# Patient Record
Sex: FEMALE | Race: BLACK OR AFRICAN AMERICAN | NOT HISPANIC OR LATINO | Employment: OTHER | URBAN - METROPOLITAN AREA
[De-identification: names, ages, dates, MRNs, and addresses within clinical notes are randomized per-mention and may not be internally consistent; named-entity substitution may affect disease eponyms.]

---

## 2017-01-16 ENCOUNTER — OFFICE VISIT (OUTPATIENT)
Dept: OPHTHALMOLOGY | Facility: CLINIC | Age: 82
End: 2017-01-16
Payer: MEDICARE

## 2017-01-16 DIAGNOSIS — H01.9 EYELID INFLAMMATION: ICD-10-CM

## 2017-01-16 DIAGNOSIS — H02.889 MGD (MEIBOMIAN GLAND DISEASE), UNSPECIFIED LATERALITY: ICD-10-CM

## 2017-01-16 DIAGNOSIS — H40.1232 LOW-TENSION GLAUCOMA OF BOTH EYES, MODERATE STAGE: Primary | ICD-10-CM

## 2017-01-16 DIAGNOSIS — H26.493 POSTERIOR CAPSULAR OPACIFICATION NON VISUALLY SIGNIFICANT OF BOTH EYES: ICD-10-CM

## 2017-01-16 DIAGNOSIS — Z96.1 PSEUDOPHAKIA: ICD-10-CM

## 2017-01-16 PROCEDURE — 92012 INTRM OPH EXAM EST PATIENT: CPT | Mod: S$GLB,,, | Performed by: OPHTHALMOLOGY

## 2017-01-16 PROCEDURE — 99999 PR PBB SHADOW E&M-EST. PATIENT-LVL II: CPT | Mod: PBBFAC,,, | Performed by: OPHTHALMOLOGY

## 2017-01-16 PROCEDURE — 99499 UNLISTED E&M SERVICE: CPT | Mod: S$GLB,,, | Performed by: OPHTHALMOLOGY

## 2017-01-16 RX ORDER — BRIMONIDINE TARTRATE AND TIMOLOL MALEATE 2; 5 MG/ML; MG/ML
1 SOLUTION OPHTHALMIC 2 TIMES DAILY
Qty: 30 ML | Refills: 3 | Status: SHIPPED | OUTPATIENT
Start: 2017-01-16 | End: 2018-10-19 | Stop reason: SDUPTHER

## 2017-01-16 RX ORDER — LATANOPROST 50 UG/ML
1 SOLUTION/ DROPS OPHTHALMIC NIGHTLY
Qty: 6 BOTTLE | Refills: 3 | Status: SHIPPED | OUTPATIENT
Start: 2017-01-16 | End: 2017-05-16 | Stop reason: SDUPTHER

## 2017-01-16 NOTE — PROGRESS NOTES
HPI     DLS: 9/16/16    Pt here for IOP check;    Meds: Combigan bid ou            Latanoprost qhs ou     1. Posterior capsular opacification non visually significant of both eyes  2. Low tension glaucoma of both eyes, moderate stage  3. Eyelid inflammation, both eyes  4. MGD (meibomian gland disease), unspecified laterality   5. Non-insulin dependent type 2 diabetes mellitus  6. Pseudophakia, both eyes        Last edited by Salud Hartman on 1/16/2017  8:06 AM.         Assessment /Plan     For exam results, see Encounter Report.    Low-tension glaucoma of both eyes, moderate stage    Eyelid inflammation - Both Eyes    MGD (meibomian gland disease), unspecified laterality    Posterior capsular opacification non visually significant of both eyes - Both Eyes    Pseudophakia - Both Eyes      1.  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 Present since 2006.     Family history   ??  Glaucoma meds   Brimonidine BID ou, xalatan OU  H/O adverse rxn to glaucoma drops  none  LASERS   none  GLAUCOMA SURGERIES  : none   OTHER EYE SURGERIES  : PCIOL ou- OD- 3/15/06, 9/27/06 OS   CDR  0.85-0.9 w/ sup thinning // 0.7  Tbase  11-15 / 11-13  Tmax      15/13 (on gtts) (? Tmax off gtts)  Ttarget   12/12  HVF  15 test 2002 to 2016 - -early SNS - fluctuates // ?early SAD, ?early IAD/NS   Gonio  +2-3 OU (narrow inf - but doubt pupillary block - PC IOL ou)  CCT  561/567  OCT  6 test 2007 to 2016 - RNFL - nl od // nl os   HRT 11 test 2007 to 2016 -MR -  Dec. S/I od // dec. S/I, bord N os /// CDR 0.76 od // 0.75 os  Disc photos  2002, 2003 - slides // 2007, 2012, 2014, 2015  - OIS     Ttoday  10/10  Test done today: IOP check    2. PCO ou  -mild - monitor   3. MGD /eyelid Inflammation / Blepharitis  WC/LH/AT's   4. Pseudophakia ou - PC IOL ou   -status post cataract extraction and insertion of intraocular lens - Both Eyes   -minimal PCO ou         Plan    POAG - Low Tension Component OD > OS  Target is 12 ou   CSM   -combignan ou bid  - Latanoprost ou q hs    Patient c/o of trouble with reading- recheck current Rx with good lighting conditions- patient sees 20/20- not more clear with change in Rx- continue Rx for now but use reading light.       Pt has letter from insurance company - she wa getting 2 bottles of latanoprost because she has trouble making one bottle last - but she is only allowed to have 1 bottle per month. -- If she can not make one bottle last can change to travatan or lumigan - higher co - pay , but the non generic bottle has a smaller more controlled gtts size and so last better.  Rx written for them to dispense 2 bottles per month 2/2 dexterity issues - may or may not work / PT did get 2 per month - now doing ok (6/25/2015)    RTC 4 months - HRT/Gonio     I have seen and personally examined the patient.  I agree with the findings, assessment and plan of the resident and/or fellow.     Lupis Lundberg MD

## 2017-02-14 ENCOUNTER — OFFICE VISIT (OUTPATIENT)
Dept: INTERNAL MEDICINE | Facility: CLINIC | Age: 82
End: 2017-02-14
Payer: MEDICARE

## 2017-02-14 ENCOUNTER — HOSPITAL ENCOUNTER (OUTPATIENT)
Dept: RADIOLOGY | Facility: HOSPITAL | Age: 82
Discharge: HOME | End: 2017-02-14
Attending: INTERNAL MEDICINE
Payer: MEDICARE

## 2017-02-14 VITALS
OXYGEN SATURATION: 96 % | HEART RATE: 63 BPM | DIASTOLIC BLOOD PRESSURE: 78 MMHG | WEIGHT: 137.56 LBS | BODY MASS INDEX: 23.49 KG/M2 | SYSTOLIC BLOOD PRESSURE: 128 MMHG | HEIGHT: 64 IN

## 2017-02-14 DIAGNOSIS — I10 ESSENTIAL HYPERTENSION: ICD-10-CM

## 2017-02-14 DIAGNOSIS — M25.519 SHOULDER PAIN, UNSPECIFIED CHRONICITY, UNSPECIFIED LATERALITY: ICD-10-CM

## 2017-02-14 DIAGNOSIS — M25.519 SHOULDER PAIN, UNSPECIFIED CHRONICITY, UNSPECIFIED LATERALITY: Primary | ICD-10-CM

## 2017-02-14 DIAGNOSIS — E11.29 TYPE 2 DIABETES MELLITUS WITH MICROALBUMINURIA, WITHOUT LONG-TERM CURRENT USE OF INSULIN: ICD-10-CM

## 2017-02-14 DIAGNOSIS — R80.9 TYPE 2 DIABETES MELLITUS WITH MICROALBUMINURIA, WITHOUT LONG-TERM CURRENT USE OF INSULIN: ICD-10-CM

## 2017-02-14 PROCEDURE — 1125F AMNT PAIN NOTED PAIN PRSNT: CPT | Mod: S$GLB,,, | Performed by: INTERNAL MEDICINE

## 2017-02-14 PROCEDURE — 1159F MED LIST DOCD IN RCRD: CPT | Mod: S$GLB,,, | Performed by: INTERNAL MEDICINE

## 2017-02-14 PROCEDURE — 99215 OFFICE O/P EST HI 40 MIN: CPT | Mod: S$GLB,,, | Performed by: INTERNAL MEDICINE

## 2017-02-14 PROCEDURE — 73030 X-RAY EXAM OF SHOULDER: CPT | Mod: 26,LT,, | Performed by: RADIOLOGY

## 2017-02-14 PROCEDURE — 1157F ADVNC CARE PLAN IN RCRD: CPT | Mod: S$GLB,,, | Performed by: INTERNAL MEDICINE

## 2017-02-14 PROCEDURE — 99999 PR PBB SHADOW E&M-EST. PATIENT-LVL III: CPT | Mod: PBBFAC,,, | Performed by: INTERNAL MEDICINE

## 2017-02-14 PROCEDURE — 73030 X-RAY EXAM OF SHOULDER: CPT | Mod: TC,LT

## 2017-02-14 PROCEDURE — 1160F RVW MEDS BY RX/DR IN RCRD: CPT | Mod: S$GLB,,, | Performed by: INTERNAL MEDICINE

## 2017-02-14 PROCEDURE — 99499 UNLISTED E&M SERVICE: CPT | Mod: S$GLB,,, | Performed by: INTERNAL MEDICINE

## 2017-02-14 NOTE — MR AVS SNAPSHOT
[facility]  [address]  [facility] [address]  Phone: [phone]  Fax: [phone]                  Inna Blankenship   2017 11:30 AM   Office Visit    Description:  Female : 1926   Provider:  Stacy Rodriguez MD   Department:  [facility]           Reason for Visit     Fall           Diagnoses this Visit        Comments    Shoulder pain, unspecified chronicity, unspecified laterality    -  Primary     Essential hypertension                To Do List           Future Appointments        Provider Department Dept Phone    2017 12:30 PM LAB, APPOINTMENT NOMC INTMED [facility] [phone]    2017 1:45 PM NOM [id number] LB LIMIT [facility] [phone]    2017 9:30 AM Stacy Rodriguez MD [facility] [phone]    2017 8:15 AM PERIMETRY, HUMPH [facility] [phone]    2017 8:30 AM Lupis Lundberg MD [facility] [phone]      Goals (5 Years of Data)     None      [facility] Nurse Care Line -  Assistance  Registered nurses in the [facility] provide clinical advisement, health education, appointment booking, and other advisory services.  Call for this free service at [phone].             Medications           Message regarding Medications     Verify the changes and/or additions to your medication regime listed below are the same as discussed with your clinician today.  If any of these changes or additions are incorrect, please notify your healthcare provider.             Verify that the below list of medications is an accurate representation of the medications you are currently taking.  If none reported, the list may be blank. If incorrect, please contact your healthcare provider. Carry this list with you in case of emergency.           Current Medications     amlodipine (NORVASC) 10 MG tablet  "Take 1 tablet (10 mg total) by mouth once daily.    atorvastatin (LIPITOR) 20 MG tablet Take 1 tablet (20 mg total) by mouth once daily.    brimonidine-timolol (COMBIGAN) 0.2-0.5 % Drop Place 1 drop into both eyes 2 (two) times daily. Disp 10 mls for 1 month    CALCIUM CARBONATE ([id number]-903 ORAL) Take 1 tablet by mouth Daily. 1  Oral Every day    latanoprost 0.005 % ophthalmic solution Place 1 drop into both eyes every evening. 2.5mL does not last a full 30 days due to dexterity issues. Disp. 2 bottles per month.    lisinopril (PRINIVIL,ZESTRIL) 40 MG tablet Take 1 tablet (40 mg total) by mouth once daily.           Clinical Reference Information           Your Vitals Were     BP Pulse Height Weight SpO2 BMI    128/78 63 5' 4" (1.626 m) 62.4 kg (137 lb 9.1 oz) 96% 23.61 kg/m2      Blood Pressure          Most Recent Value    BP  128/78      Allergies as of 2/14/2017     No Known Allergies      Immunizations Administered on Date of Encounter - 2/14/2017     None      Orders Placed During Today's Visit     Future Labs/Procedures Expected by Expires    CBC W/ AUTO DIFFERENTIAL  2/14/2017 4/15/2018    COMPREHENSIVE METABOLIC PANEL  2/14/2017 4/15/2018    X-Ray Shoulder 2 or More Views Left  2/14/2017 2/14/2018      [facility] Sign-Up     Activating your [facility] account is as easy as 1-2-3!     1) Visit [email or web address], select Sign Up Now, enter this activation code and your date of birth, then select Next.  [email or web address]  Expires: 3/31/2017 12:02 PM      2) Create a username and password to use when you visit [facility] in the future and select a security question in case you lose your password and select Next.    3) Enter your e-mail address and click Sign Up!    Additional Information  If you have questions, please e-mail [email or web address] or call [phone] to talk to our [facility] staff. Remember, [facility] is NOT to be used for urgent needs. For medical emergencies, dial 911.         Language Assistance " Services     ATTENTION: Language assistance services are available, free of charge. Please call [phone].      ATENCIÓN: Si habla español, tiene a retana disposición servicios gratuitos de asistencia lingüística. Llame al [phone].     CHÚ Ý: N?u b?n nói Ti?ng Vi?t, có các d?ch v? h? tr? ngôn ng? mi?n phí dành cho b?n. G?i s? [phone].         [facility] complies with applicable Federal civil rights laws and does not discriminate on the basis of race, color, national origin, age, disability, or sex.

## 2017-02-19 NOTE — PROGRESS NOTES
Subjective:       Patient ID: Inna Blankenship is a 89 y.o. female.    Chief Complaint: Diabetes    HPI: She has diabetes.  Denies polyuria, polydipsia.  She tripped and fell about 2 or 3 days ago.  Complains of left shoulder pain.  Denies numbness, no weakness    PAST MEDICAL HISTORY: Hypertension, hyperlipidemia, diabetes with nephropathy (diet   controlled), glaucoma, osteopenia , colon adenoma. She had a colonoscopy February 2011     PAST SURGICAL HISTORY: Hysterectomy.     MEDICATIONS: Lisinopril 40-mg daily, Xalatan drops, Lipitor 20 mg daily, amlodipine 10 mg daily     ALLERGIES: No known drug allergies.        Review of Systems   Constitutional: Negative for chills, fatigue, fever and unexpected weight change.   Respiratory: Negative for chest tightness and shortness of breath.    Cardiovascular: Negative for chest pain and palpitations.   Gastrointestinal: Negative for abdominal pain and blood in stool.   Neurological: Negative for dizziness, syncope, numbness and headaches.       Objective:      Physical Exam   HENT:   Right Ear: External ear normal.   Left Ear: External ear normal.   Nose: Nose normal.   Mouth/Throat: Oropharynx is clear and moist.   Eyes: Pupils are equal, round, and reactive to light.   Neck: Normal range of motion.   Cardiovascular: Normal rate and regular rhythm.    No murmur heard.  Pulmonary/Chest: Breath sounds normal.   Abdominal: She exhibits no distension. There is no hepatosplenomegaly. There is no tenderness.   Lymphadenopathy:     She has no cervical adenopathy.     She has no axillary adenopathy.   Neurological: She has normal strength and normal reflexes. No cranial nerve deficit or sensory deficit.     left shoulder without tenderness or redness  Assessment:         assessment and plan: 1.  Diabetes: Check CMP and CBC  2.  Shoulder pain: Check left shoulder x-ray  3.  She was here for an urgent care only appointment.  She will return to clinic for a physical  Plan:       As  above

## 2017-02-21 ENCOUNTER — OFFICE VISIT (OUTPATIENT)
Dept: INTERNAL MEDICINE | Facility: CLINIC | Age: 82
End: 2017-02-21
Payer: MEDICARE

## 2017-02-21 DIAGNOSIS — E11.9 TYPE 2 DIABETES MELLITUS WITHOUT COMPLICATION, WITHOUT LONG-TERM CURRENT USE OF INSULIN: ICD-10-CM

## 2017-02-21 DIAGNOSIS — I10 ESSENTIAL HYPERTENSION: Primary | ICD-10-CM

## 2017-02-21 PROCEDURE — 99215 OFFICE O/P EST HI 40 MIN: CPT | Mod: S$GLB,,, | Performed by: INTERNAL MEDICINE

## 2017-02-21 PROCEDURE — 99499 UNLISTED E&M SERVICE: CPT | Mod: S$GLB,,, | Performed by: INTERNAL MEDICINE

## 2017-02-21 PROCEDURE — 99999 PR PBB SHADOW E&M-EST. PATIENT-LVL III: CPT | Mod: PBBFAC,,, | Performed by: INTERNAL MEDICINE

## 2017-02-21 PROCEDURE — 1126F AMNT PAIN NOTED NONE PRSNT: CPT | Mod: S$GLB,,, | Performed by: INTERNAL MEDICINE

## 2017-02-21 PROCEDURE — 1157F ADVNC CARE PLAN IN RCRD: CPT | Mod: S$GLB,,, | Performed by: INTERNAL MEDICINE

## 2017-02-21 PROCEDURE — 1159F MED LIST DOCD IN RCRD: CPT | Mod: S$GLB,,, | Performed by: INTERNAL MEDICINE

## 2017-02-21 PROCEDURE — 1160F RVW MEDS BY RX/DR IN RCRD: CPT | Mod: S$GLB,,, | Performed by: INTERNAL MEDICINE

## 2017-02-21 RX ORDER — AMLODIPINE BESYLATE 10 MG/1
10 TABLET ORAL DAILY
Qty: 90 TABLET | Refills: 1 | Status: SHIPPED | OUTPATIENT
Start: 2017-02-21 | End: 2017-06-22 | Stop reason: SDUPTHER

## 2017-02-21 RX ORDER — LISINOPRIL 40 MG/1
40 TABLET ORAL DAILY
Qty: 90 TABLET | Refills: 1 | Status: SHIPPED | OUTPATIENT
Start: 2017-02-21 | End: 2017-06-22 | Stop reason: SDUPTHER

## 2017-02-21 RX ORDER — ATORVASTATIN CALCIUM 20 MG/1
20 TABLET, FILM COATED ORAL DAILY
Qty: 90 TABLET | Refills: 1 | Status: SHIPPED | OUTPATIENT
Start: 2017-02-21 | End: 2017-06-22 | Stop reason: SDUPTHER

## 2017-02-21 NOTE — MR AVS SNAPSHOT
[facility]  [address]  [facility] [address]  Phone: [phone]  Fax: [phone]                  Inna Blankenship   2017 9:30 AM   Office Visit    Description:  Female : 1926   Provider:  Stacy Rodriguez MD   Department:  [facility]           Reason for Visit     Follow-up           Diagnoses this Visit        Comments    Essential hypertension    -  Primary     Type 2 diabetes mellitus without complication, without long-term current use of insulin                To Do List           Future Appointments        Provider Department Dept Phone    2017 8:15 AM PERIMETRY, HUMPH [facility] - Ophthal[facility] [phone]    2017 8:30 AM Lupis Lundberg MD [facility] [phone]    2017 10:30 AM MD Johnny Martinez [facility] [phone]      Goals (5 Years of Data)     None      Follow-Up and Disposition     Return for EPP 4 months.       These Medications        Disp Refills Start End    lisinopril (PRINIVIL,ZESTRIL) 40 MG tablet 90 tablet 1 2017     Take 1 tablet (40 mg total) by mouth once daily. - Oral    Pharmacy: [facility] [address] [place] [address] (), [place] - [address] Ph #: [phone]       atorvastatin (LIPITOR) 20 MG tablet 90 tablet 1 2017     Take 1 tablet (20 mg total) by mouth once daily. - Oral    Pharmacy: [facility] [address] [place] [address] (), [place] - [address] Ph #: [phone]       Notes to Pharmacy: **Patient requests 90 day supply**    amlodipine (NORVASC) 10 MG tablet 90 tablet 1 2017     Take 1 tablet (10 mg total) by mouth once daily. - Oral    Pharmacy: [facility] [address] [place] [address] (N), [place] - [address] Ph #: [phone]       Notes to Pharmacy: **Patient requests 90 day supply**      [facility]     [facility] Nurse Care Line -  Assistance  Registered nurses in the [facility] provide clinical  "advisement, health education, appointment booking, and other advisory services.  Call for this free service at [phone].             Medications           Message regarding Medications     Verify the changes and/or additions to your medication regime listed below are the same as discussed with your clinician today.  If any of these changes or additions are incorrect, please notify your healthcare provider.             Verify that the below list of medications is an accurate representation of the medications you are currently taking.  If none reported, the list may be blank. If incorrect, please contact your healthcare provider. Carry this list with you in case of emergency.           Current Medications     amlodipine (NORVASC) 10 MG tablet Take 1 tablet (10 mg total) by mouth once daily.    atorvastatin (LIPITOR) 20 MG tablet Take 1 tablet (20 mg total) by mouth once daily.    brimonidine-timolol (COMBIGAN) 0.2-0.5 % Drop Place 1 drop into both eyes 2 (two) times daily. Disp 10 mls for 1 month    CALCIUM CARBONATE ([id number] ORAL) Take 1 tablet by mouth Daily. 1  Oral Every day    latanoprost 0.005 % ophthalmic solution Place 1 drop into both eyes every evening. 2.5mL does not last a full 30 days due to dexterity issues. Disp. 2 bottles per month.    lisinopril (PRINIVIL,ZESTRIL) 40 MG tablet Take 1 tablet (40 mg total) by mouth once daily.           Clinical Reference Information           Your Vitals Were     BP Pulse Temp Height Weight BMI    124/60 50 97.6 °F (36.4 °C) 5' 4" (1.626 m) 61.8 kg (136 lb 3.9 oz) 23.39 kg/m2      Blood Pressure          Most Recent Value    BP  124/60      Allergies as of 2/21/2017     No Known Allergies      Immunizations Administered on Date of Encounter - 2/21/2017     None      Orders Placed During Today's Visit     Future Labs/Procedures Expected by Expires    Comprehensive metabolic panel  2/21/2017 2/21/2018    Hemoglobin A1c  2/21/2017 2/21/2018    Lipid panel  " 2/21/2017 2/21/2018      [facility] Sign-Up     Activating your [facility] account is as easy as 1-2-3!     1) Visit [email or web address], select Sign Up Now, enter this activation code and your date of birth, then select Next.  [email or web address]  Expires: 3/31/2017 12:02 PM      2) Create a username and password to use when you visit [facility] in the future and select a security question in case you lose your password and select Next.    3) Enter your e-mail address and click Sign Up!    Additional Information  If you have questions, please e-mail [email or web address] or call [phone] to talk to our [facility] staff. Remember, [facility] is NOT to be used for urgent needs. For medical emergencies, dial 911.         Language Assistance Services     ATTENTION: Language assistance services are available, free of charge. Please call [phone].      ATENCIÓN: Si habla timothy, tiene a retana disposición servicios gratuitos de asistencia lingüística. Llame al [phone].     WALI Ý: N?u b?n nói Ti?ng Vi?t, có các d?ch v? h? tr? ngôn ng? mi?n phí dành cho b?n. G?i s? [phone].         [facility] complies with applicable Federal civil rights laws and does not discriminate on the basis of race, color, national origin, age, disability, or sex.

## 2017-02-26 VITALS
HEART RATE: 62 BPM | WEIGHT: 136.25 LBS | TEMPERATURE: 98 F | HEIGHT: 64 IN | BODY MASS INDEX: 23.26 KG/M2 | DIASTOLIC BLOOD PRESSURE: 60 MMHG | SYSTOLIC BLOOD PRESSURE: 124 MMHG

## 2017-02-26 NOTE — PROGRESS NOTES
Subjective:       Patient ID: Inna Blankenship is a 89 y.o. female.    Chief Complaint: Diabetes    HPI: She has diabetes.  Denies polyuria, polydipsia  Review of Systems   Constitutional: Negative for chills, fatigue, fever and unexpected weight change.   Respiratory: Negative for chest tightness and shortness of breath.    Cardiovascular: Negative for chest pain and palpitations.   Gastrointestinal: Negative for abdominal pain and blood in stool.   Neurological: Negative for dizziness, syncope, numbness and headaches.       Objective:      Physical Exam   HENT:   Right Ear: External ear normal.   Left Ear: External ear normal.   Nose: Nose normal.   Mouth/Throat: Oropharynx is clear and moist.   Eyes: Pupils are equal, round, and reactive to light.   Neck: Normal range of motion.   Cardiovascular: Normal rate and regular rhythm.    No murmur heard.  Pulmonary/Chest: Breath sounds normal.   Abdominal: She exhibits no distension. There is no hepatosplenomegaly. There is no tenderness.   Lymphadenopathy:     She has no cervical adenopathy.     She has no axillary adenopathy.   Neurological: She has normal strength and normal reflexes. No cranial nerve deficit or sensory deficit.       Assessment:         assessment and plan: Diabetes: Check CMP, lipid panel, glycosylated hemoglobin.  Discussed Pap smear, pelvic exam, mammogram, breast exam, colonoscopy.  She refused all   Plan:       As above

## 2017-05-16 ENCOUNTER — CLINICAL SUPPORT (OUTPATIENT)
Dept: OPHTHALMOLOGY | Facility: CLINIC | Age: 82
End: 2017-05-16
Payer: MEDICARE

## 2017-05-16 ENCOUNTER — OFFICE VISIT (OUTPATIENT)
Dept: OPHTHALMOLOGY | Facility: CLINIC | Age: 82
End: 2017-05-16
Payer: MEDICARE

## 2017-05-16 DIAGNOSIS — H40.1232 LOW-TENSION GLAUCOMA OF BOTH EYES, MODERATE STAGE: ICD-10-CM

## 2017-05-16 DIAGNOSIS — H02.889 MGD (MEIBOMIAN GLAND DISEASE), UNSPECIFIED LATERALITY: ICD-10-CM

## 2017-05-16 DIAGNOSIS — E11.9 NON-INSULIN DEPENDENT TYPE 2 DIABETES MELLITUS: ICD-10-CM

## 2017-05-16 DIAGNOSIS — Z96.1 PSEUDOPHAKIA: ICD-10-CM

## 2017-05-16 DIAGNOSIS — H26.493 POSTERIOR CAPSULAR OPACIFICATION NON VISUALLY SIGNIFICANT OF BOTH EYES: ICD-10-CM

## 2017-05-16 DIAGNOSIS — H01.9 EYELID INFLAMMATION: ICD-10-CM

## 2017-05-16 DIAGNOSIS — H40.1232 LOW-TENSION GLAUCOMA OF BOTH EYES, MODERATE STAGE: Primary | ICD-10-CM

## 2017-05-16 PROCEDURE — 99499 UNLISTED E&M SERVICE: CPT | Mod: S$GLB,,, | Performed by: OPHTHALMOLOGY

## 2017-05-16 PROCEDURE — 92012 INTRM OPH EXAM EST PATIENT: CPT | Mod: S$GLB,,, | Performed by: OPHTHALMOLOGY

## 2017-05-16 PROCEDURE — 99999 PR PBB SHADOW E&M-EST. PATIENT-LVL II: CPT | Mod: PBBFAC,,, | Performed by: OPHTHALMOLOGY

## 2017-05-16 PROCEDURE — 92134 CPTRZ OPH DX IMG PST SGM RTA: CPT | Mod: S$GLB,,, | Performed by: OPHTHALMOLOGY

## 2017-05-16 RX ORDER — LATANOPROST 50 UG/ML
1 SOLUTION/ DROPS OPHTHALMIC NIGHTLY
Qty: 3 BOTTLE | Refills: 3 | Status: SHIPPED | OUTPATIENT
Start: 2017-05-16 | End: 2018-05-28 | Stop reason: SDUPTHER

## 2017-05-16 NOTE — PROGRESS NOTES
HPI     Glaucoma    Additional comments: HRT review today           Eye Problem    Additional comments: Pt having more difficulty reading small print           Comments   DLS: 1/16/17    1) LTG OD>OS  2) Blepharitis/MGD  3) PCO OU  4) PCIOL OU  5) PCIOL OU    MEDS:  Combigan BID OU  Latanoprost QHS OU       Last edited by Lamar Tinajero MA on 5/16/2017  8:36 AM. (History)            Assessment /Plan     For exam results, see Encounter Report.    Low-tension glaucoma of both eyes, moderate stage    Eyelid inflammation - Both Eyes    MGD (meibomian gland disease), unspecified laterality    Posterior capsular opacification non visually significant of both eyes - Both Eyes    Pseudophakia - Both Eyes    Non-insulin dependent type 2 diabetes mellitus      1.  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 Present since 2006.     Family history   ??  Glaucoma meds   Brimonidine BID ou, xalatan OU  H/O adverse rxn to glaucoma drops  none  LASERS   none  GLAUCOMA SURGERIES  : none   OTHER EYE SURGERIES  : PCIOL ou- OD- 3/15/06, 9/27/06 OS   CDR  0.85-0.9 w/ sup thinning // 0.7  Tbase  11-15 / 11-13  Tmax      15/13 (on gtts) (? Tmax off gtts)  Ttarget   12/12  HVF  15 test 2002 to 2016 - -early SNS - fluctuates // ?early SAD, ?early IAD/NS   Gonio  +2-3 OU (narrow inf - but doubt pupillary block - PC IOL ou)  CCT  561/567  OCT  6 test 2007 to 2016 - RNFL - nl od // nl os   HRT 12 test 2007 to 2017-MR -  MR -  Dec S/I/N bord T od // dec S/I bord N os /// CDR 0.84 od // 0.80 os  Disc photos  2002, 2003 - slides // 2007, 2012, 2014, 2015  - OIS     Ttoday  10/10  Test done today: HRT/Gonio   2. PCO ou  -mild - monitor   3. MGD /eyelid Inflammation / Blepharitis  WC/LH/AT's   4. Pseudophakia ou - PC IOL ou   -status post cataract extraction and insertion of intraocular lens - Both Eyes   -minimal PCO ou        Plan    POAG - Low Tension  Component OD > OS  Target is 12 ou   CSM   -combignan ou bid  - Latanoprost ou q hs (disp 3 bottles at a time)     Patient c/o of trouble with reading- recheck current Rx with good lighting conditions- patient sees 20/20- not more clear with change in Rx- continue Rx for now but use reading light.       RTC 4 months - HVF/OCT/DFE    I have seen and personally examined the patient.  I agree with the findings, assessment and plan of the resident and/or fellow.     Lupis Lundberg MD

## 2017-05-23 ENCOUNTER — TELEPHONE (OUTPATIENT)
Dept: OPHTHALMOLOGY | Facility: CLINIC | Age: 82
End: 2017-05-23

## 2017-05-23 NOTE — TELEPHONE ENCOUNTER
faxed ok for refills of combigan and latanoprost to [facility] pharmacy, [phone]- 3 refills w/90 day supply given

## 2017-06-04 ENCOUNTER — HOSPITAL ENCOUNTER (EMERGENCY)
Facility: OTHER | Age: 82
Discharge: HOME | End: 2017-06-04
Attending: EMERGENCY MEDICINE
Payer: MEDICARE

## 2017-06-04 VITALS
SYSTOLIC BLOOD PRESSURE: 177 MMHG | BODY MASS INDEX: 24.24 KG/M2 | HEIGHT: 64 IN | WEIGHT: 142 LBS | OXYGEN SATURATION: 98 % | RESPIRATION RATE: 16 BRPM | TEMPERATURE: 98 F | HEART RATE: 68 BPM | DIASTOLIC BLOOD PRESSURE: 78 MMHG

## 2017-06-04 DIAGNOSIS — I82.432 ACUTE DEEP VEIN THROMBOSIS (DVT) OF POPLITEAL VEIN OF LEFT LOWER EXTREMITY: Primary | ICD-10-CM

## 2017-06-04 DIAGNOSIS — M79.89 LEG SWELLING: ICD-10-CM

## 2017-06-04 LAB
ALBUMIN SERPL BCP-MCNC: 4.3 G/DL
ALP SERPL-CCNC: 103 U/L
ALT SERPL W/O P-5'-P-CCNC: 14 U/L
ANION GAP SERPL CALC-SCNC: 12 MMOL/L
AST SERPL-CCNC: 20 U/L
BASOPHILS # BLD AUTO: 0.01 K/UL
BASOPHILS NFR BLD: 0.2 %
BILIRUB SERPL-MCNC: 1.1 MG/DL
BILIRUB UR QL STRIP: NEGATIVE
BUN SERPL-MCNC: 10 MG/DL
CALCIUM SERPL-MCNC: 11.1 MG/DL
CHLORIDE SERPL-SCNC: 103 MMOL/L
CLARITY UR: CLEAR
CO2 SERPL-SCNC: 27 MMOL/L
COLOR UR: YELLOW
CREAT SERPL-MCNC: 0.8 MG/DL
DIFFERENTIAL METHOD: ABNORMAL
EOSINOPHIL # BLD AUTO: 0.1 K/UL
EOSINOPHIL NFR BLD: 1.1 %
ERYTHROCYTE [DISTWIDTH] IN BLOOD BY AUTOMATED COUNT: 13.9 %
EST. GFR  (AFRICAN AMERICAN): >60 ML/MIN/1.73 M^2
EST. GFR  (NON AFRICAN AMERICAN): >60 ML/MIN/1.73 M^2
GLUCOSE SERPL-MCNC: 106 MG/DL
GLUCOSE UR QL STRIP: NEGATIVE
HCT VFR BLD AUTO: 41.5 %
HGB BLD-MCNC: 13.9 G/DL
HGB UR QL STRIP: NEGATIVE
KETONES UR QL STRIP: NEGATIVE
LEUKOCYTE ESTERASE UR QL STRIP: NEGATIVE
LYMPHOCYTES # BLD AUTO: 1.7 K/UL
LYMPHOCYTES NFR BLD: 32 %
MCH RBC QN AUTO: 31.3 PG
MCHC RBC AUTO-ENTMCNC: 33.5 %
MCV RBC AUTO: 94 FL
MONOCYTES # BLD AUTO: 0.4 K/UL
MONOCYTES NFR BLD: 6.9 %
NEUTROPHILS # BLD AUTO: 3.1 K/UL
NEUTROPHILS NFR BLD: 59.6 %
NITRITE UR QL STRIP: NEGATIVE
PH UR STRIP: 7 [PH] (ref 5–8)
PLATELET # BLD AUTO: 216 K/UL
PMV BLD AUTO: 11.4 FL
POTASSIUM SERPL-SCNC: 3.7 MMOL/L
PROT SERPL-MCNC: 8.7 G/DL
PROT UR QL STRIP: ABNORMAL
RBC # BLD AUTO: 4.44 M/UL
SODIUM SERPL-SCNC: 142 MMOL/L
SP GR UR STRIP: 1.01 (ref 1–1.03)
URN SPEC COLLECT METH UR: ABNORMAL
UROBILINOGEN UR STRIP-ACNC: NEGATIVE EU/DL
WBC # BLD AUTO: 5.22 K/UL

## 2017-06-04 PROCEDURE — 85025 COMPLETE CBC W/AUTO DIFF WBC: CPT

## 2017-06-04 PROCEDURE — 99284 EMERGENCY DEPT VISIT MOD MDM: CPT

## 2017-06-04 PROCEDURE — 25000003 PHARM REV CODE 250: Performed by: EMERGENCY MEDICINE

## 2017-06-04 PROCEDURE — 81003 URINALYSIS AUTO W/O SCOPE: CPT

## 2017-06-04 PROCEDURE — 80053 COMPREHEN METABOLIC PANEL: CPT

## 2017-06-04 RX ADMIN — APIXABAN 5 MG: 2.5 TABLET, FILM COATED ORAL at 07:06

## 2017-06-04 NOTE — ED TRIAGE NOTES
Patient c/o left ankle swelling and pain that started approx 1 week ago.  Patient also c/o swelling to the left side of her face.  Denies respiratory issues.  Patient has been feeling fatigued for awhile.  Denies any trauma.

## 2017-06-04 NOTE — ED NOTES
Pt aware need for urine sample. States she will call nurse when needing to void. Family remains at recliner area w/ pt. Call light within reach.

## 2017-06-04 NOTE — ED PROVIDER NOTES
Encounter Date: 6/4/2017    SCRIBE #1 NOTE: I, Jocelyn Ann, am scribing for, and in the presence of,  Dr. Ogden. I have scribed the entire note.       History     Chief Complaint   Patient presents with    Leg Swelling     Pt c/o left leg swelling x 1 week. Also c/o lt side facial swelling that started last night     Review of patient's allergies indicates:  No Known Allergies  Time seen by provider: 5:00 PM    This is a 89 y.o. female with arthritis, CKD, DM, HLD, and HTN who presents with complaint of acute L lower leg swelling. She reports gradual onset of symptoms over the last week. She denies recent trauma or fall. She denies any recent travel. She also notes mild L sided facial swelling last night that has improved. She denies any dental issues or SOB.      The history is provided by the patient.     Past Medical History:   Diagnosis Date    Arthritis     Chronic kidney disease, stage III (moderate) 9/11/2015    Colon adenoma     Diabetes mellitus     diet controlled    Glaucoma     Hyperlipemia     Hypertension     Osteopenia     Type II or unspecified type diabetes mellitus without mention of complication, not stated as uncontrolled      Past Surgical History:   Procedure Laterality Date    APPENDECTOMY      CATARACT EXTRACTION W/  INTRAOCULAR LENS IMPLANT Right 3/15/06    OD w/ dr. pitt    CATARACT EXTRACTION W/  INTRAOCULAR LENS IMPLANT Left 9/27/06    OS w/ dr. pitt    COLONOSCOPY W/ POLYPECTOMY      EYE SURGERY      HYSTERECTOMY       Family History   Problem Relation Age of Onset    Heart attack Mother     Heart disease Mother     No Known Problems Father     Cancer Sister     Hypertension Daughter     Asthma Daughter     Allergies Daughter     Amblyopia Neg Hx     Blindness Neg Hx     Cataracts Neg Hx     Diabetes Neg Hx     Glaucoma Neg Hx     Macular degeneration Neg Hx     Retinal detachment Neg Hx     Strabismus Neg Hx     Stroke Neg Hx     Thyroid  disease Neg Hx      Social History   Substance Use Topics    Smoking status: Never Smoker    Smokeless tobacco: Never Used    Alcohol use Yes      Comment: High ball (scotch and water) on occassion socially     Review of Systems   Constitutional: Negative for diaphoresis and fever.   HENT: Positive for facial swelling (L (resolved)). Negative for congestion and sore throat.    Eyes: Negative for pain.   Respiratory: Negative for cough and shortness of breath.    Cardiovascular: Positive for leg swelling (LLE). Negative for chest pain.   Gastrointestinal: Negative for abdominal pain, constipation, diarrhea, nausea and vomiting.   Genitourinary: Negative for dysuria and hematuria.   Musculoskeletal: Negative for myalgias.   Skin: Negative for color change and rash.   Neurological: Negative for syncope and headaches.   Psychiatric/Behavioral: Negative for behavioral problems and confusion.       Physical Exam     Initial Vitals [06/04/17 1505]   BP Pulse Resp Temp SpO2   (!) 173/91 66 18 97.1 °F (36.2 °C) 98 %     Physical Exam    Nursing note and vitals reviewed.  Constitutional: She appears well-developed and well-nourished. She is not diaphoretic. No distress.   HENT:   Head: Normocephalic and atraumatic.   Eyes: Conjunctivae are normal. Pupils are equal, round, and reactive to light.   Neck: Normal range of motion. Neck supple. No JVD present.   Cardiovascular: Normal rate, regular rhythm and normal heart sounds. Exam reveals no gallop and no friction rub.    No murmur heard.  Pulses:       Dorsalis pedis pulses are 2+ on the right side, and 2+ on the left side.   Pulmonary/Chest: Breath sounds normal. No respiratory distress. She has no wheezes. She has no rhonchi. She has no rales.   Abdominal: Soft. Bowel sounds are normal. She exhibits no distension. There is no tenderness. There is no rebound and no guarding.   Musculoskeletal: Normal range of motion. She exhibits edema. She exhibits no tenderness.   Edema  to the L leg from midcalf down. No tenderness or erythema. No edema to R leg.    Lymphadenopathy:     She has no cervical adenopathy.   Neurological: She is alert and oriented to person, place, and time. No sensory deficit.         ED Course   Procedures  Labs Reviewed   CBC W/ AUTO DIFFERENTIAL - Abnormal; Notable for the following:        Result Value    MCH 31.3 (*)     All other components within normal limits   COMPREHENSIVE METABOLIC PANEL - Abnormal; Notable for the following:     Calcium 11.1 (*)     Total Protein 8.7 (*)     Total Bilirubin 1.1 (*)     All other components within normal limits   URINALYSIS - Abnormal; Notable for the following:     Protein, UA Trace (*)     All other components within normal limits      Imaging Results          US Lower Extremity Veins Left (Final result)  Result time 06/04/17 18:15:03    Final result by Salinas Cox MD (06/04/17 18:15:03)                 Impression:     Left lower extremity DVT.      Electronically signed by: SALINAS COX MD, MD  Date:     06/04/17  Time:    18:15              Narrative:    COMPARISON: None    TECHNIQUE:  Gray scale graded compression, color flow and Doppler waveform analysis of the left lower extremity venous system.      FINDINGS:  There is nonocclusive thrombus identified within the left common femoral vein. There is occlusive thrombus seen within the left superficial femoral vein.        Paired posterior tibial and peroneal veins noted. One anterior tibial vein identified. The remaining imaged veins of the left lower extremity and right common femoral vein appear patent with normal direction of flow and wave forms. No discreet fluid collections.                                      Medical Decision Making:   Initial Assessment:   Patient presents to the ED with edema to L leg. I will check US for DVT. It does not appear to be cellulitis. She has no symptoms to suggest pulmonary edema or acute coronary syndrome.   Clinical Tests:    Lab Tests: Ordered and Reviewed  Radiological Study: Ordered and Reviewed  ED Management:  6:54 PM Patient has a DVT. I reviewed the case with Dr. Arreaga (internal medicine). She does has renal insuffiencey. He recommends starting her on Eliquis 2.5 bid and follow up with Dr. Rodriguez this week. I reviewed fall precautions with the patient. She reports occasional falls but does not hit her head. She would like to go home and follow up with her PCP. I highly doubt PE.            Scribe Attestation:   Scribe #1: I performed the above scribed service and the documentation accurately describes the services I performed. I attest to the accuracy of the note.    Attending Attestation:           Physician Attestation for Scribe:  Physician Attestation Statement for Scribe #1: I, Dr. Ogden, reviewed documentation, as scribed by Jocelyn Ann in my presence, and it is both accurate and complete.                 ED Course     Clinical Impression:     1. Acute deep vein thrombosis (DVT) of popliteal vein of left lower extremity    2. Leg swelling          Disposition:   Disposition: Discharged  Condition: Stable       Michael Ogden MD  06/10/17 2054

## 2017-06-08 ENCOUNTER — OFFICE VISIT (OUTPATIENT)
Dept: INTERNAL MEDICINE | Facility: CLINIC | Age: 82
End: 2017-06-08
Payer: MEDICARE

## 2017-06-08 ENCOUNTER — LAB VISIT (OUTPATIENT)
Dept: LAB | Facility: HOSPITAL | Age: 82
End: 2017-06-08
Attending: INTERNAL MEDICINE
Payer: MEDICARE

## 2017-06-08 VITALS
TEMPERATURE: 98 F | BODY MASS INDEX: 23.22 KG/M2 | SYSTOLIC BLOOD PRESSURE: 126 MMHG | WEIGHT: 136 LBS | HEART RATE: 62 BPM | DIASTOLIC BLOOD PRESSURE: 70 MMHG | HEIGHT: 64 IN

## 2017-06-08 DIAGNOSIS — I82.403 ACUTE DEEP VEIN THROMBOSIS (DVT) OF BOTH LOWER EXTREMITIES, UNSPECIFIED VEIN: ICD-10-CM

## 2017-06-08 DIAGNOSIS — I82.403 ACUTE DEEP VEIN THROMBOSIS (DVT) OF BOTH LOWER EXTREMITIES, UNSPECIFIED VEIN: Primary | ICD-10-CM

## 2017-06-08 LAB
ALBUMIN SERPL BCP-MCNC: 3.8 G/DL
ALP SERPL-CCNC: 86 U/L
ALT SERPL W/O P-5'-P-CCNC: 13 U/L
ANION GAP SERPL CALC-SCNC: 9 MMOL/L
AST SERPL-CCNC: 17 U/L
BILIRUB SERPL-MCNC: 0.6 MG/DL
BUN SERPL-MCNC: 14 MG/DL
CALCIUM SERPL-MCNC: 10.4 MG/DL
CHLORIDE SERPL-SCNC: 104 MMOL/L
CO2 SERPL-SCNC: 26 MMOL/L
CREAT SERPL-MCNC: 0.9 MG/DL
EST. GFR  (AFRICAN AMERICAN): >60 ML/MIN/1.73 M^2
EST. GFR  (NON AFRICAN AMERICAN): 56 ML/MIN/1.73 M^2
GLUCOSE SERPL-MCNC: 101 MG/DL
POTASSIUM SERPL-SCNC: 4.2 MMOL/L
PROT SERPL-MCNC: 7.4 G/DL
PTH-INTACT SERPL-MCNC: 47 PG/ML
SODIUM SERPL-SCNC: 139 MMOL/L

## 2017-06-08 PROCEDURE — 99215 OFFICE O/P EST HI 40 MIN: CPT | Mod: S$GLB,,, | Performed by: INTERNAL MEDICINE

## 2017-06-08 PROCEDURE — 80053 COMPREHEN METABOLIC PANEL: CPT

## 2017-06-08 PROCEDURE — 1159F MED LIST DOCD IN RCRD: CPT | Mod: S$GLB,,, | Performed by: INTERNAL MEDICINE

## 2017-06-08 PROCEDURE — 1126F AMNT PAIN NOTED NONE PRSNT: CPT | Mod: S$GLB,,, | Performed by: INTERNAL MEDICINE

## 2017-06-08 PROCEDURE — 83970 ASSAY OF PARATHORMONE: CPT

## 2017-06-08 PROCEDURE — 99499 UNLISTED E&M SERVICE: CPT | Mod: S$GLB,,, | Performed by: INTERNAL MEDICINE

## 2017-06-08 PROCEDURE — 99999 PR PBB SHADOW E&M-EST. PATIENT-LVL IV: CPT | Mod: PBBFAC,,, | Performed by: INTERNAL MEDICINE

## 2017-06-08 PROCEDURE — 36415 COLL VENOUS BLD VENIPUNCTURE: CPT

## 2017-06-09 ENCOUNTER — TELEPHONE (OUTPATIENT)
Dept: INTERNAL MEDICINE | Facility: CLINIC | Age: 82
End: 2017-06-09

## 2017-06-09 DIAGNOSIS — I82.403 DEEP VEIN THROMBOSIS (DVT) OF BOTH LOWER EXTREMITIES, UNSPECIFIED CHRONICITY, UNSPECIFIED VEIN: Primary | ICD-10-CM

## 2017-06-09 NOTE — TELEPHONE ENCOUNTER
She has an appt 7-11-17 with cardiovascular. I prefer for her to see  in vascular medicine. Please cancel the 7-11-17 appt, reschedule her with Dr. Prieto in vascular medicine. Please inform patient

## 2017-06-11 NOTE — PROGRESS NOTES
Subjective:       Patient ID: Inna Blankenship is a 89 y.o. female.    Chief Complaint: Hospital Follow Up    HPI: she was recently hospitalized with a DVT.  Please see hospitalization records.  Currently without complaint  Review of Systems   Constitutional: Negative for chills, fatigue, fever and unexpected weight change.   Respiratory: Negative for chest tightness and shortness of breath.    Cardiovascular: Negative for chest pain and palpitations.   Gastrointestinal: Negative for abdominal pain and blood in stool.   Neurological: Negative for dizziness, syncope, numbness and headaches.       Objective:      Physical Exam   HENT:   Right Ear: External ear normal.   Left Ear: External ear normal.   Nose: Nose normal.   Mouth/Throat: Oropharynx is clear and moist.   Eyes: Pupils are equal, round, and reactive to light.   Neck: Normal range of motion.   Cardiovascular: Normal rate and regular rhythm.    No murmur heard.  Pulmonary/Chest: Breath sounds normal.   Abdominal: She exhibits no distension. There is no hepatosplenomegaly. There is no tenderness.   Lymphadenopathy:     She has no cervical adenopathy.     She has no axillary adenopathy.   Neurological: She has normal strength and normal reflexes. No cranial nerve deficit or sensory deficit.       Assessment:     assessment and plan: 1.  DVT: Increase Eliquis to 5 mg twice a day.schedule vascular medicine appointment  2.  Hypercalcemia: Check CMP and PTH  3.  She was here for an urgent care only appointment she will return to clinic for a physical      Plan:       As above

## 2017-06-12 NOTE — TELEPHONE ENCOUNTER
Spoke with vascular med and Dr. Meadows is no longer there. They stated the pt can go to main campus and see DR. Sabillon or one other dr but we no longer have a vasc med

## 2017-06-22 ENCOUNTER — OFFICE VISIT (OUTPATIENT)
Dept: INTERNAL MEDICINE | Facility: CLINIC | Age: 82
End: 2017-06-22
Payer: MEDICARE

## 2017-06-22 DIAGNOSIS — I82.4Y9 ACUTE DEEP VEIN THROMBOSIS (DVT) OF PROXIMAL VEIN OF LOWER EXTREMITY, UNSPECIFIED LATERALITY: Primary | ICD-10-CM

## 2017-06-22 PROCEDURE — 99215 OFFICE O/P EST HI 40 MIN: CPT | Mod: S$GLB,,, | Performed by: INTERNAL MEDICINE

## 2017-06-22 PROCEDURE — 99999 PR PBB SHADOW E&M-EST. PATIENT-LVL III: CPT | Mod: PBBFAC,,, | Performed by: INTERNAL MEDICINE

## 2017-06-22 PROCEDURE — 1126F AMNT PAIN NOTED NONE PRSNT: CPT | Mod: S$GLB,,, | Performed by: INTERNAL MEDICINE

## 2017-06-22 PROCEDURE — 99499 UNLISTED E&M SERVICE: CPT | Mod: S$GLB,,, | Performed by: INTERNAL MEDICINE

## 2017-06-22 PROCEDURE — 1159F MED LIST DOCD IN RCRD: CPT | Mod: S$GLB,,, | Performed by: INTERNAL MEDICINE

## 2017-06-22 RX ORDER — AMLODIPINE BESYLATE 10 MG/1
10 TABLET ORAL DAILY
Qty: 90 TABLET | Refills: 1 | Status: SHIPPED | OUTPATIENT
Start: 2017-06-22 | End: 2017-10-02 | Stop reason: SDUPTHER

## 2017-06-22 RX ORDER — ATORVASTATIN CALCIUM 20 MG/1
20 TABLET, FILM COATED ORAL DAILY
Qty: 90 TABLET | Refills: 1 | Status: SHIPPED | OUTPATIENT
Start: 2017-06-22 | End: 2017-10-02 | Stop reason: SDUPTHER

## 2017-06-22 RX ORDER — LISINOPRIL 40 MG/1
40 TABLET ORAL DAILY
Qty: 90 TABLET | Refills: 1 | Status: SHIPPED | OUTPATIENT
Start: 2017-06-22 | End: 2017-10-02 | Stop reason: SDUPTHER

## 2017-06-23 ENCOUNTER — TELEPHONE (OUTPATIENT)
Dept: INTERNAL MEDICINE | Facility: CLINIC | Age: 82
End: 2017-06-23

## 2017-06-23 NOTE — TELEPHONE ENCOUNTER
----- Message from Rebecca Abreu MA sent at 6/23/2017 10:29 AM CDT -----  Contact: self - [phone] or [phone]  Patient would like to know if she can take an Advil while on blood thinners. Please call. Thanks!

## 2017-06-23 NOTE — TELEPHONE ENCOUNTER
Called patient at both available numbers, unable to reach, left brief vm.  Would recommend avoiding NSAIDs. Use tylenol if needed prn pain.

## 2017-06-25 VITALS
BODY MASS INDEX: 23.22 KG/M2 | OXYGEN SATURATION: 96 % | SYSTOLIC BLOOD PRESSURE: 134 MMHG | WEIGHT: 136 LBS | TEMPERATURE: 99 F | HEART RATE: 62 BPM | HEIGHT: 64 IN | DIASTOLIC BLOOD PRESSURE: 70 MMHG

## 2017-06-26 ENCOUNTER — TELEPHONE (OUTPATIENT)
Dept: INTERNAL MEDICINE | Facility: CLINIC | Age: 82
End: 2017-06-26

## 2017-06-26 NOTE — PROGRESS NOTES
Subjective:       Patient ID: Inna Blankenship is a 89 y.o. female.    Chief Complaint: Follow-up    HPI: She is here for follow-up of DVT.  Currently without complaint  Review of Systems   Constitutional: Negative for chills, fatigue, fever and unexpected weight change.   Respiratory: Negative for chest tightness and shortness of breath.    Cardiovascular: Negative for chest pain and palpitations.   Gastrointestinal: Negative for abdominal pain and blood in stool.   Neurological: Negative for dizziness, syncope, numbness and headaches.       Objective:      Physical Exam   HENT:   Right Ear: External ear normal.   Left Ear: External ear normal.   Nose: Nose normal.   Mouth/Throat: Oropharynx is clear and moist.   Eyes: Pupils are equal, round, and reactive to light.   Neck: Normal range of motion.   Cardiovascular: Normal rate and regular rhythm.    No murmur heard.  Pulmonary/Chest: Breath sounds normal.   Abdominal: She exhibits no distension. There is no hepatosplenomegaly. There is no tenderness.   Lymphadenopathy:     She has no cervical adenopathy.     She has no axillary adenopathy.   Neurological: She has normal strength and normal reflexes. No cranial nerve deficit or sensory deficit.       Assessment:         assessment and plan: DVT: Continue Eliquis.  Discussed bone density.  She refused  Plan:       As above

## 2017-06-26 NOTE — TELEPHONE ENCOUNTER
----- Message from Espinoza Robles sent at 6/26/2017 12:08 PM CDT -----  Contact: self [phone]  Patient is returning a call from the office . Please call and advise  Thanks !

## 2017-06-27 NOTE — TELEPHONE ENCOUNTER
spoke with pt and pt wants to know if its okay to take an advil being that she is on blood thinners

## 2017-07-05 RX ORDER — ATORVASTATIN CALCIUM 20 MG/1
TABLET, FILM COATED ORAL
Qty: 90 TABLET | Refills: 0 | Status: SHIPPED | OUTPATIENT
Start: 2017-07-05 | End: 2017-07-11 | Stop reason: SDUPTHER

## 2017-07-11 ENCOUNTER — INITIAL CONSULT (OUTPATIENT)
Dept: CARDIOLOGY | Facility: CLINIC | Age: 82
End: 2017-07-11
Payer: MEDICARE

## 2017-07-11 VITALS
DIASTOLIC BLOOD PRESSURE: 65 MMHG | BODY MASS INDEX: 23.71 KG/M2 | WEIGHT: 133.81 LBS | HEIGHT: 63 IN | SYSTOLIC BLOOD PRESSURE: 146 MMHG | HEART RATE: 49 BPM | OXYGEN SATURATION: 99 %

## 2017-07-11 DIAGNOSIS — I82.412 ACUTE DEEP VEIN THROMBOSIS (DVT) OF FEMORAL VEIN OF LEFT LOWER EXTREMITY: Primary | ICD-10-CM

## 2017-07-11 PROCEDURE — 1159F MED LIST DOCD IN RCRD: CPT | Mod: S$GLB,,, | Performed by: INTERNAL MEDICINE

## 2017-07-11 PROCEDURE — 1125F AMNT PAIN NOTED PAIN PRSNT: CPT | Mod: S$GLB,,, | Performed by: INTERNAL MEDICINE

## 2017-07-11 PROCEDURE — 99999 PR PBB SHADOW E&M-EST. PATIENT-LVL IV: CPT | Mod: PBBFAC,,, | Performed by: INTERNAL MEDICINE

## 2017-07-11 PROCEDURE — 99213 OFFICE O/P EST LOW 20 MIN: CPT | Mod: S$GLB,,, | Performed by: INTERNAL MEDICINE

## 2017-07-11 RX ORDER — ACETAMINOPHEN 160 MG/1
160 BAR, CHEWABLE ORAL EVERY 4 HOURS PRN
COMMUNITY
End: 2018-10-19

## 2017-07-11 NOTE — PROGRESS NOTES
Cardiology Clinic Note  Reason for Visit: DVT    HPI:   89 y/o F referred to us for DVT treatment reccs. Currently denies any leg pain. Swelling has improved considerably. No other sxs. Denies chest pain, tightness or pressure, CURRY, orthopnea, PND, leg edema, palpitations, pre-syncope or syncope.      ROS:    Constitution: Negative for fever or chills. Negative for weight loss or gain.   HENT: Negative for sore throat or headaches. Negative for rhinorrhea.  Eyes: Negative for blurred or double vision.   Cardiovascular: See above  Pulmonary: Negative for SOB. Negative for cough.   Gastrointestinal: Negative for abdominal pain. Negative for nausea/ vomiting. Negative for diarrhea.   : Negative for dysuria.   Skin: Negative for rashes.  Neurological: Negative for focal weakness or sensory changes.  Psychological: Negative for depression or anxiety.  PMH:     Past Medical History:   Diagnosis Date    Arthritis     Chronic kidney disease, stage III (moderate) 9/11/2015    Colon adenoma     Diabetes mellitus     diet controlled    Glaucoma     Hyperlipemia     Hypertension     Osteopenia     Type II or unspecified type diabetes mellitus without mention of complication, not stated as uncontrolled      Past Surgical History:   Procedure Laterality Date    APPENDECTOMY      CATARACT EXTRACTION W/  INTRAOCULAR LENS IMPLANT Right 3/15/06    OD w/ dr. pitt    CATARACT EXTRACTION W/  INTRAOCULAR LENS IMPLANT Left 9/27/06    OS w/ dr. pitt    COLONOSCOPY W/ POLYPECTOMY      EYE SURGERY      HYSTERECTOMY       Allergies:   Review of patient's allergies indicates:  Not on File  Medications:     Current Outpatient Prescriptions on File Prior to Visit   Medication Sig Dispense Refill    amlodipine (NORVASC) 10 MG tablet Take 1 tablet (10 mg total) by mouth once daily. 90 tablet 1    apixaban 5 mg Tab Take 1 tablet (5 mg total) by mouth 2 (two) times daily. 180 tablet 0    atorvastatin (LIPITOR) 20  "MG tablet Take 1 tablet (20 mg total) by mouth once daily. 90 tablet 1    brimonidine-timolol (COMBIGAN) 0.2-0.5 % Drop Place 1 drop into both eyes 2 (two) times daily. Disp 10 mls for 1 month 30 mL 3    CALCIUM CARBONATE (EFHT-ZHU-301 ORAL) Take 1 tablet by mouth Daily. 1  Oral Every day      latanoprost 0.005 % ophthalmic solution Place 1 drop into both eyes every evening. 3 Bottle 3    lisinopril (PRINIVIL,ZESTRIL) 40 MG tablet Take 1 tablet (40 mg total) by mouth once daily. 90 tablet 1    [DISCONTINUED] atorvastatin (LIPITOR) 20 MG tablet TAKE ONE TABLET BY MOUTH ONCE DAILY 90 tablet 0    [DISCONTINUED] timolol maleate 0.5% (TIMOPTIC-XR) 0.5 % SolG Place 1 drop into both eyes once daily. 5 mL 12     No current facility-administered medications on file prior to visit.      Social History:     Social History   Substance Use Topics    Smoking status: Never Smoker    Smokeless tobacco: Never Used    Alcohol use Yes     Family History:     Family History   Problem Relation Age of Onset    Heart attack Mother     Heart disease Mother     No Known Problems Father     Cancer Sister     Hypertension Daughter     Asthma Daughter     Allergies Daughter     Amblyopia Neg Hx     Blindness Neg Hx     Cataracts Neg Hx     Diabetes Neg Hx     Glaucoma Neg Hx     Macular degeneration Neg Hx     Retinal detachment Neg Hx     Strabismus Neg Hx     Stroke Neg Hx     Thyroid disease Neg Hx      Physical Exam:   BP (!) 146/65 (BP Location: Right arm, Patient Position: Sitting, BP Method: Automatic)   Pulse (!) 49   Ht 5' 2.5" (1.588 m)   Wt 60.7 kg (133 lb 13.1 oz)   SpO2 99%   BMI 24.09 kg/m²      Constitutional: No apparent distress, conversant  HEENT: Sclera anicteric, extraocular movements intact  Neck: No jugular venous distension, no carotid bruits  CV: Regular rate and rhythm, no murmurs rubs or gallops, normal S1/S2  Pulm: Clear to auscultation bilaterally, no wheezes, rales, or ronchi  GI: " Abdomen soft, nontender, nondistended, normoactive bowel sounds  Extremities: No lower extremity edema, warm with palpable pulses  Skin: No ecchymosis, erythema, or ulcers  Psych: Alert and oriented to person place location, appropriate affect  Neuro: No focal deficits    Labs:     Lab Results   Component Value Date     06/08/2017    K 4.2 06/08/2017     06/08/2017    CO2 26 06/08/2017    BUN 14 06/08/2017    CREATININE 0.9 06/08/2017    ANIONGAP 9 06/08/2017     Lab Results   Component Value Date    AST 17 06/08/2017    ALT 13 06/08/2017    ALKPHOS 86 06/08/2017    BILITOT 0.6 06/08/2017    BILIDIR 0.2 06/22/2005    ALBUMIN 3.8 06/08/2017     Lab Results   Component Value Date    CALCIUM 10.4 06/08/2017    PHOS 3.1 07/20/2005     No results found for: BNP, BNPTRIAGEBLO Lab Results   Component Value Date    WBC 5.22 06/04/2017    HGB 13.9 06/04/2017    HCT 41.5 06/04/2017     06/04/2017    GRAN 3.1 06/04/2017    GRAN 59.6 06/04/2017     Lab Results   Component Value Date    INR 1.1 09/13/2006     Lab Results   Component Value Date    CHOL 182 02/21/2017    HDL 63 02/21/2017    LDLCALC 109.6 02/21/2017    TRIG 47 02/21/2017     Lab Results   Component Value Date    HGBA1C 6.3 (H) 02/21/2017     Lab Results   Component Value Date    TSH 3.554 09/30/2016          Imaging:   DVT US:  There is nonocclusive thrombus identified within the left common femoral vein. There is occlusive thrombus seen within the left superficial femoral vein.        Assessment:   89 y/o F referred to us for DVT treatment reccs.     Plan:   - cont apixaban 5 mg BID x 1 year stop date 6/4/2018). Will see her back then with a repeat DVT US  - check thrombophilia panel 4 weeks after stopping apixaban, and d-dimer 3 months after  - if +ve, would then cont apixaban long term. If -ve, can discontinue    Above plan d/w Dr. Sabillon who is in agreement    Signed:  Deondre Crespo M.D.  Interventional Cardiology Fellow  Pager:  [phone]    7/11/2017 9:46 AM    Follow-up:   Future Appointments  Date Time Provider Department Center   9/22/2017 8:15 AM PERIMETRY, [facility]   9/22/2017 9:00 AM Lupis Lundberg MD [facility] O[facility]AL [facility]   10/2/2017 9:45 AM Stacy Rodriguez MD [facility] PCW       I have personally taken the history and examined this patient. I have discussed and agree with the resident's findings and plan as documented in the resident's note.  Benny Sabillon

## 2017-07-11 NOTE — LETTER
July 11, 2017      Stacy Rodriguez MD  [address]  [facility] [address]           [facility]  [address]  [facility] [address]  Phone: [phone]          Patient: Inna Blankenship   MR Number: [id number]   YOB: 1926   Date of Visit: 7/11/2017       Dear Dr. Stacy Rodriguez:    Thank you for referring Inna Blankenship to me for evaluation. Attached you will find relevant portions of my assessment and plan of care.    If you have questions, please do not hesitate to call me. I look forward to following Inna Blankenship along with you.    Sincerely,    Deondre Crespo MD    Enclosure  CC:  No Recipients    If you would like to receive this communication electronically, please contact [email or web address] or [phone] to request more information on [facility] access.    For providers and/or their staff who would like to refer a patient to [facility], please contact us through our one-stop-shop provider referral line, Julianna Adams, at [phone].    If you feel you have received this communication in error or would no longer like to receive these types of communications, please e-mail [email or web address]

## 2017-08-15 RX ORDER — APIXABAN 5 MG/1
5 TABLET, FILM COATED ORAL 2 TIMES DAILY
Qty: 180 TABLET | Refills: 0 | Status: SHIPPED | OUTPATIENT
Start: 2017-08-15 | End: 2017-10-02 | Stop reason: SDUPTHER

## 2017-09-22 ENCOUNTER — OFFICE VISIT (OUTPATIENT)
Dept: OPHTHALMOLOGY | Facility: CLINIC | Age: 82
End: 2017-09-22
Payer: MEDICARE

## 2017-09-22 ENCOUNTER — CLINICAL SUPPORT (OUTPATIENT)
Dept: OPHTHALMOLOGY | Facility: CLINIC | Age: 82
End: 2017-09-22
Payer: MEDICARE

## 2017-09-22 DIAGNOSIS — H26.493 POSTERIOR CAPSULAR OPACIFICATION NON VISUALLY SIGNIFICANT OF BOTH EYES: ICD-10-CM

## 2017-09-22 DIAGNOSIS — H40.1232 LOW-TENSION GLAUCOMA OF BOTH EYES, MODERATE STAGE: Primary | ICD-10-CM

## 2017-09-22 DIAGNOSIS — H40.1232 LOW-TENSION GLAUCOMA OF BOTH EYES, MODERATE STAGE: ICD-10-CM

## 2017-09-22 DIAGNOSIS — H01.9 EYELID INFLAMMATION: ICD-10-CM

## 2017-09-22 DIAGNOSIS — H02.889 MGD (MEIBOMIAN GLAND DISEASE), UNSPECIFIED LATERALITY: ICD-10-CM

## 2017-09-22 DIAGNOSIS — E11.9 NON-INSULIN DEPENDENT TYPE 2 DIABETES MELLITUS: ICD-10-CM

## 2017-09-22 DIAGNOSIS — Z96.1 PSEUDOPHAKIA: ICD-10-CM

## 2017-09-22 PROCEDURE — 99499 UNLISTED E&M SERVICE: CPT | Mod: S$GLB,,, | Performed by: OPHTHALMOLOGY

## 2017-09-22 PROCEDURE — 99999 PR PBB SHADOW E&M-EST. PATIENT-LVL II: CPT | Mod: PBBFAC,,, | Performed by: OPHTHALMOLOGY

## 2017-09-22 PROCEDURE — 92083 EXTENDED VISUAL FIELD XM: CPT | Mod: S$GLB,,, | Performed by: OPHTHALMOLOGY

## 2017-09-22 PROCEDURE — 92014 COMPRE OPH EXAM EST PT 1/>: CPT | Mod: S$GLB,,, | Performed by: OPHTHALMOLOGY

## 2017-09-22 PROCEDURE — 92133 CPTRZD OPH DX IMG PST SGM ON: CPT | Mod: S$GLB,,, | Performed by: OPHTHALMOLOGY

## 2017-09-22 NOTE — PROGRESS NOTES
HPI     DLS: 5/16/17    Pt here for HVF review;    Meds: Combigan bid ou             Latanoprost qhs ou     1. Low tension glaucoma of both eyes, moderate stage  2. Eyelid inflammation, both eyes  3. MGD (meibomian gland disease), unspecified laterality  4. Posterior capsular opacification non visually significant of both eyes  5. Pseudophakia, both eyes  6. Non-insulin dependent type 2 diabetes mellitus     Last edited by Salud Hartman on 9/22/2017  8:57 AM. (History)          Assessment /Plan     For exam results, see Encounter Report.    Low-tension glaucoma of both eyes, moderate stage  -     Posterior Segment OCT Optic Nerve- Both eyes    Eyelid inflammation - Both Eyes    MGD (meibomian gland disease), unspecified laterality    Posterior capsular opacification non visually significant of both eyes - Both Eyes    Pseudophakia - Both Eyes    Non-insulin dependent type 2 diabetes mellitus        1.  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 Present since 2006.     Family history   ??  Glaucoma meds   Brimonidine BID ou, xalatan OU  H/O adverse rxn to glaucoma drops  none  LASERS   none  GLAUCOMA SURGERIES  : none   OTHER EYE SURGERIES  : PCIOL ou- OD- 3/15/06, 9/27/06 OS   CDR  0.85-0.9 w/ sup thinning // 0.7  Tbase  11-15 / 11-13  Tmax      15/13 (on gtts) (? Tmax off gtts)  Ttarget   12/12  HVF  16 test 2002 to 2017 - -early SNS - fluctuates // ?early SAD, - unreliable OU  Gonio  +2-3 OU (narrow inf - but doubt pupillary block - PC IOL ou)  CCT  561/567  OCT  7 test 2007 to 2017 - RNFL - nl od // nl os   HRT 12 test 2007 to 2017-MR -  MR -  Dec S/I/N bord T od // dec S/I bord N os /// CDR 0.84 od // 0.80 os  Disc photos  2002, 2003 - slides // 2007, 2012, 2014, 2015  - OIS     Ttoday  10/10  Test done today: HVF / DFE / OCT    2. PCO ou  -mild - monitor   3. MGD /eyelid Inflammation / Blepharitis  WC/LH/AT's   4.  Pseudophakia ou - PC IOL ou   -status post cataract extraction and insertion of intraocular lens - Both Eyes   -minimal PCO ou        Plan    POAG - Low Tension Component OD > OS  Target is 12 ou   CSM   -combignan ou bid  - Latanoprost ou q hs (disp 3 bottles at a time)     Patient c/o of trouble with reading- recheck current Rx with good lighting conditions- patient sees 20/20- not more clear with change in Rx- continue Rx for now but use reading light.     Patient would like Rx sent to [facility] mail order from now on    RTC 4 months - IOP check - all other test up to date    I have seen and personally examined the patient.  I agree with the findings, assessment and plan of the resident and/or fellow.     Lupis Lundberg MD

## 2017-10-02 ENCOUNTER — OFFICE VISIT (OUTPATIENT)
Dept: INTERNAL MEDICINE | Facility: CLINIC | Age: 82
End: 2017-10-02
Payer: MEDICARE

## 2017-10-02 ENCOUNTER — LAB VISIT (OUTPATIENT)
Dept: LAB | Facility: HOSPITAL | Age: 82
End: 2017-10-02
Attending: INTERNAL MEDICINE
Payer: MEDICARE

## 2017-10-02 ENCOUNTER — IMMUNIZATION (OUTPATIENT)
Dept: INTERNAL MEDICINE | Facility: CLINIC | Age: 82
End: 2017-10-02
Payer: MEDICARE

## 2017-10-02 VITALS
WEIGHT: 134 LBS | OXYGEN SATURATION: 97 % | DIASTOLIC BLOOD PRESSURE: 64 MMHG | HEIGHT: 62 IN | HEART RATE: 48 BPM | BODY MASS INDEX: 24.66 KG/M2 | TEMPERATURE: 98 F | SYSTOLIC BLOOD PRESSURE: 108 MMHG

## 2017-10-02 DIAGNOSIS — E78.49 OTHER HYPERLIPIDEMIA: ICD-10-CM

## 2017-10-02 DIAGNOSIS — I10 ESSENTIAL HYPERTENSION: ICD-10-CM

## 2017-10-02 DIAGNOSIS — Z00.00 ENCOUNTER FOR PREVENTIVE HEALTH EXAMINATION: Primary | ICD-10-CM

## 2017-10-02 DIAGNOSIS — E11.649 TYPE 2 DIABETES MELLITUS WITH HYPOGLYCEMIA WITHOUT COMA, WITHOUT LONG-TERM CURRENT USE OF INSULIN: Primary | ICD-10-CM

## 2017-10-02 DIAGNOSIS — H40.1232 LOW-TENSION GLAUCOMA OF BOTH EYES, MODERATE STAGE: ICD-10-CM

## 2017-10-02 DIAGNOSIS — M85.80 OSTEOPENIA, UNSPECIFIED LOCATION: ICD-10-CM

## 2017-10-02 DIAGNOSIS — E11.21 TYPE 2 DIABETES MELLITUS WITH DIABETIC NEPHROPATHY, WITHOUT LONG-TERM CURRENT USE OF INSULIN: ICD-10-CM

## 2017-10-02 DIAGNOSIS — E11.649 TYPE 2 DIABETES MELLITUS WITH HYPOGLYCEMIA WITHOUT COMA, WITHOUT LONG-TERM CURRENT USE OF INSULIN: ICD-10-CM

## 2017-10-02 LAB
CREAT UR-MCNC: 82 MG/DL
MICROALBUMIN UR DL<=1MG/L-MCNC: 35 UG/ML
MICROALBUMIN/CREATININE RATIO: 42.7 UG/MG

## 2017-10-02 PROCEDURE — 90662 IIV NO PRSV INCREASED AG IM: CPT | Mod: S$GLB,,, | Performed by: INTERNAL MEDICINE

## 2017-10-02 PROCEDURE — 99499 UNLISTED E&M SERVICE: CPT | Mod: S$GLB,,, | Performed by: NURSE PRACTITIONER

## 2017-10-02 PROCEDURE — 99999 PR PBB SHADOW E&M-EST. PATIENT-LVL V: CPT | Mod: PBBFAC,,, | Performed by: NURSE PRACTITIONER

## 2017-10-02 PROCEDURE — 82570 ASSAY OF URINE CREATININE: CPT

## 2017-10-02 PROCEDURE — G0008 ADMIN INFLUENZA VIRUS VAC: HCPCS | Mod: S$GLB,,, | Performed by: INTERNAL MEDICINE

## 2017-10-02 PROCEDURE — 99215 OFFICE O/P EST HI 40 MIN: CPT | Mod: S$GLB,,, | Performed by: INTERNAL MEDICINE

## 2017-10-02 PROCEDURE — 99499 UNLISTED E&M SERVICE: CPT | Mod: S$GLB,,, | Performed by: INTERNAL MEDICINE

## 2017-10-02 PROCEDURE — G0439 PPPS, SUBSEQ VISIT: HCPCS | Mod: S$GLB,,, | Performed by: NURSE PRACTITIONER

## 2017-10-02 PROCEDURE — 99999 PR PBB SHADOW E&M-EST. PATIENT-LVL III: CPT | Mod: PBBFAC,,, | Performed by: INTERNAL MEDICINE

## 2017-10-02 RX ORDER — AMLODIPINE BESYLATE 10 MG/1
10 TABLET ORAL DAILY
Qty: 90 TABLET | Refills: 1 | Status: SHIPPED | OUTPATIENT
Start: 2017-10-02 | End: 2018-02-06 | Stop reason: SDUPTHER

## 2017-10-02 RX ORDER — LISINOPRIL 40 MG/1
40 TABLET ORAL DAILY
Qty: 90 TABLET | Refills: 1 | Status: SHIPPED | OUTPATIENT
Start: 2017-10-02 | End: 2018-02-06 | Stop reason: SDUPTHER

## 2017-10-02 RX ORDER — ATORVASTATIN CALCIUM 20 MG/1
20 TABLET, FILM COATED ORAL DAILY
Qty: 90 TABLET | Refills: 1 | Status: SHIPPED | OUTPATIENT
Start: 2017-10-02 | End: 2018-02-03 | Stop reason: SDUPTHER

## 2017-10-02 NOTE — PATIENT INSTRUCTIONS
Counseling and Referral of Other Preventative  (Italic type indicates deductible and co-insurance are waived)    Patient Name: Inna Blankenship  Today's Date: 10/2/2017      SERVICE LIMITATIONS RECOMMENDATION    Vaccines    · Pneumococcal (once after 65)    · Influenza (annually)    · Hepatitis B (if medium/high risk)    · Prevnar 13      Hepatitis B medium/high risk factors:       - End-stage renal disease       - Hemophiliacs who received Factor VII or         IX concentrates       - Clients of institutions for the mentally             retarded       - Persons who live in the same house as          a HepB carrier       - Homosexual men       - Illicit injectable drug abusers     Pneumococcal: Done, no repeat necessary     Influenza: Done, repeat in one year     Hepatitis B: N/A     Prevnar 13: Done, no repeat necessary    Mammogram (biennial age 50-74)  Annually (age 40 or over)  N/A    Pap (up to age 70 and after 70 if unknown history or abnormal study last 10 years)    N/A     The USPSTF recommends against screening for cervical cancer in women who have had a hysterectomy with removal of the cervix and who do not have a history of a high-grade precancerous lesion (cervical intraepithelial neoplasia [WHIT] grade 2 or 3) or cervical cancer.     Colorectal cancer screening (to age 75)    · Fecal occult blood test (annual)  · Flexible sigmoidoscopy (5y)  · Screening colonoscopy (10y)  · Barium enema   N/A    Diabetes self-management training (no USPSTF recommendations)  Requires referral by treating physician for patient with diabetes or renal disease. 10 hours of initial DSMT sessions of no less than 30 minutes each in a continuous 12-month period. 2 hours of follow-up DSMT in subsequent years.  N/A    Bone mass measurements (age 65 & older, biennial)  Requires diagnosis related to osteoporosis or estrogen deficiency. Biennial benefit unless patient has history of long-term glucocorticoid  N/A    Glaucoma screening (no  USPSTF recommendation)  Diabetes mellitus, family history   , age 50 or over    American, age 65 or over  Recommend follow up with eye care professional regularly    Medical nutrition therapy for diabetes or renal disease (no recommended schedule)  Requires referral by treating physician for patient with diabetes or renal disease or kidney transplant within the past 3 years.  Can be provided in same year as diabetes self-management training (DSMT), and CMS recommends medical nutrition therapy take place after DSMT. Up to 3 hours for initial year and 2 hours in subsequent years.  N/A    Cardiovascular screening blood tests (every 5 years)  · Fasting lipid panel  Order as a panel if possible  Done this year, repeat every year    Diabetes screening tests (at least every 3 years, Medicare covers annually or at 6-month intervals for prediabetic patients)  · Fasting blood sugar (FBS) or glucose tolerance test (GTT)  Patient must be diagnosed with one of the following:       - Hypertension       - Dyslipidemia       - Obesity (BMI 30kg/m2)       - Previous elevated impaired FBS or GTT       ... or any two of the following:       - Overweight (BMI 25 but <30)       - Family history of diabetes       - Age 65 or older       - History of gestational diabetes or birth of baby weighing more than 9 pounds  Done this year, repeat every year    HIV screening (annually for increased risk patients)  · HIV-1 and HIV-2 by EIA, or INGRID, rapid antibody test or oral mucosa transudate  Patients must be at increased risk for HIV infection per USPSTF guidelines or pregnant. Tests covered annually for patient at increased risk or as requested by the patient. Pregnant patients may receive up to 3 tests during pregnancy.  Risks discussed, screening is not recommended    Smoking cessation counseling (up to 8 sessions per year)  Patients must be asymptomatic of tobacco-related conditions to receive as a preventative  service.  Non-smoker    Subsequent annual wellness visit  At least 12 months since last AWV  Return in one year     The following information is provided to all patients.  This information is to help you find resources for any of the problems found today that may be affecting your health:                Living healthy guide: www.[facility]      Understanding Diabetes: www.diabetes.org      Eating healthy: www.cdc.gov/healthyweight      CDC home safety checklist: www.cdc.gov/steadi/patient.html      Agency on Aging: www.goea.[place].[facility]      Alcoholics anonymous (AA): www.aa.org      Physical Activity: www.enrrique.nih.gov/yj3cfjm      Tobacco use: www.quitwithusla.org

## 2017-10-03 NOTE — PROGRESS NOTES
"Inna Blankenship presented for a  Medicare AWV and comprehensive Health Risk Assessment today. The following components were reviewed and updated:    · Medical history  · Family History  · Social history  · Allergies and Current Medications  · Health Risk Assessment  · Health Maintenance  · Care Team     ** See Completed Assessments for Annual Wellness Visit within the encounter summary.**       The following assessments were completed:  · Living Situation  · CAGE  · Depression Screening  · Timed Get Up and Go  · Whisper Test  · Cognitive Function Screening  · Nutrition Screening  · ADL Screening  · PAQ Screening    Vitals:    10/02/17 1105   BP: 108/64   Pulse: (!) 48   Temp: 97.5 °F (36.4 °C)   SpO2: 97%   Weight: 60.8 kg (134 lb)   Height: 5' 2" (1.575 m)     Body mass index is 24.51 kg/m².  Physical Exam   Constitutional: She is oriented to person, place, and time. She appears well-developed and well-nourished. No distress.   HENT:   Head: Atraumatic.   Eyes: No scleral icterus.   Neck: Neck supple.   Cardiovascular: Normal rate, regular rhythm and normal heart sounds.    Pulses:       Dorsalis pedis pulses are 2+ on the right side, and 2+ on the left side.        Posterior tibial pulses are 2+ on the right side, and 2+ on the left side.   Pulmonary/Chest: Effort normal and breath sounds normal. No respiratory distress.   Abdominal: Soft. Bowel sounds are normal.   Musculoskeletal:        Right foot: There is normal range of motion and no deformity.        Left foot: There is normal range of motion and no deformity.   Left foot mildly edematous   Feet:   Right Foot:   Protective Sensation: 8 sites tested. 6 sites sensed.   Skin Integrity: Negative for ulcer, blister, skin breakdown, erythema, warmth, callus or dry skin.   Left Foot:   Protective Sensation: 8 sites tested. 5 sites sensed.   Skin Integrity: Negative for ulcer, blister, skin breakdown, erythema, warmth, callus or dry skin.   Neurological: She is alert " and oriented to person, place, and time.   Skin: Skin is warm and dry. No erythema.   Psychiatric: Her behavior is normal.   Nursing note and vitals reviewed.        Diagnoses and health risks identified today and associated recommendations/orders:    1. Encounter for preventive health examination  - Screenings performed, as noted above. Personal preventative testing needs reviewed.     2. Type 2 diabetes mellitus with diabetic nephropathy, without long-term current use of insulin  - Stable, followed by PCP    3. Osteopenia, unspecified location  - Stable, followed by PCP    4. Low-tension glaucoma of both eyes, moderate stage  - Stable, followed by opthalmology    5. Essential hypertension  - Stable, followed by PCP    6. Other hyperlipidemia  - Stable, followed by PCP      Provided Inna with a 5-10 year written screening schedule and personal prevention plan. Recommendations were developed using the USPSTF age appropriate recommendations. Education, counseling, and referrals were provided as needed. After Visit Summary printed and given to patient which includes a list of additional screenings\tests needed.    Return in about 1 year (around 10/2/2018) for your next annual wellness visit.    Serena Avery NP

## 2017-10-07 VITALS
BODY MASS INDEX: 23.74 KG/M2 | HEART RATE: 62 BPM | SYSTOLIC BLOOD PRESSURE: 138 MMHG | TEMPERATURE: 98 F | WEIGHT: 134 LBS | DIASTOLIC BLOOD PRESSURE: 84 MMHG | OXYGEN SATURATION: 95 % | HEIGHT: 63 IN

## 2018-01-02 ENCOUNTER — TELEPHONE (OUTPATIENT)
Dept: INTERNAL MEDICINE | Facility: CLINIC | Age: 83
End: 2018-01-02

## 2018-01-02 NOTE — TELEPHONE ENCOUNTER
----- Message from Tahmina Mckeon sent at 1/2/2018  9:41 AM CST -----  Contact: self [phone] cell   Type: Rx    Name of medication(s):  apixaban (ELIQUIS) 5 mg Tab    Is this a refill? New rx? refill    Who prescribed medication? Jennifer    Pharmacy Name, Phone, & Location: [facility] mail order    Comments: Patient is requesting above refill.    Thank you

## 2018-02-02 ENCOUNTER — OFFICE VISIT (OUTPATIENT)
Dept: OPHTHALMOLOGY | Facility: CLINIC | Age: 83
End: 2018-02-02
Payer: MEDICARE

## 2018-02-02 DIAGNOSIS — H26.493 POSTERIOR CAPSULAR OPACIFICATION NON VISUALLY SIGNIFICANT OF BOTH EYES: ICD-10-CM

## 2018-02-02 DIAGNOSIS — H01.9 EYELID INFLAMMATION: ICD-10-CM

## 2018-02-02 DIAGNOSIS — H40.1232 LOW-TENSION GLAUCOMA OF BOTH EYES, MODERATE STAGE: Primary | ICD-10-CM

## 2018-02-02 DIAGNOSIS — H40.89 OTHER GLAUCOMA OF BOTH EYES: ICD-10-CM

## 2018-02-02 DIAGNOSIS — Z96.1 PSEUDOPHAKIA: ICD-10-CM

## 2018-02-02 DIAGNOSIS — H02.889 MGD (MEIBOMIAN GLAND DISEASE), UNSPECIFIED LATERALITY: ICD-10-CM

## 2018-02-02 PROCEDURE — 92020 GONIOSCOPY: CPT | Mod: S$GLB,,, | Performed by: OPHTHALMOLOGY

## 2018-02-02 PROCEDURE — 92012 INTRM OPH EXAM EST PATIENT: CPT | Mod: S$GLB,,, | Performed by: OPHTHALMOLOGY

## 2018-02-02 PROCEDURE — 99999 PR PBB SHADOW E&M-EST. PATIENT-LVL II: CPT | Mod: PBBFAC,,, | Performed by: OPHTHALMOLOGY

## 2018-02-02 PROCEDURE — 99499 UNLISTED E&M SERVICE: CPT | Mod: S$GLB,,, | Performed by: OPHTHALMOLOGY

## 2018-02-02 NOTE — PROGRESS NOTES
HPI     Glaucoma    Additional comments: IOP ck today           Comments   DLS: 9/22/17    1) LTG OD>OS  2) Blepharitis/MGD  3) PCO OU  4) PCIOL OU  5) PCIOL OU    MEDS:  Combigan BID OU  Latanoprost QHS OU       Last edited by Lamar Tinajero MA on 2/2/2018  8:08 AM. (History)            Assessment /Plan     For exam results, see Encounter Report.    Low-tension glaucoma of both eyes, moderate stage    MGD (meibomian gland disease), unspecified laterality    Eyelid inflammation - Both Eyes    Posterior capsular opacification non visually significant of both eyes - Both Eyes    Pseudophakia - Both Eyes        1.  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 Present since 2006.     Family history   ??  Glaucoma meds   Brimonidine BID ou, xalatan OU  H/O adverse rxn to glaucoma drops  none  LASERS   none  GLAUCOMA SURGERIES  : none   OTHER EYE SURGERIES  : PCIOL ou- OD- 3/15/06, 9/27/06 OS   CDR  0.85-0.9 w/ sup thinning // 0.7  Tbase  11-15 / 11-13  Tmax      15/13 (on gtts) (? Tmax off gtts)  Ttarget   12/12  HVF  15 test 2002 to 2016 - -early SNS - fluctuates // ?early SAD, ?early IAD/NS   Gonio  +2-3 OU (narrow inf - but doubt pupillary block - PC IOL ou)  CCT  561/567  OCT  6 test 2007 to 2016 - RNFL - nl od // nl os   HRT 12 test 2007 to 2017-MR -  MR -  Dec S/I/N bord T od // dec S/I bord N os /// CDR 0.84 od // 0.80 os  Disc photos  2002, 2003 - slides // 2007, 2012, 2014, 2015  - OIS     Ttoday  13/13 ( up from 10 ou last visit - did not use combigan this am) (( target is 12 ou)   Test done today: Gonio   2. PCO ou  -mild - monitor   3. MGD /eyelid Inflammation / Blepharitis  WC/LH/AT's   4. Pseudophakia ou - PC IOL ou   -status post cataract extraction and insertion of intraocular lens - Both Eyes   -minimal PCO ou        Plan    POAG - Low Tension Component OD > OS  Just above target today at 13 ou (did not use combigan  this am yet)   Target is 12 ou   CSM   -combignan ou bid  - Latanoprost ou q hs (disp 3 bottles at a time)     Patient c/o of trouble with reading- recheck current Rx with good lighting conditions- patient sees 20/20- not more clear with change in Rx- continue Rx for now but use reading light.       RTC 4 months - HRT - if IOP remains ablve target - consider SLT ou - can do about 180 degress to 270 degrees ou   - too narrow inf ou     I have seen and personally examined the patient.  I agree with the findings, assessment and plan of the resident and/or fellow.     Lupis Lundberg MD

## 2018-02-03 RX ORDER — ATORVASTATIN CALCIUM 20 MG/1
20 TABLET, FILM COATED ORAL DAILY
Qty: 90 TABLET | Refills: 0 | Status: SHIPPED | OUTPATIENT
Start: 2018-02-03 | End: 2018-02-06 | Stop reason: SDUPTHER

## 2018-02-06 ENCOUNTER — OFFICE VISIT (OUTPATIENT)
Dept: INTERNAL MEDICINE | Facility: CLINIC | Age: 83
End: 2018-02-06
Payer: MEDICARE

## 2018-02-06 ENCOUNTER — LAB VISIT (OUTPATIENT)
Dept: LAB | Facility: HOSPITAL | Age: 83
End: 2018-02-06
Attending: INTERNAL MEDICINE
Payer: MEDICARE

## 2018-02-06 DIAGNOSIS — E11.29 TYPE 2 DIABETES MELLITUS WITH MICROALBUMINURIA, WITHOUT LONG-TERM CURRENT USE OF INSULIN: ICD-10-CM

## 2018-02-06 DIAGNOSIS — E11.9 TYPE 2 DIABETES MELLITUS WITHOUT COMPLICATION, WITHOUT LONG-TERM CURRENT USE OF INSULIN: ICD-10-CM

## 2018-02-06 DIAGNOSIS — R80.9 TYPE 2 DIABETES MELLITUS WITH MICROALBUMINURIA, WITHOUT LONG-TERM CURRENT USE OF INSULIN: ICD-10-CM

## 2018-02-06 DIAGNOSIS — E11.9 TYPE 2 DIABETES MELLITUS WITHOUT COMPLICATION, WITHOUT LONG-TERM CURRENT USE OF INSULIN: Primary | ICD-10-CM

## 2018-02-06 LAB
ALBUMIN SERPL BCP-MCNC: 3.6 G/DL
ALP SERPL-CCNC: 79 U/L
ALT SERPL W/O P-5'-P-CCNC: 13 U/L
ANION GAP SERPL CALC-SCNC: 7 MMOL/L
AST SERPL-CCNC: 20 U/L
BILIRUB SERPL-MCNC: 0.7 MG/DL
BUN SERPL-MCNC: 21 MG/DL
CALCIUM SERPL-MCNC: 10 MG/DL
CHLORIDE SERPL-SCNC: 106 MMOL/L
CHOLEST SERPL-MCNC: 166 MG/DL
CHOLEST/HDLC SERPL: 2.8 {RATIO}
CO2 SERPL-SCNC: 28 MMOL/L
CREAT SERPL-MCNC: 0.8 MG/DL
EST. GFR  (AFRICAN AMERICAN): >60 ML/MIN/1.73 M^2
EST. GFR  (NON AFRICAN AMERICAN): >60 ML/MIN/1.73 M^2
ESTIMATED AVG GLUCOSE: 126 MG/DL
GLUCOSE SERPL-MCNC: 131 MG/DL
HBA1C MFR BLD HPLC: 6 %
HDLC SERPL-MCNC: 59 MG/DL
HDLC SERPL: 35.5 %
LDLC SERPL CALC-MCNC: 98 MG/DL
NONHDLC SERPL-MCNC: 107 MG/DL
POTASSIUM SERPL-SCNC: 4.2 MMOL/L
PROT SERPL-MCNC: 6.6 G/DL
SODIUM SERPL-SCNC: 141 MMOL/L
TRIGL SERPL-MCNC: 45 MG/DL

## 2018-02-06 PROCEDURE — 1126F AMNT PAIN NOTED NONE PRSNT: CPT | Mod: S$GLB,,, | Performed by: INTERNAL MEDICINE

## 2018-02-06 PROCEDURE — 99999 PR PBB SHADOW E&M-EST. PATIENT-LVL III: CPT | Mod: PBBFAC,,, | Performed by: INTERNAL MEDICINE

## 2018-02-06 PROCEDURE — 36415 COLL VENOUS BLD VENIPUNCTURE: CPT

## 2018-02-06 PROCEDURE — 80053 COMPREHEN METABOLIC PANEL: CPT

## 2018-02-06 PROCEDURE — 80061 LIPID PANEL: CPT

## 2018-02-06 PROCEDURE — 99499 UNLISTED E&M SERVICE: CPT | Mod: S$GLB,,, | Performed by: INTERNAL MEDICINE

## 2018-02-06 PROCEDURE — 1159F MED LIST DOCD IN RCRD: CPT | Mod: S$GLB,,, | Performed by: INTERNAL MEDICINE

## 2018-02-06 PROCEDURE — 99215 OFFICE O/P EST HI 40 MIN: CPT | Mod: S$GLB,,, | Performed by: INTERNAL MEDICINE

## 2018-02-06 PROCEDURE — 3008F BODY MASS INDEX DOCD: CPT | Mod: S$GLB,,, | Performed by: INTERNAL MEDICINE

## 2018-02-06 PROCEDURE — 83036 HEMOGLOBIN GLYCOSYLATED A1C: CPT

## 2018-02-06 RX ORDER — LISINOPRIL 40 MG/1
40 TABLET ORAL DAILY
Qty: 90 TABLET | Refills: 1 | Status: SHIPPED | OUTPATIENT
Start: 2018-02-06 | End: 2018-10-02 | Stop reason: SDUPTHER

## 2018-02-06 RX ORDER — ATORVASTATIN CALCIUM 20 MG/1
20 TABLET, FILM COATED ORAL DAILY
Qty: 90 TABLET | Refills: 1 | Status: SHIPPED | OUTPATIENT
Start: 2018-02-06 | End: 2018-10-24 | Stop reason: SDUPTHER

## 2018-02-06 RX ORDER — AMLODIPINE BESYLATE 10 MG/1
10 TABLET ORAL DAILY
Qty: 90 TABLET | Refills: 1 | Status: SHIPPED | OUTPATIENT
Start: 2018-02-06 | End: 2018-10-24 | Stop reason: SDUPTHER

## 2018-02-11 VITALS
BODY MASS INDEX: 25.4 KG/M2 | OXYGEN SATURATION: 96 % | TEMPERATURE: 98 F | WEIGHT: 138 LBS | SYSTOLIC BLOOD PRESSURE: 112 MMHG | HEART RATE: 62 BPM | DIASTOLIC BLOOD PRESSURE: 70 MMHG | HEIGHT: 62 IN

## 2018-02-11 NOTE — PROGRESS NOTES
Subjective:       Patient ID: Inna Blankenship is a 89 y.o. female.    Chief Complaint: Diabetes    HPI  She has diabetes.  Denies polyuria, polydipsia  Review of Systems   Constitutional: Negative for chills, fatigue, fever and unexpected weight change.   Respiratory: Negative for chest tightness and shortness of breath.    Cardiovascular: Negative for chest pain and palpitations.   Gastrointestinal: Negative for abdominal pain and blood in stool.   Neurological: Negative for dizziness, syncope, numbness and headaches.       Objective:      Physical Exam   HENT:   Right Ear: External ear normal.   Left Ear: External ear normal.   Nose: Nose normal.   Mouth/Throat: Oropharynx is clear and moist.   Eyes: Pupils are equal, round, and reactive to light.   Neck: Normal range of motion.   Cardiovascular: Normal rate and regular rhythm.    No murmur heard.  Pulmonary/Chest: Breath sounds normal.   Abdominal: She exhibits no distension. There is no hepatosplenomegaly. There is no tenderness.   Lymphadenopathy:     She has no cervical adenopathy.     She has no axillary adenopathy.   Neurological: She has normal strength and normal reflexes. No cranial nerve deficit or sensory deficit.       Assessment/Plan       Assessment and plan: Diabetes: Check CMP, lipid panel, glycosylated hemoglobin

## 2018-04-17 NOTE — PROGRESS NOTES
Here is the plan from today's visit    1. Posterior dislocation of elbow, closed, right, sequela  MRI of elbow, then to see an orthopedic hand surgeon to discuss all options.   - MR Elbow Right w/o Contrast; Future  - ORTHOPEDICS ADULT REFERRAL    Please call or return to clinic if your symptoms don't go away.    Follow up plan  Please make a clinic appointment for follow up with your primary physician Mayda Chamorro MD as needed.     Thank you for coming to [facility] today.  Lab Testing:  **If you had lab testing today and your results are reassuring or normal they will be mailed to you or sent through [employer] within 7 days.   **If the lab tests need quick action we will call you with the results.  The phone number we will call with results is # [phone] (home) . If this is not the best number please call our clinic and change the number.  Medication Refills:  If you need any refills please call your pharmacy and they will contact us.   If you need to  your refill at a new pharmacy, please contact the new pharmacy directly. The new pharmacy will help you get your medications transferred faster.   Scheduling:  If you have any concerns about today's visit or wish to schedule another appointment please call our office during normal business hours [phone] (8-5:00 M-F)  If a referral was made to a [facility] and you don't get a call from central scheduling please call [phone].  If a Mammogram was ordered for you at [facility] call [phone] to schedule or change your appointment.  If you had an XRay/CT/Ultrasound/MRI ordered the number is [phone] to schedule or change your radiology appointment.   Medical Concerns:  If you have urgent medical concerns please call [phone] at any time of the day.    Estefany Cope MD     Subjective:       Patient ID: Inna Blankenship is a 89 y.o. female.    Chief Complaint: Diabetes    HPI: She has diabetes.  Denies polyuria, polydipsia    PAST MEDICAL HISTORY: Hypertension, hyperlipidemia, diabetes with nephropathy (diet   Controlled),DVT, glaucoma, osteopenia , colon adenoma. She had a colonoscopy February 2011     PAST SURGICAL HISTORY: Hysterectomy.     MEDICATIONS: Lisinopril 40-mg daily, Xalatan drops, Lipitor 20 mg daily, amlodipine 10 mg daily , Eliquis 5 mg twice a day     ALLERGIES: No known drug allergies.        Review of Systems   Constitutional: Negative for chills, fatigue, fever and unexpected weight change.   Respiratory: Negative for chest tightness and shortness of breath.    Cardiovascular: Negative for chest pain and palpitations.   Gastrointestinal: Negative for abdominal pain and blood in stool.   Neurological: Negative for dizziness, syncope, numbness and headaches.       Objective:      Physical Exam   HENT:   Right Ear: External ear normal.   Left Ear: External ear normal.   Nose: Nose normal.   Mouth/Throat: Oropharynx is clear and moist.   Eyes: Pupils are equal, round, and reactive to light.   Neck: Normal range of motion.   Cardiovascular: Normal rate and regular rhythm.    No murmur heard.  Pulmonary/Chest: Breath sounds normal.   Abdominal: She exhibits no distension. There is no hepatosplenomegaly. There is no tenderness.   Lymphadenopathy:     She has no cervical adenopathy.     She has no axillary adenopathy.   Neurological: She has normal strength and normal reflexes. No cranial nerve deficit or sensory deficit.       Assessment:         assessment and plan: Diabetes: Check CMP, glycosylated hemoglobin, urine microalbumin, CBC  Plan:       As above

## 2018-05-08 RX ORDER — APIXABAN 5 MG/1
TABLET, FILM COATED ORAL
Qty: 180 TABLET | Refills: 0 | Status: SHIPPED | OUTPATIENT
Start: 2018-05-08 | End: 2018-10-24 | Stop reason: SDUPTHER

## 2018-05-10 ENCOUNTER — PES CALL (OUTPATIENT)
Dept: ADMINISTRATIVE | Facility: CLINIC | Age: 83
End: 2018-05-10

## 2018-05-28 DIAGNOSIS — H40.1232 LOW-TENSION GLAUCOMA OF BOTH EYES, MODERATE STAGE: ICD-10-CM

## 2018-05-28 RX ORDER — LATANOPROST 50 UG/ML
SOLUTION/ DROPS OPHTHALMIC
Qty: 3 BOTTLE | Refills: 3 | Status: SHIPPED | OUTPATIENT
Start: 2018-05-28 | End: 2018-10-19 | Stop reason: SDUPTHER

## 2018-06-14 NOTE — PROGRESS NOTES
Assessment /Plan     For exam results, see Encounter Report.    Low-tension glaucoma of both eyes, moderate stage    MGD (meibomian gland disease), unspecified laterality    Eyelid inflammation - Both Eyes    Posterior capsular opacification non visually significant of both eyes - Both Eyes    Pseudophakia - Both Eyes    Non-insulin dependent type 2 diabetes mellitus    Other glaucoma of both eyes         1.  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 Present since 2006.     Family history   ??  Glaucoma meds   Brimonidine BID ou, xalatan OU  H/O adverse rxn to glaucoma drops  none  LASERS   none  GLAUCOMA SURGERIES  : none   OTHER EYE SURGERIES  : PCIOL ou- OD- 3/15/06, 9/27/06 OS   CDR  0.85-0.9 w/ sup thinning // 0.7  Tbase  11-15 / 11-13  Tmax      15/13 (on gtts) (? Tmax off gtts)  Ttarget   12/12  HVF  15 test 2002 to 2016 - -early SNS - fluctuates // ?early SAD, ?early IAD/NS   Gonio  +2-3 OU (narrow inf - but doubt pupillary block - PC IOL ou)  CCT  561/567  OCT  6 test 2007 to 2016 - RNFL - nl od // nl os   HRT 13 test 2007 to 2018- MR -  Dec. S/N/I od // dec S os /// CDR 0.83 od // 0.74 os  Disc photos  2002, 2003 - slides // 2007, 2012, 2014, 2015  - OIS     Ttoday  11/11 (( below  target of 12 ou ))  Test done today: HRT / IOP      2. PCO ou  -mild - monitor     3. MGD /eyelid Inflammation / Blepharitis  WC/LH/AT's     4. Pseudophakia ou - PC IOL ou   -status post cataract extraction and insertion of intraocular lens - Both Eyes   -minimal PCO ou          Plan    POAG - Low Tension Component OD > OS  Just below  target today at 11 ou   Target is 12 ou   CSM   -combignan ou bid  - Latanoprost ou q hs (disp 3 bottles at a time)     Patient c/o of trouble with reading- recheck current Rx with good lighting conditions- patient sees 20/20- not more clear with change in Rx- continue Rx for now but use reading  light.       RTC 4 months - HVF / DFE / photos // - consider SLT ou if IOP goes up - can do about 180 degress to 270 degrees ou   - too narrow inf ou     I have seen and personally examined the patient.  I agree with the findings, assessment and plan of the resident and/or fellow.     Lupis Lundberg MD

## 2018-06-15 ENCOUNTER — OFFICE VISIT (OUTPATIENT)
Dept: OPHTHALMOLOGY | Facility: CLINIC | Age: 83
End: 2018-06-15
Payer: MEDICARE

## 2018-06-15 ENCOUNTER — CLINICAL SUPPORT (OUTPATIENT)
Dept: OPHTHALMOLOGY | Facility: CLINIC | Age: 83
End: 2018-06-15
Payer: MEDICARE

## 2018-06-15 DIAGNOSIS — H40.1232 LOW-TENSION GLAUCOMA OF BOTH EYES, MODERATE STAGE: ICD-10-CM

## 2018-06-15 DIAGNOSIS — Z96.1 PSEUDOPHAKIA: ICD-10-CM

## 2018-06-15 DIAGNOSIS — H01.9 EYELID INFLAMMATION: ICD-10-CM

## 2018-06-15 DIAGNOSIS — H40.1232 LOW-TENSION GLAUCOMA OF BOTH EYES, MODERATE STAGE: Primary | ICD-10-CM

## 2018-06-15 DIAGNOSIS — H02.889 MGD (MEIBOMIAN GLAND DISEASE), UNSPECIFIED LATERALITY: ICD-10-CM

## 2018-06-15 DIAGNOSIS — E11.9 NON-INSULIN DEPENDENT TYPE 2 DIABETES MELLITUS: ICD-10-CM

## 2018-06-15 DIAGNOSIS — H40.89 OTHER GLAUCOMA OF BOTH EYES: ICD-10-CM

## 2018-06-15 DIAGNOSIS — H26.493 POSTERIOR CAPSULAR OPACIFICATION NON VISUALLY SIGNIFICANT OF BOTH EYES: ICD-10-CM

## 2018-06-15 PROCEDURE — 92012 INTRM OPH EXAM EST PATIENT: CPT | Mod: S$GLB,,, | Performed by: OPHTHALMOLOGY

## 2018-06-15 PROCEDURE — 99999 PR PBB SHADOW E&M-EST. PATIENT-LVL II: CPT | Mod: PBBFAC,,, | Performed by: OPHTHALMOLOGY

## 2018-06-15 PROCEDURE — 99499 UNLISTED E&M SERVICE: CPT | Mod: S$PBB,,, | Performed by: OPHTHALMOLOGY

## 2018-06-15 PROCEDURE — 92133 CPTRZD OPH DX IMG PST SGM ON: CPT | Mod: S$GLB,,, | Performed by: OPHTHALMOLOGY

## 2018-07-16 DIAGNOSIS — I82.592 CHRONIC DEEP VEIN THROMBOSIS (DVT) OF OTHER VEIN OF LEFT LOWER EXTREMITY: Primary | ICD-10-CM

## 2018-07-17 ENCOUNTER — CLINICAL SUPPORT (OUTPATIENT)
Dept: CARDIOLOGY | Facility: CLINIC | Age: 83
End: 2018-07-17
Attending: INTERNAL MEDICINE
Payer: MEDICARE

## 2018-07-17 ENCOUNTER — OFFICE VISIT (OUTPATIENT)
Dept: CARDIOLOGY | Facility: CLINIC | Age: 83
End: 2018-07-17
Payer: MEDICARE

## 2018-07-17 VITALS
DIASTOLIC BLOOD PRESSURE: 84 MMHG | WEIGHT: 138.88 LBS | HEIGHT: 62 IN | HEART RATE: 56 BPM | SYSTOLIC BLOOD PRESSURE: 184 MMHG | BODY MASS INDEX: 25.55 KG/M2

## 2018-07-17 DIAGNOSIS — Z86.718 HISTORY OF DVT (DEEP VEIN THROMBOSIS): ICD-10-CM

## 2018-07-17 DIAGNOSIS — I82.592 CHRONIC DEEP VEIN THROMBOSIS (DVT) OF OTHER VEIN OF LEFT LOWER EXTREMITY: ICD-10-CM

## 2018-07-17 PROBLEM — O22.30 DVT (DEEP VEIN THROMBOSIS) IN PREGNANCY: Status: ACTIVE | Noted: 2018-07-17

## 2018-07-17 PROCEDURE — 99999 PR PBB SHADOW E&M-EST. PATIENT-LVL III: CPT | Mod: PBBFAC,,, | Performed by: INTERNAL MEDICINE

## 2018-07-17 PROCEDURE — 99215 OFFICE O/P EST HI 40 MIN: CPT | Mod: S$GLB,,, | Performed by: INTERNAL MEDICINE

## 2018-07-17 PROCEDURE — 93971 EXTREMITY STUDY: CPT | Mod: S$GLB,,, | Performed by: INTERNAL MEDICINE

## 2018-07-17 NOTE — ASSESSMENT & PLAN NOTE
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   - She has been on Apixaban 5mg BID since 06/2017. Tolerating well with decreased swelling.   - Continue Apixaban 5mg BID likely indefinitely as she is tolerating well  - Follow up with PCP  - Can see Dr. Sabillon PRN   - if any issues with bleeding she can be discontinued as she has distal DVT   - Continue ambulating as tolerated

## 2018-07-18 PROBLEM — Z86.718 HISTORY OF DVT (DEEP VEIN THROMBOSIS): Status: ACTIVE | Noted: 2018-07-17

## 2018-10-03 RX ORDER — LISINOPRIL 40 MG/1
TABLET ORAL
Qty: 90 TABLET | Refills: 1 | Status: SHIPPED | OUTPATIENT
Start: 2018-10-03 | End: 2018-10-24 | Stop reason: SDUPTHER

## 2018-10-08 ENCOUNTER — TELEPHONE (OUTPATIENT)
Dept: INTERNAL MEDICINE | Facility: CLINIC | Age: 83
End: 2018-10-08

## 2018-10-08 NOTE — TELEPHONE ENCOUNTER
----- Message from Anamaria Senior sent at 10/8/2018  2:20 PM CDT -----  Contact: Menl/ [phone]  The patient has an appointment with the A nurse on 10/24 and would like to set up a follow up at with  on the same day. No appointment available on 10/24.Please advise.

## 2018-10-16 NOTE — PROGRESS NOTES
Assessment /Plan     For exam results, see Encounter Report.    Low-tension glaucoma of both eyes, moderate stage    MGD (meibomian gland disease), unspecified laterality    Eyelid inflammation - Both Eyes    Posterior capsular opacification non visually significant of both eyes - Both Eyes    Pseudophakia - Both Eyes          1.  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 Present since 2006.     Family history   ??  Glaucoma meds   Brimonidine BID ou, xalatan OU  H/O adverse rxn to glaucoma drops  none  LASERS   none  GLAUCOMA SURGERIES  : none   OTHER EYE SURGERIES  : PCIOL ou- OD- 3/15/06, 9/27/06 OS   CDR  0.85-0.9 w/ sup thinning // 0.7  Tbase  11-15 / 11-13  Tmax      15/13 (on gtts) (? Tmax off gtts)  Ttarget   12-13 ou   HVF  16 test 2002 to 2016 - -early SNS - fluctuates // non specific - unreliable ou - stable   Gonio  +2-3 OU (narrow inf - but doubt pupillary block - PC IOL ou)  CCT  561/567  OCT  6 test 2007 to 2016 - RNFL - nl od // nl os   HRT 13 test 2007 to 2018- MR -  Dec. S/N/I od // dec S os /// CDR 0.83 od // 0.74 os  Disc photos  2002, 2003 - slides // 2007, 2012, 2014, 2015, 2018   - OIS     Ttoday  13/12 (( below  target of 12 - 13 ou ))  Test done today:HVF / DFE / photos      2. PCO ou  -mild - monitor     3. MGD /eyelid Inflammation / Blepharitis  WC/LH/AT's     4. Pseudophakia ou - PC IOL ou   -status post cataract extraction and insertion of intraocular lens - Both Eyes   -minimal PCO ou          Plan    POAG - Low Tension Component OD > OS  Just below  target today at 11 ou   Target is 12 ou   CSM   -combignan ou bid  - Latanoprost ou q hs (disp 3 bottles at a time)     Patient c/o of trouble with reading- recheck current Rx with good lighting conditions- patient sees 20/20- not more clear with change in Rx- continue Rx for now but use reading light.       RTC 4 months - gonio     I have  seen and personally examined the patient.  I agree with the findings, assessment and plan of the resident and/or fellow.     Lupis Lundberg MD

## 2018-10-19 ENCOUNTER — OFFICE VISIT (OUTPATIENT)
Dept: OPHTHALMOLOGY | Facility: CLINIC | Age: 83
End: 2018-10-19
Payer: MEDICARE

## 2018-10-19 ENCOUNTER — CLINICAL SUPPORT (OUTPATIENT)
Dept: OPHTHALMOLOGY | Facility: CLINIC | Age: 83
End: 2018-10-19
Payer: MEDICARE

## 2018-10-19 DIAGNOSIS — H40.1232 LOW-TENSION GLAUCOMA OF BOTH EYES, MODERATE STAGE: ICD-10-CM

## 2018-10-19 DIAGNOSIS — H02.889 MGD (MEIBOMIAN GLAND DISEASE), UNSPECIFIED LATERALITY: ICD-10-CM

## 2018-10-19 DIAGNOSIS — H26.493 POSTERIOR CAPSULAR OPACIFICATION NON VISUALLY SIGNIFICANT OF BOTH EYES: ICD-10-CM

## 2018-10-19 DIAGNOSIS — Z96.1 PSEUDOPHAKIA: ICD-10-CM

## 2018-10-19 DIAGNOSIS — H01.9 EYELID INFLAMMATION: ICD-10-CM

## 2018-10-19 DIAGNOSIS — H40.1232 LOW-TENSION GLAUCOMA OF BOTH EYES, MODERATE STAGE: Primary | ICD-10-CM

## 2018-10-19 PROCEDURE — 92083 EXTENDED VISUAL FIELD XM: CPT | Mod: 26,S$PBB,, | Performed by: OPHTHALMOLOGY

## 2018-10-19 PROCEDURE — 92250 FUNDUS PHOTOGRAPHY W/I&R: CPT | Mod: PBBFAC | Performed by: OPHTHALMOLOGY

## 2018-10-19 PROCEDURE — 99999 PR PBB SHADOW E&M-EST. PATIENT-LVL II: CPT | Mod: PBBFAC,,, | Performed by: OPHTHALMOLOGY

## 2018-10-19 PROCEDURE — 92014 COMPRE OPH EXAM EST PT 1/>: CPT | Mod: S$PBB,,, | Performed by: OPHTHALMOLOGY

## 2018-10-19 PROCEDURE — 92083 EXTENDED VISUAL FIELD XM: CPT | Mod: PBBFAC

## 2018-10-19 PROCEDURE — 99212 OFFICE O/P EST SF 10 MIN: CPT | Mod: PBBFAC,25 | Performed by: OPHTHALMOLOGY

## 2018-10-19 RX ORDER — LATANOPROST 50 UG/ML
1 SOLUTION/ DROPS OPHTHALMIC NIGHTLY
Qty: 3 BOTTLE | Refills: 3 | Status: SHIPPED | OUTPATIENT
Start: 2018-10-19 | End: 2019-05-31 | Stop reason: SDUPTHER

## 2018-10-19 RX ORDER — BRIMONIDINE TARTRATE AND TIMOLOL MALEATE 2; 5 MG/ML; MG/ML
1 SOLUTION OPHTHALMIC 2 TIMES DAILY
Qty: 30 ML | Refills: 3 | Status: SHIPPED | OUTPATIENT
Start: 2018-10-19 | End: 2019-10-18 | Stop reason: SDUPTHER

## 2018-10-24 ENCOUNTER — OFFICE VISIT (OUTPATIENT)
Dept: INTERNAL MEDICINE | Facility: CLINIC | Age: 83
End: 2018-10-24
Payer: MEDICARE

## 2018-10-24 ENCOUNTER — LAB VISIT (OUTPATIENT)
Dept: LAB | Facility: HOSPITAL | Age: 83
End: 2018-10-24
Attending: INTERNAL MEDICINE
Payer: MEDICARE

## 2018-10-24 VITALS
HEIGHT: 62 IN | DIASTOLIC BLOOD PRESSURE: 60 MMHG | WEIGHT: 138.25 LBS | SYSTOLIC BLOOD PRESSURE: 118 MMHG | BODY MASS INDEX: 25.44 KG/M2 | HEART RATE: 58 BPM

## 2018-10-24 DIAGNOSIS — H02.889 MEIBOMIAN GLAND DISEASE, UNSPECIFIED LATERALITY: ICD-10-CM

## 2018-10-24 DIAGNOSIS — I70.0 CALCIFICATION OF AORTA: ICD-10-CM

## 2018-10-24 DIAGNOSIS — E11.9 DIABETES MELLITUS WITHOUT COMPLICATION: ICD-10-CM

## 2018-10-24 DIAGNOSIS — M85.80 OSTEOPENIA, UNSPECIFIED LOCATION: ICD-10-CM

## 2018-10-24 DIAGNOSIS — Z00.00 ENCOUNTER FOR PREVENTIVE HEALTH EXAMINATION: Primary | ICD-10-CM

## 2018-10-24 DIAGNOSIS — I10 ESSENTIAL HYPERTENSION: ICD-10-CM

## 2018-10-24 DIAGNOSIS — H40.1232 LOW-TENSION GLAUCOMA OF BOTH EYES, MODERATE STAGE: ICD-10-CM

## 2018-10-24 DIAGNOSIS — N18.30 CHRONIC KIDNEY DISEASE, STAGE III (MODERATE): ICD-10-CM

## 2018-10-24 DIAGNOSIS — E11.21 TYPE 2 DIABETES MELLITUS WITH DIABETIC NEPHROPATHY, WITHOUT LONG-TERM CURRENT USE OF INSULIN: ICD-10-CM

## 2018-10-24 DIAGNOSIS — E11.9 DIABETES MELLITUS WITHOUT COMPLICATION: Primary | ICD-10-CM

## 2018-10-24 DIAGNOSIS — E78.49 OTHER HYPERLIPIDEMIA: ICD-10-CM

## 2018-10-24 DIAGNOSIS — Z86.718 HISTORY OF DVT (DEEP VEIN THROMBOSIS): ICD-10-CM

## 2018-10-24 LAB
ALBUMIN/CREAT UR: 62.1 UG/MG
CREAT UR-MCNC: 66 MG/DL
MICROALBUMIN UR DL<=1MG/L-MCNC: 41 UG/ML

## 2018-10-24 PROCEDURE — 99214 OFFICE O/P EST MOD 30 MIN: CPT | Mod: PBBFAC | Performed by: NURSE PRACTITIONER

## 2018-10-24 PROCEDURE — 99213 OFFICE O/P EST LOW 20 MIN: CPT | Mod: PBBFAC,27 | Performed by: INTERNAL MEDICINE

## 2018-10-24 PROCEDURE — G0439 PPPS, SUBSEQ VISIT: HCPCS | Mod: S$GLB,,, | Performed by: NURSE PRACTITIONER

## 2018-10-24 PROCEDURE — 99999 PR PBB SHADOW E&M-EST. PATIENT-LVL IV: CPT | Mod: PBBFAC,,, | Performed by: NURSE PRACTITIONER

## 2018-10-24 PROCEDURE — 99999 PR PBB SHADOW E&M-EST. PATIENT-LVL III: CPT | Mod: PBBFAC,,, | Performed by: INTERNAL MEDICINE

## 2018-10-24 PROCEDURE — 82043 UR ALBUMIN QUANTITATIVE: CPT

## 2018-10-24 PROCEDURE — 99215 OFFICE O/P EST HI 40 MIN: CPT | Mod: S$PBB,,, | Performed by: INTERNAL MEDICINE

## 2018-10-24 PROCEDURE — 1101F PT FALLS ASSESS-DOCD LE1/YR: CPT | Mod: CPTII,,, | Performed by: INTERNAL MEDICINE

## 2018-10-24 RX ORDER — ATORVASTATIN CALCIUM 20 MG/1
20 TABLET, FILM COATED ORAL DAILY
Qty: 90 TABLET | Refills: 1 | Status: SHIPPED | OUTPATIENT
Start: 2018-10-24 | End: 2019-05-01 | Stop reason: SDUPTHER

## 2018-10-24 RX ORDER — AMLODIPINE BESYLATE 10 MG/1
10 TABLET ORAL DAILY
Qty: 90 TABLET | Refills: 1 | Status: SHIPPED | OUTPATIENT
Start: 2018-10-24 | End: 2019-06-17 | Stop reason: SDUPTHER

## 2018-10-24 RX ORDER — LISINOPRIL 40 MG/1
40 TABLET ORAL DAILY
Qty: 90 TABLET | Refills: 1 | Status: ON HOLD | OUTPATIENT
Start: 2018-10-24 | End: 2018-12-27 | Stop reason: HOSPADM

## 2018-10-24 NOTE — PROGRESS NOTES
"Inna Blankenship presented for a  Medicare AWV and comprehensive Health Risk Assessment today. The following components were reviewed and updated:    · Medical history  · Family History  · Social history  · Allergies and Current Medications  · Health Risk Assessment  · Health Maintenance  · Care Team     ** See Completed Assessments for Annual Wellness Visit within the encounter summary.**       The following assessments were completed:  · Living Situation  · CAGE  · Depression Screening  · Timed Get Up and Go  · Whisper Test  · Cognitive Function Screening* Advanced age, her with daughter who assist with cares.  · Nutrition Screening  · ADL Screening  · PAQ Screening    Vitals:    10/24/18 0833   BP: 118/60   Pulse: (!) 58   Weight: 62.7 kg (138 lb 3.7 oz)   Height: 5' 2" (1.575 m)     Body mass index is 25.28 kg/m².  Physical Exam   Constitutional: She is oriented to person, place, and time. She appears well-developed and well-nourished.   HENT:   Head: Normocephalic and atraumatic.   Nose: Nose normal.   Eyes: Conjunctivae and EOM are normal.   Cardiovascular: Normal rate, regular rhythm, normal heart sounds and intact distal pulses.   No murmur heard.  Pulses:       Dorsalis pedis pulses are 2+ on the right side, and 2+ on the left side.   Pulmonary/Chest: Effort normal and breath sounds normal.   Musculoskeletal:        Right foot: There is normal range of motion and no deformity.        Left foot: There is normal range of motion and no deformity.   Cane and wheelchair use   Feet:   Right Foot:   Protective Sensation: 7 sites tested. 7 sites sensed.   Skin Integrity: Negative for skin breakdown, callus or dry skin.   Left Foot:   Protective Sensation: 7 sites tested. 7 sites sensed.   Skin Integrity: Negative for skin breakdown, callus or dry skin.   Neurological: She is alert and oriented to person, place, and time.   Skin: Skin is warm and dry.   Psychiatric: She has a normal mood and affect. Her behavior is " normal. Judgment and thought content normal.   Nursing note and vitals reviewed.        Diagnoses and health risks identified today and associated recommendations/orders:    1. Encounter for preventive health examination  Assessment performed. Health maintenance updated. Chart review completed.    2. History of DVT (deep vein thrombosis)  Chronic. Stable. Followed by PCP.    3. Chronic kidney disease, stage III (moderate)  Component      Latest Ref Rng & Units 10/24/2018   Creatinine      0.5 - 1.4 mg/dL 0.8   Chronic. Stable. Followed by PCP.    4. Meibomian gland disease, unspecified laterality  Chronic. Continue regimen as instructed. Followed by Ophthalmology.    5. Low-tension glaucoma of both eyes, moderate stage  Chronic. Continue regimen as instructed. Followed by Ophthalmology.    6. Type 2 diabetes mellitus with diabetic nephropathy, without long-term current use of insulin  Component      Latest Ref Rng & Units 2/6/2018   Hemoglobin A1C      4.0 - 5.6 % 6.0 (H)   Estimated Avg Glucose      68 - 131 mg/dL 126   Repeat labs today with PCP.  Chronic. Stable.     7. Osteopenia, unspecified location  Chronic. Stable. Followed by PCP.    8. Essential hypertension  Chronic. Stable. Followed by PCP.    9. Other hyperlipidemia  Chronic. Stable. Followed by PCP.    10. Calcification of aorta  Noted on imaging. Stable with blood pressure control and statin therapy. Followed by PCP.      Provided Inna with a 5-10 year written screening schedule and personal prevention plan. Recommendations were developed using the USPSTF age appropriate recommendations. Education, counseling, and referrals were provided as needed. After Visit Summary printed and given to patient which includes a list of additional screenings\tests needed.    Follow-up for follow up with Primary Care Provider as instructed, ;sooner if problems, HRA in 1 year.    JEAN MARIE Wilson

## 2018-10-24 NOTE — PATIENT INSTRUCTIONS
Counseling and Referral of Other Preventative  (Italic type indicates deductible and co-insurance are waived)    Patient Name: Inna Blankenship  Today's Date: 10/24/2018    Health Maintenance       Date Due Completion Date    Hemoglobin A1c 08/06/2018 2/6/2018    Lipid Panel 02/06/2019 2/6/2018    Eye Exam 10/19/2019 10/19/2018    Foot Exam 10/24/2019 10/24/2018 (Done)    Override on 10/24/2018: Done    Override on 10/2/2017: Done    Override on 9/30/2016: Done    Override on 1/29/2016: Done    TETANUS VACCINE 09/30/2026 9/30/2016 (ClinicallyNA)    Override on 9/30/2016: Not Clinically Appropriate        No orders of the defined types were placed in this encounter.    The following information is provided to all patients.  This information is to help you find resources for any of the problems found today that may be affecting your health:                Living healthy guide: www.[facility].[place].[facility]      Understanding Diabetes: www.diabetes.org      Eating healthy: www.cdc.gov/healthyweight      CDC home safety checklist: www.cdc.gov/steadi/patient.html      Agency on Aging: www.goea.[place]      Alcoholics anonymous (AA): www.aa.org      Physical Activity: www.enrrique.nih.gov/wu9bcur      Tobacco use: www.quitwithusla.org

## 2018-10-28 VITALS
HEART RATE: 62 BPM | DIASTOLIC BLOOD PRESSURE: 64 MMHG | BODY MASS INDEX: 25.4 KG/M2 | WEIGHT: 138 LBS | HEIGHT: 62 IN | SYSTOLIC BLOOD PRESSURE: 122 MMHG

## 2018-10-28 NOTE — PROGRESS NOTES
Subjective:       Patient ID: Inna Blankenship is a 89 y.o. female.    Chief Complaint: Diabetes    HPI  She has diabetes.  Denies polyuria, polydipsia       PAST MEDICAL HISTORY: Hypertension, hyperlipidemia, diabetes with nephropathy (diet   Controlled),DVT, glaucoma, osteopenia , colon adenoma. She had a colonoscopy February 2011     PAST SURGICAL HISTORY: Hysterectomy.     MEDICATIONS: Lisinopril 40-mg daily, Xalatan drops, Lipitor 20 mg daily, amlodipine 10 mg daily , Eliquis 5 mg twice a day     ALLERGIES: No known drug allergies.        Review of Systems   Constitutional: Negative for chills, fatigue, fever and unexpected weight change.   Respiratory: Negative for chest tightness and shortness of breath.    Cardiovascular: Negative for chest pain and palpitations.   Gastrointestinal: Negative for abdominal pain and blood in stool.   Neurological: Negative for dizziness, syncope, numbness and headaches.       Objective:      Physical Exam   HENT:   Right Ear: External ear normal.   Left Ear: External ear normal.   Nose: Nose normal.   Mouth/Throat: Oropharynx is clear and moist.   Eyes: Pupils are equal, round, and reactive to light.   Neck: Normal range of motion.   Cardiovascular: Normal rate and regular rhythm.   No murmur heard.  Pulmonary/Chest: Breath sounds normal.   Abdominal: She exhibits no distension. There is no hepatosplenomegaly. There is no tenderness.   Lymphadenopathy:     She has no cervical adenopathy.     She has no axillary adenopathy.   Neurological: She has normal strength and normal reflexes. No cranial nerve deficit or sensory deficit.       Assessment/Plan       Assessment and plan:  Diabetes:  Check CMP, A1c, CBC, urine microalbumin.  Discussed Pap smear, pelvic exam, colonoscopy, mammogram, bone density, bone density.  She declined all

## 2018-11-01 ENCOUNTER — TELEPHONE (OUTPATIENT)
Dept: INTERNAL MEDICINE | Facility: CLINIC | Age: 83
End: 2018-11-01

## 2018-11-01 NOTE — TELEPHONE ENCOUNTER
----- Message from John Long sent at 11/1/2018 11:46 AM CDT -----  Contact: self/[phone]  Pt is calling to speak with someone in the office in regards to the test results she received in the mail. Pt states that she does not understand them and she needs to speak with someone to discuss. Please advise.          Thanks

## 2018-11-02 ENCOUNTER — TELEPHONE (OUTPATIENT)
Dept: INTERNAL MEDICINE | Facility: CLINIC | Age: 83
End: 2018-11-02

## 2018-12-26 ENCOUNTER — HOSPITAL ENCOUNTER (OUTPATIENT)
Facility: OTHER | Age: 83
Discharge: HOME HEALTH | End: 2018-12-27
Attending: EMERGENCY MEDICINE | Admitting: EMERGENCY MEDICINE
Payer: MEDICARE

## 2018-12-26 DIAGNOSIS — J81.0 ACUTE PULMONARY EDEMA: Primary | ICD-10-CM

## 2018-12-26 DIAGNOSIS — I50.9 CHF (CONGESTIVE HEART FAILURE): ICD-10-CM

## 2018-12-26 DIAGNOSIS — R05.9 COUGH: ICD-10-CM

## 2018-12-26 LAB
ANION GAP SERPL CALC-SCNC: 10 MMOL/L
BASOPHILS # BLD AUTO: 0.02 K/UL
BASOPHILS NFR BLD: 0.4 %
BNP SERPL-MCNC: 460 PG/ML
BUN SERPL-MCNC: 24 MG/DL
CALCIUM SERPL-MCNC: 10.1 MG/DL
CHLORIDE SERPL-SCNC: 106 MMOL/L
CO2 SERPL-SCNC: 25 MMOL/L
CREAT SERPL-MCNC: 0.8 MG/DL
DIFFERENTIAL METHOD: ABNORMAL
EOSINOPHIL # BLD AUTO: 0.1 K/UL
EOSINOPHIL NFR BLD: 1.5 %
ERYTHROCYTE [DISTWIDTH] IN BLOOD BY AUTOMATED COUNT: 14.7 %
EST. GFR  (AFRICAN AMERICAN): >60 ML/MIN/1.73 M^2
EST. GFR  (NON AFRICAN AMERICAN): >60 ML/MIN/1.73 M^2
GLUCOSE SERPL-MCNC: 133 MG/DL
HCT VFR BLD AUTO: 35.6 %
HGB BLD-MCNC: 11.5 G/DL
INFLUENZA A, MOLECULAR: NEGATIVE
INFLUENZA B, MOLECULAR: NEGATIVE
LYMPHOCYTES # BLD AUTO: 1.6 K/UL
LYMPHOCYTES NFR BLD: 31 %
MCH RBC QN AUTO: 29.9 PG
MCHC RBC AUTO-ENTMCNC: 32.3 G/DL
MCV RBC AUTO: 93 FL
MONOCYTES # BLD AUTO: 0.3 K/UL
MONOCYTES NFR BLD: 5.6 %
NEUTROPHILS # BLD AUTO: 3.2 K/UL
NEUTROPHILS NFR BLD: 61.3 %
PLATELET # BLD AUTO: 200 K/UL
PMV BLD AUTO: 12 FL
POCT GLUCOSE: 113 MG/DL (ref 70–110)
POCT GLUCOSE: 126 MG/DL (ref 70–110)
POTASSIUM SERPL-SCNC: 3.9 MMOL/L
RBC # BLD AUTO: 3.85 M/UL
SODIUM SERPL-SCNC: 141 MMOL/L
SPECIMEN SOURCE: NORMAL
TROPONIN I SERPL DL<=0.01 NG/ML-MCNC: 0.06 NG/ML
TROPONIN I SERPL DL<=0.01 NG/ML-MCNC: 0.06 NG/ML
WBC # BLD AUTO: 5.19 K/UL

## 2018-12-26 PROCEDURE — 63600175 PHARM REV CODE 636 W HCPCS: Mod: HCNC | Performed by: PHYSICIAN ASSISTANT

## 2018-12-26 PROCEDURE — 83880 ASSAY OF NATRIURETIC PEPTIDE: CPT | Mod: HCNC

## 2018-12-26 PROCEDURE — G0378 HOSPITAL OBSERVATION PER HR: HCPCS | Mod: HCNC

## 2018-12-26 PROCEDURE — 25000003 PHARM REV CODE 250: Mod: HCNC | Performed by: PHYSICIAN ASSISTANT

## 2018-12-26 PROCEDURE — 93005 ELECTROCARDIOGRAM TRACING: CPT | Mod: HCNC

## 2018-12-26 PROCEDURE — 85025 COMPLETE CBC W/AUTO DIFF WBC: CPT | Mod: HCNC

## 2018-12-26 PROCEDURE — 99220 PR INITIAL OBSERVATION CARE,LEVL III: ICD-10-PCS | Mod: HCNC,,, | Performed by: PHYSICIAN ASSISTANT

## 2018-12-26 PROCEDURE — 96374 THER/PROPH/DIAG INJ IV PUSH: CPT | Mod: HCNC

## 2018-12-26 PROCEDURE — 94761 N-INVAS EAR/PLS OXIMETRY MLT: CPT | Mod: HCNC

## 2018-12-26 PROCEDURE — 99285 EMERGENCY DEPT VISIT HI MDM: CPT | Mod: 25,HCNC

## 2018-12-26 PROCEDURE — 84484 ASSAY OF TROPONIN QUANT: CPT | Mod: HCNC

## 2018-12-26 PROCEDURE — 93010 EKG 12-LEAD: ICD-10-PCS | Mod: HCNC,,, | Performed by: INTERNAL MEDICINE

## 2018-12-26 PROCEDURE — 93010 ELECTROCARDIOGRAM REPORT: CPT | Mod: HCNC,,, | Performed by: INTERNAL MEDICINE

## 2018-12-26 PROCEDURE — 25000003 PHARM REV CODE 250: Mod: HCNC | Performed by: EMERGENCY MEDICINE

## 2018-12-26 PROCEDURE — 63600175 PHARM REV CODE 636 W HCPCS: Mod: HCNC | Performed by: EMERGENCY MEDICINE

## 2018-12-26 PROCEDURE — 80048 BASIC METABOLIC PNL TOTAL CA: CPT | Mod: HCNC

## 2018-12-26 PROCEDURE — 36415 COLL VENOUS BLD VENIPUNCTURE: CPT | Mod: HCNC

## 2018-12-26 PROCEDURE — 84484 ASSAY OF TROPONIN QUANT: CPT | Mod: 91,HCNC

## 2018-12-26 PROCEDURE — 87502 INFLUENZA DNA AMP PROBE: CPT | Mod: HCNC

## 2018-12-26 PROCEDURE — 99220 PR INITIAL OBSERVATION CARE,LEVL III: CPT | Mod: HCNC,,, | Performed by: PHYSICIAN ASSISTANT

## 2018-12-26 RX ORDER — SODIUM CHLORIDE 0.9 % (FLUSH) 0.9 %
5 SYRINGE (ML) INJECTION
Status: DISCONTINUED | OUTPATIENT
Start: 2018-12-26 | End: 2018-12-27 | Stop reason: HOSPADM

## 2018-12-26 RX ORDER — IBUPROFEN 200 MG
16 TABLET ORAL
Status: DISCONTINUED | OUTPATIENT
Start: 2018-12-26 | End: 2018-12-27 | Stop reason: HOSPADM

## 2018-12-26 RX ORDER — LATANOPROST 50 UG/ML
1 SOLUTION/ DROPS OPHTHALMIC NIGHTLY
Status: DISCONTINUED | OUTPATIENT
Start: 2018-12-26 | End: 2018-12-27 | Stop reason: HOSPADM

## 2018-12-26 RX ORDER — ACETAMINOPHEN 325 MG/1
650 TABLET ORAL EVERY 8 HOURS PRN
Status: DISCONTINUED | OUTPATIENT
Start: 2018-12-26 | End: 2018-12-27 | Stop reason: HOSPADM

## 2018-12-26 RX ORDER — IBUPROFEN 200 MG
24 TABLET ORAL
Status: DISCONTINUED | OUTPATIENT
Start: 2018-12-26 | End: 2018-12-27 | Stop reason: HOSPADM

## 2018-12-26 RX ORDER — AMLODIPINE BESYLATE 5 MG/1
10 TABLET ORAL DAILY
Status: DISCONTINUED | OUTPATIENT
Start: 2018-12-27 | End: 2018-12-27 | Stop reason: HOSPADM

## 2018-12-26 RX ORDER — FUROSEMIDE 10 MG/ML
40 INJECTION INTRAMUSCULAR; INTRAVENOUS 2 TIMES DAILY
Status: DISCONTINUED | OUTPATIENT
Start: 2018-12-26 | End: 2018-12-27 | Stop reason: HOSPADM

## 2018-12-26 RX ORDER — GLUCAGON 1 MG
1 KIT INJECTION
Status: DISCONTINUED | OUTPATIENT
Start: 2018-12-26 | End: 2018-12-27 | Stop reason: HOSPADM

## 2018-12-26 RX ORDER — INSULIN ASPART 100 [IU]/ML
0-5 INJECTION, SOLUTION INTRAVENOUS; SUBCUTANEOUS
Status: DISCONTINUED | OUTPATIENT
Start: 2018-12-26 | End: 2018-12-27 | Stop reason: HOSPADM

## 2018-12-26 RX ORDER — ONDANSETRON 2 MG/ML
4 INJECTION INTRAMUSCULAR; INTRAVENOUS EVERY 8 HOURS PRN
Status: DISCONTINUED | OUTPATIENT
Start: 2018-12-26 | End: 2018-12-27 | Stop reason: HOSPADM

## 2018-12-26 RX ORDER — ASPIRIN 325 MG
325 TABLET ORAL
Status: COMPLETED | OUTPATIENT
Start: 2018-12-26 | End: 2018-12-26

## 2018-12-26 RX ORDER — ATORVASTATIN CALCIUM 20 MG/1
20 TABLET, FILM COATED ORAL DAILY
Status: DISCONTINUED | OUTPATIENT
Start: 2018-12-27 | End: 2018-12-27 | Stop reason: HOSPADM

## 2018-12-26 RX ORDER — OLMESARTAN MEDOXOMIL 20 MG/1
20 TABLET ORAL DAILY
Status: DISCONTINUED | OUTPATIENT
Start: 2018-12-26 | End: 2018-12-27 | Stop reason: HOSPADM

## 2018-12-26 RX ORDER — FUROSEMIDE 10 MG/ML
40 INJECTION INTRAMUSCULAR; INTRAVENOUS
Status: COMPLETED | OUTPATIENT
Start: 2018-12-26 | End: 2018-12-26

## 2018-12-26 RX ADMIN — FUROSEMIDE 40 MG: 10 INJECTION, SOLUTION INTRAVENOUS at 08:12

## 2018-12-26 RX ADMIN — LATANOPROST 1 DROP: 50 SOLUTION/ DROPS OPHTHALMIC at 08:12

## 2018-12-26 RX ADMIN — APIXABAN 5 MG: 2.5 TABLET, FILM COATED ORAL at 07:12

## 2018-12-26 RX ADMIN — FUROSEMIDE 40 MG: 10 INJECTION, SOLUTION INTRAVENOUS at 02:12

## 2018-12-26 RX ADMIN — OLMESARTAN MEDOXOMIL 20 MG: 20 TABLET, COATED ORAL at 07:12

## 2018-12-26 RX ADMIN — ASPIRIN 325 MG ORAL TABLET 325 MG: 325 PILL ORAL at 02:12

## 2018-12-26 NOTE — SUBJECTIVE & OBJECTIVE
Past Medical History:   Diagnosis Date    Arthritis     CHF (congestive heart failure) 12/26/2018    Chronic kidney disease, stage III (moderate) 9/11/2015    Colon adenoma     Diabetes mellitus     diet controlled    Glaucoma     Hyperlipemia     Hypertension     Osteopenia     Type 2 diabetes mellitus     Type II or unspecified type diabetes mellitus without mention of complication, not stated as uncontrolled        Past Surgical History:   Procedure Laterality Date    APPENDECTOMY      CATARACT EXTRACTION W/  INTRAOCULAR LENS IMPLANT Right 3/15/06        CATARACT EXTRACTION W/  INTRAOCULAR LENS IMPLANT Left 9/27/06        COLONOSCOPY W/ POLYPECTOMY      EYE SURGERY      HYSTERECTOMY         Review of patient's allergies indicates:  No Known Allergies    No current facility-administered medications on file prior to encounter.      Current Outpatient Medications on File Prior to Encounter   Medication Sig    amLODIPine (NORVASC) 10 MG tablet Take 1 tablet (10 mg total) by mouth once daily.    apixaban (ELIQUIS) 5 mg Tab Take 1 tablet (5 mg total) by mouth 2 (two) times daily.    atorvastatin (LIPITOR) 20 MG tablet Take 1 tablet (20 mg total) by mouth once daily.    brimonidine-timolol (COMBIGAN) 0.2-0.5 % Drop Place 1 drop into both eyes 2 (two) times daily. Disp 10 mls for 1 month    CALCIUM CARBONATE (BELK-TVJ-144 ORAL) Take 1 tablet by mouth Daily. 1  Oral Every day    latanoprost 0.005 % ophthalmic solution Place 1 drop into both eyes every evening.    lisinopril (PRINIVIL,ZESTRIL) 40 MG tablet Take 1 tablet (40 mg total) by mouth once daily.    multivit with minerals/lutein (MULTIVITAMIN 50 PLUS ORAL) Take by mouth.    [DISCONTINUED] timolol maleate 0.5% (TIMOPTIC-XR) 0.5 % SolG Place 1 drop into both eyes once daily.     Family History     Problem Relation (Age of Onset)    Allergies Daughter    Asthma Daughter    Cancer Sister    Heart attack Mother    Heart  disease Mother    Hypertension Daughter    No Known Problems Father        Tobacco Use    Smoking status: Never Smoker    Smokeless tobacco: Never Used   Substance and Sexual Activity    Alcohol use: No     Comment: quit drinking since starting Eliquis    Drug use: No    Sexual activity: No     Review of Systems   Constitutional: Negative for activity change, appetite change, chills, fatigue and fever.   HENT: Negative for congestion, rhinorrhea and sinus pressure.    Eyes: Negative.    Respiratory: Positive for cough and shortness of breath. Negative for chest tightness.    Cardiovascular: Negative for chest pain, palpitations and leg swelling.   Gastrointestinal: Negative for abdominal distention, abdominal pain, constipation, diarrhea, nausea and vomiting.   Endocrine: Negative for polydipsia and polyuria.   Genitourinary: Negative for difficulty urinating and urgency.   Musculoskeletal: Negative for back pain and neck pain.   Neurological: Negative for dizziness, light-headedness and headaches.   Psychiatric/Behavioral: Negative for agitation.     Objective:     Vital Signs (Most Recent):  Temp: 97.5 °F (36.4 °C) (12/26/18 1212)  Pulse: (!) 52 (12/26/18 1345)  Resp: 20 (12/26/18 1245)  BP: (!) 163/67 (12/26/18 1345)  SpO2: 95 % (12/26/18 1345) Vital Signs (24h Range):  Temp:  [97.5 °F (36.4 °C)] 97.5 °F (36.4 °C)  Pulse:  [52-61] 52  Resp:  [18-20] 20  SpO2:  [93 %-95 %] 95 %  BP: (150-195)/(65-93) 163/67     Weight: 61.2 kg (135 lb)  Body mass index is 23.17 kg/m².    Physical Exam   Constitutional: She is oriented to person, place, and time. She appears well-developed and well-nourished. No distress.   HENT:   Head: Normocephalic and atraumatic.   Mouth/Throat: Oropharynx is clear and moist.   Eyes: Conjunctivae are normal. Pupils are equal, round, and reactive to light. Right eye exhibits no discharge. Left eye exhibits no discharge. No scleral icterus.   Neck: Normal range of motion. Neck supple.    Cardiovascular: Normal rate, regular rhythm, normal heart sounds and intact distal pulses.   Pulmonary/Chest: Effort normal and breath sounds normal. No respiratory distress.   Diminished b/l   Abdominal: Soft. Bowel sounds are normal. She exhibits no distension. There is no tenderness.   Musculoskeletal: Normal range of motion. She exhibits no edema, tenderness or deformity.   Neurological: She is alert and oriented to person, place, and time. No cranial nerve deficit.   Skin: Skin is warm and dry. Capillary refill takes less than 2 seconds. She is not diaphoretic.   Psychiatric: She has a normal mood and affect.   Nursing note and vitals reviewed.        CRANIAL NERVES     CN III, IV, VI   Pupils are equal, round, and reactive to light.       Significant Labs:   CBC:   Recent Labs   Lab 12/26/18  1243   WBC 5.19   HGB 11.5*   HCT 35.6*        CMP:   Recent Labs   Lab 12/26/18  1243      K 3.9      CO2 25   *   BUN 24   CREATININE 0.8   CALCIUM 10.1   ANIONGAP 10   EGFRNONAA >60     Cardiac Markers:   Recent Labs   Lab 12/26/18  1408   *     Troponin:   Recent Labs   Lab 12/26/18  1243   TROPONINI 0.058*     All pertinent labs within the past 24 hours have been reviewed.    Significant Imaging: I have reviewed all pertinent imaging results/findings within the past 24 hours.   Imaging Results          X-Ray Chest 1 View (Final result)  Result time 12/26/18 12:56:59    Final result by Gucci Washington MD (12/26/18 12:56:59)                 Impression:      Cardiomegaly with central vascular congestion and small effusions.      Electronically signed by: Gucci Washington MD  Date:    12/26/2018  Time:    12:56             Narrative:    EXAMINATION:  XR CHEST 1 VIEW    CLINICAL HISTORY:  Cough    TECHNIQUE:  Single frontal view of the chest was performed.    COMPARISON:  10/29/2012    FINDINGS:  Cardiac silhouette is enlarged.  There is central vascular congestion and suspected  small effusions.  No acute osseous findings.  Degenerative changes of the shoulders.

## 2018-12-26 NOTE — ED NOTES
Pt hourly rounding complete. Pt resting in stretcher AAOx4, RR even and unlabored, NAD noted. Pt up to date on POC and has no questions, concerns, or needs at this time. Pt remains on continuous bp, pulse ox, and cardiac monitors. Bed locked and in lowest position, side rails up x2, and call light within reach. Will continue to monitor.

## 2018-12-26 NOTE — PLAN OF CARE
PCP  Stacy Rodriguez    [facility]    Lives w spouse and daughter next door    Daughter will transport.  See facesheet    Has RW,  No anticipated needs     Denies substance abuse or mental health issues.       12/26/18 1712   Discharge Assessment   Assessment Type Discharge Planning Assessment   Confirmed/corrected address and phone number on facesheet? Yes   Assessment information obtained from? Patient;Caregiver   Expected Length of Stay (days) 1   Communicated expected length of stay with patient/caregiver yes   Prior to hospitilization cognitive status: Alert/Oriented   Prior to hospitalization functional status: Independent   Current cognitive status: Alert/Oriented   Current Functional Status: Independent   Lives With spouse   Able to Return to Prior Arrangements yes   Is patient able to care for self after discharge? Yes   Who are your caregiver(s) and their phone number(s)? see facesheet   Readmission Within the Last 30 Days no previous admission in last 30 days   Patient currently being followed by outpatient case management? No   Patient currently receives any other outside agency services? No   Equipment Currently Used at Home walker, rolling   Do you have any problems affording any of your prescribed medications? No   Is the patient taking medications as prescribed? yes   Does the patient have transportation home? Yes   Transportation Anticipated family or friend will provide   Does the patient receive services at the [facility]? No   Discharge Plan A Home with family

## 2018-12-26 NOTE — ED PROVIDER NOTES
Encounter Date: 12/26/2018    SCRIBE #1 NOTE: IGallo, am scribing for, and in the presence of, Dr. Ogden.       History     Chief Complaint   Patient presents with    Cough     pt to er with c/o one day of cough and nasal congestion x one day.      Seen by provider: 12:23 PM    Patient is a 89 y.o. female with a history of DM, HTN, HLD, and CKD who presents to the ED via EMS with complaint of cough, which began yesterday. She reports generalized weakness for two days. She states she had an episode of heavy coughing after walking back to bed form the bathroom this morning, after which she layed down in bed and began to experience palpitations, shortness of breath and generalized weakness. SOB and palpitations have since improved, though she still feels weak. She denies fever, chest pain, abdominal pain, headache, or urinary symptoms. She is anticoagulated s/p DVT last year. She reports compliance with her prescription medications, but did not yet take them today.      The history is provided by the patient, the EMS personnel and a relative (daughter).     Review of patient's allergies indicates:  No Known Allergies  Past Medical History:   Diagnosis Date    Arthritis     CHF (congestive heart failure) 12/26/2018    Chronic kidney disease, stage III (moderate) 9/11/2015    Colon adenoma     Diabetes mellitus     diet controlled    Glaucoma     Hyperlipemia     Hypertension     Osteopenia     Type 2 diabetes mellitus     Type II or unspecified type diabetes mellitus without mention of complication, not stated as uncontrolled      Past Surgical History:   Procedure Laterality Date    APPENDECTOMY      CATARACT EXTRACTION W/  INTRAOCULAR LENS IMPLANT Right 3/15/06        CATARACT EXTRACTION W/  INTRAOCULAR LENS IMPLANT Left 9/27/06        COLONOSCOPY W/ POLYPECTOMY      EYE SURGERY      HYSTERECTOMY       Family History   Problem Relation Age of Onset    Heart  attack Mother     Heart disease Mother     No Known Problems Father     Cancer Sister     Hypertension Daughter     Asthma Daughter     Allergies Daughter     Amblyopia Neg Hx     Blindness Neg Hx     Cataracts Neg Hx     Diabetes Neg Hx     Glaucoma Neg Hx     Macular degeneration Neg Hx     Retinal detachment Neg Hx     Strabismus Neg Hx     Stroke Neg Hx     Thyroid disease Neg Hx      Social History     Tobacco Use    Smoking status: Never Smoker    Smokeless tobacco: Never Used   Substance Use Topics    Alcohol use: No     Comment: quit drinking since starting Eliquis    Drug use: No     Review of Systems   Constitutional: Negative for chills and fever.   HENT: Positive for congestion.    Respiratory: Positive for cough and shortness of breath.    Cardiovascular: Positive for palpitations. Negative for chest pain and leg swelling.   Gastrointestinal: Negative for abdominal pain, diarrhea, nausea and vomiting.   Genitourinary: Negative for dysuria.   Musculoskeletal: Negative for back pain.   Skin: Negative for rash.   Neurological: Positive for weakness (generalized). Negative for headaches.   Hematological: Bruises/bleeds easily (on eliquis).   Psychiatric/Behavioral: Negative for confusion.       Physical Exam     Initial Vitals [12/26/18 1212]   BP Pulse Resp Temp SpO2   (!) 195/93 61 18 97.5 °F (36.4 °C) (!) 93 %      MAP       --         Physical Exam    Nursing note and vitals reviewed.  Constitutional: She appears well-developed and well-nourished. She is not diaphoretic. No distress.   HENT:   Head: Normocephalic and atraumatic.   Eyes: Conjunctivae and EOM are normal. Pupils are equal, round, and reactive to light.   Neck: Normal range of motion. Neck supple.   Cardiovascular: Normal rate, regular rhythm and normal heart sounds. Exam reveals no gallop and no friction rub.    No murmur heard.  Pulmonary/Chest: No respiratory distress. She has no wheezes. She has no rhonchi. She has  no rales.   Diminished breath sounds at the bases bilaterally.   Abdominal: Soft. There is no tenderness.   Musculoskeletal: Normal range of motion. She exhibits no edema (no lower extremity edema).   Neurological: She is alert and oriented to person, place, and time.   Skin: Skin is warm and dry.   Psychiatric: She has a normal mood and affect. Her behavior is normal. Judgment and thought content normal.         ED Course   Procedures  Labs Reviewed   CBC W/ AUTO DIFFERENTIAL - Abnormal; Notable for the following components:       Result Value    RBC 3.85 (*)     Hemoglobin 11.5 (*)     Hematocrit 35.6 (*)     RDW 14.7 (*)     All other components within normal limits   BASIC METABOLIC PANEL - Abnormal; Notable for the following components:    Glucose 133 (*)     All other components within normal limits   B-TYPE NATRIURETIC PEPTIDE - Abnormal; Notable for the following components:     (*)     All other components within normal limits   TROPONIN I - Abnormal; Notable for the following components:    Troponin I 0.058 (*)     All other components within normal limits   INFLUENZA A & B BY MOLECULAR     EKG Readings: (Independently Interpreted)   Sinus bradycardia. Rate of 58 bpm. No ischemic changes.       Imaging Results          X-Ray Chest 1 View (Final result)  Result time 12/26/18 12:56:59    Final result by Gucci Washington MD (12/26/18 12:56:59)                 Impression:      Cardiomegaly with central vascular congestion and small effusions.      Electronically signed by: Gucci Washington MD  Date:    12/26/2018  Time:    12:56             Narrative:    EXAMINATION:  XR CHEST 1 VIEW    CLINICAL HISTORY:  Cough    TECHNIQUE:  Single frontal view of the chest was performed.    COMPARISON:  10/29/2012    FINDINGS:  Cardiac silhouette is enlarged.  There is central vascular congestion and suspected small effusions.  No acute osseous findings.  Degenerative changes of the shoulders.                               X-Rays:   Independently Interpreted Readings:   Chest X-Ray: Pulmonary edema. This is a new finding for her.     Medical Decision Making:   Initial Assessment:   Patient with one day of cough and two days of generalized weakness. Will look for infectious etiology.  Independently Interpreted Test(s):   I have ordered and independently interpreted X-rays - see prior notes.  I have ordered and independently interpreted EKG Reading(s) - see prior notes  Clinical Tests:   Lab Tests: Ordered and Reviewed  Radiological Study: Ordered and Reviewed  Medical Tests: Reviewed and Ordered  ED Management:  Chest x-ray with pulmonary edema. This is a new finding for her. Will add on troponin, BNP, and give dose of diuretics.             Scribe Attestation:   Scribe #1: I performed the above scribed service and the documentation accurately describes the services I performed. I attest to the accuracy of the note.    Attending Attestation:           Physician Attestation for Scribe:  Physician Attestation Statement for Scribe #1: I, Dr. Ogden, reviewed documentation, as scribed by Gallo Ellsworth in my presence, and it is both accurate and complete.                    Clinical Impression:     1. Acute pulmonary edema    2. Cough    3. CHF (congestive heart failure)           Disposition:   Disposition: Discharged  Condition: Stable                        Michael Ogden MD  12/26/18 2813

## 2018-12-26 NOTE — ED TRIAGE NOTES
Pt presents with c/o cough and congestion x1 day. Pt denies SOB at this time. Pt skin warm dry and intact, no discoloration or swelling noted. Pt AAOx4 RR even and unlabored, NAD noted. Pt placed on cardiac, bp, and pulse ox monitors. Bed locked and in lowest position, side rails up x2, and call light within reach.

## 2018-12-26 NOTE — HPI
"89 y.o. female with a history of DM, HTN, HLD, DVT on Apixiban and CKD who presents to the ED via EMS with complaint of cough for the last few days associated with weakness. Patient states she "couldn't sleep or get comfortable" felt SOB with laying down. She took 2 tylenol and laid sitting up and felt a little improved. Denies fever, chest pain, abdominal pain, headache, or urinary symptoms. She reports compliance with her prescription medications, but did not yet take them today. Denies any similar episode in the past. Denies tobacco, alcoho use.           "

## 2018-12-27 ENCOUNTER — HOSPITAL ENCOUNTER (OUTPATIENT)
Dept: CARDIOLOGY | Facility: OTHER | Age: 83
Discharge: HOME | End: 2018-12-27
Attending: EMERGENCY MEDICINE
Payer: MEDICARE

## 2018-12-27 VITALS
HEART RATE: 52 BPM | BODY MASS INDEX: 22.88 KG/M2 | WEIGHT: 134 LBS | HEIGHT: 64 IN | DIASTOLIC BLOOD PRESSURE: 67 MMHG | SYSTOLIC BLOOD PRESSURE: 163 MMHG

## 2018-12-27 VITALS
WEIGHT: 134.94 LBS | HEART RATE: 72 BPM | DIASTOLIC BLOOD PRESSURE: 71 MMHG | HEIGHT: 64 IN | OXYGEN SATURATION: 95 % | TEMPERATURE: 98 F | RESPIRATION RATE: 16 BRPM | BODY MASS INDEX: 23.04 KG/M2 | SYSTOLIC BLOOD PRESSURE: 155 MMHG

## 2018-12-27 LAB
ANION GAP SERPL CALC-SCNC: 14 MMOL/L
AORTIC ROOT ANNULUS: 3.35 CM
AORTIC VALVE CUSP SEPERATION: 1.46 CM
ASCENDING AORTA: 2.45 CM
AV INDEX (PROSTH): 0.87
AV MEAN GRADIENT: 4.2 MMHG
AV PEAK GRADIENT: 7.29 MMHG
AV VALVE AREA: 2.84 CM2
BSA FOR ECHO PROCEDURE: 1.66 M2
BUN SERPL-MCNC: 19 MG/DL
CALCIUM SERPL-MCNC: 10.1 MG/DL
CHLORIDE SERPL-SCNC: 99 MMOL/L
CO2 SERPL-SCNC: 25 MMOL/L
CREAT SERPL-MCNC: 0.8 MG/DL
CV ECHO LV RWT: 0.6 CM
DOP CALC AO PEAK VEL: 1.35 M/S
DOP CALC AO VTI: 21.71 CM
DOP CALC LVOT AREA: 3.27 CM2
DOP CALC LVOT DIAMETER: 2.04 CM
DOP CALC LVOT STROKE VOLUME: 61.61 CM3
DOP CALCLVOT PEAK VEL VTI: 18.86 CM
E WAVE DECELERATION TIME: 227.67 MSEC
E/A RATIO: 0.79
E/E' RATIO: 12.25
ECHO LV POSTERIOR WALL: 1.24 CM (ref 0.6–1.1)
EST. GFR  (AFRICAN AMERICAN): >60 ML/MIN/1.73 M^2
EST. GFR  (NON AFRICAN AMERICAN): >60 ML/MIN/1.73 M^2
FRACTIONAL SHORTENING: 38 % (ref 28–44)
GLUCOSE SERPL-MCNC: 126 MG/DL
INTERVENTRICULAR SEPTUM: 1.27 CM (ref 0.6–1.1)
IVRT: 0.11 MSEC
LA MAJOR: 6.3 CM
LA WIDTH: 5.15 CM
LEFT ATRIUM SIZE: 4.42 CM
LEFT INTERNAL DIMENSION IN SYSTOLE: 2.59 CM (ref 2.1–4)
LEFT VENTRICLE DIASTOLIC VOLUME INDEX: 46.54 ML/M2
LEFT VENTRICLE DIASTOLIC VOLUME: 76.79 ML
LEFT VENTRICLE MASS INDEX: 113.7 G/M2
LEFT VENTRICLE SYSTOLIC VOLUME INDEX: 14.8 ML/M2
LEFT VENTRICLE SYSTOLIC VOLUME: 24.39 ML
LEFT VENTRICULAR INTERNAL DIMENSION IN DIASTOLE: 4.16 CM (ref 3.5–6)
LEFT VENTRICULAR MASS: 187.59 G
LV LATERAL E/E' RATIO: 9.8
LV SEPTAL E/E' RATIO: 16.33
MAGNESIUM SERPL-MCNC: 1.8 MG/DL
MV PEAK A VEL: 0.62 M/S
MV PEAK E VEL: 0.49 M/S
PHOSPHATE SERPL-MCNC: 3.2 MG/DL
PISA TR MAX VEL: 3.58 M/S
POCT GLUCOSE: 116 MG/DL (ref 70–110)
POTASSIUM SERPL-SCNC: 3.3 MMOL/L
PV PEAK VELOCITY: 0.84 CM/S
RA MAJOR: 4.84 CM
RA WIDTH: 3.67 CM
RIGHT VENTRICULAR END-DIASTOLIC DIMENSION: 4 CM
RV TISSUE DOPPLER FREE WALL SYSTOLIC VELOCITY 1 (APICAL 4 CHAMBER VIEW): 12.53 M/S
SINUS: 3.1 CM
SODIUM SERPL-SCNC: 138 MMOL/L
STJ: 2.47 CM
TDI LATERAL: 0.05
TDI SEPTAL: 0.03
TDI: 0.04
TR MAX PG: 51.27 MMHG
TRICUSPID ANNULAR PLANE SYSTOLIC EXCURSION: 1.18 CM
TROPONIN I SERPL DL<=0.01 NG/ML-MCNC: 0.09 NG/ML

## 2018-12-27 PROCEDURE — 99217 PR OBSERVATION CARE DISCHARGE: ICD-10-PCS | Mod: HCNC,,, | Performed by: PHYSICIAN ASSISTANT

## 2018-12-27 PROCEDURE — 94761 N-INVAS EAR/PLS OXIMETRY MLT: CPT | Mod: HCNC

## 2018-12-27 PROCEDURE — 97161 PT EVAL LOW COMPLEX 20 MIN: CPT | Mod: HCNC

## 2018-12-27 PROCEDURE — 99217 PR OBSERVATION CARE DISCHARGE: CPT | Mod: HCNC,,, | Performed by: PHYSICIAN ASSISTANT

## 2018-12-27 PROCEDURE — G0378 HOSPITAL OBSERVATION PER HR: HCPCS | Mod: HCNC

## 2018-12-27 PROCEDURE — 93306 TTE W/DOPPLER COMPLETE: CPT | Mod: HCNC

## 2018-12-27 PROCEDURE — 80048 BASIC METABOLIC PNL TOTAL CA: CPT | Mod: HCNC

## 2018-12-27 PROCEDURE — 93306 TTE W/DOPPLER COMPLETE: CPT | Mod: 26,HCNC,, | Performed by: INTERNAL MEDICINE

## 2018-12-27 PROCEDURE — 83735 ASSAY OF MAGNESIUM: CPT | Mod: HCNC

## 2018-12-27 PROCEDURE — G8980 MOBILITY D/C STATUS: HCPCS | Mod: CJ,HCNC

## 2018-12-27 PROCEDURE — G8979 MOBILITY GOAL STATUS: HCPCS | Mod: CI,HCNC

## 2018-12-27 PROCEDURE — 63600175 PHARM REV CODE 636 W HCPCS: Mod: HCNC | Performed by: PHYSICIAN ASSISTANT

## 2018-12-27 PROCEDURE — 25000003 PHARM REV CODE 250: Mod: HCNC | Performed by: PHYSICIAN ASSISTANT

## 2018-12-27 PROCEDURE — 84484 ASSAY OF TROPONIN QUANT: CPT | Mod: HCNC

## 2018-12-27 PROCEDURE — 84100 ASSAY OF PHOSPHORUS: CPT | Mod: HCNC

## 2018-12-27 PROCEDURE — G8978 MOBILITY CURRENT STATUS: HCPCS | Mod: CJ,HCNC

## 2018-12-27 PROCEDURE — 97116 GAIT TRAINING THERAPY: CPT | Mod: HCNC

## 2018-12-27 PROCEDURE — 36415 COLL VENOUS BLD VENIPUNCTURE: CPT | Mod: HCNC

## 2018-12-27 RX ORDER — CARVEDILOL 3.12 MG/1
3.12 TABLET ORAL 2 TIMES DAILY
Status: DISCONTINUED | OUTPATIENT
Start: 2018-12-27 | End: 2018-12-27 | Stop reason: HOSPADM

## 2018-12-27 RX ORDER — HYDRALAZINE HYDROCHLORIDE 25 MG/1
25 TABLET, FILM COATED ORAL ONCE
Status: COMPLETED | OUTPATIENT
Start: 2018-12-27 | End: 2018-12-27

## 2018-12-27 RX ORDER — OLMESARTAN MEDOXOMIL 20 MG/1
20 TABLET ORAL DAILY
Qty: 30 TABLET | Refills: 0 | Status: SHIPPED | OUTPATIENT
Start: 2018-12-28 | End: 2019-01-29 | Stop reason: SDUPTHER

## 2018-12-27 RX ORDER — FUROSEMIDE 20 MG/1
20 TABLET ORAL 2 TIMES DAILY
Qty: 60 TABLET | Refills: 0 | Status: SHIPPED | OUTPATIENT
Start: 2018-12-27 | End: 2019-01-10 | Stop reason: DRUGHIGH

## 2018-12-27 RX ORDER — POTASSIUM CHLORIDE 20 MEQ/1
40 TABLET, EXTENDED RELEASE ORAL ONCE
Status: COMPLETED | OUTPATIENT
Start: 2018-12-27 | End: 2018-12-27

## 2018-12-27 RX ORDER — CARVEDILOL 3.12 MG/1
3.12 TABLET ORAL 2 TIMES DAILY
Qty: 60 TABLET | Refills: 0 | Status: SHIPPED | OUTPATIENT
Start: 2018-12-27 | End: 2019-01-10

## 2018-12-27 RX ADMIN — AMLODIPINE BESYLATE 10 MG: 5 TABLET ORAL at 09:12

## 2018-12-27 RX ADMIN — POTASSIUM CHLORIDE 40 MEQ: 1500 TABLET, EXTENDED RELEASE ORAL at 11:12

## 2018-12-27 RX ADMIN — OLMESARTAN MEDOXOMIL 20 MG: 20 TABLET, COATED ORAL at 09:12

## 2018-12-27 RX ADMIN — FUROSEMIDE 40 MG: 10 INJECTION, SOLUTION INTRAVENOUS at 09:12

## 2018-12-27 RX ADMIN — APIXABAN 5 MG: 2.5 TABLET, FILM COATED ORAL at 09:12

## 2018-12-27 RX ADMIN — ATORVASTATIN CALCIUM 20 MG: 20 TABLET, FILM COATED ORAL at 09:12

## 2018-12-27 RX ADMIN — HYDRALAZINE HYDROCHLORIDE 25 MG: 25 TABLET, FILM COATED ORAL at 01:12

## 2018-12-27 NOTE — HOSPITAL COURSE
89 y/o female admitted with SOB/orthopnea, Spo2 on RA 93% , CXR c/w CHF. Diuresed well. ECHO revealed Normal left ventricular systolic function. The estimated ejection fraction is 68%. No WMA, Grade I (mild) left ventricular diastolic dysfunction consistent with impaired relaxation, Mild to moderate pulmonary hypertension present. Patient clinically improved, vitals stable, discharged home with home health. Follow up with PCP and Cardiologist. Plan of care d/w patient and family who understand and agree.

## 2018-12-27 NOTE — DISCHARGE SUMMARY
"[facility] Hospital Medicine  Discharge Summary      Patient Name: Inna Blankenship  MRN: [id number]  Admission Date: 12/26/2018  Hospital Length of Stay: 0 days  Discharge Date and Time:  12/27/2018 4:46 PM  Attending Physician: SHWETHA Boone MD   Discharging Provider: Lorraine Capellan PA-C  Primary Care Provider: Stacy Rodriguez MD      HPI:   89 y.o. female with a history of DM, HTN, HLD, DVT on Apixiban and CKD who presents to the ED via EMS with complaint of cough for the last few days associated with weakness. Patient states she "couldn't sleep or get comfortable" felt SOB with laying down. She took 2 tylenol and laid sitting up and felt a little improved. Denies fever, chest pain, abdominal pain, headache, or urinary symptoms. She reports compliance with her prescription medications, but did not yet take them today. Denies any similar episode in the past. Denies tobacco, alcoho use.             * No surgery found *      Hospital Course:   89 y/o female admitted with SOB/orthopnea, Spo2 on RA 93% , CXR c/w CHF. Diuresed well. ECHO revealed Normal left ventricular systolic function. The estimated ejection fraction is 68%. No WMA, Grade I (mild) left ventricular diastolic dysfunction consistent with impaired relaxation, Mild to moderate pulmonary hypertension present. Patient clinically improved, vitals stable, discharged home with home health. Follow up with PCP and Cardiologist. Plan of care d/w patient and family who understand and agree.          * Acute congestive heart failure    - SOB/orthopnea, Spo2 on RA 93%, symptoms improved  - s/p lasix in ED with some improvement in symptoms  - no Hx of CHF  - , CXR c/w CHF  - flat elevated troponin likely d/t pulm edema  - cont ARB, add low dose Carvedilol 3.125 mg twice daily  - ECHO-   · Moderate concentric left ventricular hypertrophy.  · Mild to moderate pulmonary hypertension present.  · Normal left ventricular systolic function. " The estimated ejection fraction is 68%  · No wall motion abnormalities.  · Grade I (mild) left ventricular diastolic dysfunction consistent with impaired relaxation.  · Normal left atrial pressure.  · Normal right ventricular systolic function.  · Mild left atrial enlargement.  · Mild aortic regurgitation.  · Mild to moderate tricuspid regurgitation.                  History of DVT (deep vein thrombosis)    - recurrent  - cont Apixiban       Type 2 diabetes mellitus with renal complication    - diet controlled    -   Lab Results   Component Value Date    HGBA1C 6.2 (H) 10/24/2018            Chronic kidney disease, stage III (moderate)    - appears stable         Essential hypertension    - elevated on admission  - resume home med amlodipine 10 mg daily, change ACEi to ARB, add Coreg 3.215 twice daily  - follow up with PCP       Final Active Diagnoses:    Diagnosis Date Noted POA    PRINCIPAL PROBLEM:  Acute congestive heart failure [I50.9] 12/26/2018 Yes    Acute pulmonary edema [J81.0] 12/26/2018 Yes    History of DVT (deep vein thrombosis) [Z86.718] 07/17/2018 Not Applicable    Type 2 diabetes mellitus with renal complication [E11.29] 09/30/2016 Yes    Chronic kidney disease, stage III (moderate) [N18.3] 09/11/2015 Yes    Essential hypertension [I10] 07/27/2012 Yes      Problems Resolved During this Admission:       Discharged Condition: stable    [place] [facility]    Follow Up:  Follow-up Information     Benny Sabillon MD In 1 week.    Specialties:  Cardiology, INTERVENTIONAL CARDIOLOGY  Contact information:  [address]  [place] [id number] [phone]             Schedule an appointment as soon as possible for a visit with Stacy Rodriguez MD.    Specialty:  Internal Medicine  Contact information:  [address]  [place] [id number] [phone]                 Patient Instructions:      Diet Adult Regular     Order Specific Question Answer Comments   Na restriction, if  any: 2gNa      Notify your health care provider if you experience any of the following:  temperature >100.4     Notify your health care provider if you experience any of the following:  persistent nausea and vomiting or diarrhea     Notify your health care provider if you experience any of the following:  severe uncontrolled pain     Notify your health care provider if you experience any of the following:  difficulty breathing or increased cough     Notify your health care provider if you experience any of the following:  severe persistent headache     Notify your health care provider if you experience any of the following:  persistent dizziness, light-headedness, or visual disturbances     Notify your health care provider if you experience any of the following:  increased confusion or weakness     Activity as tolerated       Significant Diagnostic Studies: Labs:   CMP   Recent Labs   Lab 12/26/18  1243 12/27/18  0517    138   K 3.9 3.3*    99   CO2 25 25   * 126*   BUN 24 19   CREATININE 0.8 0.8   CALCIUM 10.1 10.1   ANIONGAP 10 14   ESTGFRAFRICA >60 >60   EGFRNONAA >60 >60   , CBC   Recent Labs   Lab 12/26/18  1243   WBC 5.19   HGB 11.5*   HCT 35.6*      , Troponin   Recent Labs   Lab 12/27/18  0121   TROPONINI 0.088*   , A1C:   Recent Labs   Lab 10/24/18  1128   HGBA1C 6.2*    and All labs within the past 24 hours have been reviewed  Radiology: X-Ray: CXR: X-Ray Chest 1 View (CXR):   Results for orders placed or performed during the hospital encounter of 12/26/18   X-Ray Chest 1 View    Narrative    EXAMINATION:  XR CHEST 1 VIEW    CLINICAL HISTORY:  Cough    TECHNIQUE:  Single frontal view of the chest was performed.    COMPARISON:  10/29/2012    FINDINGS:  Cardiac silhouette is enlarged.  There is central vascular congestion and suspected small effusions.  No acute osseous findings.  Degenerative changes of the shoulders.      Impression    Cardiomegaly with central vascular congestion  and small effusions.      Electronically signed by: Gucci Washington MD  Date:    12/26/2018  Time:    12:56     Cardiac Graphics: Echocardiogram:   Transthoracic echo (TTE) complete (Cupid Only):   Results for orders placed or performed during the hospital encounter of 12/26/18   Transthoracic echo (TTE) complete (Cupid Only)   Result Value Ref Range    BSA 1.66 m2    TDI SEPTAL 0.03     LV LATERAL E/E' RATIO 9.80     LV SEPTAL E/E' RATIO 16.33     LA WIDTH 5.15 cm    AORTIC VALVE CUSP SEPERATION 1.46 cm    TDI LATERAL 0.05     PV PEAK VELOCITY 0.84 cm/s    LVIDD 4.16 3.5 - 6.0 cm    IVS 1.27 (A) 0.6 - 1.1 cm    PW 1.24 (A) 0.6 - 1.1 cm    Ao root annulus 3.35 cm    LVIDS 2.59 2.1 - 4.0 cm    FS 38 28 - 44 %    Sinus 3.10 cm    STJ 2.47 cm    Ascending aorta 2.45 cm    LV mass 187.59 g    LA size 4.42 cm    RVDD 4.00 cm    TAPSE 1.18 cm    RV S' 12.53 m/s    Left Ventricle Relative Wall Thickness 0.60 cm    AV mean gradient 4.20 mmHg    AV valve area 2.84 cm2    AV index (prosthetic) 0.87     E/A ratio 0.79     Mean e' 0.04     E wave decelartion time 227.67 msec    IVRT 0.11 msec    LVOT diameter 2.04 cm    LVOT area 3.27 cm2    LVOT peak VTI 18.86 cm    Ao peak perla 1.35 m/s    Ao VTI 21.71 cm    LVOT stroke volume 61.61 cm3    AV peak gradient 7.29 mmHg    E/E' ratio 12.25     MV Peak E Perla 0.49 m/s    TR Max Perla 3.58 m/s    MV Peak A Perla 0.62 m/s    LV Systolic Volume 24.39 mL    LV Systolic Volume Index 14.8 mL/m2    LV Diastolic Volume 76.79 mL    LV Diastolic Volume Index 46.54 mL/m2    LV Mass Index 113.7 g/m2    RA Major Axis 4.84 cm    Left Atrium Major Axis 6.30 cm    Triscuspid Valve Regurgitation Peak Gradient 51.27 mmHg    RA Width 3.67 cm       Pending Diagnostic Studies:     None         Medications:  Reconciled Home Medications:      Medication List      START taking these medications    carvedilol 3.125 MG tablet  Commonly known as:  COREG  Take 1 tablet (3.125 mg total) by mouth 2 (two) times  daily.     furosemide 20 MG tablet  Commonly known as:  LASIX  Take 1 tablet (20 mg total) by mouth 2 (two) times daily.     olmesartan 20 MG tablet  Commonly known as:  BENICAR  Take 1 tablet (20 mg total) by mouth once daily.  Start taking on:  12/28/2018        CONTINUE taking these medications    amLODIPine 10 MG tablet  Commonly known as:  NORVASC  Take 1 tablet (10 mg total) by mouth once daily.     apixaban 5 mg Tab  Commonly known as:  ELIQUIS  Take 1 tablet (5 mg total) by mouth 2 (two) times daily.     atorvastatin 20 MG tablet  Commonly known as:  LIPITOR  Take 1 tablet (20 mg total) by mouth once daily.     brimonidine-timolol 0.2-0.5 % Drop  Commonly known as:  COMBIGAN  Place 1 drop into both eyes 2 (two) times daily. Disp 10 mls for 1 month     latanoprost 0.005 % ophthalmic solution  Place 1 drop into both eyes every evening.     MULTIVITAMIN 50 PLUS ORAL  Take by mouth.     [id number] ORAL  Take 1 tablet by mouth Daily. 1  Oral Every day        STOP taking these medications    lisinopril 40 MG tablet  Commonly known as:  PRINIVIL,ZESTRIL     timolol maleate 0.5% 0.5 % Solg  Commonly known as:  TIMOPTIC-XE            Indwelling Lines/Drains at time of discharge:   Lines/Drains/Airways          None          Time spent on the discharge of patient: >30 minutes  Patient was seen and examined on the date of discharge and determined to be suitable for discharge.         Lorraine Capellan PA-C  Department of Hospital Medicine  [facility]

## 2018-12-27 NOTE — PLAN OF CARE
[facility]  [address] [place]. [address]           HOME  HEALTH ORDERS        Admit to Home Health    Diagnoses:  Active Hospital Problems    Diagnosis  POA    *Acute congestive heart failure [I50.9]  Yes    Acute pulmonary edema [J81.0]  Yes    History of DVT (deep vein thrombosis) [Z86.718]  Not Applicable    Type 2 diabetes mellitus with renal complication [E11.29]  Yes    Chronic kidney disease, stage III (moderate) [N18.3]  Yes    Essential hypertension [I10]  Yes      Resolved Hospital Problems   No resolved problems to display.       Patient is homebound due to: Pt requires home health services due to taxing effort to leave the home as a result of weakness/debility from Acute congestive heart failure    Face to face services were provided on 12/27/2018    Allergies:  Review of patient's allergies indicates:  No Known Allergies    Diet: low sodium    Activity: as tolerated    Nursing:   SN to complete comprehensive assessment including routine vital signs. Instruct on disease process and s/s of complications to report to MD. Review/verify medication list sent home with the patient at time of discharge  and instruct patient/caregiver as needed. Frequency may be adjusted depending on start of care date.    Notify MD if SBP > 160 or < 90; DBP > 90 or < 50; HR > 120 or < 50; Temp > 101;     CONSULTS:     Physical Therapy to evaluate and treat. Evaluate for home safety and equipment needs; Establish/upgrade home exercise program. Perform / instruct on therapeutic exercises, gait training, transfer training, and Range of Motion.    Occupational Therapy to evaluate and treat. Evaluate home environment for safety and equipment needs. Perform/Instruct on transfers, ADL training, ROM, and therapeutic exercises.                                       Aide to provide assistance with personal care, ADLs, and vital signs      Medications: Review discharge medications with  patient and family and provide education.         Medication List      START taking these medications    carvedilol 3.125 MG tablet  Commonly known as:  COREG  Take 1 tablet (3.125 mg total) by mouth 2 (two) times daily.     furosemide 20 MG tablet  Commonly known as:  LASIX  Take 1 tablet (20 mg total) by mouth 2 (two) times daily.     olmesartan 20 MG tablet  Commonly known as:  BENICAR  Take 1 tablet (20 mg total) by mouth once daily.  Start taking on:  12/28/2018        CONTINUE taking these medications    amLODIPine 10 MG tablet  Commonly known as:  NORVASC  Take 1 tablet (10 mg total) by mouth once daily.     apixaban 5 mg Tab  Commonly known as:  ELIQUIS  Take 1 tablet (5 mg total) by mouth 2 (two) times daily.     atorvastatin 20 MG tablet  Commonly known as:  LIPITOR  Take 1 tablet (20 mg total) by mouth once daily.     brimonidine-timolol 0.2-0.5 % Drop  Commonly known as:  COMBIGAN  Place 1 drop into both eyes 2 (two) times daily. Disp 10 mls for 1 month     latanoprost 0.005 % ophthalmic solution  Place 1 drop into both eyes every evening.     MULTIVITAMIN 50 PLUS ORAL     [id number] ORAL        STOP taking these medications    lisinopril 40 MG tablet  Commonly known as:  PRINIVIL,ZESTRIL     timolol maleate 0.5% 0.5 % Solg  Commonly known as:  TIMOPTIC-XE           Where to Get Your Medications      These medications were sent to [facility] [place] - [address]  [address] [place] [address]    Phone:  [phone]   · carvedilol 3.125 MG tablet  · furosemide 20 MG tablet  · olmesartan 20 MG tablet           _________________________________  DCinthya Boone MD      12/27/2018

## 2018-12-27 NOTE — ASSESSMENT & PLAN NOTE
- SOB/orthopnea, Spo2 on RA 93%  - s/p lasix in ED with some improvement in symptoms  - no Hx of CHF  - , CXR c/w pulm edema  - flat elevated troponin likely d/t pulm edema  - check ECHO  - lasix IV twice daily for now  - monitor I/O  - hold on bblocker for now with bradycardia, change ACEi to ARB

## 2018-12-27 NOTE — PLAN OF CARE
Discharge Planning:  Patient admitted on 12-26-18  LOS- day 1      Current dispo: home today  Home health to follow  All discharge questions and concerns were addressed  Transportation: family at the bedside    Case management  to follow

## 2018-12-27 NOTE — ASSESSMENT & PLAN NOTE
- SOB/orthopnea, Spo2 on RA 93%, symptoms improved  - s/p lasix in ED with some improvement in symptoms  - no Hx of CHF  - , CXR c/w CHF  - flat elevated troponin likely d/t pulm edema  - cont ARB, add low dose Carvedilol 3.125 mg twice daily  - ECHO-   · Moderate concentric left ventricular hypertrophy.  · Mild to moderate pulmonary hypertension present.  · Normal left ventricular systolic function. The estimated ejection fraction is 68%  · No wall motion abnormalities.  · Grade I (mild) left ventricular diastolic dysfunction consistent with impaired relaxation.  · Normal left atrial pressure.  · Normal right ventricular systolic function.  · Mild left atrial enlargement.  · Mild aortic regurgitation.  · Mild to moderate tricuspid regurgitation.

## 2018-12-27 NOTE — H&P
"[facility] Hospital Medicine  History & Physical    Patient Name: Inna Blankenship  MRN: [id number]  Admission Date: 12/26/2018  Attending Physician: SHWETHA Boone MD   Primary Care Provider: Stacy Rodriguez MD         Patient information was obtained from patient, relative(s), past medical records and ER records.     Subjective:     Principal Problem:CHF (congestive heart failure)    Chief Complaint:   Chief Complaint   Patient presents with    Cough     pt to er with c/o one day of cough and nasal congestion x one day.         HPI: 89 y.o. female with a history of DM, HTN, HLD, DVT on Apixiban and CKD who presents to the ED via EMS with complaint of cough for the last few days associated with weakness. Patient states she "couldn't sleep or get comfortable" felt SOB with laying down. She took 2 tylenol and laid sitting up and felt a little improved. Denies fever, chest pain, abdominal pain, headache, or urinary symptoms. She reports compliance with her prescription medications, but did not yet take them today. Denies any similar episode in the past. Denies tobacco, alcoho use.             Past Medical History:   Diagnosis Date    Arthritis     CHF (congestive heart failure) 12/26/2018    Chronic kidney disease, stage III (moderate) 9/11/2015    Colon adenoma     Diabetes mellitus     diet controlled    Glaucoma     Hyperlipemia     Hypertension     Osteopenia     Type 2 diabetes mellitus     Type II or unspecified type diabetes mellitus without mention of complication, not stated as uncontrolled        Past Surgical History:   Procedure Laterality Date    APPENDECTOMY      CATARACT EXTRACTION W/  INTRAOCULAR LENS IMPLANT Right 3/15/06        CATARACT EXTRACTION W/  INTRAOCULAR LENS IMPLANT Left 9/27/06        COLONOSCOPY W/ POLYPECTOMY      EYE SURGERY      HYSTERECTOMY         Review of patient's allergies indicates:  No Known Allergies    No current " facility-administered medications on file prior to encounter.      Current Outpatient Medications on File Prior to Encounter   Medication Sig    amLODIPine (NORVASC) 10 MG tablet Take 1 tablet (10 mg total) by mouth once daily.    apixaban (ELIQUIS) 5 mg Tab Take 1 tablet (5 mg total) by mouth 2 (two) times daily.    atorvastatin (LIPITOR) 20 MG tablet Take 1 tablet (20 mg total) by mouth once daily.    brimonidine-timolol (COMBIGAN) 0.2-0.5 % Drop Place 1 drop into both eyes 2 (two) times daily. Disp 10 mls for 1 month    CALCIUM CARBONATE ([id number] ORAL) Take 1 tablet by mouth Daily. 1  Oral Every day    latanoprost 0.005 % ophthalmic solution Place 1 drop into both eyes every evening.    lisinopril (PRINIVIL,ZESTRIL) 40 MG tablet Take 1 tablet (40 mg total) by mouth once daily.    multivit with minerals/lutein (MULTIVITAMIN 50 PLUS ORAL) Take by mouth.    [DISCONTINUED] timolol maleate 0.5% (TIMOPTIC-XR) 0.5 % SolG Place 1 drop into both eyes once daily.     Family History     Problem Relation (Age of Onset)    Allergies Daughter    Asthma Daughter    Cancer Sister    Heart attack Mother    Heart disease Mother    Hypertension Daughter    No Known Problems Father        Tobacco Use    Smoking status: Never Smoker    Smokeless tobacco: Never Used   Substance and Sexual Activity    Alcohol use: No     Comment: quit drinking since starting Eliquis    Drug use: No    Sexual activity: No     Review of Systems   Constitutional: Negative for activity change, appetite change, chills, fatigue and fever.   HENT: Negative for congestion, rhinorrhea and sinus pressure.    Eyes: Negative.    Respiratory: Positive for cough and shortness of breath. Negative for chest tightness.    Cardiovascular: Negative for chest pain, palpitations and leg swelling.   Gastrointestinal: Negative for abdominal distention, abdominal pain, constipation, diarrhea, nausea and vomiting.   Endocrine: Negative for polydipsia and  polyuria.   Genitourinary: Negative for difficulty urinating and urgency.   Musculoskeletal: Negative for back pain and neck pain.   Neurological: Negative for dizziness, light-headedness and headaches.   Psychiatric/Behavioral: Negative for agitation.     Objective:     Vital Signs (Most Recent):  Temp: 97.5 °F (36.4 °C) (12/26/18 1212)  Pulse: (!) 52 (12/26/18 1345)  Resp: 20 (12/26/18 1245)  BP: (!) 163/67 (12/26/18 1345)  SpO2: 95 % (12/26/18 1345) Vital Signs (24h Range):  Temp:  [97.5 °F (36.4 °C)] 97.5 °F (36.4 °C)  Pulse:  [52-61] 52  Resp:  [18-20] 20  SpO2:  [93 %-95 %] 95 %  BP: (150-195)/(65-93) 163/67     Weight: 61.2 kg (135 lb)  Body mass index is 23.17 kg/m².    Physical Exam   Constitutional: She is oriented to person, place, and time. She appears well-developed and well-nourished. No distress.   HENT:   Head: Normocephalic and atraumatic.   Mouth/Throat: Oropharynx is clear and moist.   Eyes: Conjunctivae are normal. Pupils are equal, round, and reactive to light. Right eye exhibits no discharge. Left eye exhibits no discharge. No scleral icterus.   Neck: Normal range of motion. Neck supple.   Cardiovascular: Normal rate, regular rhythm, normal heart sounds and intact distal pulses.   Pulmonary/Chest: Effort normal and breath sounds normal. No respiratory distress.   Diminished b/l   Abdominal: Soft. Bowel sounds are normal. She exhibits no distension. There is no tenderness.   Musculoskeletal: Normal range of motion. She exhibits no edema, tenderness or deformity.   Neurological: She is alert and oriented to person, place, and time. No cranial nerve deficit.   Skin: Skin is warm and dry. Capillary refill takes less than 2 seconds. She is not diaphoretic.   Psychiatric: She has a normal mood and affect.   Nursing note and vitals reviewed.        CRANIAL NERVES     CN III, IV, VI   Pupils are equal, round, and reactive to light.       Significant Labs:   CBC:   Recent Labs   Lab 12/26/18  1243   WBC  5.19   HGB 11.5*   HCT 35.6*        CMP:   Recent Labs   Lab 12/26/18  1243      K 3.9      CO2 25   *   BUN 24   CREATININE 0.8   CALCIUM 10.1   ANIONGAP 10   EGFRNONAA >60     Cardiac Markers:   Recent Labs   Lab 12/26/18  1408   *     Troponin:   Recent Labs   Lab 12/26/18  1243   TROPONINI 0.058*     All pertinent labs within the past 24 hours have been reviewed.    Significant Imaging: I have reviewed all pertinent imaging results/findings within the past 24 hours.   Imaging Results          X-Ray Chest 1 View (Final result)  Result time 12/26/18 12:56:59    Final result by Gucci Washington MD (12/26/18 12:56:59)                 Impression:      Cardiomegaly with central vascular congestion and small effusions.      Electronically signed by: Gucci Washington MD  Date:    12/26/2018  Time:    12:56             Narrative:    EXAMINATION:  XR CHEST 1 VIEW    CLINICAL HISTORY:  Cough    TECHNIQUE:  Single frontal view of the chest was performed.    COMPARISON:  10/29/2012    FINDINGS:  Cardiac silhouette is enlarged.  There is central vascular congestion and suspected small effusions.  No acute osseous findings.  Degenerative changes of the shoulders.                              Assessment/Plan:     * CHF (congestive heart failure)    - SOB/orthopnea, Spo2 on RA 93%  - s/p lasix in ED with some improvement in symptoms  - no Hx of CHF  - , CXR c/w pulm edema  - flat elevated troponin likely d/t pulm edema  - check ECHO  - lasix IV twice daily for now  - monitor I/O  - hold on bblocker for now with bradycardia, change ACEi to ARB             History of DVT (deep vein thrombosis)    - recurrent  - cont Apixiban       Type 2 diabetes mellitus with renal complication    - diet controlled  - SSI/accuchecks  -   Lab Results   Component Value Date    HGBA1C 6.2 (H) 10/24/2018            Chronic kidney disease, stage III (moderate)    - appears stable         Essential  hypertension    - elevated on admission  - resume home med amlodipine 10 mg daily, change ACEi to ARB  - monitor         VTE Risk Mitigation (From admission, onward)        Ordered     apixaban tablet 5 mg  2 times daily      12/26/18 1829     IP VTE HIGH RISK PATIENT  Once      12/26/18 1829     Place sequential compression device  Until discontinued      12/26/18 1830             Lorraine Capellan PA-C  Department of Hospital Medicine   [facility]

## 2018-12-27 NOTE — ASSESSMENT & PLAN NOTE
- elevated on admission  - resume home med amlodipine 10 mg daily, change ACEi to ARB  - monitor

## 2018-12-27 NOTE — PLAN OF CARE
12/27/18 1706   Final Note   Assessment Type Final Discharge Note   Anticip[facility] Discharge Disposition [facility]   Hospital Follow Up  Appt(s) scheduled? Yes   Discharge plans and expectations educations in teach back method with documentation complete? Yes   Right Care Referral Info   Post Acute Recommendation Home-care   Referral Type    Facility Name [facility]

## 2018-12-27 NOTE — ASSESSMENT & PLAN NOTE
- diet controlled  - SSI/accuchecks  -   Lab Results   Component Value Date    HGBA1C 6.2 (H) 10/24/2018

## 2018-12-27 NOTE — PLAN OF CARE
Problem: Physical Therapy Goal  Goal: Physical Therapy Goal  Goals to be met by: 1/3/19    Patient will increase functional independence with mobility by performin. Supine to sit with Mod I  2. Sit to supine with Mod I  3. Sit<>stand transfer with supervision using rolling walker.   4. Gait > 150 feet with SBA using rolling walker.   5. Ascend/descend 4inch step with no handrails with CGA with or without AD.       Outcome: Ongoing (interventions implemented as appropriate)  Eval complete; Goals established. Patient presented in pleasant demeanor, agreeable to PT evaluation and treatment. Patient requires SBA for bed mobility and CGA for transfers. Moderate verbal cues provided for hand placement and BLE positioning during transfers after PT demonstration provided. Patient tolerated 120' of gait training with RW with minimal verbal cues, CGA/SBA for safety. Patient would benefit from HH upon discharge in order to optimize functional mobility outcomes and maximize patient safety.

## 2018-12-27 NOTE — PT/OT/SLP EVAL
Physical Therapy Evaluation    Patient Name:  Inna Blankenship   MRN:  [id number]    Recommendations:     Discharge Recommendations:  (HH PT)   Discharge Equipment Recommendations: none   Barriers to discharge: Decreased caregiver support    Assessment:     Inna Blankenship is a 89 y.o. female admitted with a medical diagnosis of CHF (congestive heart failure).  She presents with the following impairments/functional limitations:  weakness, impaired functional mobilty, gait instability. These impairments inhibit the patient from independently and safely participating in desired functional tasks such as transfers, ambulating within home, cooking, cleaning, and community ambulation.     Eval complete; Goals established. Patient presented in pleasant demeanor, agreeable to PT evaluation and treatment. Patient requires SBA for bed mobility and CGA for transfers. Moderate verbal cues provided for hand placement and BLE positioning during transfers after PT demonstration provided. Patient tolerated 120' of gait training with RW with minimal verbal cues, CGA/SBA for safety 2/2 dizziness. Patient would benefit from HH upon discharge in order to optimize functional mobility outcomes and maximize patient safety.     Rehab Prognosis: Good; patient would benefit from acute skilled PT services to address these deficits and reach maximum level of function.    Recent Surgery: * No surgery found *      Plan:     During this hospitalization, patient to be seen 3 x/week to address the identified rehab impairments via gait training, therapeutic activities, therapeutic exercises and progress toward the following goals:    · Plan of Care Expires:  01/26/19    Subjective     Chief Complaint: Dizziness while ambulating  Patient/Family Comments/goals: Pt inquired about any stairs at other family members home but patient stated she feel confident and has had no problems in the past with stair negotiation. Patient's goddaughter confirmed.    Pain/Comfort:  · Pain Rating 1: 0/10  · Pain Rating Post-Intervention 1: 0/10    Patients cultural, spiritual, Yarsanism conflicts given the current situation: no    Living Environment:  · SSH, 1 step, no handrails   · Walk-in shower with shower bench  · Lives with  who is 89 and cannot assist patient  · Patient was independent with all ADLS and IADLs except driving, sweeping, and mopping  · Daughter and godchild available to to assist after work  · Stated she fell within the past 3 weeks while turning  · Equipment used at home: cane, straight, walker, rolling, grab bar, shower chair.    Objective:     Communicated with RN prior to session.  Patient found all lines intact and call button in reach bed alarm, peripheral IV, telemetry  upon PT entry to room.    General Precautions: Standard, (Cardiac diet)   Orthopedic Precautions:N/A   Braces: N/A     · Cognition:   · Patient is oriented to name, , location and situation   · Pt follows approximately 75 % of multi-step commands.  · Mood: Pleasant and cooperative.   · Musculoskeletal:  · Posture:    · -       Rounded shoulders  · -       Forward head  · LE ROM/Strength:   · RLE weakness > LLE  · LLE MMT for hip flexion, knee ext, knee flx, and ankle DF 4/5  · RLE MMT for hip flexion, knee ext, knee flx, and ankle DF 4-/5  · Neuromuscular:  · Sensation: Intact to light touch bilateral LEs. Denied paresthesias.   · Coordination/Tone/Reflexes: No impairments identified with functional mobility. No formal testing performed.   · Balance: SBA with RW when standing   · Visual-vestibular: No impairments identified with functional mobility. No formal testing performed.  · Integument: Visible skin intact  · Cardiopulmonary:  · Vital signs:   · BP: 156/67  · HR: 76    Functional Mobility:  · Bed Mobility:     · Scooting: stand by assistance  · Supine to Sit: stand by assistance  · Transfers:     · Sit to Stand:  contact guard assistance with rolling walker  · Gait:    · 120' of gait training with RW, CGA/SBA  · Verbal cues for posture as patient had excessive trunk flexion while using RW  · Verbal cues for safety while turning (to prevent RW from losing contact with floor)  · Decreased juanita   · Balance:   · SBA with RW    Therapeutic Activities and Exercises:  · Discussed d/c planning with family member and patient  · Discussed with patient the importance of calling RN prior to ambulating to bathroom   · Told RN she was safe in chair with family member present upon PT's departure from room     AM-PAC 6 CLICK MOBILITY  Total Score:20     Patient left up in chair with all lines intact, call button in reach, RN notified and family member present.    GOALS:   Multidisciplinary Problems     Physical Therapy Goals        Problem: Physical Therapy Goal    Goal Priority Disciplines Outcome Goal Variances Interventions   Physical Therapy Goal     PT, PT/OT Ongoing (interventions implemented as appropriate)     Description:  Goals to be met by: 1/3/19    Patient will increase functional independence with mobility by performin. Supine to sit with Mod I  2. Sit to supine with Mod I  3. Sit<>stand transfer with supervision using rolling walker.   4. Gait > 150 feet with SBA using rolling walker.   5. Ascend/descend 4inch step with no handrails with CGA with or without AD.                         History:     Past Medical History:   Diagnosis Date    Arthritis     CHF (congestive heart failure) 2018    Chronic kidney disease, stage III (moderate) 2015    Colon adenoma     Diabetes mellitus     diet controlled    Glaucoma     Hyperlipemia     Hypertension     Osteopenia     Type 2 diabetes mellitus     Type II or unspecified type diabetes mellitus without mention of complication, not stated as uncontrolled        Past Surgical History:   Procedure Laterality Date    APPENDECTOMY      CATARACT EXTRACTION W/  INTRAOCULAR LENS IMPLANT Right 3/15/06         CATARACT EXTRACTION W/  INTRAOCULAR LENS IMPLANT Left 9/27/06        COLONOSCOPY W/ POLYPECTOMY      EYE SURGERY      HYSTERECTOMY         Clinical Decision Making:     History  Co-morbidities and personal factors that may impact the plan of care Examination  Body Structures and Functions, activity limitations and participation restrictions that may impact the plan of care Clinical Presentation   Decision Making/ Complexity Score   Co-morbidities:   [] Time since onset of injury / illness / exacerbation  [] Status of current condition  []Patient's cognitive status and safety concerns    [] Multiple Medical Problems (see med hx)  Personal Factors:   [] Patient's age  [] Prior Level of function   [] Patient's home situation (environment and family support)  [] Patient's level of motivation  [] Expected progression of patient      HISTORY:(criteria)    [] 15084 - no personal factors/history    [] 76178 - has 1-2 personal factor/comorbidity     [] 39216 - has >3 personal factor/comorbidity     Body Regions:  [] Objective examination findings  [] Head     []  Neck  [] Trunk   [] Upper Extremity  [] Lower Extremity    Body Systems:  [] For communication ability, affect, cognition, language, and learning style: the assessment of the ability to make needs known, consciousness, orientation (person, place, and time), expected emotional /behavioral responses, and learning preferences (eg, learning barriers, education  needs)  [] For the neuromuscular system: a general assessment of gross coordinated movement (eg, balance, gait, locomotion, transfers, and transitions) and motor function  (motor control and motor learning)  [] For the musculoskeletal system: the assessment of gross symmetry, gross range of motion, gross strength, height, and weight  [] For the integumentary system: the assessment of pliability(texture), presence of scar formation, skin color, and skin integrity  [] For  cardiovascular/pulmonary system: the assessment of heart rate, respiratory rate, blood pressure, and edema     Activity limitations:    [] Patient's cognitive status and saf ety concerns          [] Status of current condition      [] Weight bearing restriction  [] Cardiopulmunary Restriction    Participation Restrictions:   [] Goals and goal agreement with the patient     [] Rehab potential (prognosis) and probable outcome      Examination of Body System: (criteria)    [] 18544 - addressing 1-2 elements    [] 90034 - addressing a total of 3 or more elements     [] 10711 -  Addressing a total of 4 or more elements         Clinical Presentation: (criteria)  Choose one     On examination of body system using standardized tests and measures patient presents with (CHOOSE ONE) elements from any of the following: body structures and functions, activity limitations, and/or participation restrictions.  Leading to a clinical presentation that is considered (CHOOSE ONE)                              Clinical Decision Making  (Eval Complexity):  Choose One     Time Tracking:     PT Received On: 12/27/18  PT Start Time: 0909     PT Stop Time: 0941  PT Total Time (min): 32 min     Billable Minutes: Evaluation 22 and Gait Training 10      Galina Pierre, PT  12/27/2018

## 2018-12-27 NOTE — ASSESSMENT & PLAN NOTE
- elevated on admission  - resume home med amlodipine 10 mg daily, change ACEi to ARB, add Coreg 3.215 twice daily  - follow up with PCP

## 2018-12-27 NOTE — PLAN OF CARE
"Problem: Adult Inpatient Plan of Care  Goal: Plan of Care Review  Outcome: Ongoing (interventions implemented as appropriate)  Pt in bed eating dinner, yoko complaint of SOB or cough noted. Pt states " I feel much better ". Pt updated on POC and verbalized understanding. Call bell placed I n reach of pt. Pt ambulates to commode with standby assist. Tele monitor in place. Safety and comfort needs addressed. Will continue to monitor, condition stable.       "

## 2018-12-28 ENCOUNTER — TELEPHONE (OUTPATIENT)
Dept: INTERNAL MEDICINE | Facility: CLINIC | Age: 83
End: 2018-12-28

## 2018-12-28 NOTE — TELEPHONE ENCOUNTER
----- Message from Maya Granger sent at 12/28/2018  1:13 PM CST -----  Contact: Pt [phone]  Pt is calling to notify  that she is no longer in Intensive Care. Please call and advise.

## 2018-12-28 NOTE — TELEPHONE ENCOUNTER
Called patient contact. Having difficulty getting her Lasix filled at [facility]. Advised to have [facility] either pull the med to them in [place] or move the rx to a [facility] that does have the lasix, she realizes she has to have it.  Made f/u appt post hospital stay.

## 2018-12-28 NOTE — NURSING
Pt left unit with family and belongings at bedside. VSS. NAD. Pt and family given discharge instructions and verbalized understanding. Condition stable.

## 2018-12-29 NOTE — PT/OT/SLP DISCHARGE
Physical Therapy Discharge Summary    Name: Inna Blankenship  MRN: [id number]   Principal Problem: Acute congestive heart failure     Patient Discharged from acute Physical Therapy on 18.  Please refer to prior PT noted date on 18 for functional status.     Assessment:     Patient has not met goals.    Objective:     GOALS:   Multidisciplinary Problems     Physical Therapy Goals        Problem: Physical Therapy Goal    Goal Priority Disciplines Outcome Goal Variances Interventions   Physical Therapy Goal     PT, PT/OT Ongoing (interventions implemented as appropriate)     Description:  Goals to be met by: 1/3/19    Patient will increase functional independence with mobility by performin. Supine to sit with Mod I  2. Sit to supine with Mod I  3. Sit<>stand transfer with supervision using rolling walker.   4. Gait > 150 feet with SBA using rolling walker.   5. Ascend/descend 4inch step with no handrails with CGA with or without AD.                         Reasons for Discontinuation of Therapy Services  Transfer to alternate level of care.      Plan:     Patient Discharged to: Home with Home Health Service.    Galina Pierre, PT  2018

## 2018-12-30 ENCOUNTER — NURSE TRIAGE (OUTPATIENT)
Dept: ADMINISTRATIVE | Facility: CLINIC | Age: 83
End: 2018-12-30

## 2018-12-30 RX ORDER — FUROSEMIDE 20 MG/1
20 TABLET ORAL 2 TIMES DAILY
Qty: 28 TABLET | Refills: 0 | Status: SHIPPED | OUTPATIENT
Start: 2018-12-30 | End: 2019-01-24 | Stop reason: SDUPTHER

## 2018-12-30 NOTE — TELEPHONE ENCOUNTER
"    Reason for Disposition   [1] Request for URGENT new prescription or refill of "essential" medication (i.e., likelihood of harm to patient if not taken) AND [2] triager unable to fill per unit policy    Protocols used: ST MEDICATION QUESTION CALL-A-    Patients lasix won't come in from mail order pharmacy for several more days and she needs this medication. On call provider approved 2 weeks supply of lasix. Prescription sent to [facility] on [address] per patient request.  "

## 2019-01-07 ENCOUNTER — TELEPHONE (OUTPATIENT)
Dept: ADMINISTRATIVE | Facility: CLINIC | Age: 84
End: 2019-01-07

## 2019-01-07 NOTE — TELEPHONE ENCOUNTER
Home Health SOC 12/28/2018 - 02/25/2019 with [facility] - Dr. Stacy Rodriguez. Patient received SN, PT and OT services.

## 2019-01-09 ENCOUNTER — OFFICE VISIT (OUTPATIENT)
Dept: INTERNAL MEDICINE | Facility: CLINIC | Age: 84
End: 2019-01-09
Payer: MEDICARE

## 2019-01-09 VITALS
DIASTOLIC BLOOD PRESSURE: 60 MMHG | BODY MASS INDEX: 20.76 KG/M2 | SYSTOLIC BLOOD PRESSURE: 128 MMHG | WEIGHT: 132.25 LBS | HEIGHT: 67 IN

## 2019-01-09 DIAGNOSIS — I50.9 ACUTE CONGESTIVE HEART FAILURE, UNSPECIFIED HEART FAILURE TYPE: ICD-10-CM

## 2019-01-09 DIAGNOSIS — Z09 HOSPITAL DISCHARGE FOLLOW-UP: Primary | ICD-10-CM

## 2019-01-09 PROCEDURE — 99213 OFFICE O/P EST LOW 20 MIN: CPT | Mod: HCNC,S$GLB,, | Performed by: NURSE PRACTITIONER

## 2019-01-09 PROCEDURE — 1101F PT FALLS ASSESS-DOCD LE1/YR: CPT | Mod: CPTII,HCNC,S$GLB, | Performed by: NURSE PRACTITIONER

## 2019-01-09 PROCEDURE — 99213 PR OFFICE/OUTPT VISIT, EST, LEVL III, 20-29 MIN: ICD-10-PCS | Mod: HCNC,S$GLB,, | Performed by: NURSE PRACTITIONER

## 2019-01-09 PROCEDURE — 99999 PR PBB SHADOW E&M-EST. PATIENT-LVL III: CPT | Mod: PBBFAC,HCNC,, | Performed by: NURSE PRACTITIONER

## 2019-01-09 PROCEDURE — 1101F PR PT FALLS ASSESS DOC 0-1 FALLS W/OUT INJ PAST YR: ICD-10-PCS | Mod: CPTII,HCNC,S$GLB, | Performed by: NURSE PRACTITIONER

## 2019-01-09 PROCEDURE — 99999 PR PBB SHADOW E&M-EST. PATIENT-LVL III: ICD-10-PCS | Mod: PBBFAC,HCNC,, | Performed by: NURSE PRACTITIONER

## 2019-01-09 NOTE — PROGRESS NOTES
Subjective:       Patient ID: Inna Blankenship is a 89 y.o. female.    Chief Complaint: Follow-up (ED (went in for SOB and was diagnosed with CHF) )    HPI:  89 yo female that presents to clinic today for hospital follow up.    History of DM, HTN, HLD, and CKD     Patient was seen in ED on 12/26/18 where she presented by ambulance with SOB, cough and palpations.  States she had an episode of heavy coughing after walking back to bed form the bathroom that morning, after which she layed down in bed and began to experience palpitations, shortness of breath and generalized weakness.    In ED had elevated BNP and pulmonary edema on chest xray.  Responded well with lasix.  Echo showed moderate concentric LVH and grade 1 left ventricular dystolic function.  Patient was discharged on lasix, coreg and benicar and told to follow up with PCP and cardiology.    Since ED discharge, patient states that she has had no further cough os SOB.  Denies any fever, chest pain, n/v or dizziness.  States that appetite and energy level are getting back to normal.    Review of Systems   Constitutional: Negative for activity change, appetite change, fatigue and fever.   HENT: Negative for congestion, ear pain, rhinorrhea, sinus pressure, sinus pain and sore throat.    Respiratory: Negative for apnea, cough, shortness of breath and wheezing.    Cardiovascular: Negative for chest pain, palpitations and leg swelling.   Gastrointestinal: Negative for abdominal distention, abdominal pain, constipation, diarrhea, nausea and vomiting.   Musculoskeletal: Negative for arthralgias, back pain, myalgias, neck pain and neck stiffness.   Skin: Negative for color change and rash.   Neurological: Negative for dizziness, light-headedness, numbness and headaches.   Psychiatric/Behavioral: Negative for behavioral problems.       Objective:      Physical Exam   Constitutional: She is oriented to person, place, and time. She appears well-developed and well-nourished.  No distress.   HENT:   Head: Normocephalic and atraumatic.   Right Ear: External ear normal.   Left Ear: External ear normal.   Mouth/Throat: Oropharynx is clear and moist.   Eyes: Conjunctivae and EOM are normal. Pupils are equal, round, and reactive to light.   Neck: Normal range of motion. Neck supple. No thyromegaly present.   Cardiovascular: Normal rate, regular rhythm, normal heart sounds and intact distal pulses.   Pulmonary/Chest: Effort normal and breath sounds normal. No stridor. No respiratory distress. She has no wheezes. She has no rales.   Abdominal: Soft. Bowel sounds are normal. She exhibits no distension and no mass. There is no tenderness.   Lymphadenopathy:     She has no cervical adenopathy.   Neurological: She is alert and oriented to person, place, and time. No cranial nerve deficit or sensory deficit.   Skin: Skin is warm and dry. No erythema.   Psychiatric: Her behavior is normal.       Assessment:       1. Hospital discharge follow-up    2. Acute congestive heart failure, unspecified heart failure type        Plan:     1.  Hospital discharge follow up CHF  -Vitals are stable in clinic.  Patient appears to be doing well since hospital discharge.  -Newly diagnosed CHF.  -Patient states great improvement with new medications.  -Continue medications at current doses.  -Will need further follow up with cardiology.  -Encouraged to keep follow up with PCP in February.  -Encouraged to monitor salt intake in diet.

## 2019-01-10 ENCOUNTER — OFFICE VISIT (OUTPATIENT)
Dept: CARDIOLOGY | Facility: CLINIC | Age: 84
End: 2019-01-10
Payer: MEDICARE

## 2019-01-10 VITALS
DIASTOLIC BLOOD PRESSURE: 68 MMHG | HEART RATE: 58 BPM | WEIGHT: 132.06 LBS | BODY MASS INDEX: 22.55 KG/M2 | SYSTOLIC BLOOD PRESSURE: 149 MMHG | HEIGHT: 64 IN

## 2019-01-10 DIAGNOSIS — E78.49 OTHER HYPERLIPIDEMIA: ICD-10-CM

## 2019-01-10 DIAGNOSIS — I10 ESSENTIAL HYPERTENSION: ICD-10-CM

## 2019-01-10 DIAGNOSIS — I50.32 CHRONIC DIASTOLIC CONGESTIVE HEART FAILURE: Primary | ICD-10-CM

## 2019-01-10 DIAGNOSIS — N18.30 CHRONIC KIDNEY DISEASE, STAGE III (MODERATE): ICD-10-CM

## 2019-01-10 DIAGNOSIS — E11.21 TYPE 2 DIABETES MELLITUS WITH DIABETIC NEPHROPATHY, WITHOUT LONG-TERM CURRENT USE OF INSULIN: ICD-10-CM

## 2019-01-10 PROCEDURE — 99999 PR PBB SHADOW E&M-EST. PATIENT-LVL IV: ICD-10-PCS | Mod: PBBFAC,HCNC,, | Performed by: INTERNAL MEDICINE

## 2019-01-10 PROCEDURE — 99214 PR OFFICE/OUTPT VISIT, EST, LEVL IV, 30-39 MIN: ICD-10-PCS | Mod: HCNC,S$GLB,, | Performed by: INTERNAL MEDICINE

## 2019-01-10 PROCEDURE — 1101F PT FALLS ASSESS-DOCD LE1/YR: CPT | Mod: CPTII,HCNC,S$GLB, | Performed by: INTERNAL MEDICINE

## 2019-01-10 PROCEDURE — 1101F PR PT FALLS ASSESS DOC 0-1 FALLS W/OUT INJ PAST YR: ICD-10-PCS | Mod: CPTII,HCNC,S$GLB, | Performed by: INTERNAL MEDICINE

## 2019-01-10 PROCEDURE — 99214 OFFICE O/P EST MOD 30 MIN: CPT | Mod: HCNC,S$GLB,, | Performed by: INTERNAL MEDICINE

## 2019-01-10 PROCEDURE — 99999 PR PBB SHADOW E&M-EST. PATIENT-LVL IV: CPT | Mod: PBBFAC,HCNC,, | Performed by: INTERNAL MEDICINE

## 2019-01-10 RX ORDER — FUROSEMIDE 20 MG/1
20 TABLET ORAL DAILY
Qty: 90 TABLET | Refills: 3 | Status: SHIPPED | OUTPATIENT
Start: 2019-01-10 | End: 2019-02-02 | Stop reason: SDUPTHER

## 2019-01-10 NOTE — LETTER
January 10, 2019      Devan Garcia, NP  [address]  [facility] [address]           [facility] Cardiology  [address]  [facility] [address]  Phone: [phone]          Patient: Inna Blankenship   MR Number: [id number]   YOB: 1926   Date of Visit: 1/10/2019       Dear Devan Garcia:    Thank you for referring Inna Blankenship to me for evaluation. Attached you will find relevant portions of my assessment and plan of care.    If you have questions, please do not hesitate to call me. I look forward to following Inna Blankenship along with you.    Sincerely,    Gucci Shin MD    Enclosure  CC:  No Recipients    If you would like to receive this communication electronically, please contact [email or web address] or [phone] to request more information on [employer] access.    For providers and/or their staff who would like to refer a patient to [facility], please contact us through our one-stop-shop provider referral line, Julianna Adams, at [phone].    If you feel you have received this communication in error or would no longer like to receive these types of communications, please e-mail [email or web address]

## 2019-01-10 NOTE — PROGRESS NOTES
"Subjective:    Patient ID:  Inna Blankenship is a 89 y.o. female who presents for evaluate of CHF    HPI   89 y.o. female with a history of DM, HTN, HLD, DVT on Apixiban and CKD who presented to the ED via EMS 12/26/18 with complaint of cough for the last few days associated with weakness. Patient states she "couldn't sleep or get comfortable" felt SOB with laying down. BNP on admit was 460. See CXR and Echo below. He has eaten food prepared by family for [employer] and [employer]. She has done well since discharge with no SOB or edema and weight back to base line.      FINDINGS:  Cardiac silhouette is enlarged.  There is central vascular congestion and suspected small effusions.  No acute osseous findings.  Degenerative changes of the shoulders.      Impression       Cardiomegaly with central vascular congestion and small effusions.      Electronically signed by: Gucci Washington MD  Date: 12/26/2018     Conclusion 12/27/18    · Moderate concentric left ventricular hypertrophy.  · Mild to moderate pulmonary hypertension present.  · Normal left ventricular systolic function. The estimated ejection fraction is 68%  · No wall motion abnormalities.  · Grade I (mild) left ventricular diastolic dysfunction consistent with impaired relaxation.  · Normal left atrial pressure.  · Normal right ventricular systolic function.  · Mild left atrial enlargement.  · Mild aortic regurgitation.  · Mild to moderate tricuspid regurgitation.            Lab Results   Component Value Date     12/27/2018    K 3.3 (L) 12/27/2018    CL 99 12/27/2018    CO2 25 12/27/2018    BUN 19 12/27/2018    CREATININE 0.8 12/27/2018     (H) 12/27/2018    HGBA1C 6.2 (H) 10/24/2018    MG 1.8 12/27/2018    AST 26 10/24/2018    ALT 22 10/24/2018    ALBUMIN 4.0 10/24/2018    PROT 7.6 10/24/2018    BILITOT 0.9 10/24/2018    WBC 5.19 12/26/2018    HGB 11.5 (L) 12/26/2018    HCT 35.6 (L) 12/26/2018    MCV 93 12/26/2018     12/26/2018    INR 1.1 " 09/13/2006    TSH 3.554 09/30/2016         Lab Results   Component Value Date    CHOL 166 02/06/2018    HDL 59 02/06/2018    TRIG 45 02/06/2018       Lab Results   Component Value Date    LDLCALC 98.0 02/06/2018       Past Medical History:   Diagnosis Date    Arthritis     CHF (congestive heart failure) 12/26/2018    Chronic kidney disease, stage III (moderate) 9/11/2015    Colon adenoma     Diabetes mellitus     diet controlled    Glaucoma     Hyperlipemia     Hypertension     Osteopenia     Type 2 diabetes mellitus     Type II or unspecified type diabetes mellitus without mention of complication, not stated as uncontrolled        Current Outpatient Medications:     amLODIPine (NORVASC) 10 MG tablet, Take 1 tablet (10 mg total) by mouth once daily., Disp: 90 tablet, Rfl: 1    apixaban (ELIQUIS) 5 mg Tab, Take 1 tablet (5 mg total) by mouth 2 (two) times daily., Disp: 180 tablet, Rfl: 1    atorvastatin (LIPITOR) 20 MG tablet, Take 1 tablet (20 mg total) by mouth once daily., Disp: 90 tablet, Rfl: 1    brimonidine-timolol (COMBIGAN) 0.2-0.5 % Drop, Place 1 drop into both eyes 2 (two) times daily. Disp 10 mls for 1 month, Disp: 30 mL, Rfl: 3    CALCIUM CARBONATE ([id number] ORAL), Take 1 tablet by mouth Daily. 1  Oral Every day, Disp: , Rfl:     furosemide (LASIX) 20 MG tablet, Take 1 tablet (20 mg total) by mouth 2 (two) times daily. for 14 days, Disp: 28 tablet, Rfl: 0    latanoprost 0.005 % ophthalmic solution, Place 1 drop into both eyes every evening., Disp: 3 Bottle, Rfl: 3    multivit with minerals/lutein (MULTIVITAMIN 50 PLUS ORAL), Take by mouth., Disp: , Rfl:     olmesartan (BENICAR) 20 MG tablet, Take 1 tablet (20 mg total) by mouth once daily., Disp: 30 tablet, Rfl: 0    furosemide (LASIX) 20 MG tablet, Take 1 tablet (20 mg total) by mouth once daily., Disp: 90 tablet, Rfl: 3          Review of Systems   Constitution: Negative for decreased appetite, diaphoresis, fever, weakness,  "malaise/fatigue, weight gain and weight loss.   HENT: Negative for congestion, ear discharge, ear pain and nosebleeds.    Eyes: Negative for blurred vision, double vision and visual disturbance.   Cardiovascular: Negative for chest pain, claudication, cyanosis, dyspnea on exertion, irregular heartbeat, leg swelling, near-syncope, orthopnea, palpitations, paroxysmal nocturnal dyspnea and syncope.   Respiratory: Negative for cough, hemoptysis, shortness of breath, sleep disturbances due to breathing, snoring, sputum production and wheezing.    Endocrine: Negative for polydipsia, polyphagia and polyuria.   Hematologic/Lymphatic: Negative for adenopathy and bleeding problem. Does not bruise/bleed easily.   Skin: Negative for color change, nail changes, poor wound healing and rash.   Musculoskeletal: Negative for muscle cramps and muscle weakness.   Gastrointestinal: Negative for abdominal pain, anorexia, change in bowel habit, hematochezia, nausea and vomiting.   Genitourinary: Negative for dysuria, frequency and hematuria.   Neurological: Negative for brief paralysis, difficulty with concentration, excessive daytime sleepiness, dizziness, focal weakness, headaches, light-headedness, seizures and vertigo.   Psychiatric/Behavioral: Negative for altered mental status and depression.   Allergic/Immunologic: Negative for persistent infections.        Objective:BP (!) 149/68 (BP Location: Left arm, Patient Position: Sitting, BP Method: Pediatric (Automatic))   Pulse (!) 58   Ht 5' 4" (1.626 m)   Wt 59.9 kg (132 lb 0.9 oz)   BMI 22.67 kg/m²             Physical Exam   Constitutional: She is oriented to person, place, and time. She appears well-developed and well-nourished.   HENT:   Head: Normocephalic.   Right Ear: External ear normal.   Left Ear: External ear normal.   Nose: Nose normal.   Inspection of lips, teeth and gums normal   Eyes: Conjunctivae and EOM are normal. Pupils are equal, round, and reactive to light. " No scleral icterus.   Neck: Normal range of motion. No JVD present. No tracheal deviation present. No thyromegaly present.   Cardiovascular: Regular rhythm, S1 normal, S2 normal and intact distal pulses. Bradycardia present. Exam reveals no gallop and no friction rub.   No murmur heard.  Pulmonary/Chest: Effort normal and breath sounds normal. No respiratory distress. She has no wheezes. She has no rales. She exhibits no tenderness.   Abdominal: Soft. Bowel sounds are normal. She exhibits no distension. There is no hepatosplenomegaly. There is no tenderness. There is no guarding.   Musculoskeletal: Normal range of motion. She exhibits edema (no edema). She exhibits no tenderness.   Lymphadenopathy:   Palpation of lymph nodes of neck and groin normal   Neurological: She is oriented to person, place, and time. No cranial nerve deficit. She exhibits normal muscle tone. Coordination normal.   Skin: Skin is warm and dry. No rash noted. No erythema. No pallor.   No ankle nor pretibial edema   Psychiatric: She has a normal mood and affect. Her behavior is normal. Judgment and thought content normal.         Assessment:       1. Chronic diastolic congestive heart failure    2. Essential hypertension    3. Other hyperlipidemia    4. Chronic kidney disease, stage III (moderate)    5. Type 2 diabetes mellitus with diabetic nephropathy, without long-term current use of insulin         Plan:       Inna was seen today for acute congestive heart failure, unspecified heart failure ty.    Diagnoses and all orders for this visit:    Chronic diastolic congestive heart failure    Essential hypertension    Other hyperlipidemia    Chronic kidney disease, stage III (moderate)    Type 2 diabetes mellitus with diabetic nephropathy, without long-term current use of insulin    Other orders  -     furosemide (LASIX) 20 MG tablet; Take 1 tablet (20 mg total) by mouth once daily.

## 2019-01-24 RX ORDER — FUROSEMIDE 20 MG/1
20 TABLET ORAL 2 TIMES DAILY
Qty: 28 TABLET | Refills: 1 | Status: SHIPPED | OUTPATIENT
Start: 2019-01-24 | End: 2019-02-02 | Stop reason: ALTCHOICE

## 2019-01-30 RX ORDER — OLMESARTAN MEDOXOMIL 20 MG/1
20 TABLET ORAL DAILY
Qty: 30 TABLET | Refills: 0 | Status: SHIPPED | OUTPATIENT
Start: 2019-01-30 | End: 2019-02-02 | Stop reason: SDUPTHER

## 2019-02-02 ENCOUNTER — LAB VISIT (OUTPATIENT)
Dept: LAB | Facility: HOSPITAL | Age: 84
End: 2019-02-02
Attending: INTERNAL MEDICINE
Payer: MEDICARE

## 2019-02-02 ENCOUNTER — OFFICE VISIT (OUTPATIENT)
Dept: INTERNAL MEDICINE | Facility: CLINIC | Age: 84
End: 2019-02-02
Payer: MEDICARE

## 2019-02-02 VITALS
BODY MASS INDEX: 22.96 KG/M2 | SYSTOLIC BLOOD PRESSURE: 138 MMHG | OXYGEN SATURATION: 96 % | WEIGHT: 134.5 LBS | HEART RATE: 62 BPM | HEIGHT: 64 IN | DIASTOLIC BLOOD PRESSURE: 60 MMHG

## 2019-02-02 DIAGNOSIS — E11.9 DIABETES MELLITUS WITHOUT COMPLICATION: ICD-10-CM

## 2019-02-02 DIAGNOSIS — E11.9 DIABETES MELLITUS WITHOUT COMPLICATION: Primary | ICD-10-CM

## 2019-02-02 DIAGNOSIS — I10 HYPERTENSION, UNSPECIFIED TYPE: ICD-10-CM

## 2019-02-02 LAB
ALBUMIN SERPL BCP-MCNC: 4.2 G/DL
ALP SERPL-CCNC: 91 U/L
ALT SERPL W/O P-5'-P-CCNC: 15 U/L
ANION GAP SERPL CALC-SCNC: 11 MMOL/L
AST SERPL-CCNC: 20 U/L
BILIRUB SERPL-MCNC: 0.8 MG/DL
BUN SERPL-MCNC: 22 MG/DL
CALCIUM SERPL-MCNC: 10.4 MG/DL
CHLORIDE SERPL-SCNC: 102 MMOL/L
CHOLEST SERPL-MCNC: 200 MG/DL
CHOLEST/HDLC SERPL: 3.1 {RATIO}
CO2 SERPL-SCNC: 27 MMOL/L
CREAT SERPL-MCNC: 1.1 MG/DL
EST. GFR  (AFRICAN AMERICAN): 50 ML/MIN/1.73 M^2
EST. GFR  (NON AFRICAN AMERICAN): 44 ML/MIN/1.73 M^2
ESTIMATED AVG GLUCOSE: 140 MG/DL
GLUCOSE SERPL-MCNC: 117 MG/DL
HBA1C MFR BLD HPLC: 6.5 %
HDLC SERPL-MCNC: 65 MG/DL
HDLC SERPL: 32.5 %
LDLC SERPL CALC-MCNC: 122.4 MG/DL
NONHDLC SERPL-MCNC: 135 MG/DL
POTASSIUM SERPL-SCNC: 3.8 MMOL/L
PROT SERPL-MCNC: 8 G/DL
SODIUM SERPL-SCNC: 140 MMOL/L
TRIGL SERPL-MCNC: 63 MG/DL

## 2019-02-02 PROCEDURE — 3288F PR FALLS RISK ASSESSMENT DOCUMENTED: ICD-10-PCS | Mod: HCNC,CPTII,S$GLB, | Performed by: INTERNAL MEDICINE

## 2019-02-02 PROCEDURE — 99499 UNLISTED E&M SERVICE: CPT | Mod: HCNC,S$GLB,, | Performed by: INTERNAL MEDICINE

## 2019-02-02 PROCEDURE — 99999 PR PBB SHADOW E&M-EST. PATIENT-LVL III: CPT | Mod: PBBFAC,HCNC,, | Performed by: INTERNAL MEDICINE

## 2019-02-02 PROCEDURE — 99499 RISK ADDL DX/OHS AUDIT: ICD-10-PCS | Mod: HCNC,S$GLB,, | Performed by: INTERNAL MEDICINE

## 2019-02-02 PROCEDURE — 3288F FALL RISK ASSESSMENT DOCD: CPT | Mod: HCNC,CPTII,S$GLB, | Performed by: INTERNAL MEDICINE

## 2019-02-02 PROCEDURE — 99999 PR PBB SHADOW E&M-EST. PATIENT-LVL III: ICD-10-PCS | Mod: PBBFAC,HCNC,, | Performed by: INTERNAL MEDICINE

## 2019-02-02 PROCEDURE — 99215 PR OFFICE/OUTPT VISIT, EST, LEVL V, 40-54 MIN: ICD-10-PCS | Mod: HCNC,S$GLB,, | Performed by: INTERNAL MEDICINE

## 2019-02-02 PROCEDURE — 1100F PTFALLS ASSESS-DOCD GE2>/YR: CPT | Mod: HCNC,CPTII,S$GLB, | Performed by: INTERNAL MEDICINE

## 2019-02-02 PROCEDURE — 36415 COLL VENOUS BLD VENIPUNCTURE: CPT | Mod: HCNC

## 2019-02-02 PROCEDURE — 80061 LIPID PANEL: CPT | Mod: HCNC

## 2019-02-02 PROCEDURE — 1100F PR PT FALLS ASSESS DOC 2+ FALLS/FALL W/INJURY/YR: ICD-10-PCS | Mod: HCNC,CPTII,S$GLB, | Performed by: INTERNAL MEDICINE

## 2019-02-02 PROCEDURE — 80053 COMPREHEN METABOLIC PANEL: CPT | Mod: HCNC

## 2019-02-02 PROCEDURE — 83036 HEMOGLOBIN GLYCOSYLATED A1C: CPT | Mod: HCNC

## 2019-02-02 PROCEDURE — 99215 OFFICE O/P EST HI 40 MIN: CPT | Mod: HCNC,S$GLB,, | Performed by: INTERNAL MEDICINE

## 2019-02-02 RX ORDER — OLMESARTAN MEDOXOMIL 20 MG/1
20 TABLET ORAL DAILY
Qty: 90 TABLET | Refills: 1 | Status: SHIPPED | OUTPATIENT
Start: 2019-02-02 | End: 2019-06-26 | Stop reason: SDUPTHER

## 2019-02-02 RX ORDER — FUROSEMIDE 20 MG/1
20 TABLET ORAL DAILY
Qty: 90 TABLET | Refills: 1 | Status: SHIPPED | OUTPATIENT
Start: 2019-02-02 | End: 2019-05-31 | Stop reason: SDUPTHER

## 2019-02-09 NOTE — PROGRESS NOTES
Subjective:       Patient ID: Inna Blankenship is a 89 y.o. female.    Chief Complaint: Hypertension    HPI  She returns for management of hypertension.  She has had hypertension for over a year.  Current treatment has included medications outlined in medication list.  She denies chest pain or shortness of breath.  No palpitations.  Denies left arm or neck pain.    PAST MEDICAL HISTORY: Hypertension, hyperlipidemia, diabetes with nephropathy (diet   Controlled), pulmonary hypertension, aortic regurgitation, DVT, glaucoma, osteopenia , colon adenoma. She had a colonoscopy February 2011     PAST SURGICAL HISTORY: Hysterectomy.     MEDICATIONS:  Xalatan drops, Lipitor 20 mg daily, amlodipine 10 mg daily , Eliquis 5 mg twice a day , Benicar 20 mg daily, Lasix 20 mg daily    ALLERGIES: No known drug allergies.      Review of Systems   Constitutional: Negative for chills, fatigue, fever and unexpected weight change.   Respiratory: Negative for chest tightness and shortness of breath.    Cardiovascular: Negative for chest pain and palpitations.   Gastrointestinal: Negative for abdominal pain and blood in stool.   Neurological: Negative for dizziness, syncope, numbness and headaches.       Objective:      Physical Exam   HENT:   Right Ear: External ear normal.   Left Ear: External ear normal.   Nose: Nose normal.   Mouth/Throat: Oropharynx is clear and moist.   Eyes: Pupils are equal, round, and reactive to light.   Neck: Normal range of motion.   Cardiovascular: Normal rate and regular rhythm.   Murmur heard.  Pulmonary/Chest: Breath sounds normal.   Abdominal: She exhibits no distension. There is no hepatosplenomegaly. There is no tenderness.   Lymphadenopathy:     She has no cervical adenopathy.     She has no axillary adenopathy.   Neurological: She has normal strength and normal reflexes. No cranial nerve deficit or sensory deficit.       Assessment/Plan       Assessment and plan:  Hypertension:  Check CMP, lipid panel,  A1c

## 2019-02-18 ENCOUNTER — OFFICE VISIT (OUTPATIENT)
Dept: OPHTHALMOLOGY | Facility: CLINIC | Age: 84
End: 2019-02-18
Payer: MEDICARE

## 2019-02-18 DIAGNOSIS — Z96.1 PSEUDOPHAKIA: ICD-10-CM

## 2019-02-18 DIAGNOSIS — H26.493 POSTERIOR CAPSULAR OPACIFICATION NON VISUALLY SIGNIFICANT OF BOTH EYES: ICD-10-CM

## 2019-02-18 DIAGNOSIS — H40.1232 LOW-TENSION GLAUCOMA OF BOTH EYES, MODERATE STAGE: Primary | ICD-10-CM

## 2019-02-18 DIAGNOSIS — H01.9 EYELID INFLAMMATION: ICD-10-CM

## 2019-02-18 DIAGNOSIS — H02.889 MGD (MEIBOMIAN GLAND DISEASE), UNSPECIFIED LATERALITY: ICD-10-CM

## 2019-02-18 PROCEDURE — 92012 PR EYE EXAM, EST PATIENT,INTERMED: ICD-10-PCS | Mod: HCNC,S$GLB,, | Performed by: OPHTHALMOLOGY

## 2019-02-18 PROCEDURE — 92020 GONIOSCOPY: CPT | Mod: HCNC,S$GLB,, | Performed by: OPHTHALMOLOGY

## 2019-02-18 PROCEDURE — 99999 PR PBB SHADOW E&M-EST. PATIENT-LVL II: CPT | Mod: PBBFAC,HCNC,, | Performed by: OPHTHALMOLOGY

## 2019-02-18 PROCEDURE — 99999 PR PBB SHADOW E&M-EST. PATIENT-LVL II: ICD-10-PCS | Mod: PBBFAC,HCNC,, | Performed by: OPHTHALMOLOGY

## 2019-02-18 PROCEDURE — 92012 INTRM OPH EXAM EST PATIENT: CPT | Mod: HCNC,S$GLB,, | Performed by: OPHTHALMOLOGY

## 2019-02-18 PROCEDURE — 92020 PR SPECIAL EYE EVAL,GONIOSCOPY: ICD-10-PCS | Mod: HCNC,S$GLB,, | Performed by: OPHTHALMOLOGY

## 2019-02-18 NOTE — PROGRESS NOTES
HPI     Glaucoma      Additional comments: 4 month ck today              Blurred Vision      Additional comments: Pt having difficulty reading small print and feels   she needs stronger glasses              Comments     DLS: 10/19/18    1) LTG OD>OS  2) Blepharitis/MGD  3) PCO OU  4) PCIOL OU  5) PCIOL OU    MEDS:  Combigan BID OU  Latanoprost QHS OU          Last edited by Lamar Tinajero MA on 2/18/2019  8:08 AM. (History)            Assessment /Plan     For exam results, see Encounter Report.    Low-tension glaucoma of both eyes, moderate stage    MGD (meibomian gland disease), unspecified laterality    Eyelid inflammation - Both Eyes    Posterior capsular opacification non visually significant of both eyes - Both Eyes    Pseudophakia - Both Eyes      ***

## 2019-02-18 NOTE — PROGRESS NOTES
" ICU Inborn Progress Notes      Age: 5 days Follow Up Provider:     Sex: female Admit Attending: Afshin Gallagher MD   CLEMENCIA:  Gestational Age: 33w5d BW: 2082 g (4 lb 9.4 oz)   Corrected Gest. Age:  34w 3d    Subjective   Overview:      Vaginal Delivery for prematurity at 33w 5d gestation, vacuum x 4 applied. Maternal history and prenatal labs reviewed.  PPROM x ~25 hrs. Amniotic fluid was Clear. Mag initiated evening of  and turned off around 1500 on . Delayed Cord Clampin seconds. Infant vigorous at birth with strong cry.    Interval History:    Discussed with bedside nurse patient's course overnight. Nursing notes reviewed.    Infant remains incubator. Desaturation events improved after nasal cannula resumed . Desat x 3 in the past 24 hours, loaded on caffeine on 1/3. UAC discontinued overnight due to dampened waveform, it was placed overnight - due to loss of IV access and unable to obtain UVC.    Objective   Medications:     Scheduled Meds:    caffeine citrate 10 mg/kg Oral Daily   sodium chloride 3 mL Intravenous Q12H     Continuous Infusions:     dextrose variable concentration infusion (chapis/ped) 7 mL/hr Last Rate: 5.5 mL/hr (19 0300)     PRN Meds:   sodium chloride  •  sucrose  •  zinc oxide    Devices, Monitoring, Treatments:     Lines, Devices, Monitoring and Treatments:    Peripheral IV (Ped/Chapis) 18 Left Hand (Active)   Site Assessment Clean;Dry;Intact 2018  6:40 AM   Line Status Infusing 2018  6:40 AM   Dressing Type Transparent 2018  6:40 AM   Dressing Status Clean;Dry;Intact 2018  6:40 AM       Necessity of devices was discussed with the treatment team and continued or discontinued as appropriate: yes    Respiratory Support:     Nasal Cannula 2 LPM, 21%    Physical Exam:        Current: Weight: (!) 1985 g (4 lb 6 oz) Birth Weight Change: -5%   Last HC: 30.5 cm (12.01\")      PainScore:        Apnea and Bradycardia:   Apnea/Bradycardia " HPI     Glaucoma      Additional comments: 4 month ck today              Blurred Vision      Additional comments: Pt having difficulty reading small print and feels   she needs stronger glasses              Comments     DLS: 10/19/18    1) LTG OD>OS  2) Blepharitis/MGD  3) PCO OU  4) PCIOL OU  5) PCIOL OU    MEDS:  Combigan BID OU  Latanoprost QHS OU          Last edited by Lamar Tinajeor MA on 2/18/2019  8:08 AM. (History)            Assessment /Plan     For exam results, see Encounter Report.    Low-tension glaucoma of both eyes, moderate stage    MGD (meibomian gland disease), unspecified laterality    Eyelid inflammation - Both Eyes    Posterior capsular opacification non visually significant of both eyes - Both Eyes    Pseudophakia - Both Eyes          1.  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 Present since 2006.     Family history   ??  Glaucoma meds   Brimonidine BID ou, xalatan OU  H/O adverse rxn to glaucoma drops  none  LASERS   none  GLAUCOMA SURGERIES  : none   OTHER EYE SURGERIES  : PCIOL ou- OD- 3/15/06, 9/27/06 OS   CDR  0.85-0.9 w/ sup thinning // 0.7  Tbase  11-15 / 11-13  Tmax      15/13 (on gtts) (? Tmax off gtts)  Ttarget   12-13 ou   HVF  16 test 2002 to 2016 - -early SNS - fluctuates // non specific - unreliable ou - stable   Gonio  +2-3 OU (narrow inf - but doubt pupillary block - PC IOL ou)  CCT  561/567  OCT  6 test 2007 to 2016 - RNFL - nl od // nl os   HRT 13 test 2007 to 2018- MR -  Dec. S/N/I od // dec S os /// CDR 0.83 od // 0.74 os  Disc photos  2002, 2003 - slides // 2007, 2012, 2014, 2015, 2018   - OIS     Ttoday  13/13 (( below  target of 12 - 13 ou ))  Test done today: gonio      2. PCO ou  -mild - monitor     3. MGD /eyelid Inflammation / Blepharitis  WC/LH/AT's     4. Pseudophakia ou - PC IOL ou   -status post cataract extraction and insertion of intraocular lens - Both Eyes   -minimal  Events (last 3 days)     Date/Time   Apnea (Sec)   SpO2   Heart Rate   Episode Length (Sec)     Color Change   Intervention   Association Who       01/03/19 1348   20   72   148   20   no   self-resolved   spontaneous TB       01/03/19 1145   20   71   146   20   no   self-resolved   spontaneous TB       01/03/19 1111   20   72   154   20   no   self-resolved   spontaneous TB       01/02/19 1450   20   71   141   25   yes   mild stimulation   spontaneous   SG     01/02/19 0810   20   68   161   25   yes   moderate stimulation     spontaneous SG     01/02/19 0700   20   68   130   25   yes   moderate stimulation     spontaneous SG     01/02/19 0642   --   75   154   25   no   mild stimulation   spontaneous   CS     01/02/19 0423   --   77   158   20   no   self-resolved   spontaneous CS     01/01/19 1354   30   55   --   30   yes   vigorous stimulation     spontaneous SG     01/01/19 1139   --   79   138   20   no   mild stimulation   spontaneous   SG           Bradycardia rate: No Data Recorded    Temp:  [98.1 °F (36.7 °C)-99.2 °F (37.3 °C)] 98.6 °F (37 °C)  Heart Rate:  [129-181] 158  Resp:  [0-63] 58  BP: (78-94)/(51-56) 78/53  Arterial Line BP: (65-79)/(47-54) 66/47  SpO2 Current: SpO2  Min: 71 %  Max: 100 %    Heent: fontanelles are soft and flat, Significant residual moulding. Facial assymetery improved   Respiratory: clear breath sounds bilaterally, no retractions or nasal flaring. Good air entry heard.    Cardiovascular: RRR, S1 S2, no murmurs, 2+ brachial and femoral pulses, brisk capillary refill   Abdomen: Soft, non tender,round, non-distended, good bowel sounds, no loops, cord drying   : normal external genitalia   Extremities: well-perfused, warm and dry, jaundice   Skin: no rashes, or bruising.   Neuro: easily aroused, active, alert     Radiology and Labs:      I have reviewed all the lab results for the past 24 hours. Pertinent findings reviewed in assessment and plan.  yes    I have reviewed all the  PCO ou      5 CHF - hsopitalized  in early 2019   -was taken off the BB for a while - but the cardiologist says it is ok to use and she is back on it again 2/18/2019        Plan    POAG - Low Tension Component OD > OS  Just below  target today at 11 ou   Target is 12 ou   CSM   -combignan ou bid  - Latanoprost ou q hs (disp 3 bottles at a time)     Patient c/o of trouble with reading- recheck current Rx with good lighting conditions- patient sees 20/20- not more clear with change in Rx- continue Rx for now but use reading light.       RTC 4 months - HRT     I have seen and personally examined the patient.  I agree with the findings, assessment and plan of the resident and/or fellow.     Lupis Lundberg MD                  "imaging results for the past 24 hours. Pertinent findings reviewed in assessment and plan. yes    Intake and Output:      Current Weight: Weight: (!) 1985 g (4 lb 6 oz) Last 24hr Weight change: -30 g (-1.1 oz)   Growth:    7 day weight gain:  (to be calculated on M and Thu)   Caloric Intake:  Kcal/kg/day     Intake:     Total Fluid Goal: 160 ml/kg/day Total Fluid Actual: 152 ml/kg/day   Feeds: Maternal BM and Formula  SSC 24   Fortified: No   Route:NG/OG %     PIV: D10 + [id number]/100 ml  Blood Products: none   Output:     UOP: 1.1 ml/kg/hr + void x1 Emesis: x0   Stool: x4      Other: None         Assessment/Plan   Assessment and Plan:      Active Problems:  Prematurity, 2,000-2,499 grams, 33-34 completed weeks  Low birth weight or  infant, 5967-3318 grams  Single liveborn, born in hospital, delivered by vaginal delivery  Assessment: \"Syeda\" is a 33 5/7 wk female infant born via vaginal delivery for PPROM, PTL. Mother is a 32 yr old . Maternal serology: MBT O-, RPR NR, rubella immune, Hep B neg, HIV neg, Hep C neg. Mother received BMZ x 2 on  and . Vigorous with cry at birth. Delayed cord clamping x 30 seconds. BW 2082 grams. BBT O+ ANA LAURA negative.   Plan:  - Age appropriate care   - Routine NICU screening      Apnea of prematurity  Assessment: Admitted to NICU in room air. Nasal cannula placed on  for apneic/desaturation events. Events improved but continued despite flow, therefore loaded with Caffeine /3. A/B/D x3 in last 24 hrs (prior to caffeine load). Caffeine (1/3-present).  Plan:  - Monitor events on caffeine     delivered by vacuum extraction  Caput succedaneum  Assessment: Required vacuum x 4 at delivery, infant presented with face up. Large caput with molding and fluidity at delivery, possible facial palsy with left side drooping-now resolved and no facial asymmetry appreciated 1/3. CBC reassuring x 2. Plt () 166k and () 183k. HUS (): Normal.  Plan:   - " CBC prn  - OT consult for cranial moulding when respiratory status stable    Temperature regulation disturbance,   Assessment: Admitted in Giraffe incubator.  Plan:   - Adjust incubator temperature as indicated to maintain NTE      Hyperbilirubinemia  Assessment:  MBT:  O neg, BBT: O pos, ANA LAURA neg.  Bili () 11.7. Phototherapy -present. Peak bili () 12.1.  Plan:  - Continue phototherapy  - Follow Bili on Neochem profile in AM    Slow feeding in   Hypocalcemia- - Resolving  Assessment: NPO on admission. Mother plans to breastfeed. Mother received Mag PTD. Mg level (): 3.7, (): 3.1, (): 2.7.  Ca Levels - (): 7.2, (): 7.7, (): 8.6, (1/3): 9.6. Feeds initiated .   UAC -1/3. UAC discontinued overnight due to dampened waveform. PIV placed.  Plan:   - Increase feeds to 25 ml q3hr MBM/SSC 24 (~100 ml/kg/d), then increase by 5 mL every 12 hours to goal 40 mL q3h  - Continue IVF D10W + CaGluc via PIV   - Continue total fluid goal to 160 mL/kg/day  - will plan to leave IV out if comes out  - POC glucoses per protocol   - Neochem profile in AM    Healthcare Maintenance  [place] screen- done   Hepatitis B vaccine  Vitamin K and Erythromycin in DR   Hearing screen  CCHD- pass   Car seat test  Free T4/TSH  ROP screen  PCP  Ophthalmology F/U      Resolved Problems  Hypoglycemia, --resolved  Assessment: Admission POC glucose 27. Required D10 bolus x 2 and then Subsequent glucose within range.   Need for observation and evaluation of  for sepsis  Prolonged premature rupture of membranes  Assessment: PPROM approx x25 hrs, clear fluid. Mother on antibiotics. GBS unknown. Blood culture (): FNG. S/P Amp/Gent (-) CBC reassuring x3.      Discharge Planning:      [place] Testing  CCHD Critical Congen Heart Defect Test Date: 19 (19)  Critical Congen Heart Defect Test Result: pass (19)   Car Seat Challenge Test      Hearing Screen       Screen Metabolic Screen Results: Completed (19 0600)     Immunization History   Administered Date(s) Administered   • Hep B, Adolescent or Pediatric 2019         Expected Discharge Date: TBD    Social comments: None at this time  Family Communication: Mother remains inpatient- updated at bedside      Rosa Coffman, APRN  2019  9:55 AM    Patient rounds conducted with Primary Care Nurse

## 2019-03-24 ENCOUNTER — HOSPITAL ENCOUNTER (EMERGENCY)
Facility: OTHER | Age: 84
Discharge: HOME | End: 2019-03-24
Attending: EMERGENCY MEDICINE
Payer: MEDICARE

## 2019-03-24 VITALS
BODY MASS INDEX: 23.05 KG/M2 | HEIGHT: 64 IN | SYSTOLIC BLOOD PRESSURE: 156 MMHG | RESPIRATION RATE: 16 BRPM | DIASTOLIC BLOOD PRESSURE: 72 MMHG | WEIGHT: 135 LBS | HEART RATE: 62 BPM | TEMPERATURE: 98 F | OXYGEN SATURATION: 100 %

## 2019-03-24 DIAGNOSIS — R00.2 PALPITATIONS: ICD-10-CM

## 2019-03-24 LAB
ALBUMIN SERPL BCP-MCNC: 3.8 G/DL (ref 3.5–5.2)
ALP SERPL-CCNC: 85 U/L (ref 55–135)
ALT SERPL W/O P-5'-P-CCNC: 11 U/L (ref 10–44)
ANION GAP SERPL CALC-SCNC: 10 MMOL/L (ref 8–16)
AST SERPL-CCNC: 21 U/L (ref 10–40)
BASOPHILS # BLD AUTO: 0.03 K/UL (ref 0–0.2)
BASOPHILS NFR BLD: 0.5 % (ref 0–1.9)
BILIRUB SERPL-MCNC: 0.7 MG/DL (ref 0.1–1)
BUN SERPL-MCNC: 29 MG/DL (ref 10–30)
CALCIUM SERPL-MCNC: 10.8 MG/DL (ref 8.7–10.5)
CHLORIDE SERPL-SCNC: 101 MMOL/L (ref 95–110)
CO2 SERPL-SCNC: 27 MMOL/L (ref 23–29)
CREAT SERPL-MCNC: 1.1 MG/DL (ref 0.5–1.4)
DIFFERENTIAL METHOD: NORMAL
EOSINOPHIL # BLD AUTO: 0.1 K/UL (ref 0–0.5)
EOSINOPHIL NFR BLD: 1.4 % (ref 0–8)
ERYTHROCYTE [DISTWIDTH] IN BLOOD BY AUTOMATED COUNT: 14.1 % (ref 11.5–14.5)
EST. GFR  (AFRICAN AMERICAN): 50 ML/MIN/1.73 M^2
EST. GFR  (NON AFRICAN AMERICAN): 43 ML/MIN/1.73 M^2
GLUCOSE SERPL-MCNC: 106 MG/DL (ref 70–110)
HCT VFR BLD AUTO: 40.7 % (ref 37–48.5)
HGB BLD-MCNC: 13.7 G/DL (ref 12–16)
LYMPHOCYTES # BLD AUTO: 2 K/UL (ref 1–4.8)
LYMPHOCYTES NFR BLD: 30.8 % (ref 18–48)
MCH RBC QN AUTO: 30.5 PG (ref 27–31)
MCHC RBC AUTO-ENTMCNC: 33.7 G/DL (ref 32–36)
MCV RBC AUTO: 91 FL (ref 82–98)
MONOCYTES # BLD AUTO: 0.3 K/UL (ref 0.3–1)
MONOCYTES NFR BLD: 4.7 % (ref 4–15)
NEUTROPHILS # BLD AUTO: 4 K/UL (ref 1.8–7.7)
NEUTROPHILS NFR BLD: 62.4 % (ref 38–73)
PLATELET # BLD AUTO: 198 K/UL (ref 150–350)
PMV BLD AUTO: 11.8 FL (ref 9.2–12.9)
POTASSIUM SERPL-SCNC: 4 MMOL/L (ref 3.5–5.1)
PROT SERPL-MCNC: 7.7 G/DL (ref 6–8.4)
RBC # BLD AUTO: 4.49 M/UL (ref 4–5.4)
SODIUM SERPL-SCNC: 138 MMOL/L (ref 136–145)
WBC # BLD AUTO: 6.39 K/UL (ref 3.9–12.7)

## 2019-03-24 PROCEDURE — 99285 EMERGENCY DEPT VISIT HI MDM: CPT | Mod: HCNC,25

## 2019-03-24 PROCEDURE — 93010 EKG 12-LEAD: ICD-10-PCS | Mod: HCNC,,, | Performed by: INTERNAL MEDICINE

## 2019-03-24 PROCEDURE — 80053 COMPREHEN METABOLIC PANEL: CPT | Mod: HCNC

## 2019-03-24 PROCEDURE — 85025 COMPLETE CBC W/AUTO DIFF WBC: CPT | Mod: HCNC

## 2019-03-24 PROCEDURE — 93005 ELECTROCARDIOGRAM TRACING: CPT | Mod: HCNC

## 2019-03-24 PROCEDURE — 93010 ELECTROCARDIOGRAM REPORT: CPT | Mod: HCNC,,, | Performed by: INTERNAL MEDICINE

## 2019-03-24 NOTE — ED PROVIDER NOTES
"Encounter Date: 3/24/2019       History     Chief Complaint   Patient presents with    Conjunctivitis     Pt c/o bilateral eye redness which started last night, denies pain or itching sensation. Pt also c/o fast heart beat last night, states "I could see & feel my heart beating in my neck." Pt c/o SOB last night when trying to lie down. Pt states "I had it a little th is morning too."    Palpitations     89 year old female with PMH significant for htn, hld, glaucoma, CKD and chronic CHF presents complaining of palpitations occurring yesterday as well as periorbital rash. Patient reports last night she began noticing her heart "beating in her neck" when she looked in the mirror. She denies chest pain, shortness of breath or syncope at the time. Patient reports symptoms have resolved today but she felt she should come be evaluated. She is with her daughter who stated she would have brought her last night during symptoms but her mother did not call her. Molina additionally states that yesterday she noticed periorbital rash that was erythematous and appeared "mask-like." She denied pain, pruritus or vision changes at the time. It resolved last night but she reported noticing it again this morning before Sabianism. It has again resolved. She denies any new facial creams, ointments or soaps.         Review of patient's allergies indicates:  No Known Allergies  Past Medical History:   Diagnosis Date    Arthritis     CHF (congestive heart failure) 12/26/2018    Chronic kidney disease, stage III (moderate) 9/11/2015    Colon adenoma     Diabetes mellitus     diet controlled    Glaucoma     Hyperlipemia     Hypertension     Osteopenia     Type 2 diabetes mellitus     Type II or unspecified type diabetes mellitus without mention of complication, not stated as uncontrolled      Past Surgical History:   Procedure Laterality Date    APPENDECTOMY      CATARACT EXTRACTION W/  INTRAOCULAR LENS IMPLANT Right 3/15/06    "     CATARACT EXTRACTION W/  INTRAOCULAR LENS IMPLANT Left 9/27/06        COLONOSCOPY W/ POLYPECTOMY      EYE SURGERY      HYSTERECTOMY       Family History   Problem Relation Age of Onset    Heart attack Mother     Heart disease Mother     No Known Problems Father     Cancer Sister     Hypertension Daughter     Asthma Daughter     Allergies Daughter     Amblyopia Neg Hx     Blindness Neg Hx     Cataracts Neg Hx     Diabetes Neg Hx     Glaucoma Neg Hx     Macular degeneration Neg Hx     Retinal detachment Neg Hx     Strabismus Neg Hx     Stroke Neg Hx     Thyroid disease Neg Hx      Social History     Tobacco Use    Smoking status: Never Smoker    Smokeless tobacco: Never Used   Substance Use Topics    Alcohol use: No     Comment: quit drinking since starting Eliquis    Drug use: No     Review of Systems   Constitutional: Negative for appetite change, chills, diaphoresis and fever.   HENT: Negative for sore throat and trouble swallowing.    Eyes: Negative for photophobia and visual disturbance.        Reports intermittent periorbital rash   Respiratory: Negative for cough, chest tightness, shortness of breath and wheezing.    Cardiovascular: Negative for chest pain and palpitations.   Gastrointestinal: Negative for abdominal distention, abdominal pain, diarrhea, nausea and vomiting.   Genitourinary: Negative for difficulty urinating and dysuria.   Musculoskeletal: Negative for arthralgias and myalgias.   Skin: Positive for rash.   Neurological: Negative for dizziness, light-headedness, numbness and headaches.   Hematological: Negative for adenopathy.   Psychiatric/Behavioral: Negative for agitation. The patient is not nervous/anxious.        Physical Exam     Initial Vitals [03/24/19 1334]   BP Pulse Resp Temp SpO2   (!) 145/67 60 18 98 °F (36.7 °C) 98 %      MAP       --         Physical Exam    Constitutional: She appears well-developed and well-nourished. No  distress.   HENT:   Head: Normocephalic and atraumatic.   Eyes: Conjunctivae are normal. Right eye exhibits no discharge. Left eye exhibits no discharge. No scleral icterus.   No periorbital rash appreciated on exam   Neck: Normal range of motion. Neck supple.   Cardiovascular: Normal rate, regular rhythm and normal heart sounds.   Pulmonary/Chest: Breath sounds normal. No respiratory distress. She has no wheezes. She exhibits no tenderness.   Abdominal: Soft. Bowel sounds are normal. She exhibits no distension. There is no tenderness.   Genitourinary:   Genitourinary Comments: deferred   Musculoskeletal: Normal range of motion.   Lymphadenopathy:     She has no cervical adenopathy.   Neurological: She is alert and oriented to person, place, and time. No cranial nerve deficit or sensory deficit.   Skin: Skin is warm and dry. No rash noted. No erythema.   Psychiatric: She has a normal mood and affect. Her behavior is normal. Judgment and thought content normal.         ED Course   Procedures  Labs Reviewed   COMPREHENSIVE METABOLIC PANEL - Abnormal; Notable for the following components:       Result Value    Calcium 10.8 (*)     eGFR if  50 (*)     eGFR if non  43 (*)     All other components within normal limits    Narrative:     Recoll. [id number] by [email or web address] at 03/24/2019 16:17, reason: Specimen   hemolyzed notify Jyoti Maldonado   CBC W/ AUTO DIFFERENTIAL          Imaging Results          X-Ray Chest PA And Lateral (Final result)  Result time 03/24/19 14:16:23    Final result by Jarrod Goldsmith MD (03/24/19 14:16:23)                 Impression:      1. No large focal consolidation.  Obscuration of the costophrenic angles could reflect trace effusions versus atelectasis or scarring.  Prominent interstitial attenuation has improved since the previous exam suggesting sequela of chronic change, noting minimal superimposed interstitial edema is a consideration.      Electronically signed  by: Jarrod Goldsmith MD  Date:    03/24/2019  Time:    14:16             Narrative:    EXAMINATION:  XR CHEST PA AND LATERAL    CLINICAL HISTORY:  Palpitations    TECHNIQUE:  PA and lateral views of the chest were performed.    COMPARISON:  12/26/2018    FINDINGS:  The cardiomediastinal silhouette is prominent noting calcification and tortuosity of the aorta..  There is obscuration of the bilateral costophrenic angles, could reflect trace effusions versus atelectasis or scarring..  The trachea is midline.  The lungs are symmetrically expanded bilaterally with coarse interstitial attenuation and bilateral basilar subsegmental atelectasis..  No large focal consolidation seen.  There is no pneumothorax.  The osseous structures are remarkable for degenerative changes noting osteopenia..                                 Medical Decision Making:   Initial Assessment:   89 year female presents with complaint of palpitations and periorbital rash  ED Management:  Urgent evaluation of 89 year old female who presents with complaint of palpitations and periorbital rash. Patient reports palpitations occurring last night in which she should see her heart beating in the mirror. She reports symptoms then subsided and she has felt well overall today. She denies chest pain, shortness of breath, dizziness, headache, syncope. Normal sinus rhythm on EKG. Physical exam benign. Patient additionally complains of periorbital rash occurring in mask like distribution over the eyes. Reports it improved yesterday and then recurred this morning before Jehovah's witness. It has again resolved. Patient denies associated pruritus, vision changes or eye pain. Patient denies any new creams, ointments, or exposures. Possible seasonal allergy or contact dermatitis. Recommend follow up with PCP or dermatology if persists.   CBC wnl. CXR shows improvement from prior study with no signs of acute focal consolidation and improvement in insterstitial attenuation. CMP  within normal limits. Patient stable for discharge home with routine follow up.                       Clinical Impression:       ICD-10-CM ICD-9-CM   1. Palpitations R00.2 785.1   2. Palpitations R00.2 785.1                                Renu Hollis MD  Resident  03/24/19 2825

## 2019-03-24 NOTE — ED TRIAGE NOTES
"Pt arrived with c/o "heart racing" that started last night and lasted about an hour.  States the heart racing caused her to become SOB.  States she decided to come into ER today because "the skin around her eyes was red."  Denies any visual change, discharge from eyes, or itchiness.  Pt denies palpitations currently, denies any pain.  Reports recent dx of CHF.  "

## 2019-03-24 NOTE — DISCHARGE INSTRUCTIONS
- drink plenty of fluids to remain adequately hydrated   - follow up with primary care physician as previously scheduled   - if rash persists or worsens recommend follow up with dermatology

## 2019-03-24 NOTE — ED NOTES
Rounding completed on pt. Pt denies any c/o at this time, denies palpitations, chest pain or eye complaints. VSS. Family remains at bedside. Updated on plan of care.

## 2019-04-01 PROBLEM — J81.0 ACUTE PULMONARY EDEMA: Status: RESOLVED | Noted: 2018-12-26 | Resolved: 2019-04-01

## 2019-05-01 RX ORDER — ATORVASTATIN CALCIUM 20 MG/1
TABLET, FILM COATED ORAL
Qty: 90 TABLET | Refills: 0 | Status: SHIPPED | OUTPATIENT
Start: 2019-05-01 | End: 2019-06-26 | Stop reason: SDUPTHER

## 2019-05-31 DIAGNOSIS — H40.1232 LOW-TENSION GLAUCOMA OF BOTH EYES, MODERATE STAGE: ICD-10-CM

## 2019-05-31 RX ORDER — LATANOPROST 50 UG/ML
1 SOLUTION/ DROPS OPHTHALMIC NIGHTLY
Qty: 15 ML | Refills: 3 | Status: SHIPPED | OUTPATIENT
Start: 2019-05-31 | End: 2019-10-18 | Stop reason: SDUPTHER

## 2019-05-31 RX ORDER — FUROSEMIDE 20 MG/1
20 TABLET ORAL DAILY
Qty: 90 TABLET | Refills: 1 | Status: SHIPPED | OUTPATIENT
Start: 2019-05-31 | End: 2019-06-26 | Stop reason: SDUPTHER

## 2019-05-31 NOTE — TELEPHONE ENCOUNTER
----- Message from Keshia Young sent at 5/31/2019 12:30 PM CDT -----  Contact: [phone]  Type: Rx    Name of medication(s): furosemide (LASIX) 20 MG tablet    Is this a refill? New rx?   refill    Who prescribed medication?  Jennifer    Pharmacy Name, Phone, & Location:  [facility] - [facility] [address]    Comments:   Please advise, thank you

## 2019-05-31 NOTE — TELEPHONE ENCOUNTER
Approved Medications      furosemide (LASIX) 20 MG tablet         Sig: Take 1 tablet (20 mg total) by mouth once daily.    Disp:  90 tablet    Refills:  1    Start: 5/31/2019 - 6/30/2019    Class: Normal    Authorized by: Amanda Trejo MD        To be filled at: [facility] - [facility] [address]        Spoke with patient to inform them that medication has been approved.

## 2019-06-17 NOTE — PROGRESS NOTES
HPI     DLS: 2/18/19    Pt here for HRT review;    Meds: Combigan BID OU   Latanoprost QHS OU     1) LTG OD>OS   2) Blepharitis/MGD   3) PCO OU   4) PCIOL OU   5) PCIOL OU     Last edited by Salud Hartman on 6/18/2019  8:53 AM. (History)          Assessment /Plan     For exam results, see Encounter Report.    Low-tension glaucoma of both eyes, moderate stage    MGD (meibomian gland disease), unspecified laterality    Eyelid inflammation - Both Eyes    Posterior capsular opacification non visually significant of both eyes - Both Eyes    Pseudophakia - Both Eyes          1.  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 Present since 2006.     Family history   ??  Glaucoma meds   Brimonidine BID ou, xalatan OU  H/O adverse rxn to glaucoma drops  none  LASERS   none  GLAUCOMA SURGERIES  : none   OTHER EYE SURGERIES  : PCIOL ou- OD- 3/15/06, 9/27/06 OS   CDR  0.85-0.9 w/ sup thinning // 0.7  Tbase  11-15 / 11-13  Tmax      15/13 (on gtts) (? Tmax off gtts)  Ttarget   12-13 ou   HVF  16 test 2002 to 2016 - -early SNS - fluctuates // non specific - unreliable ou - stable   Gonio  +2-3 OU (narrow inf - but doubt pupillary block - PC IOL ou)  CCT  561/567  OCT  6 test 2007 to 2016 - RNFL - nl od // nl os   HRT 14 test 2007 to 2019- MR -  Dec. S/N/I od // dec S/I os /// CDR 0.83 od // 0.74 os  Disc photos  2002, 2003 - slides // 2007, 2012, 2014, 2015, 2018   - OIS     Ttoday  9/10 (( below  target of 12 - 13 ou ))  Test done today: HRT      2. PCO ou  -mild - monitor     3. MGD /eyelid Inflammation / Blepharitis  WC/LH/AT's     4. Pseudophakia ou - PC IOL ou   -status post cataract extraction and insertion of intraocular lens - Both Eyes   -minimal PCO ou      5 CHF - hsopitalized  in early 2019   -was taken off the BB for a while - but the cardiologist says it is ok to use and she is back on it again 2/18/2019        Plan    POAG -  Low Tension Component OD > OS  Just below  target today at 11 ou   Target is 12 ou   CSM   -combignan ou bid  - Latanoprost ou q hs (disp 3 bottles at a time)     Patient c/o of trouble with reading- recheck current Rx with good lighting conditions- patient sees 20/20- not more clear with change in Rx- continue Rx for now but use reading light.       RTC 4 months - HVF/OCT/DFE    I have seen and personally examined the patient.  I agree with the findings, assessment and plan of the resident and/or fellow.     Lupis Lundberg MD

## 2019-06-18 ENCOUNTER — OFFICE VISIT (OUTPATIENT)
Dept: OPHTHALMOLOGY | Facility: CLINIC | Age: 84
End: 2019-06-18
Payer: MEDICARE

## 2019-06-18 DIAGNOSIS — Z96.1 PSEUDOPHAKIA: ICD-10-CM

## 2019-06-18 DIAGNOSIS — H02.889 MGD (MEIBOMIAN GLAND DISEASE), UNSPECIFIED LATERALITY: ICD-10-CM

## 2019-06-18 DIAGNOSIS — H26.493 POSTERIOR CAPSULAR OPACIFICATION NON VISUALLY SIGNIFICANT OF BOTH EYES: ICD-10-CM

## 2019-06-18 DIAGNOSIS — H01.9 EYELID INFLAMMATION: ICD-10-CM

## 2019-06-18 DIAGNOSIS — H40.1232 LOW-TENSION GLAUCOMA OF BOTH EYES, MODERATE STAGE: Primary | ICD-10-CM

## 2019-06-18 PROCEDURE — 92133 HEIDELBERG RETINA TOMOGRAPHY (HRT) - OU - BOTH EYES: ICD-10-PCS | Mod: HCNC,S$GLB,, | Performed by: OPHTHALMOLOGY

## 2019-06-18 PROCEDURE — 92012 INTRM OPH EXAM EST PATIENT: CPT | Mod: HCNC,S$GLB,, | Performed by: OPHTHALMOLOGY

## 2019-06-18 PROCEDURE — 99999 PR PBB SHADOW E&M-EST. PATIENT-LVL II: ICD-10-PCS | Mod: PBBFAC,HCNC,, | Performed by: OPHTHALMOLOGY

## 2019-06-18 PROCEDURE — 92133 CPTRZD OPH DX IMG PST SGM ON: CPT | Mod: HCNC,S$GLB,, | Performed by: OPHTHALMOLOGY

## 2019-06-18 PROCEDURE — 92012 PR EYE EXAM, EST PATIENT,INTERMED: ICD-10-PCS | Mod: HCNC,S$GLB,, | Performed by: OPHTHALMOLOGY

## 2019-06-18 PROCEDURE — 99999 PR PBB SHADOW E&M-EST. PATIENT-LVL II: CPT | Mod: PBBFAC,HCNC,, | Performed by: OPHTHALMOLOGY

## 2019-06-18 RX ORDER — AMLODIPINE BESYLATE 10 MG/1
TABLET ORAL
Qty: 90 TABLET | Refills: 0 | Status: SHIPPED | OUTPATIENT
Start: 2019-06-18 | End: 2019-06-26 | Stop reason: SDUPTHER

## 2019-06-18 RX ORDER — APIXABAN 5 MG/1
TABLET, FILM COATED ORAL
Qty: 180 TABLET | Refills: 0 | Status: SHIPPED | OUTPATIENT
Start: 2019-06-18 | End: 2019-06-26 | Stop reason: SDUPTHER

## 2019-06-26 ENCOUNTER — LAB VISIT (OUTPATIENT)
Dept: LAB | Facility: HOSPITAL | Age: 84
End: 2019-06-26
Attending: INTERNAL MEDICINE
Payer: MEDICARE

## 2019-06-26 ENCOUNTER — OFFICE VISIT (OUTPATIENT)
Dept: INTERNAL MEDICINE | Facility: CLINIC | Age: 84
End: 2019-06-26
Payer: MEDICARE

## 2019-06-26 DIAGNOSIS — I10 HYPERTENSION, UNSPECIFIED TYPE: Primary | ICD-10-CM

## 2019-06-26 DIAGNOSIS — I10 HYPERTENSION, UNSPECIFIED TYPE: ICD-10-CM

## 2019-06-26 DIAGNOSIS — E11.9 DIABETES MELLITUS WITHOUT COMPLICATION: ICD-10-CM

## 2019-06-26 LAB
ALBUMIN SERPL BCP-MCNC: 4.4 G/DL (ref 3.5–5.2)
ALP SERPL-CCNC: 86 U/L (ref 55–135)
ALT SERPL W/O P-5'-P-CCNC: 12 U/L (ref 10–44)
ANION GAP SERPL CALC-SCNC: 10 MMOL/L (ref 8–16)
AST SERPL-CCNC: 20 U/L (ref 10–40)
BILIRUB SERPL-MCNC: 0.8 MG/DL (ref 0.1–1)
BUN SERPL-MCNC: 24 MG/DL (ref 10–30)
CALCIUM SERPL-MCNC: 10.6 MG/DL (ref 8.7–10.5)
CHLORIDE SERPL-SCNC: 102 MMOL/L (ref 95–110)
CO2 SERPL-SCNC: 29 MMOL/L (ref 23–29)
CREAT SERPL-MCNC: 1.1 MG/DL (ref 0.5–1.4)
EST. GFR  (AFRICAN AMERICAN): 50 ML/MIN/1.73 M^2
EST. GFR  (NON AFRICAN AMERICAN): 43 ML/MIN/1.73 M^2
ESTIMATED AVG GLUCOSE: 131 MG/DL (ref 68–131)
GLUCOSE SERPL-MCNC: 123 MG/DL (ref 70–110)
HBA1C MFR BLD HPLC: 6.2 % (ref 4–5.6)
POTASSIUM SERPL-SCNC: 4.1 MMOL/L (ref 3.5–5.1)
PROT SERPL-MCNC: 8 G/DL (ref 6–8.4)
SODIUM SERPL-SCNC: 141 MMOL/L (ref 136–145)

## 2019-06-26 PROCEDURE — 99499 RISK ADDL DX/OHS AUDIT: ICD-10-PCS | Mod: HCNC,S$GLB,, | Performed by: INTERNAL MEDICINE

## 2019-06-26 PROCEDURE — 99999 PR PBB SHADOW E&M-EST. PATIENT-LVL III: ICD-10-PCS | Mod: PBBFAC,HCNC,, | Performed by: INTERNAL MEDICINE

## 2019-06-26 PROCEDURE — 1101F PR PT FALLS ASSESS DOC 0-1 FALLS W/OUT INJ PAST YR: ICD-10-PCS | Mod: HCNC,CPTII,S$GLB, | Performed by: INTERNAL MEDICINE

## 2019-06-26 PROCEDURE — 80053 COMPREHEN METABOLIC PANEL: CPT | Mod: HCNC

## 2019-06-26 PROCEDURE — 99215 PR OFFICE/OUTPT VISIT, EST, LEVL V, 40-54 MIN: ICD-10-PCS | Mod: HCNC,S$GLB,, | Performed by: INTERNAL MEDICINE

## 2019-06-26 PROCEDURE — 36415 COLL VENOUS BLD VENIPUNCTURE: CPT | Mod: HCNC

## 2019-06-26 PROCEDURE — 99499 UNLISTED E&M SERVICE: CPT | Mod: HCNC,S$GLB,, | Performed by: INTERNAL MEDICINE

## 2019-06-26 PROCEDURE — 99215 OFFICE O/P EST HI 40 MIN: CPT | Mod: HCNC,S$GLB,, | Performed by: INTERNAL MEDICINE

## 2019-06-26 PROCEDURE — 1101F PT FALLS ASSESS-DOCD LE1/YR: CPT | Mod: HCNC,CPTII,S$GLB, | Performed by: INTERNAL MEDICINE

## 2019-06-26 PROCEDURE — 83036 HEMOGLOBIN GLYCOSYLATED A1C: CPT | Mod: HCNC

## 2019-06-26 PROCEDURE — 99999 PR PBB SHADOW E&M-EST. PATIENT-LVL III: CPT | Mod: PBBFAC,HCNC,, | Performed by: INTERNAL MEDICINE

## 2019-06-26 RX ORDER — BUSPIRONE HYDROCHLORIDE 5 MG/1
5 TABLET ORAL DAILY
Qty: 90 TABLET | Refills: 1 | Status: SHIPPED | OUTPATIENT
Start: 2019-06-26 | End: 2020-01-15 | Stop reason: SDUPTHER

## 2019-06-26 RX ORDER — OLMESARTAN MEDOXOMIL 20 MG/1
20 TABLET ORAL DAILY
Qty: 90 TABLET | Refills: 1 | Status: SHIPPED | OUTPATIENT
Start: 2019-06-26 | End: 2020-01-15 | Stop reason: SDUPTHER

## 2019-06-26 RX ORDER — FUROSEMIDE 20 MG/1
20 TABLET ORAL DAILY
Qty: 90 TABLET | Refills: 1 | Status: SHIPPED | OUTPATIENT
Start: 2019-06-26 | End: 2020-01-15 | Stop reason: SDUPTHER

## 2019-06-26 RX ORDER — ATORVASTATIN CALCIUM 20 MG/1
TABLET, FILM COATED ORAL
Qty: 90 TABLET | Refills: 1 | Status: SHIPPED | OUTPATIENT
Start: 2019-06-26 | End: 2020-01-15 | Stop reason: SDUPTHER

## 2019-06-26 RX ORDER — AMLODIPINE BESYLATE 10 MG/1
10 TABLET ORAL DAILY
Qty: 90 TABLET | Refills: 1 | Status: SHIPPED | OUTPATIENT
Start: 2019-06-26 | End: 2020-01-15 | Stop reason: SDUPTHER

## 2019-06-30 VITALS
BODY MASS INDEX: 23.11 KG/M2 | HEIGHT: 64 IN | HEART RATE: 62 BPM | WEIGHT: 135.38 LBS | SYSTOLIC BLOOD PRESSURE: 138 MMHG | DIASTOLIC BLOOD PRESSURE: 64 MMHG

## 2019-06-30 NOTE — PROGRESS NOTES
Subjective:       Patient ID: Inna Blankenship is a 89 y.o. female.    Chief Complaint: Hypertension    HPI  She returns for management of hypertension.  She has had hypertension for over a year.  Current treatment has included medications outlined in medication list.  She denies chest pain or shortness of breath.  No palpitations.  Denies left arm or neck pain. She has diabetes.  Denies polyuria, polydipsia  Review of Systems   Constitutional: Negative for chills, fatigue, fever and unexpected weight change.   Respiratory: Negative for chest tightness and shortness of breath.    Cardiovascular: Negative for chest pain and palpitations.   Gastrointestinal: Negative for abdominal pain and blood in stool.   Neurological: Negative for dizziness, syncope, numbness and headaches.       Objective:      Physical Exam   HENT:   Right Ear: External ear normal.   Left Ear: External ear normal.   Nose: Nose normal.   Mouth/Throat: Oropharynx is clear and moist.   Eyes: Pupils are equal, round, and reactive to light.   Neck: Normal range of motion.   Cardiovascular: Normal rate and regular rhythm.   Murmur heard.  Pulmonary/Chest: Breath sounds normal.   Abdominal: She exhibits no distension. There is no hepatosplenomegaly. There is no tenderness.   Lymphadenopathy:     She has no cervical adenopathy.     She has no axillary adenopathy.   Neurological: She has normal strength and normal reflexes. No cranial nerve deficit or sensory deficit.       Assessment/Plan       Assessment and plan:  1.  Hypertension:  Check CMP  2.  Diabetes:  Check A1c

## 2019-07-05 ENCOUNTER — TELEPHONE (OUTPATIENT)
Dept: INTERNAL MEDICINE | Facility: CLINIC | Age: 84
End: 2019-07-05

## 2019-07-05 NOTE — TELEPHONE ENCOUNTER
----- Message from Espinoza Robles sent at 7/5/2019 12:32 PM CDT -----  Contact: [phone]  Patient stated she is calling in regards to lab work result that was mail out , stated she have some questions to ask . Please call and advise, Thanks

## 2019-09-10 ENCOUNTER — PES CALL (OUTPATIENT)
Dept: ADMINISTRATIVE | Facility: CLINIC | Age: 84
End: 2019-09-10

## 2019-10-16 ENCOUNTER — PATIENT OUTREACH (OUTPATIENT)
Dept: ADMINISTRATIVE | Facility: OTHER | Age: 84
End: 2019-10-16

## 2019-10-18 ENCOUNTER — OFFICE VISIT (OUTPATIENT)
Dept: OPHTHALMOLOGY | Facility: CLINIC | Age: 84
End: 2019-10-18
Payer: MEDICARE

## 2019-10-18 ENCOUNTER — CLINICAL SUPPORT (OUTPATIENT)
Dept: OPHTHALMOLOGY | Facility: CLINIC | Age: 84
End: 2019-10-18
Payer: MEDICARE

## 2019-10-18 DIAGNOSIS — H01.9 EYELID INFLAMMATION: ICD-10-CM

## 2019-10-18 DIAGNOSIS — H40.1232 LOW-TENSION GLAUCOMA OF BOTH EYES, MODERATE STAGE: ICD-10-CM

## 2019-10-18 DIAGNOSIS — Z96.1 PSEUDOPHAKIA: ICD-10-CM

## 2019-10-18 DIAGNOSIS — H26.493 POSTERIOR CAPSULAR OPACIFICATION NON VISUALLY SIGNIFICANT OF BOTH EYES: ICD-10-CM

## 2019-10-18 DIAGNOSIS — H02.889 MGD (MEIBOMIAN GLAND DISEASE), UNSPECIFIED LATERALITY: Primary | ICD-10-CM

## 2019-10-18 DIAGNOSIS — E11.9 NON-INSULIN DEPENDENT TYPE 2 DIABETES MELLITUS: ICD-10-CM

## 2019-10-18 PROCEDURE — 92083 EXTENDED VISUAL FIELD XM: CPT | Mod: HCNC,S$GLB,, | Performed by: OPHTHALMOLOGY

## 2019-10-18 PROCEDURE — 99999 PR PBB SHADOW E&M-EST. PATIENT-LVL II: CPT | Mod: PBBFAC,HCNC,, | Performed by: OPHTHALMOLOGY

## 2019-10-18 PROCEDURE — 92014 COMPRE OPH EXAM EST PT 1/>: CPT | Mod: HCNC,S$GLB,, | Performed by: OPHTHALMOLOGY

## 2019-10-18 PROCEDURE — 92133 POSTERIOR SEGMENT OCT OPTIC NERVE(OCULAR COHERENCE TOMOGRAPHY) - OU - BOTH EYES: ICD-10-PCS | Mod: HCNC,S$GLB,, | Performed by: OPHTHALMOLOGY

## 2019-10-18 PROCEDURE — 92133 CPTRZD OPH DX IMG PST SGM ON: CPT | Mod: HCNC,S$GLB,, | Performed by: OPHTHALMOLOGY

## 2019-10-18 PROCEDURE — 99999 PR PBB SHADOW E&M-EST. PATIENT-LVL II: ICD-10-PCS | Mod: PBBFAC,HCNC,, | Performed by: OPHTHALMOLOGY

## 2019-10-18 PROCEDURE — 92083 HUMPHREY VISUAL FIELD - OU - BOTH EYES: ICD-10-PCS | Mod: HCNC,S$GLB,, | Performed by: OPHTHALMOLOGY

## 2019-10-18 PROCEDURE — 92014 PR EYE EXAM, EST PATIENT,COMPREHESV: ICD-10-PCS | Mod: HCNC,S$GLB,, | Performed by: OPHTHALMOLOGY

## 2019-10-18 RX ORDER — LATANOPROST 50 UG/ML
1 SOLUTION/ DROPS OPHTHALMIC NIGHTLY
Qty: 15 ML | Refills: 3 | Status: SHIPPED | OUTPATIENT
Start: 2019-10-18 | End: 2021-09-03

## 2019-10-18 RX ORDER — BRIMONIDINE TARTRATE AND TIMOLOL MALEATE 2; 5 MG/ML; MG/ML
1 SOLUTION OPHTHALMIC 2 TIMES DAILY
Qty: 30 ML | Refills: 3 | Status: SHIPPED | OUTPATIENT
Start: 2019-10-18 | End: 2020-10-20

## 2019-10-18 NOTE — PROGRESS NOTES
HPI     Glaucoma      Additional comments: HVF and OCT review today              Comments     DLS: 6/18/19    1) LTG OD>OS  2) Blepharitis/MGD  3) PCO OU  4) PCIOL OU  5) PCIOL OU    MEDS:  Combigan BID OU  Latanoprost QHS OU          Last edited by Lamar Tinajero MA on 10/18/2019  9:30 AM. (History)            Assessment /Plan     For exam results, see Encounter Report.    MGD (meibomian gland disease), unspecified laterality    Low-tension glaucoma of both eyes, moderate stage  -     Posterior Segment OCT Optic Nerve- Both eyes    Eyelid inflammation - Both Eyes    Posterior capsular opacification non visually significant of both eyes - Both Eyes    Non-insulin dependent type 2 diabetes mellitus    Pseudophakia - Both Eyes            1.  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 Present since 2006.     Family history   ??  Glaucoma meds   Brimonidine BID ou, xalatan OU  H/O adverse rxn to glaucoma drops  none  LASERS   none  GLAUCOMA SURGERIES  : none   OTHER EYE SURGERIES  : PCIOL ou- OD- 3/15/06, 9/27/06 OS   CDR  0.85-0.9 w/ sup thinning // 0.7  Tbase  11-15 / 11-13  Tmax      15/13 (on gtts) (? Tmax off gtts)  Ttarget   12-13 ou   HVF  17 test 2002 to 2019 - -abnl high sens  // non specific - unreliable ou - stable   Gonio  +2-3 OU (narrow inf - but doubt pupillary block - PC IOL ou)  CCT  561/567  OCT  7 test 2007 to 2019 - RNFL - bord TI  od // nl os   HRT 14 test 2007 to 2019- MR -  Dec. S/N/I od // dec S/I os /// CDR 0.83 od // 0.74 os  Disc photos  2002, 2003 - slides // 2007, 2012, 2014, 2015, 2018   - OIS     Ttoday  10/11 (( below  target of 12 - 13 ou ))  Test done today: HVF / DFE / OCT      2. PCO ou  -mild - monitor     3. MGD /eyelid Inflammation / Blepharitis  WC/LH/AT's     4. Pseudophakia ou - PC IOL ou   -status post cataract extraction and insertion of intraocular lens - Both Eyes   -minimal PCO ou       5 CHF - hsopitalized  in early 2019   -was taken off the BB for a while - but the cardiologist says it is ok to use and she is back on it again 2/18/2019        Plan    POAG - Low Tension Component OD > OS  Just below  target today at 11 ou   Target is 12 ou   CSM   -combignan ou bid  - Latanoprost ou q hs (disp 3 bottles at a time)     Patient c/o of trouble with reading- recheck current Rx with good lighting conditions- patient sees 20/20- not more clear with change in Rx- continue Rx for now but use reading light.       RTC 4 months - gonio     I have seen and personally examined the patient.  I agree with the findings, assessment and plan of the resident and/or fellow.     Lupis Lundberg MD

## 2019-11-27 ENCOUNTER — OFFICE VISIT (OUTPATIENT)
Dept: HOME HEALTH SERVICES | Facility: CLINIC | Age: 84
End: 2019-11-27
Payer: MEDICARE

## 2019-11-27 VITALS
HEART RATE: 60 BPM | BODY MASS INDEX: 23.05 KG/M2 | DIASTOLIC BLOOD PRESSURE: 79 MMHG | WEIGHT: 135 LBS | SYSTOLIC BLOOD PRESSURE: 141 MMHG | HEIGHT: 64 IN

## 2019-11-27 DIAGNOSIS — Z00.00 ENCOUNTER FOR PREVENTIVE HEALTH EXAMINATION: Primary | ICD-10-CM

## 2019-11-27 DIAGNOSIS — I50.32 CHRONIC DIASTOLIC CONGESTIVE HEART FAILURE: ICD-10-CM

## 2019-11-27 DIAGNOSIS — H02.889 MEIBOMIAN GLAND DISEASE, UNSPECIFIED LATERALITY: ICD-10-CM

## 2019-11-27 DIAGNOSIS — Z86.718 HISTORY OF DVT (DEEP VEIN THROMBOSIS): ICD-10-CM

## 2019-11-27 DIAGNOSIS — E78.49 OTHER HYPERLIPIDEMIA: ICD-10-CM

## 2019-11-27 DIAGNOSIS — M85.80 OSTEOPENIA, UNSPECIFIED LOCATION: ICD-10-CM

## 2019-11-27 DIAGNOSIS — N18.30 CHRONIC KIDNEY DISEASE, STAGE III (MODERATE): ICD-10-CM

## 2019-11-27 DIAGNOSIS — I10 ESSENTIAL HYPERTENSION: ICD-10-CM

## 2019-11-27 DIAGNOSIS — H40.1232 LOW-TENSION GLAUCOMA OF BOTH EYES, MODERATE STAGE: ICD-10-CM

## 2019-11-27 DIAGNOSIS — E11.21 TYPE 2 DIABETES MELLITUS WITH DIABETIC NEPHROPATHY, WITHOUT LONG-TERM CURRENT USE OF INSULIN: ICD-10-CM

## 2019-11-27 PROBLEM — I50.9 ACUTE CONGESTIVE HEART FAILURE: Status: RESOLVED | Noted: 2018-12-26 | Resolved: 2019-11-27

## 2019-11-27 PROCEDURE — G0439 PR MEDICARE ANNUAL WELLNESS SUBSEQUENT VISIT: ICD-10-PCS | Mod: S$GLB,,, | Performed by: NURSE PRACTITIONER

## 2019-11-27 PROCEDURE — G0439 PPPS, SUBSEQ VISIT: HCPCS | Mod: S$GLB,,, | Performed by: NURSE PRACTITIONER

## 2019-11-27 PROCEDURE — 99499 UNLISTED E&M SERVICE: CPT | Mod: S$GLB,,, | Performed by: NURSE PRACTITIONER

## 2019-11-27 PROCEDURE — 99499 RISK ADDL DX/OHS AUDIT: ICD-10-PCS | Mod: S$GLB,,, | Performed by: NURSE PRACTITIONER

## 2019-11-27 NOTE — PROGRESS NOTES
"Inna Blankenship presented for a  Medicare AWV and comprehensive Health Risk Assessment today. The following components were reviewed and updated:    · Medical history  · Family History  · Social history  · Allergies and Current Medications  · Health Risk Assessment  · Health Maintenance  · Care Team     ** See Completed Assessments for Annual Wellness Visit within the encounter summary.**       The following assessments were completed:  · Living Situation  · CAGE  · Depression Screening  · Timed Get Up and Go  · Whisper Test  · Cognitive Function Screening  ·   ·   ·   · Nutrition Screening  · ADL Screening  · PAQ Screening    Vitals:    11/27/19 1207   BP: (!) 141/79   Pulse: 60   Weight: 61.2 kg (135 lb)   Height: 5' 4" (1.626 m)     Body mass index is 23.17 kg/m².  Physical Exam   Constitutional: She is oriented to person, place, and time. She appears well-developed and well-nourished.   HENT:   Head: Normocephalic and atraumatic.   Eyes: EOM are normal.   Neck: Normal range of motion.   Cardiovascular: Normal rate, regular rhythm and normal heart sounds.   Pulmonary/Chest: Effort normal and breath sounds normal. No respiratory distress.   Abdominal: Soft. Bowel sounds are normal. She exhibits no distension.   Musculoskeletal: Normal range of motion. She exhibits no edema.   Unsteady gait  Cane present   Neurological: She is alert and oriented to person, place, and time.   Skin: Skin is warm and dry.   Psychiatric: She has a normal mood and affect. Her behavior is normal.         Diagnoses and health risks identified today and associated recommendations/orders:    1. Encounter for preventive health examination  Assessment completed. Preventive measures reviewed with patient.    2. Low-tension glaucoma of both eyes, moderate stage  Stable, followed by Optometry.    3. Meibomian gland disease, unspecified laterality  Stable, followed by Optometry.    4. Essential hypertension  Stable, followed by PCP.    5. Other " hyperlipidemia  Stable, followed by PCP.    6. Chronic diastolic congestive heart failure  Stable, followed by Cardiology.    7. Chronic kidney disease, stage III (moderate)  Stable, followed by PCP.    8. History of DVT (deep vein thrombosis)  Stable, followed by PCP and Cardiology.    9. Type 2 diabetes mellitus with diabetic nephropathy, without long-term current use of insulin  Stable, followed by PCP.    10. Osteopenia, unspecified location  Stable, followed by PCP.    Provided Inna with a 5-10 year written screening schedule and personal prevention plan. Recommendations were developed using the USPSTF age appropriate recommendations. Education, counseling, and referrals were provided as needed. After Visit Summary printed and given to patient which includes a list of additional screenings\tests needed.    No follow-ups on file.    Morelia Puente NP  I offered to discuss end of life issues, including information on how to make advance directives that the patient could use to name someone who would make medical decisions on their behalf if they became too ill to make themselves.    ___Patient declined  _X_Patient is interested, I provided paper work and offered to discuss.

## 2019-11-27 NOTE — PATIENT INSTRUCTIONS
Counseling and Referral of Other Preventative  (Italic type indicates deductible and co-insurance are waived)    Patient Name: Inna Blankenship  Today's Date: 11/27/2019    Health Maintenance       Date Due Completion Date    Shingles Vaccine (1 of 2) [birth year]1976 ---    Foot Exam 10/24/2019 10/24/2018    Override on 10/24/2018: Done    Override on 10/2/2017: Done    Override on 9/30/2016: Done    Override on 1/29/2016: Done    Influenza Vaccine (1) 12/21/2019 (Originally 9/1/2019) 10/12/2018    Override on 9/30/2016: Done    Hemoglobin A1c 12/26/2019 6/26/2019    Override on 10/24/2018: Done    Lipid Panel 02/02/2020 2/2/2019    Eye Exam 10/18/2020 10/18/2019    TETANUS VACCINE 09/30/2026 9/30/2016 (ClinicallyNA)    Override on 9/30/2016: Not Clinically Appropriate        No orders of the defined types were placed in this encounter.    The following information is provided to all patients.  This information is to help you find resources for any of the problems found today that may be affecting your health:                Living healthy guide: www.[facility].[place].gov      Understanding Diabetes: www.diabetes.org      Eating healthy: www.cdc.gov/healthyweight      CDC home safety checklist: www.cdc.gov/steadi/patient.html      Agency on Aging: www.goea.[place].[facility]      Alcoholics anonymous (AA): www.aa.org      Physical Activity: www.enrrique.nih.gov/vw9ougn      Tobacco use: www.quitwithusla.org

## 2020-01-15 ENCOUNTER — OFFICE VISIT (OUTPATIENT)
Dept: INTERNAL MEDICINE | Facility: CLINIC | Age: 85
End: 2020-01-15
Payer: MEDICARE

## 2020-01-15 DIAGNOSIS — I10 HYPERTENSION, UNSPECIFIED TYPE: ICD-10-CM

## 2020-01-15 DIAGNOSIS — I35.1 AORTIC VALVE INSUFFICIENCY, ETIOLOGY OF CARDIAC VALVE DISEASE UNSPECIFIED: Primary | ICD-10-CM

## 2020-01-15 DIAGNOSIS — I27.20 PULMONARY HTN: ICD-10-CM

## 2020-01-15 DIAGNOSIS — E11.9 DIABETES MELLITUS WITHOUT COMPLICATION: ICD-10-CM

## 2020-01-15 PROCEDURE — 1126F PR PAIN SEVERITY QUANTIFIED, NO PAIN PRESENT: ICD-10-PCS | Mod: HCNC,S$GLB,, | Performed by: INTERNAL MEDICINE

## 2020-01-15 PROCEDURE — 1101F PT FALLS ASSESS-DOCD LE1/YR: CPT | Mod: HCNC,CPTII,S$GLB, | Performed by: INTERNAL MEDICINE

## 2020-01-15 PROCEDURE — 99999 PR PBB SHADOW E&M-EST. PATIENT-LVL IV: CPT | Mod: PBBFAC,HCNC,, | Performed by: INTERNAL MEDICINE

## 2020-01-15 PROCEDURE — 1159F PR MEDICATION LIST DOCUMENTED IN MEDICAL RECORD: ICD-10-PCS | Mod: HCNC,S$GLB,, | Performed by: INTERNAL MEDICINE

## 2020-01-15 PROCEDURE — 99215 PR OFFICE/OUTPT VISIT, EST, LEVL V, 40-54 MIN: ICD-10-PCS | Mod: HCNC,S$GLB,, | Performed by: INTERNAL MEDICINE

## 2020-01-15 PROCEDURE — 1101F PR PT FALLS ASSESS DOC 0-1 FALLS W/OUT INJ PAST YR: ICD-10-PCS | Mod: HCNC,CPTII,S$GLB, | Performed by: INTERNAL MEDICINE

## 2020-01-15 PROCEDURE — 99999 PR PBB SHADOW E&M-EST. PATIENT-LVL IV: ICD-10-PCS | Mod: PBBFAC,HCNC,, | Performed by: INTERNAL MEDICINE

## 2020-01-15 PROCEDURE — 1126F AMNT PAIN NOTED NONE PRSNT: CPT | Mod: HCNC,S$GLB,, | Performed by: INTERNAL MEDICINE

## 2020-01-15 PROCEDURE — 99215 OFFICE O/P EST HI 40 MIN: CPT | Mod: HCNC,S$GLB,, | Performed by: INTERNAL MEDICINE

## 2020-01-15 PROCEDURE — 1159F MED LIST DOCD IN RCRD: CPT | Mod: HCNC,S$GLB,, | Performed by: INTERNAL MEDICINE

## 2020-01-15 RX ORDER — ATORVASTATIN CALCIUM 20 MG/1
TABLET, FILM COATED ORAL
Qty: 90 TABLET | Refills: 1 | Status: SHIPPED | OUTPATIENT
Start: 2020-01-15 | End: 2020-08-06

## 2020-01-15 RX ORDER — AMLODIPINE BESYLATE 10 MG/1
10 TABLET ORAL DAILY
Qty: 90 TABLET | Refills: 1 | Status: ON HOLD | OUTPATIENT
Start: 2020-01-15 | End: 2020-08-29

## 2020-01-15 RX ORDER — ESCITALOPRAM OXALATE 10 MG/1
10 TABLET ORAL DAILY
Qty: 90 TABLET | Refills: 1 | Status: SHIPPED | OUTPATIENT
Start: 2020-01-15 | End: 2020-08-06

## 2020-01-15 RX ORDER — OLMESARTAN MEDOXOMIL 20 MG/1
20 TABLET ORAL DAILY
Qty: 90 TABLET | Refills: 1 | Status: SHIPPED | OUTPATIENT
Start: 2020-01-15 | End: 2020-08-04

## 2020-01-15 RX ORDER — OLMESARTAN MEDOXOMIL 20 MG/1
TABLET ORAL
COMMUNITY
Start: 2019-12-03 | End: 2020-01-15 | Stop reason: SDUPTHER

## 2020-01-15 RX ORDER — BUSPIRONE HYDROCHLORIDE 5 MG/1
5 TABLET ORAL 2 TIMES DAILY
Qty: 180 TABLET | Refills: 1 | Status: SHIPPED | OUTPATIENT
Start: 2020-01-15 | End: 2020-08-06

## 2020-01-15 RX ORDER — FUROSEMIDE 20 MG/1
20 TABLET ORAL DAILY
Qty: 90 TABLET | Refills: 1 | Status: SHIPPED | OUTPATIENT
Start: 2020-01-15 | End: 2020-12-24 | Stop reason: SDUPTHER

## 2020-01-20 VITALS
BODY MASS INDEX: 22.51 KG/M2 | HEART RATE: 96 BPM | HEIGHT: 65 IN | DIASTOLIC BLOOD PRESSURE: 62 MMHG | OXYGEN SATURATION: 95 % | WEIGHT: 135.13 LBS | SYSTOLIC BLOOD PRESSURE: 120 MMHG

## 2020-01-20 NOTE — PROGRESS NOTES
Subjective:       Patient ID: Inna Blankenship is a 89 y.o. female.    Chief Complaint: Hypertension    HPI  She returns for management of hypertension.  She has had hypertension for over a year.  Current treatment has included medications outlined in medication list.  She denies chest pain or shortness of breath.  No palpitations.  Denies left arm or neck pain.  Review of Systems   Constitutional: Negative for chills, fatigue, fever and unexpected weight change.   Respiratory: Negative for chest tightness and shortness of breath.    Cardiovascular: Negative for chest pain and palpitations.   Gastrointestinal: Negative for abdominal pain and blood in stool.   Neurological: Negative for dizziness, syncope, numbness and headaches.       Objective:      Physical Exam   HENT:   Right Ear: External ear normal.   Left Ear: External ear normal.   Nose: Nose normal.   Mouth/Throat: Oropharynx is clear and moist.   Eyes: Pupils are equal, round, and reactive to light.   Neck: Normal range of motion.   Cardiovascular: Normal rate and regular rhythm.   Murmur heard.  Pulmonary/Chest: Breath sounds normal.   Abdominal: She exhibits no distension. There is no hepatosplenomegaly. There is no tenderness.   Lymphadenopathy:     She has no cervical adenopathy.     She has no axillary adenopathy.   Neurological: She has normal strength and normal reflexes. No cranial nerve deficit or sensory deficit.       Assessment/Plan       Assessment and plan:  Hypertension:  Check CMP, lipid panel, A1c, CBC, TSH.  Discussed mammogram, breast exam, Pap smear, pelvic exam, colonoscopy, bone density.  She declined all

## 2020-01-28 ENCOUNTER — OFFICE VISIT (OUTPATIENT)
Dept: CARDIOLOGY | Facility: CLINIC | Age: 85
End: 2020-01-28
Payer: MEDICARE

## 2020-01-28 VITALS
BODY MASS INDEX: 22.77 KG/M2 | HEIGHT: 65 IN | WEIGHT: 136.69 LBS | HEART RATE: 49 BPM | SYSTOLIC BLOOD PRESSURE: 170 MMHG | DIASTOLIC BLOOD PRESSURE: 74 MMHG

## 2020-01-28 DIAGNOSIS — E11.21 TYPE 2 DIABETES MELLITUS WITH DIABETIC NEPHROPATHY, WITHOUT LONG-TERM CURRENT USE OF INSULIN: ICD-10-CM

## 2020-01-28 DIAGNOSIS — E78.49 OTHER HYPERLIPIDEMIA: ICD-10-CM

## 2020-01-28 DIAGNOSIS — I50.32 CHRONIC DIASTOLIC CONGESTIVE HEART FAILURE: Primary | ICD-10-CM

## 2020-01-28 DIAGNOSIS — I10 ESSENTIAL HYPERTENSION: ICD-10-CM

## 2020-01-28 DIAGNOSIS — Z86.718 HISTORY OF DVT (DEEP VEIN THROMBOSIS): ICD-10-CM

## 2020-01-28 PROCEDURE — 1159F MED LIST DOCD IN RCRD: CPT | Mod: HCNC,S$GLB,, | Performed by: INTERNAL MEDICINE

## 2020-01-28 PROCEDURE — 3288F FALL RISK ASSESSMENT DOCD: CPT | Mod: HCNC,CPTII,S$GLB, | Performed by: INTERNAL MEDICINE

## 2020-01-28 PROCEDURE — 99214 PR OFFICE/OUTPT VISIT, EST, LEVL IV, 30-39 MIN: ICD-10-PCS | Mod: HCNC,S$GLB,, | Performed by: INTERNAL MEDICINE

## 2020-01-28 PROCEDURE — 99999 PR PBB SHADOW E&M-EST. PATIENT-LVL III: CPT | Mod: PBBFAC,HCNC,, | Performed by: INTERNAL MEDICINE

## 2020-01-28 PROCEDURE — 1126F PR PAIN SEVERITY QUANTIFIED, NO PAIN PRESENT: ICD-10-PCS | Mod: HCNC,S$GLB,, | Performed by: INTERNAL MEDICINE

## 2020-01-28 PROCEDURE — 1100F PTFALLS ASSESS-DOCD GE2>/YR: CPT | Mod: HCNC,CPTII,S$GLB, | Performed by: INTERNAL MEDICINE

## 2020-01-28 PROCEDURE — 99499 RISK ADDL DX/OHS AUDIT: ICD-10-PCS | Mod: HCNC,S$GLB,, | Performed by: INTERNAL MEDICINE

## 2020-01-28 PROCEDURE — 3288F PR FALLS RISK ASSESSMENT DOCUMENTED: ICD-10-PCS | Mod: HCNC,CPTII,S$GLB, | Performed by: INTERNAL MEDICINE

## 2020-01-28 PROCEDURE — 99999 PR PBB SHADOW E&M-EST. PATIENT-LVL III: ICD-10-PCS | Mod: PBBFAC,HCNC,, | Performed by: INTERNAL MEDICINE

## 2020-01-28 PROCEDURE — 1126F AMNT PAIN NOTED NONE PRSNT: CPT | Mod: HCNC,S$GLB,, | Performed by: INTERNAL MEDICINE

## 2020-01-28 PROCEDURE — 99499 UNLISTED E&M SERVICE: CPT | Mod: HCNC,S$GLB,, | Performed by: INTERNAL MEDICINE

## 2020-01-28 PROCEDURE — 1159F PR MEDICATION LIST DOCUMENTED IN MEDICAL RECORD: ICD-10-PCS | Mod: HCNC,S$GLB,, | Performed by: INTERNAL MEDICINE

## 2020-01-28 PROCEDURE — 99214 OFFICE O/P EST MOD 30 MIN: CPT | Mod: HCNC,S$GLB,, | Performed by: INTERNAL MEDICINE

## 2020-01-28 PROCEDURE — 1100F PR PT FALLS ASSESS DOC 2+ FALLS/FALL W/INJURY/YR: ICD-10-PCS | Mod: HCNC,CPTII,S$GLB, | Performed by: INTERNAL MEDICINE

## 2020-01-28 NOTE — LETTER
January 28, 2020      Stacy Rodriguez MD  [address]  [place] [address]           [facility] - Cardiology  [address]  [facility] [address]  Phone: [phone]          Patient: Inna Blankenship   MR Number: [id number]   YOB: 1926   Date of Visit: 1/28/2020       Dear Dr. Stacy Rodriguez:    Thank you for referring Inna Blankenship to me for evaluation. Attached you will find relevant portions of my assessment and plan of care.    If you have questions, please do not hesitate to call me. I look forward to following Inna Blankenship along with you.    Sincerely,    Gucci Shin MD    Enclosure  CC:  No Recipients    If you would like to receive this communication electronically, please contact [email or web address] or [phone] to request more information on [employer] access.    For providers and/or their staff who would like to refer a patient to [facility], please contact us through our one-stop-shop provider referral line, Julianna Adams, at [phone].    If you feel you have received this communication in error or would no longer like to receive these types of communications, please e-mail [email or web address]

## 2020-01-28 NOTE — PROGRESS NOTES
"Subjective:    Patient ID:  Inna Blankenship is a 89 y.o. female who presents for evaluation of Congestive Heart Failure      HPI   89 year old female with a history of diabetes, hypertension, hyperlipidemia, DVT on Apixiban and CKD . She remains stable since has last visit and denies chest pain or SOB. She is sleeping better with he "new" pills     Conclusion 12/27/18    · Moderate concentric left ventricular hypertrophy.  · Mild to moderate pulmonary hypertension present.  · Normal left ventricular systolic function. The estimated ejection fraction is 68%  · No wall motion abnormalities.  · Grade I (mild) left ventricular diastolic dysfunction consistent with impaired relaxation.  · Normal left atrial pressure.  · Normal right ventricular systolic function.  · Mild left atrial enlargement.  · Mild aortic regurgitation.  · Mild to moderate tricuspid regurgitation.          Lab Results   Component Value Date     01/15/2020    K 4.2 01/15/2020     01/15/2020    CO2 29 01/15/2020    BUN 27 01/15/2020    CREATININE 1.4 01/15/2020     (H) 01/15/2020    HGBA1C 6.2 (H) 01/15/2020    MG 1.8 12/27/2018    AST 19 01/15/2020    ALT 8 (L) 01/15/2020    ALBUMIN 4.0 01/15/2020    PROT 8.1 01/15/2020    BILITOT 0.5 01/15/2020    WBC 5.59 01/15/2020    HGB 13.3 01/15/2020    HCT 40.7 01/15/2020    MCV 93 01/15/2020     01/15/2020    INR 1.1 09/13/2006    TSH 5.489 (H) 01/15/2020         Lab Results   Component Value Date    CHOL 191 01/15/2020    HDL 61 01/15/2020    TRIG 62 01/15/2020       Lab Results   Component Value Date    LDLCALC 117.6 01/15/2020       Past Medical History:   Diagnosis Date    Arthritis     CHF (congestive heart failure) 12/26/2018    Chronic kidney disease, stage III (moderate) 9/11/2015    Colon adenoma     Diabetes mellitus     diet controlled    Glaucoma     Hyperlipemia     Hypertension     Osteopenia     Type 2 diabetes mellitus     Type II or unspecified type diabetes " mellitus without mention of complication, not stated as uncontrolled        Current Outpatient Medications:     amLODIPine (NORVASC) 10 MG tablet, Take 1 tablet (10 mg total) by mouth once daily., Disp: 90 tablet, Rfl: 1    apixaban (ELIQUIS) 5 mg Tab, TAKE 1 TABLET (5 MG TOTAL) BY MOUTH 2 (TWO) TIMES DAILY., Disp: 180 tablet, Rfl: 1    atorvastatin (LIPITOR) 20 MG tablet, TAKE 1 TABLET ONE TIME DAILY, Disp: 90 tablet, Rfl: 1    brimonidine-timolol (COMBIGAN) 0.2-0.5 % Drop, Place 1 drop into both eyes 2 (two) times daily. Disp 10 mls for 1 month, Disp: 30 mL, Rfl: 3    busPIRone (BUSPAR) 5 MG Tab, Take 1 tablet (5 mg total) by mouth 2 (two) times daily., Disp: 180 tablet, Rfl: 1    CALCIUM CARBONATE (MVIK-YBC-100 ORAL), Take 1 tablet by mouth Daily. 1  Oral Every day, Disp: , Rfl:     escitalopram oxalate (LEXAPRO) 10 MG tablet, Take 1 tablet (10 mg total) by mouth once daily., Disp: 90 tablet, Rfl: 1    FLUZONE HIGH-DOSE 2019-20, PF, 180 mcg/0.5 mL Syrg, TO BE ADMINISTERED BY PHARMACIST FOR IMMUNIZATION, Disp: , Rfl: 0    furosemide (LASIX) 20 MG tablet, Take 1 tablet (20 mg total) by mouth once daily., Disp: 90 tablet, Rfl: 1    latanoprost 0.005 % ophthalmic solution, Place 1 drop into both eyes every evening., Disp: 15 mL, Rfl: 3    multivit with minerals/lutein (MULTIVITAMIN 50 PLUS ORAL), Take 1 tablet by mouth once daily. , Disp: , Rfl:     olmesartan (BENICAR) 20 MG tablet, Take 1 tablet (20 mg total) by mouth once daily., Disp: 90 tablet, Rfl: 1          Review of Systems   Constitution: Positive for malaise/fatigue. Negative for decreased appetite, diaphoresis, fever, weight gain and weight loss.   HENT: Negative for congestion, ear discharge, ear pain and nosebleeds.    Eyes: Negative for blurred vision, double vision and visual disturbance.   Cardiovascular: Negative for chest pain, claudication, cyanosis, dyspnea on exertion, irregular heartbeat, leg swelling, near-syncope, orthopnea,  "palpitations, paroxysmal nocturnal dyspnea and syncope.   Respiratory: Negative for cough, hemoptysis, shortness of breath, sleep disturbances due to breathing, snoring, sputum production and wheezing.    Endocrine: Negative for polydipsia, polyphagia and polyuria.   Hematologic/Lymphatic: Negative for adenopathy and bleeding problem. Does not bruise/bleed easily.   Skin: Negative for color change, nail changes, poor wound healing and rash.   Musculoskeletal: Negative for muscle cramps and muscle weakness.   Gastrointestinal: Negative for abdominal pain, anorexia, change in bowel habit, hematochezia, nausea and vomiting.   Genitourinary: Negative for dysuria, frequency and hematuria.   Neurological: Negative for brief paralysis, difficulty with concentration, excessive daytime sleepiness, dizziness, focal weakness, headaches, light-headedness, seizures, vertigo and weakness.   Psychiatric/Behavioral: Negative for altered mental status and depression.   Allergic/Immunologic: Negative for persistent infections.        Objective:BP (!) 170/74   Pulse (!) 49   Ht 5' 5" (1.651 m)   Wt 62 kg (136 lb 11 oz)   BMI 22.75 kg/m²             Physical Exam   Constitutional: She is oriented to person, place, and time. She appears well-developed and well-nourished.   HENT:   Head: Normocephalic.   Right Ear: External ear normal.   Left Ear: External ear normal.   Nose: Nose normal.   Inspection of lips, teeth and gums normal   Eyes: Pupils are equal, round, and reactive to light. Conjunctivae and EOM are normal. No scleral icterus.   Neck: Normal range of motion. No JVD present. No tracheal deviation present. No thyromegaly present.   Cardiovascular: Normal rate, regular rhythm, normal heart sounds and intact distal pulses. Exam reveals no gallop and no friction rub.   No murmur heard.  Pulmonary/Chest: Effort normal and breath sounds normal. No respiratory distress. She has no wheezes. She has no rales. She exhibits no " tenderness.   Abdominal: Soft. Bowel sounds are normal. She exhibits no distension. There is no hepatosplenomegaly. There is no tenderness. There is no guarding.   Musculoskeletal: Normal range of motion. She exhibits no edema or tenderness.   Lymphadenopathy:   Palpation of lymph nodes of neck and groin normal   Neurological: She is oriented to person, place, and time. No cranial nerve deficit. She exhibits normal muscle tone. Coordination normal.   Skin: Skin is warm and dry. No rash noted. No erythema. No pallor.   Palpation of skin normal   Psychiatric: She has a normal mood and affect. Her behavior is normal. Judgment and thought content normal.         Assessment:       1. Chronic diastolic congestive heart failure    2. Essential hypertension    3. Other hyperlipidemia    4. History of DVT (deep vein thrombosis)    5. Type 2 diabetes mellitus with diabetic nephropathy, without long-term current use of insulin         Plan:       Inna was seen today for congestive heart failure.    Diagnoses and all orders for this visit:    Chronic diastolic congestive heart failure    Essential hypertension    Other hyperlipidemia    History of DVT (deep vein thrombosis)    Type 2 diabetes mellitus with diabetic nephropathy, without long-term current use of insulin

## 2020-02-18 ENCOUNTER — OFFICE VISIT (OUTPATIENT)
Dept: OPHTHALMOLOGY | Facility: CLINIC | Age: 85
End: 2020-02-18
Payer: MEDICARE

## 2020-02-18 DIAGNOSIS — Z96.1 PSEUDOPHAKIA: ICD-10-CM

## 2020-02-18 DIAGNOSIS — E11.9 NON-INSULIN DEPENDENT TYPE 2 DIABETES MELLITUS: ICD-10-CM

## 2020-02-18 DIAGNOSIS — H26.493 POSTERIOR CAPSULAR OPACIFICATION NON VISUALLY SIGNIFICANT OF BOTH EYES: ICD-10-CM

## 2020-02-18 DIAGNOSIS — H01.9 EYELID INFLAMMATION: ICD-10-CM

## 2020-02-18 DIAGNOSIS — H40.1232 LOW-TENSION GLAUCOMA OF BOTH EYES, MODERATE STAGE: Primary | ICD-10-CM

## 2020-02-18 DIAGNOSIS — H02.889 MGD (MEIBOMIAN GLAND DISEASE), UNSPECIFIED LATERALITY: ICD-10-CM

## 2020-02-18 PROCEDURE — 99499 UNLISTED E&M SERVICE: CPT | Mod: HCNC,S$GLB,, | Performed by: OPHTHALMOLOGY

## 2020-02-18 PROCEDURE — 92020 GONIOSCOPY: CPT | Mod: HCNC,S$GLB,, | Performed by: OPHTHALMOLOGY

## 2020-02-18 PROCEDURE — 92012 INTRM OPH EXAM EST PATIENT: CPT | Mod: HCNC,S$GLB,, | Performed by: OPHTHALMOLOGY

## 2020-02-18 PROCEDURE — 92012 PR EYE EXAM, EST PATIENT,INTERMED: ICD-10-PCS | Mod: HCNC,S$GLB,, | Performed by: OPHTHALMOLOGY

## 2020-02-18 PROCEDURE — 99999 PR PBB SHADOW E&M-EST. PATIENT-LVL II: ICD-10-PCS | Mod: PBBFAC,HCNC,, | Performed by: OPHTHALMOLOGY

## 2020-02-18 PROCEDURE — 92020 PR SPECIAL EYE EVAL,GONIOSCOPY: ICD-10-PCS | Mod: HCNC,S$GLB,, | Performed by: OPHTHALMOLOGY

## 2020-02-18 PROCEDURE — 99499 RISK ADDL DX/OHS AUDIT: ICD-10-PCS | Mod: HCNC,S$GLB,, | Performed by: OPHTHALMOLOGY

## 2020-02-18 PROCEDURE — 99999 PR PBB SHADOW E&M-EST. PATIENT-LVL II: CPT | Mod: PBBFAC,HCNC,, | Performed by: OPHTHALMOLOGY

## 2020-02-18 NOTE — PROGRESS NOTES
HPI     DLS: 10/18/19    Pt here for 4 month check;    Meds:   Combigan BID OU   Latanoprost QHS OU    1) LTG OD>OS   2) Blepharitis/MGD   3) PCO OU   4) PCIOL OU   5) PCIOL OU     Last edited by Salud Hartman on 2/18/2020  8:06 AM. (History)            Assessment /Plan     For exam results, see Encounter Report.    Low-tension glaucoma of both eyes, moderate stage    MGD (meibomian gland disease), unspecified laterality    Eyelid inflammation - Both Eyes    Posterior capsular opacification non visually significant of both eyes - Both Eyes    Non-insulin dependent type 2 diabetes mellitus    Pseudophakia - Both Eyes            1.  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 Present since 2006.     Family history   ??  Glaucoma meds   Brimonidine BID ou, xalatan OU  H/O adverse rxn to glaucoma drops  none  LASERS   none  GLAUCOMA SURGERIES  : none   OTHER EYE SURGERIES  : PCIOL ou- OD- 3/15/06, 9/27/06 OS   CDR  0.85-0.9 w/ sup thinning // 0.7  Tbase  11-15 / 11-13  Tmax      15/13 (on gtts) (? Tmax off gtts)  Ttarget   12-13 ou   HVF  17 test 2002 to 2019 - -abnl high sens  // non specific - unreliable ou - stable   Gonio  +2-3 OU (narrow inf - but doubt pupillary block - PC IOL ou)  CCT  561/567  OCT  7 test 2007 to 2019 - RNFL - bord TI  od // nl os   HRT 14 test 2007 to 2019- MR -  Dec. S/N/I od // dec S/I os /// CDR 0.83 od // 0.74 os  Disc photos  2002, 2003 - slides // 2007, 2012, 2014, 2015, 2018   - OIS     Ttoday  10/9 (( below  target of 12 - 13 ou ))  Test done today:gonio      2. PCO ou  -mild - monitor     3. MGD /eyelid Inflammation / Blepharitis  WC/LH/AT's     4. Pseudophakia ou - PC IOL ou   -status post cataract extraction and insertion of intraocular lens - Both Eyes   -minimal PCO ou      5 CHF - hsopitalized  in early 2019   -was taken off the BB for a while - but the cardiologist says it is ok to use and  she is back on it again 2/18/2019        Plan    POAG - Low Tension Component OD > OS  Just below  target today at 11 ou   Target is 12 ou   CSM   -combignan ou bid  - Latanoprost ou q hs (disp 3 bottles at a time)     Patient c/o of trouble with reading- recheck current Rx with good lighting conditions- patient sees 20/20- not more clear with change in Rx- continue Rx for now but use reading light.       RTC 4 months - HRT     I have seen and personally examined the patient.  I agree with the findings, assessment and plan of the resident and/or fellow.     Lupis Lundberg MD

## 2020-04-28 RX ORDER — APIXABAN 5 MG/1
TABLET, FILM COATED ORAL
Qty: 180 TABLET | Refills: 0 | Status: SHIPPED | OUTPATIENT
Start: 2020-04-28 | End: 2020-06-19

## 2020-05-18 ENCOUNTER — PES CALL (OUTPATIENT)
Dept: ADMINISTRATIVE | Facility: CLINIC | Age: 85
End: 2020-05-18

## 2020-05-19 ENCOUNTER — OFFICE VISIT (OUTPATIENT)
Dept: INTERNAL MEDICINE | Facility: CLINIC | Age: 85
End: 2020-05-19
Payer: MEDICARE

## 2020-05-19 ENCOUNTER — TELEPHONE (OUTPATIENT)
Dept: INTERNAL MEDICINE | Facility: CLINIC | Age: 85
End: 2020-05-19

## 2020-05-19 DIAGNOSIS — I10 HYPERTENSION, UNSPECIFIED TYPE: Primary | ICD-10-CM

## 2020-05-19 DIAGNOSIS — E11.9 DIABETES MELLITUS WITHOUT COMPLICATION: Primary | ICD-10-CM

## 2020-05-19 PROCEDURE — 1159F PR MEDICATION LIST DOCUMENTED IN MEDICAL RECORD: ICD-10-PCS | Mod: HCNC,95,, | Performed by: INTERNAL MEDICINE

## 2020-05-19 PROCEDURE — 1159F MED LIST DOCD IN RCRD: CPT | Mod: HCNC,95,, | Performed by: INTERNAL MEDICINE

## 2020-05-19 PROCEDURE — 99443 PR PHYSICIAN TELEPHONE EVALUATION 21-30 MIN: CPT | Mod: HCNC,95,, | Performed by: INTERNAL MEDICINE

## 2020-05-19 PROCEDURE — 99443 PR PHYSICIAN TELEPHONE EVALUATION 21-30 MIN: ICD-10-PCS | Mod: HCNC,95,, | Performed by: INTERNAL MEDICINE

## 2020-05-19 PROCEDURE — 1101F PT FALLS ASSESS-DOCD LE1/YR: CPT | Mod: HCNC,CPTII,95, | Performed by: INTERNAL MEDICINE

## 2020-05-19 PROCEDURE — 1101F PR PT FALLS ASSESS DOC 0-1 FALLS W/OUT INJ PAST YR: ICD-10-PCS | Mod: HCNC,CPTII,95, | Performed by: INTERNAL MEDICINE

## 2020-05-23 NOTE — PROGRESS NOTES
Established Patient - Audio Only Telehealth Visit     The patient location is: home  The chief complaint leading to consultation is:  Hypertension  Visit type: Virtual visit with audio only (telephone)  Total time spent with patient: 40 min       The reason for the audio only service rather than synchronous audio and video virtual visit was related to technical difficulties or patient preference/necessity.     Each patient to whom I provide medical services by telemedicine is:  (1) informed of the relationship between the physician and patient and the respective role of any other health care provider with respect to management of the patient; and (2) notified that they may decline to receive medical services by telemedicine and may withdraw from such care at any time. Patient verbally consented to receive this service via voice-only telephone call.       HPI:  She returns for management of hypertension.  She has had hypertension for over a year.  Current treatment has included medications outlined in medication list.  She denies chest pain or shortness of breath.  No palpitations.  Denies left arm or neck pain.  She has been checking her blood pressure at home, and it has been normal.  No other complaint     Assessment and plan:  Hypertension:  Check CMP, A1c, TSH, urine microalbumin.  40 min spent advising and counseling patient, coordinating care                        This service was not originating from a related E/M service provided within the previous 7 days nor will  to an E/M service or procedure within the next 24 hours or my soonest available appointment.  Prevailing standard of care was able to be met in this audio-only visit.

## 2020-05-26 ENCOUNTER — LAB VISIT (OUTPATIENT)
Dept: LAB | Facility: HOSPITAL | Age: 85
End: 2020-05-26
Attending: INTERNAL MEDICINE
Payer: MEDICARE

## 2020-05-26 DIAGNOSIS — E11.9 DIABETES MELLITUS WITHOUT COMPLICATION: ICD-10-CM

## 2020-05-26 LAB
ALBUMIN SERPL BCP-MCNC: 3.6 G/DL (ref 3.5–5.2)
ALP SERPL-CCNC: 88 U/L (ref 55–135)
ALT SERPL W/O P-5'-P-CCNC: 10 U/L (ref 10–44)
ANION GAP SERPL CALC-SCNC: 8 MMOL/L (ref 8–16)
AST SERPL-CCNC: 20 U/L (ref 10–40)
BILIRUB SERPL-MCNC: 0.5 MG/DL (ref 0.1–1)
BUN SERPL-MCNC: 16 MG/DL (ref 10–30)
CALCIUM SERPL-MCNC: 9.8 MG/DL (ref 8.7–10.5)
CHLORIDE SERPL-SCNC: 105 MMOL/L (ref 95–110)
CO2 SERPL-SCNC: 27 MMOL/L (ref 23–29)
CREAT SERPL-MCNC: 0.9 MG/DL (ref 0.5–1.4)
EST. GFR  (AFRICAN AMERICAN): >60 ML/MIN/1.73 M^2
EST. GFR  (NON AFRICAN AMERICAN): 54.9 ML/MIN/1.73 M^2
ESTIMATED AVG GLUCOSE: 117 MG/DL (ref 68–131)
GLUCOSE SERPL-MCNC: 109 MG/DL (ref 70–110)
HBA1C MFR BLD HPLC: 5.7 % (ref 4–5.6)
POTASSIUM SERPL-SCNC: 4.1 MMOL/L (ref 3.5–5.1)
PROT SERPL-MCNC: 7.3 G/DL (ref 6–8.4)
SODIUM SERPL-SCNC: 140 MMOL/L (ref 136–145)
T4 FREE SERPL-MCNC: 0.93 NG/DL (ref 0.71–1.51)
TSH SERPL DL<=0.005 MIU/L-ACNC: 5.71 UIU/ML (ref 0.4–4)

## 2020-05-26 PROCEDURE — 84439 ASSAY OF FREE THYROXINE: CPT | Mod: HCNC

## 2020-05-26 PROCEDURE — 80053 COMPREHEN METABOLIC PANEL: CPT | Mod: HCNC

## 2020-05-26 PROCEDURE — 83036 HEMOGLOBIN GLYCOSYLATED A1C: CPT | Mod: HCNC

## 2020-05-26 PROCEDURE — 84443 ASSAY THYROID STIM HORMONE: CPT | Mod: HCNC

## 2020-05-26 PROCEDURE — 36415 COLL VENOUS BLD VENIPUNCTURE: CPT | Mod: HCNC

## 2020-07-23 ENCOUNTER — PES CALL (OUTPATIENT)
Dept: ADMINISTRATIVE | Facility: CLINIC | Age: 85
End: 2020-07-23

## 2020-08-04 ENCOUNTER — OFFICE VISIT (OUTPATIENT)
Dept: HOME HEALTH SERVICES | Facility: CLINIC | Age: 85
End: 2020-08-04
Payer: MEDICARE

## 2020-08-04 VITALS
BODY MASS INDEX: 22.49 KG/M2 | SYSTOLIC BLOOD PRESSURE: 123 MMHG | DIASTOLIC BLOOD PRESSURE: 60 MMHG | HEART RATE: 59 BPM | WEIGHT: 135 LBS | HEIGHT: 65 IN

## 2020-08-04 DIAGNOSIS — E11.9 CONTROLLED TYPE 2 DIABETES MELLITUS WITHOUT COMPLICATION, WITHOUT LONG-TERM CURRENT USE OF INSULIN: ICD-10-CM

## 2020-08-04 DIAGNOSIS — Z74.09 OTHER REDUCED MOBILITY: ICD-10-CM

## 2020-08-04 DIAGNOSIS — I50.32 CHRONIC DIASTOLIC CONGESTIVE HEART FAILURE: ICD-10-CM

## 2020-08-04 DIAGNOSIS — Z99.89 DEPENDENCE ON OTHER ENABLING MACHINES AND DEVICES: ICD-10-CM

## 2020-08-04 DIAGNOSIS — H02.889 MEIBOMIAN GLAND DISEASE, UNSPECIFIED LATERALITY: ICD-10-CM

## 2020-08-04 DIAGNOSIS — E78.49 OTHER HYPERLIPIDEMIA: ICD-10-CM

## 2020-08-04 DIAGNOSIS — I10 ESSENTIAL HYPERTENSION: ICD-10-CM

## 2020-08-04 DIAGNOSIS — Z00.00 ENCOUNTER FOR PREVENTIVE HEALTH EXAMINATION: Primary | ICD-10-CM

## 2020-08-04 DIAGNOSIS — H40.1232 LOW-TENSION GLAUCOMA OF BOTH EYES, MODERATE STAGE: ICD-10-CM

## 2020-08-04 DIAGNOSIS — M85.80 OSTEOPENIA, UNSPECIFIED LOCATION: ICD-10-CM

## 2020-08-04 DIAGNOSIS — I77.1 TORTUOUS AORTA: ICD-10-CM

## 2020-08-04 DIAGNOSIS — Z86.718 HISTORY OF DVT (DEEP VEIN THROMBOSIS): ICD-10-CM

## 2020-08-04 PROCEDURE — G0439 PPPS, SUBSEQ VISIT: HCPCS | Mod: S$GLB,,, | Performed by: NURSE PRACTITIONER

## 2020-08-04 PROCEDURE — 99499 RISK ADDL DX/OHS AUDIT: ICD-10-PCS | Mod: S$GLB,,, | Performed by: NURSE PRACTITIONER

## 2020-08-04 PROCEDURE — G0439 PR MEDICARE ANNUAL WELLNESS SUBSEQUENT VISIT: ICD-10-PCS | Mod: S$GLB,,, | Performed by: NURSE PRACTITIONER

## 2020-08-04 PROCEDURE — 99499 UNLISTED E&M SERVICE: CPT | Mod: S$GLB,,, | Performed by: NURSE PRACTITIONER

## 2020-08-04 NOTE — PATIENT INSTRUCTIONS
Counseling and Referral of Other Preventative  (Italic type indicates deductible and co-insurance are waived)    Patient Name: Inna Blankenship  Today's Date: 8/4/2020    Health Maintenance       Date Due Completion Date    Shingles Vaccine (1 of 2) 02/04/1976 ---    Foot Exam 10/24/2019 10/24/2018    Override on 10/24/2018: Done    Override on 10/2/2017: Done    Override on 9/30/2016: Done    Override on 1/29/2016: Done    Influenza Vaccine (1) 09/01/2020 11/5/2019    Override on 9/30/2016: Done    Hemoglobin A1c 11/26/2020 5/26/2020    Override on 10/24/2018: Done    Lipid Panel 01/15/2021 1/15/2020    Eye Exam 02/18/2021 2/18/2020    TETANUS VACCINE 09/30/2026 9/30/2016 (ClinicallyNA)    Override on 9/30/2016: Not Clinically Appropriate        No orders of the defined types were placed in this encounter.    The following information is provided to all patients.  This information is to help you find resources for any of the problems found today that may be affecting your health:                Living healthy guide: www.[facility].[place].gov      Understanding Diabetes: www.diabetes.org      Eating healthy: www.cdc.gov/healthyweight      CDC home safety checklist: www.cdc.gov/steadi/patient.html      Agency on Aging: www.goea.[place].[facility]      Alcoholics anonymous (AA): www.aa.org      Physical Activity: www.enrrique.nih.gov/tr5jtds      Tobacco use: www.quitwithusla.org

## 2020-08-04 NOTE — PROGRESS NOTES
"  Inna Blankenship presented for a  Medicare AWV and comprehensive Health Risk Assessment today. The following components were reviewed and updated:    · Medical history  · Family History  · Social history  · Allergies and Current Medications  · Health Risk Assessment  · Health Maintenance  · Care Team     ** See Completed Assessments for Annual Wellness Visit within the encounter summary.**         The following assessments were completed:  · Living Situation  · CAGE  · Depression Screening  · Timed Get Up and Go  · Whisper Test  · Cognitive Function Screening  ·   ·   ·   · Nutrition Screening  · ADL Screening  · PAQ Screening        Vitals:    08/04/20 0902   BP: 123/60   Pulse: (!) 59   Weight: 61.2 kg (135 lb)   Height: 5' 5" (1.651 m)     Body mass index is 22.47 kg/m².  Physical Exam  Constitutional:       Appearance: Normal appearance.   HENT:      Head: Normocephalic and atraumatic.      Nose: Nose normal.      Mouth/Throat:      Mouth: Mucous membranes are moist.   Eyes:      Extraocular Movements: Extraocular movements intact.   Neck:      Musculoskeletal: Normal range of motion.   Cardiovascular:      Rate and Rhythm: Normal rate and regular rhythm.      Heart sounds: Normal heart sounds.   Pulmonary:      Effort: Pulmonary effort is normal. No respiratory distress.      Breath sounds: Normal breath sounds.   Abdominal:      General: Bowel sounds are normal. There is no distension.      Palpations: Abdomen is soft.   Musculoskeletal: Normal range of motion.         General: No swelling.      Comments: Unsteady gait  Cane present   Skin:     General: Skin is warm and dry.   Neurological:      General: No focal deficit present.      Mental Status: She is alert and oriented to person, place, and time.   Psychiatric:         Mood and Affect: Mood normal.         Behavior: Behavior normal.               Diagnoses and health risks identified today and associated recommendations/orders:    1. Encounter for preventive " health examination  Assessment completed. Preventive measures reviewed with patient and patients daughter Cara.    2. Dependence on other enabling machines and devices  Stable, followed by PCP.    3. Other reduced mobility  Stable, followed by PCP.    4. Low-tension glaucoma of both eyes, moderate stage  Stable, followed by Optometry.    5. Meibomian gland disease, unspecified laterality  Stable, followed by Optometry.    6. Chronic diastolic congestive heart failure  Stable, followed by Cardiology.    7. Tortuous aorta  Stable, followed by Cardiology.    8. Essential hypertension  Stable, followed by PCP.    9. Other hyperlipidemia  Stable, followed by PCP.    10. History of DVT (deep vein thrombosis)  Stable, followed by Cardiology.    11. Controlled type 2 diabetes mellitus without complication, without long-term current use of insulin  Stable, followed by PCP.    12. Osteopenia, unspecified location  Stable, followed by PCP.    Provided Inna with a 5-10 year written screening schedule and personal prevention plan. Recommendations were developed using the USPSTF age appropriate recommendations. Education, counseling, and referrals were provided as needed. After Visit Summary printed and given to patient which includes a list of additional screenings\tests needed.    No follow-ups on file.    Morelia Puente NP  I offered to discuss end of life issues, including information on how to make advance directives that the patient could use to name someone who would make medical decisions on their behalf if they became too ill to make themselves.    ___Patient declined  _X_Patient is interested, I provided paper work and offered to discuss.

## 2020-08-28 ENCOUNTER — TELEPHONE (OUTPATIENT)
Dept: INTERNAL MEDICINE | Facility: CLINIC | Age: 85
End: 2020-08-28

## 2020-08-28 ENCOUNTER — HOSPITAL ENCOUNTER (INPATIENT)
Facility: OTHER | Age: 85
LOS: 2 days | Discharge: HOME HEALTH | DRG: 291 | End: 2020-08-30
Attending: EMERGENCY MEDICINE | Admitting: HOSPITALIST
Payer: MEDICARE

## 2020-08-28 DIAGNOSIS — E78.49 OTHER HYPERLIPIDEMIA: ICD-10-CM

## 2020-08-28 DIAGNOSIS — I48.20 CHRONIC ATRIAL FIBRILLATION: ICD-10-CM

## 2020-08-28 DIAGNOSIS — R06.09 DOE (DYSPNEA ON EXERTION): ICD-10-CM

## 2020-08-28 DIAGNOSIS — I10 ESSENTIAL HYPERTENSION: ICD-10-CM

## 2020-08-28 DIAGNOSIS — I50.33 ACUTE ON CHRONIC DIASTOLIC CONGESTIVE HEART FAILURE: Primary | ICD-10-CM

## 2020-08-28 DIAGNOSIS — I48.91 NEW ONSET ATRIAL FIBRILLATION: ICD-10-CM

## 2020-08-28 DIAGNOSIS — I50.9 ACUTE EXACERBATION OF CHF (CONGESTIVE HEART FAILURE): ICD-10-CM

## 2020-08-28 DIAGNOSIS — E11.9 CONTROLLED TYPE 2 DIABETES MELLITUS WITHOUT COMPLICATION, WITHOUT LONG-TERM CURRENT USE OF INSULIN: ICD-10-CM

## 2020-08-28 DIAGNOSIS — E87.6 HYPOKALEMIA: ICD-10-CM

## 2020-08-28 PROBLEM — I50.43 ACUTE ON CHRONIC COMBINED SYSTOLIC AND DIASTOLIC CONGESTIVE HEART FAILURE: Status: ACTIVE | Noted: 2020-08-28

## 2020-08-28 LAB
ALBUMIN SERPL BCP-MCNC: 3 G/DL (ref 3.5–5.2)
ALP SERPL-CCNC: 96 U/L (ref 55–135)
ALT SERPL W/O P-5'-P-CCNC: 34 U/L (ref 10–44)
ANION GAP SERPL CALC-SCNC: 12 MMOL/L (ref 8–16)
AST SERPL-CCNC: 41 U/L (ref 10–40)
BASOPHILS # BLD AUTO: 0.03 K/UL (ref 0–0.2)
BASOPHILS NFR BLD: 0.5 % (ref 0–1.9)
BILIRUB SERPL-MCNC: 0.9 MG/DL (ref 0.1–1)
BNP SERPL-MCNC: 766 PG/ML (ref 0–99)
BUN SERPL-MCNC: 16 MG/DL (ref 10–30)
CALCIUM SERPL-MCNC: 9.3 MG/DL (ref 8.7–10.5)
CHLORIDE SERPL-SCNC: 106 MMOL/L (ref 95–110)
CO2 SERPL-SCNC: 22 MMOL/L (ref 23–29)
CREAT SERPL-MCNC: 0.8 MG/DL (ref 0.5–1.4)
DIFFERENTIAL METHOD: ABNORMAL
EOSINOPHIL # BLD AUTO: 0.1 K/UL (ref 0–0.5)
EOSINOPHIL NFR BLD: 0.8 % (ref 0–8)
ERYTHROCYTE [DISTWIDTH] IN BLOOD BY AUTOMATED COUNT: 15.2 % (ref 11.5–14.5)
EST. GFR  (AFRICAN AMERICAN): >60 ML/MIN/1.73 M^2
EST. GFR  (NON AFRICAN AMERICAN): >60 ML/MIN/1.73 M^2
GLUCOSE SERPL-MCNC: 112 MG/DL (ref 70–110)
HCT VFR BLD AUTO: 34.5 % (ref 37–48.5)
HGB BLD-MCNC: 11.1 G/DL (ref 12–16)
IMM GRANULOCYTES # BLD AUTO: 0.02 K/UL (ref 0–0.04)
IMM GRANULOCYTES NFR BLD AUTO: 0.3 % (ref 0–0.5)
INR PPP: 1.4 (ref 0.8–1.2)
LYMPHOCYTES # BLD AUTO: 1.4 K/UL (ref 1–4.8)
LYMPHOCYTES NFR BLD: 23.1 % (ref 18–48)
MAGNESIUM SERPL-MCNC: 1.7 MG/DL (ref 1.6–2.6)
MCH RBC QN AUTO: 30.5 PG (ref 27–31)
MCHC RBC AUTO-ENTMCNC: 32.2 G/DL (ref 32–36)
MCV RBC AUTO: 95 FL (ref 82–98)
MONOCYTES # BLD AUTO: 0.5 K/UL (ref 0.3–1)
MONOCYTES NFR BLD: 8.8 % (ref 4–15)
NEUTROPHILS # BLD AUTO: 4.1 K/UL (ref 1.8–7.7)
NEUTROPHILS NFR BLD: 66.5 % (ref 38–73)
NRBC BLD-RTO: 0 /100 WBC
PLATELET # BLD AUTO: 170 K/UL (ref 150–350)
PMV BLD AUTO: 11.5 FL (ref 9.2–12.9)
POCT GLUCOSE: 149 MG/DL (ref 70–110)
POTASSIUM SERPL-SCNC: 3.2 MMOL/L (ref 3.5–5.1)
PROT SERPL-MCNC: 6.5 G/DL (ref 6–8.4)
PROTHROMBIN TIME: 14.6 SEC (ref 9–12.5)
RBC # BLD AUTO: 3.64 M/UL (ref 4–5.4)
SARS-COV-2 RDRP RESP QL NAA+PROBE: NEGATIVE
SODIUM SERPL-SCNC: 140 MMOL/L (ref 136–145)
TROPONIN I SERPL DL<=0.01 NG/ML-MCNC: 0.12 NG/ML (ref 0–0.03)
WBC # BLD AUTO: 6.11 K/UL (ref 3.9–12.7)

## 2020-08-28 PROCEDURE — 93010 EKG 12-LEAD: ICD-10-PCS | Mod: HCNC,,, | Performed by: INTERNAL MEDICINE

## 2020-08-28 PROCEDURE — 36415 COLL VENOUS BLD VENIPUNCTURE: CPT | Mod: HCNC

## 2020-08-28 PROCEDURE — 63600175 PHARM REV CODE 636 W HCPCS: Mod: HCNC | Performed by: EMERGENCY MEDICINE

## 2020-08-28 PROCEDURE — 99285 EMERGENCY DEPT VISIT HI MDM: CPT | Mod: 25,HCNC

## 2020-08-28 PROCEDURE — 63600175 PHARM REV CODE 636 W HCPCS: Mod: HCNC | Performed by: NURSE PRACTITIONER

## 2020-08-28 PROCEDURE — 84484 ASSAY OF TROPONIN QUANT: CPT | Mod: HCNC

## 2020-08-28 PROCEDURE — 93010 ELECTROCARDIOGRAM REPORT: CPT | Mod: HCNC,,, | Performed by: INTERNAL MEDICINE

## 2020-08-28 PROCEDURE — 93005 ELECTROCARDIOGRAM TRACING: CPT | Mod: HCNC

## 2020-08-28 PROCEDURE — 83880 ASSAY OF NATRIURETIC PEPTIDE: CPT | Mod: HCNC

## 2020-08-28 PROCEDURE — 80053 COMPREHEN METABOLIC PANEL: CPT | Mod: HCNC

## 2020-08-28 PROCEDURE — 85025 COMPLETE CBC W/AUTO DIFF WBC: CPT | Mod: HCNC

## 2020-08-28 PROCEDURE — 83735 ASSAY OF MAGNESIUM: CPT | Mod: HCNC

## 2020-08-28 PROCEDURE — 25000003 PHARM REV CODE 250: Mod: HCNC | Performed by: EMERGENCY MEDICINE

## 2020-08-28 PROCEDURE — 96374 THER/PROPH/DIAG INJ IV PUSH: CPT | Mod: HCNC

## 2020-08-28 PROCEDURE — 25000003 PHARM REV CODE 250: Mod: HCNC | Performed by: NURSE PRACTITIONER

## 2020-08-28 PROCEDURE — U0002 COVID-19 LAB TEST NON-CDC: HCPCS | Mod: HCNC

## 2020-08-28 PROCEDURE — 94761 N-INVAS EAR/PLS OXIMETRY MLT: CPT | Mod: HCNC

## 2020-08-28 PROCEDURE — 83036 HEMOGLOBIN GLYCOSYLATED A1C: CPT | Mod: HCNC

## 2020-08-28 PROCEDURE — 99223 1ST HOSP IP/OBS HIGH 75: CPT | Mod: HCNC,AI,, | Performed by: NURSE PRACTITIONER

## 2020-08-28 PROCEDURE — 11000001 HC ACUTE MED/SURG PRIVATE ROOM: Mod: HCNC

## 2020-08-28 PROCEDURE — 99223 PR INITIAL HOSPITAL CARE,LEVL III: ICD-10-PCS | Mod: HCNC,AI,, | Performed by: NURSE PRACTITIONER

## 2020-08-28 PROCEDURE — 85610 PROTHROMBIN TIME: CPT | Mod: HCNC

## 2020-08-28 RX ORDER — IBUPROFEN 200 MG
24 TABLET ORAL
Status: DISCONTINUED | OUTPATIENT
Start: 2020-08-28 | End: 2020-08-30 | Stop reason: HOSPADM

## 2020-08-28 RX ORDER — BRIMONIDINE TARTRATE AND TIMOLOL MALEATE 2; 5 MG/ML; MG/ML
1 SOLUTION OPHTHALMIC 2 TIMES DAILY
Status: DISCONTINUED | OUTPATIENT
Start: 2020-08-28 | End: 2020-08-29

## 2020-08-28 RX ORDER — FUROSEMIDE 10 MG/ML
40 INJECTION INTRAMUSCULAR; INTRAVENOUS
Status: DISCONTINUED | OUTPATIENT
Start: 2020-08-28 | End: 2020-08-30 | Stop reason: HOSPADM

## 2020-08-28 RX ORDER — LATANOPROST 50 UG/ML
1 SOLUTION/ DROPS OPHTHALMIC NIGHTLY
Status: DISCONTINUED | OUTPATIENT
Start: 2020-08-28 | End: 2020-08-30 | Stop reason: HOSPADM

## 2020-08-28 RX ORDER — GLUCAGON 1 MG
1 KIT INJECTION
Status: DISCONTINUED | OUTPATIENT
Start: 2020-08-28 | End: 2020-08-30 | Stop reason: HOSPADM

## 2020-08-28 RX ORDER — ESCITALOPRAM OXALATE 10 MG/1
10 TABLET ORAL DAILY
Status: DISCONTINUED | OUTPATIENT
Start: 2020-08-29 | End: 2020-08-30 | Stop reason: HOSPADM

## 2020-08-28 RX ORDER — IBUPROFEN 200 MG
16 TABLET ORAL
Status: DISCONTINUED | OUTPATIENT
Start: 2020-08-28 | End: 2020-08-30 | Stop reason: HOSPADM

## 2020-08-28 RX ORDER — BUSPIRONE HYDROCHLORIDE 5 MG/1
5 TABLET ORAL 2 TIMES DAILY
Status: DISCONTINUED | OUTPATIENT
Start: 2020-08-28 | End: 2020-08-30 | Stop reason: HOSPADM

## 2020-08-28 RX ORDER — SODIUM CHLORIDE 0.9 % (FLUSH) 0.9 %
10 SYRINGE (ML) INJECTION
Status: DISCONTINUED | OUTPATIENT
Start: 2020-08-28 | End: 2020-08-30 | Stop reason: HOSPADM

## 2020-08-28 RX ORDER — ATORVASTATIN CALCIUM 20 MG/1
20 TABLET, FILM COATED ORAL DAILY
Status: DISCONTINUED | OUTPATIENT
Start: 2020-08-29 | End: 2020-08-30 | Stop reason: HOSPADM

## 2020-08-28 RX ORDER — AMOXICILLIN 250 MG
1 CAPSULE ORAL 2 TIMES DAILY
Status: DISCONTINUED | OUTPATIENT
Start: 2020-08-28 | End: 2020-08-30 | Stop reason: HOSPADM

## 2020-08-28 RX ORDER — ONDANSETRON 4 MG/1
4 TABLET, ORALLY DISINTEGRATING ORAL EVERY 8 HOURS PRN
Status: DISCONTINUED | OUTPATIENT
Start: 2020-08-28 | End: 2020-08-30 | Stop reason: HOSPADM

## 2020-08-28 RX ORDER — INSULIN ASPART 100 [IU]/ML
0-5 INJECTION, SOLUTION INTRAVENOUS; SUBCUTANEOUS
Status: DISCONTINUED | OUTPATIENT
Start: 2020-08-28 | End: 2020-08-30 | Stop reason: HOSPADM

## 2020-08-28 RX ORDER — FUROSEMIDE 10 MG/ML
40 INJECTION INTRAMUSCULAR; INTRAVENOUS
Status: COMPLETED | OUTPATIENT
Start: 2020-08-28 | End: 2020-08-28

## 2020-08-28 RX ORDER — OLMESARTAN MEDOXOMIL 20 MG/1
20 TABLET ORAL DAILY
Status: DISCONTINUED | OUTPATIENT
Start: 2020-08-29 | End: 2020-08-30 | Stop reason: HOSPADM

## 2020-08-28 RX ADMIN — FUROSEMIDE 40 MG: 10 INJECTION, SOLUTION INTRAMUSCULAR; INTRAVENOUS at 06:08

## 2020-08-28 RX ADMIN — DOCUSATE SODIUM 50 MG AND SENNOSIDES 8.6 MG 1 TABLET: 8.6; 5 TABLET, FILM COATED ORAL at 10:08

## 2020-08-28 RX ADMIN — FUROSEMIDE 40 MG: 10 INJECTION, SOLUTION INTRAMUSCULAR; INTRAVENOUS at 10:08

## 2020-08-28 RX ADMIN — BUSPIRONE HYDROCHLORIDE 5 MG: 5 TABLET ORAL at 10:08

## 2020-08-28 RX ADMIN — LATANOPROST 1 DROP: 50 SOLUTION OPHTHALMIC at 10:08

## 2020-08-28 RX ADMIN — APIXABAN 5 MG: 2.5 TABLET, FILM COATED ORAL at 06:08

## 2020-08-28 NOTE — ED NOTES
Pt to ED with c/o worsening SOB x 1-2 days. Pt reports reports SOB at rest and with activity. Pt in A.fib in ED. Pt poor historian. Pt states she has history of A.fib. Pt denies CP, n/v/d, fever, chills. AAOX4. RR even, unlabored. Pt speaking in clear, complete sentences without pause. NAD noted. Pt attached to cardiac monitor, pulse ox and BP cycled. Will continue to monitor.

## 2020-08-28 NOTE — TELEPHONE ENCOUNTER
Daughter stated her mother is breathing fast. Stated the pt's  is on hospice. Daughter stated pt didn't want to go to the ER. Also stated pt was giving buspirone and lexapro for her anxiety and doesn't know if those should be increased during this time when pt is dealing with her  who is on hospice.

## 2020-08-28 NOTE — HPI
From H&P by Adrian Shaffer NP:  The patient is a 89 y.o. female with a past medical history of HTN, DM, CKD, CHF, and DVT on Eliquis who presents with complaints of increasing dyspnea on exertion over the last several months with corresponding chest pain.  Her shortness of breath worsened this morning and has lasted longer than usual.  She denies cough or wheezing. She has decreased appetite at baseline with no recent changes.  On initial workup, the patient's BNP is elevated to 766 with a slightly elevated troponin.  She will be admitted for acute exacerbation of congestive heart failure.

## 2020-08-28 NOTE — TELEPHONE ENCOUNTER
Please let daughter know that Dr. Rodriguze is out today. I recommend that she be seen whether it is in emergency room or urgent care for these symptoms including shortness of breath. Playing around with those two medications is likely not going to change anything in the short term. Think it would be best for her to be evaluated urgently if these symptoms are still persisting.

## 2020-08-28 NOTE — ED PROVIDER NOTES
Encounter Date: 8/28/2020    SCRIBE #1 NOTE: I, Kay Mcintyre, am scribing for, and in the presence of, Dr. Rausch.       History     Chief Complaint   Patient presents with    Shortness of Breath     Shortness of breath that started 2 days ago. O2 sat in the low 90s on room air. Denies any other symptoms.     Time seen by provider: 5:00 PM    This is a 89 y.o. female with hx of HTN, DM, CKD, CHF, and DVT on Eliquis who presents via EMS with complaint of intermittent shortness of breath for several months. She reports associated chest tightness when she is short of breath. No leg swelling. Episodes are exacerbated by exertion and movement and are alleviated by rest and sitting still.  She has also had some episodic palpitations with activity that resolve after a few minutes. Her shortness of breath worsened this morning and has lasted longer than usual. EMS reports SaO2 in the low 90s on room air. She denies cough or wheezing. She has decreased appetite at baseline with no recent changes. She denies fever. She is compliant with her medications. This is the extent of the patient's complaints at this time.    The history is provided by the patient, the EMS personnel and medical records.     Review of patient's allergies indicates:  No Known Allergies  Past Medical History:   Diagnosis Date    Arthritis     CHF (congestive heart failure) 12/26/2018    Chronic kidney disease, stage III (moderate) 9/11/2015    Colon adenoma     Diabetes mellitus     diet controlled    Glaucoma     Hyperlipemia     Hypertension     Osteopenia     Type 2 diabetes mellitus     Type II or unspecified type diabetes mellitus without mention of complication, not stated as uncontrolled      Past Surgical History:   Procedure Laterality Date    APPENDECTOMY      CATARACT EXTRACTION W/  INTRAOCULAR LENS IMPLANT Right 03/15/2006        CATARACT EXTRACTION W/  INTRAOCULAR LENS IMPLANT Left 09/27/2006         COLONOSCOPY W/ POLYPECTOMY      EYE SURGERY      HYSTERECTOMY       Family History   Problem Relation Age of Onset    Heart attack Mother     Heart disease Mother     No Known Problems Father     Cancer Sister     Hypertension Daughter     Asthma Daughter     Allergies Daughter     Amblyopia Neg Hx     Blindness Neg Hx     Cataracts Neg Hx     Diabetes Neg Hx     Glaucoma Neg Hx     Macular degeneration Neg Hx     Retinal detachment Neg Hx     Strabismus Neg Hx     Stroke Neg Hx     Thyroid disease Neg Hx      Social History     Tobacco Use    Smoking status: Never Smoker    Smokeless tobacco: Never Used   Substance Use Topics    Alcohol use: Yes     Comment: wine, rarely    Drug use: No     Review of Systems   Constitutional: Positive for appetite change (chronic, no change from baseline). Negative for fever.   HENT: Negative for sore throat.    Eyes: Negative for visual disturbance.   Respiratory: Positive for shortness of breath. Negative for cough and wheezing.    Cardiovascular: Positive for chest pain. Negative for leg swelling.   Gastrointestinal: Negative for nausea.   Genitourinary: Negative for dysuria.   Musculoskeletal: Negative for back pain.   Skin: Negative for rash.   Neurological: Negative for weakness.       Physical Exam     Initial Vitals [08/28/20 1639]   BP Pulse Resp Temp SpO2   (!) 147/92 95 18 98.4 °F (36.9 °C) 100 %      MAP       --         Physical Exam    Nursing note and vitals reviewed.  Constitutional: She appears well-developed and well-nourished. She is not diaphoretic. No distress.   HENT:   Head: Normocephalic and atraumatic.   Eyes: EOM are normal. Pupils are equal, round, and reactive to light.   Neck: Normal range of motion. Neck supple.   Cardiovascular: Normal rate and normal heart sounds. An irregularly irregular rhythm present.  Exam reveals no gallop and no friction rub.    No murmur heard.  Pulmonary/Chest: No respiratory distress. She has  decreased breath sounds (left base). She has no wheezes. She exhibits no tenderness.   Abdominal: Soft. She exhibits no distension. There is no abdominal tenderness. There is no rebound.   Musculoskeletal: Normal range of motion. No tenderness or edema.      Comments: No lower extremity edema.   Neurological: She is alert and oriented to person, place, and time. She has normal strength. No cranial nerve deficit.   Skin: Skin is warm and dry.   Psychiatric: She has a normal mood and affect.         ED Course   Procedures  Labs Reviewed   CBC W/ AUTO DIFFERENTIAL - Abnormal; Notable for the following components:       Result Value    RBC 3.64 (*)     Hemoglobin 11.1 (*)     Hematocrit 34.5 (*)     RDW 15.2 (*)     All other components within normal limits   COMPREHENSIVE METABOLIC PANEL - Abnormal; Notable for the following components:    Potassium 3.2 (*)     CO2 22 (*)     Glucose 112 (*)     Albumin 3.0 (*)     AST 41 (*)     All other components within normal limits   B-TYPE NATRIURETIC PEPTIDE - Abnormal; Notable for the following components:     (*)     All other components within normal limits   TROPONIN I - Abnormal; Notable for the following components:    Troponin I 0.123 (*)     All other components within normal limits   PROTIME-INR - Abnormal; Notable for the following components:    Prothrombin Time 14.6 (*)     INR 1.4 (*)     All other components within normal limits   SARS-COV-2 RNA AMPLIFICATION, QUAL   URINALYSIS, REFLEX TO URINE CULTURE   HEMOGLOBIN A1C   MAGNESIUM     EKG Readings: (Independently Interpreted)   A-fib at rate of 79. No acute ischemia. Compared to previous EKG.       Imaging Results          X-Ray Chest AP Portable (Final result)  Result time 08/28/20 17:38:46    Final result by Misa Crockett MD (08/28/20 17:38:46)                 Impression:      As above.      Electronically signed by: Misa Crockett MD  Date:    08/28/2020  Time:    17:38             Narrative:     EXAMINATION:  XR CHEST AP PORTABLE    CLINICAL HISTORY:  Other forms of dyspnea    TECHNIQUE:  Single frontal view of the chest was performed.    COMPARISON:  March 2019.    FINDINGS:  Heart is enlarged but stable in size.  Lungs are symmetrically expanded.  There is mild increased interstitial attenuation which may reflect chronic change versus mild interstitial edema.  Consolidation/atelectasis seen at the left lung base.  This could represent possible aspiration or developing pneumonia in the right clinical setting.  No evidence of pneumothorax or large pleural effusion.                                 Medical Decision Making:   History:   Old Medical Records: I decided to obtain old medical records.  Initial Assessment:       89-year-old female with history of HTN, CKD, diastolic CHF, DVT on Eliquis presents with progressive CURRY for the past few weeks.  Patient states she is asymptomatic at rest, but with any activity she feels more SOB than usual, with associated chest tightness and occasional palpitations.  Symptoms resolved after few minutes of resting.  She states today SOB was worse than usual.  She is compliant with all medications including Lasix 20 mg and Eliquis for history of DVT.  She denies any associated lower extremity edema, cough, fevers.   On exam patient with irregularly irregular rhythm with normal rate.  Lung exam with slight decrease in breath sounds on left base, but no wheezing.  She has normal O2 sat at rest.  Per review of her last note from her cardiologist Dr. Shin, she has diastolic CHF with most recent admission for this in December 2018, but no known history of AFib.  Concern for CHF exacerbation, new onset AFib, ACS, less likely pneumonia.      Initial labs with elevated BNP and troponin above previous levels when admitted for CHF in December 2018.  EKG with no sign of ischemia, and patient with no chest pain to suggest elevated troponin represents ACS or NSTEMI.  Chest x-ray  concerning for interstitial edema, with possible consolidation or atelectasis at left lung base; no productive cough or fever to suggest pneumonia.  COVID-19 swab negative.  Given new onset AFib and diastolic CHF exacerbation, will admit for inpatient diuresis and cardiology evaluation of AFib.  She is currently on ARB and Norvasc for hypertension, would consider adding beta-blocker for rate control, though while in ED patient's heart rate in AFib ranging from 70-90 with normal BP.  She is already on Eliquis for DVT, so will continue this, and give her nightly dose now.  Will treat with IV Lasix for CHF exacerbation and admit to hospitalist in stable condition.      Independently Interpreted Test(s):   I have ordered and independently interpreted EKG Reading(s) - see prior notes  Clinical Tests:   Lab Tests: Ordered and Reviewed  Radiological Study: Ordered and Reviewed  Medical Tests: Ordered and Reviewed            Scribe Attestation:   Scribe #1: I performed the above scribed service and the documentation accurately describes the services I performed. I attest to the accuracy of the note.    Attending Attestation:           Physician Attestation for Scribe:  Physician Attestation Statement for Scribe #1: I, Dr. Rausch, reviewed documentation, as scribed by Kay Mcintyre in my presence, and it is both accurate and complete.                               Clinical Impression:     1. Acute on chronic diastolic congestive heart failure    2. CURRY (dyspnea on exertion)    3. New onset atrial fibrillation    4. Acute exacerbation of CHF (congestive heart failure)              ED Disposition Condition    Admit                           Pierre Rausch MD  08/28/20 2387

## 2020-08-28 NOTE — ED NOTES
Patient rounding complete. Comfort and positioning addressed. Toileting needs addressed. Pain (0/10). Pt remains attached to pulse ox, cardiac monitor, BP cycled. Bed rails up x2, bed locked and at lowest position, call remains at beside within reach of patient, instructed on use. AAOx4. RR even and unlabored. Pt updated on plan of care. Pt verbalized understanding. Will continue to monitor.

## 2020-08-28 NOTE — TELEPHONE ENCOUNTER
----- Message from Tony Coy sent at 8/28/2020 11:59 AM CDT -----  Contact: Daughter, [phone]  Would like to receive medical advice.  Symptoms: SOB & vomiting    How long has the patient had these symptoms:  yesterday morning      Would they like a call back or a response via [facility]:  Call back     Additional information:  Please call to advise.

## 2020-08-29 PROBLEM — I48.20 CHRONIC ATRIAL FIBRILLATION: Status: ACTIVE | Noted: 2020-08-29

## 2020-08-29 PROBLEM — E87.6 HYPOKALEMIA: Status: ACTIVE | Noted: 2020-08-29

## 2020-08-29 LAB
ANION GAP SERPL CALC-SCNC: 10 MMOL/L (ref 8–16)
ASCENDING AORTA: 3.03 CM
AV INDEX (PROSTH): 0.69
AV MEAN GRADIENT: 5 MMHG
AV PEAK GRADIENT: 11 MMHG
AV REGURGITATION PRESSURE HALF TIME: 450 MS
AV VALVE AREA: 2.38 CM2
AV VELOCITY RATIO: 0.67
BILIRUB UR QL STRIP: NEGATIVE
BSA FOR ECHO PROCEDURE: 1.56 M2
BUN SERPL-MCNC: 13 MG/DL (ref 10–30)
CALCIUM SERPL-MCNC: 9 MG/DL (ref 8.7–10.5)
CHLORIDE SERPL-SCNC: 104 MMOL/L (ref 95–110)
CLARITY UR: CLEAR
CO2 SERPL-SCNC: 26 MMOL/L (ref 23–29)
COLOR UR: YELLOW
CREAT SERPL-MCNC: 0.8 MG/DL (ref 0.5–1.4)
CV ECHO LV RWT: 0.51 CM
DOP CALC AO PEAK VEL: 1.64 M/S
DOP CALC AO VTI: 28.63 CM
DOP CALC LVOT AREA: 3.4 CM2
DOP CALC LVOT DIAMETER: 2.09 CM
DOP CALC LVOT PEAK VEL: 1.1 M/S
DOP CALC LVOT STROKE VOLUME: 68.13 CM3
DOP CALCLVOT PEAK VEL VTI: 19.87 CM
E WAVE DECELERATION TIME: 121.54 MSEC
E/A RATIO: 2.97
E/E' RATIO: 15.64 M/S
ECHO LV POSTERIOR WALL: 1.07 CM (ref 0.6–1.1)
EST. GFR  (AFRICAN AMERICAN): >60 ML/MIN/1.73 M^2
EST. GFR  (NON AFRICAN AMERICAN): >60 ML/MIN/1.73 M^2
ESTIMATED AVG GLUCOSE: 120 MG/DL (ref 68–131)
FRACTIONAL SHORTENING: 29 % (ref 28–44)
GLUCOSE SERPL-MCNC: 98 MG/DL (ref 70–110)
GLUCOSE UR QL STRIP: NEGATIVE
HBA1C MFR BLD HPLC: 5.8 % (ref 4–5.6)
HGB UR QL STRIP: NEGATIVE
INTERVENTRICULAR SEPTUM: 1.05 CM (ref 0.6–1.1)
IVRT: 108.47 MSEC
KETONES UR QL STRIP: NEGATIVE
LA MAJOR: 6.82 CM
LA MINOR: 6.82 CM
LA WIDTH: 4.96 CM
LEFT ATRIUM SIZE: 3.94 CM
LEFT ATRIUM VOLUME INDEX MOD: 65 ML/M2
LEFT ATRIUM VOLUME INDEX: 72.2 ML/M2
LEFT ATRIUM VOLUME MOD: 102 CM3
LEFT ATRIUM VOLUME: 113.29 CM3
LEFT INTERNAL DIMENSION IN SYSTOLE: 2.99 CM (ref 2.1–4)
LEFT VENTRICLE DIASTOLIC VOLUME INDEX: 51 ML/M2
LEFT VENTRICLE DIASTOLIC VOLUME: 80.01 ML
LEFT VENTRICLE MASS INDEX: 96 G/M2
LEFT VENTRICLE SYSTOLIC VOLUME INDEX: 22.2 ML/M2
LEFT VENTRICLE SYSTOLIC VOLUME: 34.81 ML
LEFT VENTRICULAR INTERNAL DIMENSION IN DIASTOLE: 4.23 CM (ref 3.5–6)
LEFT VENTRICULAR MASS: 150.66 G
LEUKOCYTE ESTERASE UR QL STRIP: NEGATIVE
LV LATERAL E/E' RATIO: 12.29 M/S
LV SEPTAL E/E' RATIO: 21.5 M/S
MV PEAK A VEL: 0.29 M/S
MV PEAK E VEL: 0.86 M/S
MV STENOSIS PRESSURE HALF TIME: 35.25 MS
MV VALVE AREA P 1/2 METHOD: 6.24 CM2
NITRITE UR QL STRIP: NEGATIVE
PH UR STRIP: 7 [PH] (ref 5–8)
PISA MRMAX VEL: 0.03 M/S
PISA TR MAX VEL: 3.68 M/S
POCT GLUCOSE: 112 MG/DL (ref 70–110)
POCT GLUCOSE: 117 MG/DL (ref 70–110)
POCT GLUCOSE: 93 MG/DL (ref 70–110)
POTASSIUM SERPL-SCNC: 2.9 MMOL/L (ref 3.5–5.1)
PROT UR QL STRIP: NEGATIVE
PULM VEIN S/D RATIO: 1.88
PV PEAK D VEL: 0.26 M/S
PV PEAK S VEL: 0.49 M/S
PV PEAK VELOCITY: 0.66 CM/S
RA MAJOR: 5.62 CM
RA PRESSURE: 8 MMHG
RA WIDTH: 4.44 CM
RIGHT VENTRICULAR END-DIASTOLIC DIMENSION: 3.91 CM
SINUS: 2.89 CM
SODIUM SERPL-SCNC: 140 MMOL/L (ref 136–145)
SP GR UR STRIP: 1.01 (ref 1–1.03)
STJ: 2.71 CM
TDI LATERAL: 0.07 M/S
TDI SEPTAL: 0.04 M/S
TDI: 0.06 M/S
TR MAX PG: 54 MMHG
TRICUSPID ANNULAR PLANE SYSTOLIC EXCURSION: 2.3 CM
TROPONIN I SERPL DL<=0.01 NG/ML-MCNC: 0.11 NG/ML (ref 0–0.03)
TROPONIN I SERPL DL<=0.01 NG/ML-MCNC: 0.12 NG/ML (ref 0–0.03)
TV REST PULMONARY ARTERY PRESSURE: 62 MMHG
URN SPEC COLLECT METH UR: NORMAL
UROBILINOGEN UR STRIP-ACNC: NEGATIVE EU/DL

## 2020-08-29 PROCEDURE — 25000003 PHARM REV CODE 250: Mod: HCNC | Performed by: HOSPITALIST

## 2020-08-29 PROCEDURE — 63600175 PHARM REV CODE 636 W HCPCS: Mod: HCNC | Performed by: NURSE PRACTITIONER

## 2020-08-29 PROCEDURE — 99223 PR INITIAL HOSPITAL CARE,LEVL III: ICD-10-PCS | Mod: HCNC,25,, | Performed by: INTERNAL MEDICINE

## 2020-08-29 PROCEDURE — 94761 N-INVAS EAR/PLS OXIMETRY MLT: CPT | Mod: HCNC

## 2020-08-29 PROCEDURE — 36415 COLL VENOUS BLD VENIPUNCTURE: CPT | Mod: HCNC

## 2020-08-29 PROCEDURE — 84484 ASSAY OF TROPONIN QUANT: CPT | Mod: HCNC

## 2020-08-29 PROCEDURE — 11000001 HC ACUTE MED/SURG PRIVATE ROOM: Mod: HCNC

## 2020-08-29 PROCEDURE — 99233 PR SUBSEQUENT HOSPITAL CARE,LEVL III: ICD-10-PCS | Mod: HCNC,,, | Performed by: HOSPITALIST

## 2020-08-29 PROCEDURE — 81003 URINALYSIS AUTO W/O SCOPE: CPT | Mod: HCNC

## 2020-08-29 PROCEDURE — 25000003 PHARM REV CODE 250: Mod: HCNC | Performed by: NURSE PRACTITIONER

## 2020-08-29 PROCEDURE — 84484 ASSAY OF TROPONIN QUANT: CPT | Mod: 91,HCNC

## 2020-08-29 PROCEDURE — 80048 BASIC METABOLIC PNL TOTAL CA: CPT | Mod: HCNC

## 2020-08-29 PROCEDURE — 99223 1ST HOSP IP/OBS HIGH 75: CPT | Mod: HCNC,25,, | Performed by: INTERNAL MEDICINE

## 2020-08-29 PROCEDURE — 99233 SBSQ HOSP IP/OBS HIGH 50: CPT | Mod: HCNC,,, | Performed by: HOSPITALIST

## 2020-08-29 RX ORDER — BRIMONIDINE TARTRATE 1.5 MG/ML
1 SOLUTION/ DROPS OPHTHALMIC EVERY 8 HOURS
Status: DISCONTINUED | OUTPATIENT
Start: 2020-08-29 | End: 2020-08-30 | Stop reason: HOSPADM

## 2020-08-29 RX ORDER — POTASSIUM CHLORIDE 20 MEQ/1
40 TABLET, EXTENDED RELEASE ORAL EVERY 4 HOURS
Status: COMPLETED | OUTPATIENT
Start: 2020-08-29 | End: 2020-08-30

## 2020-08-29 RX ORDER — TIMOLOL MALEATE 5 MG/ML
1 SOLUTION/ DROPS OPHTHALMIC 2 TIMES DAILY
Status: DISCONTINUED | OUTPATIENT
Start: 2020-08-29 | End: 2020-08-30 | Stop reason: HOSPADM

## 2020-08-29 RX ADMIN — APIXABAN 5 MG: 2.5 TABLET, FILM COATED ORAL at 08:08

## 2020-08-29 RX ADMIN — FUROSEMIDE 40 MG: 10 INJECTION, SOLUTION INTRAMUSCULAR; INTRAVENOUS at 08:08

## 2020-08-29 RX ADMIN — BUSPIRONE HYDROCHLORIDE 5 MG: 5 TABLET ORAL at 08:08

## 2020-08-29 RX ADMIN — LATANOPROST 1 DROP: 50 SOLUTION OPHTHALMIC at 08:08

## 2020-08-29 RX ADMIN — ATORVASTATIN CALCIUM 20 MG: 20 TABLET, FILM COATED ORAL at 08:08

## 2020-08-29 RX ADMIN — POTASSIUM CHLORIDE 40 MEQ: 1500 TABLET, EXTENDED RELEASE ORAL at 09:08

## 2020-08-29 RX ADMIN — DOCUSATE SODIUM 50 MG AND SENNOSIDES 8.6 MG 1 TABLET: 8.6; 5 TABLET, FILM COATED ORAL at 08:08

## 2020-08-29 RX ADMIN — TIMOLOL MALEATE 1 DROP: 5 SOLUTION/ DROPS OPHTHALMIC at 08:08

## 2020-08-29 RX ADMIN — ESCITALOPRAM OXALATE 10 MG: 10 TABLET ORAL at 08:08

## 2020-08-29 RX ADMIN — BRIMONIDINE TARTRATE 1 DROP: 1.5 SOLUTION OPHTHALMIC at 09:08

## 2020-08-29 RX ADMIN — FUROSEMIDE 40 MG: 10 INJECTION, SOLUTION INTRAMUSCULAR; INTRAVENOUS at 09:08

## 2020-08-29 RX ADMIN — OLMESARTAN MEDOXOMIL 20 MG: 20 TABLET, FILM COATED ORAL at 08:08

## 2020-08-29 NOTE — H&P
[facility] Hospital Medicine  History & Physical    Patient Name: Inna Blankenship  MRN: [id number]  Admission Date: 8/28/2020  Attending Physician: Po Green MD   Primary Care Provider: Stacy Rodriguez MD         Patient information was obtained from patient, past medical records and ER records.     Subjective:     Principal Problem:Acute on chronic combined systolic and diastolic congestive heart failure    Chief Complaint:   Chief Complaint   Patient presents with    Shortness of Breath     Shortness of breath that started 2 days ago. O2 sat in the low 90s on room air. Denies any other symptoms.        HPI: The patient is a 89 y.o. female with a past medical history of HTN, DM, CKD, CHF, and DVT on Eliquis who presents with complaints of increasing dyspnea on exertion over the last several months with corresponding chest pain.  Her shortness of breath worsened this morning and has lasted longer than usual.  She denies cough or wheezing. She has decreased appetite at baseline with no recent changes.  On initial workup, the patient's BNP is elevated to 766 with a slightly elevated troponin.  She will be admitted for acute exacerbation of congestive heart failure.    Past Medical History:   Diagnosis Date    Arthritis     CHF (congestive heart failure) 12/26/2018    Chronic kidney disease, stage III (moderate) 9/11/2015    Colon adenoma     Diabetes mellitus     diet controlled    Glaucoma     Hyperlipemia     Hypertension     Osteopenia     Type 2 diabetes mellitus     Type II or unspecified type diabetes mellitus without mention of complication, not stated as uncontrolled        Past Surgical History:   Procedure Laterality Date    APPENDECTOMY      CATARACT EXTRACTION W/  INTRAOCULAR LENS IMPLANT Right 03/15/2006        CATARACT EXTRACTION W/  INTRAOCULAR LENS IMPLANT Left 09/27/2006        COLONOSCOPY W/ POLYPECTOMY      EYE SURGERY      HYSTERECTOMY          Review of patient's allergies indicates:  No Known Allergies    No current facility-administered medications on file prior to encounter.      Current Outpatient Medications on File Prior to Encounter   Medication Sig    atorvastatin (LIPITOR) 20 MG tablet TAKE 1 TABLET EVERY DAY    busPIRone (BUSPAR) 5 MG Tab TAKE 1 TABLET (5 MG TOTAL) BY MOUTH 2 (TWO) TIMES DAILY.    ELIQUIS 5 mg Tab TAKE 1 TABLET TWICE DAILY    escitalopram oxalate (LEXAPRO) 10 MG tablet TAKE 1 TABLET (10 MG TOTAL) BY MOUTH ONCE DAILY.    olmesartan (BENICAR) 20 MG tablet TAKE 1 TABLET EVERY DAY    amLODIPine (NORVASC) 10 MG tablet Take 1 tablet (10 mg total) by mouth once daily. (Patient not taking: Reported on 8/4/2020)    brimonidine-timolol (COMBIGAN) 0.2-0.5 % Drop Place 1 drop into both eyes 2 (two) times daily. Disp 10 mls for 1 month    CALCIUM CARBONATE (RICU-RFE-675 ORAL) Take 1 tablet by mouth Daily. 1  Oral Every day    FLUZONE HIGH-DOSE 2019-20, PF, 180 mcg/0.5 mL Syrg TO BE ADMINISTERED BY PHARMACIST FOR IMMUNIZATION    furosemide (LASIX) 20 MG tablet Take 1 tablet (20 mg total) by mouth once daily.    latanoprost 0.005 % ophthalmic solution Place 1 drop into both eyes every evening.    multivit with minerals/lutein (MULTIVITAMIN 50 PLUS ORAL) Take 1 tablet by mouth once daily.     [DISCONTINUED] timolol maleate 0.5% (TIMOPTIC-XR) 0.5 % SolG Place 1 drop into both eyes once daily.     Family History     Problem Relation (Age of Onset)    Allergies Daughter    Asthma Daughter    Cancer Sister    Heart attack Mother    Heart disease Mother    Hypertension Daughter    No Known Problems Father        Tobacco Use    Smoking status: Never Smoker    Smokeless tobacco: Never Used   Substance and Sexual Activity    Alcohol use: Yes     Comment: wine, rarely    Drug use: No    Sexual activity: Not Currently     Review of Systems   Constitutional: Positive for activity change, appetite change and fatigue. Negative for  fever.   HENT: Negative for congestion, ear pain, rhinorrhea and sinus pressure.    Eyes: Negative for pain and discharge.   Respiratory: Positive for cough and shortness of breath. Negative for chest tightness and wheezing.    Cardiovascular: Positive for leg swelling. Negative for chest pain.   Gastrointestinal: Negative for abdominal distention, abdominal pain, diarrhea, nausea and vomiting.   Endocrine: Negative for cold intolerance and heat intolerance.   Genitourinary: Negative for difficulty urinating, flank pain, frequency, hematuria and urgency.   Musculoskeletal: Positive for myalgias. Negative for arthralgias and joint swelling.   Allergic/Immunologic: Negative for environmental allergies and food allergies.   Neurological: Negative for dizziness, weakness, light-headedness and headaches.   Hematological: Does not bruise/bleed easily.   Psychiatric/Behavioral: Negative for agitation, behavioral problems and decreased concentration.     Objective:     Vital Signs (Most Recent):  Temp: 98.4 °F (36.9 °C) (08/28/20 1956)  Pulse: 85 (08/28/20 2053)  Resp: (!) 22 (08/28/20 1956)  BP: (!) 150/73 (08/28/20 1956)  SpO2: 97 % (08/28/20 1956) Vital Signs (24h Range):  Temp:  [98.4 °F (36.9 °C)] 98.4 °F (36.9 °C)  Pulse:  [73-95] 85  Resp:  [17-22] 22  SpO2:  [95 %-100 %] 97 %  BP: (139-179)/(66-92) 150/73     Weight: 65.3 kg (144 lb)  Body mass index is 24.72 kg/m².    Physical Exam  Constitutional:       Appearance: Normal appearance. She is well-developed.   HENT:      Head: Normocephalic.   Eyes:      General:         Right eye: No discharge.         Left eye: No discharge.      Conjunctiva/sclera: Conjunctivae normal.   Neck:      Musculoskeletal: Normal range of motion and neck supple.   Cardiovascular:      Rate and Rhythm: Normal rate. Rhythm regularly irregular.      Pulses:           Radial pulses are 2+ on the right side and 2+ on the left side.      Heart sounds: Normal heart sounds.   Pulmonary:       Effort: Pulmonary effort is normal. Tachypnea present. No respiratory distress.      Breath sounds: Examination of the right-lower field reveals rales. Examination of the left-lower field reveals rales. Rales present.   Abdominal:      General: Bowel sounds are decreased. There is no distension.      Palpations: Abdomen is soft.      Tenderness: There is no abdominal tenderness.   Musculoskeletal: Normal range of motion.      Right lower le+ Pitting Edema present.      Left lower le+ Pitting Edema present.   Skin:     General: Skin is warm and dry.      Capillary Refill: Capillary refill takes 2 to 3 seconds.   Neurological:      Mental Status: She is alert.      GCS: GCS eye subscore is 4. GCS verbal subscore is 4. GCS motor subscore is 6.   Psychiatric:         Mood and Affect: Mood normal.         Speech: Speech normal.         Behavior: Behavior normal.             Significant Labs:   CBC:   Recent Labs   Lab 20  1723   WBC 6.11   HGB 11.1*   HCT 34.5*        CMP:   Recent Labs   Lab 20  1723      K 3.2*      CO2 22*   *   BUN 16   CREATININE 0.8   CALCIUM 9.3   PROT 6.5   ALBUMIN 3.0*   BILITOT 0.9   ALKPHOS 96   AST 41*   ALT 34   ANIONGAP 12   EGFRNONAA >60       Significant Imaging: I have reviewed all pertinent imaging results/findings within the past 24 hours.    Assessment/Plan:     * Acute on chronic combined systolic and diastolic congestive heart failure  BNP- 766, troponin- .123    Lasix IV BID  Echo  Cardiology consult  Trend Troponin      Controlled type 2 diabetes mellitus without complication, without long-term current use of insulin  BG- 112    A1c pending  Low dose SSI  BG AC/HS        Other hyperlipidemia  Continue Lipitor      Essential hypertension  Hypertensive currently    Continue benicar  Lasix IV BID        VTE Risk Mitigation (From admission, onward)         Ordered     apixaban tablet 5 mg  2 times daily      20     Place  sequential compression device  Until discontinued      08/28/20 2000     IP VTE HIGH RISK PATIENT  Once      08/28/20 1903     Place sequential compression device  Until discontinued      08/28/20 1903     Reason for No Pharmacological VTE Prophylaxis  Once     Question:  Reasons:  Answer:  Already adequately anticoagulated on oral Anticoagulants    08/28/20 1903                   Adrian Shaffer NP  Department of Hospital Medicine   [facility]

## 2020-08-29 NOTE — NURSING
Spoke with Renetta Welsh, MikeyD. Patient's brimonidine/timolol drops not available in pharmacy. Patient did not bring these eye drops in with her other home medications. Spoke to patient's daughter Cara Saini, on the phone, she will bring in patient's eye drops tomorrow when she comes to visit her mother.

## 2020-08-29 NOTE — CONSULTS
Cardiology Consult  8/29/2020  3:34 PM    Attending Cardiologist: Sb Pacheco M.D.  Primary Care Provider: Stacy Rodriguez MD  Chief Complaint/Reason For Consultation:  CHF      Problem list  Patient Active Problem List   Diagnosis    Essential hypertension    Other hyperlipidemia    Osteopenia    Low tension glaucoma, moderate stage - Both Eyes    MGD (meibomian gland disease) - Both Eyes    History of DVT (deep vein thrombosis)    Chronic diastolic congestive heart failure    Controlled type 2 diabetes mellitus without complication, without long-term current use of insulin    Tortuous aorta    Acute on chronic combined systolic and diastolic congestive heart failure       CC:  SOB    HPI:  Inna Blankenship is a 89 y.o.year-old female HTN, DM, CKD, CHF, and DVT on Eliquis who presents with complaints of increasing dyspnea on exertion over the last several months with corresponding chest pain.  Her shortness of breath worsened this morning and has lasted longer than usual.  She denies cough or wheezing. She has decreased appetite at baseline with no recent changes. W/u thus far showed BNP of 766, troponin slightly elevated from CHF and afib with controlled rate on EKG.  She [place] Maxine [place] [facility] Today, she reports feeling much better regarding her shortness of breath.    Medications  Current Facility-Administered Medications   Medication Dose Route Frequency Provider Last Rate Last Dose    apixaban tablet 5 mg  5 mg Oral BID Adrian Shaffer NP   5 mg at 08/29/20 0857    atorvastatin tablet 20 mg  20 mg Oral Daily Adrian Shaffer NP   20 mg at 08/29/20 0856    brimonidine-timoloL 0.2-0.5 % ophthalmic solution 1 drop  1 drop Both Eyes BID Adrian Shaffer NP        busPIRone tablet 5 mg  5 mg Oral BID Adrian Shaffer NP   5 mg at 08/29/20 0857    dextrose 50% injection 12.5 g  12.5 g Intravenous PRN Adrian Shaffer NP        dextrose 50% injection 25 g  25 g  Intravenous PRN Adrian Shaffer NP        escitalopram oxalate tablet 10 mg  10 mg Oral Daily Adrian Shaffer NP   10 mg at 08/29/20 0856    furosemide injection 40 mg  40 mg Intravenous Q12H Adrian Shaffer NP   40 mg at 08/29/20 0900    glucagon (human recombinant) injection 1 mg  1 mg Intramuscular PRN Adrian Shaffer NP        glucose chewable tablet 16 g  16 g Oral PRN Adrian Shaffer NP        glucose chewable tablet 24 g  24 g Oral PRN Adrian Shaffer NP        insulin aspart U-100 pen 0-5 Units  0-5 Units Subcutaneous QID (AC + HS) PRN Adrian Shaffer NP        latanoprost 0.005 % ophthalmic solution 1 drop  1 drop Both Eyes QHS Adrian Shaffer NP   1 drop at 08/28/20 2200    olmesartan tablet 20 mg  20 mg Oral Daily Adrian Shaffer NP   20 mg at 08/29/20 0857    ondansetron disintegrating tablet 4 mg  4 mg Oral Q8H PRN Adrian Shaffer NP        senna-docusate 8.6-50 mg per tablet 1 tablet  1 tablet Oral BID Adrian Shaffer NP   1 tablet at 08/29/20 0857    sodium chloride 0.9% flush 10 mL  10 mL Intravenous PRN Pierre Rausch MD        sodium chloride 0.9% flush 10 mL  10 mL Intravenous PRN Adrian Shaffer NP          Prior to Admission medications    Medication Sig Start Date End Date Taking? Authorizing Provider   atorvastatin (LIPITOR) 20 MG tablet TAKE 1 TABLET EVERY DAY 8/6/20  Yes Espinoza Nogueira MD   busPIRone (BUSPAR) 5 MG Tab TAKE 1 TABLET (5 MG TOTAL) BY MOUTH 2 (TWO) TIMES DAILY. 8/6/20 8/6/21 Yes Espinoza Nogueira MD   ELIQUIS 5 mg Tab TAKE 1 TABLET TWICE DAILY 6/19/20  Yes Stacy Rodriguez MD   escitalopram oxalate (LEXAPRO) 10 MG tablet TAKE 1 TABLET (10 MG TOTAL) BY MOUTH ONCE DAILY. 8/6/20 8/6/21 Yes Espinoza Nogueira MD   olmesartan (BENICAR) 20 MG tablet TAKE 1 TABLET EVERY DAY 8/4/20  Yes Stacy Rodriguez MD   amLODIPine (NORVASC) 10 MG tablet Take 1 tablet (10 mg total) by mouth once daily.  Patient not taking: Reported  on 8/4/2020 1/15/20   Stacy Rodriguez MD   brimonidine-timolol (COMBIGAN) 0.2-0.5 % Drop Place 1 drop into both eyes 2 (two) times daily. Disp 10 mls for 1 month 10/18/19   Lupis Lundberg MD   CALCIUM CARBONATE ([id number] ORAL) Take 1 tablet by mouth Daily. 1  Oral Every day    Historical Provider, MD   FLUZONE HIGH-DOSE 2019-20, PF, 180 mcg/0.5 mL Syrg TO BE ADMINISTERED BY PHARMACIST FOR IMMUNIZATION 11/5/19   Historical Provider, MD   furosemide (LASIX) 20 MG tablet Take 1 tablet (20 mg total) by mouth once daily. 1/15/20 8/4/20  Stacy Rodriguez MD   latanoprost 0.005 % ophthalmic solution Place 1 drop into both eyes every evening. 10/18/19   Lupis Lundberg MD   multivit with minerals/lutein (MULTIVITAMIN 50 PLUS ORAL) Take 1 tablet by mouth once daily.     Historical Provider, MD   timolol maleate 0.5% (TIMOPTIC-XR) 0.5 % SolG Place 1 drop into both eyes once daily. 4/12/13 7/17/18  Lupis Lundberg MD         History  Past Medical History:   Diagnosis Date    Arthritis     CHF (congestive heart failure) 12/26/2018    Chronic kidney disease, stage III (moderate) 9/11/2015    Colon adenoma     Diabetes mellitus     diet controlled    Glaucoma     Hyperlipemia     Hypertension     Osteopenia     Type 2 diabetes mellitus     Type II or unspecified type diabetes mellitus without mention of complication, not stated as uncontrolled      Past Surgical History:   Procedure Laterality Date    APPENDECTOMY      CATARACT EXTRACTION W/  INTRAOCULAR LENS IMPLANT Right 03/15/2006        CATARACT EXTRACTION W/  INTRAOCULAR LENS IMPLANT Left 09/27/2006        COLONOSCOPY W/ POLYPECTOMY      EYE SURGERY      HYSTERECTOMY       Social History     Socioeconomic History    Marital status:      Spouse name: Not on file    Number of children: Not on file    Years of education: Not on file    Highest education level: Not on file   Occupational History    Not  on file   Social Needs    Financial resource strain: Not on file    Food insecurity     Worry: Not on file     Inability: Not on file    Transportation needs     Medical: Not on file     Non-medical: Not on file   Tobacco Use    Smoking status: Never Smoker    Smokeless tobacco: Never Used   Substance and Sexual Activity    Alcohol use: Yes     Comment: wine, rarely    Drug use: No    Sexual activity: Not Currently   Lifestyle    Physical activity     Days per week: Not on file     Minutes per session: Not on file    Stress: Not on file   Relationships    Social connections     Talks on phone: Not on file     Gets together: Not on file     Attends Buddhist service: Not on file     Active member of club or organization: Not on file     Attends meetings of clubs or organizations: Not on file     Relationship status: Not on file   Other Topics Concern    Not on file   Social History Narrative    Not on file         Allergies  Review of patient's allergies indicates:  No Known Allergies      Review of Systems   Review of Systems   Constitution: Positive for decreased appetite.   Cardiovascular: Positive for chest pain, dyspnea on exertion, irregular heartbeat and leg swelling. Negative for claudication, cyanosis, near-syncope, orthopnea, palpitations, paroxysmal nocturnal dyspnea and syncope.   Respiratory: Positive for shortness of breath. Negative for cough, hemoptysis, sleep disturbances due to breathing, snoring, sputum production and wheezing.    Gastrointestinal: Negative for hematemesis, hematochezia and melena.         Physical Exam  Wt Readings from Last 1 Encounters:   08/29/20 54.4 kg (119 lb 14.9 oz)     BP Readings from Last 3 Encounters:   08/29/20 132/74   08/29/20 (!) 152/76   08/04/20 123/60     Pulse Readings from Last 1 Encounters:   08/29/20 70     Body mass index is 20.59 kg/m².    Physical Exam   Constitutional: She appears well-developed and well-nourished.   Cardiovascular: Normal  rate, S1 normal and S2 normal. An irregularly irregular rhythm present.   Pulses:       Carotid pulses are 2+ on the right side and 2+ on the left side.       Radial pulses are 2+ on the right side and 2+ on the left side.        Dorsalis pedis pulses are 2+ on the right side and 2+ on the left side.   Pulmonary/Chest: Breath sounds normal. No respiratory distress. She has no wheezes. She has no rales. She exhibits no tenderness.   Abdominal: Bowel sounds are normal.   Musculoskeletal:         General: No edema.   Neurological: She is alert.   Skin: Skin is warm and dry.   Psychiatric: She has a normal mood and affect. Her behavior is normal.                 Assessment and Plan:    1.  Acute on chronic diastolic heart failure  Echo noted  Responding well to IV lasix.  Could change to po lasix tomorrow.    2. Afib, controlled HR  Not requiring any rate controlling meds.  On apixaban    3.  HTN  Stable.  Continue benicar    4. DVT  On apixaban.    Thank you for allowing me to participate in the care of Inna Blankenship.         Sb Pacheco MD, F.A.C.C, F.S.C.A.I.

## 2020-08-29 NOTE — ED NOTES
Pt care assumed. Report received from BRODERICK Ang. Patient rounding complete. Pt reports pain 0/10. Restroom and comfort needs addressed. Pt updated on POC. Call light in reach. Will continue to monitor.  Awaiting urine sample.

## 2020-08-29 NOTE — PLAN OF CARE
Initial Discharge Planning Assessment    Patient is alert and oriented with no communication barriers.      Prior to admission patient was independent with ADLs.   Patient denies the use of HH but uses the following DME walker , wheelchair and cane       Patients PCP is correct on the face sheet      Patient choice pharmacy is     [facility]   [address]  [facility] [address]  Phone: [phone] Fax: [phone]  And [facility]   Patients family will transport home at discharge.       CM needs TBD.         08/29/20 1446   Discharge Assessment   Assessment Type Discharge Planning Assessment   Confirmed/corrected address and phone number on facesheet? Yes   Assessment information obtained from? Patient   Communicated expected length of stay with patient/caregiver yes   Prior to hospitilization cognitive status: Alert/Oriented   Prior to hospitalization functional status: Independent;Needs Assistance   Current cognitive status: Alert/Oriented   Current Functional Status: Independent;Needs Assistance   Lives With significant other   Able to Return to Prior Arrangements yes   Is patient able to care for self after discharge? Yes   Readmission Within the Last 30 Days no previous admission in last 30 days   Patient currently being followed by outpatient case management? No   Patient currently receives any other outside agency services? No   Equipment Currently Used at Home walker, rolling;wheelchair;cane, straight   Do you have any problems affording any of your prescribed medications? No   Is the patient taking medications as prescribed? yes   Does the patient have transportation home? Yes   Transportation Anticipated family or friend will provide   Dialysis Name and Scheduled days na   Does the patient receive services at the [facility]? No   Discharge Plan A Home   Discharge Plan B Home with family   DME Needed Upon Discharge  none   Patient/Family in Agreement with Plan yes

## 2020-08-29 NOTE — PLAN OF CARE
POC reviewed with patient. Patient is AAOx4, forgetful at time. VSS on RA. Patient has no c/o pain, nausea, or SOB. Tele monitor in place, patient is in A fib. Patient is incontinent of urine, purewick in place. Displaced x1 overnight, incontinence care provided. Patient turns and repositions self in bed. Patient resting comfortably between care. Purposeful rounding completed. Bed alarm on. Patient instructed to call staff with any needs.

## 2020-08-29 NOTE — PLAN OF CARE
VSS on room air.  Daugther at bedside all day today.  Pt toleration diet well. Pur wich in place to continuous wall suction.  Bed in low position with call light in reach.  Voices no complaints at present. Instructed to call with any needs.

## 2020-08-29 NOTE — NURSING
Patient arrived to room 324 at approximately 1945. VSS on RA. AAOx4, oriented to room and call light. Called daughter Cara Saini and made her aware of patient's admission- [phone]. Patient has clothing from home and home prescription medications. Home medications sent to security. Patient is incontinent of urine. Purewick placed. Bed lowered and locked. Bed alarm on and audible. Patient instructed to call staff with assistance.

## 2020-08-29 NOTE — SUBJECTIVE & OBJECTIVE
Past Medical History:   Diagnosis Date    Arthritis     CHF (congestive heart failure) 12/26/2018    Chronic kidney disease, stage III (moderate) 9/11/2015    Colon adenoma     Diabetes mellitus     diet controlled    Glaucoma     Hyperlipemia     Hypertension     Osteopenia     Type 2 diabetes mellitus     Type II or unspecified type diabetes mellitus without mention of complication, not stated as uncontrolled        Past Surgical History:   Procedure Laterality Date    APPENDECTOMY      CATARACT EXTRACTION W/  INTRAOCULAR LENS IMPLANT Right 03/15/2006        CATARACT EXTRACTION W/  INTRAOCULAR LENS IMPLANT Left 09/27/2006        COLONOSCOPY W/ POLYPECTOMY      EYE SURGERY      HYSTERECTOMY         Review of patient's allergies indicates:  No Known Allergies    No current facility-administered medications on file prior to encounter.      Current Outpatient Medications on File Prior to Encounter   Medication Sig    atorvastatin (LIPITOR) 20 MG tablet TAKE 1 TABLET EVERY DAY    busPIRone (BUSPAR) 5 MG Tab TAKE 1 TABLET (5 MG TOTAL) BY MOUTH 2 (TWO) TIMES DAILY.    ELIQUIS 5 mg Tab TAKE 1 TABLET TWICE DAILY    escitalopram oxalate (LEXAPRO) 10 MG tablet TAKE 1 TABLET (10 MG TOTAL) BY MOUTH ONCE DAILY.    olmesartan (BENICAR) 20 MG tablet TAKE 1 TABLET EVERY DAY    amLODIPine (NORVASC) 10 MG tablet Take 1 tablet (10 mg total) by mouth once daily. (Patient not taking: Reported on 8/4/2020)    brimonidine-timolol (COMBIGAN) 0.2-0.5 % Drop Place 1 drop into both eyes 2 (two) times daily. Disp 10 mls for 1 month    CALCIUM CARBONATE ([id number]-[id number]-404 ORAL) Take 1 tablet by mouth Daily. 1  Oral Every day    FLUZONE HIGH-DOSE 2019-20, PF, 180 mcg/0.5 mL Syrg TO BE ADMINISTERED BY PHARMACIST FOR IMMUNIZATION    furosemide (LASIX) 20 MG tablet Take 1 tablet (20 mg total) by mouth once daily.    latanoprost 0.005 % ophthalmic solution Place 1 drop into both eyes every evening.     multivit with minerals/lutein (MULTIVITAMIN 50 PLUS ORAL) Take 1 tablet by mouth once daily.     [DISCONTINUED] timolol maleate 0.5% (TIMOPTIC-XR) 0.5 % SolG Place 1 drop into both eyes once daily.     Family History     Problem Relation (Age of Onset)    Allergies Daughter    Asthma Daughter    Cancer Sister    Heart attack Mother    Heart disease Mother    Hypertension Daughter    No Known Problems Father        Tobacco Use    Smoking status: Never Smoker    Smokeless tobacco: Never Used   Substance and Sexual Activity    Alcohol use: Yes     Comment: wine, rarely    Drug use: No    Sexual activity: Not Currently     Review of Systems   Constitutional: Positive for activity change, appetite change and fatigue. Negative for fever.   HENT: Negative for congestion, ear pain, rhinorrhea and sinus pressure.    Eyes: Negative for pain and discharge.   Respiratory: Positive for cough and shortness of breath. Negative for chest tightness and wheezing.    Cardiovascular: Positive for leg swelling. Negative for chest pain.   Gastrointestinal: Negative for abdominal distention, abdominal pain, diarrhea, nausea and vomiting.   Endocrine: Negative for cold intolerance and heat intolerance.   Genitourinary: Negative for difficulty urinating, flank pain, frequency, hematuria and urgency.   Musculoskeletal: Positive for myalgias. Negative for arthralgias and joint swelling.   Allergic/Immunologic: Negative for environmental allergies and food allergies.   Neurological: Negative for dizziness, weakness, light-headedness and headaches.   Hematological: Does not bruise/bleed easily.   Psychiatric/Behavioral: Negative for agitation, behavioral problems and decreased concentration.     Objective:     Vital Signs (Most Recent):  Temp: 98.4 °F (36.9 °C) (08/28/20 1956)  Pulse: 85 (08/28/20 2053)  Resp: (!) 22 (08/28/20 1956)  BP: (!) 150/73 (08/28/20 1956)  SpO2: 97 % (08/28/20 1956) Vital Signs (24h Range):  Temp:  [98.4 °F (36.9  °C)] 98.4 °F (36.9 °C)  Pulse:  [73-95] 85  Resp:  [17-22] 22  SpO2:  [95 %-100 %] 97 %  BP: (139-179)/(66-92) 150/73     Weight: 65.3 kg (144 lb)  Body mass index is 24.72 kg/m².    Physical Exam  Constitutional:       Appearance: Normal appearance. She is well-developed.   HENT:      Head: Normocephalic.   Eyes:      General:         Right eye: No discharge.         Left eye: No discharge.      Conjunctiva/sclera: Conjunctivae normal.   Neck:      Musculoskeletal: Normal range of motion and neck supple.   Cardiovascular:      Rate and Rhythm: Normal rate. Rhythm regularly irregular.      Pulses:           Radial pulses are 2+ on the right side and 2+ on the left side.      Heart sounds: Normal heart sounds.   Pulmonary:      Effort: Pulmonary effort is normal. Tachypnea present. No respiratory distress.      Breath sounds: Examination of the right-lower field reveals rales. Examination of the left-lower field reveals rales. Rales present.   Abdominal:      General: Bowel sounds are decreased. There is no distension.      Palpations: Abdomen is soft.      Tenderness: There is no abdominal tenderness.   Musculoskeletal: Normal range of motion.      Right lower le+ Pitting Edema present.      Left lower le+ Pitting Edema present.   Skin:     General: Skin is warm and dry.      Capillary Refill: Capillary refill takes 2 to 3 seconds.   Neurological:      Mental Status: She is alert.      GCS: GCS eye subscore is 4. GCS verbal subscore is 4. GCS motor subscore is 6.   Psychiatric:         Mood and Affect: Mood normal.         Speech: Speech normal.         Behavior: Behavior normal.             Significant Labs:   CBC:   Recent Labs   Lab 20  1723   WBC 6.11   HGB 11.1*   HCT 34.5*        CMP:   Recent Labs   Lab 20  1723      K 3.2*      CO2 22*   *   BUN 16   CREATININE 0.8   CALCIUM 9.3   PROT 6.5   ALBUMIN 3.0*   BILITOT 0.9   ALKPHOS 96   AST 41*   ALT 34   ANIONGAP 12    EGFRNONAA >60       Significant Imaging: I have reviewed all pertinent imaging results/findings within the past 24 hours.

## 2020-08-29 NOTE — CONSULTS
Food & Nutrition  Education    Diet Education: Fluid restriction and low slat diet  Time Spent: Remote  Learners: Patient      Nutrition Education provided with handouts:   Low salt Diet  Restricting Fluids    Comments:  RD working remotely for weekend coverage. Fluid restriction and low sodium diet educations added to clinical references to be printed at discharge. RD on site to f/u.      Follow-Up: yes    Please Re-consult as needed        Thanks!

## 2020-08-30 VITALS
OXYGEN SATURATION: 92 % | HEIGHT: 64 IN | TEMPERATURE: 97 F | WEIGHT: 119.94 LBS | RESPIRATION RATE: 17 BRPM | HEART RATE: 65 BPM | DIASTOLIC BLOOD PRESSURE: 56 MMHG | SYSTOLIC BLOOD PRESSURE: 136 MMHG | BODY MASS INDEX: 20.47 KG/M2

## 2020-08-30 LAB
ANION GAP SERPL CALC-SCNC: 13 MMOL/L (ref 8–16)
BUN SERPL-MCNC: 19 MG/DL (ref 10–30)
CALCIUM SERPL-MCNC: 9 MG/DL (ref 8.7–10.5)
CHLORIDE SERPL-SCNC: 103 MMOL/L (ref 95–110)
CO2 SERPL-SCNC: 22 MMOL/L (ref 23–29)
CREAT SERPL-MCNC: 0.8 MG/DL (ref 0.5–1.4)
EST. GFR  (AFRICAN AMERICAN): >60 ML/MIN/1.73 M^2
EST. GFR  (NON AFRICAN AMERICAN): >60 ML/MIN/1.73 M^2
GLUCOSE SERPL-MCNC: 96 MG/DL (ref 70–110)
POCT GLUCOSE: 115 MG/DL (ref 70–110)
POCT GLUCOSE: 117 MG/DL (ref 70–110)
POCT GLUCOSE: 128 MG/DL (ref 70–110)
POTASSIUM SERPL-SCNC: 3.8 MMOL/L (ref 3.5–5.1)
SODIUM SERPL-SCNC: 138 MMOL/L (ref 136–145)

## 2020-08-30 PROCEDURE — 25000003 PHARM REV CODE 250: Mod: HCNC | Performed by: HOSPITALIST

## 2020-08-30 PROCEDURE — 80048 BASIC METABOLIC PNL TOTAL CA: CPT | Mod: HCNC

## 2020-08-30 PROCEDURE — 25000003 PHARM REV CODE 250: Mod: HCNC | Performed by: NURSE PRACTITIONER

## 2020-08-30 PROCEDURE — 99232 SBSQ HOSP IP/OBS MODERATE 35: CPT | Mod: HCNC,,, | Performed by: INTERNAL MEDICINE

## 2020-08-30 PROCEDURE — 99238 HOSP IP/OBS DSCHRG MGMT 30/<: CPT | Mod: HCNC,,, | Performed by: HOSPITALIST

## 2020-08-30 PROCEDURE — 63600175 PHARM REV CODE 636 W HCPCS: Mod: HCNC | Performed by: NURSE PRACTITIONER

## 2020-08-30 PROCEDURE — 36415 COLL VENOUS BLD VENIPUNCTURE: CPT | Mod: HCNC

## 2020-08-30 PROCEDURE — 99232 PR SUBSEQUENT HOSPITAL CARE,LEVL II: ICD-10-PCS | Mod: HCNC,,, | Performed by: INTERNAL MEDICINE

## 2020-08-30 PROCEDURE — 99238 PR HOSPITAL DISCHARGE DAY,<30 MIN: ICD-10-PCS | Mod: HCNC,,, | Performed by: HOSPITALIST

## 2020-08-30 RX ADMIN — TIMOLOL MALEATE 1 DROP: 5 SOLUTION/ DROPS OPHTHALMIC at 08:08

## 2020-08-30 RX ADMIN — ATORVASTATIN CALCIUM 20 MG: 20 TABLET, FILM COATED ORAL at 08:08

## 2020-08-30 RX ADMIN — ESCITALOPRAM OXALATE 10 MG: 10 TABLET ORAL at 08:08

## 2020-08-30 RX ADMIN — BRIMONIDINE TARTRATE 1 DROP: 1.5 SOLUTION OPHTHALMIC at 06:08

## 2020-08-30 RX ADMIN — POTASSIUM CHLORIDE 40 MEQ: 1500 TABLET, EXTENDED RELEASE ORAL at 02:08

## 2020-08-30 RX ADMIN — FUROSEMIDE 40 MG: 10 INJECTION, SOLUTION INTRAMUSCULAR; INTRAVENOUS at 09:08

## 2020-08-30 RX ADMIN — OLMESARTAN MEDOXOMIL 20 MG: 20 TABLET, FILM COATED ORAL at 08:08

## 2020-08-30 RX ADMIN — BUSPIRONE HYDROCHLORIDE 5 MG: 5 TABLET ORAL at 08:08

## 2020-08-30 RX ADMIN — APIXABAN 5 MG: 2.5 TABLET, FILM COATED ORAL at 08:08

## 2020-08-30 NOTE — HOSPITAL COURSE
Patient was admitted on the heart failure protocol and diuresed.  Echo showed atrial fibrillation with controlled rate, multiple valvular abnormalities, EF 56%.  She was feeling well after being diuresed overnight and was seen by the cardiologist, who recommended continuing IV diuresis until the following day.  She was transitioned back to oral Lasix on 8/30 and discharged home when she was feeling comfortable.  Home health was ordered.

## 2020-08-30 NOTE — DISCHARGE SUMMARY
[facility] Hospital Medicine  Discharge Summary      Patient Name: Inna Blankenship  MRN: [id number]  Admission Date: 8/28/2020  Hospital Length of Stay: 2 days  Discharge Date and Time: 8/30/2020  1:58 PM  Attending Physician: No att. providers found   Discharging Provider: Krissy Torres MD  Primary Care Provider: Stacy Rodriguez MD      HPI:   From H&P by Pamela Shaffer NP:  The patient is a 89 y.o. female with a past medical history of HTN, DM, CKD, CHF, and DVT on Eliquis who presents with complaints of increasing dyspnea on exertion over the last several months with corresponding chest pain.  Her shortness of breath worsened this morning and has lasted longer than usual.  She denies cough or wheezing. She has decreased appetite at baseline with no recent changes.  On initial workup, the patient's BNP is elevated to 766 with a slightly elevated troponin.  She will be admitted for acute exacerbation of congestive heart failure.          Hospital Course:   Patient was admitted on the heart failure protocol and diuresed.  Echo showed atrial fibrillation with controlled rate, multiple valvular abnormalities, EF 56%.  She was feeling well after being diuresed overnight and was seen by the cardiologist, who recommended continuing IV diuresis until the following day.  She was transitioned back to oral Lasix on 8/30 and discharged home when she was feeling comfortable.  Home health was ordered.     Consults:   Consults (From admission, onward)        Status Ordering Provider     Inpatient consult to Cardiology  Once     Provider:  Neeru Elliott MD    Completed PAMELA SHAFFER     Inpatient consult to Registered Dietitian/Nutritionist  Once     Provider:  (Not yet assigned)    PAMELA Tamez     Inpatient consult to Social Work/Case Management  Once     Provider:  (Not yet assigned)    PAMELA Tamez            Final Active Diagnoses:    Diagnosis Date Noted POA     PRINCIPAL PROBLEM:  Acute on chronic diastolic congestive heart failure [I50.33] 08/28/2020 Yes    Chronic atrial fibrillation [I48.20] 08/29/2020 Yes    Hypokalemia [E87.6] 08/29/2020 Yes    Controlled type 2 diabetes mellitus without complication, without long-term current use of insulin [E11.9] 08/04/2020 Yes    Other hyperlipidemia [E78.49] 07/29/2012 Yes    Essential hypertension [I10] 07/27/2012 Yes      Problems Resolved During this Admission:       Discharged Condition: stable    Disposition: Home or Self Care    Follow Up:  Follow-up Information     Stacy Rodriguez MD.    Specialty: Internal Medicine  Why: Follow up in 1-2 weeks  Contact information:  [address]  [place] [id number] [phone]             Gucci Shin MD.    Specialty: Cardiology  Why: Follow up for the next available appointment  Contact information:  [address]  [place] [id number] [phone]             [facility] [place].    Specialty: Home Health Services  Why: they will call you to schedule first appointment for home health  Contact information:  [address]  [address]  [place] [address] [phone]                 Patient Instructions:      Diet Cardiac     Activity as tolerated       Medications:  Reconciled Home Medications:      Medication List      CONTINUE taking these medications    atorvastatin 20 MG tablet  Commonly known as: LIPITOR  TAKE 1 TABLET EVERY DAY     brimonidine-timoloL 0.2-0.5 % Drop  Commonly known as: COMBIGAN  Place 1 drop into both eyes 2 (two) times daily. Disp 10 mls for 1 month     busPIRone 5 MG Tab  Commonly known as: BUSPAR  TAKE 1 TABLET (5 MG TOTAL) BY MOUTH 2 (TWO) TIMES DAILY.     ELIQUIS 5 mg Tab  Generic drug: apixaban  TAKE 1 TABLET TWICE DAILY     escitalopram oxalate 10 MG tablet  Commonly known as: LEXAPRO  TAKE 1 TABLET (10 MG TOTAL) BY MOUTH ONCE DAILY.     FLUZONE HIGH-DOSE 2019-20 (PF) 180 mcg/0.5 mL Syrg  Generic drug: flu vacc  [id number](65yr up)PF  TO BE ADMINISTERED BY PHARMACIST FOR IMMUNIZATION     furosemide 20 MG tablet  Commonly known as: LASIX  Take 1 tablet (20 mg total) by mouth once daily.     latanoprost 0.005 % ophthalmic solution  Place 1 drop into both eyes every evening.     MULTIVITAMIN 50 PLUS ORAL  Take 1 tablet by mouth once daily.     olmesartan 20 MG tablet  Commonly known as: BENICAR  TAKE 1 TABLET EVERY DAY     [id number] ORAL  Take 1 tablet by mouth Daily. 1  Oral Every day        ASK your doctor about these medications    timolol maleate 0.5% 0.5 % Solg  Commonly known as: TIMOPTIC-XE  Place 1 drop into both eyes once daily.            Time spent on the discharge of patient: <30 minutes  Patient was seen and examined on the date of discharge and determined to be suitable for discharge.         Krissy Torres MD  Department of Hospital Medicine  [facility]

## 2020-08-30 NOTE — NURSING
"AAOx4. NAD noted. Patient denies pain/discomfort at this time. Vitals stable - available in flow sheet. Pure wick in place. Cardiac monitor in place. Glucose monitoring continued. Patient states " I usually use a walker/wheel chair at home. Bed locked/lowest position, side rails up x 2, call light within reach. Patient encouraged to call for assistance when needed. Will continue to monitor.   "

## 2020-08-30 NOTE — PROGRESS NOTES
[facility] Hospital Medicine  Progress Note    Patient Name: Inna Blankenship  MRN: [id number]  Patient Class: IP- Inpatient   Admission Date: 8/28/2020  Length of Stay: 1 days  Attending Physician: Krissy Mccall MD  Primary Care Provider: Stacy Rodriguez MD        Subjective:     Principal Problem:Acute on chronic diastolic congestive heart failure        HPI:  From H&P by Adrian Shaffer NP:  The patient is a 89 y.o. female with a past medical history of HTN, DM, CKD, CHF, and DVT on Eliquis who presents with complaints of increasing dyspnea on exertion over the last several months with corresponding chest pain.  Her shortness of breath worsened this morning and has lasted longer than usual.  She denies cough or wheezing. She has decreased appetite at baseline with no recent changes.  On initial workup, the patient's BNP is elevated to 766 with a slightly elevated troponin.  She will be admitted for acute exacerbation of congestive heart failure.    Overview/Hospital Course:  Patient was admitted on the heart failure protocol and diuresed.  Echo showed atrial fibrillation with controlled rate, multiple valvular abnormalities, EF 56%.  She was feeling well after being diuresed overnight and was seen by the cardiologist, who recommended continuing IV diuresis until the following day.    Interval History:  Shortness of breath much improved.  She has been told she should be able to resume oral Lasix tomorrow and be discharged.  She's happy about that.  Her daughter is at bedside.    Review of Systems   Constitutional: Negative for chills and fever.   Respiratory: Positive for shortness of breath. Negative for cough.    Cardiovascular: Negative for chest pain and palpitations.     Objective:     Vital Signs (Most Recent):  Temp: 97.8 °F (36.6 °C) (08/29/20 1519)  Pulse: 81 (08/29/20 1900)  Resp: 14 (08/29/20 1519)  BP: 132/74 (08/29/20 1519)  SpO2: (!) 92 % (08/29/20 1519) Vital Signs (24h  Range):  Temp:  [97.5 °F (36.4 °C)-98.4 °F (36.9 °C)] 97.8 °F (36.6 °C)  Pulse:  [] 81  Resp:  [14-22] 14  SpO2:  [92 %-97 %] 92 %  BP: (126-156)/(66-98) 132/74     Weight: 54.4 kg (119 lb 14.9 oz)  Body mass index is 20.59 kg/m².    Intake/Output Summary (Last 24 hours) at 8/29/2020 1909  Last data filed at 8/29/2020 1800  Gross per 24 hour   Intake 720 ml   Output 525 ml   Net 195 ml      Physical Exam  Constitutional:       General: She is not in acute distress.     Appearance: She is well-developed. She is not ill-appearing.      Comments: Elderly woman, mildly hard of hearing.   HENT:      Head: Normocephalic.   Eyes:      Conjunctiva/sclera: Conjunctivae normal.      Pupils: Pupils are equal, round, and reactive to light.   Neck:      Musculoskeletal: Neck supple.      Thyroid: No thyromegaly.   Cardiovascular:      Rate and Rhythm: Normal rate. Rhythm irregular.      Heart sounds: Murmur present. No friction rub. No gallop.    Pulmonary:      Effort: Pulmonary effort is normal.      Breath sounds: Normal breath sounds.   Abdominal:      General: Bowel sounds are normal. There is no distension.      Palpations: Abdomen is soft.      Tenderness: There is no abdominal tenderness.   Musculoskeletal: Normal range of motion.   Lymphadenopathy:      Cervical: No cervical adenopathy.   Skin:     General: Skin is warm and dry.      Findings: No rash.   Neurological:      Mental Status: She is alert and oriented to person, place, and time.      Comments: Strength equal and symmetric   Psychiatric:         Behavior: Behavior normal.         Thought Content: Thought content normal.         Significant Labs: All pertinent labs within the past 24 hours have been reviewed.    Significant Imaging: I have reviewed all pertinent imaging results/findings within the past 24 hours.      Assessment/Plan:      * Acute on chronic diastolic congestive heart failure  - Presented with elevated BNP and troponin, sudden onset  shortness of breath.  - New echo shows normal EF, atrial fibrillation.  - Continue IV Lasix per cardiologist.  - Troponin trending down.    Hypokalemia  - Due to Lasix  - Supplement oral potassium.  Might start spironolactone.      Chronic atrial fibrillation  - Patient on metoprolol, apixaban  - Note she is also on beta-blocker eyedrops for glaucoma.  HR controlled.      Controlled type 2 diabetes mellitus without complication, without long-term current use of insulin  - HbA1c 5.8 and BG has been normal  - Continue low dose SSI, hold oral medications.    Other hyperlipidemia  Continue Lipitor      Essential hypertension  Hypertensive currently    Continue benicar  Lasix IV BID        VTE Risk Mitigation (From admission, onward)         Ordered     apixaban tablet 5 mg  2 times daily      08/28/20 2000     Place sequential compression device  Until discontinued      08/28/20 2000     IP VTE HIGH RISK PATIENT  Once      08/28/20 1903     Place sequential compression device  Until discontinued      08/28/20 1903     Reason for No Pharmacological VTE Prophylaxis  Once     Question:  Reasons:  Answer:  Already adequately anticoagulated on oral Anticoagulants    08/28/20 1903                Discharge Planning   BEN:      Code Status: Full Code   Is the patient medically ready for discharge?:     Reason for patient still in hospital (select all that apply): Patient trending condition, Treatment and Consult recommendations  Discharge Plan A: Home                  Krissy Torres MD  Department of Hospital Medicine   [facility]

## 2020-08-30 NOTE — PLAN OF CARE
Pt states she lives at home with  (in hospice) and daughter.     Daughter requests  to resume with [facility].  Spoke with Ambika at [facility], they will begin seeing pt on Wednesday.  Referral completed in [facility].  Information added to AVS.    Family to provide transportation home.    No further DC needs from CM perspective.         08/30/20 1612   Final Note   Assessment Type Final Discharge Note   Anticipated Discharge Disposition Home-Health   What phone number can be called within the next 1-3 days to see how you are doing after discharge? [phone]   Hospital Follow Up  Appt(s) scheduled? Yes   Discharge plans and expectations educations in teach back method with documentation complete? Yes   Right Care Referral Info   Post Acute Recommendation Home-care   Post-Acute Status   Post-Acute Authorization Home Health   Home Health Status Set-up Complete

## 2020-08-30 NOTE — SUBJECTIVE & OBJECTIVE
Interval History:  Shortness of breath much improved.  She has been told she should be able to resume oral Lasix tomorrow and be discharged.  She's happy about that.  Her daughter is at bedside.    Review of Systems   Constitutional: Negative for chills and fever.   Respiratory: Positive for shortness of breath. Negative for cough.    Cardiovascular: Negative for chest pain and palpitations.     Objective:     Vital Signs (Most Recent):  Temp: 97.8 °F (36.6 °C) (08/29/20 1519)  Pulse: 81 (08/29/20 1900)  Resp: 14 (08/29/20 1519)  BP: 132/74 (08/29/20 1519)  SpO2: (!) 92 % (08/29/20 1519) Vital Signs (24h Range):  Temp:  [97.5 °F (36.4 °C)-98.4 °F (36.9 °C)] 97.8 °F (36.6 °C)  Pulse:  [] 81  Resp:  [14-22] 14  SpO2:  [92 %-97 %] 92 %  BP: (126-156)/(66-98) 132/74     Weight: 54.4 kg (119 lb 14.9 oz)  Body mass index is 20.59 kg/m².    Intake/Output Summary (Last 24 hours) at 8/29/2020 1909  Last data filed at 8/29/2020 1800  Gross per 24 hour   Intake 720 ml   Output 525 ml   Net 195 ml      Physical Exam  Constitutional:       General: She is not in acute distress.     Appearance: She is well-developed. She is not ill-appearing.      Comments: Elderly woman, mildly hard of hearing.   HENT:      Head: Normocephalic.   Eyes:      Conjunctiva/sclera: Conjunctivae normal.      Pupils: Pupils are equal, round, and reactive to light.   Neck:      Musculoskeletal: Neck supple.      Thyroid: No thyromegaly.   Cardiovascular:      Rate and Rhythm: Normal rate. Rhythm irregular.      Heart sounds: Murmur present. No friction rub. No gallop.    Pulmonary:      Effort: Pulmonary effort is normal.      Breath sounds: Normal breath sounds.   Abdominal:      General: Bowel sounds are normal. There is no distension.      Palpations: Abdomen is soft.      Tenderness: There is no abdominal tenderness.   Musculoskeletal: Normal range of motion.   Lymphadenopathy:      Cervical: No cervical adenopathy.   Skin:     General: Skin  is warm and dry.      Findings: No rash.   Neurological:      Mental Status: She is alert and oriented to person, place, and time.      Comments: Strength equal and symmetric   Psychiatric:         Behavior: Behavior normal.         Thought Content: Thought content normal.         Significant Labs: All pertinent labs within the past 24 hours have been reviewed.    Significant Imaging: I have reviewed all pertinent imaging results/findings within the past 24 hours.

## 2020-08-30 NOTE — PLAN OF CARE
[facility]    HOME HEALTH ORDERS  FACE TO FACE ENCOUNTER    Patient Name: Inna Blankenship  YOB: 1926    PCP: Stacy Rodriguez MD   PCP Address: [address] [place] [id number]  PCP Phone Number: [phone]  PCP Fax: [phone]    Encounter Date: 08/30/2020    Admit to Home Health    Diagnoses:  Active Hospital Problems    Diagnosis  POA    *Acute on chronic diastolic congestive heart failure [I50.33]  Yes    Chronic atrial fibrillation [I48.20]  Yes    Hypokalemia [E87.6]  Yes    Controlled type 2 diabetes mellitus without complication, without long-term current use of insulin [E11.9]  Yes    Other hyperlipidemia [E78.49]  Yes    Essential hypertension [I10]  Yes      Resolved Hospital Problems   No resolved problems to display.       Future Appointments   Date Time Provider Department Center   9/18/2020  8:40 AM Stacy Rodriguez MD [facility] Johnny Simental PCW     Follow-up Information     Stacy Rodriguez MD.    Specialty: Internal Medicine  Why: Follow up in 1-2 weeks  Contact information:  [address]  [facility] [id number] [phone]             Gucci Shin MD.    Specialty: Cardiology  Why: Follow up for the next available appointment  Contact information:  [address]  [facility] [id number] [phone]                     I have seen and examined this patient face to face today. My clinical findings that support the need for the home health skilled services and home bound status are the following:  Weakness/numbness causing balance and gait disturbance due to Weakness/Debility making it taxing to leave home.    Allergies:Review of patient's allergies indicates:  No Known Allergies    Diet: cardiac diet    Activities: activity as tolerated    Nursing:   SN to complete comprehensive assessment including routine vital signs. Instruct on disease process and s/s of complications to report to MD. Review/verify medication list sent home with the  patient at time of discharge  and instruct patient/caregiver as needed. Frequency may be adjusted depending on start of care date.    Notify MD if SBP > 160 or < 90; DBP > 90 or < 50; HR > 120 or < 50; Temp > 101    CONSULTS:    Physical Therapy to evaluate and treat. Evaluate for home safety and equipment needs; Establish/upgrade home exercise program. Perform / instruct on therapeutic exercises, gait training, transfer training, and Range of Motion.  Occupational Therapy to evaluate and treat. Evaluate home environment for safety and equipment needs. Perform/Instruct on transfers, ADL training, ROM, and therapeutic exercises.  Aide to provide assistance with personal care, ADLs, and vital signs.        Medications: Review discharge medications with patient and family and provide education.      Current Discharge Medication List      CONTINUE these medications which have NOT CHANGED    Details   atorvastatin (LIPITOR) 20 MG tablet TAKE 1 TABLET EVERY DAY  Qty: 90 tablet, Refills: 0      busPIRone (BUSPAR) 5 MG Tab TAKE 1 TABLET (5 MG TOTAL) BY MOUTH 2 (TWO) TIMES DAILY.  Qty: 180 tablet, Refills: 0      ELIQUIS 5 mg Tab TAKE 1 TABLET TWICE DAILY  Qty: 180 tablet, Refills: 0      escitalopram oxalate (LEXAPRO) 10 MG tablet TAKE 1 TABLET (10 MG TOTAL) BY MOUTH ONCE DAILY.  Qty: 90 tablet, Refills: 0      olmesartan (BENICAR) 20 MG tablet TAKE 1 TABLET EVERY DAY  Qty: 90 tablet, Refills: 0      brimonidine-timolol (COMBIGAN) 0.2-0.5 % Drop Place 1 drop into both eyes 2 (two) times daily. Disp 10 mls for 1 month  Qty: 30 mL, Refills: 3    Associated Diagnoses: Low-tension glaucoma of both eyes, moderate stage      CALCIUM CARBONATE (MAMB-XVR-691 ORAL) Take 1 tablet by mouth Daily. 1  Oral Every day      FLUZONE HIGH-DOSE 2019-20, PF, 180 mcg/0.5 mL Syrg TO BE ADMINISTERED BY PHARMACIST FOR IMMUNIZATION  Refills: 0      furosemide (LASIX) 20 MG tablet Take 1 tablet (20 mg total) by mouth once daily.  Qty: 90 tablet,  Refills: 1      latanoprost 0.005 % ophthalmic solution Place 1 drop into both eyes every evening.  Qty: 15 mL, Refills: 3    Associated Diagnoses: Low-tension glaucoma of both eyes, moderate stage      multivit with minerals/lutein (MULTIVITAMIN 50 PLUS ORAL) Take 1 tablet by mouth once daily.          STOP taking these medications       timolol maleate 0.5% (TIMOPTIC-XR) 0.5 % SolG Comments:   Reason for Stopping:               I certify that this patient is confined to her home and needs intermittent skilled nursing care, physical therapy and occupational therapy.

## 2020-08-30 NOTE — ASSESSMENT & PLAN NOTE
- Patient on metoprolol, apixaban  - Note she is also on beta-blocker eyedrops for glaucoma.  HR controlled.

## 2020-08-30 NOTE — PROGRESS NOTES
"    Cardiology Progress Note    8/30/2020  9:19 AM    Subjective/Interim:      No complaints.  No CP, SOB, PND.  No issues reported overnight by RN.  afib is controlled.     Objective:   24-hour Vitals:  Temp:  [97.7 °F (36.5 °C)-98.4 °F (36.9 °C)] 97.9 °F (36.6 °C)  Pulse:  [] 67  Resp:  [14-22] 22  SpO2:  [92 %-98 %] 98 %  BP: (107-152)/(51-79) 130/68    Physical Examination:  Vitals: /68 (BP Location: Right arm, Patient Position: Lying)   Pulse 67   Temp 97.9 °F (36.6 °C) (Oral)   Resp (!) 22   Ht 5' 4" (1.626 m)   Wt 54.4 kg (119 lb 14.9 oz)   SpO2 98%   Breastfeeding No   BMI 20.59 kg/m²     Physical Exam   Constitutional: She appears well-developed and well-nourished.   Cardiovascular: Normal rate, S1 normal and S2 normal. An irregularly irregular rhythm present.   Pulses:       Carotid pulses are 2+ on the right side and 2+ on the left side.       Radial pulses are 2+ on the right side and 2+ on the left side.        Dorsalis pedis pulses are 2+ on the right side and 2+ on the left side.   Pulmonary/Chest: Breath sounds normal. No respiratory distress. She has no wheezes. She has no rales. She exhibits no tenderness.   Abdominal: Bowel sounds are normal.   Musculoskeletal:         General: No edema.   Neurological: She is alert.   Skin: Skin is warm and dry.   Psychiatric: She has a normal mood and affect. Her behavior is normal.       Laboratory:  Trended Lab Data:  Recent Labs   Lab 08/28/20  1723   WBC 6.11   HGB 11.1*   HCT 34.5*          Recent Labs   Lab 08/28/20  1723 08/28/20  1953 08/29/20  0408 08/30/20  0401     --  140 138   K 3.2*  --  2.9* 3.8     --  104 103   CO2 22*  --  26 22*   BUN 16  --  13 19   *  --  98 96   CALCIUM 9.3  --  9.0 9.0   MG  --  1.7  --   --        Recent Labs   Lab 08/28/20  1723   PROT 6.5   ALBUMIN 3.0*   BILITOT 0.9   AST 41*   ALT 34   ALKPHOS 96       Recent Labs   Lab 08/28/20  1723   INR 1.4*       Cardiac:   Recent " Labs   Lab 08/28/20  1723 08/29/20  0007 08/29/20  0408   TROPONINI 0.123* 0.108* 0.120*   *  --   --        FLP:   Lab Results   Component Value Date    CHOL 191 01/15/2020    HDL 61 01/15/2020    LDLCALC 117.6 01/15/2020    TRIG 62 01/15/2020    CHOLHDL 31.9 01/15/2020     DM:   Lab Results   Component Value Date    HGBA1C 5.8 (H) 08/28/2020    HGBA1C 5.7 (H) 05/26/2020    HGBA1C 6.2 (H) 01/15/2020    LDLCALC 117.6 01/15/2020    CREATININE 0.8 08/30/2020     Thyroid:   Lab Results   Component Value Date    TSH 5.715 (H) 05/26/2020    FREET4 0.93 05/26/2020     Anemia:   Lab Results   Component Value Date    FERRITIN 593.2 (H) 06/15/2005     Urinalysis:   Lab Results   Component Value Date    LABURIN  01/29/2016     Multiple organisms isolated. None in predominance.  Repeat if    LABURIN clinically necessary. 01/29/2016    COLORU Yellow 08/29/2020    SPECGRAV 1.010 08/29/2020    NITRITE Negative 08/29/2020    KETONESU Negative 08/29/2020    UROBILINOGEN Negative 08/29/2020     @      Other Results:  EKG (my interpretation):    TELEMETRY:  afib rate 70's.    Echo:    Radiology:  X-ray Chest Ap Portable    Result Date: 8/28/2020  EXAMINATION: XR CHEST AP PORTABLE CLINICAL HISTORY: Other forms of dyspnea TECHNIQUE: Single frontal view of the chest was performed. COMPARISON: March 2019. FINDINGS: Heart is enlarged but stable in size.  Lungs are symmetrically expanded.  There is mild increased interstitial attenuation which may reflect chronic change versus mild interstitial edema.  Consolidation/atelectasis seen at the left lung base.  This could represent possible aspiration or developing pneumonia in the right clinical setting.  No evidence of pneumothorax or large pleural effusion.     As above. Electronically signed by: Misa Crockett MD Date:    08/28/2020 Time:    17:38        Current Medications:     Infusions:       Scheduled:   apixaban  5 mg Oral BID    atorvastatin  20 mg Oral Daily    brimonidine  0.15 % OPTH DROP  1 drop Both Eyes Q8H    busPIRone  5 mg Oral BID    escitalopram oxalate  10 mg Oral Daily    furosemide (LASIX) IV  40 mg Intravenous Q12H    latanoprost  1 drop Both Eyes QHS    olmesartan  20 mg Oral Daily    senna-docusate 8.6-50 mg  1 tablet Oral BID    timolol maleate 0.5%  1 drop Both Eyes BID        PRN:  dextrose 50%, dextrose 50%, glucagon (human recombinant), glucose, glucose, insulin aspart U-100, ondansetron, sodium chloride 0.9%, sodium chloride 0.9%     Assessment and Plan:     Inna Blankenship is a 89 y.o.female with    1.  Acute on chronic diastolic heart failure  Echo noted  Responded well to IV lasix.  Ok to discharge on po lasix QD today.  F/u with her cardiologist, Dr. Shin.     2. Afib, controlled HR  Not requiring any rate controlling meds.  On apixaban     3.  HTN  Stable.  Continue benicar     4. DVT  On apixaban.         Sb Pacheco MD        Disclaimer: This document was created using voice recognition software (M*Modal Fluency Direct). Although it may be edited, this document may contain errors related to incorrect recognition of the spoken word. Please call the physician if clarification is needed.

## 2020-08-30 NOTE — ASSESSMENT & PLAN NOTE
- Presented with elevated BNP and troponin, sudden onset shortness of breath.  - New echo shows normal EF, atrial fibrillation.  - Continue IV Lasix per cardiologist.  - Troponin trending down.

## 2020-08-30 NOTE — NURSING
Discharge instructions given, questions answered, pt and daughter acknowledged understanding. IV removed with catheter intact. Telemetry removed and returned to monitor tech. Pt dressed and belongings at bedside. Daughter to transport home. Transporting to the [facility] via wheelchair.

## 2020-08-31 ENCOUNTER — PATIENT OUTREACH (OUTPATIENT)
Dept: ADMINISTRATIVE | Facility: CLINIC | Age: 85
End: 2020-08-31

## 2020-08-31 DIAGNOSIS — I50.33 ACUTE ON CHRONIC DIASTOLIC (CONGESTIVE) HEART FAILURE: Primary | ICD-10-CM

## 2020-08-31 NOTE — PATIENT INSTRUCTIONS
Left- or Right- Side Congestive Heart Failure (CHF)    The heart is a large muscle. It is a pump that circulates blood throughout the body. Blood carries oxygen to all of the organs, including the brain, muscles, and skin. After your body takes the oxygen out of the blood, the blood returns to the heart. The right side of the heart collects the blood from the body and pumps it to the lungs. In the lungs, it gets fresh oxygen and gives up carbon dioxide. The oxygen-rich blood from the lungs then returns to the left side of the heart, where it is pumped back out to the rest of your body, starting the process all over.  Congestive heart failure (CHF) occurs when the heart muscle is weakened. This affects the pumping action of the heart. Heart failure can affect the right side of the heart or the left side. But heart failure may affect not only the right side of the heart or only the left side. Although it may have started on one side, it can and often eventually does affect both sides.  Right-side heart failure  When the right side of the heart is weakened, it cant handle the blood it is getting from the rest of the body. This blood returns to the heart through veins. When too much pressure builds up in the veins, fluid leaks out into the tissues. Gravity then causes that fluid to move to those parts of the body that are the lowest. So one of the first symptoms of right-side CHF can include swelling in the feet and ankles. If the condition gets worse, the swelling can even go up past the knees. Sometimes it gets so severe, the liver can get congested as well.  Left-side heart failure  When the left side of the heart is weakened, it cant handle the blood it gets from the lungs. Pressure then builds up in the veins of the lungs, causing fluid to leak into the lung tissues. This may cause CHF and pulmonary edema. This causes you to feel short of breath, weak, or dizzy. These symptoms are often worse with exertion,  such as when climbing stairs or walking up hills. Lying with your head flat is uncomfortable and can make your breathing worse. This may make sleeping difficult. You may need to use extra pillows to elevate your upper body to sleep well. The same is true when just resting during the daytime.  There are many causes of heart failure including:  · Coronary artery disease  · Past heart attack (also known as acute myocardial infarction, or AMI)  · High blood pressure  · Damaged heart valve  · Diabetes  · Obesity  · Cigarette smoking  · Alcohol abuse  Heart failure is a chronic condition. There is no cure. The purpose of medical treatment is to improve the pumping action of the heart. The main way to do this is to remove excess water from the body. A number of medicines can help reach this goal, improve symptoms, and prevent the heart from becoming weaker. Sometimes, heart failure can become so severe that a device is placed in the heart to help with pumping. Another major goal is to better treat the causes of heart failure, such as diabetes and high blood pressure, by making changes in your lifestyle and maximizing medical control when needed.  Home care  Follow these guidelines when caring for yourself at home:  · Check your weight every day. This is very important because a sudden increase in weight gain could mean worsening heart failure. Keep these things in mind:  ¨ Use the same scale every day.  ¨ Weigh yourself at the same time every day.  ¨ Make sure the scale is on a hard floor surface, not on a rug or carpet.  ¨ Keep a record of your weight every day so your healthcare provider can see it. If you are not given a log sheet for this, keep a separate journal for this purpose.   · Cut back on the amount of salt (sodium) you eat. Follow your healthcare provider's recommendation on how much salt or sodium you should have each day.  ¨ Avoid high-salt foods. These include olives, pickles, smoked meats, salted potato  chips, and most prepared foods.  ¨ Don't add salt to your food at the table. Use only small amounts of salt when cooking.  ¨ Read the labels carefully on food packages to learn how much salt or sodium is in each serving in the package. Remember, a can or package of food may contain more than 1 serving. So if you eat all the food in the package, you may be getting more salt than you think.  · Follow your healthcare provider's recommendations about how much fluid you should have. Be aware that some foods, such as soup, pudding, and juicy fruits like oranges or melons, contain liquid. You'll need to count the liquid in those foods as part of your daily fluid intake. Your provider can help you with this.  · Stop smoking.  · Cut back on how much alcohol you drink.  · Lose weight if you are overweight. The excess weight adds a lot of stress on the workload of the heart.  · Stay active. Talk with your provider about an exercise program that is safe for your heart.  · Keep your feet elevated to reduce swelling. Ask your provider about support hose as a preventive treatment for daytime leg swelling.  Besides taking your medicine as instructed, an important part of treatment is lifestyle changes. These include diet, physical activity, stopping smoking, and weight control.  Improve your diet by including more fresh foods, cutting back on how much sugar and saturated fat you eat, and eating fewer processed foods and less salt.  Follow-up care  Follow up with your healthcare provider, or as advised.  Make sure to keep any appointments that were made for you. These can help better control your congestive heart failure. You will need to follow up with your provider on a routine basis to make sure your heart failure is well managed.  If an X-ray, ECG, or other tests were done, you will be told of any new findings that may affect your care.  Call 911  Call 911 if you:  · Become severely short of breath  · Feel lightheaded, or feel  like you might pass out or faint  · Have chest pain or discomfort that is different than usual, the medicines your doctor told you to use for this don't help, or the pain lasts longer than 10 to 15 minutes  · You suddenly develop a rapid heart rate  When to seek medical advice  The following may be signs that your heart failure is getting worse. Call your healthcare provider right away if any of these happen:  · Sudden weight gain. This means 3 or more pounds in one day, or 5 or more pounds in 1 week.  · Trouble breathing not related to being active  · New or increased swelling of your legs or ankles  · Swelling or pain in your abdomen  · Breathing trouble at night. This means waking up short of breath or needing more pillows to breathe.  · Frequent coughing that doesnt go away  · Feeling much more tired than usual  Date Last Reviewed: 1/4/20  © 5641-0104 [employer]. [address] [place] [address], [place] [address]. All rights reserved. This information is not intended as a substitute for professional medical care. Always follow your healthcare professional's instructions.

## 2020-09-02 PROCEDURE — G0180 PR HOME HEALTH MD CERTIFICATION: ICD-10-PCS | Mod: ,,, | Performed by: HOSPITALIST

## 2020-09-02 PROCEDURE — G0180 MD CERTIFICATION HHA PATIENT: HCPCS | Mod: ,,, | Performed by: HOSPITALIST

## 2020-09-10 ENCOUNTER — CARE AT HOME (OUTPATIENT)
Dept: HOME HEALTH SERVICES | Facility: CLINIC | Age: 85
End: 2020-09-10
Payer: MEDICARE

## 2020-09-10 DIAGNOSIS — I50.33 ACUTE ON CHRONIC DIASTOLIC (CONGESTIVE) HEART FAILURE: ICD-10-CM

## 2020-09-10 PROCEDURE — 99495 TRANSJ CARE MGMT MOD F2F 14D: CPT | Mod: S$GLB,,, | Performed by: NURSE PRACTITIONER

## 2020-09-10 PROCEDURE — 99499 UNLISTED E&M SERVICE: CPT | Mod: S$GLB,,, | Performed by: NURSE PRACTITIONER

## 2020-09-10 PROCEDURE — 99497 PR ADVNCD CARE PLAN 30 MIN: ICD-10-PCS | Mod: 25,S$GLB,, | Performed by: NURSE PRACTITIONER

## 2020-09-10 PROCEDURE — 99495 TCM SERVICES (MODERATE COMPLEXITY): ICD-10-PCS | Mod: S$GLB,,, | Performed by: NURSE PRACTITIONER

## 2020-09-10 PROCEDURE — 99499 RISK ADDL DX/OHS AUDIT: ICD-10-PCS | Mod: S$GLB,,, | Performed by: NURSE PRACTITIONER

## 2020-09-10 PROCEDURE — 99497 ADVNCD CARE PLAN 30 MIN: CPT | Mod: 25,S$GLB,, | Performed by: NURSE PRACTITIONER

## 2020-09-15 VITALS
HEIGHT: 64 IN | WEIGHT: 119 LBS | TEMPERATURE: 97 F | BODY MASS INDEX: 20.32 KG/M2 | RESPIRATION RATE: 18 BRPM | DIASTOLIC BLOOD PRESSURE: 62 MMHG | HEART RATE: 62 BPM | SYSTOLIC BLOOD PRESSURE: 126 MMHG | OXYGEN SATURATION: 96 %

## 2020-09-15 NOTE — PROGRESS NOTES
[facility]  Transition of Care Home Visit    Visit Date: 9/10/2020  Encounter Provider: Heather Farley NP  PCP:  Stacy Rodriguez MD    PRESENTING HISTORY      Patient ID: Inna Blankenship is a 89 y.o. female.    Consult Requested By:  Dr. Stacy Rodriguez  Reason for Consult:  Hospital Follow up    Inna is being seen at home due to  physical debility that presents a taxing effort to leave the home, to mitigate high risk of hospital readmission or due to the limited availability of reliable or safe options for transportation to the point of access to the provider. Prior to treatment on this visit the chart was reviewed and patient consent was obtained.  .      Chief Complaint: Transitional Care, CHF      History of Present Illness: Ms. Inna Blankenship is a 89 y.o. female who was recently admitted to [facility].    Admission Date: 8/28/2020  Hospital Length of Stay: 2 days  Discharge Date and Time: 8/30/2020     Hospital Course:   Patient was admitted on the heart failure protocol and diuresed.  Echo showed atrial fibrillation with controlled rate, multiple valvular abnormalities, EF 56%.  She was feeling well after being diuresed overnight and was seen by the cardiologist, who recommended continuing IV diuresis until the following day.  She was transitioned back to oral Lasix on 8/30 and discharged home when she was feeling comfortable.  Home health was ordered.   ___________________________________________________________  HPI:  Inna Blankenship is a 89 year old female with a past medical history of HTN, DM, CKD, CHF and DVT in which she takes Elilquis daily. She presented to ED 8/28/2020 with a cc of increasing dyspnea on exertion with corresponding chest pain. She denies cough or wheezing. She has decreased appetite at baseline with no recent changes.  On initial workup, the patient's BNP is elevated to 766 with a slightly elevated troponin.  She will be admitted for acute exacerbation of congestive heart  failure.    See hospital course above.    She is being evaluated today for a transition of care visit. With this visit today patient is found at home, sitting up at the kitchen table. She is AAOx3. Her daughter is present who is her primary caregiver during the day. She has another family member who sits with her and her  overnight. Her  is current with [facility] hospice. She is ambulatory with a rolling walker.    [facility] RN/PT is working with patient over the last few weeks.      VSS. Denies fever, chest pain, shortness of breath, nausea, vomiting, diarrhea. Risks of environmental exposure to coronavirus discussed including: social distancing, hand hygiene, and limiting departures from the home for necessities only.  Reports understanding and willingness to comply.  All hospital discharge orders reviewed and being followed, all medications reconciled and reviewed, patient and family verbalized understanding. No other needs identified at this time.     I initiated the process of advance care planning today and explained the importance of this process to the patient and family.  I introduced the concept of advance directives to the patient, as well. The patient has not previously appointed a HCPOA. Then the patient received detailed information about the importance of designating a Health Care Power of  (HCPOA). The patient was also instructed to communicate with this person about their wishes for future healthcare, should patient become sick and lose decision-making capacity. We spoke about ACP for 30 minutes.    Attestation: Screening criteria to assess the level of the patient's risk for infection with COVID-19 as recommended by the CDC at the time of the above documented home visit concluded appropriateness to proceed. Universal precautions were maintained at all times, including provider use of 60% alcohol gel hand  immediately prior to entry and upon departing the patient's  home.    Review of Systems  Constitutional: Positive for activity change and fatigue. Negative for fever.   HENT: Negative for congestion, ear pain, rhinorrhea and sinus pressure.   Eyes: Negative for pain and discharge.   Respiratory: Negative for chest tightness and wheezing.    Cardiovascular: Positive for leg swelling. Negative for chest pain.   Gastrointestinal: Negative for abdominal distention, abdominal pain, diarrhea, nausea and vomiting.   Endocrine: Negative for cold intolerance and heat intolerance.   Genitourinary: Negative for difficulty urinating, flank pain, frequency, hematuria and urgency.   Musculoskeletal: Positive for myalgias. Negative for arthralgias and joint swelling.   Allergic/Immunologic: Negative for environmental allergies and food allergies.   Neurological: Negative for dizziness, weakness, light-headedness and headaches.   Hematological: Does not bruise/bleed easily.   Psychiatric/Behavioral: Negative for agitation, behavioral problems and decreased concentration.    Assessments:  · Environmental: Lives in single story with no steps to enter  · Functional Status: Min asst with ADLs and walker for mobility  · Safety: Fall precautions  · Nutritional: Cardiac diet  · Home Health/DME/Supplies: [facility]    PAST HISTORY:     Past Medical History:   Diagnosis Date    Arthritis     CHF (congestive heart failure) 12/26/2018    Chronic atrial fibrillation 8/29/2020    Chronic kidney disease, stage III (moderate) 9/11/2015    Colon adenoma     Diabetes mellitus, type II     Diet controlled    Glaucoma     Hyperlipemia     Hypertension     Osteopenia        Past Surgical History:   Procedure Laterality Date    APPENDECTOMY      CATARACT EXTRACTION W/  INTRAOCULAR LENS IMPLANT Right 03/15/2006        CATARACT EXTRACTION W/  INTRAOCULAR LENS IMPLANT Left 09/27/2006        COLONOSCOPY W/ POLYPECTOMY      EYE SURGERY      HYSTERECTOMY         Family History    Problem Relation Age of Onset    Heart attack Mother     Heart disease Mother     No Known Problems Father     Cancer Sister     Hypertension Daughter     Asthma Daughter     Allergies Daughter     Amblyopia Neg Hx     Blindness Neg Hx     Cataracts Neg Hx     Diabetes Neg Hx     Glaucoma Neg Hx     Macular degeneration Neg Hx     Retinal detachment Neg Hx     Strabismus Neg Hx     Stroke Neg Hx     Thyroid disease Neg Hx        Social History     Socioeconomic History    Marital status:      Spouse name: Not on file    Number of children: Not on file    Years of education: Not on file    Highest education level: Not on file   Occupational History    Not on file   Social Needs    Financial resource strain: Not on file    Food insecurity     Worry: Not on file     Inability: Not on file    Transportation needs     Medical: Not on file     Non-medical: Not on file   Tobacco Use    Smoking status: Never Smoker    Smokeless tobacco: Never Used   Substance and Sexual Activity    Alcohol use: Yes     Comment: wine, rarely    Drug use: No    Sexual activity: Not Currently   Lifestyle    Physical activity     Days per week: Not on file     Minutes per session: Not on file    Stress: Not on file   Relationships    Social connections     Talks on phone: Not on file     Gets together: Not on file     Attends Protestant service: Not on file     Active member of club or organization: Not on file     Attends meetings of clubs or organizations: Not on file     Relationship status: Not on file   Other Topics Concern    Not on file   Social History Narrative    Not on file       MEDICATIONS & ALLERGIES:     Current Outpatient Medications on File Prior to Visit   Medication Sig Dispense Refill    atorvastatin (LIPITOR) 20 MG tablet TAKE 1 TABLET EVERY DAY 90 tablet 0    brimonidine-timolol (COMBIGAN) 0.2-0.5 % Drop Place 1 drop into both eyes 2 (two) times daily. Disp 10 mls for 1 month 30  mL 3    busPIRone (BUSPAR) 5 MG Tab TAKE 1 TABLET (5 MG TOTAL) BY MOUTH 2 (TWO) TIMES DAILY. 180 tablet 0    CALCIUM CARBONATE ([id number] ORAL) Take 1 tablet by mouth Daily. 1  Oral Every day      ELIQUIS 5 mg Tab TAKE 1 TABLET TWICE DAILY 180 tablet 0    escitalopram oxalate (LEXAPRO) 10 MG tablet TAKE 1 TABLET (10 MG TOTAL) BY MOUTH ONCE DAILY. 90 tablet 0    FLUZONE HIGH-DOSE 2019-20, PF, 180 mcg/0.5 mL Syrg TO BE ADMINISTERED BY PHARMACIST FOR IMMUNIZATION  0    furosemide (LASIX) 20 MG tablet Take 1 tablet (20 mg total) by mouth once daily. 90 tablet 1    latanoprost 0.005 % ophthalmic solution Place 1 drop into both eyes every evening. 15 mL 3    multivit with minerals/lutein (MULTIVITAMIN 50 PLUS ORAL) Take 1 tablet by mouth once daily.       olmesartan (BENICAR) 20 MG tablet TAKE 1 TABLET EVERY DAY 90 tablet 0    [DISCONTINUED] timolol maleate 0.5% (TIMOPTIC-XR) 0.5 % SolG Place 1 drop into both eyes once daily. 5 mL 12     No current facility-administered medications on file prior to visit.         Review of patient's allergies indicates:  No Known Allergies    OBJECTIVE:     Vital Signs:  Vitals:    09/10/20 1438   BP: 126/62   Pulse: 62   Resp: 18   Temp: 97.3 °F (36.3 °C)     Body mass index is 20.43 kg/m².     Physical Exam:  Constitutional:       Appearance: Normal appearance. She is well-developed.   HENT:      Head: Normocephalic.   Eyes:      General:         Right eye: No discharge.         Left eye: No discharge.      Conjunctiva/sclera: Conjunctivae normal.   Neck:      Musculoskeletal: Normal range of motion and neck supple.   Cardiovascular:      Rate and Rhythm: Normal rate. Rhythm regularly irregular.      Pulses:           Radial pulses are 2+ on the right side and 2+ on the left side.      Heart sounds: Normal heart sounds.   Pulmonary:      Effort: Pulmonary effort is normal. No respiratory distress.      Breath sounds: wnl  Abdominal:      General: There is no distension.       Palpations: Abdomen is soft.      Tenderness: There is no abdominal tenderness.   Musculoskeletal: Normal range of motion.      Right lower le+ Pitting Edema present.      Left lower le+ Pitting Edema present.   Skin:     General: Skin is warm and dry.      Capillary Refill: Capillary refill takes 2 to 3 seconds.   Neurological:      Mental Status: She is alert.      Psychiatric:         Mood and Affect: Mood normal.         Speech: Speech normal.         Behavior: Behavior normal    Laboratory  Lab Results   Component Value Date    WBC 6.11 2020    HGB 11.1 (L) 2020    HCT 34.5 (L) 2020    MCV 95 2020     2020     Lab Results   Component Value Date    INR 1.4 (H) 2020    INR 1.1 2006    INR 1.0 03/10/2006     Lab Results   Component Value Date    HGBA1C 5.8 (H) 2020     No results for input(s): POCTGLUCOSE in the last 72 hours.    Diagnostic Results:  n/a    TRANSITION OF CARE:     [facility] Contact Note: 2020    Family and/or Caretaker present at visit?  Yes.  Diagnostic tests reviewed/disposition: No diagnosic tests pending after this hospitalization.  Disease/illness education: Fall precautions  Home health/community services discussion/referrals: Patient has home health established at [facility].   Establishment or re-establishment of referral orders for community resources: No other necessary community resources.   Discussion with other health care providers: No discussion with other health care providers necessary.     Transition of Care Visit:     I have reviewed and updated the history and problem list.  I have reconciled the medication list.  I have discussed the hospitalization and current medical issues, prognosis and plans with the patient/family.  I  spent more than 50% of time discussing the care with the patient/family.  Total Face-to-Face Encounter: 60 minutes.    Medications Reconciliation:   I have reconciled the  patient's home medications and discharge medications with the patient/family. I have updated all changes.  Refer to After-Visit Medication List.    ASSESSMENT & PLAN:     HIGH RISK CONDITION(S):  Patient has a condition that poses threat to life and bodily function    Inna was seen today for transitional care.    Diagnoses and all orders for this visit:    Acute on chronic diastolic congestive heart failure  - Presented with elevated BNP and troponin, sudden onset shortness of breath.  - New echo shows normal EF, atrial fibrillation.    Chronic atrial fibrillation  Long term anti coagulant   - Patient on metoprolol, apixaban  - Bleeding precautions        Controlled type 2 diabetes mellitus without complication, without long-term current use of insulin  - HbA1c 5.8 and BG has been normal  - Continue home medication.     Other hyperlipidemia  Continue Lipitor        Essential hypertension  Continue home meds        Were controlled substances prescribed?  No    Instructions for the patient:    Scheduled Follow-up :  Future Appointments   Date Time Provider Department Center   9/21/2020  8:00 AM Stacy Rodriguez MD [facility] Boone PCW       After Visit Medication List :     Medication List          Accurate as of September 10, 2020 11:59 PM. If you have any questions, ask your nurse or doctor.            CONTINUE taking these medications    atorvastatin 20 MG tablet  Commonly known as: LIPITOR  TAKE 1 TABLET EVERY DAY     brimonidine-timoloL 0.2-0.5 % Drop  Commonly known as: COMBIGAN  Place 1 drop into both eyes 2 (two) times daily. Disp 10 mls for 1 month     busPIRone 5 MG Tab  Commonly known as: BUSPAR  TAKE 1 TABLET (5 MG TOTAL) BY MOUTH 2 (TWO) TIMES DAILY.     ELIQUIS 5 mg Tab  Generic drug: apixaban  TAKE 1 TABLET TWICE DAILY     escitalopram oxalate 10 MG tablet  Commonly known as: LEXAPRO  TAKE 1 TABLET (10 MG TOTAL) BY MOUTH ONCE DAILY.     FLUZONE HIGH-DOSE 2019-20 (PF) 180 mcg/0.5 mL Syrg  Generic drug:  flu vacc [id number](65yr up)PF     latanoprost 0.005 % ophthalmic solution  Place 1 drop into both eyes every evening.     MULTIVITAMIN 50 PLUS ORAL     olmesartan 20 MG tablet  Commonly known as: BENICAR  TAKE 1 TABLET EVERY DAY     [id number] ORAL            Signature:  Heather Farley NP    Patient consent obtained prior to treatment

## 2020-09-15 NOTE — PATIENT INSTRUCTIONS
Instructions:  - [facility] Nurse Practitioner to schedule home follow-up visit with patient in 4-6 weeks or as needed.  - Continue all medications, treatments and therapies as ordered.   - Follow all instructions, recommendations as discussed.  - Maintain Safety Precautions at all times.  - Attend all medical appointments as scheduled.  - For worsening symptoms: call Primary Care Physician or Nurse Practitioner.  - For emergencies, call 911 or immediately report to the nearest emergency room.  - Limit Risks of environmental exposure to coronavirus/COVID-19 as discussed including: social distancing, hand hygiene, and limiting departures from the home for necessities only.

## 2020-09-21 ENCOUNTER — IMMUNIZATION (OUTPATIENT)
Dept: INTERNAL MEDICINE | Facility: CLINIC | Age: 85
End: 2020-09-21
Payer: MEDICARE

## 2020-09-21 ENCOUNTER — OFFICE VISIT (OUTPATIENT)
Dept: INTERNAL MEDICINE | Facility: CLINIC | Age: 85
End: 2020-09-21
Payer: MEDICARE

## 2020-09-21 ENCOUNTER — EXTERNAL HOME HEALTH (OUTPATIENT)
Dept: HOME HEALTH SERVICES | Facility: HOSPITAL | Age: 85
End: 2020-09-21
Payer: MEDICARE

## 2020-09-21 DIAGNOSIS — I48.91 ATRIAL FIBRILLATION, UNSPECIFIED TYPE: Primary | ICD-10-CM

## 2020-09-21 DIAGNOSIS — I10 HYPERTENSION, UNSPECIFIED TYPE: ICD-10-CM

## 2020-09-21 PROCEDURE — G0008 PR ADMIN INFLUENZA VIRUS VAC: ICD-10-PCS | Mod: HCNC,S$GLB,, | Performed by: INTERNAL MEDICINE

## 2020-09-21 PROCEDURE — 99999 PR PBB SHADOW E&M-EST. PATIENT-LVL IV: ICD-10-PCS | Mod: PBBFAC,HCNC,, | Performed by: INTERNAL MEDICINE

## 2020-09-21 PROCEDURE — 1126F AMNT PAIN NOTED NONE PRSNT: CPT | Mod: HCNC,S$GLB,, | Performed by: INTERNAL MEDICINE

## 2020-09-21 PROCEDURE — 1159F MED LIST DOCD IN RCRD: CPT | Mod: HCNC,S$GLB,, | Performed by: INTERNAL MEDICINE

## 2020-09-21 PROCEDURE — 90694 VACC AIIV4 NO PRSRV 0.5ML IM: CPT | Mod: HCNC,S$GLB,, | Performed by: INTERNAL MEDICINE

## 2020-09-21 PROCEDURE — 99215 PR OFFICE/OUTPT VISIT, EST, LEVL V, 40-54 MIN: ICD-10-PCS | Mod: HCNC,25,S$GLB, | Performed by: INTERNAL MEDICINE

## 2020-09-21 PROCEDURE — 90694 FLU VACCINE - QUADRIVALENT - ADJUVANTED: ICD-10-PCS | Mod: HCNC,S$GLB,, | Performed by: INTERNAL MEDICINE

## 2020-09-21 PROCEDURE — 1126F PR PAIN SEVERITY QUANTIFIED, NO PAIN PRESENT: ICD-10-PCS | Mod: HCNC,S$GLB,, | Performed by: INTERNAL MEDICINE

## 2020-09-21 PROCEDURE — 1101F PT FALLS ASSESS-DOCD LE1/YR: CPT | Mod: HCNC,CPTII,S$GLB, | Performed by: INTERNAL MEDICINE

## 2020-09-21 PROCEDURE — 99215 OFFICE O/P EST HI 40 MIN: CPT | Mod: HCNC,25,S$GLB, | Performed by: INTERNAL MEDICINE

## 2020-09-21 PROCEDURE — 99999 PR PBB SHADOW E&M-EST. PATIENT-LVL IV: CPT | Mod: PBBFAC,HCNC,, | Performed by: INTERNAL MEDICINE

## 2020-09-21 PROCEDURE — 1101F PR PT FALLS ASSESS DOC 0-1 FALLS W/OUT INJ PAST YR: ICD-10-PCS | Mod: HCNC,CPTII,S$GLB, | Performed by: INTERNAL MEDICINE

## 2020-09-21 PROCEDURE — 1159F PR MEDICATION LIST DOCUMENTED IN MEDICAL RECORD: ICD-10-PCS | Mod: HCNC,S$GLB,, | Performed by: INTERNAL MEDICINE

## 2020-09-21 PROCEDURE — G0008 ADMIN INFLUENZA VIRUS VAC: HCPCS | Mod: HCNC,S$GLB,, | Performed by: INTERNAL MEDICINE

## 2020-09-23 VITALS
WEIGHT: 125.88 LBS | BODY MASS INDEX: 21.49 KG/M2 | SYSTOLIC BLOOD PRESSURE: 128 MMHG | HEART RATE: 87 BPM | DIASTOLIC BLOOD PRESSURE: 86 MMHG | TEMPERATURE: 99 F | HEIGHT: 64 IN | OXYGEN SATURATION: 97 %

## 2020-09-24 NOTE — PROGRESS NOTES
Subjective:       Patient ID: Inna Blankenship is a 89 y.o. female.    Chief Complaint: Hypertension    HPI  She returns for management of hypertension.  She has had hypertension for over a year.  Current treatment has included medications outlined in medication list.  She denies chest pain or shortness of breath.  No palpitations.  Denies left arm or neck pain.       PAST MEDICAL HISTORY: Hypertension, hyperlipidemia, diabetes with nephropathy (diet   Controlled), pulmonary hypertension, aortic regurgitation,  atrial fibrillation, DVT, glaucoma , colon adenoma. She had a colonoscopy February 2011     PAST SURGICAL HISTORY: Hysterectomy.     MEDICATIONS:  Xalatan drops, Lipitor 20 mg daily, Eliquis 5 mg twice a day , Benicar 20 mg daily, Lasix 20 mg daily    ALLERGIES: No known drug allergies.      Review of Systems   Constitutional: Negative for chills, fatigue, fever and unexpected weight change.   Respiratory: Negative for chest tightness and shortness of breath.    Cardiovascular: Negative for chest pain and palpitations.   Gastrointestinal: Negative for abdominal pain and blood in stool.   Neurological: Negative for dizziness, syncope, numbness and headaches.       Objective:      Physical Exam  HENT:      Right Ear: External ear normal.      Left Ear: External ear normal.      Nose: Nose normal.      Mouth/Throat:      Mouth: Mucous membranes are moist.      Pharynx: Oropharynx is clear.   Eyes:      Pupils: Pupils are equal, round, and reactive to light.   Neck:      Musculoskeletal: Normal range of motion.   Cardiovascular:      Rate and Rhythm: Normal rate and regular rhythm.      Heart sounds: Murmur present.   Pulmonary:      Breath sounds: Normal breath sounds.   Abdominal:      General: There is no distension.      Palpations: There is no hepatomegaly or splenomegaly.      Tenderness: There is no abdominal tenderness.   Lymphadenopathy:      Cervical: No cervical adenopathy.      Upper Body:      Right upper  body: No axillary adenopathy.      Left upper body: No axillary adenopathy.   Neurological:      Cranial Nerves: No cranial nerve deficit.      Sensory: No sensory deficit.      Motor: Motor function is intact.      Deep Tendon Reflexes: Reflexes are normal and symmetric.         Assessment/Plan           assessment and plan:  1.  Hypertension:  Controlled  2.  Atrial fibrillation:  Follow up with Cardiology

## 2020-09-29 ENCOUNTER — DOCUMENT SCAN (OUTPATIENT)
Dept: HOME HEALTH SERVICES | Facility: HOSPITAL | Age: 85
End: 2020-09-29
Payer: MEDICARE

## 2020-09-29 ENCOUNTER — OFFICE VISIT (OUTPATIENT)
Dept: CARDIOLOGY | Facility: CLINIC | Age: 85
End: 2020-09-29
Payer: MEDICARE

## 2020-09-29 VITALS
BODY MASS INDEX: 21.23 KG/M2 | HEIGHT: 65 IN | DIASTOLIC BLOOD PRESSURE: 79 MMHG | SYSTOLIC BLOOD PRESSURE: 161 MMHG | HEART RATE: 92 BPM | WEIGHT: 127.44 LBS

## 2020-09-29 DIAGNOSIS — I50.32 CHRONIC DIASTOLIC CONGESTIVE HEART FAILURE: Primary | ICD-10-CM

## 2020-09-29 DIAGNOSIS — I10 ESSENTIAL HYPERTENSION: ICD-10-CM

## 2020-09-29 DIAGNOSIS — I48.91 ATRIAL FIBRILLATION, UNSPECIFIED TYPE: ICD-10-CM

## 2020-09-29 DIAGNOSIS — I27.20 PULMONARY HYPERTENSION: ICD-10-CM

## 2020-09-29 DIAGNOSIS — E78.49 OTHER HYPERLIPIDEMIA: ICD-10-CM

## 2020-09-29 PROCEDURE — 99999 PR PBB SHADOW E&M-EST. PATIENT-LVL V: CPT | Mod: PBBFAC,HCNC,GC, | Performed by: STUDENT IN AN ORGANIZED HEALTH CARE EDUCATION/TRAINING PROGRAM

## 2020-09-29 PROCEDURE — 1126F AMNT PAIN NOTED NONE PRSNT: CPT | Mod: HCNC,GC,S$GLB, | Performed by: STUDENT IN AN ORGANIZED HEALTH CARE EDUCATION/TRAINING PROGRAM

## 2020-09-29 PROCEDURE — 1101F PR PT FALLS ASSESS DOC 0-1 FALLS W/OUT INJ PAST YR: ICD-10-PCS | Mod: HCNC,CPTII,GC,S$GLB | Performed by: STUDENT IN AN ORGANIZED HEALTH CARE EDUCATION/TRAINING PROGRAM

## 2020-09-29 PROCEDURE — 1126F PR PAIN SEVERITY QUANTIFIED, NO PAIN PRESENT: ICD-10-PCS | Mod: HCNC,GC,S$GLB, | Performed by: STUDENT IN AN ORGANIZED HEALTH CARE EDUCATION/TRAINING PROGRAM

## 2020-09-29 PROCEDURE — 99214 OFFICE O/P EST MOD 30 MIN: CPT | Mod: HCNC,GC,S$GLB, | Performed by: STUDENT IN AN ORGANIZED HEALTH CARE EDUCATION/TRAINING PROGRAM

## 2020-09-29 PROCEDURE — 1159F MED LIST DOCD IN RCRD: CPT | Mod: HCNC,GC,S$GLB, | Performed by: STUDENT IN AN ORGANIZED HEALTH CARE EDUCATION/TRAINING PROGRAM

## 2020-09-29 PROCEDURE — 1159F PR MEDICATION LIST DOCUMENTED IN MEDICAL RECORD: ICD-10-PCS | Mod: HCNC,GC,S$GLB, | Performed by: STUDENT IN AN ORGANIZED HEALTH CARE EDUCATION/TRAINING PROGRAM

## 2020-09-29 PROCEDURE — 1101F PT FALLS ASSESS-DOCD LE1/YR: CPT | Mod: HCNC,CPTII,GC,S$GLB | Performed by: STUDENT IN AN ORGANIZED HEALTH CARE EDUCATION/TRAINING PROGRAM

## 2020-09-29 PROCEDURE — 99214 PR OFFICE/OUTPT VISIT, EST, LEVL IV, 30-39 MIN: ICD-10-PCS | Mod: HCNC,GC,S$GLB, | Performed by: STUDENT IN AN ORGANIZED HEALTH CARE EDUCATION/TRAINING PROGRAM

## 2020-09-29 PROCEDURE — 99999 PR PBB SHADOW E&M-EST. PATIENT-LVL V: ICD-10-PCS | Mod: PBBFAC,HCNC,GC, | Performed by: STUDENT IN AN ORGANIZED HEALTH CARE EDUCATION/TRAINING PROGRAM

## 2020-09-29 NOTE — PROGRESS NOTES
Cardiology Clinic Note  Reason for Visit: hospital follow up  09/29/2020    HPI:     Inna Blankenship is a 89 y/o F with HTN, HLD, T2DM, h/o DVT on apixaban, CKD who presents for follow up. She was last seen by Dr. Shin 1/28/20 at which time she denied any new symptoms and no new medical changes made. She was admitted 8/28-8/30 for progressive CURRY. She was admitted for ADHF and new onset atrial fibrillation, was diuresed with IV lasix and discharged home with PO furosemide 20 mg daily and started on apixaban 5 mg BID.     Since discharge, she reports SOB after mild exertion (ex: walking to/from the bathroom, getting dressed in the morning). Daughter reports these symptoms are new as of 1-month ago. She denies any chest pain with exertion. She denies any orthopnea, PND or leg swelling. She also denies palpitations. Daughter has maintained a low-sodium diet. Despite PT/OT at home, pt remains largely sedentary and does not walk/exercise. She has been stressed because her  has been on hospice this month.    ROS:    Constitution: Negative for fever or chills. Negative for weight loss or gain.   HENT: Negative for sore throat or headaches. Negative for rhinorrhea.  Eyes: Negative for blurred or double vision.   Cardiovascular: See above  Pulmonary: Negative for SOB. Negative for cough.   Gastrointestinal: Negative for abdominal pain. Negative for nausea/ vomiting. Negative for diarrhea.   : Negative for dysuria.   Neurological: Negative for focal weakness or sensory changes.  PMH:     Past Medical History:   Diagnosis Date    Arthritis     CHF (congestive heart failure) 12/26/2018    Chronic atrial fibrillation 8/29/2020    Chronic kidney disease, stage III (moderate) 9/11/2015    Colon adenoma     Diabetes mellitus, type II     Diet controlled    Glaucoma     Hyperlipemia     Hypertension     Osteopenia      Past Surgical History:   Procedure Laterality Date    APPENDECTOMY      CATARACT EXTRACTION W/   INTRAOCULAR LENS IMPLANT Right 03/15/2006        CATARACT EXTRACTION W/  INTRAOCULAR LENS IMPLANT Left 09/27/2006        COLONOSCOPY W/ POLYPECTOMY      EYE SURGERY      HYSTERECTOMY       Allergies:   Review of patient's allergies indicates:  No Known Allergies  Medications:     Current Outpatient Medications on File Prior to Visit   Medication Sig Dispense Refill    atorvastatin (LIPITOR) 20 MG tablet TAKE 1 TABLET EVERY DAY 90 tablet 0    brimonidine-timolol (COMBIGAN) 0.2-0.5 % Drop Place 1 drop into both eyes 2 (two) times daily. Disp 10 mls for 1 month 30 mL 3    busPIRone (BUSPAR) 5 MG Tab TAKE 1 TABLET (5 MG TOTAL) BY MOUTH 2 (TWO) TIMES DAILY. 180 tablet 0    CALCIUM CARBONATE (YYES-NLE-658 ORAL) Take 1 tablet by mouth Daily. 1  Oral Every day      ELIQUIS 5 mg Tab TAKE 1 TABLET TWICE DAILY 180 tablet 0    escitalopram oxalate (LEXAPRO) 10 MG tablet TAKE 1 TABLET (10 MG TOTAL) BY MOUTH ONCE DAILY. 90 tablet 0    FLUZONE HIGH-DOSE 2019-20, PF, 180 mcg/0.5 mL Syrg TO BE ADMINISTERED BY PHARMACIST FOR IMMUNIZATION  0    furosemide (LASIX) 20 MG tablet Take 1 tablet (20 mg total) by mouth once daily. 90 tablet 1    latanoprost 0.005 % ophthalmic solution Place 1 drop into both eyes every evening. 15 mL 3    multivit with minerals/lutein (MULTIVITAMIN 50 PLUS ORAL) Take 1 tablet by mouth once daily.       olmesartan (BENICAR) 20 MG tablet TAKE 1 TABLET EVERY DAY 90 tablet 0    [DISCONTINUED] timolol maleate 0.5% (TIMOPTIC-XR) 0.5 % SolG Place 1 drop into both eyes once daily. 5 mL 12     No current facility-administered medications on file prior to visit.      Social History:     Social History     Tobacco Use    Smoking status: Never Smoker    Smokeless tobacco: Never Used   Substance Use Topics    Alcohol use: Yes     Comment: wine, rarely     Family History:     Family History   Problem Relation Age of Onset    Heart attack Mother     Heart disease Mother     No  "Known Problems Father     Cancer Sister     Hypertension Daughter     Asthma Daughter     Allergies Daughter     Amblyopia Neg Hx     Blindness Neg Hx     Cataracts Neg Hx     Diabetes Neg Hx     Glaucoma Neg Hx     Macular degeneration Neg Hx     Retinal detachment Neg Hx     Strabismus Neg Hx     Stroke Neg Hx     Thyroid disease Neg Hx      Physical Exam:   BP (!) 161/79 (BP Location: Left arm, Patient Position: Sitting, BP Method: Large (Automatic))   Pulse 92   Ht 5' 5" (1.651 m)   Wt 57.8 kg (127 lb 6.8 oz)   BMI 21.20 kg/m²      Constitutional: No acute distress, conversant  HEENT: Sclera anicteric, extraocular motions intact, Oropharynx clear  Neck: No JVD, no carotid bruits  Cardiovascular: regular rate and rhythm, no murmur, rubs or gallops, normal S1/S2  Pulmonary: Clear to auscultation bilaterally  Abdominal: Abdomen soft, nontender, nondistended, positive bowel sounds  Extremities: No lower extremity edema,   Pulses: 2+ BL Radial, 2+ BL carotid, 2+ BL DP, 2+ BL PT  Skin: No ecchymosis, erythema, or ulcers  Psych: Alert and oriented x 3, appropriate affect  Neuro: CNII-XII intact, no focal deficits    Labs:     Lab Results   Component Value Date     08/30/2020    K 3.8 08/30/2020     08/30/2020    CO2 22 (L) 08/30/2020    BUN 19 08/30/2020    CREATININE 0.8 08/30/2020    ANIONGAP 13 08/30/2020     Lab Results   Component Value Date    AST 41 (H) 08/28/2020    ALT 34 08/28/2020    ALKPHOS 96 08/28/2020    BILITOT 0.9 08/28/2020    BILIDIR 0.2 06/22/2005    ALBUMIN 3.0 (L) 08/28/2020     Lab Results   Component Value Date    CALCIUM 9.0 08/30/2020    MG 1.7 08/28/2020    PHOS 3.2 12/27/2018     Lab Results   Component Value Date     (H) 08/28/2020     (H) 12/26/2018    Lab Results   Component Value Date    WBC 6.11 08/28/2020    HGB 11.1 (L) 08/28/2020    HCT 34.5 (L) 08/28/2020     08/28/2020    GRAN 4.1 08/28/2020    GRAN 66.5 08/28/2020     Lab Results "   Component Value Date    INR 1.4 (H) 2020     Lab Results   Component Value Date    CHOL 191 01/15/2020    HDL 61 01/15/2020    LDLCALC 117.6 01/15/2020    TRIG 62 01/15/2020     Lab Results   Component Value Date    HGBA1C 5.8 (H) 2020     Lab Results   Component Value Date    TSH 5.715 (H) 2020          Imaging:     EK20: atrial fibrillation  79 bpm    TTE 20:  ·Concentric left ventricular hypertrophy.  ·Severe left atrial enlargement.  ·Intermediate central venous pressure (8 mmHg).  ·The estimated PA systolic pressure is 62 mmHg.  ·Normal left ventricular systolic function. The estimated ejection fraction is 56%.  ·No wall motion abnormalities.  ·Atrial fibrillation observed.  ·Normal right ventricular systolic function.  ·Moderate right atrial enlargement.  ·Moderate aortic regurgitation.  ·Moderate tricuspid regurgitation.     Assessment:     1. Atrial fibrillation- rate controlled  2. Chronic diastolic heart failure - compensated, euvolemic, NYHA FC II symptoms  3. Hypertension - controlled  4. Hyperlipidemia  5. Elevated PA pressures    Plan:     1. Atrial fibrillation  - CHADsVASC 6 (age, female, HTN, T2DM, CHF); continue apixaban 5 mg BID  - refer to EP for rhythm control     2. CHF  - compensated, euvolemic   - continue current regimen    3. Elevated PA pressures on TTE  - may be due to age-related diastolic dysfunction but concern for CTEPH, V/Q scan ordered    RTC in 6 months, or earlier as needed.    Patient seen and plan of care discussed with Dr. Ponce    Signed:  Britany Sanchez MD  Cardiology Fellow PGY 5  Beeper:  [phone]

## 2020-10-06 ENCOUNTER — TELEPHONE (OUTPATIENT)
Dept: ELECTROPHYSIOLOGY | Facility: CLINIC | Age: 85
End: 2020-10-06

## 2020-10-06 DIAGNOSIS — I49.8 OTHER SPECIFIED CARDIAC ARRHYTHMIAS: Primary | ICD-10-CM

## 2020-10-09 NOTE — PROGRESS NOTES
Electrophysiology Note   Reason for visit: Management of new onset AF    HPI:   Inna Blankenship is a 89 y.o. female with a past medical history of HTN, DM, CKD, CHF, and DVT on Eliquis who presents today to establish care with arrhythmic clinic. She came to attention after a recent hospitalization for evaluation of persistent shortness of breath in August 2020. She noted be clinical heart failure and was treated for decompensated heart failure with diuresis. Patient was also noted to be in rate controlled atrial fibrillation which she previously had no documented history of.  She was maintained on anticoagulation without AV delmi blockade and discharged home after an otherwise uneventful admission. She has since followed up in general cardiology clinic where she continued to complain of dyspnea though improved from August. She had not had any other recent visits to the ED or required hospitalization since August.     She is here today accompanied by her daughter who reports that patient has had notable decline in her functional status. She reports ongoing mild exertional dyspnea worse in the mornings. She denies any chest pain with exertion. She denies any orthopnea, PND or leg swelling. She also denies palpitations. Her  recently passed and that has impacted her life.     Of note her Echo showed preserved LVEF, LV hypertrophy, bi atrial enlargement and  moderate AI and TR.  ECG today: atrial fibrillation, 81 bpm, occasional PVCs.       Past Medical History:   Diagnosis Date    Arthritis     CHF (congestive heart failure) 12/26/2018    Chronic atrial fibrillation 8/29/2020    Chronic kidney disease, stage III (moderate) 9/11/2015    Colon adenoma     Diabetes mellitus, type II     Diet controlled    Glaucoma     Hyperlipemia     Hypertension     Osteopenia         Social History     Socioeconomic History    Marital status:      Spouse name: Not on file    Number of children: Not on file     Years of education: Not on file    Highest education level: Not on file   Occupational History    Not on file   Social Needs    Financial resource strain: Not on file    Food insecurity     Worry: Not on file     Inability: Not on file    Transportation needs     Medical: Not on file     Non-medical: Not on file   Tobacco Use    Smoking status: Never Smoker    Smokeless tobacco: Never Used   Substance and Sexual Activity    Alcohol use: Yes     Comment: wine, rarely    Drug use: No    Sexual activity: Not Currently   Lifestyle    Physical activity     Days per week: Not on file     Minutes per session: Not on file    Stress: Not on file   Relationships    Social connections     Talks on phone: Not on file     Gets together: Not on file     Attends Tenriism service: Not on file     Active member of club or organization: Not on file     Attends meetings of clubs or organizations: Not on file     Relationship status: Not on file   Other Topics Concern    Not on file   Social History Narrative    Not on file        Family History   Problem Relation Age of Onset    Heart attack Mother     Heart disease Mother     No Known Problems Father     Cancer Sister     Hypertension Daughter     Asthma Daughter     Allergies Daughter     Amblyopia Neg Hx     Blindness Neg Hx     Cataracts Neg Hx     Diabetes Neg Hx     Glaucoma Neg Hx     Macular degeneration Neg Hx     Retinal detachment Neg Hx     Strabismus Neg Hx     Stroke Neg Hx     Thyroid disease Neg Hx         Current Outpatient Medications   Medication Sig Dispense Refill    atorvastatin (LIPITOR) 20 MG tablet TAKE 1 TABLET EVERY DAY 90 tablet 0    brimonidine-timolol (COMBIGAN) 0.2-0.5 % Drop Place 1 drop into both eyes 2 (two) times daily. Disp 10 mls for 1 month 30 mL 3    busPIRone (BUSPAR) 5 MG Tab TAKE 1 TABLET TWICE DAILY 180 tablet 0    CALCIUM CARBONATE (LDVP-LLW-524 ORAL) Take 1 tablet by mouth Daily. 1  Oral Every day       ELIQUIS 5 mg Tab TAKE 1 TABLET TWICE DAILY 180 tablet 0    escitalopram oxalate (LEXAPRO) 10 MG tablet TAKE 1 TABLET EVERY DAY 90 tablet 0    FLUZONE HIGH-DOSE 2019-20, PF, 180 mcg/0.5 mL Syrg TO BE ADMINISTERED BY PHARMACIST FOR IMMUNIZATION  0    furosemide (LASIX) 20 MG tablet Take 1 tablet (20 mg total) by mouth once daily. 90 tablet 1    latanoprost 0.005 % ophthalmic solution Place 1 drop into both eyes every evening. 15 mL 3    multivit with minerals/lutein (MULTIVITAMIN 50 PLUS ORAL) Take 1 tablet by mouth once daily.       olmesartan (BENICAR) 20 MG tablet TAKE 1 TABLET EVERY DAY 90 tablet 0     No current facility-administered medications for this visit.         Constitutional: (-)fevers, (-)chills, (-)night sweats  HEENT: (-) headaches (-) lightheadedness (-) blurry vision, (-) nose bleeds (-)dentures (-) neck stiffness (-)hearing changes  Cardiovascular: (-)chest pain (-)paroxysmal nocturnal dyspnea (-)orthopnea  Respiratory: (-)shortness of breath, (-)dyspnea on exertion (-)hemoptysis  Gastrointestinal: (-)abdominal pain (-)nausea (-)vomiting (-)hematemesis  (-)hematochezia (-)tarry stools  Musculoskeletal: (-)arthralgias (-)limited range of motion  Neurologic: (-)parasthesias (-)mood disorder (-)anxiety  Endo: (-)polyuria (-)polydipsia (-)heat/cold intolerance  Skin: (-)rash     Physical Exam:   Wt Readings from Last 3 Encounters:   10/12/20 58.4 kg (128 lb 12 oz)   09/29/20 57.8 kg (127 lb 6.8 oz)   09/21/20 57.1 kg (125 lb 14.1 oz)     Temp Readings from Last 3 Encounters:   09/21/20 98.5 °F (36.9 °C)   09/10/20 97.3 °F (36.3 °C)   08/30/20 97 °F (36.1 °C) (Oral)     BP Readings from Last 3 Encounters:   10/12/20 (!) 141/79   09/29/20 (!) 161/79   09/21/20 128/86     Pulse Readings from Last 3 Encounters:   10/12/20 (!) 113   09/29/20 92   09/21/20 87       Gen: she appears well for her stated age, she is in no acute distress, seated in a wheel chair, she is pleasant patient answering questions  appropriately  HEENT: extra-ocular muscles intact, normocephalic-atraumatic  Neck: no jugular venous distension, no lymphadenopathy, no thyromegaly, no carotid bruits  CVS: regular rate and irregular rhythm, S1S2 normal, no murmurs/rubs/gallops  CHEST: clear to auscultation bilaterally, no wheezes/rales/ronchi  ABD: Soft, non-tender, nondistended, bowel sounds present  EXT: No Edema, 2+ pulses bilaterally  NEURO: awake, alert, and oriented   Skin: No rash     Review of Labs:   Lab Results   Component Value Date    WBC 6.11 08/28/2020    HGB 11.1 (L) 08/28/2020    HCT 34.5 (L) 08/28/2020    MCV 95 08/28/2020     08/28/2020       CMP  Sodium   Date Value Ref Range Status   08/30/2020 138 136 - 145 mmol/L Final     Potassium   Date Value Ref Range Status   08/30/2020 3.8 3.5 - 5.1 mmol/L Final     Chloride   Date Value Ref Range Status   08/30/2020 103 95 - 110 mmol/L Final     CO2   Date Value Ref Range Status   08/30/2020 22 (L) 23 - 29 mmol/L Final     Glucose   Date Value Ref Range Status   08/30/2020 96 70 - 110 mg/dL Final     BUN, Bld   Date Value Ref Range Status   08/30/2020 19 10 - 30 mg/dL Final     Creatinine   Date Value Ref Range Status   08/30/2020 0.8 0.5 - 1.4 mg/dL Final     Calcium   Date Value Ref Range Status   08/30/2020 9.0 8.7 - 10.5 mg/dL Final     Total Protein   Date Value Ref Range Status   08/28/2020 6.5 6.0 - 8.4 g/dL Final     Albumin   Date Value Ref Range Status   08/28/2020 3.0 (L) 3.5 - 5.2 g/dL Final     Total Bilirubin   Date Value Ref Range Status   08/28/2020 0.9 0.1 - 1.0 mg/dL Final     Comment:     For infants and newborns, interpretation of results should be based  on gestational age, weight and in agreement with clinical  observations.  Premature Infant recommended reference ranges:  Up to 24 hours.............<8.0 mg/dL  Up to 48 hours............<12.0 mg/dL  3-5 days..................<15.0 mg/dL  6-29 days.................<15.0 mg/dL       Alkaline Phosphatase   Date  Value Ref Range Status   08/28/2020 96 55 - 135 U/L Final     AST   Date Value Ref Range Status   08/28/2020 41 (H) 10 - 40 U/L Final     ALT   Date Value Ref Range Status   08/28/2020 34 10 - 44 U/L Final     Anion Gap   Date Value Ref Range Status   08/30/2020 13 8 - 16 mmol/L Final     eGFR if    Date Value Ref Range Status   08/30/2020 >60 >60 mL/min/1.73 m^2 Final     eGFR if non    Date Value Ref Range Status   08/30/2020 >60 >60 mL/min/1.73 m^2 Final     Comment:     Calculation used to obtain the estimated glomerular filtration  rate (eGFR) is the CKD-EPI equation.          24 Hour Holter- 2016  Sinus rhythm with heart rates varying between 34 and 103 bpm with an average of 59 bpm.   VENTRICULAR ARRHYTHMIAS   1. There were very rare PVCs totalling 43 and averaging 1 per hour.   2. There were no episodes of ventricular tachycardia.   SUPRA VENTRICULAR ARRHYTHMIAS   1. There were rare PACs totalling 132 and averaging 5 per hour.  There were 2 couplets.   2. There were 2 non-sustained runs of EAT lasting from 3 to 6 beats.   SINUS NODE FUNCTION   1. The circadian pattern of sinus rate variability followed a typical curve with HRs averaging 65 bpm during waking hours and 55 bpm during sleep (9:30 PM - 7:30 AM).   2. There was no evidence of high grade SA delmi block.   AV CONDUCTION   1. There was no evidence of high grade AV block.   2. The longest RR interval was 2120 msec. The shortest RR interval was 500 msec.       Most recent ECG  10/12/2020 - atrial fibrillation , 81 bpm, occasion PVCs      Most recent Cath  None      Doppler 6/2017  There is nonocclusive thrombus identified within the left common femoral vein. There is occlusive thrombus seen within the left superficial femoral vein.       Most recent Echo 8/29/2020  · Concentric left ventricular hypertrophy.  · Severe left atrial enlargement.  · Intermediate central venous pressure (8 mmHg).  · The estimated PA systolic  pressure is 62 mmHg.  · Normal left ventricular systolic function. The estimated ejection fraction is 56%.  · No wall motion abnormalities.  · Atrial fibrillation observed.  · Normal right ventricular systolic function.  · Moderate right atrial enlargement.  · Moderate aortic regurgitation.  · Moderate tricuspid regurgitation.         Summary  Inna Blankenship is a 89 y.o. female with HTN, DM, CKD, CHF, and DVT  who was recently found to be in atrial fibrillation and associated clinical heart failure. Her CHADSVASC score is elevated around 6  (age, female, HTN, T2DM, CHF) and she is on anticoagulation. She is here today for evaluation of rhythm control given her ongoing symptoms of dyspnea despite rate control atrial fibrillation. I think it is very reasonable to pursue rhythm control strategy given her ongoing symptoms. She is anticoagulated and adherent to her medications but is hesitant about RAFFAELE even after it was discussed in detail. She has never had a CVA therefore reasonable to forgo RAFFAELE if she remains complaint with anticoagulation. Regarding her  rhythm control, we are some what limited to primarily class III agents. She does not have a hx of CAD and has not had a recent ischemic evaluation. Her renal function within the normal range.       Assessment  #Rate controlled persistent atrial fibrillation/ CHADsVASC 6 - Currently on Eliquis, not on BB   #Persistent dyspnea       Plan:  Will start amioderone, load at 200 mg BID x 4 weeks then reduce to 200 mg daily . If patient has s/e will consider reducing to 100 mg or switch entirely after she is in NSR  WIll plan for DCCV in 3 weeks . Will forgo RAFFAELE if she is remains complaint with eliquis   Will continue apixaban 5 mg BID

## 2020-10-12 ENCOUNTER — INITIAL CONSULT (OUTPATIENT)
Dept: ELECTROPHYSIOLOGY | Facility: CLINIC | Age: 85
End: 2020-10-12
Payer: MEDICARE

## 2020-10-12 ENCOUNTER — HOSPITAL ENCOUNTER (OUTPATIENT)
Dept: CARDIOLOGY | Facility: CLINIC | Age: 85
Discharge: HOME | End: 2020-10-12
Payer: MEDICARE

## 2020-10-12 VITALS
SYSTOLIC BLOOD PRESSURE: 141 MMHG | DIASTOLIC BLOOD PRESSURE: 79 MMHG | HEIGHT: 64 IN | WEIGHT: 128.75 LBS | BODY MASS INDEX: 21.98 KG/M2 | HEART RATE: 113 BPM

## 2020-10-12 DIAGNOSIS — I48.91 ATRIAL FIBRILLATION, UNSPECIFIED TYPE: ICD-10-CM

## 2020-10-12 DIAGNOSIS — I48.20 CHRONIC ATRIAL FIBRILLATION: Primary | ICD-10-CM

## 2020-10-12 DIAGNOSIS — Z01.818 PRE-OP TESTING: ICD-10-CM

## 2020-10-12 DIAGNOSIS — I49.8 OTHER SPECIFIED CARDIAC ARRHYTHMIAS: ICD-10-CM

## 2020-10-12 PROCEDURE — 1126F AMNT PAIN NOTED NONE PRSNT: CPT | Mod: HCNC,S$GLB,, | Performed by: INTERNAL MEDICINE

## 2020-10-12 PROCEDURE — 93010 RHYTHM STRIP: ICD-10-PCS | Mod: HCNC,S$GLB,, | Performed by: INTERNAL MEDICINE

## 2020-10-12 PROCEDURE — 99999 PR PBB SHADOW E&M-EST. PATIENT-LVL IV: CPT | Mod: PBBFAC,HCNC,, | Performed by: INTERNAL MEDICINE

## 2020-10-12 PROCEDURE — 1159F MED LIST DOCD IN RCRD: CPT | Mod: HCNC,S$GLB,, | Performed by: INTERNAL MEDICINE

## 2020-10-12 PROCEDURE — 1101F PR PT FALLS ASSESS DOC 0-1 FALLS W/OUT INJ PAST YR: ICD-10-PCS | Mod: HCNC,CPTII,S$GLB, | Performed by: INTERNAL MEDICINE

## 2020-10-12 PROCEDURE — 93005 ELECTROCARDIOGRAM TRACING: CPT | Mod: HCNC,S$GLB,, | Performed by: INTERNAL MEDICINE

## 2020-10-12 PROCEDURE — 99999 PR PBB SHADOW E&M-EST. PATIENT-LVL IV: ICD-10-PCS | Mod: PBBFAC,HCNC,, | Performed by: INTERNAL MEDICINE

## 2020-10-12 PROCEDURE — 93010 ELECTROCARDIOGRAM REPORT: CPT | Mod: HCNC,S$GLB,, | Performed by: INTERNAL MEDICINE

## 2020-10-12 PROCEDURE — 99205 OFFICE O/P NEW HI 60 MIN: CPT | Mod: HCNC,S$GLB,, | Performed by: INTERNAL MEDICINE

## 2020-10-12 PROCEDURE — 93005 RHYTHM STRIP: ICD-10-PCS | Mod: HCNC,S$GLB,, | Performed by: INTERNAL MEDICINE

## 2020-10-12 PROCEDURE — 1126F PR PAIN SEVERITY QUANTIFIED, NO PAIN PRESENT: ICD-10-PCS | Mod: HCNC,S$GLB,, | Performed by: INTERNAL MEDICINE

## 2020-10-12 PROCEDURE — 99205 PR OFFICE/OUTPT VISIT, NEW, LEVL V, 60-74 MIN: ICD-10-PCS | Mod: HCNC,S$GLB,, | Performed by: INTERNAL MEDICINE

## 2020-10-12 PROCEDURE — 1159F PR MEDICATION LIST DOCUMENTED IN MEDICAL RECORD: ICD-10-PCS | Mod: HCNC,S$GLB,, | Performed by: INTERNAL MEDICINE

## 2020-10-12 PROCEDURE — 1101F PT FALLS ASSESS-DOCD LE1/YR: CPT | Mod: HCNC,CPTII,S$GLB, | Performed by: INTERNAL MEDICINE

## 2020-10-12 RX ORDER — AMIODARONE HYDROCHLORIDE 200 MG/1
200 TABLET ORAL 2 TIMES DAILY
Qty: 60 TABLET | Refills: 6 | Status: SHIPPED | OUTPATIENT
Start: 2020-10-12 | End: 2020-10-19 | Stop reason: SDUPTHER

## 2020-10-12 NOTE — LETTER
October 14, 2020      Britany Sanchez MD  [address]  [facility] [address]           [id number]  [address]  [facility] [address]  Phone: [phone]  Fax: [phone]          Patient: Inna Blankenship   MR Number: [id number]   YOB: 1926   Date of Visit: 10/12/2020       Dear Dr. Britany Sanchez:    Thank you for referring Inna Blankenship to me for evaluation. Attached you will find relevant portions of my assessment and plan of care.    If you have questions, please do not hesitate to call me. I look forward to following Inna Blankenship along with you.    Sincerely,    Marvin Elmore MD    Enclosure  CC:  No Recipients    If you would like to receive this communication electronically, please contact [email or web address] or [phone] to request more information on [facility] access.    For providers and/or their staff who would like to refer a patient to [facility], please contact us through our one-stop-shop provider referral line, [facility] , at [phone].    If you feel you have received this communication in error or would no longer like to receive these types of communications, please e-mail [email or web address]

## 2020-10-13 ENCOUNTER — TELEPHONE (OUTPATIENT)
Dept: ELECTROPHYSIOLOGY | Facility: CLINIC | Age: 85
End: 2020-10-13

## 2020-10-13 NOTE — TELEPHONE ENCOUNTER
----- Message from Jennifer Andres sent at 10/13/2020  2:52 PM CDT -----  Regarding: Interaction  Pt says Rhonda called twice regarding a drug interaction with amiodarone. They need to speak with some one in the office to fill the prescription for Pt. Thanks    [phone]- Human

## 2020-10-15 RX ORDER — AMIODARONE HYDROCHLORIDE 200 MG/1
200 TABLET ORAL 2 TIMES DAILY
Qty: 60 TABLET | Refills: 6 | Status: CANCELLED | OUTPATIENT
Start: 2020-10-15 | End: 2021-10-15

## 2020-10-16 ENCOUNTER — TELEPHONE (OUTPATIENT)
Dept: INTERNAL MEDICINE | Facility: CLINIC | Age: 85
End: 2020-10-16

## 2020-10-16 ENCOUNTER — TELEPHONE (OUTPATIENT)
Dept: ELECTROPHYSIOLOGY | Facility: CLINIC | Age: 85
End: 2020-10-16

## 2020-10-16 RX ORDER — SERTRALINE HYDROCHLORIDE 50 MG/1
50 TABLET, FILM COATED ORAL DAILY
Qty: 90 TABLET | Refills: 1 | Status: SHIPPED | OUTPATIENT
Start: 2020-10-16 | End: 2020-10-19

## 2020-10-16 NOTE — TELEPHONE ENCOUNTER
Please let her know I would like to change her medication. Please advise her to stop the lexapro. I sent a prescription for Zoloft to her [facility] pharmacy as a replacement

## 2020-10-16 NOTE — TELEPHONE ENCOUNTER
Spoke with daughter clark. Informed daughter for pt to stop the lexapro and zoloft was sent to [facility] pharmacy as a replacement. Daughter verbally understood.

## 2020-10-16 NOTE — TELEPHONE ENCOUNTER
Spoke with daughter she says mom is on Lexapro and she thinks it is not working ever since their father passed away a week ago. Also her mom is not acting like herself. Saying things out of usual. She was wondering if there was something you can help with. Says patient cannot remember like she used too.       Please advise!

## 2020-10-16 NOTE — TELEPHONE ENCOUNTER
Returned call to pt's daughter. Advised that Dr noland has not made a decision on medication at this time. I will readdress with him on Monday and get back with her.           ----- Message from Sofiya Farah MA sent at 10/16/2020  1:12 PM CDT -----  Regarding: FW: returning a call/disconnected  Contact: patient's daughter Cara    ----- Message -----  From: Emely Lopez  Sent: 10/16/2020  12:30 PM CDT  To: Catarina Wong Staff  Subject: returning a call/disconnected                    The pt's daughter is calling back because the phone was disconnected. Please call her back @ [phone]. Thanks, Emely

## 2020-10-16 NOTE — TELEPHONE ENCOUNTER
----- Message from No Cardona sent at 10/16/2020 12:51 PM CDT -----  Contact: [phone] Daughter (Nika)  Patient is returning a phone call.  Who left a message for the patient: ?  Does patient know what this is regarding:  ?  Comments:

## 2020-10-19 RX ORDER — AMIODARONE HYDROCHLORIDE 200 MG/1
200 TABLET ORAL DAILY
Qty: 90 TABLET | Refills: 3 | Status: SHIPPED | OUTPATIENT
Start: 2020-11-19 | End: 2020-10-26 | Stop reason: SDUPTHER

## 2020-10-19 RX ORDER — AMIODARONE HYDROCHLORIDE 200 MG/1
200 TABLET ORAL 2 TIMES DAILY
Qty: 60 TABLET | Refills: 11 | Status: SHIPPED | OUTPATIENT
Start: 2020-10-19 | End: 2021-01-11

## 2020-10-19 NOTE — TELEPHONE ENCOUNTER
Returned call to pt's daughter. She stated the PCP changed the citalopram to sertraline. Both of these medications prolong QT interval and should not be used with amiodarone. Daughter  Stopped citalopram on Friday and now will call PCP back to discuss replacing sertraline. She will call linh with drug PCP will replace with. Amiodarone called in to [facility] so pt can begin taking in the mean time.       ----- Message from Jennifer Andres sent at 10/19/2020  9:27 AM CDT -----  Regarding: Med Question  Pt's PCP took off Lexapro because of a drug interaction and want Dr Elmore know if can call. Thanks    [phone] Anerson/daughter

## 2020-10-19 NOTE — TELEPHONE ENCOUNTER
----- Message from Jocelynrick Peraza sent at 10/19/2020  3:31 PM CDT -----  Contact: [phone] Daughter (Cara)  Pt is requesting for sertraline (ZOLOFT) 50 MG tablet to be cancel with [facility] due to an interaction with another rx. Please advise . Please advise

## 2020-10-21 ENCOUNTER — CARE AT HOME (OUTPATIENT)
Dept: HOME HEALTH SERVICES | Facility: CLINIC | Age: 85
End: 2020-10-21
Payer: MEDICARE

## 2020-10-21 VITALS
DIASTOLIC BLOOD PRESSURE: 74 MMHG | HEIGHT: 64 IN | BODY MASS INDEX: 21.85 KG/M2 | HEART RATE: 87 BPM | RESPIRATION RATE: 18 BRPM | WEIGHT: 128 LBS | SYSTOLIC BLOOD PRESSURE: 132 MMHG | TEMPERATURE: 97 F | OXYGEN SATURATION: 98 %

## 2020-10-21 DIAGNOSIS — I50.33 ACUTE ON CHRONIC DIASTOLIC (CONGESTIVE) HEART FAILURE: ICD-10-CM

## 2020-10-21 PROCEDURE — 99497 PR ADVNCD CARE PLAN 30 MIN: ICD-10-PCS | Mod: S$GLB,,, | Performed by: NURSE PRACTITIONER

## 2020-10-21 PROCEDURE — 99350 HOME/RES VST EST HIGH MDM 60: CPT | Mod: S$GLB,,, | Performed by: NURSE PRACTITIONER

## 2020-10-21 PROCEDURE — 1101F PR PT FALLS ASSESS DOC 0-1 FALLS W/OUT INJ PAST YR: ICD-10-PCS | Mod: CPTII,S$GLB,, | Performed by: NURSE PRACTITIONER

## 2020-10-21 PROCEDURE — 1126F PR PAIN SEVERITY QUANTIFIED, NO PAIN PRESENT: ICD-10-PCS | Mod: S$GLB,,, | Performed by: NURSE PRACTITIONER

## 2020-10-21 PROCEDURE — 1159F PR MEDICATION LIST DOCUMENTED IN MEDICAL RECORD: ICD-10-PCS | Mod: S$GLB,,, | Performed by: NURSE PRACTITIONER

## 2020-10-21 PROCEDURE — 1126F AMNT PAIN NOTED NONE PRSNT: CPT | Mod: S$GLB,,, | Performed by: NURSE PRACTITIONER

## 2020-10-21 PROCEDURE — 1101F PT FALLS ASSESS-DOCD LE1/YR: CPT | Mod: CPTII,S$GLB,, | Performed by: NURSE PRACTITIONER

## 2020-10-21 PROCEDURE — 99350 PR HOME VISIT,ESTAB PATIENT,LEVEL IV: ICD-10-PCS | Mod: S$GLB,,, | Performed by: NURSE PRACTITIONER

## 2020-10-21 PROCEDURE — 1159F MED LIST DOCD IN RCRD: CPT | Mod: S$GLB,,, | Performed by: NURSE PRACTITIONER

## 2020-10-21 PROCEDURE — 99497 ADVNCD CARE PLAN 30 MIN: CPT | Mod: S$GLB,,, | Performed by: NURSE PRACTITIONER

## 2020-10-21 NOTE — PATIENT INSTRUCTIONS
Instructions:  - [facility] Nurse Practitioner to schedule home follow-up visit with patient in 4-6 weeks or as needed.  - Continue all medications, treatments and therapies as ordered.   - Follow all instructions, recommendations as discussed.  - Maintain Safety Precautions at all times.  - Attend all medical appointments as scheduled.  - For worsening symptoms: call Primary Care Physician or Nurse Practitioner.  - For emergencies, call 911 or immediately report to the nearest emergency room.  - Limit Risks of environmental exposure to coronavirus/COVID-19 as discussed including: social distancing, hand hygiene, and limiting departures from the home for necessities only.

## 2020-10-21 NOTE — PROGRESS NOTES
[facility]  Transition of Care Home Visit    Visit Date: 10/21/2020  Encounter Provider: Heather Farley NP  PCP:  Stacy Rodriguez MD    PRESENTING HISTORY      Patient ID: Inna Blankenship is a 89 y.o. female.    Consult Requested By:  Heather Farley  Reason for Consult:  Hospital Follow up    Inna is being seen at home due to  physical debility that presents a taxing effort to leave the home, to mitigate high risk of hospital readmission or due to the limited availability of reliable or safe options for transportation to the point of access to the provider. Prior to treatment on this visit the chart was reviewed and patient consent was obtained.  .      Chief Complaint: Transitional Care, CHF      History of Present Illness: Ms. Inna Blankenship is a 89 y.o. female who was recently admitted to [facility].    Admission Date: 8/28/2020  Hospital Length of Stay: 2 days  Discharge Date and Time: 8/30/2020     Hospital Course:   Patient was admitted on the heart failure protocol and diuresed.  Echo showed atrial fibrillation with controlled rate, multiple valvular abnormalities, EF 56%.  She was feeling well after being diuresed overnight and was seen by the cardiologist, who recommended continuing IV diuresis until the following day.  She was transitioned back to oral Lasix on 8/30 and discharged home when she was feeling comfortable.  Home health was ordered.   ___________________________________________________________  HPI:  Inna Blankenship is a 89 year old female with a past medical history of HTN, DM, CKD, CHF and DVT in which she takes Elilquis daily. She presented to ED 8/28/2020 with a cc of increasing dyspnea on exertion with corresponding chest pain. She denies cough or wheezing. She has decreased appetite at baseline with no recent changes.  On initial workup, the patient's BNP is elevated to 766 with a slightly elevated troponin.  She will be admitted for acute exacerbation of congestive heart  failure.    See hospital course above.    She is being evaluated today for a follow up medical care visit. With this visit today patient is found at home, sitting up in the living room on the sofa. She is AAOx3. Her daughter is present who is her primary caregiver during the day. She has another family member who sits with her overnight. Her  was a hospice patient who  about 2 weeks ago, as per her daughter she has been grieving. She is ambulatory with a rolling walker. She has some new onset Afib and was recently started on amiodarone. She is asymptomatic at this time with a normal heart rate.    [facility] RN/PT is working with patient over the last few weeks.      VSS. Denies fever, chest pain, shortness of breath, nausea, vomiting, diarrhea. Risks of environmental exposure to coronavirus discussed including: social distancing, hand hygiene, and limiting departures from the home for necessities only.  Reports understanding and willingness to comply.  All hospital discharge orders reviewed and being followed, all medications reconciled and reviewed, patient and family verbalized understanding. No other needs identified at this time.     I reiterated the process of advance care planning today and explained the importance of this process to the patient and family.  I introduced the concept of advance directives to the patient, as well. The patient has not previously appointed a HCPOA. Then the patient received detailed information about the importance of designating a Health Care Power of  (HCPOA). The patient was also instructed to communicate with this person about their wishes for future healthcare, should patient become sick and lose decision-making capacity. We spoke about ACP for 30 minutes.    Attestation: Screening criteria to assess the level of the patient's risk for infection with COVID-19 as recommended by the CDC at the time of the above documented home visit concluded  appropriateness to proceed. Universal precautions were maintained at all times, including provider use of 60% alcohol gel hand  immediately prior to entry and upon departing the patient's home.    Review of Systems  Constitutional:  Negative for fever.   HENT: Negative for congestion, ear pain, rhinorrhea and sinus pressure.   Eyes: Negative for pain and discharge.   Respiratory: Negative for chest tightness and wheezing.    Cardiovascular:  Negative for chest pain.   Gastrointestinal: Negative for abdominal distention, abdominal pain, diarrhea, nausea and vomiting.   Endocrine: Negative for cold intolerance and heat intolerance.   Genitourinary: Negative for difficulty urinating, flank pain, frequency, hematuria and urgency.   Musculoskeletal: Positive for myalgias. Negative for arthralgias and joint swelling.   Allergic/Immunologic: Negative for environmental allergies and food allergies.   Neurological: Negative for dizziness, weakness, light-headedness and headaches.   Hematological: Does not bruise/bleed easily.   Psychiatric/Behavioral: Negative for agitation, behavioral problems and decreased concentration.    Assessments:  · Environmental: Lives in single story with no steps to enter  · Functional Status: Min asst with ADLs and walker for mobility  · Safety: Fall precautions  · Nutritional: Cardiac diet  · Home Health/DME/Supplies: [facility]    PAST HISTORY:     Past Medical History:   Diagnosis Date    Arthritis     CHF (congestive heart failure) 12/26/2018    Chronic atrial fibrillation 8/29/2020    Chronic kidney disease, stage III (moderate) 9/11/2015    Colon adenoma     Diabetes mellitus, type II     Diet controlled    Glaucoma     Hyperlipemia     Hypertension     Osteopenia        Past Surgical History:   Procedure Laterality Date    APPENDECTOMY      CATARACT EXTRACTION W/  INTRAOCULAR LENS IMPLANT Right 03/15/2006        CATARACT EXTRACTION W/  INTRAOCULAR LENS  IMPLANT Left 09/27/2006        COLONOSCOPY W/ POLYPECTOMY      EYE SURGERY      HYSTERECTOMY         Family History   Problem Relation Age of Onset    Heart attack Mother     Heart disease Mother     No Known Problems Father     Cancer Sister     Hypertension Daughter     Asthma Daughter     Allergies Daughter     Amblyopia Neg Hx     Blindness Neg Hx     Cataracts Neg Hx     Diabetes Neg Hx     Glaucoma Neg Hx     Macular degeneration Neg Hx     Retinal detachment Neg Hx     Strabismus Neg Hx     Stroke Neg Hx     Thyroid disease Neg Hx        Social History     Socioeconomic History    Marital status:      Spouse name: Not on file    Number of children: Not on file    Years of education: Not on file    Highest education level: Not on file   Occupational History    Not on file   Social Needs    Financial resource strain: Not on file    Food insecurity     Worry: Not on file     Inability: Not on file    Transportation needs     Medical: Not on file     Non-medical: Not on file   Tobacco Use    Smoking status: Never Smoker    Smokeless tobacco: Never Used   Substance and Sexual Activity    Alcohol use: Yes     Comment: wine, rarely    Drug use: No    Sexual activity: Not Currently   Lifestyle    Physical activity     Days per week: Not on file     Minutes per session: Not on file    Stress: Not on file   Relationships    Social connections     Talks on phone: Not on file     Gets together: Not on file     Attends Baptism service: Not on file     Active member of club or organization: Not on file     Attends meetings of clubs or organizations: Not on file     Relationship status: Not on file   Other Topics Concern    Not on file   Social History Narrative    Not on file       MEDICATIONS & ALLERGIES:     Current Outpatient Medications on File Prior to Visit   Medication Sig Dispense Refill    amiodarone (PACERONE) 200 MG Tab Take 1 tablet (200 mg total) by  mouth 2 (two) times daily. 60 tablet 11    [START ON 11/19/2020] amiodarone (PACERONE) 200 MG Tab Take 1 tablet (200 mg total) by mouth once daily. 90 tablet 3    atorvastatin (LIPITOR) 20 MG tablet TAKE 1 TABLET EVERY DAY 90 tablet 0    busPIRone (BUSPAR) 5 MG Tab TAKE 1 TABLET TWICE DAILY 180 tablet 0    CALCIUM CARBONATE (EYQC-VEV-500 ORAL) Take 1 tablet by mouth Daily. 1  Oral Every day      COMBIGAN 0.2-0.5 % Drop INSTILL 1 DROP INTO BOTH EYES TWICE DAILY 15 mL 3    ELIQUIS 5 mg Tab TAKE 1 TABLET TWICE DAILY 180 tablet 0    FLUZONE HIGH-DOSE 2019-20, PF, 180 mcg/0.5 mL Syrg TO BE ADMINISTERED BY PHARMACIST FOR IMMUNIZATION  0    furosemide (LASIX) 20 MG tablet Take 1 tablet (20 mg total) by mouth once daily. 90 tablet 1    latanoprost 0.005 % ophthalmic solution Place 1 drop into both eyes every evening. 15 mL 3    multivit with minerals/lutein (MULTIVITAMIN 50 PLUS ORAL) Take 1 tablet by mouth once daily.       olmesartan (BENICAR) 20 MG tablet TAKE 1 TABLET EVERY DAY 90 tablet 0    [DISCONTINUED] timolol maleate 0.5% (TIMOPTIC-XR) 0.5 % SolG Place 1 drop into both eyes once daily. 5 mL 12     No current facility-administered medications on file prior to visit.         Review of patient's allergies indicates:  No Known Allergies    OBJECTIVE:     Vital Signs:  Vitals:    10/21/20 1030   BP: 132/74   Pulse: 87   Resp: 18   Temp: 97 °F (36.1 °C)     Body mass index is 21.97 kg/m².     Physical Exam:  Constitutional:       Appearance: Normal appearance. She is well-developed.   HENT:      Head: Normocephalic.   Eyes:      General:         Right eye: No discharge.         Left eye: No discharge.      Conjunctiva/sclera: Conjunctivae normal.   Neck:      Musculoskeletal: Normal range of motion and neck supple.   Cardiovascular:      Rate and Rhythm: Normal rate. Rhythm regularly irregular.      Pulses:           Radial pulses are 2+ on the right side and 2+ on the left side.      Heart sounds: Normal  heart sounds.   Pulmonary:      Effort: Pulmonary effort is normal. No respiratory distress.      Breath sounds: wnl  Abdominal:      General: There is no distension.      Palpations: Abdomen is soft.      Tenderness: There is no abdominal tenderness.   Musculoskeletal: Normal range of motion.      Right lower le+ Pitting Edema present.      Left lower le+ Pitting Edema present.   Skin:     General: Skin is warm and dry.      Capillary Refill: Capillary refill takes 2 to 3 seconds.   Neurological:      Mental Status: She is alert.      Psychiatric:         Mood and Affect: Mood normal.         Speech: Speech normal.         Behavior: Behavior normal    Laboratory  Lab Results   Component Value Date    WBC 6.11 2020    HGB 11.1 (L) 2020    HCT 34.5 (L) 2020    MCV 95 2020     2020     Lab Results   Component Value Date    INR 1.4 (H) 2020    INR 1.1 2006    INR 1.0 03/10/2006     Lab Results   Component Value Date    HGBA1C 5.8 (H) 2020     No results for input(s): POCTGLUCOSE in the last 72 hours.    Diagnostic Results:  n/a    TRANSITION OF CARE:     [facility] Contact Note: 2020    Family and/or Caretaker present at visit?  Yes.  Diagnostic tests reviewed/disposition: No diagnosic tests pending after this hospitalization.  Disease/illness education: Fall precautions  Home health/community services discussion/referrals: Patient has home health established at [facility].   Establishment or re-establishment of referral orders for community resources: No other necessary community resources.   Discussion with other health care providers: No discussion with other health care providers necessary.     Transition of Care Visit:     I have reviewed and updated the history and problem list.  I have reconciled the medication list.  I have discussed the hospitalization and current medical issues, prognosis and plans with the patient/family.  I   spent more than 50% of time discussing the care with the patient/family.  Total Face-to-Face Encounter: 60 minutes.    Medications Reconciliation:   I have reconciled the patient's home medications and discharge medications with the patient/family. I have updated all changes.  Refer to After-Visit Medication List.    ASSESSMENT & PLAN:     HIGH RISK CONDITION(S):  Patient has a condition that poses threat to life and bodily function    Inna was seen today for transitional care.    Diagnoses and all orders for this visit:    Acute on chronic diastolic congestive heart failure  - Presented with elevated BNP and troponin, sudden onset shortness of breath.  - New echo shows normal EF, atrial fibrillation.    Chronic atrial fibrillation  Long term anti coagulant   - Patient on metoprolol, apixaban  -Continue amiodarone  - Bleeding precautions        Controlled type 2 diabetes mellitus without complication, without long-term current use of insulin  - HbA1c 5.8 and BG has been normal  - Continue home medication.     Other hyperlipidemia  Continue Lipitor        Essential hypertension  Continue home meds        Were controlled substances prescribed?  No    Instructions for the patient:    Scheduled Follow-up :  Future Appointments   Date Time Provider Department Center   11/2/2020 11:00 AM INPATIENT, [facility] ECHOSTR [facility] Boone   1/22/2021 11:20 AM Stacy Rodriguez MD [facility]one [facility]       After Visit Medication List :     Medication List          Accurate as of October 21, 2020  3:39 PM. If you have any questions, ask your nurse or doctor.            CONTINUE taking these medications    * amiodarone 200 MG Tab  Commonly known as: PACERONE  Take 1 tablet (200 mg total) by mouth 2 (two) times daily.     * amiodarone 200 MG Tab  Commonly known as: PACERONE  Take 1 tablet (200 mg total) by mouth once daily.  Start taking on: November 19, 2020     atorvastatin 20 MG tablet  Commonly known as: LIPITOR  TAKE 1 TABLET EVERY  DAY     busPIRone 5 MG Tab  Commonly known as: BUSPAR  TAKE 1 TABLET TWICE DAILY     COMBIGAN 0.2-0.5 % Drop  Generic drug: brimonidine-timoloL  INSTILL 1 DROP INTO BOTH EYES TWICE DAILY     ELIQUIS 5 mg Tab  Generic drug: apixaban  TAKE 1 TABLET TWICE DAILY     FLUZONE HIGH-DOSE 2019-20 (PF) 180 mcg/0.5 mL Syrg  Generic drug: flu vacc [id number](65yr up)PF     furosemide 20 MG tablet  Commonly known as: LASIX  Take 1 tablet (20 mg total) by mouth once daily.     latanoprost 0.005 % ophthalmic solution  Place 1 drop into both eyes every evening.     MULTIVITAMIN 50 PLUS ORAL     olmesartan 20 MG tablet  Commonly known as: BENICAR  TAKE 1 TABLET EVERY DAY     [id number] ORAL         * This list has 2 medication(s) that are the same as other medications prescribed for you. Read the directions carefully, and ask your doctor or other care provider to review them with you.                Signature:  Heather Farley NP    Patient consent obtained prior to treatment

## 2020-10-26 RX ORDER — AMIODARONE HYDROCHLORIDE 200 MG/1
200 TABLET ORAL DAILY
Qty: 90 TABLET | Refills: 3 | Status: ON HOLD | OUTPATIENT
Start: 2020-11-19 | End: 2021-02-11 | Stop reason: HOSPADM

## 2020-10-27 NOTE — PROGRESS NOTES
Patient Instructions  RAFFAELE (Transesophageal Echocardiogram) with Cardioversion     Pre-Procedure Testin/06/2020 at 10:50 AM  Blood Work has been scheduled for you at : [facility]  Located on the second floor of the Clinic Towers  · You do NOT need to fast for this blood work.       2020 at 10:30 AM  COVID 19 Nasal Swab has been scheduled for you at: [facility]  Located on the second floor of the Clinic Towers   · You do NOT need to fast for this.       Important Medication Information:    · You may take your usual morning medications with a sip of water on the day of your procedure.         Day of Procedure:    2020 at 9:00 am - RAFFAELE / Cardioversion     [facility] - [address] [place] [address]  Please report to Cardiology Waiting Room on the 3rd floor of the Hospital,     · Do not eat or drink anything after 12 midnight on the night before your procedure.  · Please follow important medication instructions as listed above.   · Please do not wear makeup, especially mascara, when arriving for your procedure.  · You will be going home after your procedure.  · You will need someone to drive you home from the hospital due to anesthesia. You are allowed one adult guest to accompany you pre and post procedure.       Post Procedure Testin2020  At 11:00AM   EKG has been scheduled for you at: [facility]  Located on the second Floor of the Clinic Towers    You will have a post procedural EKG one week after your cardioversion to assess your heart rhythm and rate.       Directions to Cardiology Waiting Room  If you park in the Parking Garage:  Take elevators to the 2nd floor  Walk up ramp and turn right by Gold Elevators  Take elevator to the 3rd floor  Upon exiting the elevator, turn away from the clinic areas  Walk long english around to front of hospital to area with windows overlooking [facility]  Check in at Reception Desk  OR  If  family is dropping you off:  Have them drop you off at the front of the Hospital  (Near the ER, where all the flags are hung).  Take the E elevators to the 3rd floor.  Check in at the Reception Desk in the waiting room.      Any need to reschedule or cancel procedures, or any questions regarding your procedures should be addressed directly with the Arrhythmia Department Nurses at the following phone number: [phone].

## 2020-11-01 PROCEDURE — G0179 MD RECERTIFICATION HHA PT: HCPCS | Mod: ,,, | Performed by: INTERNAL MEDICINE

## 2020-11-01 PROCEDURE — G0179 PR HOME HEALTH MD RECERTIFICATION: ICD-10-PCS | Mod: ,,, | Performed by: INTERNAL MEDICINE

## 2020-11-03 ENCOUNTER — TELEPHONE (OUTPATIENT)
Dept: CARDIOLOGY | Facility: CLINIC | Age: 85
End: 2020-11-03

## 2020-11-03 NOTE — TELEPHONE ENCOUNTER
----- Message from Magaly Jules MA sent at 10/26/2020  1:39 PM CDT -----  Regarding: FW: Swelling    ----- Message -----  From: Lissy Kendrick RN  Sent: 10/26/2020  12:31 PM CDT  To: Laura Garg Staff  Subject: FW: Swelling                                     Pt is scheduled for RAFFAELE/DCCV on 11/09/2020. Lower leg swelling & Breathing raspy. Could you please follow up with pt. Thanks.    ----- Message -----  From: Jennifer Andres  Sent: 10/26/2020   9:40 AM CDT  To: Lissy Kendrick RN  Subject: Swelling                                         Daughter calling to report Pt's feet are swollen & breathing raspy. Thanks     [phone]

## 2020-11-03 NOTE — TELEPHONE ENCOUNTER
Returned pt's call and spoke to daughter who reported the symptoms had resolved. She will call back if she has other questions.     Britany Sanchez MD  Cardiology, PGY V  Pager: [phone]

## 2020-11-05 ENCOUNTER — TELEPHONE (OUTPATIENT)
Dept: ELECTROPHYSIOLOGY | Facility: CLINIC | Age: 85
End: 2020-11-05

## 2020-11-05 NOTE — TELEPHONE ENCOUNTER
Returned call to daughter. Shr received instructions in the mail and had a few questions. All questions answered. She voiced understanding.        ----- Message from Sofiya Farah MA sent at 11/5/2020  4:23 PM CST -----  Regarding: FW: cancel lab  Contact: patient    ----- Message -----  From: Emely Lopez  Sent: 11/5/2020   4:18 PM CST  To: Catarina Wong Staff  Subject: cancel lab                                       The pt is calling to cancel her labs because her medication didn't come in until later. Please call her back @ [phone]. Thanks, Emely

## 2020-11-06 ENCOUNTER — LAB VISIT (OUTPATIENT)
Dept: SURGERY | Facility: CLINIC | Age: 85
End: 2020-11-06
Payer: MEDICARE

## 2020-11-06 ENCOUNTER — TELEPHONE (OUTPATIENT)
Dept: ELECTROPHYSIOLOGY | Facility: CLINIC | Age: 85
End: 2020-11-06

## 2020-11-06 DIAGNOSIS — Z01.818 PRE-OP TESTING: ICD-10-CM

## 2020-11-06 LAB — SARS-COV-2 RNA RESP QL NAA+PROBE: NOT DETECTED

## 2020-11-06 PROCEDURE — U0003 INFECTIOUS AGENT DETECTION BY NUCLEIC ACID (DNA OR RNA); SEVERE ACUTE RESPIRATORY SYNDROME CORONAVIRUS 2 (SARS-COV-2) (CORONAVIRUS DISEASE [COVID-19]), AMPLIFIED PROBE TECHNIQUE, MAKING USE OF HIGH THROUGHPUT TECHNOLOGIES AS DESCRIBED BY CMS-2020-01-R: HCPCS | Mod: HCNC

## 2020-11-06 NOTE — TELEPHONE ENCOUNTER
Spoke to Cara, patient's daughter    CONFIRMED procedure arrival time of 9AM    Reiterated instructions including:  -Directions to check in desk  -NPO after midnight night prior to procedure  -Confirmed compliance of Eliquis, reports no missed doses  -Pre-procedure LABS done, no alerts  -COVID test today, pending  -Confirmed no fever, cough, or shortness of breath in the past 30 days  -Reviewed current visitor policy    Patient verbalizes understanding of above and appreciates call.

## 2020-11-09 ENCOUNTER — HOSPITAL ENCOUNTER (OUTPATIENT)
Dept: CARDIOLOGY | Facility: HOSPITAL | Age: 85
Discharge: HOME | End: 2020-11-09
Attending: INTERNAL MEDICINE

## 2020-11-09 ENCOUNTER — HOSPITAL ENCOUNTER (OUTPATIENT)
Facility: HOSPITAL | Age: 85
Discharge: HOME | End: 2020-11-09
Attending: INTERNAL MEDICINE | Admitting: INTERNAL MEDICINE
Payer: MEDICARE

## 2020-11-09 ENCOUNTER — ANESTHESIA (OUTPATIENT)
Dept: MEDSURG UNIT | Facility: HOSPITAL | Age: 85
End: 2020-11-09
Payer: MEDICARE

## 2020-11-09 ENCOUNTER — ANESTHESIA EVENT (OUTPATIENT)
Dept: MEDSURG UNIT | Facility: HOSPITAL | Age: 85
End: 2020-11-09
Payer: MEDICARE

## 2020-11-09 VITALS
TEMPERATURE: 99 F | OXYGEN SATURATION: 95 % | DIASTOLIC BLOOD PRESSURE: 81 MMHG | BODY MASS INDEX: 21 KG/M2 | RESPIRATION RATE: 36 BRPM | HEART RATE: 61 BPM | HEIGHT: 64 IN | WEIGHT: 123 LBS | SYSTOLIC BLOOD PRESSURE: 173 MMHG

## 2020-11-09 DIAGNOSIS — I48.91 ATRIAL FIBRILLATION: ICD-10-CM

## 2020-11-09 DIAGNOSIS — I48.19 PERSISTENT ATRIAL FIBRILLATION: ICD-10-CM

## 2020-11-09 DIAGNOSIS — I48.20 CHRONIC ATRIAL FIBRILLATION: Primary | ICD-10-CM

## 2020-11-09 LAB — POCT GLUCOSE: 137 MG/DL (ref 70–110)

## 2020-11-09 PROCEDURE — 37000008 HC ANESTHESIA 1ST 15 MINUTES: Mod: HCNC | Performed by: INTERNAL MEDICINE

## 2020-11-09 PROCEDURE — 25000003 PHARM REV CODE 250: Mod: HCNC | Performed by: NURSE PRACTITIONER

## 2020-11-09 PROCEDURE — 92960 PR CARDIOVERSION, ELECTIVE;EXTERN: ICD-10-PCS | Mod: HCNC,,, | Performed by: INTERNAL MEDICINE

## 2020-11-09 PROCEDURE — 25000003 PHARM REV CODE 250: Mod: HCNC | Performed by: NURSE ANESTHETIST, CERTIFIED REGISTERED

## 2020-11-09 PROCEDURE — D9220A PRA ANESTHESIA: Mod: HCNC,CRNA,, | Performed by: NURSE ANESTHETIST, CERTIFIED REGISTERED

## 2020-11-09 PROCEDURE — 92960 CARDIOVERSION ELECTRIC EXT: CPT | Mod: HCNC | Performed by: INTERNAL MEDICINE

## 2020-11-09 PROCEDURE — D9220A PRA ANESTHESIA: Mod: HCNC,ANES,, | Performed by: ANESTHESIOLOGY

## 2020-11-09 PROCEDURE — 92960 CARDIOVERSION ELECTRIC EXT: CPT | Mod: HCNC,,, | Performed by: INTERNAL MEDICINE

## 2020-11-09 PROCEDURE — 82962 GLUCOSE BLOOD TEST: CPT | Mod: HCNC | Performed by: INTERNAL MEDICINE

## 2020-11-09 PROCEDURE — 93005 ELECTROCARDIOGRAM TRACING: CPT | Mod: HCNC,59

## 2020-11-09 PROCEDURE — 93010 EKG 12-LEAD: ICD-10-PCS | Mod: HCNC,,, | Performed by: INTERNAL MEDICINE

## 2020-11-09 PROCEDURE — 63600175 PHARM REV CODE 636 W HCPCS: Mod: HCNC | Performed by: NURSE ANESTHETIST, CERTIFIED REGISTERED

## 2020-11-09 PROCEDURE — D9220A PRA ANESTHESIA: ICD-10-PCS | Mod: HCNC,ANES,, | Performed by: ANESTHESIOLOGY

## 2020-11-09 PROCEDURE — D9220A PRA ANESTHESIA: ICD-10-PCS | Mod: HCNC,CRNA,, | Performed by: NURSE ANESTHETIST, CERTIFIED REGISTERED

## 2020-11-09 PROCEDURE — 93005 ELECTROCARDIOGRAM TRACING: CPT | Mod: HCNC

## 2020-11-09 PROCEDURE — 25000003 PHARM REV CODE 250: Mod: HCNC | Performed by: INTERNAL MEDICINE

## 2020-11-09 PROCEDURE — 37000009 HC ANESTHESIA EA ADD 15 MINS: Mod: HCNC | Performed by: INTERNAL MEDICINE

## 2020-11-09 PROCEDURE — 93010 ELECTROCARDIOGRAM REPORT: CPT | Mod: HCNC,,, | Performed by: INTERNAL MEDICINE

## 2020-11-09 RX ORDER — ETOMIDATE 2 MG/ML
INJECTION INTRAVENOUS
Status: DISCONTINUED | OUTPATIENT
Start: 2020-11-09 | End: 2020-11-09

## 2020-11-09 RX ORDER — LIDOCAINE HYDROCHLORIDE 20 MG/ML
INJECTION INTRAVENOUS
Status: DISCONTINUED | OUTPATIENT
Start: 2020-11-09 | End: 2020-11-09

## 2020-11-09 RX ORDER — SODIUM CHLORIDE 9 MG/ML
INJECTION, SOLUTION INTRAVENOUS CONTINUOUS PRN
Status: DISCONTINUED | OUTPATIENT
Start: 2020-11-09 | End: 2020-11-09

## 2020-11-09 RX ORDER — SODIUM CHLORIDE 0.9 % (FLUSH) 0.9 %
3 SYRINGE (ML) INJECTION
Status: DISCONTINUED | OUTPATIENT
Start: 2020-11-09 | End: 2020-11-09 | Stop reason: HOSPADM

## 2020-11-09 RX ORDER — PROPOFOL 10 MG/ML
VIAL (ML) INTRAVENOUS
Status: DISCONTINUED | OUTPATIENT
Start: 2020-11-09 | End: 2020-11-09

## 2020-11-09 RX ORDER — ONDANSETRON 2 MG/ML
4 INJECTION INTRAMUSCULAR; INTRAVENOUS ONCE AS NEEDED
Status: DISCONTINUED | OUTPATIENT
Start: 2020-11-09 | End: 2020-11-09 | Stop reason: HOSPADM

## 2020-11-09 RX ORDER — DIPHENHYDRAMINE HYDROCHLORIDE 50 MG/ML
25 INJECTION INTRAMUSCULAR; INTRAVENOUS EVERY 6 HOURS PRN
Status: DISCONTINUED | OUTPATIENT
Start: 2020-11-09 | End: 2020-11-09 | Stop reason: HOSPADM

## 2020-11-09 RX ORDER — HYDROMORPHONE HYDROCHLORIDE 1 MG/ML
0.2 INJECTION, SOLUTION INTRAMUSCULAR; INTRAVENOUS; SUBCUTANEOUS EVERY 5 MIN PRN
Status: DISCONTINUED | OUTPATIENT
Start: 2020-11-09 | End: 2020-11-09 | Stop reason: HOSPADM

## 2020-11-09 RX ORDER — FENTANYL CITRATE 50 UG/ML
25 INJECTION, SOLUTION INTRAMUSCULAR; INTRAVENOUS EVERY 5 MIN PRN
Status: DISCONTINUED | OUTPATIENT
Start: 2020-11-09 | End: 2020-11-09 | Stop reason: HOSPADM

## 2020-11-09 RX ORDER — SILVER SULFADIAZINE 10 G/1000G
CREAM TOPICAL
Status: DISCONTINUED | OUTPATIENT
Start: 2020-11-09 | End: 2020-11-09 | Stop reason: HOSPADM

## 2020-11-09 RX ADMIN — SODIUM CHLORIDE: 0.9 INJECTION, SOLUTION INTRAVENOUS at 10:11

## 2020-11-09 RX ADMIN — ETOMIDATE 3 MG: 2 INJECTION, SOLUTION INTRAVENOUS at 10:11

## 2020-11-09 RX ADMIN — LIDOCAINE HYDROCHLORIDE 40 MG: 20 INJECTION, SOLUTION INTRAVENOUS at 10:11

## 2020-11-09 RX ADMIN — APIXABAN 2.5 MG: 2.5 TABLET, FILM COATED ORAL at 09:11

## 2020-11-09 RX ADMIN — PROPOFOL 15 MG: 10 INJECTION, EMULSION INTRAVENOUS at 10:11

## 2020-11-09 NOTE — TRANSFER OF CARE
"Anesthesia Transfer of Care Note    Patient: Inna Blankenship    Procedure(s) Performed: Procedure(s) (LRB):  CARDIOVERSION (N/A)  ECHOCARDIOGRAM, TRANSESOPHAGEAL (N/A)    Patient location: PACU    Anesthesia Type: general    Transport from OR: Transported from OR on 6-10 L/min O2 by face mask with adequate spontaneous ventilation    Post pain: adequate analgesia    Post assessment: no apparent anesthetic complications and tolerated procedure well    Post vital signs: stable    Level of consciousness: awake, alert and oriented    Nausea/Vomiting: no nausea/vomiting    Complications: none    Transfer of care protocol was followed      Last vitals:   Visit Vitals  /86 (BP Location: Right arm, Patient Position: Lying)   Pulse 60   Temp 37.2 °C (98.9 °F) (Oral)   Resp 20   Ht 5' 4" (1.626 m)   Wt 55.8 kg (123 lb)   SpO2 99%   Breastfeeding No   BMI 21.11 kg/m²     "

## 2020-11-09 NOTE — DISCHARGE SUMMARY
[facility]  Cardiac Electrophysiology  Discharge Summary      Patient Name: Inna Blankenship  MRN: [id number]  Admission Date: 11/9/2020  Hospital Length of Stay: 0 days  Discharge Date and Time:  11/09/2020 10:54 AM  Attending Physician: Marvin Elmore MD    Discharging Provider: Berenice Sue NP  Primary Care Physician: Stacy Rodriguez MD    HPI:   Inna Blankenship presents to short stay cardiac unit on 11/09/2020 for planned RAFFAELE guided DCCV with Dr. Elmore.     Ms. Blankenship is a 89 y.o. female with history of HTN, DM, CKD, CHF, DVT, and new onset AF.     She initially presented to [facility] for persistent shortness of breath in August 2020. At that time she was noted to be in clinical heart failure and was treated with diuresis. She was also noted to be in rate controlled atrial fibrillation which she previously had no documented history of.  She was maintained on anticoagulation without AV delmi blockade and discharged home.   Ms. Blankenship has since followed up in general cardiology clinic where she continued to complain of dyspnea, and more recently with arrhythmia clinic. On evaluation by Fabio Elmore and Jamar, Ms. Blankenship continued to report dyspnea and daughter reported a notable decline in functional status.     Echo (08/29/20) with preserved LVEF, LV hypertrophy, biatrial enlargement and  moderate AI and TR.  AAD was initially held due to ongoing treatment with Lexapro.  This medication was later switched to Zoloft and Ms. Blankenship was initiated on amiodarone load 200 mg bid x 4 weeks on 10/19/20, with plan for maintenance dosing thereafter. She continues to be treated with Eliquis for CVA prophylaxis.      Today, Ms. Blankenship reports feeling well, although continues to have CURRY, worse in the mornings. Review of most recent labs revealing of stable renal function, Cr 1.2 and Hgb/Hct stable.     EKG:   My independent visualization of most recent EKG is AFL at 79 bpm with variable AV block     Procedure(s)  (LRB):  CARDIOVERSION (N/A)  ECHOCARDIOGRAM, TRANSESOPHAGEAL (N/A)     Indwelling Lines/Drains at time of discharge:  Lines/Drains/Airways     Drain            Female External Urinary Catheter 08/28/20 2000 72 days            Hospital Course:  Ms. Blankenship underwent RAFFAELE without evidence of SAVANNA thrombus.  Proceeded with DCCV 200 J x 1 shock, converting from AF to sinus rhythm. She tolerated the procedure without any acute complications. Post-DCCV ECG revealing of sinus rhythm.  Plan to continue amiodarone load through 11/19/20, then reduce to maintenance dose.  Decrease Eliquis to 2.5 mg bid.  Discussed plan of care and discharge instructions with Ms. Blankenship and her daughter.  Instructed to follow up in 1 week for ECG and in 4 weeks for follow up with Dr. Elmore.  All questions were answered and she was discharged home in stable condition.           Consults: Anesthesia       Pending Diagnostic Studies:     Procedure Component Value Units Date/Time    EKG 12-LEAD [[id number]]     Order Status: Sent Lab Status: No result           Final Active Diagnoses:    Diagnosis Date Noted POA    Persistent atrial fibrillation [I48.19] 11/09/2020 Yes      Problems Resolved During this Admission:     Persistent atrial fibrillation    s/p RAFFALEE guided DCCV with restoration of sinus rhythm     Plan:   - Continue amiodarone load through 11/19/2, then reduce to maintenance dosing of 200 mg daily   - Reduce Eliquis to 2.5 mg BID  - Follow up with ECG in 1 week   - Follow up with Dr. Elmore in 4 wees   - Discharge plans/instructions discussed with patient who verbalized understanding and agreement of plans of care. No further questions or concerns  voiced at this time.              Discharged Condition: good    Disposition: Home or Self Care    Follow Up:  Follow-up Information     Marvin Elmore MD In 4 weeks.    Specialties: Electrophysiology, Cardiovascular Disease, Cardiology  Why: s/p DCCV  Contact information:  [facility]  [facility]  [facility] [address]  [phone]                 Patient Instructions:      Diet Adult Regular     Other restrictions (specify):   Order Comments: Because you have received sedation for this procedure:  -Limit activity for the remainder of the day.  -Do not smoke for at least 6 hours and until you are fully awake and alert.  -Do not drink alcoholic beverage for 24 hours.  -Do not drive for 24 hours.  -Defer important decision making until the following day.     Go to the Emergency Department if you develop:   -Bleeding  -Weakness or numbness  -Visual, gait or speech disturbance  -New chest pain, palpitations, shortness of breath, rapid heart beat, or fainting  -Fever      Any need to reschedule or cancel procedures, or any questions regarding your procedures should be addressed directly with the Arrhythmia Department Nurses at the following phone number: [phone].     Notify your health care provider if you experience any of the following:  difficulty breathing or increased cough     Notify your health care provider if you experience any of the following:  persistent dizziness, light-headedness, or visual disturbances     Activity as tolerated     Medications:  Reconciled Home Medications:      Medication List      CHANGE how you take these medications    apixaban 2.5 mg Tab  Commonly known as: ELIQUIS  Take 1 tablet (2.5 mg total) by mouth 2 (two) times daily.  What changed:   · medication strength  · how much to take        CONTINUE taking these medications    * amiodarone 200 MG Tab  Commonly known as: PACERONE  Take 1 tablet (200 mg total) by mouth 2 (two) times daily.     * amiodarone 200 MG Tab  Commonly known as: PACERONE  Take 1 tablet (200 mg total) by mouth once daily.  Start taking on: November 19, 2020     atorvastatin 20 MG tablet  Commonly known as: LIPITOR  TAKE 1 TABLET EVERY DAY     busPIRone 5 MG Tab  Commonly known as: BUSPAR  TAKE 1 TABLET TWICE DAILY     COMBIGAN 0.2-0.5 % Drop  Generic drug:  brimonidine-timoloL  INSTILL 1 DROP INTO BOTH EYES TWICE DAILY     FLUZONE HIGH-DOSE 2019-20 (PF) 180 mcg/0.5 mL Syrg  Generic drug: flu vacc [id number](65yr up)PF  TO BE ADMINISTERED BY PHARMACIST FOR IMMUNIZATION     furosemide 20 MG tablet  Commonly known as: LASIX  Take 1 tablet (20 mg total) by mouth once daily.     latanoprost 0.005 % ophthalmic solution  Place 1 drop into both eyes every evening.     MULTIVITAMIN 50 PLUS ORAL  Take 1 tablet by mouth once daily.     olmesartan 20 MG tablet  Commonly known as: BENICAR  TAKE 1 TABLET EVERY DAY     [id number] ORAL  Take 1 tablet by mouth Daily. 1  Oral Every day         * This list has 2 medication(s) that are the same as other medications prescribed for you. Read the directions carefully, and ask your doctor or other care provider to review them with you.            ASK your doctor about these medications    timolol maleate 0.5% 0.5 % Solg  Commonly known as: TIMOPTIC-XE  Place 1 drop into both eyes once daily.            Time spent on the discharge of patient: 30 minutes    Berenice Sue NP  Cardiac Electrophysiology  [facility]

## 2020-11-09 NOTE — HPI
Inna Blankenship presents to short stay cardiac unit on 11/09/2020 for planned RAFFAELE guided DCCV with Dr. Elmore.     Ms. Blankenship is a 89 y.o. female with history of HTN, DM, CKD, CHF, DVT, and new onset AF.     She initially presented to [facility] for persistent shortness of breath in August 2020. At that time she was noted to be in clinical heart failure and was treated with diuresis. She was also noted to be in rate controlled atrial fibrillation which she previously had no documented history of.  She was maintained on anticoagulation without AV delmi blockade and discharged home.   Ms. Blankenship has since followed up in general cardiology clinic where she continued to complain of dyspnea, and more recently with arrhythmia clinic. On evaluation by Fabio Elmore and Jamar, Ms. Blankenship continued to report dyspnea and daughter reported a notable decline in functional status.     Echo (08/29/20) with preserved LVEF, LV hypertrophy, biatrial enlargement and  moderate AI and TR.  AAD was initially held due to ongoing treatment with Lexapro.  This medication was later switched to Zoloft and Ms. Blankenship was initiated on amiodarone load 200 mg bid x 4 weeks on 10/19/20, with plan for maintenance dosing thereafter. She continues to be treated with Eliquis for CVA prophylaxis.      Today, Ms. Blankenship reports feeling well, although continues to have CURRY, worse in the mornings. Review of most recent labs revealing of stable renal function, Cr 1.2 and Hgb/Hct stable.     EKG:   My independent visualization of most recent EKG is AFL at 79 bpm with variable AV block

## 2020-11-09 NOTE — PROGRESS NOTES
Patient given home medication regimen and discharge instructions. Patient prescriptions sent to pharmacy. Patient informed of follow up appointments and given a copy of discharge instructions with follow up appointments listed. RFA peripheral IV discontinued. Patient discharged home per MD orders with all personal belongings.

## 2020-11-09 NOTE — HOSPITAL COURSE
Ms. Blankenship underwent RAFFAELE without evidence of SAVANNA thrombus.  Proceeded with DCCV 200 J x 1 shock, converting from AF to sinus rhythm. She tolerated the procedure without any acute complications. Post-DCCV ECG revealing of sinus rhythm.  Plan to continue amiodarone load through 11/19/20, then reduce to maintenance dose.  Decrease Eliquis to 2.5 mg bid.  Discussed plan of care and discharge instructions with Ms. Blankenship and her daughter.  Instructed to follow up in 1 week for ECG and in 4 weeks for follow up with Dr. Elmore.  All questions were answered and she was discharged home in stable condition.

## 2020-11-09 NOTE — SUBJECTIVE & OBJECTIVE
Past Medical History:   Diagnosis Date    Arthritis     CHF (congestive heart failure) 12/26/2018    Chronic atrial fibrillation 8/29/2020    Chronic kidney disease, stage III (moderate) 9/11/2015    Colon adenoma     Diabetes mellitus, type II     Diet controlled    Glaucoma     Hyperlipemia     Hypertension     Osteopenia        Past Surgical History:   Procedure Laterality Date    APPENDECTOMY      CATARACT EXTRACTION W/  INTRAOCULAR LENS IMPLANT Right 03/15/2006        CATARACT EXTRACTION W/  INTRAOCULAR LENS IMPLANT Left 09/27/2006        COLONOSCOPY W/ POLYPECTOMY      EYE SURGERY      HYSTERECTOMY         Review of patient's allergies indicates:  No Known Allergies    No current facility-administered medications on file prior to encounter.      Current Outpatient Medications on File Prior to Encounter   Medication Sig    ELIQUIS 5 mg Tab TAKE 1 TABLET TWICE DAILY    amiodarone (PACERONE) 200 MG Tab Take 1 tablet (200 mg total) by mouth 2 (two) times daily.    atorvastatin (LIPITOR) 20 MG tablet TAKE 1 TABLET EVERY DAY    busPIRone (BUSPAR) 5 MG Tab TAKE 1 TABLET TWICE DAILY    CALCIUM CARBONATE ([id number] ORAL) Take 1 tablet by mouth Daily. 1  Oral Every day    COMBIGAN 0.2-0.5 % Drop INSTILL 1 DROP INTO BOTH EYES TWICE DAILY    FLUZONE HIGH-DOSE 2019-20, PF, 180 mcg/0.5 mL Syrg TO BE ADMINISTERED BY PHARMACIST FOR IMMUNIZATION    furosemide (LASIX) 20 MG tablet Take 1 tablet (20 mg total) by mouth once daily.    latanoprost 0.005 % ophthalmic solution Place 1 drop into both eyes every evening.    multivit with minerals/lutein (MULTIVITAMIN 50 PLUS ORAL) Take 1 tablet by mouth once daily.     olmesartan (BENICAR) 20 MG tablet TAKE 1 TABLET EVERY DAY    [DISCONTINUED] timolol maleate 0.5% (TIMOPTIC-XR) 0.5 % SolG Place 1 drop into both eyes once daily.     Family History     Problem Relation (Age of Onset)    Allergies Daughter    Asthma Daughter    Cancer  Sister    Heart attack Mother    Heart disease Mother    Hypertension Daughter    No Known Problems Father        Tobacco Use    Smoking status: Never Smoker    Smokeless tobacco: Never Used   Substance and Sexual Activity    Alcohol use: Yes     Comment: wine, rarely    Drug use: No    Sexual activity: Not Currently     Review of Systems   Constitution: Positive for malaise/fatigue. Negative for fever.   Cardiovascular: Positive for dyspnea on exertion. Negative for chest pain, palpitations and syncope.   Hematologic/Lymphatic: Negative for bleeding problem.   Neurological: Negative for light-headedness.   Psychiatric/Behavioral: Negative for altered mental status. The patient is not nervous/anxious.      Objective:     Vital Signs (Most Recent):    Vital Signs (24h Range):             There is no height or weight on file to calculate BMI.            Physical Exam   Constitutional: She is oriented to person, place, and time. Vital signs are normal. She appears well-developed and well-nourished.   Cardiovascular: Normal rate, S1 normal, S2 normal, normal heart sounds and intact distal pulses. An irregularly irregular rhythm present. Exam reveals no gallop and no friction rub.   No murmur heard.  Pulmonary/Chest: Effort normal and breath sounds normal.   Musculoskeletal: Normal range of motion.         General: No edema.   Neurological: She is alert and oriented to person, place, and time. She has normal strength.   Skin: Skin is warm, dry and intact.   Vitals reviewed.

## 2020-11-09 NOTE — ASSESSMENT & PLAN NOTE
s/p RAFFAELE guided DCCV with restoration of sinus rhythm     Plan:   - Continue amiodarone load through 11/19/2, then reduce to maintenance dosing of 200 mg daily   - Reduce Eliquis to 2.5 mg BID  - Follow up with ECG in 1 week   - Follow up with Dr. Elmore in 4 wees   - Discharge plans/instructions discussed with patient who verbalized understanding and agreement of plans of care. No further questions or concerns  voiced at this time.

## 2020-11-09 NOTE — PLAN OF CARE
Patient in EP PACU s/p RAFFAELE/DCCV. Patient in NSR on telemetry. Post procedure EKG in chart. Patient is AAOx3, vital signs stable, denies pain. Patient's daughter updated on patient's status and at bedside. LIZZY Ng to speak with patient and daughter prior to discharge. Discharge orders placed, patient to be discharged shortly.

## 2020-11-09 NOTE — PROGRESS NOTES
Admitted to ep pacu awake and alert. Vs stable ecg shows nsr.  Daughter called and updated on condition

## 2020-11-09 NOTE — H&P
[facility] - Short Stay Cardiac Unit  Cardiac Electrophysiology  History and Physical     Admission Date: 11/9/2020  Code Status: Prior   Attending Provider: Marvin Elmore MD   Principal Problem:<principal problem not specified>    Subjective:     Chief Complaint:  Persistent AF     HPI:  Inna Blankenship presents to short stay cardiac unit on 11/09/2020 for planned RAFFAELE guided DCCV with Dr. Elmore.     Ms. Blankenship is a 89 y.o. female with history of HTN, DM, CKD, CHF, DVT, and new onset AF.     She initially presented to [facility] for persistent shortness of breath in August 2020. At that time she was noted to be in clinical heart failure and was treated with diuresis. She was also noted to be in rate controlled atrial fibrillation which she previously had no documented history of.  She was maintained on anticoagulation without AV delmi blockade and discharged home.   Ms. Blankenship has since followed up in general cardiology clinic where she continued to complain of dyspnea, and more recently with arrhythmia clinic. On evaluation by Fabio Elmore and Jamar, Ms. Blankenship continued to report dyspnea and daughter reported a notable decline in functional status.     Echo (08/29/20) with preserved LVEF, LV hypertrophy, biatrial enlargement and  moderate AI and TR.  AAD was initially held due to ongoing treatment with Lexapro.  This medication was later switched to Zoloft and Ms. Blankenship was initiated on amiodarone load 200 mg bid x 4 weeks on 10/19/20, with plan for maintenance dosing thereafter. She continues to be treated with Eliquis for CVA prophylaxis.      Today, Ms. Blankenship reports feeling well, although continues to have CURRY, worse in the mornings. Review of most recent labs revealing of stable renal function, Cr 1.2 and Hgb/Hct stable.     EKG:   My independent visualization of most recent EKG is AFL at 79 bpm with variable AV block     Past Medical History:   Diagnosis Date    Arthritis     CHF (congestive heart failure)  12/26/2018    Chronic atrial fibrillation 8/29/2020    Chronic kidney disease, stage III (moderate) 9/11/2015    Colon adenoma     Diabetes mellitus, type II     Diet controlled    Glaucoma     Hyperlipemia     Hypertension     Osteopenia        Past Surgical History:   Procedure Laterality Date    APPENDECTOMY      CATARACT EXTRACTION W/  INTRAOCULAR LENS IMPLANT Right 03/15/2006        CATARACT EXTRACTION W/  INTRAOCULAR LENS IMPLANT Left 09/27/2006        COLONOSCOPY W/ POLYPECTOMY      EYE SURGERY      HYSTERECTOMY         Review of patient's allergies indicates:  No Known Allergies    No current facility-administered medications on file prior to encounter.      Current Outpatient Medications on File Prior to Encounter   Medication Sig    ELIQUIS 5 mg Tab TAKE 1 TABLET TWICE DAILY    amiodarone (PACERONE) 200 MG Tab Take 1 tablet (200 mg total) by mouth 2 (two) times daily.    atorvastatin (LIPITOR) 20 MG tablet TAKE 1 TABLET EVERY DAY    busPIRone (BUSPAR) 5 MG Tab TAKE 1 TABLET TWICE DAILY    CALCIUM CARBONATE ([id number] ORAL) Take 1 tablet by mouth Daily. 1  Oral Every day    COMBIGAN 0.2-0.5 % Drop INSTILL 1 DROP INTO BOTH EYES TWICE DAILY    FLUZONE HIGH-DOSE 2019-20, PF, 180 mcg/0.5 mL Syrg TO BE ADMINISTERED BY PHARMACIST FOR IMMUNIZATION    furosemide (LASIX) 20 MG tablet Take 1 tablet (20 mg total) by mouth once daily.    latanoprost 0.005 % ophthalmic solution Place 1 drop into both eyes every evening.    multivit with minerals/lutein (MULTIVITAMIN 50 PLUS ORAL) Take 1 tablet by mouth once daily.     olmesartan (BENICAR) 20 MG tablet TAKE 1 TABLET EVERY DAY    [DISCONTINUED] timolol maleate 0.5% (TIMOPTIC-XR) 0.5 % SolG Place 1 drop into both eyes once daily.     Family History     Problem Relation (Age of Onset)    Allergies Daughter    Asthma Daughter    Cancer Sister    Heart attack Mother    Heart disease Mother    Hypertension Daughter    No  Known Problems Father        Tobacco Use    Smoking status: Never Smoker    Smokeless tobacco: Never Used   Substance and Sexual Activity    Alcohol use: Yes     Comment: wine, rarely    Drug use: No    Sexual activity: Not Currently     Review of Systems   Constitution: Positive for malaise/fatigue. Negative for fever.   Cardiovascular: Positive for dyspnea on exertion. Negative for chest pain, palpitations and syncope.   Hematologic/Lymphatic: Negative for bleeding problem.   Neurological: Negative for light-headedness.   Psychiatric/Behavioral: Negative for altered mental status. The patient is not nervous/anxious.      Objective:     Vital Signs (Most Recent):    Vital Signs (24h Range):             There is no height or weight on file to calculate BMI.            Physical Exam   Constitutional: She is oriented to person, place, and time. Vital signs are normal. She appears well-developed and well-nourished.   Cardiovascular: Normal rate, S1 normal, S2 normal, normal heart sounds and intact distal pulses. An irregularly irregular rhythm present. Exam reveals no gallop and no friction rub.   No murmur heard.  Pulmonary/Chest: Effort normal and breath sounds normal.   Musculoskeletal: Normal range of motion.         General: No edema.   Neurological: She is alert and oriented to person, place, and time. She has normal strength.   Skin: Skin is warm, dry and intact.   Vitals reviewed.        Assessment and Plan:     Persistent atrial fibrillation  - Reports full compliance with Eliquis, denies any missed doses. Will receive reduced dose of Eliquis 2.5 mg this morning prior to procedure. Defer RAFFAELE.   - Proceed with DCCV only.   - Anesthesia for sedation prior to procedure.   - Extensive discussion with patient regarding risks and benefits of DCCV, and patient would like to proceed.  The patient voices understanding and all questions have been answered. No further questions/concerns voiced at this time.                 Berenice Sue NP  Cardiac Electrophysiology  [facility] - Short Stay Cardiac Unit

## 2020-11-09 NOTE — ASSESSMENT & PLAN NOTE
- Reports full compliance with Eliquis, denies any missed doses. Will receive reduced dose of Eliquis 2.5 mg this morning prior to procedure. Defer RAFFAELE.   - Proceed with DCCV only.   - Anesthesia for sedation prior to procedure.   - Extensive discussion with patient regarding risks and benefits of DCCV, and patient would like to proceed.  The patient voices understanding and all questions have been answered. No further questions/concerns voiced at this time.

## 2020-11-09 NOTE — ANESTHESIA PREPROCEDURE EVALUATION
11/09/2020  Pre-operative evaluation for Procedure(s) (LRB):  CARDIOVERSION (N/A)  ECHOCARDIOGRAM, TRANSESOPHAGEAL (N/A)    Inna Blankenship is a 89 y.o. female with afib here for RAFFAELE with cardioversion    Patient Active Problem List   Diagnosis    Essential hypertension    Other hyperlipidemia    Osteopenia    Low tension glaucoma, moderate stage - Both Eyes    MGD (meibomian gland disease) - Both Eyes    History of DVT (deep vein thrombosis)    Chronic diastolic congestive heart failure    Controlled type 2 diabetes mellitus without complication, without long-term current use of insulin    Tortuous aorta    Acute on chronic diastolic congestive heart failure    Chronic atrial fibrillation    Hypokalemia    Persistent atrial fibrillation       Review of patient's allergies indicates:  No Known Allergies    No current facility-administered medications on file prior to encounter.      Current Outpatient Medications on File Prior to Encounter   Medication Sig Dispense Refill    ELIQUIS 5 mg Tab TAKE 1 TABLET TWICE DAILY 180 tablet 0    amiodarone (PACERONE) 200 MG Tab Take 1 tablet (200 mg total) by mouth 2 (two) times daily. 60 tablet 11    atorvastatin (LIPITOR) 20 MG tablet TAKE 1 TABLET EVERY DAY 90 tablet 0    busPIRone (BUSPAR) 5 MG Tab TAKE 1 TABLET TWICE DAILY 180 tablet 0    CALCIUM CARBONATE (IPXR-DMN-803 ORAL) Take 1 tablet by mouth Daily. 1  Oral Every day      COMBIGAN 0.2-0.5 % Drop INSTILL 1 DROP INTO BOTH EYES TWICE DAILY 15 mL 3    FLUZONE HIGH-DOSE 2019-20, PF, 180 mcg/0.5 mL Syrg TO BE ADMINISTERED BY PHARMACIST FOR IMMUNIZATION  0    furosemide (LASIX) 20 MG tablet Take 1 tablet (20 mg total) by mouth once daily. 90 tablet 1    latanoprost 0.005 % ophthalmic solution Place 1 drop into both eyes every evening. 15 mL 3    multivit with minerals/lutein (MULTIVITAMIN 50 PLUS  ORAL) Take 1 tablet by mouth once daily.       olmesartan (BENICAR) 20 MG tablet TAKE 1 TABLET EVERY DAY 90 tablet 0    [DISCONTINUED] timolol maleate 0.5% (TIMOPTIC-XR) 0.5 % SolG Place 1 drop into both eyes once daily. 5 mL 12       Past Surgical History:   Procedure Laterality Date    APPENDECTOMY      CATARACT EXTRACTION W/  INTRAOCULAR LENS IMPLANT Right 03/15/2006        CATARACT EXTRACTION W/  INTRAOCULAR LENS IMPLANT Left 09/27/2006        COLONOSCOPY W/ POLYPECTOMY      EYE SURGERY      HYSTERECTOMY         Social History     Socioeconomic History    Marital status:      Spouse name: Not on file    Number of children: Not on file    Years of education: Not on file    Highest education level: Not on file   Occupational History    Not on file   Social Needs    Financial resource strain: Not on file    Food insecurity     Worry: Not on file     Inability: Not on file    Transportation needs     Medical: Not on file     Non-medical: Not on file   Tobacco Use    Smoking status: Never Smoker    Smokeless tobacco: Never Used   Substance and Sexual Activity    Alcohol use: Yes     Comment: wine, rarely    Drug use: No    Sexual activity: Not Currently   Lifestyle    Physical activity     Days per week: Not on file     Minutes per session: Not on file    Stress: Not on file   Relationships    Social connections     Talks on phone: Not on file     Gets together: Not on file     Attends Spiritism service: Not on file     Active member of club or organization: Not on file     Attends meetings of clubs or organizations: Not on file     Relationship status: Not on file   Other Topics Concern    Not on file   Social History Narrative    Not on file         CBC:   Recent Labs     11/06/20  1038   WBC 5.77   RBC 4.31   HGB 13.3   HCT 42.6      MCV 99*   MCH 30.9   MCHC 31.2*       CMP:   Recent Labs     11/06/20  1038   *   K 4.2      CO2 26   BUN  18   CREATININE 1.2   *   CALCIUM 10.2       INR  Recent Labs     11/06/20  1038   INR 1.4*   APTT 29.3           Anesthesia Evaluation    I have reviewed the Patient Summary Reports.    I have reviewed the Nursing Notes. I have reviewed the NPO Status.      Review of Systems  Anesthesia Hx:  No problems with previous Anesthesia    Cardiovascular:   Hypertension CHF    Pulmonary:  Pulmonary Normal    Renal/:   Chronic Renal Disease, CRI    Hepatic/GI:  Hepatic/GI Normal    Neurological:  Neurology Normal    Endocrine:   Diabetes        Physical Exam  General:  Well nourished    Airway/Jaw/Neck:  Airway Findings: Mouth Opening: Normal Tongue: Normal  General Airway Assessment: Adult  Mallampati: II       Chest/Lungs:  Chest/Lungs Findings: Normal Respiratory Rate     Heart/Vascular:  Heart Findings: Rate: Normal             Anesthesia Plan  Type of Anesthesia, risks & benefits discussed:  Anesthesia Type:  general, MAC  Patient's Preference:   Intra-op Monitoring Plan: standard ASA monitors  Intra-op Monitoring Plan Comments:   Post Op Pain Control Plan: multimodal analgesia and IV/PO Opioids PRN  Post Op Pain Control Plan Comments:   Induction:   IV  Beta Blocker:         Informed Consent: Patient understands risks and agrees with Anesthesia plan.  Questions answered. Anesthesia consent signed with patient.  ASA Score: 3     Day of Surgery Review of History & Physical:            Ready For Surgery From Anesthesia Perspective.

## 2020-11-10 NOTE — ANESTHESIA POSTPROCEDURE EVALUATION
Anesthesia Post Evaluation    Patient: Inna Blankenship    Procedure(s) Performed: Procedure(s) (LRB):  CARDIOVERSION (N/A)  ECHOCARDIOGRAM, TRANSESOPHAGEAL (N/A)    Final Anesthesia Type: general    Patient location during evaluation: PACU  Patient participation: Yes- Able to Participate  Level of consciousness: awake and alert  Post-procedure vital signs: reviewed and stable  Pain management: adequate  Airway patency: patent    PONV status at discharge: No PONV  Anesthetic complications: no      Cardiovascular status: blood pressure returned to baseline  Respiratory status: unassisted  Hydration status: euvolemic  Follow-up not needed.          Vitals Value Taken Time   /81 11/09/20 1134   Temp 37 °C (98.6 °F) 11/09/20 1100   Pulse 64 11/09/20 1140   Resp 46 11/09/20 1135   SpO2 95 % 11/09/20 1140   Vitals shown include unvalidated device data.      No case tracking events are documented in the log.      Pain/Josiah Score: Josiah Score: 10 (11/9/2020 11:30 AM)

## 2020-11-11 ENCOUNTER — EXTERNAL HOME HEALTH (OUTPATIENT)
Dept: HOME HEALTH SERVICES | Facility: HOSPITAL | Age: 85
End: 2020-11-11
Payer: MEDICARE

## 2020-11-11 ENCOUNTER — TELEPHONE (OUTPATIENT)
Dept: ELECTROPHYSIOLOGY | Facility: CLINIC | Age: 85
End: 2020-11-11

## 2020-11-11 NOTE — TELEPHONE ENCOUNTER
Returned call to  nurse. She states that pt's daughter reports she isn't herself today. HR 48 B/P 100/58  And pt c/o dizziness.  Advised I would speak with dr Elmore and get back with her.    Discussed with Dr Elmore. He recommends the pt decrease amiodarone to 100 mg / QD.     Returned call to  nurse to advise. She voiced understanding.            ----- Message from Sofiya Farah MA sent at 11/11/2020  1:27 PM CST -----  Regarding: FW: heart rate 48  Contact: [facility]-[phone]    ----- Message -----  From: Emely Lopez  Sent: 11/11/2020   1:10 PM CST  To: [facility] Staff  Subject: heart rate 48                                    Farrah is calling to report tat the pt's heart rate is 48. Please call her back @ [phone]. Thanks, Emely - Nurse informed me to send message. - she will be leaving the house in a few mins.

## 2020-11-16 ENCOUNTER — HOSPITAL ENCOUNTER (OUTPATIENT)
Dept: CARDIOLOGY | Facility: CLINIC | Age: 85
Discharge: HOME | End: 2020-11-16
Payer: MEDICARE

## 2020-11-16 DIAGNOSIS — I48.20 CHRONIC ATRIAL FIBRILLATION: ICD-10-CM

## 2020-11-16 PROCEDURE — 93010 ELECTROCARDIOGRAM REPORT: CPT | Mod: HCNC,S$GLB,, | Performed by: INTERNAL MEDICINE

## 2020-11-16 PROCEDURE — 93005 EKG 12-LEAD: ICD-10-PCS | Mod: HCNC,S$GLB,, | Performed by: INTERNAL MEDICINE

## 2020-11-16 PROCEDURE — 93005 ELECTROCARDIOGRAM TRACING: CPT | Mod: HCNC,S$GLB,, | Performed by: INTERNAL MEDICINE

## 2020-11-16 PROCEDURE — 93010 EKG 12-LEAD: ICD-10-PCS | Mod: HCNC,S$GLB,, | Performed by: INTERNAL MEDICINE

## 2020-11-18 ENCOUNTER — LAB VISIT (OUTPATIENT)
Dept: LAB | Facility: HOSPITAL | Age: 85
End: 2020-11-18
Attending: INTERNAL MEDICINE
Payer: MEDICARE

## 2020-11-18 DIAGNOSIS — I50.43 ACUTE ON CHRONIC COMBINED SYSTOLIC AND DIASTOLIC HEART FAILURE: ICD-10-CM

## 2020-11-18 DIAGNOSIS — N18.30 CHRONIC KIDNEY DISEASE, STAGE III (MODERATE): Primary | ICD-10-CM

## 2020-11-18 LAB
ALBUMIN SERPL BCP-MCNC: 3.4 G/DL (ref 3.5–5.2)
ALP SERPL-CCNC: 97 U/L (ref 55–135)
ALT SERPL W/O P-5'-P-CCNC: 30 U/L (ref 10–44)
ANION GAP SERPL CALC-SCNC: 9 MMOL/L (ref 8–16)
AST SERPL-CCNC: 32 U/L (ref 10–40)
BILIRUB SERPL-MCNC: 0.9 MG/DL (ref 0.1–1)
BUN SERPL-MCNC: 16 MG/DL (ref 10–30)
CALCIUM SERPL-MCNC: 9.7 MG/DL (ref 8.7–10.5)
CHLORIDE SERPL-SCNC: 105 MMOL/L (ref 95–110)
CHOLEST SERPL-MCNC: 142 MG/DL (ref 120–199)
CHOLEST/HDLC SERPL: 2.7 {RATIO} (ref 2–5)
CO2 SERPL-SCNC: 27 MMOL/L (ref 23–29)
CREAT SERPL-MCNC: 1.2 MG/DL (ref 0.5–1.4)
ERYTHROCYTE [DISTWIDTH] IN BLOOD BY AUTOMATED COUNT: 16.7 % (ref 11.5–14.5)
EST. GFR  (AFRICAN AMERICAN): 44.7 ML/MIN/1.73 M^2
EST. GFR  (NON AFRICAN AMERICAN): 38.8 ML/MIN/1.73 M^2
GLUCOSE SERPL-MCNC: 94 MG/DL (ref 70–110)
HCT VFR BLD AUTO: 42.6 % (ref 37–48.5)
HDLC SERPL-MCNC: 52 MG/DL (ref 40–75)
HDLC SERPL: 36.6 % (ref 20–50)
HGB BLD-MCNC: 13 G/DL (ref 12–16)
LDLC SERPL CALC-MCNC: 80.2 MG/DL (ref 63–159)
MCH RBC QN AUTO: 30 PG (ref 27–31)
MCHC RBC AUTO-ENTMCNC: 30.5 G/DL (ref 32–36)
MCV RBC AUTO: 98 FL (ref 82–98)
NONHDLC SERPL-MCNC: 90 MG/DL
PLATELET # BLD AUTO: 171 K/UL (ref 150–350)
PMV BLD AUTO: 11.9 FL (ref 9.2–12.9)
POTASSIUM SERPL-SCNC: 3.9 MMOL/L (ref 3.5–5.1)
PROT SERPL-MCNC: 6.6 G/DL (ref 6–8.4)
RBC # BLD AUTO: 4.34 M/UL (ref 4–5.4)
SODIUM SERPL-SCNC: 141 MMOL/L (ref 136–145)
T4 FREE SERPL-MCNC: 1.23 NG/DL (ref 0.71–1.51)
TRIGL SERPL-MCNC: 49 MG/DL (ref 30–150)
TSH SERPL DL<=0.005 MIU/L-ACNC: 6.46 UIU/ML (ref 0.4–4)
WBC # BLD AUTO: 3.94 K/UL (ref 3.9–12.7)

## 2020-11-18 PROCEDURE — 85027 COMPLETE CBC AUTOMATED: CPT | Mod: HCNC

## 2020-11-18 PROCEDURE — 80061 LIPID PANEL: CPT | Mod: HCNC

## 2020-11-18 PROCEDURE — 84439 ASSAY OF FREE THYROXINE: CPT | Mod: HCNC

## 2020-11-18 PROCEDURE — 83880 ASSAY OF NATRIURETIC PEPTIDE: CPT | Mod: HCNC

## 2020-11-18 PROCEDURE — 80053 COMPREHEN METABOLIC PANEL: CPT | Mod: HCNC

## 2020-11-18 PROCEDURE — 84443 ASSAY THYROID STIM HORMONE: CPT | Mod: HCNC

## 2020-11-19 ENCOUNTER — TELEPHONE (OUTPATIENT)
Dept: ELECTROPHYSIOLOGY | Facility: CLINIC | Age: 85
End: 2020-11-19

## 2020-11-19 ENCOUNTER — DOCUMENT SCAN (OUTPATIENT)
Dept: HOME HEALTH SERVICES | Facility: HOSPITAL | Age: 85
End: 2020-11-19
Payer: MEDICARE

## 2020-11-19 DIAGNOSIS — I49.8 OTHER SPECIFIED CARDIAC ARRHYTHMIAS: Primary | ICD-10-CM

## 2020-11-19 LAB — BNP SERPL-MCNC: 1052 PG/ML (ref 0–99)

## 2020-11-20 ENCOUNTER — DOCUMENT SCAN (OUTPATIENT)
Dept: HOME HEALTH SERVICES | Facility: HOSPITAL | Age: 85
End: 2020-11-20
Payer: MEDICARE

## 2020-11-20 ENCOUNTER — TELEPHONE (OUTPATIENT)
Dept: INTERNAL MEDICINE | Facility: CLINIC | Age: 85
End: 2020-11-20

## 2020-11-20 DIAGNOSIS — I50.9 CONGESTIVE HEART FAILURE, UNSPECIFIED HF CHRONICITY, UNSPECIFIED HEART FAILURE TYPE: Primary | ICD-10-CM

## 2020-11-20 NOTE — TELEPHONE ENCOUNTER
Please contact patient inform her that her lab work indicates she needs to follow-up with cardiology.  Order in.  Please schedule

## 2020-11-20 NOTE — TELEPHONE ENCOUNTER
Spoke with daughter clark. Stated pt is f/u with cardiology. Daughter stated pt had the procedure on November 9th and will do her EKG.

## 2020-11-23 ENCOUNTER — DOCUMENT SCAN (OUTPATIENT)
Dept: HOME HEALTH SERVICES | Facility: HOSPITAL | Age: 85
End: 2020-11-23
Payer: MEDICARE

## 2020-11-24 ENCOUNTER — TELEPHONE (OUTPATIENT)
Dept: ELECTROPHYSIOLOGY | Facility: CLINIC | Age: 85
End: 2020-11-24

## 2020-11-24 NOTE — TELEPHONE ENCOUNTER
Returned call to pt's daughter. She is to continue on amiodarone until follow up with Dr Elmore in January.      ----- Message from Janett Mata sent at 11/24/2020 11:25 AM CST -----  Contact: Pt daughter Cara  Pt calling to find out if she needs to refill medication  amiodarone (PACERONE) 200 MG Tab. Please call pt daughter Cara @ [phone]. Thank you.

## 2020-12-02 ENCOUNTER — CARE AT HOME (OUTPATIENT)
Dept: HOME HEALTH SERVICES | Facility: CLINIC | Age: 85
End: 2020-12-02
Payer: MEDICARE

## 2020-12-02 DIAGNOSIS — E11.9 CONTROLLED TYPE 2 DIABETES MELLITUS WITHOUT COMPLICATION, WITHOUT LONG-TERM CURRENT USE OF INSULIN: Primary | ICD-10-CM

## 2020-12-02 PROCEDURE — 99350 HOME/RES VST EST HIGH MDM 60: CPT | Mod: S$GLB,,, | Performed by: NURSE PRACTITIONER

## 2020-12-02 PROCEDURE — 99350 PR HOME VISIT,ESTAB PATIENT,LEVEL IV: ICD-10-PCS | Mod: S$GLB,,, | Performed by: NURSE PRACTITIONER

## 2020-12-02 PROCEDURE — 99497 ADVNCD CARE PLAN 30 MIN: CPT | Mod: S$GLB,,, | Performed by: NURSE PRACTITIONER

## 2020-12-02 PROCEDURE — 99497 PR ADVNCD CARE PLAN 30 MIN: ICD-10-PCS | Mod: S$GLB,,, | Performed by: NURSE PRACTITIONER

## 2020-12-02 PROCEDURE — 1159F MED LIST DOCD IN RCRD: CPT | Mod: S$GLB,,, | Performed by: NURSE PRACTITIONER

## 2020-12-02 PROCEDURE — 1159F PR MEDICATION LIST DOCUMENTED IN MEDICAL RECORD: ICD-10-PCS | Mod: S$GLB,,, | Performed by: NURSE PRACTITIONER

## 2020-12-02 NOTE — PROGRESS NOTES
[facility]  Transition of Care Home Visit    Visit Date: 12/2/2020  Encounter Provider: Heather Farley NP  PCP:  Stacy Rodriguez MD    PRESENTING HISTORY      Patient ID: Inna Blankenship is a 89 y.o. female.    Consult Requested By:  No ref. provider found  Reason for Consult:  Hospital Follow up    Inna is being seen at home due to  physical debility that presents a taxing effort to leave the home, to mitigate high risk of hospital readmission or due to the limited availability of reliable or safe options for transportation to the point of access to the provider. Prior to treatment on this visit the chart was reviewed and patient consent was obtained.  .      Chief Complaint: Transitional Care, CHF      History of Present Illness: Ms. Inna Blankenship is a 89 y.o. female who was recently admitted to [facility].    Admission Date: 8/28/2020  Hospital Length of Stay: 2 days  Discharge Date and Time: 8/30/2020     Hospital Course:   Patient was admitted on the heart failure protocol and diuresed.  Echo showed atrial fibrillation with controlled rate, multiple valvular abnormalities, EF 56%.  She was feeling well after being diuresed overnight and was seen by the cardiologist, who recommended continuing IV diuresis until the following day.  She was transitioned back to oral Lasix on 8/30 and discharged home when she was feeling comfortable.  Home health was ordered.   ___________________________________________________________  HPI:  Inna Blankenship is a 89 year old female with a past medical history of HTN, DM, CKD, CHF and DVT in which she takes Elilquis daily. She presented to ED 8/28/2020 with a cc of increasing dyspnea on exertion with corresponding chest pain. She denies cough or wheezing. She has decreased appetite at baseline with no recent changes.  On initial workup, the patient's BNP is elevated to 766 with a slightly elevated troponin.  She will be admitted for acute exacerbation of congestive  heart failure.    See hospital course above.    She is being evaluated today for a follow up medical care visit. With this visit today patient is found at home, sitting up in the living room on the sofa. She is AAOx3. Her daughter is present who is her primary caregiver during the day. She has another family member who sits with her overnight.She is ambulatory with a rolling walker. She has some new onset Afib and was recently started on amiodarone. She is asymptomatic at this time with a normal heart rate.    [facility] RN/PT is working with patient over the last few weeks.      VSS. Denies fever, chest pain, shortness of breath, nausea, vomiting, diarrhea. Risks of environmental exposure to coronavirus discussed including: social distancing, hand hygiene, and limiting departures from the home for necessities only.  Reports understanding and willingness to comply.  All hospital discharge orders reviewed and being followed, all medications reconciled and reviewed, patient and family verbalized understanding. No other needs identified at this time.     I reiterated the process of advance care planning today and explained the importance of this process to the patient and family.  I introduced the concept of advance directives to the patient, as well. The patient has not previously appointed a HCPOA. Then the patient received detailed information about the importance of designating a Health Care Power of  (HCPOA). The patient was also instructed to communicate with this person about their wishes for future healthcare, should patient become sick and lose decision-making capacity. We spoke about ACP for 30 minutes.    Attestation: Screening criteria to assess the level of the patient's risk for infection with COVID-19 as recommended by the CDC at the time of the above documented home visit concluded appropriateness to proceed. Universal precautions were maintained at all times, including provider use of  60% alcohol gel hand  immediately prior to entry and upon departing the patient's home.    Review of Systems  Constitutional:  Negative for fever.   HENT: Negative for congestion, ear pain, rhinorrhea and sinus pressure.   Eyes: Negative for pain and discharge.   Respiratory: Negative for chest tightness and wheezing.    Cardiovascular:  Negative for chest pain.   Gastrointestinal: Negative for abdominal distention, abdominal pain, diarrhea, nausea and vomiting.   Endocrine: Negative for cold intolerance and heat intolerance.   Genitourinary: Negative for difficulty urinating, flank pain, frequency, hematuria and urgency.   Musculoskeletal: Positive for myalgias. Negative for arthralgias and joint swelling.   Allergic/Immunologic: Negative for environmental allergies and food allergies.   Neurological: Negative for dizziness, weakness, light-headedness and headaches.   Hematological: Does not bruise/bleed easily.   Psychiatric/Behavioral: Negative for agitation, behavioral problems and decreased concentration.    Assessments:  · Environmental: Lives in single story with no steps to enter  · Functional Status: Min asst with ADLs and walker for mobility  · Safety: Fall precautions  · Nutritional: Cardiac diet  · Home Health/DME/Supplies: [facility]     PAST HISTORY:     Past Medical History:   Diagnosis Date    Arthritis     CHF (congestive heart failure) 12/26/2018    Chronic atrial fibrillation 8/29/2020    Chronic kidney disease, stage III (moderate) 9/11/2015    Colon adenoma     Diabetes mellitus, type II     Diet controlled    Glaucoma     Hyperlipemia     Hypertension     Osteopenia        Past Surgical History:   Procedure Laterality Date    APPENDECTOMY      CATARACT EXTRACTION W/  INTRAOCULAR LENS IMPLANT Right 03/15/2006        CATARACT EXTRACTION W/  INTRAOCULAR LENS IMPLANT Left 09/27/2006        COLONOSCOPY W/ POLYPECTOMY      EYE SURGERY      HYSTERECTOMY       TRANSESOPHAGEAL ECHOCARDIOGRAPHY N/A 11/9/2020    Procedure: ECHOCARDIOGRAM, TRANSESOPHAGEAL;  Surgeon: Marcelina Diagnostic Provider;  Location: [facility] EP LAB;  Service: Cardiology;  Laterality: N/A;    TREATMENT OF CARDIAC ARRHYTHMIA N/A 11/9/2020    Procedure: CARDIOVERSION;  Surgeon: Marvin Elmore MD;  Location: [facility] EP LAB;  Service: Cardiology;  Laterality: N/A;  AF, RAFFAELE, DCCV, MAC, GP, 3 PREP       Family History   Problem Relation Age of Onset    Heart attack Mother     Heart disease Mother     No Known Problems Father     Cancer Sister     Hypertension Daughter     Asthma Daughter     Allergies Daughter     Amblyopia Neg Hx     Blindness Neg Hx     Cataracts Neg Hx     Diabetes Neg Hx     Glaucoma Neg Hx     Macular degeneration Neg Hx     Retinal detachment Neg Hx     Strabismus Neg Hx     Stroke Neg Hx     Thyroid disease Neg Hx        Social History     Socioeconomic History    Marital status:      Spouse name: Not on file    Number of children: Not on file    Years of education: Not on file    Highest education level: Not on file   Occupational History    Not on file   Social Needs    Financial resource strain: Not on file    Food insecurity     Worry: Not on file     Inability: Not on file    Transportation needs     Medical: Not on file     Non-medical: Not on file   Tobacco Use    Smoking status: Never Smoker    Smokeless tobacco: Never Used   Substance and Sexual Activity    Alcohol use: Yes     Comment: wine, rarely    Drug use: No    Sexual activity: Not Currently   Lifestyle    Physical activity     Days per week: Not on file     Minutes per session: Not on file    Stress: Not on file   Relationships    Social connections     Talks on phone: Not on file     Gets together: Not on file     Attends Sabianist service: Not on file     Active member of club or organization: Not on file     Attends meetings of clubs or organizations: Not on file     Relationship  status: Not on file   Other Topics Concern    Not on file   Social History Narrative    Not on file       MEDICATIONS & ALLERGIES:     Current Outpatient Medications on File Prior to Visit   Medication Sig Dispense Refill    amiodarone (PACERONE) 200 MG Tab Take 1 tablet (200 mg total) by mouth 2 (two) times daily. 60 tablet 11    amiodarone (PACERONE) 200 MG Tab Take 1 tablet (200 mg total) by mouth once daily. 90 tablet 3    apixaban (ELIQUIS) 2.5 mg Tab Take 1 tablet (2.5 mg total) by mouth 2 (two) times daily. 180 tablet 3    atorvastatin (LIPITOR) 20 MG tablet TAKE 1 TABLET EVERY DAY 90 tablet 0    busPIRone (BUSPAR) 5 MG Tab TAKE 1 TABLET TWICE DAILY 180 tablet 0    CALCIUM CARBONATE (RWAC-RIM-136 ORAL) Take 1 tablet by mouth Daily. 1  Oral Every day      COMBIGAN 0.2-0.5 % Drop INSTILL 1 DROP INTO BOTH EYES TWICE DAILY 15 mL 3    FLUZONE HIGH-DOSE 2019-20, PF, 180 mcg/0.5 mL Syrg TO BE ADMINISTERED BY PHARMACIST FOR IMMUNIZATION  0    furosemide (LASIX) 20 MG tablet Take 1 tablet (20 mg total) by mouth once daily. 90 tablet 1    latanoprost 0.005 % ophthalmic solution Place 1 drop into both eyes every evening. 15 mL 3    multivit with minerals/lutein (MULTIVITAMIN 50 PLUS ORAL) Take 1 tablet by mouth once daily.       olmesartan (BENICAR) 20 MG tablet TAKE 1 TABLET EVERY DAY 90 tablet 0    [DISCONTINUED] timolol maleate 0.5% (TIMOPTIC-XR) 0.5 % SolG Place 1 drop into both eyes once daily. 5 mL 12     No current facility-administered medications on file prior to visit.         Review of patient's allergies indicates:  No Known Allergies    OBJECTIVE:     Vital Signs:  Vitals:    12/02/20 0930   BP: 133/74   Pulse: 64   Resp: 18   Temp: 97 °F (36.1 °C)     Body mass index is 21.11 kg/m².     Physical Exam:  Constitutional:       Appearance: Normal appearance. She is well-developed.   HENT:      Head: Normocephalic.   Eyes:      General:         Right eye: No discharge.         Left eye: No  discharge.      Conjunctiva/sclera: Conjunctivae normal.   Neck:      Musculoskeletal: Normal range of motion and neck supple.   Cardiovascular:      Rate and Rhythm: Normal rate. Rhythm regularly irregular.      Pulses:           Radial pulses are 2+ on the right side and 2+ on the left side.      Heart sounds: Normal heart sounds.   Pulmonary:      Effort: Pulmonary effort is normal. No respiratory distress.      Breath sounds: wnl  Abdominal:      General: There is no distension.      Palpations: Abdomen is soft.      Tenderness: There is no abdominal tenderness.   Musculoskeletal: Normal range of motion.      Right lower le+ Pitting Edema present.      Left lower le+ Pitting Edema present.   Skin:     General: Skin is warm and dry.      Capillary Refill: Capillary refill takes 2 to 3 seconds.   Neurological:      Mental Status: She is alert.      Psychiatric:         Mood and Affect: Mood normal.         Speech: Speech normal.         Behavior: Behavior normal    Laboratory  Lab Results   Component Value Date    WBC 3.94 2020    HGB 13.0 2020    HCT 42.6 2020    MCV 98 2020     2020     Lab Results   Component Value Date    INR 1.4 (H) 2020    INR 1.4 (H) 2020    INR 1.1 2006     Lab Results   Component Value Date    HGBA1C 5.8 (H) 2020     No results for input(s): POCTGLUCOSE in the last 72 hours.    Diagnostic Results:  n/a    TRANSITION OF CARE:     [facility] Contact Note: 2020    Family and/or Caretaker present at visit?  Yes.  Diagnostic tests reviewed/disposition: No diagnosic tests pending after this hospitalization.  Disease/illness education: Fall precautions  Home health/community services discussion/referrals: Patient has home health established at [facility].   Establishment or re-establishment of referral orders for community resources: No other necessary community resources.   Discussion with other health care  providers: No discussion with other health care providers necessary.     Transition of Care Visit:     I have reviewed and updated the history and problem list.  I have reconciled the medication list.  I have discussed the hospitalization and current medical issues, prognosis and plans with the patient/family.  I  spent more than 50% of time discussing the care with the patient/family.  Total Face-to-Face Encounter: 60 minutes.    Medications Reconciliation:   I have reconciled the patient's home medications and discharge medications with the patient/family. I have updated all changes.  Refer to After-Visit Medication List.    ASSESSMENT & PLAN:     HIGH RISK CONDITION(S):  Patient has a condition that poses threat to life and bodily function    Inna was seen today for transitional care.    Diagnoses and all orders for this visit:    Acute on chronic diastolic congestive heart failure  - Presented with elevated BNP and troponin, sudden onset shortness of breath.  - New echo shows normal EF, atrial fibrillation.    Chronic atrial fibrillation  Long term anti coagulant   - Patient on metoprolol, apixaban  -Continue amiodarone  - Bleeding precautions        Controlled type 2 diabetes mellitus without complication, without long-term current use of insulin  - HbA1c 5.8 and BG has been normal  - Continue home medication.     Other hyperlipidemia  Continue Lipitor        Essential hypertension  Continue home meds        Were controlled substances prescribed?  No    Instructions for the patient:    Scheduled Follow-up :  Future Appointments   Date Time Provider Department Center   1/11/2021  8:30 AM EKG, APPT [facility] EKG Johnny Boone   1/11/2021  9:00 AM [address]ika Street NP [facility] ARRHYTH [facility] Boone   1/22/2021 11:20 AM Stacy Rodriguez MD [facility] Johnny Boone PCW       After Visit Medication List :     Medication List          Accurate as of December 2, 2020 11:59 PM. If you have any questions, ask your nurse or doctor.             CONTINUE taking these medications    * amiodarone 200 MG Tab  Commonly known as: PACERONE  Take 1 tablet (200 mg total) by mouth 2 (two) times daily.     * amiodarone 200 MG Tab  Commonly known as: PACERONE  Take 1 tablet (200 mg total) by mouth once daily.     apixaban 2.5 mg Tab  Commonly known as: ELIQUIS  Take 1 tablet (2.5 mg total) by mouth 2 (two) times daily.     atorvastatin 20 MG tablet  Commonly known as: LIPITOR  TAKE 1 TABLET EVERY DAY     busPIRone 5 MG Tab  Commonly known as: BUSPAR  TAKE 1 TABLET TWICE DAILY     COMBIGAN 0.2-0.5 % Drop  Generic drug: brimonidine-timoloL  INSTILL 1 DROP INTO BOTH EYES TWICE DAILY     FLUZONE HIGH-DOSE 2019-20 (PF) 180 mcg/0.5 mL Syrg  Generic drug: flu vacc [id number](65yr up)PF     furosemide 20 MG tablet  Commonly known as: LASIX  Take 1 tablet (20 mg total) by mouth once daily.     latanoprost 0.005 % ophthalmic solution  Place 1 drop into both eyes every evening.     MULTIVITAMIN 50 PLUS ORAL     olmesartan 20 MG tablet  Commonly known as: BENICAR  TAKE 1 TABLET EVERY DAY     [id number] ORAL         * This list has 2 medication(s) that are the same as other medications prescribed for you. Read the directions carefully, and ask your doctor or other care provider to review them with you.                Signature:  Heather Farley NP    Patient consent obtained prior to treatment

## 2020-12-11 ENCOUNTER — TELEPHONE (OUTPATIENT)
Dept: INTERNAL MEDICINE | Facility: CLINIC | Age: 85
End: 2020-12-11

## 2020-12-11 NOTE — TELEPHONE ENCOUNTER
----- Message from Carolyn David sent at 12/11/2020  2:45 PM CST -----  Contact: Sandeep/ [facility]/ [phone]  Verbal order is needed to continue patient physical therapy.

## 2020-12-11 NOTE — TELEPHONE ENCOUNTER
Spoke with Sandeep from [facility] and informed him of EL Kendal message to ok the PT. He stated an verbal understanding.

## 2020-12-14 VITALS
TEMPERATURE: 97 F | BODY MASS INDEX: 21 KG/M2 | HEIGHT: 64 IN | RESPIRATION RATE: 18 BRPM | HEART RATE: 64 BPM | SYSTOLIC BLOOD PRESSURE: 133 MMHG | WEIGHT: 123 LBS | OXYGEN SATURATION: 98 % | DIASTOLIC BLOOD PRESSURE: 74 MMHG

## 2020-12-14 NOTE — PATIENT INSTRUCTIONS
Instructions:  - Continue all medications, treatments and therapies as ordered.   - Follow all instructions, recommendations as discussed.  - Maintain Safety Precautions at all times.  - Attend all medical appointments as scheduled.  - For worsening symptoms: call Primary Care Physician or Nurse Practitioner.  - For emergencies, call 911 or immediately report to the nearest emergency room.  - Limit Risks of environmental exposure to coronavirus/COVID-19 as discussed including: social distancing, hand hygiene, and limiting departures from the home for necessities only.

## 2020-12-15 ENCOUNTER — DOCUMENT SCAN (OUTPATIENT)
Dept: HOME HEALTH SERVICES | Facility: HOSPITAL | Age: 85
End: 2020-12-15
Payer: MEDICARE

## 2020-12-24 RX ORDER — FUROSEMIDE 20 MG/1
20 TABLET ORAL DAILY
Qty: 90 TABLET | Refills: 0 | Status: SHIPPED | OUTPATIENT
Start: 2020-12-24 | End: 2021-02-04 | Stop reason: SDUPTHER

## 2020-12-24 NOTE — TELEPHONE ENCOUNTER
----- Message from Carolyn David sent at 12/24/2020 10:31 AM CST -----  Contact: [phone]/ Delia/daughter  Requesting an RX refill or new RX.  Is this a refill or new RX: refill  RX name and strength: furosemide (LASIX) 20 MG tablet 90 tablet   Is this a 30 day or 90 day RX: 90  Pharmacy name and phone # (copy/paste from chart):  [facility] - [facility] [address] [phone] (Phone)  [phone] (Fax)      Comments:

## 2020-12-24 NOTE — TELEPHONE ENCOUNTER
Approved Prescriptions     furosemide (LASIX) 20 MG tablet         Sig: Take 1 tablet (20 mg total) by mouth once daily.    Disp:  90 tablet    Refills:  0    Start: 12/24/2020 - 1/23/2021    Class: Normal    Authorized by: Espinoza Nogueira MD        To be filled at: [facility] - [facility] [address]

## 2020-12-31 ENCOUNTER — TELEPHONE (OUTPATIENT)
Dept: INTERNAL MEDICINE | Facility: CLINIC | Age: 85
End: 2020-12-31

## 2020-12-31 PROCEDURE — G0179 MD RECERTIFICATION HHA PT: HCPCS | Mod: ,,, | Performed by: INTERNAL MEDICINE

## 2020-12-31 PROCEDURE — G0179 PR HOME HEALTH MD RECERTIFICATION: ICD-10-PCS | Mod: ,,, | Performed by: INTERNAL MEDICINE

## 2020-12-31 NOTE — TELEPHONE ENCOUNTER
----- Message from Chacha Arthur sent at 12/31/2020 11:06 AM CST -----  Contact: Yolanda/[phone]  Type:Home Health(orders,updates,clarifications,etc)    Home Health Agency/Nurse:  [facility]    Phone number: [phone]    Reason for call:    Comments: 1+ chin edema in the left lower extremity.   Patient has not been taking her furosemide (LASIX) 20 MG tablet for the past week, but she is back on it today 12/31/20. Please call and advise. Thank you

## 2020-12-31 NOTE — TELEPHONE ENCOUNTER
Spoke to Nidhi with [facility]. No shortness of breath and lungs sound clear.   She has 1+ pitting edema in the left lower leg. Rhonda was on backorder for furosemide (LASIX) 20 MG tablet  and pt didn't take her medication for 1 week. She is back taking it today 12/31/20

## 2021-01-06 ENCOUNTER — TELEPHONE (OUTPATIENT)
Dept: ELECTROPHYSIOLOGY | Facility: CLINIC | Age: 86
End: 2021-01-06

## 2021-01-08 ENCOUNTER — TELEPHONE (OUTPATIENT)
Dept: INTERNAL MEDICINE | Facility: CLINIC | Age: 86
End: 2021-01-08

## 2021-01-08 ENCOUNTER — TELEPHONE (OUTPATIENT)
Dept: ELECTROPHYSIOLOGY | Facility: CLINIC | Age: 86
End: 2021-01-08

## 2021-01-11 ENCOUNTER — HOSPITAL ENCOUNTER (OUTPATIENT)
Dept: CARDIOLOGY | Facility: CLINIC | Age: 86
Discharge: HOME | End: 2021-01-11
Payer: MEDICARE

## 2021-01-11 ENCOUNTER — OFFICE VISIT (OUTPATIENT)
Dept: ELECTROPHYSIOLOGY | Facility: CLINIC | Age: 86
End: 2021-01-11
Payer: MEDICARE

## 2021-01-11 VITALS
DIASTOLIC BLOOD PRESSURE: 84 MMHG | WEIGHT: 126.13 LBS | HEIGHT: 64 IN | BODY MASS INDEX: 21.53 KG/M2 | HEART RATE: 53 BPM | SYSTOLIC BLOOD PRESSURE: 144 MMHG

## 2021-01-11 DIAGNOSIS — Z86.718 HISTORY OF DVT (DEEP VEIN THROMBOSIS): ICD-10-CM

## 2021-01-11 DIAGNOSIS — I49.8 OTHER SPECIFIED CARDIAC ARRHYTHMIAS: ICD-10-CM

## 2021-01-11 DIAGNOSIS — I10 ESSENTIAL HYPERTENSION: ICD-10-CM

## 2021-01-11 DIAGNOSIS — I50.9 CONGESTIVE HEART FAILURE, UNSPECIFIED HF CHRONICITY, UNSPECIFIED HEART FAILURE TYPE: ICD-10-CM

## 2021-01-11 DIAGNOSIS — I48.20 CHRONIC ATRIAL FIBRILLATION: Primary | ICD-10-CM

## 2021-01-11 PROCEDURE — 93010 RHYTHM STRIP: ICD-10-PCS | Mod: HCNC,S$GLB,, | Performed by: INTERNAL MEDICINE

## 2021-01-11 PROCEDURE — 99214 PR OFFICE/OUTPT VISIT, EST, LEVL IV, 30-39 MIN: ICD-10-PCS | Mod: HCNC,S$GLB,, | Performed by: NURSE PRACTITIONER

## 2021-01-11 PROCEDURE — 93010 ELECTROCARDIOGRAM REPORT: CPT | Mod: HCNC,S$GLB,, | Performed by: INTERNAL MEDICINE

## 2021-01-11 PROCEDURE — 3288F FALL RISK ASSESSMENT DOCD: CPT | Mod: HCNC,CPTII,S$GLB, | Performed by: NURSE PRACTITIONER

## 2021-01-11 PROCEDURE — 1101F PT FALLS ASSESS-DOCD LE1/YR: CPT | Mod: HCNC,CPTII,S$GLB, | Performed by: NURSE PRACTITIONER

## 2021-01-11 PROCEDURE — 1159F MED LIST DOCD IN RCRD: CPT | Mod: HCNC,S$GLB,, | Performed by: NURSE PRACTITIONER

## 2021-01-11 PROCEDURE — 1126F AMNT PAIN NOTED NONE PRSNT: CPT | Mod: HCNC,S$GLB,, | Performed by: NURSE PRACTITIONER

## 2021-01-11 PROCEDURE — 3288F PR FALLS RISK ASSESSMENT DOCUMENTED: ICD-10-PCS | Mod: HCNC,CPTII,S$GLB, | Performed by: NURSE PRACTITIONER

## 2021-01-11 PROCEDURE — 1159F PR MEDICATION LIST DOCUMENTED IN MEDICAL RECORD: ICD-10-PCS | Mod: HCNC,S$GLB,, | Performed by: NURSE PRACTITIONER

## 2021-01-11 PROCEDURE — 99999 PR PBB SHADOW E&M-EST. PATIENT-LVL IV: CPT | Mod: PBBFAC,HCNC,, | Performed by: NURSE PRACTITIONER

## 2021-01-11 PROCEDURE — 1126F PR PAIN SEVERITY QUANTIFIED, NO PAIN PRESENT: ICD-10-PCS | Mod: HCNC,S$GLB,, | Performed by: NURSE PRACTITIONER

## 2021-01-11 PROCEDURE — 99999 PR PBB SHADOW E&M-EST. PATIENT-LVL IV: ICD-10-PCS | Mod: PBBFAC,HCNC,, | Performed by: NURSE PRACTITIONER

## 2021-01-11 PROCEDURE — 93005 RHYTHM STRIP: ICD-10-PCS | Mod: HCNC,S$GLB,, | Performed by: INTERNAL MEDICINE

## 2021-01-11 PROCEDURE — 93005 ELECTROCARDIOGRAM TRACING: CPT | Mod: HCNC,S$GLB,, | Performed by: INTERNAL MEDICINE

## 2021-01-11 PROCEDURE — 99499 UNLISTED E&M SERVICE: CPT | Mod: S$GLB,,, | Performed by: NURSE PRACTITIONER

## 2021-01-11 PROCEDURE — 99214 OFFICE O/P EST MOD 30 MIN: CPT | Mod: HCNC,S$GLB,, | Performed by: NURSE PRACTITIONER

## 2021-01-11 PROCEDURE — 99499 RISK ADDL DX/OHS AUDIT: ICD-10-PCS | Mod: S$GLB,,, | Performed by: NURSE PRACTITIONER

## 2021-01-11 PROCEDURE — 1101F PR PT FALLS ASSESS DOC 0-1 FALLS W/OUT INJ PAST YR: ICD-10-PCS | Mod: HCNC,CPTII,S$GLB, | Performed by: NURSE PRACTITIONER

## 2021-01-12 ENCOUNTER — EXTERNAL HOME HEALTH (OUTPATIENT)
Dept: HOME HEALTH SERVICES | Facility: HOSPITAL | Age: 86
End: 2021-01-12
Payer: MEDICARE

## 2021-01-20 ENCOUNTER — CARE AT HOME (OUTPATIENT)
Dept: HOME HEALTH SERVICES | Facility: CLINIC | Age: 86
End: 2021-01-20
Payer: MEDICARE

## 2021-01-20 DIAGNOSIS — I50.32 CHRONIC DIASTOLIC CONGESTIVE HEART FAILURE: Primary | ICD-10-CM

## 2021-01-20 PROCEDURE — 99443 PR PHYSICIAN TELEPHONE EVALUATION 21-30 MIN: CPT | Mod: 95,,, | Performed by: NURSE PRACTITIONER

## 2021-01-20 PROCEDURE — 99443 PR PHYSICIAN TELEPHONE EVALUATION 21-30 MIN: ICD-10-PCS | Mod: 95,,, | Performed by: NURSE PRACTITIONER

## 2021-01-22 ENCOUNTER — HOSPITAL ENCOUNTER (EMERGENCY)
Facility: HOSPITAL | Age: 86
Discharge: HOME | End: 2021-01-22
Attending: EMERGENCY MEDICINE
Payer: MEDICARE

## 2021-01-22 ENCOUNTER — OFFICE VISIT (OUTPATIENT)
Dept: INTERNAL MEDICINE | Facility: CLINIC | Age: 86
End: 2021-01-22
Payer: MEDICARE

## 2021-01-22 VITALS
HEART RATE: 42 BPM | BODY MASS INDEX: 21.38 KG/M2 | OXYGEN SATURATION: 91 % | SYSTOLIC BLOOD PRESSURE: 142 MMHG | WEIGHT: 125.25 LBS | DIASTOLIC BLOOD PRESSURE: 40 MMHG | HEIGHT: 64 IN

## 2021-01-22 VITALS
WEIGHT: 125 LBS | HEART RATE: 54 BPM | BODY MASS INDEX: 20.09 KG/M2 | OXYGEN SATURATION: 99 % | TEMPERATURE: 99 F | HEIGHT: 66 IN | SYSTOLIC BLOOD PRESSURE: 168 MMHG | DIASTOLIC BLOOD PRESSURE: 78 MMHG | RESPIRATION RATE: 16 BRPM

## 2021-01-22 DIAGNOSIS — R06.02 SHORTNESS OF BREATH: ICD-10-CM

## 2021-01-22 DIAGNOSIS — F43.21 GRIEF: ICD-10-CM

## 2021-01-22 DIAGNOSIS — R00.1 BRADYCARDIA: Primary | ICD-10-CM

## 2021-01-22 DIAGNOSIS — I10 HYPERTENSION, UNSPECIFIED TYPE: ICD-10-CM

## 2021-01-22 DIAGNOSIS — F32.A DEPRESSION, UNSPECIFIED DEPRESSION TYPE: ICD-10-CM

## 2021-01-22 DIAGNOSIS — R06.02 SOB (SHORTNESS OF BREATH): Primary | ICD-10-CM

## 2021-01-22 DIAGNOSIS — R94.31 QT PROLONGATION: ICD-10-CM

## 2021-01-22 DIAGNOSIS — E03.9 HYPOTHYROIDISM, UNSPECIFIED TYPE: ICD-10-CM

## 2021-01-22 LAB
ALBUMIN SERPL BCP-MCNC: 3.5 G/DL (ref 3.5–5.2)
ALP SERPL-CCNC: 91 U/L (ref 55–135)
ALT SERPL W/O P-5'-P-CCNC: 42 U/L (ref 10–44)
ANION GAP SERPL CALC-SCNC: 7 MMOL/L (ref 8–16)
AST SERPL-CCNC: 33 U/L (ref 10–40)
BASOPHILS # BLD AUTO: 0.05 K/UL (ref 0–0.2)
BASOPHILS NFR BLD: 0.8 % (ref 0–1.9)
BILIRUB SERPL-MCNC: 1 MG/DL (ref 0.1–1)
BNP SERPL-MCNC: 805 PG/ML (ref 0–99)
BUN SERPL-MCNC: 19 MG/DL (ref 10–30)
CALCIUM SERPL-MCNC: 9.7 MG/DL (ref 8.7–10.5)
CHLORIDE SERPL-SCNC: 106 MMOL/L (ref 95–110)
CO2 SERPL-SCNC: 29 MMOL/L (ref 23–29)
CREAT SERPL-MCNC: 1.3 MG/DL (ref 0.5–1.4)
CTP QC/QA: YES
DIFFERENTIAL METHOD: ABNORMAL
EOSINOPHIL # BLD AUTO: 0.1 K/UL (ref 0–0.5)
EOSINOPHIL NFR BLD: 2.1 % (ref 0–8)
ERYTHROCYTE [DISTWIDTH] IN BLOOD BY AUTOMATED COUNT: 16.1 % (ref 11.5–14.5)
EST. GFR  (AFRICAN AMERICAN): 40.6 ML/MIN/1.73 M^2
EST. GFR  (NON AFRICAN AMERICAN): 35.2 ML/MIN/1.73 M^2
GLUCOSE SERPL-MCNC: 158 MG/DL (ref 70–110)
HCT VFR BLD AUTO: 46.2 % (ref 37–48.5)
HGB BLD-MCNC: 14.3 G/DL (ref 12–16)
IMM GRANULOCYTES # BLD AUTO: 0.01 K/UL (ref 0–0.04)
IMM GRANULOCYTES NFR BLD AUTO: 0.2 % (ref 0–0.5)
LYMPHOCYTES # BLD AUTO: 1.8 K/UL (ref 1–4.8)
LYMPHOCYTES NFR BLD: 28 % (ref 18–48)
MCH RBC QN AUTO: 31.4 PG (ref 27–31)
MCHC RBC AUTO-ENTMCNC: 31 G/DL (ref 32–36)
MCV RBC AUTO: 102 FL (ref 82–98)
MONOCYTES # BLD AUTO: 0.4 K/UL (ref 0.3–1)
MONOCYTES NFR BLD: 6.9 % (ref 4–15)
NEUTROPHILS # BLD AUTO: 3.9 K/UL (ref 1.8–7.7)
NEUTROPHILS NFR BLD: 62 % (ref 38–73)
NRBC BLD-RTO: 0 /100 WBC
PLATELET # BLD AUTO: 152 K/UL (ref 150–350)
PMV BLD AUTO: 12.5 FL (ref 9.2–12.9)
POTASSIUM SERPL-SCNC: 3.9 MMOL/L (ref 3.5–5.1)
PROT SERPL-MCNC: 6.7 G/DL (ref 6–8.4)
RBC # BLD AUTO: 4.55 M/UL (ref 4–5.4)
SARS-COV-2 RDRP RESP QL NAA+PROBE: NEGATIVE
SODIUM SERPL-SCNC: 142 MMOL/L (ref 136–145)
T4 FREE SERPL-MCNC: 1.06 NG/DL (ref 0.71–1.51)
TSH SERPL DL<=0.005 MIU/L-ACNC: 7.18 UIU/ML (ref 0.4–4)
WBC # BLD AUTO: 6.26 K/UL (ref 3.9–12.7)

## 2021-01-22 PROCEDURE — 3288F PR FALLS RISK ASSESSMENT DOCUMENTED: ICD-10-PCS | Mod: CPTII,S$GLB,, | Performed by: INTERNAL MEDICINE

## 2021-01-22 PROCEDURE — 99999 PR PBB SHADOW E&M-EST. PATIENT-LVL III: ICD-10-PCS | Mod: PBBFAC,,, | Performed by: INTERNAL MEDICINE

## 2021-01-22 PROCEDURE — 99215 PR OFFICE/OUTPT VISIT, EST, LEVL V, 40-54 MIN: ICD-10-PCS | Mod: S$GLB,,, | Performed by: INTERNAL MEDICINE

## 2021-01-22 PROCEDURE — 80053 COMPREHEN METABOLIC PANEL: CPT

## 2021-01-22 PROCEDURE — 1101F PT FALLS ASSESS-DOCD LE1/YR: CPT | Mod: CPTII,S$GLB,, | Performed by: INTERNAL MEDICINE

## 2021-01-22 PROCEDURE — 93010 ELECTROCARDIOGRAM REPORT: CPT | Mod: ,,, | Performed by: INTERNAL MEDICINE

## 2021-01-22 PROCEDURE — 99999 PR PBB SHADOW E&M-EST. PATIENT-LVL III: CPT | Mod: PBBFAC,,, | Performed by: INTERNAL MEDICINE

## 2021-01-22 PROCEDURE — 99285 EMERGENCY DEPT VISIT HI MDM: CPT | Mod: 25

## 2021-01-22 PROCEDURE — 99284 PR EMERGENCY DEPT VISIT,LEVEL IV: ICD-10-PCS | Mod: CR,CS,, | Performed by: EMERGENCY MEDICINE

## 2021-01-22 PROCEDURE — 25000003 PHARM REV CODE 250: Performed by: EMERGENCY MEDICINE

## 2021-01-22 PROCEDURE — 99215 OFFICE O/P EST HI 40 MIN: CPT | Mod: S$GLB,,, | Performed by: INTERNAL MEDICINE

## 2021-01-22 PROCEDURE — 1101F PR PT FALLS ASSESS DOC 0-1 FALLS W/OUT INJ PAST YR: ICD-10-PCS | Mod: CPTII,S$GLB,, | Performed by: INTERNAL MEDICINE

## 2021-01-22 PROCEDURE — U0002 COVID-19 LAB TEST NON-CDC: HCPCS | Performed by: EMERGENCY MEDICINE

## 2021-01-22 PROCEDURE — 93010 EKG 12-LEAD: ICD-10-PCS | Mod: ,,, | Performed by: INTERNAL MEDICINE

## 2021-01-22 PROCEDURE — 1126F PR PAIN SEVERITY QUANTIFIED, NO PAIN PRESENT: ICD-10-PCS | Mod: S$GLB,,, | Performed by: INTERNAL MEDICINE

## 2021-01-22 PROCEDURE — 83880 ASSAY OF NATRIURETIC PEPTIDE: CPT

## 2021-01-22 PROCEDURE — 84443 ASSAY THYROID STIM HORMONE: CPT

## 2021-01-22 PROCEDURE — 99284 EMERGENCY DEPT VISIT MOD MDM: CPT | Mod: CR,CS,, | Performed by: EMERGENCY MEDICINE

## 2021-01-22 PROCEDURE — 3288F FALL RISK ASSESSMENT DOCD: CPT | Mod: CPTII,S$GLB,, | Performed by: INTERNAL MEDICINE

## 2021-01-22 PROCEDURE — 1159F PR MEDICATION LIST DOCUMENTED IN MEDICAL RECORD: ICD-10-PCS | Mod: S$GLB,,, | Performed by: INTERNAL MEDICINE

## 2021-01-22 PROCEDURE — 94761 N-INVAS EAR/PLS OXIMETRY MLT: CPT

## 2021-01-22 PROCEDURE — 1126F AMNT PAIN NOTED NONE PRSNT: CPT | Mod: S$GLB,,, | Performed by: INTERNAL MEDICINE

## 2021-01-22 PROCEDURE — 85025 COMPLETE CBC W/AUTO DIFF WBC: CPT

## 2021-01-22 PROCEDURE — 1159F MED LIST DOCD IN RCRD: CPT | Mod: S$GLB,,, | Performed by: INTERNAL MEDICINE

## 2021-01-22 PROCEDURE — 93005 ELECTROCARDIOGRAM TRACING: CPT

## 2021-01-22 PROCEDURE — 84439 ASSAY OF FREE THYROXINE: CPT

## 2021-01-22 RX ORDER — OLMESARTAN MEDOXOMIL 20 MG/1
20 TABLET ORAL
Status: COMPLETED | OUTPATIENT
Start: 2021-01-22 | End: 2021-01-22

## 2021-01-22 RX ORDER — SERTRALINE HYDROCHLORIDE 50 MG/1
TABLET, FILM COATED ORAL
COMMUNITY
Start: 2020-10-19 | End: 2021-02-08

## 2021-01-22 RX ADMIN — OLMESARTAN MEDOXOMIL 20 MG: 20 TABLET, FILM COATED ORAL at 02:01

## 2021-01-26 ENCOUNTER — TELEPHONE (OUTPATIENT)
Dept: ELECTROPHYSIOLOGY | Facility: CLINIC | Age: 86
End: 2021-01-26

## 2021-02-01 VITALS — BODY MASS INDEX: 20.09 KG/M2 | HEIGHT: 66 IN | WEIGHT: 125 LBS

## 2021-02-04 ENCOUNTER — TELEPHONE (OUTPATIENT)
Dept: ELECTROPHYSIOLOGY | Facility: CLINIC | Age: 86
End: 2021-02-04

## 2021-02-05 RX ORDER — ATORVASTATIN CALCIUM 20 MG/1
20 TABLET, FILM COATED ORAL DAILY
Qty: 90 TABLET | Refills: 0 | Status: SHIPPED | OUTPATIENT
Start: 2021-02-05 | End: 2021-04-26

## 2021-02-05 RX ORDER — BUSPIRONE HYDROCHLORIDE 5 MG/1
5 TABLET ORAL 2 TIMES DAILY
Qty: 180 TABLET | Refills: 0 | Status: SHIPPED | OUTPATIENT
Start: 2021-02-05 | End: 2021-04-26

## 2021-02-05 RX ORDER — FUROSEMIDE 20 MG/1
20 TABLET ORAL DAILY
Qty: 90 TABLET | Refills: 0 | Status: ON HOLD | OUTPATIENT
Start: 2021-02-05 | End: 2021-04-18

## 2021-02-08 ENCOUNTER — HOSPITAL ENCOUNTER (INPATIENT)
Facility: OTHER | Age: 86
LOS: 3 days | Discharge: HOME HEALTH | DRG: 309 | End: 2021-02-11
Attending: INTERNAL MEDICINE | Admitting: INTERNAL MEDICINE
Payer: MEDICARE

## 2021-02-08 DIAGNOSIS — Z51.5 PALLIATIVE CARE PATIENT: ICD-10-CM

## 2021-02-08 DIAGNOSIS — R06.02 SHORTNESS OF BREATH: ICD-10-CM

## 2021-02-08 DIAGNOSIS — R94.31 QT PROLONGATION: Primary | ICD-10-CM

## 2021-02-08 DIAGNOSIS — I50.9 CONGESTIVE HEART FAILURE, UNSPECIFIED HF CHRONICITY, UNSPECIFIED HEART FAILURE TYPE: ICD-10-CM

## 2021-02-08 DIAGNOSIS — R00.1 SYMPTOMATIC BRADYCARDIA: ICD-10-CM

## 2021-02-08 DIAGNOSIS — I10 ESSENTIAL HYPERTENSION: ICD-10-CM

## 2021-02-08 DIAGNOSIS — R00.1 BRADYCARDIA: ICD-10-CM

## 2021-02-08 PROBLEM — I48.19 PERSISTENT ATRIAL FIBRILLATION: Chronic | Status: ACTIVE | Noted: 2020-11-09

## 2021-02-08 PROBLEM — E11.9 CONTROLLED TYPE 2 DIABETES MELLITUS WITHOUT COMPLICATION, WITHOUT LONG-TERM CURRENT USE OF INSULIN: Chronic | Status: ACTIVE | Noted: 2020-08-04

## 2021-02-08 PROBLEM — E87.6 HYPOKALEMIA: Status: RESOLVED | Noted: 2020-08-29 | Resolved: 2021-02-08

## 2021-02-08 PROBLEM — I50.32 CHRONIC DIASTOLIC CONGESTIVE HEART FAILURE: Chronic | Status: ACTIVE | Noted: 2020-08-28

## 2021-02-08 PROBLEM — I50.32 CHRONIC DIASTOLIC CONGESTIVE HEART FAILURE: Status: RESOLVED | Noted: 2019-01-10 | Resolved: 2021-02-08

## 2021-02-08 PROBLEM — I48.20 CHRONIC ATRIAL FIBRILLATION: Status: RESOLVED | Noted: 2020-08-29 | Resolved: 2021-02-08

## 2021-02-08 LAB
ALBUMIN SERPL BCP-MCNC: 3.5 G/DL (ref 3.5–5.2)
ALP SERPL-CCNC: 97 U/L (ref 55–135)
ALT SERPL W/O P-5'-P-CCNC: 31 U/L (ref 10–44)
ANION GAP SERPL CALC-SCNC: 11 MMOL/L (ref 8–16)
AST SERPL-CCNC: 41 U/L (ref 10–40)
BASOPHILS # BLD AUTO: 0.05 K/UL (ref 0–0.2)
BASOPHILS NFR BLD: 0.9 % (ref 0–1.9)
BILIRUB SERPL-MCNC: 0.9 MG/DL (ref 0.1–1)
BILIRUB UR QL STRIP: NEGATIVE
BNP SERPL-MCNC: 905 PG/ML (ref 0–99)
BUN SERPL-MCNC: 16 MG/DL (ref 10–30)
CALCIUM SERPL-MCNC: 9.9 MG/DL (ref 8.7–10.5)
CHLORIDE SERPL-SCNC: 107 MMOL/L (ref 95–110)
CLARITY UR: CLEAR
CO2 SERPL-SCNC: 22 MMOL/L (ref 23–29)
COLOR UR: YELLOW
CREAT SERPL-MCNC: 1.1 MG/DL (ref 0.5–1.4)
CTP QC/QA: YES
DIFFERENTIAL METHOD: ABNORMAL
EOSINOPHIL # BLD AUTO: 0.1 K/UL (ref 0–0.5)
EOSINOPHIL NFR BLD: 2.4 % (ref 0–8)
ERYTHROCYTE [DISTWIDTH] IN BLOOD BY AUTOMATED COUNT: 15.9 % (ref 11.5–14.5)
EST. GFR  (AFRICAN AMERICAN): 49 ML/MIN/1.73 M^2
EST. GFR  (NON AFRICAN AMERICAN): 43 ML/MIN/1.73 M^2
GLUCOSE SERPL-MCNC: 143 MG/DL (ref 70–110)
GLUCOSE UR QL STRIP: NEGATIVE
HCT VFR BLD AUTO: 43 % (ref 37–48.5)
HGB BLD-MCNC: 13.6 G/DL (ref 12–16)
HGB UR QL STRIP: NEGATIVE
IMM GRANULOCYTES # BLD AUTO: 0.01 K/UL (ref 0–0.04)
IMM GRANULOCYTES NFR BLD AUTO: 0.2 % (ref 0–0.5)
INR PPP: 1.3 (ref 0.8–1.2)
KETONES UR QL STRIP: NEGATIVE
LACTATE SERPL-SCNC: 2 MMOL/L (ref 0.5–2.2)
LEUKOCYTE ESTERASE UR QL STRIP: NEGATIVE
LYMPHOCYTES # BLD AUTO: 1.4 K/UL (ref 1–4.8)
LYMPHOCYTES NFR BLD: 25.6 % (ref 18–48)
MCH RBC QN AUTO: 30.8 PG (ref 27–31)
MCHC RBC AUTO-ENTMCNC: 31.6 G/DL (ref 32–36)
MCV RBC AUTO: 98 FL (ref 82–98)
MONOCYTES # BLD AUTO: 0.4 K/UL (ref 0.3–1)
MONOCYTES NFR BLD: 7 % (ref 4–15)
NEUTROPHILS # BLD AUTO: 3.4 K/UL (ref 1.8–7.7)
NEUTROPHILS NFR BLD: 63.9 % (ref 38–73)
NITRITE UR QL STRIP: NEGATIVE
NRBC BLD-RTO: 0 /100 WBC
PH UR STRIP: 7 [PH] (ref 5–8)
PLATELET # BLD AUTO: 164 K/UL (ref 150–350)
PMV BLD AUTO: 11.1 FL (ref 9.2–12.9)
POTASSIUM SERPL-SCNC: 4.6 MMOL/L (ref 3.5–5.1)
PROT SERPL-MCNC: 7.1 G/DL (ref 6–8.4)
PROT UR QL STRIP: NEGATIVE
PROTHROMBIN TIME: 13.5 SEC (ref 9–12.5)
RBC # BLD AUTO: 4.41 M/UL (ref 4–5.4)
SARS-COV-2 RDRP RESP QL NAA+PROBE: NEGATIVE
SODIUM SERPL-SCNC: 140 MMOL/L (ref 136–145)
SP GR UR STRIP: 1.01 (ref 1–1.03)
TROPONIN I SERPL DL<=0.01 NG/ML-MCNC: 0.1 NG/ML (ref 0–0.03)
URN SPEC COLLECT METH UR: NORMAL
UROBILINOGEN UR STRIP-ACNC: NEGATIVE EU/DL
WBC # BLD AUTO: 5.31 K/UL (ref 3.9–12.7)

## 2021-02-08 PROCEDURE — 20000000 HC ICU ROOM

## 2021-02-08 PROCEDURE — 81003 URINALYSIS AUTO W/O SCOPE: CPT

## 2021-02-08 PROCEDURE — 85025 COMPLETE CBC W/AUTO DIFF WBC: CPT

## 2021-02-08 PROCEDURE — 63600175 PHARM REV CODE 636 W HCPCS: Performed by: NURSE PRACTITIONER

## 2021-02-08 PROCEDURE — 25000003 PHARM REV CODE 250: Performed by: INTERNAL MEDICINE

## 2021-02-08 PROCEDURE — 85610 PROTHROMBIN TIME: CPT

## 2021-02-08 PROCEDURE — 99291 CRITICAL CARE FIRST HOUR: CPT | Mod: 25

## 2021-02-08 PROCEDURE — 96374 THER/PROPH/DIAG INJ IV PUSH: CPT

## 2021-02-08 PROCEDURE — 83880 ASSAY OF NATRIURETIC PEPTIDE: CPT

## 2021-02-08 PROCEDURE — 99223 1ST HOSP IP/OBS HIGH 75: CPT | Mod: ,,, | Performed by: INTERNAL MEDICINE

## 2021-02-08 PROCEDURE — 93010 ELECTROCARDIOGRAM REPORT: CPT | Mod: ,,, | Performed by: INTERNAL MEDICINE

## 2021-02-08 PROCEDURE — 93010 EKG 12-LEAD: ICD-10-PCS | Mod: 76,,, | Performed by: INTERNAL MEDICINE

## 2021-02-08 PROCEDURE — 80053 COMPREHEN METABOLIC PANEL: CPT

## 2021-02-08 PROCEDURE — 99291 PR CRITICAL CARE, E/M 30-74 MINUTES: ICD-10-PCS | Mod: ,,, | Performed by: INTERNAL MEDICINE

## 2021-02-08 PROCEDURE — 84484 ASSAY OF TROPONIN QUANT: CPT

## 2021-02-08 PROCEDURE — 99223 PR INITIAL HOSPITAL CARE,LEVL III: ICD-10-PCS | Mod: ,,, | Performed by: INTERNAL MEDICINE

## 2021-02-08 PROCEDURE — 93005 ELECTROCARDIOGRAM TRACING: CPT

## 2021-02-08 PROCEDURE — 83605 ASSAY OF LACTIC ACID: CPT

## 2021-02-08 PROCEDURE — U0002 COVID-19 LAB TEST NON-CDC: HCPCS | Performed by: NURSE PRACTITIONER

## 2021-02-08 PROCEDURE — 99291 CRITICAL CARE FIRST HOUR: CPT | Mod: ,,, | Performed by: INTERNAL MEDICINE

## 2021-02-08 PROCEDURE — 25000003 PHARM REV CODE 250: Performed by: PHYSICIAN ASSISTANT

## 2021-02-08 RX ORDER — IBUPROFEN 200 MG
24 TABLET ORAL
Status: DISCONTINUED | OUTPATIENT
Start: 2021-02-08 | End: 2021-02-11 | Stop reason: HOSPADM

## 2021-02-08 RX ORDER — HYDRALAZINE HYDROCHLORIDE 25 MG/1
25 TABLET, FILM COATED ORAL EVERY 8 HOURS PRN
Status: DISCONTINUED | OUTPATIENT
Start: 2021-02-08 | End: 2021-02-11 | Stop reason: HOSPADM

## 2021-02-08 RX ORDER — SODIUM CHLORIDE 0.9 % (FLUSH) 0.9 %
10 SYRINGE (ML) INJECTION
Status: DISCONTINUED | OUTPATIENT
Start: 2021-02-08 | End: 2021-02-11 | Stop reason: HOSPADM

## 2021-02-08 RX ORDER — FUROSEMIDE 20 MG/1
20 TABLET ORAL DAILY
Status: DISCONTINUED | OUTPATIENT
Start: 2021-02-09 | End: 2021-02-11 | Stop reason: HOSPADM

## 2021-02-08 RX ORDER — INSULIN ASPART 100 [IU]/ML
0-5 INJECTION, SOLUTION INTRAVENOUS; SUBCUTANEOUS
Status: DISCONTINUED | OUTPATIENT
Start: 2021-02-08 | End: 2021-02-10

## 2021-02-08 RX ORDER — OLMESARTAN MEDOXOMIL 20 MG/1
20 TABLET ORAL DAILY
Status: DISCONTINUED | OUTPATIENT
Start: 2021-02-09 | End: 2021-02-09

## 2021-02-08 RX ORDER — SODIUM CHLORIDE 0.9 % (FLUSH) 0.9 %
10 SYRINGE (ML) INJECTION
Status: CANCELLED | OUTPATIENT
Start: 2021-02-08

## 2021-02-08 RX ORDER — ACETAMINOPHEN 325 MG/1
650 TABLET ORAL EVERY 6 HOURS PRN
Status: DISCONTINUED | OUTPATIENT
Start: 2021-02-08 | End: 2021-02-11 | Stop reason: HOSPADM

## 2021-02-08 RX ORDER — FUROSEMIDE 10 MG/ML
40 INJECTION INTRAMUSCULAR; INTRAVENOUS
Status: COMPLETED | OUTPATIENT
Start: 2021-02-08 | End: 2021-02-08

## 2021-02-08 RX ORDER — TALC
6 POWDER (GRAM) TOPICAL NIGHTLY PRN
Status: CANCELLED | OUTPATIENT
Start: 2021-02-08

## 2021-02-08 RX ORDER — ATORVASTATIN CALCIUM 20 MG/1
20 TABLET, FILM COATED ORAL DAILY
Status: DISCONTINUED | OUTPATIENT
Start: 2021-02-09 | End: 2021-02-11 | Stop reason: HOSPADM

## 2021-02-08 RX ORDER — IBUPROFEN 200 MG
16 TABLET ORAL
Status: DISCONTINUED | OUTPATIENT
Start: 2021-02-08 | End: 2021-02-11 | Stop reason: HOSPADM

## 2021-02-08 RX ORDER — GLUCAGON 1 MG
1 KIT INJECTION
Status: DISCONTINUED | OUTPATIENT
Start: 2021-02-08 | End: 2021-02-11 | Stop reason: HOSPADM

## 2021-02-08 RX ADMIN — APIXABAN 2.5 MG: 2.5 TABLET, FILM COATED ORAL at 10:02

## 2021-02-08 RX ADMIN — HYDRALAZINE HYDROCHLORIDE 25 MG: 25 TABLET, FILM COATED ORAL at 07:02

## 2021-02-08 RX ADMIN — FUROSEMIDE 40 MG: 10 INJECTION, SOLUTION INTRAMUSCULAR; INTRAVENOUS at 12:02

## 2021-02-09 ENCOUNTER — DOCUMENT SCAN (OUTPATIENT)
Dept: HOME HEALTH SERVICES | Facility: HOSPITAL | Age: 86
End: 2021-02-09
Payer: MEDICARE

## 2021-02-09 PROBLEM — Z51.5 PALLIATIVE CARE PATIENT: Status: ACTIVE | Noted: 2021-02-09

## 2021-02-09 PROBLEM — N18.32 STAGE 3B CHRONIC KIDNEY DISEASE: Status: ACTIVE | Noted: 2021-02-09

## 2021-02-09 LAB
ALBUMIN SERPL BCP-MCNC: 3.6 G/DL (ref 3.5–5.2)
ANION GAP SERPL CALC-SCNC: 13 MMOL/L (ref 8–16)
BUN SERPL-MCNC: 17 MG/DL (ref 10–30)
CALCIUM SERPL-MCNC: 10.3 MG/DL (ref 8.7–10.5)
CHLORIDE SERPL-SCNC: 108 MMOL/L (ref 95–110)
CO2 SERPL-SCNC: 19 MMOL/L (ref 23–29)
CREAT SERPL-MCNC: 1.2 MG/DL (ref 0.5–1.4)
EST. GFR  (AFRICAN AMERICAN): 44 ML/MIN/1.73 M^2
EST. GFR  (NON AFRICAN AMERICAN): 39 ML/MIN/1.73 M^2
ESTIMATED AVG GLUCOSE: 131 MG/DL (ref 68–131)
GLUCOSE SERPL-MCNC: 136 MG/DL (ref 70–110)
HBA1C MFR BLD: 6.2 % (ref 4–5.6)
MAGNESIUM SERPL-MCNC: 1.7 MG/DL (ref 1.6–2.6)
PHOSPHATE SERPL-MCNC: 3.5 MG/DL (ref 2.7–4.5)
POCT GLUCOSE: 103 MG/DL (ref 70–110)
POCT GLUCOSE: 110 MG/DL (ref 70–110)
POCT GLUCOSE: 125 MG/DL (ref 70–110)
POCT GLUCOSE: 82 MG/DL (ref 70–110)
POTASSIUM SERPL-SCNC: 4.8 MMOL/L (ref 3.5–5.1)
SODIUM SERPL-SCNC: 140 MMOL/L (ref 136–145)
TSH SERPL DL<=0.005 MIU/L-ACNC: 0.85 UIU/ML (ref 0.4–4)

## 2021-02-09 PROCEDURE — 20000000 HC ICU ROOM

## 2021-02-09 PROCEDURE — 99232 PR SUBSEQUENT HOSPITAL CARE,LEVL II: ICD-10-PCS | Mod: ,,, | Performed by: INTERNAL MEDICINE

## 2021-02-09 PROCEDURE — 25000003 PHARM REV CODE 250: Performed by: PHYSICIAN ASSISTANT

## 2021-02-09 PROCEDURE — 99233 SBSQ HOSP IP/OBS HIGH 50: CPT | Mod: ,,, | Performed by: INTERNAL MEDICINE

## 2021-02-09 PROCEDURE — 99232 SBSQ HOSP IP/OBS MODERATE 35: CPT | Mod: ,,, | Performed by: INTERNAL MEDICINE

## 2021-02-09 PROCEDURE — 99233 PR SUBSEQUENT HOSPITAL CARE,LEVL III: ICD-10-PCS | Mod: ,,, | Performed by: INTERNAL MEDICINE

## 2021-02-09 PROCEDURE — 93010 EKG 12-LEAD: ICD-10-PCS | Mod: ,,, | Performed by: INTERNAL MEDICINE

## 2021-02-09 PROCEDURE — 63600175 PHARM REV CODE 636 W HCPCS: Performed by: INTERNAL MEDICINE

## 2021-02-09 PROCEDURE — 25000003 PHARM REV CODE 250: Performed by: INTERNAL MEDICINE

## 2021-02-09 PROCEDURE — 36415 COLL VENOUS BLD VENIPUNCTURE: CPT

## 2021-02-09 PROCEDURE — 93010 ELECTROCARDIOGRAM REPORT: CPT | Mod: ,,, | Performed by: INTERNAL MEDICINE

## 2021-02-09 PROCEDURE — 99223 PR INITIAL HOSPITAL CARE,LEVL III: ICD-10-PCS | Mod: ,,, | Performed by: FAMILY MEDICINE

## 2021-02-09 PROCEDURE — 99223 1ST HOSP IP/OBS HIGH 75: CPT | Mod: ,,, | Performed by: FAMILY MEDICINE

## 2021-02-09 PROCEDURE — 83735 ASSAY OF MAGNESIUM: CPT

## 2021-02-09 PROCEDURE — 80069 RENAL FUNCTION PANEL: CPT

## 2021-02-09 PROCEDURE — 83036 HEMOGLOBIN GLYCOSYLATED A1C: CPT

## 2021-02-09 PROCEDURE — 93005 ELECTROCARDIOGRAM TRACING: CPT

## 2021-02-09 PROCEDURE — 84443 ASSAY THYROID STIM HORMONE: CPT

## 2021-02-09 RX ORDER — OLMESARTAN MEDOXOMIL 20 MG/1
40 TABLET ORAL DAILY
Status: DISCONTINUED | OUTPATIENT
Start: 2021-02-09 | End: 2021-02-11 | Stop reason: HOSPADM

## 2021-02-09 RX ORDER — HALOPERIDOL 5 MG/ML
5 INJECTION INTRAMUSCULAR ONCE
Status: COMPLETED | OUTPATIENT
Start: 2021-02-09 | End: 2021-02-09

## 2021-02-09 RX ORDER — HALOPERIDOL 5 MG/ML
INJECTION INTRAMUSCULAR
Status: DISPENSED
Start: 2021-02-09 | End: 2021-02-10

## 2021-02-09 RX ORDER — MUPIROCIN 20 MG/G
OINTMENT TOPICAL 2 TIMES DAILY
Status: DISCONTINUED | OUTPATIENT
Start: 2021-02-09 | End: 2021-02-11 | Stop reason: HOSPADM

## 2021-02-09 RX ORDER — HALOPERIDOL 5 MG/ML
2.5 INJECTION INTRAMUSCULAR ONCE
Status: COMPLETED | OUTPATIENT
Start: 2021-02-10 | End: 2021-02-09

## 2021-02-09 RX ORDER — HYDRALAZINE HYDROCHLORIDE 20 MG/ML
10 INJECTION INTRAMUSCULAR; INTRAVENOUS ONCE
Status: COMPLETED | OUTPATIENT
Start: 2021-02-10 | End: 2021-02-10

## 2021-02-09 RX ADMIN — ATORVASTATIN CALCIUM 20 MG: 20 TABLET, FILM COATED ORAL at 08:02

## 2021-02-09 RX ADMIN — MUPIROCIN: 20 OINTMENT TOPICAL at 08:02

## 2021-02-09 RX ADMIN — OLMESARTAN MEDOXOMIL 40 MG: 20 TABLET, FILM COATED ORAL at 08:02

## 2021-02-09 RX ADMIN — HALOPERIDOL LACTATE 2.5 MG: 5 INJECTION, SOLUTION INTRAMUSCULAR at 11:02

## 2021-02-09 RX ADMIN — FUROSEMIDE 20 MG: 20 TABLET ORAL at 08:02

## 2021-02-09 RX ADMIN — MUPIROCIN: 20 OINTMENT TOPICAL at 09:02

## 2021-02-09 RX ADMIN — APIXABAN 2.5 MG: 2.5 TABLET, FILM COATED ORAL at 08:02

## 2021-02-09 RX ADMIN — HYDRALAZINE HYDROCHLORIDE 25 MG: 25 TABLET, FILM COATED ORAL at 03:02

## 2021-02-09 RX ADMIN — HALOPERIDOL LACTATE 5 MG: 5 INJECTION, SOLUTION INTRAMUSCULAR at 11:02

## 2021-02-10 PROBLEM — R41.0 DELIRIUM: Status: ACTIVE | Noted: 2021-02-10

## 2021-02-10 PROBLEM — R94.31 QT PROLONGATION: Status: ACTIVE | Noted: 2021-02-10

## 2021-02-10 LAB
ALBUMIN SERPL BCP-MCNC: 2.9 G/DL (ref 3.5–5.2)
ANION GAP SERPL CALC-SCNC: 12 MMOL/L (ref 8–16)
BUN SERPL-MCNC: 22 MG/DL (ref 10–30)
CALCIUM SERPL-MCNC: 8.8 MG/DL (ref 8.7–10.5)
CHLORIDE SERPL-SCNC: 103 MMOL/L (ref 95–110)
CO2 SERPL-SCNC: 22 MMOL/L (ref 23–29)
CREAT SERPL-MCNC: 1 MG/DL (ref 0.5–1.4)
EST. GFR  (AFRICAN AMERICAN): 55 ML/MIN/1.73 M^2
EST. GFR  (NON AFRICAN AMERICAN): 48 ML/MIN/1.73 M^2
GLUCOSE SERPL-MCNC: 125 MG/DL (ref 70–110)
MAGNESIUM SERPL-MCNC: 1.6 MG/DL (ref 1.6–2.6)
PHOSPHATE SERPL-MCNC: 3.1 MG/DL (ref 2.7–4.5)
POCT GLUCOSE: 108 MG/DL (ref 70–110)
POCT GLUCOSE: 120 MG/DL (ref 70–110)
POTASSIUM SERPL-SCNC: 3.5 MMOL/L (ref 3.5–5.1)
SODIUM SERPL-SCNC: 137 MMOL/L (ref 136–145)

## 2021-02-10 PROCEDURE — 80069 RENAL FUNCTION PANEL: CPT

## 2021-02-10 PROCEDURE — 97116 GAIT TRAINING THERAPY: CPT

## 2021-02-10 PROCEDURE — 97161 PT EVAL LOW COMPLEX 20 MIN: CPT

## 2021-02-10 PROCEDURE — 25000003 PHARM REV CODE 250: Performed by: INTERNAL MEDICINE

## 2021-02-10 PROCEDURE — 63600175 PHARM REV CODE 636 W HCPCS: Performed by: NURSE PRACTITIONER

## 2021-02-10 PROCEDURE — 36415 COLL VENOUS BLD VENIPUNCTURE: CPT

## 2021-02-10 PROCEDURE — 99232 PR SUBSEQUENT HOSPITAL CARE,LEVL II: ICD-10-PCS | Mod: ,,, | Performed by: INTERNAL MEDICINE

## 2021-02-10 PROCEDURE — 99233 SBSQ HOSP IP/OBS HIGH 50: CPT | Mod: ,,, | Performed by: INTERNAL MEDICINE

## 2021-02-10 PROCEDURE — 99233 PR SUBSEQUENT HOSPITAL CARE,LEVL III: ICD-10-PCS | Mod: ,,, | Performed by: FAMILY MEDICINE

## 2021-02-10 PROCEDURE — 83735 ASSAY OF MAGNESIUM: CPT

## 2021-02-10 PROCEDURE — 99232 SBSQ HOSP IP/OBS MODERATE 35: CPT | Mod: ,,, | Performed by: INTERNAL MEDICINE

## 2021-02-10 PROCEDURE — 99497 PR ADVNCD CARE PLAN 30 MIN: ICD-10-PCS | Mod: ,,, | Performed by: FAMILY MEDICINE

## 2021-02-10 PROCEDURE — 97530 THERAPEUTIC ACTIVITIES: CPT

## 2021-02-10 PROCEDURE — 63600175 PHARM REV CODE 636 W HCPCS: Performed by: INTERNAL MEDICINE

## 2021-02-10 PROCEDURE — 99233 SBSQ HOSP IP/OBS HIGH 50: CPT | Mod: ,,, | Performed by: FAMILY MEDICINE

## 2021-02-10 PROCEDURE — 99233 PR SUBSEQUENT HOSPITAL CARE,LEVL III: ICD-10-PCS | Mod: ,,, | Performed by: INTERNAL MEDICINE

## 2021-02-10 PROCEDURE — 21400001 HC TELEMETRY ROOM

## 2021-02-10 PROCEDURE — 99497 ADVNCD CARE PLAN 30 MIN: CPT | Mod: ,,, | Performed by: FAMILY MEDICINE

## 2021-02-10 RX ORDER — HYDRALAZINE HYDROCHLORIDE 20 MG/ML
INJECTION INTRAMUSCULAR; INTRAVENOUS
Status: DISPENSED
Start: 2021-02-10 | End: 2021-02-10

## 2021-02-10 RX ADMIN — OLMESARTAN MEDOXOMIL 40 MG: 20 TABLET, FILM COATED ORAL at 08:02

## 2021-02-10 RX ADMIN — HYDRALAZINE HYDROCHLORIDE 10 MG: 20 INJECTION INTRAMUSCULAR; INTRAVENOUS at 12:02

## 2021-02-10 RX ADMIN — ATORVASTATIN CALCIUM 20 MG: 20 TABLET, FILM COATED ORAL at 08:02

## 2021-02-10 RX ADMIN — MUPIROCIN: 20 OINTMENT TOPICAL at 09:02

## 2021-02-10 RX ADMIN — APIXABAN 2.5 MG: 2.5 TABLET, FILM COATED ORAL at 08:02

## 2021-02-10 RX ADMIN — FUROSEMIDE 20 MG: 20 TABLET ORAL at 08:02

## 2021-02-10 RX ADMIN — APIXABAN 2.5 MG: 2.5 TABLET, FILM COATED ORAL at 09:02

## 2021-02-10 RX ADMIN — MUPIROCIN: 20 OINTMENT TOPICAL at 08:02

## 2021-02-11 VITALS
TEMPERATURE: 98 F | OXYGEN SATURATION: 94 % | SYSTOLIC BLOOD PRESSURE: 165 MMHG | BODY MASS INDEX: 22.96 KG/M2 | DIASTOLIC BLOOD PRESSURE: 73 MMHG | HEART RATE: 80 BPM | RESPIRATION RATE: 12 BRPM | HEIGHT: 64 IN | WEIGHT: 134.5 LBS

## 2021-02-11 PROBLEM — R00.1 BRADYCARDIA: Status: RESOLVED | Noted: 2021-02-08 | Resolved: 2021-02-11

## 2021-02-11 PROBLEM — R41.0 DELIRIUM: Status: RESOLVED | Noted: 2021-02-10 | Resolved: 2021-02-11

## 2021-02-11 PROBLEM — R94.31 QT PROLONGATION: Status: RESOLVED | Noted: 2021-02-10 | Resolved: 2021-02-11

## 2021-02-11 PROBLEM — Z51.5 PALLIATIVE CARE PATIENT: Status: RESOLVED | Noted: 2021-02-09 | Resolved: 2021-02-11

## 2021-02-11 LAB
ALBUMIN SERPL BCP-MCNC: 2.9 G/DL (ref 3.5–5.2)
ANION GAP SERPL CALC-SCNC: 10 MMOL/L (ref 8–16)
BUN SERPL-MCNC: 22 MG/DL (ref 10–30)
CALCIUM SERPL-MCNC: 9.5 MG/DL (ref 8.7–10.5)
CHLORIDE SERPL-SCNC: 105 MMOL/L (ref 95–110)
CO2 SERPL-SCNC: 24 MMOL/L (ref 23–29)
CREAT SERPL-MCNC: 1.1 MG/DL (ref 0.5–1.4)
ERYTHROCYTE [DISTWIDTH] IN BLOOD BY AUTOMATED COUNT: 15.2 % (ref 11.5–14.5)
EST. GFR  (AFRICAN AMERICAN): 49 ML/MIN/1.73 M^2
EST. GFR  (NON AFRICAN AMERICAN): 43 ML/MIN/1.73 M^2
GLUCOSE SERPL-MCNC: 105 MG/DL (ref 70–110)
HCT VFR BLD AUTO: 38.7 % (ref 37–48.5)
HGB BLD-MCNC: 12.7 G/DL (ref 12–16)
MAGNESIUM SERPL-MCNC: 1.6 MG/DL (ref 1.6–2.6)
MCH RBC QN AUTO: 30.5 PG (ref 27–31)
MCHC RBC AUTO-ENTMCNC: 32.8 G/DL (ref 32–36)
MCV RBC AUTO: 93 FL (ref 82–98)
PHOSPHATE SERPL-MCNC: 3.1 MG/DL (ref 2.7–4.5)
PLATELET # BLD AUTO: 154 K/UL (ref 150–350)
PMV BLD AUTO: 11.6 FL (ref 9.2–12.9)
POTASSIUM SERPL-SCNC: 3.3 MMOL/L (ref 3.5–5.1)
RBC # BLD AUTO: 4.17 M/UL (ref 4–5.4)
SODIUM SERPL-SCNC: 139 MMOL/L (ref 136–145)
WBC # BLD AUTO: 4.73 K/UL (ref 3.9–12.7)

## 2021-02-11 PROCEDURE — 99232 SBSQ HOSP IP/OBS MODERATE 35: CPT | Mod: ,,, | Performed by: INTERNAL MEDICINE

## 2021-02-11 PROCEDURE — 97535 SELF CARE MNGMENT TRAINING: CPT

## 2021-02-11 PROCEDURE — 99238 PR HOSPITAL DISCHARGE DAY,<30 MIN: ICD-10-PCS | Mod: ,,, | Performed by: INTERNAL MEDICINE

## 2021-02-11 PROCEDURE — 99238 HOSP IP/OBS DSCHRG MGMT 30/<: CPT | Mod: ,,, | Performed by: INTERNAL MEDICINE

## 2021-02-11 PROCEDURE — 93005 ELECTROCARDIOGRAM TRACING: CPT

## 2021-02-11 PROCEDURE — 85027 COMPLETE CBC AUTOMATED: CPT

## 2021-02-11 PROCEDURE — 93041 RHYTHM ECG TRACING: CPT

## 2021-02-11 PROCEDURE — 97116 GAIT TRAINING THERAPY: CPT

## 2021-02-11 PROCEDURE — 93010 EKG 12-LEAD: ICD-10-PCS | Mod: ,,, | Performed by: INTERNAL MEDICINE

## 2021-02-11 PROCEDURE — 25000003 PHARM REV CODE 250: Performed by: INTERNAL MEDICINE

## 2021-02-11 PROCEDURE — 83735 ASSAY OF MAGNESIUM: CPT

## 2021-02-11 PROCEDURE — 36415 COLL VENOUS BLD VENIPUNCTURE: CPT

## 2021-02-11 PROCEDURE — 99232 PR SUBSEQUENT HOSPITAL CARE,LEVL II: ICD-10-PCS | Mod: ,,, | Performed by: INTERNAL MEDICINE

## 2021-02-11 PROCEDURE — 97166 OT EVAL MOD COMPLEX 45 MIN: CPT

## 2021-02-11 PROCEDURE — 93010 ELECTROCARDIOGRAM REPORT: CPT | Mod: ,,, | Performed by: INTERNAL MEDICINE

## 2021-02-11 PROCEDURE — 99900035 HC TECH TIME PER 15 MIN (STAT)

## 2021-02-11 PROCEDURE — 80069 RENAL FUNCTION PANEL: CPT

## 2021-02-11 RX ORDER — OLMESARTAN MEDOXOMIL 40 MG/1
40 TABLET ORAL DAILY
Qty: 30 TABLET | Refills: 0 | Status: SHIPPED | OUTPATIENT
Start: 2021-02-12 | End: 2021-03-09 | Stop reason: SDUPTHER

## 2021-02-11 RX ORDER — POTASSIUM CHLORIDE 20 MEQ/1
20 TABLET, EXTENDED RELEASE ORAL ONCE
Status: COMPLETED | OUTPATIENT
Start: 2021-02-11 | End: 2021-02-11

## 2021-02-11 RX ADMIN — HYDRALAZINE HYDROCHLORIDE 25 MG: 25 TABLET, FILM COATED ORAL at 08:02

## 2021-02-11 RX ADMIN — POTASSIUM CHLORIDE 20 MEQ: 1500 TABLET, EXTENDED RELEASE ORAL at 08:02

## 2021-02-11 RX ADMIN — ATORVASTATIN CALCIUM 20 MG: 20 TABLET, FILM COATED ORAL at 08:02

## 2021-02-11 RX ADMIN — APIXABAN 2.5 MG: 2.5 TABLET, FILM COATED ORAL at 08:02

## 2021-02-11 RX ADMIN — MUPIROCIN: 20 OINTMENT TOPICAL at 08:02

## 2021-02-11 RX ADMIN — OLMESARTAN MEDOXOMIL 40 MG: 20 TABLET, FILM COATED ORAL at 08:02

## 2021-02-11 RX ADMIN — FUROSEMIDE 20 MG: 20 TABLET ORAL at 08:02

## 2021-02-12 ENCOUNTER — TELEPHONE (OUTPATIENT)
Dept: INTERNAL MEDICINE | Facility: CLINIC | Age: 86
End: 2021-02-12

## 2021-02-12 ENCOUNTER — PATIENT OUTREACH (OUTPATIENT)
Dept: ADMINISTRATIVE | Facility: CLINIC | Age: 86
End: 2021-02-12

## 2021-02-25 ENCOUNTER — TELEPHONE (OUTPATIENT)
Dept: INTERNAL MEDICINE | Facility: CLINIC | Age: 86
End: 2021-02-25

## 2021-02-26 ENCOUNTER — DOCUMENT SCAN (OUTPATIENT)
Dept: HOME HEALTH SERVICES | Facility: HOSPITAL | Age: 86
End: 2021-02-26
Payer: MEDICARE

## 2021-03-01 PROCEDURE — G0179 MD RECERTIFICATION HHA PT: HCPCS | Mod: ,,, | Performed by: INTERNAL MEDICINE

## 2021-03-01 PROCEDURE — G0179 PR HOME HEALTH MD RECERTIFICATION: ICD-10-PCS | Mod: ,,, | Performed by: INTERNAL MEDICINE

## 2021-03-02 ENCOUNTER — DOCUMENT SCAN (OUTPATIENT)
Dept: HOME HEALTH SERVICES | Facility: HOSPITAL | Age: 86
End: 2021-03-02
Payer: MEDICARE

## 2021-03-02 ENCOUNTER — DOCUMENT SCAN (OUTPATIENT)
Dept: HOME HEALTH SERVICES | Facility: HOSPITAL | Age: 86
End: 2021-03-02

## 2021-03-06 ENCOUNTER — PATIENT OUTREACH (OUTPATIENT)
Dept: ADMINISTRATIVE | Facility: OTHER | Age: 86
End: 2021-03-06

## 2021-03-09 ENCOUNTER — OFFICE VISIT (OUTPATIENT)
Dept: CARDIOLOGY | Facility: CLINIC | Age: 86
End: 2021-03-09
Payer: MEDICARE

## 2021-03-09 ENCOUNTER — EXTERNAL HOME HEALTH (OUTPATIENT)
Dept: HOME HEALTH SERVICES | Facility: HOSPITAL | Age: 86
End: 2021-03-09
Payer: MEDICARE

## 2021-03-09 VITALS
HEIGHT: 64 IN | BODY MASS INDEX: 21.38 KG/M2 | SYSTOLIC BLOOD PRESSURE: 181 MMHG | HEART RATE: 61 BPM | WEIGHT: 125.25 LBS | DIASTOLIC BLOOD PRESSURE: 80 MMHG

## 2021-03-09 DIAGNOSIS — Z79.01 LONG TERM (CURRENT) USE OF ANTICOAGULANTS: ICD-10-CM

## 2021-03-09 DIAGNOSIS — I48.0 PAROXYSMAL ATRIAL FIBRILLATION: Primary | ICD-10-CM

## 2021-03-09 DIAGNOSIS — R41.3 MEMORY DEFICIT: ICD-10-CM

## 2021-03-09 DIAGNOSIS — I10 ESSENTIAL HYPERTENSION: ICD-10-CM

## 2021-03-09 DIAGNOSIS — I50.32 CHRONIC DIASTOLIC CONGESTIVE HEART FAILURE: ICD-10-CM

## 2021-03-09 DIAGNOSIS — Z86.718 HISTORY OF DVT (DEEP VEIN THROMBOSIS): ICD-10-CM

## 2021-03-09 PROCEDURE — 99999 PR PBB SHADOW E&M-EST. PATIENT-LVL III: CPT | Mod: PBBFAC,,, | Performed by: INTERNAL MEDICINE

## 2021-03-09 PROCEDURE — 1159F MED LIST DOCD IN RCRD: CPT | Mod: S$GLB,,, | Performed by: INTERNAL MEDICINE

## 2021-03-09 PROCEDURE — 99499 RISK ADDL DX/OHS AUDIT: ICD-10-PCS | Mod: S$GLB,,, | Performed by: INTERNAL MEDICINE

## 2021-03-09 PROCEDURE — 99214 PR OFFICE/OUTPT VISIT, EST, LEVL IV, 30-39 MIN: ICD-10-PCS | Mod: S$GLB,,, | Performed by: INTERNAL MEDICINE

## 2021-03-09 PROCEDURE — 99214 OFFICE O/P EST MOD 30 MIN: CPT | Mod: S$GLB,,, | Performed by: INTERNAL MEDICINE

## 2021-03-09 PROCEDURE — 1159F PR MEDICATION LIST DOCUMENTED IN MEDICAL RECORD: ICD-10-PCS | Mod: S$GLB,,, | Performed by: INTERNAL MEDICINE

## 2021-03-09 PROCEDURE — 99999 PR PBB SHADOW E&M-EST. PATIENT-LVL III: ICD-10-PCS | Mod: PBBFAC,,, | Performed by: INTERNAL MEDICINE

## 2021-03-09 PROCEDURE — 99499 UNLISTED E&M SERVICE: CPT | Mod: S$GLB,,, | Performed by: INTERNAL MEDICINE

## 2021-03-09 RX ORDER — OLMESARTAN MEDOXOMIL 40 MG/1
40 TABLET ORAL DAILY
Qty: 30 TABLET | Refills: 0 | Status: SHIPPED | OUTPATIENT
Start: 2021-03-09 | End: 2021-03-31

## 2021-03-16 ENCOUNTER — TELEPHONE (OUTPATIENT)
Dept: INTERNAL MEDICINE | Facility: CLINIC | Age: 86
End: 2021-03-16

## 2021-03-25 ENCOUNTER — DOCUMENT SCAN (OUTPATIENT)
Dept: HOME HEALTH SERVICES | Facility: HOSPITAL | Age: 86
End: 2021-03-25
Payer: MEDICARE

## 2021-03-31 RX ORDER — OLMESARTAN MEDOXOMIL 40 MG/1
TABLET ORAL
Qty: 30 TABLET | Refills: 6 | Status: SHIPPED | OUTPATIENT
Start: 2021-03-31 | End: 2021-08-12

## 2021-04-16 ENCOUNTER — HOSPITAL ENCOUNTER (OUTPATIENT)
Facility: HOSPITAL | Age: 86
Discharge: HOME HEALTH | End: 2021-04-18
Attending: EMERGENCY MEDICINE | Admitting: INTERNAL MEDICINE
Payer: MEDICARE

## 2021-04-16 DIAGNOSIS — R06.02 SHORTNESS OF BREATH: ICD-10-CM

## 2021-04-16 DIAGNOSIS — I50.43 ACUTE ON CHRONIC COMBINED SYSTOLIC AND DIASTOLIC CONGESTIVE HEART FAILURE: Primary | ICD-10-CM

## 2021-04-16 DIAGNOSIS — R06.09 DYSPNEA ON EXERTION: ICD-10-CM

## 2021-04-16 DIAGNOSIS — M79.89 LEG SWELLING: ICD-10-CM

## 2021-04-16 DIAGNOSIS — R41.3 MEMORY CHANGES: ICD-10-CM

## 2021-04-16 DIAGNOSIS — R07.9 CHEST PAIN: ICD-10-CM

## 2021-04-16 DIAGNOSIS — I50.9 CHF (CONGESTIVE HEART FAILURE): ICD-10-CM

## 2021-04-16 DIAGNOSIS — F05 SUNDOWNING: ICD-10-CM

## 2021-04-16 LAB
ALBUMIN SERPL BCP-MCNC: 3.5 G/DL (ref 3.5–5.2)
ALP SERPL-CCNC: 107 U/L (ref 55–135)
ALT SERPL W/O P-5'-P-CCNC: 25 U/L (ref 10–44)
ANION GAP SERPL CALC-SCNC: 10 MMOL/L (ref 8–16)
AST SERPL-CCNC: 32 U/L (ref 10–40)
BASOPHILS # BLD AUTO: 0.03 K/UL (ref 0–0.2)
BASOPHILS NFR BLD: 0.6 % (ref 0–1.9)
BILIRUB SERPL-MCNC: 0.9 MG/DL (ref 0.1–1)
BNP SERPL-MCNC: 743 PG/ML (ref 0–99)
BUN SERPL-MCNC: 21 MG/DL (ref 10–30)
CALCIUM SERPL-MCNC: 10.1 MG/DL (ref 8.7–10.5)
CHLORIDE SERPL-SCNC: 107 MMOL/L (ref 95–110)
CO2 SERPL-SCNC: 24 MMOL/L (ref 23–29)
CREAT SERPL-MCNC: 1 MG/DL (ref 0.5–1.4)
CTP QC/QA: YES
DIFFERENTIAL METHOD: ABNORMAL
EOSINOPHIL # BLD AUTO: 0.2 K/UL (ref 0–0.5)
EOSINOPHIL NFR BLD: 4.4 % (ref 0–8)
ERYTHROCYTE [DISTWIDTH] IN BLOOD BY AUTOMATED COUNT: 15.2 % (ref 11.5–14.5)
EST. GFR  (AFRICAN AMERICAN): 55.3 ML/MIN/1.73 M^2
EST. GFR  (NON AFRICAN AMERICAN): 48 ML/MIN/1.73 M^2
GLUCOSE SERPL-MCNC: 103 MG/DL (ref 70–110)
HCT VFR BLD AUTO: 43 % (ref 37–48.5)
HGB BLD-MCNC: 13.8 G/DL (ref 12–16)
IMM GRANULOCYTES # BLD AUTO: 0.02 K/UL (ref 0–0.04)
IMM GRANULOCYTES NFR BLD AUTO: 0.4 % (ref 0–0.5)
INR PPP: 1.3 (ref 0.8–1.2)
LYMPHOCYTES # BLD AUTO: 1.4 K/UL (ref 1–4.8)
LYMPHOCYTES NFR BLD: 26.4 % (ref 18–48)
MCH RBC QN AUTO: 31.3 PG (ref 27–31)
MCHC RBC AUTO-ENTMCNC: 32.1 G/DL (ref 32–36)
MCV RBC AUTO: 98 FL (ref 82–98)
MONOCYTES # BLD AUTO: 0.5 K/UL (ref 0.3–1)
MONOCYTES NFR BLD: 8.7 % (ref 4–15)
NEUTROPHILS # BLD AUTO: 3.1 K/UL (ref 1.8–7.7)
NEUTROPHILS NFR BLD: 59.5 % (ref 38–73)
NRBC BLD-RTO: 0 /100 WBC
PLATELET # BLD AUTO: 150 K/UL (ref 150–450)
PMV BLD AUTO: 12 FL (ref 9.2–12.9)
POCT GLUCOSE: 100 MG/DL (ref 70–110)
POTASSIUM SERPL-SCNC: 4 MMOL/L (ref 3.5–5.1)
PROT SERPL-MCNC: 7.5 G/DL (ref 6–8.4)
PROTHROMBIN TIME: 13.6 SEC (ref 9–12.5)
RBC # BLD AUTO: 4.41 M/UL (ref 4–5.4)
SARS-COV-2 RDRP RESP QL NAA+PROBE: NEGATIVE
SODIUM SERPL-SCNC: 141 MMOL/L (ref 136–145)
TROPONIN I SERPL DL<=0.01 NG/ML-MCNC: 0.1 NG/ML (ref 0–0.03)
TROPONIN I SERPL DL<=0.01 NG/ML-MCNC: 0.11 NG/ML (ref 0–0.03)
WBC # BLD AUTO: 5.18 K/UL (ref 3.9–12.7)

## 2021-04-16 PROCEDURE — 99291 CRITICAL CARE FIRST HOUR: CPT | Mod: CR,CS,, | Performed by: EMERGENCY MEDICINE

## 2021-04-16 PROCEDURE — 99291 PR CRITICAL CARE, E/M 30-74 MINUTES: ICD-10-PCS | Mod: CR,CS,, | Performed by: EMERGENCY MEDICINE

## 2021-04-16 PROCEDURE — 63600175 PHARM REV CODE 636 W HCPCS: Performed by: STUDENT IN AN ORGANIZED HEALTH CARE EDUCATION/TRAINING PROGRAM

## 2021-04-16 PROCEDURE — 80053 COMPREHEN METABOLIC PANEL: CPT | Performed by: STUDENT IN AN ORGANIZED HEALTH CARE EDUCATION/TRAINING PROGRAM

## 2021-04-16 PROCEDURE — 99220 PR INITIAL OBSERVATION CARE,LEVL III: ICD-10-PCS | Mod: ,,, | Performed by: PHYSICIAN ASSISTANT

## 2021-04-16 PROCEDURE — 99292 PR CRITICAL CARE, ADDL 30 MIN: ICD-10-PCS | Mod: CR,CS,, | Performed by: EMERGENCY MEDICINE

## 2021-04-16 PROCEDURE — 84484 ASSAY OF TROPONIN QUANT: CPT | Performed by: PHYSICIAN ASSISTANT

## 2021-04-16 PROCEDURE — 25000003 PHARM REV CODE 250: Performed by: PHYSICIAN ASSISTANT

## 2021-04-16 PROCEDURE — 84484 ASSAY OF TROPONIN QUANT: CPT | Mod: 91 | Performed by: STUDENT IN AN ORGANIZED HEALTH CARE EDUCATION/TRAINING PROGRAM

## 2021-04-16 PROCEDURE — 96374 THER/PROPH/DIAG INJ IV PUSH: CPT

## 2021-04-16 PROCEDURE — 85025 COMPLETE CBC W/AUTO DIFF WBC: CPT | Performed by: STUDENT IN AN ORGANIZED HEALTH CARE EDUCATION/TRAINING PROGRAM

## 2021-04-16 PROCEDURE — 83880 ASSAY OF NATRIURETIC PEPTIDE: CPT | Performed by: STUDENT IN AN ORGANIZED HEALTH CARE EDUCATION/TRAINING PROGRAM

## 2021-04-16 PROCEDURE — 93005 ELECTROCARDIOGRAM TRACING: CPT

## 2021-04-16 PROCEDURE — 99220 PR INITIAL OBSERVATION CARE,LEVL III: CPT | Mod: ,,, | Performed by: PHYSICIAN ASSISTANT

## 2021-04-16 PROCEDURE — 99292 CRITICAL CARE ADDL 30 MIN: CPT

## 2021-04-16 PROCEDURE — 85610 PROTHROMBIN TIME: CPT | Performed by: STUDENT IN AN ORGANIZED HEALTH CARE EDUCATION/TRAINING PROGRAM

## 2021-04-16 PROCEDURE — 93010 ELECTROCARDIOGRAM REPORT: CPT | Mod: ,,, | Performed by: INTERNAL MEDICINE

## 2021-04-16 PROCEDURE — G0378 HOSPITAL OBSERVATION PER HR: HCPCS

## 2021-04-16 PROCEDURE — 99292 CRITICAL CARE ADDL 30 MIN: CPT | Mod: CR,CS,, | Performed by: EMERGENCY MEDICINE

## 2021-04-16 PROCEDURE — 93010 EKG 12-LEAD: ICD-10-PCS | Mod: ,,, | Performed by: INTERNAL MEDICINE

## 2021-04-16 PROCEDURE — 96375 TX/PRO/DX INJ NEW DRUG ADDON: CPT

## 2021-04-16 PROCEDURE — 63600175 PHARM REV CODE 636 W HCPCS: Performed by: PHYSICIAN ASSISTANT

## 2021-04-16 PROCEDURE — U0002 COVID-19 LAB TEST NON-CDC: HCPCS | Performed by: HOSPITALIST

## 2021-04-16 PROCEDURE — 99291 CRITICAL CARE FIRST HOUR: CPT | Mod: 25

## 2021-04-16 RX ORDER — ONDANSETRON 8 MG/1
8 TABLET, ORALLY DISINTEGRATING ORAL EVERY 8 HOURS PRN
Status: DISCONTINUED | OUTPATIENT
Start: 2021-04-16 | End: 2021-04-18 | Stop reason: HOSPADM

## 2021-04-16 RX ORDER — GLUCAGON 1 MG
1 KIT INJECTION
Status: DISCONTINUED | OUTPATIENT
Start: 2021-04-16 | End: 2021-04-18 | Stop reason: HOSPADM

## 2021-04-16 RX ORDER — LOSARTAN POTASSIUM 50 MG/1
100 TABLET ORAL DAILY
Refills: 6 | Status: DISCONTINUED | OUTPATIENT
Start: 2021-04-17 | End: 2021-04-18 | Stop reason: HOSPADM

## 2021-04-16 RX ORDER — IBUPROFEN 200 MG
16 TABLET ORAL
Status: DISCONTINUED | OUTPATIENT
Start: 2021-04-16 | End: 2021-04-18 | Stop reason: HOSPADM

## 2021-04-16 RX ORDER — SODIUM CHLORIDE 0.9 % (FLUSH) 0.9 %
5 SYRINGE (ML) INJECTION
Status: DISCONTINUED | OUTPATIENT
Start: 2021-04-16 | End: 2021-04-18 | Stop reason: HOSPADM

## 2021-04-16 RX ORDER — IPRATROPIUM BROMIDE AND ALBUTEROL SULFATE 2.5; .5 MG/3ML; MG/3ML
3 SOLUTION RESPIRATORY (INHALATION) EVERY 4 HOURS PRN
Status: DISCONTINUED | OUTPATIENT
Start: 2021-04-16 | End: 2021-04-18 | Stop reason: HOSPADM

## 2021-04-16 RX ORDER — BISACODYL 10 MG
10 SUPPOSITORY, RECTAL RECTAL DAILY PRN
Status: DISCONTINUED | OUTPATIENT
Start: 2021-04-16 | End: 2021-04-18 | Stop reason: HOSPADM

## 2021-04-16 RX ORDER — HYDRALAZINE HYDROCHLORIDE 20 MG/ML
5 INJECTION INTRAMUSCULAR; INTRAVENOUS ONCE
Status: COMPLETED | OUTPATIENT
Start: 2021-04-16 | End: 2021-04-16

## 2021-04-16 RX ORDER — TALC
6 POWDER (GRAM) TOPICAL NIGHTLY PRN
Status: DISCONTINUED | OUTPATIENT
Start: 2021-04-16 | End: 2021-04-18 | Stop reason: HOSPADM

## 2021-04-16 RX ORDER — BUSPIRONE HYDROCHLORIDE 5 MG/1
5 TABLET ORAL 2 TIMES DAILY
Status: DISCONTINUED | OUTPATIENT
Start: 2021-04-16 | End: 2021-04-18 | Stop reason: HOSPADM

## 2021-04-16 RX ORDER — POLYETHYLENE GLYCOL 3350 17 G/17G
17 POWDER, FOR SOLUTION ORAL DAILY
Status: DISCONTINUED | OUTPATIENT
Start: 2021-04-17 | End: 2021-04-16

## 2021-04-16 RX ORDER — ACETAMINOPHEN 325 MG/1
650 TABLET ORAL EVERY 4 HOURS PRN
Status: DISCONTINUED | OUTPATIENT
Start: 2021-04-16 | End: 2021-04-18 | Stop reason: HOSPADM

## 2021-04-16 RX ORDER — LATANOPROST 50 UG/ML
1 SOLUTION/ DROPS OPHTHALMIC NIGHTLY
Status: DISCONTINUED | OUTPATIENT
Start: 2021-04-17 | End: 2021-04-18 | Stop reason: HOSPADM

## 2021-04-16 RX ORDER — IBUPROFEN 200 MG
24 TABLET ORAL
Status: DISCONTINUED | OUTPATIENT
Start: 2021-04-16 | End: 2021-04-18 | Stop reason: HOSPADM

## 2021-04-16 RX ORDER — INSULIN ASPART 100 [IU]/ML
0-5 INJECTION, SOLUTION INTRAVENOUS; SUBCUTANEOUS
Status: DISCONTINUED | OUTPATIENT
Start: 2021-04-16 | End: 2021-04-18 | Stop reason: HOSPADM

## 2021-04-16 RX ORDER — FUROSEMIDE 10 MG/ML
80 INJECTION INTRAMUSCULAR; INTRAVENOUS
Status: COMPLETED | OUTPATIENT
Start: 2021-04-16 | End: 2021-04-16

## 2021-04-16 RX ORDER — HYDRALAZINE HYDROCHLORIDE 50 MG/1
50 TABLET, FILM COATED ORAL EVERY 8 HOURS PRN
Status: DISCONTINUED | OUTPATIENT
Start: 2021-04-16 | End: 2021-04-18 | Stop reason: HOSPADM

## 2021-04-16 RX ORDER — ATORVASTATIN CALCIUM 20 MG/1
20 TABLET, FILM COATED ORAL DAILY
Status: DISCONTINUED | OUTPATIENT
Start: 2021-04-17 | End: 2021-04-18 | Stop reason: HOSPADM

## 2021-04-16 RX ADMIN — HYDRALAZINE HYDROCHLORIDE 5 MG: 20 INJECTION INTRAMUSCULAR; INTRAVENOUS at 09:04

## 2021-04-16 RX ADMIN — APIXABAN 2.5 MG: 2.5 TABLET, FILM COATED ORAL at 11:04

## 2021-04-16 RX ADMIN — FUROSEMIDE 80 MG: 10 INJECTION, SOLUTION INTRAMUSCULAR; INTRAVENOUS at 06:04

## 2021-04-16 RX ADMIN — BUSPIRONE HYDROCHLORIDE 5 MG: 5 TABLET ORAL at 11:04

## 2021-04-17 LAB
ANION GAP SERPL CALC-SCNC: 10 MMOL/L (ref 8–16)
ASCENDING AORTA: 3.01 CM
AV INDEX (PROSTH): 0.59
AV MEAN GRADIENT: 5 MMHG
AV PEAK GRADIENT: 8 MMHG
AV VALVE AREA: 1.85 CM2
AV VELOCITY RATIO: 0.58
BASOPHILS # BLD AUTO: 0.04 K/UL (ref 0–0.2)
BASOPHILS NFR BLD: 0.7 % (ref 0–1.9)
BSA FOR ECHO PROCEDURE: 1.57 M2
BUN SERPL-MCNC: 20 MG/DL (ref 10–30)
CALCIUM SERPL-MCNC: 9.5 MG/DL (ref 8.7–10.5)
CHLORIDE SERPL-SCNC: 105 MMOL/L (ref 95–110)
CO2 SERPL-SCNC: 26 MMOL/L (ref 23–29)
CREAT SERPL-MCNC: 1 MG/DL (ref 0.5–1.4)
CV ECHO LV RWT: 0.68 CM
DIFFERENTIAL METHOD: ABNORMAL
DOP CALC AO PEAK VEL: 1.43 M/S
DOP CALC AO VTI: 28.7 CM
DOP CALC LVOT AREA: 3.1 CM2
DOP CALC LVOT DIAMETER: 2 CM
DOP CALC LVOT PEAK VEL: 0.83 M/S
DOP CALC LVOT STROKE VOLUME: 53 CM3
DOP CALCLVOT PEAK VEL VTI: 16.88 CM
E WAVE DECELERATION TIME: 230.05 MSEC
E/A RATIO: 2.1
E/E' RATIO: 14 M/S
ECHO LV POSTERIOR WALL: 1.5 CM (ref 0.6–1.1)
EJECTION FRACTION: 55 %
EOSINOPHIL # BLD AUTO: 0.2 K/UL (ref 0–0.5)
EOSINOPHIL NFR BLD: 4.1 % (ref 0–8)
ERYTHROCYTE [DISTWIDTH] IN BLOOD BY AUTOMATED COUNT: 15.1 % (ref 11.5–14.5)
EST. GFR  (AFRICAN AMERICAN): 55.3 ML/MIN/1.73 M^2
EST. GFR  (NON AFRICAN AMERICAN): 48 ML/MIN/1.73 M^2
ESTIMATED AVG GLUCOSE: 123 MG/DL (ref 68–131)
FRACTIONAL SHORTENING: 26 % (ref 28–44)
GLUCOSE SERPL-MCNC: 105 MG/DL (ref 70–110)
HBA1C MFR BLD: 5.9 % (ref 4–5.6)
HCT VFR BLD AUTO: 41 % (ref 37–48.5)
HGB BLD-MCNC: 13.1 G/DL (ref 12–16)
IMM GRANULOCYTES # BLD AUTO: 0.01 K/UL (ref 0–0.04)
IMM GRANULOCYTES NFR BLD AUTO: 0.2 % (ref 0–0.5)
INR PPP: 1.3 (ref 0.8–1.2)
INTERVENTRICULAR SEPTUM: 1.2 CM (ref 0.6–1.1)
IVRT: 99.9 MSEC
LA MAJOR: 7.16 CM
LA MINOR: 7.19 CM
LA WIDTH: 5.43 CM
LEFT ATRIUM SIZE: 4.27 CM
LEFT ATRIUM VOLUME INDEX MOD: 79.1 ML/M2
LEFT ATRIUM VOLUME INDEX: 89.5 ML/M2
LEFT ATRIUM VOLUME MOD: 124.93 CM3
LEFT ATRIUM VOLUME: 141.41 CM3
LEFT INTERNAL DIMENSION IN SYSTOLE: 3.27 CM (ref 2.1–4)
LEFT VENTRICLE DIASTOLIC VOLUME INDEX: 55.4 ML/M2
LEFT VENTRICLE DIASTOLIC VOLUME: 87.53 ML
LEFT VENTRICLE MASS INDEX: 144 G/M2
LEFT VENTRICLE SYSTOLIC VOLUME INDEX: 27.4 ML/M2
LEFT VENTRICLE SYSTOLIC VOLUME: 43.31 ML
LEFT VENTRICULAR INTERNAL DIMENSION IN DIASTOLE: 4.4 CM (ref 3.5–6)
LEFT VENTRICULAR MASS: 227.51 G
LV LATERAL E/E' RATIO: 10.5 M/S
LV SEPTAL E/E' RATIO: 21 M/S
LYMPHOCYTES # BLD AUTO: 1.4 K/UL (ref 1–4.8)
LYMPHOCYTES NFR BLD: 23.8 % (ref 18–48)
MAGNESIUM SERPL-MCNC: 1.8 MG/DL (ref 1.6–2.6)
MCH RBC QN AUTO: 31 PG (ref 27–31)
MCHC RBC AUTO-ENTMCNC: 32 G/DL (ref 32–36)
MCV RBC AUTO: 97 FL (ref 82–98)
MONOCYTES # BLD AUTO: 0.5 K/UL (ref 0.3–1)
MONOCYTES NFR BLD: 7.9 % (ref 4–15)
MV PEAK A VEL: 0.3 M/S
MV PEAK E VEL: 0.63 M/S
MV STENOSIS PRESSURE HALF TIME: 66.71 MS
MV VALVE AREA P 1/2 METHOD: 3.3 CM2
NEUTROPHILS # BLD AUTO: 3.7 K/UL (ref 1.8–7.7)
NEUTROPHILS NFR BLD: 63.3 % (ref 38–73)
NRBC BLD-RTO: 0 /100 WBC
PHOSPHATE SERPL-MCNC: 3 MG/DL (ref 2.7–4.5)
PISA TR MAX VEL: 3.68 M/S
PLATELET # BLD AUTO: 169 K/UL (ref 150–450)
PMV BLD AUTO: 11.6 FL (ref 9.2–12.9)
POCT GLUCOSE: 124 MG/DL (ref 70–110)
POCT GLUCOSE: 131 MG/DL (ref 70–110)
POCT GLUCOSE: 140 MG/DL (ref 70–110)
POCT GLUCOSE: 96 MG/DL (ref 70–110)
POTASSIUM SERPL-SCNC: 3.4 MMOL/L (ref 3.5–5.1)
PROTHROMBIN TIME: 13.6 SEC (ref 9–12.5)
RA MAJOR: 6.32 CM
RA PRESSURE: 3 MMHG
RA VOL SYS: 26 ML
RA WIDTH: 4.05 CM
RBC # BLD AUTO: 4.23 M/UL (ref 4–5.4)
RIGHT VENTRICULAR END-DIASTOLIC DIMENSION: 3.66 CM
RV TISSUE DOPPLER FREE WALL SYSTOLIC VELOCITY 1 (APICAL 4 CHAMBER VIEW): 5.23 CM/S
SINUS: 3.17 CM
SODIUM SERPL-SCNC: 141 MMOL/L (ref 136–145)
STJ: 2.71 CM
TDI LATERAL: 0.06 M/S
TDI SEPTAL: 0.03 M/S
TDI: 0.05 M/S
TR MAX PG: 54 MMHG
TRICUSPID ANNULAR PLANE SYSTOLIC EXCURSION: 1.14 CM
TROPONIN I SERPL DL<=0.01 NG/ML-MCNC: 0.12 NG/ML (ref 0–0.03)
TV REST PULMONARY ARTERY PRESSURE: 57 MMHG
WBC # BLD AUTO: 5.79 K/UL (ref 3.9–12.7)

## 2021-04-17 PROCEDURE — 99226 PR SUBSEQUENT OBSERVATION CARE,LEVEL III: CPT | Mod: ,,, | Performed by: PHYSICIAN ASSISTANT

## 2021-04-17 PROCEDURE — 63600175 PHARM REV CODE 636 W HCPCS: Performed by: PHYSICIAN ASSISTANT

## 2021-04-17 PROCEDURE — 80048 BASIC METABOLIC PNL TOTAL CA: CPT | Performed by: PHYSICIAN ASSISTANT

## 2021-04-17 PROCEDURE — 25000003 PHARM REV CODE 250: Performed by: PHYSICIAN ASSISTANT

## 2021-04-17 PROCEDURE — 85025 COMPLETE CBC W/AUTO DIFF WBC: CPT | Performed by: PHYSICIAN ASSISTANT

## 2021-04-17 PROCEDURE — 94799 UNLISTED PULMONARY SVC/PX: CPT

## 2021-04-17 PROCEDURE — 85610 PROTHROMBIN TIME: CPT | Performed by: PHYSICIAN ASSISTANT

## 2021-04-17 PROCEDURE — 36415 COLL VENOUS BLD VENIPUNCTURE: CPT | Performed by: PHYSICIAN ASSISTANT

## 2021-04-17 PROCEDURE — 96376 TX/PRO/DX INJ SAME DRUG ADON: CPT | Mod: 59

## 2021-04-17 PROCEDURE — 84100 ASSAY OF PHOSPHORUS: CPT | Performed by: PHYSICIAN ASSISTANT

## 2021-04-17 PROCEDURE — 83036 HEMOGLOBIN GLYCOSYLATED A1C: CPT | Performed by: PHYSICIAN ASSISTANT

## 2021-04-17 PROCEDURE — G0378 HOSPITAL OBSERVATION PER HR: HCPCS

## 2021-04-17 PROCEDURE — 99226 PR SUBSEQUENT OBSERVATION CARE,LEVEL III: ICD-10-PCS | Mod: ,,, | Performed by: PHYSICIAN ASSISTANT

## 2021-04-17 PROCEDURE — 84484 ASSAY OF TROPONIN QUANT: CPT | Performed by: PHYSICIAN ASSISTANT

## 2021-04-17 PROCEDURE — 83735 ASSAY OF MAGNESIUM: CPT | Performed by: PHYSICIAN ASSISTANT

## 2021-04-17 RX ORDER — FUROSEMIDE 10 MG/ML
40 INJECTION INTRAMUSCULAR; INTRAVENOUS ONCE
Status: COMPLETED | OUTPATIENT
Start: 2021-04-17 | End: 2021-04-17

## 2021-04-17 RX ORDER — POTASSIUM CHLORIDE 20 MEQ/1
40 TABLET, EXTENDED RELEASE ORAL EVERY 4 HOURS
Status: COMPLETED | OUTPATIENT
Start: 2021-04-17 | End: 2021-04-17

## 2021-04-17 RX ADMIN — LATANOPROST 1 DROP: 50 SOLUTION OPHTHALMIC at 09:04

## 2021-04-17 RX ADMIN — BUSPIRONE HYDROCHLORIDE 5 MG: 5 TABLET ORAL at 09:04

## 2021-04-17 RX ADMIN — FUROSEMIDE 40 MG: 10 INJECTION, SOLUTION INTRAMUSCULAR; INTRAVENOUS at 09:04

## 2021-04-17 RX ADMIN — APIXABAN 2.5 MG: 2.5 TABLET, FILM COATED ORAL at 09:04

## 2021-04-17 RX ADMIN — POTASSIUM CHLORIDE 40 MEQ: 1500 TABLET, EXTENDED RELEASE ORAL at 09:04

## 2021-04-17 RX ADMIN — LOSARTAN POTASSIUM 100 MG: 50 TABLET, FILM COATED ORAL at 09:04

## 2021-04-17 RX ADMIN — ATORVASTATIN CALCIUM 20 MG: 20 TABLET, FILM COATED ORAL at 09:04

## 2021-04-17 RX ADMIN — POTASSIUM CHLORIDE 40 MEQ: 1500 TABLET, EXTENDED RELEASE ORAL at 01:04

## 2021-04-18 VITALS
OXYGEN SATURATION: 92 % | DIASTOLIC BLOOD PRESSURE: 70 MMHG | HEART RATE: 55 BPM | SYSTOLIC BLOOD PRESSURE: 159 MMHG | WEIGHT: 121 LBS | TEMPERATURE: 98 F | HEIGHT: 64 IN | BODY MASS INDEX: 20.66 KG/M2 | RESPIRATION RATE: 18 BRPM

## 2021-04-18 LAB
ANION GAP SERPL CALC-SCNC: 11 MMOL/L (ref 8–16)
BASOPHILS # BLD AUTO: 0.04 K/UL (ref 0–0.2)
BASOPHILS NFR BLD: 0.7 % (ref 0–1.9)
BUN SERPL-MCNC: 21 MG/DL (ref 10–30)
CALCIUM SERPL-MCNC: 9.4 MG/DL (ref 8.7–10.5)
CHLORIDE SERPL-SCNC: 109 MMOL/L (ref 95–110)
CO2 SERPL-SCNC: 18 MMOL/L (ref 23–29)
CREAT SERPL-MCNC: 1 MG/DL (ref 0.5–1.4)
DIFFERENTIAL METHOD: ABNORMAL
EOSINOPHIL # BLD AUTO: 0.3 K/UL (ref 0–0.5)
EOSINOPHIL NFR BLD: 4.8 % (ref 0–8)
ERYTHROCYTE [DISTWIDTH] IN BLOOD BY AUTOMATED COUNT: 15.3 % (ref 11.5–14.5)
EST. GFR  (AFRICAN AMERICAN): 55.3 ML/MIN/1.73 M^2
EST. GFR  (NON AFRICAN AMERICAN): 48 ML/MIN/1.73 M^2
GLUCOSE SERPL-MCNC: 89 MG/DL (ref 70–110)
HCT VFR BLD AUTO: 45.3 % (ref 37–48.5)
HGB BLD-MCNC: 14.1 G/DL (ref 12–16)
IMM GRANULOCYTES # BLD AUTO: 0.01 K/UL (ref 0–0.04)
IMM GRANULOCYTES NFR BLD AUTO: 0.2 % (ref 0–0.5)
LYMPHOCYTES # BLD AUTO: 1.3 K/UL (ref 1–4.8)
LYMPHOCYTES NFR BLD: 21.1 % (ref 18–48)
MAGNESIUM SERPL-MCNC: 1.9 MG/DL (ref 1.6–2.6)
MCH RBC QN AUTO: 31.7 PG (ref 27–31)
MCHC RBC AUTO-ENTMCNC: 31.1 G/DL (ref 32–36)
MCV RBC AUTO: 102 FL (ref 82–98)
MONOCYTES # BLD AUTO: 0.5 K/UL (ref 0.3–1)
MONOCYTES NFR BLD: 7.9 % (ref 4–15)
NEUTROPHILS # BLD AUTO: 3.9 K/UL (ref 1.8–7.7)
NEUTROPHILS NFR BLD: 65.3 % (ref 38–73)
NRBC BLD-RTO: 0 /100 WBC
PHOSPHATE SERPL-MCNC: 3.3 MG/DL (ref 2.7–4.5)
PLATELET # BLD AUTO: 111 K/UL (ref 150–450)
PMV BLD AUTO: 12.8 FL (ref 9.2–12.9)
POCT GLUCOSE: 103 MG/DL (ref 70–110)
POCT GLUCOSE: 107 MG/DL (ref 70–110)
POTASSIUM SERPL-SCNC: 4.6 MMOL/L (ref 3.5–5.1)
RBC # BLD AUTO: 4.45 M/UL (ref 4–5.4)
SODIUM SERPL-SCNC: 138 MMOL/L (ref 136–145)
TROPONIN I SERPL DL<=0.01 NG/ML-MCNC: 0.1 NG/ML (ref 0–0.03)
WBC # BLD AUTO: 5.98 K/UL (ref 3.9–12.7)

## 2021-04-18 PROCEDURE — 85025 COMPLETE CBC W/AUTO DIFF WBC: CPT | Performed by: PHYSICIAN ASSISTANT

## 2021-04-18 PROCEDURE — 80048 BASIC METABOLIC PNL TOTAL CA: CPT | Performed by: PHYSICIAN ASSISTANT

## 2021-04-18 PROCEDURE — 36415 COLL VENOUS BLD VENIPUNCTURE: CPT | Performed by: PHYSICIAN ASSISTANT

## 2021-04-18 PROCEDURE — 84100 ASSAY OF PHOSPHORUS: CPT | Performed by: PHYSICIAN ASSISTANT

## 2021-04-18 PROCEDURE — 84484 ASSAY OF TROPONIN QUANT: CPT | Performed by: PHYSICIAN ASSISTANT

## 2021-04-18 PROCEDURE — 94799 UNLISTED PULMONARY SVC/PX: CPT

## 2021-04-18 PROCEDURE — 83735 ASSAY OF MAGNESIUM: CPT | Performed by: PHYSICIAN ASSISTANT

## 2021-04-18 PROCEDURE — 25000003 PHARM REV CODE 250: Performed by: PHYSICIAN ASSISTANT

## 2021-04-18 PROCEDURE — 99217 PR OBSERVATION CARE DISCHARGE: ICD-10-PCS | Mod: ,,, | Performed by: PHYSICIAN ASSISTANT

## 2021-04-18 PROCEDURE — 99217 PR OBSERVATION CARE DISCHARGE: CPT | Mod: ,,, | Performed by: PHYSICIAN ASSISTANT

## 2021-04-18 PROCEDURE — G0378 HOSPITAL OBSERVATION PER HR: HCPCS

## 2021-04-18 RX ORDER — AMLODIPINE BESYLATE 2.5 MG/1
2.5 TABLET ORAL DAILY
Qty: 30 TABLET | Refills: 11 | Status: ON HOLD | OUTPATIENT
Start: 2021-04-18 | End: 2022-04-12 | Stop reason: HOSPADM

## 2021-04-18 RX ORDER — MIRTAZAPINE 15 MG/1
15 TABLET, FILM COATED ORAL NIGHTLY
Qty: 30 TABLET | Refills: 11 | Status: SHIPPED | OUTPATIENT
Start: 2021-04-18 | End: 2021-11-17 | Stop reason: ALTCHOICE

## 2021-04-18 RX ORDER — AMLODIPINE BESYLATE 2.5 MG/1
2.5 TABLET ORAL DAILY
Status: DISCONTINUED | OUTPATIENT
Start: 2021-04-18 | End: 2021-04-18 | Stop reason: HOSPADM

## 2021-04-18 RX ORDER — FUROSEMIDE 20 MG/1
20 TABLET ORAL DAILY
Qty: 30 TABLET | Refills: 2 | Status: SHIPPED | OUTPATIENT
Start: 2021-04-18 | End: 2021-07-09 | Stop reason: SDUPTHER

## 2021-04-18 RX ADMIN — BUSPIRONE HYDROCHLORIDE 5 MG: 5 TABLET ORAL at 09:04

## 2021-04-18 RX ADMIN — ATORVASTATIN CALCIUM 20 MG: 20 TABLET, FILM COATED ORAL at 09:04

## 2021-04-18 RX ADMIN — APIXABAN 2.5 MG: 2.5 TABLET, FILM COATED ORAL at 09:04

## 2021-04-18 RX ADMIN — AMLODIPINE BESYLATE 2.5 MG: 2.5 TABLET ORAL at 09:04

## 2021-04-18 RX ADMIN — LOSARTAN POTASSIUM 100 MG: 50 TABLET, FILM COATED ORAL at 09:04

## 2021-04-23 ENCOUNTER — PES CALL (OUTPATIENT)
Dept: ADMINISTRATIVE | Facility: CLINIC | Age: 86
End: 2021-04-23

## 2021-04-26 RX ORDER — BUSPIRONE HYDROCHLORIDE 5 MG/1
TABLET ORAL
Qty: 180 TABLET | Refills: 0 | Status: SHIPPED | OUTPATIENT
Start: 2021-04-26 | End: 2021-07-06

## 2021-04-26 RX ORDER — ATORVASTATIN CALCIUM 20 MG/1
TABLET, FILM COATED ORAL
Qty: 90 TABLET | Refills: 0 | Status: SHIPPED | OUTPATIENT
Start: 2021-04-26 | End: 2021-07-06

## 2021-04-30 PROCEDURE — G0179 MD RECERTIFICATION HHA PT: HCPCS | Mod: ,,, | Performed by: INTERNAL MEDICINE

## 2021-04-30 PROCEDURE — G0179 PR HOME HEALTH MD RECERTIFICATION: ICD-10-PCS | Mod: ,,, | Performed by: INTERNAL MEDICINE

## 2021-05-03 ENCOUNTER — EXTERNAL HOME HEALTH (OUTPATIENT)
Dept: HOME HEALTH SERVICES | Facility: HOSPITAL | Age: 86
End: 2021-05-03
Payer: MEDICARE

## 2021-05-17 ENCOUNTER — TELEPHONE (OUTPATIENT)
Dept: NEUROLOGY | Facility: CLINIC | Age: 86
End: 2021-05-17

## 2021-05-19 ENCOUNTER — TELEPHONE (OUTPATIENT)
Dept: INTERNAL MEDICINE | Facility: CLINIC | Age: 86
End: 2021-05-19

## 2021-06-16 ENCOUNTER — TELEPHONE (OUTPATIENT)
Dept: INTERNAL MEDICINE | Facility: CLINIC | Age: 86
End: 2021-06-16

## 2021-06-24 ENCOUNTER — TELEPHONE (OUTPATIENT)
Dept: INTERNAL MEDICINE | Facility: CLINIC | Age: 86
End: 2021-06-24

## 2021-07-09 ENCOUNTER — OFFICE VISIT (OUTPATIENT)
Dept: INTERNAL MEDICINE | Facility: CLINIC | Age: 86
End: 2021-07-09
Payer: MEDICARE

## 2021-07-09 ENCOUNTER — LAB VISIT (OUTPATIENT)
Dept: LAB | Facility: HOSPITAL | Age: 86
End: 2021-07-09
Attending: INTERNAL MEDICINE
Payer: MEDICARE

## 2021-07-09 ENCOUNTER — TELEPHONE (OUTPATIENT)
Dept: INTERNAL MEDICINE | Facility: CLINIC | Age: 86
End: 2021-07-09

## 2021-07-09 DIAGNOSIS — E11.9 DIABETES MELLITUS WITHOUT COMPLICATION: ICD-10-CM

## 2021-07-09 DIAGNOSIS — I10 HYPERTENSION, UNSPECIFIED TYPE: ICD-10-CM

## 2021-07-09 DIAGNOSIS — I50.9 CONGESTIVE HEART FAILURE, UNSPECIFIED HF CHRONICITY, UNSPECIFIED HEART FAILURE TYPE: Primary | ICD-10-CM

## 2021-07-09 LAB
ALBUMIN SERPL BCP-MCNC: 3.9 G/DL (ref 3.5–5.2)
ALP SERPL-CCNC: 100 U/L (ref 55–135)
ALT SERPL W/O P-5'-P-CCNC: 14 U/L (ref 10–44)
ANION GAP SERPL CALC-SCNC: 8 MMOL/L (ref 8–16)
AST SERPL-CCNC: 26 U/L (ref 10–40)
BILIRUB SERPL-MCNC: 0.8 MG/DL (ref 0.1–1)
BUN SERPL-MCNC: 24 MG/DL (ref 10–30)
CALCIUM SERPL-MCNC: 11 MG/DL (ref 8.7–10.5)
CHLORIDE SERPL-SCNC: 102 MMOL/L (ref 95–110)
CO2 SERPL-SCNC: 27 MMOL/L (ref 23–29)
CREAT SERPL-MCNC: 1 MG/DL (ref 0.5–1.4)
EST. GFR  (AFRICAN AMERICAN): 55.3 ML/MIN/1.73 M^2
EST. GFR  (NON AFRICAN AMERICAN): 48 ML/MIN/1.73 M^2
GLUCOSE SERPL-MCNC: 89 MG/DL (ref 70–110)
POTASSIUM SERPL-SCNC: 4.6 MMOL/L (ref 3.5–5.1)
PROT SERPL-MCNC: 8.2 G/DL (ref 6–8.4)
SODIUM SERPL-SCNC: 137 MMOL/L (ref 136–145)

## 2021-07-09 PROCEDURE — 36415 COLL VENOUS BLD VENIPUNCTURE: CPT | Performed by: INTERNAL MEDICINE

## 2021-07-09 PROCEDURE — 1159F MED LIST DOCD IN RCRD: CPT | Mod: S$GLB,,, | Performed by: INTERNAL MEDICINE

## 2021-07-09 PROCEDURE — 83036 HEMOGLOBIN GLYCOSYLATED A1C: CPT | Performed by: INTERNAL MEDICINE

## 2021-07-09 PROCEDURE — 99999 PR PBB SHADOW E&M-EST. PATIENT-LVL IV: ICD-10-PCS | Mod: PBBFAC,,, | Performed by: INTERNAL MEDICINE

## 2021-07-09 PROCEDURE — 99499 RISK ADDL DX/OHS AUDIT: ICD-10-PCS | Mod: S$PBB,,, | Performed by: INTERNAL MEDICINE

## 2021-07-09 PROCEDURE — 1101F PR PT FALLS ASSESS DOC 0-1 FALLS W/OUT INJ PAST YR: ICD-10-PCS | Mod: CPTII,S$GLB,, | Performed by: INTERNAL MEDICINE

## 2021-07-09 PROCEDURE — 3288F FALL RISK ASSESSMENT DOCD: CPT | Mod: CPTII,S$GLB,, | Performed by: INTERNAL MEDICINE

## 2021-07-09 PROCEDURE — 1126F PR PAIN SEVERITY QUANTIFIED, NO PAIN PRESENT: ICD-10-PCS | Mod: S$GLB,,, | Performed by: INTERNAL MEDICINE

## 2021-07-09 PROCEDURE — 1159F PR MEDICATION LIST DOCUMENTED IN MEDICAL RECORD: ICD-10-PCS | Mod: S$GLB,,, | Performed by: INTERNAL MEDICINE

## 2021-07-09 PROCEDURE — 1126F AMNT PAIN NOTED NONE PRSNT: CPT | Mod: S$GLB,,, | Performed by: INTERNAL MEDICINE

## 2021-07-09 PROCEDURE — 80053 COMPREHEN METABOLIC PANEL: CPT | Performed by: INTERNAL MEDICINE

## 2021-07-09 PROCEDURE — 99999 PR PBB SHADOW E&M-EST. PATIENT-LVL IV: CPT | Mod: PBBFAC,,, | Performed by: INTERNAL MEDICINE

## 2021-07-09 PROCEDURE — 1101F PT FALLS ASSESS-DOCD LE1/YR: CPT | Mod: CPTII,S$GLB,, | Performed by: INTERNAL MEDICINE

## 2021-07-09 PROCEDURE — 99215 OFFICE O/P EST HI 40 MIN: CPT | Mod: S$GLB,,, | Performed by: INTERNAL MEDICINE

## 2021-07-09 PROCEDURE — 99215 PR OFFICE/OUTPT VISIT, EST, LEVL V, 40-54 MIN: ICD-10-PCS | Mod: S$GLB,,, | Performed by: INTERNAL MEDICINE

## 2021-07-09 PROCEDURE — 99499 UNLISTED E&M SERVICE: CPT | Mod: S$PBB,,, | Performed by: INTERNAL MEDICINE

## 2021-07-09 PROCEDURE — 3288F PR FALLS RISK ASSESSMENT DOCUMENTED: ICD-10-PCS | Mod: CPTII,S$GLB,, | Performed by: INTERNAL MEDICINE

## 2021-07-09 RX ORDER — FUROSEMIDE 20 MG/1
20 TABLET ORAL DAILY
Qty: 30 TABLET | Refills: 2 | Status: SHIPPED | OUTPATIENT
Start: 2021-07-09 | End: 2021-08-20

## 2021-07-10 ENCOUNTER — TELEPHONE (OUTPATIENT)
Dept: INTERNAL MEDICINE | Facility: CLINIC | Age: 86
End: 2021-07-10

## 2021-07-10 DIAGNOSIS — I10 HYPERTENSION, UNSPECIFIED TYPE: Primary | ICD-10-CM

## 2021-07-10 LAB
ESTIMATED AVG GLUCOSE: 126 MG/DL (ref 68–131)
HBA1C MFR BLD: 6 % (ref 4–5.6)

## 2021-07-14 VITALS
TEMPERATURE: 99 F | DIASTOLIC BLOOD PRESSURE: 62 MMHG | BODY MASS INDEX: 21.3 KG/M2 | SYSTOLIC BLOOD PRESSURE: 102 MMHG | OXYGEN SATURATION: 95 % | HEART RATE: 98 BPM | WEIGHT: 124.75 LBS | HEIGHT: 64 IN

## 2021-07-20 ENCOUNTER — PES CALL (OUTPATIENT)
Dept: ADMINISTRATIVE | Facility: CLINIC | Age: 86
End: 2021-07-20

## 2021-07-23 ENCOUNTER — PES CALL (OUTPATIENT)
Dept: ADMINISTRATIVE | Facility: CLINIC | Age: 86
End: 2021-07-23

## 2021-07-26 ENCOUNTER — LAB VISIT (OUTPATIENT)
Dept: LAB | Facility: HOSPITAL | Age: 86
End: 2021-07-26
Attending: INTERNAL MEDICINE
Payer: MEDICARE

## 2021-07-26 DIAGNOSIS — I10 HYPERTENSION, UNSPECIFIED TYPE: ICD-10-CM

## 2021-07-26 LAB
ALBUMIN SERPL BCP-MCNC: 3.4 G/DL (ref 3.5–5.2)
ALP SERPL-CCNC: 92 U/L (ref 55–135)
ALT SERPL W/O P-5'-P-CCNC: 15 U/L (ref 10–44)
ANION GAP SERPL CALC-SCNC: 9 MMOL/L (ref 8–16)
AST SERPL-CCNC: 24 U/L (ref 10–40)
BILIRUB SERPL-MCNC: 0.8 MG/DL (ref 0.1–1)
BUN SERPL-MCNC: 24 MG/DL (ref 10–30)
CALCIUM SERPL-MCNC: 10.1 MG/DL (ref 8.7–10.5)
CHLORIDE SERPL-SCNC: 102 MMOL/L (ref 95–110)
CO2 SERPL-SCNC: 26 MMOL/L (ref 23–29)
CREAT SERPL-MCNC: 1 MG/DL (ref 0.5–1.4)
EST. GFR  (AFRICAN AMERICAN): 55.3 ML/MIN/1.73 M^2
EST. GFR  (NON AFRICAN AMERICAN): 48 ML/MIN/1.73 M^2
GLUCOSE SERPL-MCNC: 147 MG/DL (ref 70–110)
POTASSIUM SERPL-SCNC: 3.9 MMOL/L (ref 3.5–5.1)
PROT SERPL-MCNC: 7.5 G/DL (ref 6–8.4)
SODIUM SERPL-SCNC: 137 MMOL/L (ref 136–145)

## 2021-07-26 PROCEDURE — 80053 COMPREHEN METABOLIC PANEL: CPT | Performed by: INTERNAL MEDICINE

## 2021-07-26 PROCEDURE — 36415 COLL VENOUS BLD VENIPUNCTURE: CPT | Mod: PN | Performed by: INTERNAL MEDICINE

## 2021-08-13 RX ORDER — OLMESARTAN MEDOXOMIL 40 MG/1
TABLET ORAL
Qty: 90 TABLET | Refills: 3 | Status: SHIPPED | OUTPATIENT
Start: 2021-08-13 | End: 2022-03-28

## 2021-09-14 ENCOUNTER — PES CALL (OUTPATIENT)
Dept: ADMINISTRATIVE | Facility: CLINIC | Age: 86
End: 2021-09-14

## 2021-10-04 ENCOUNTER — OFFICE VISIT (OUTPATIENT)
Dept: CARDIOLOGY | Facility: CLINIC | Age: 86
End: 2021-10-04
Payer: MEDICARE

## 2021-10-04 VITALS
HEART RATE: 64 BPM | BODY MASS INDEX: 21.83 KG/M2 | DIASTOLIC BLOOD PRESSURE: 72 MMHG | SYSTOLIC BLOOD PRESSURE: 169 MMHG | WEIGHT: 127.88 LBS | HEIGHT: 64 IN

## 2021-10-04 DIAGNOSIS — I10 ESSENTIAL HYPERTENSION: ICD-10-CM

## 2021-10-04 DIAGNOSIS — I48.20 CHRONIC ATRIAL FIBRILLATION: ICD-10-CM

## 2021-10-04 DIAGNOSIS — R41.3 MEMORY DEFICIT: ICD-10-CM

## 2021-10-04 DIAGNOSIS — E78.49 OTHER HYPERLIPIDEMIA: ICD-10-CM

## 2021-10-04 DIAGNOSIS — Z86.718 HISTORY OF DVT (DEEP VEIN THROMBOSIS): ICD-10-CM

## 2021-10-04 DIAGNOSIS — I50.32 CHRONIC DIASTOLIC CONGESTIVE HEART FAILURE: Primary | ICD-10-CM

## 2021-10-04 DIAGNOSIS — I50.9 CONGESTIVE HEART FAILURE, UNSPECIFIED HF CHRONICITY, UNSPECIFIED HEART FAILURE TYPE: ICD-10-CM

## 2021-10-04 DIAGNOSIS — Z79.01 LONG TERM (CURRENT) USE OF ANTICOAGULANTS: ICD-10-CM

## 2021-10-04 PROCEDURE — 99213 OFFICE O/P EST LOW 20 MIN: CPT | Mod: S$GLB,,, | Performed by: INTERNAL MEDICINE

## 2021-10-04 PROCEDURE — 3288F FALL RISK ASSESSMENT DOCD: CPT | Mod: CPTII,S$GLB,, | Performed by: INTERNAL MEDICINE

## 2021-10-04 PROCEDURE — 99999 PR PBB SHADOW E&M-EST. PATIENT-LVL IV: CPT | Mod: PBBFAC,,, | Performed by: INTERNAL MEDICINE

## 2021-10-04 PROCEDURE — 3288F PR FALLS RISK ASSESSMENT DOCUMENTED: ICD-10-PCS | Mod: CPTII,S$GLB,, | Performed by: INTERNAL MEDICINE

## 2021-10-04 PROCEDURE — 1159F MED LIST DOCD IN RCRD: CPT | Mod: CPTII,S$GLB,, | Performed by: INTERNAL MEDICINE

## 2021-10-04 PROCEDURE — 1126F AMNT PAIN NOTED NONE PRSNT: CPT | Mod: CPTII,S$GLB,, | Performed by: INTERNAL MEDICINE

## 2021-10-04 PROCEDURE — 1101F PT FALLS ASSESS-DOCD LE1/YR: CPT | Mod: CPTII,S$GLB,, | Performed by: INTERNAL MEDICINE

## 2021-10-04 PROCEDURE — 99999 PR PBB SHADOW E&M-EST. PATIENT-LVL IV: ICD-10-PCS | Mod: PBBFAC,,, | Performed by: INTERNAL MEDICINE

## 2021-10-04 PROCEDURE — 99213 PR OFFICE/OUTPT VISIT, EST, LEVL III, 20-29 MIN: ICD-10-PCS | Mod: S$GLB,,, | Performed by: INTERNAL MEDICINE

## 2021-10-04 PROCEDURE — 1159F PR MEDICATION LIST DOCUMENTED IN MEDICAL RECORD: ICD-10-PCS | Mod: CPTII,S$GLB,, | Performed by: INTERNAL MEDICINE

## 2021-10-04 PROCEDURE — 1126F PR PAIN SEVERITY QUANTIFIED, NO PAIN PRESENT: ICD-10-PCS | Mod: CPTII,S$GLB,, | Performed by: INTERNAL MEDICINE

## 2021-10-04 PROCEDURE — 1101F PR PT FALLS ASSESS DOC 0-1 FALLS W/OUT INJ PAST YR: ICD-10-PCS | Mod: CPTII,S$GLB,, | Performed by: INTERNAL MEDICINE

## 2021-10-06 ENCOUNTER — PES CALL (OUTPATIENT)
Dept: ADMINISTRATIVE | Facility: CLINIC | Age: 86
End: 2021-10-06

## 2021-10-07 ENCOUNTER — PES CALL (OUTPATIENT)
Dept: ADMINISTRATIVE | Facility: CLINIC | Age: 86
End: 2021-10-07

## 2021-10-14 ENCOUNTER — TELEPHONE (OUTPATIENT)
Dept: INTERNAL MEDICINE | Facility: CLINIC | Age: 86
End: 2021-10-14

## 2021-10-14 ENCOUNTER — PES CALL (OUTPATIENT)
Dept: ADMINISTRATIVE | Facility: CLINIC | Age: 86
End: 2021-10-14

## 2021-10-14 DIAGNOSIS — I50.9 CONGESTIVE HEART FAILURE, UNSPECIFIED HF CHRONICITY, UNSPECIFIED HEART FAILURE TYPE: Primary | ICD-10-CM

## 2021-10-27 DIAGNOSIS — I48.20 CHRONIC ATRIAL FIBRILLATION: Primary | ICD-10-CM

## 2021-11-17 ENCOUNTER — TELEPHONE (OUTPATIENT)
Dept: INTERNAL MEDICINE | Facility: CLINIC | Age: 86
End: 2021-11-17
Payer: MEDICARE

## 2021-11-17 RX ORDER — ESCITALOPRAM OXALATE 10 MG/1
10 TABLET ORAL DAILY
Qty: 30 TABLET | Refills: 11 | Status: SHIPPED | OUTPATIENT
Start: 2021-11-17 | End: 2022-09-07 | Stop reason: SDUPTHER

## 2021-11-24 ENCOUNTER — OFFICE VISIT (OUTPATIENT)
Dept: HOME HEALTH SERVICES | Facility: CLINIC | Age: 86
End: 2021-11-24
Payer: MEDICARE

## 2021-11-24 VITALS
HEIGHT: 64 IN | DIASTOLIC BLOOD PRESSURE: 86 MMHG | TEMPERATURE: 98 F | HEART RATE: 61 BPM | WEIGHT: 132 LBS | OXYGEN SATURATION: 95 % | SYSTOLIC BLOOD PRESSURE: 142 MMHG | BODY MASS INDEX: 22.53 KG/M2

## 2021-11-24 DIAGNOSIS — E11.22 CONTROLLED TYPE 2 DIABETES MELLITUS WITH STAGE 3 CHRONIC KIDNEY DISEASE, WITHOUT LONG-TERM CURRENT USE OF INSULIN: ICD-10-CM

## 2021-11-24 DIAGNOSIS — I10 ESSENTIAL HYPERTENSION: Chronic | ICD-10-CM

## 2021-11-24 DIAGNOSIS — Z00.00 ENCOUNTER FOR PREVENTIVE HEALTH EXAMINATION: Primary | ICD-10-CM

## 2021-11-24 DIAGNOSIS — R26.9 ABNORMALITY OF GAIT AND MOBILITY: ICD-10-CM

## 2021-11-24 DIAGNOSIS — I48.19 PERSISTENT ATRIAL FIBRILLATION: Chronic | ICD-10-CM

## 2021-11-24 DIAGNOSIS — I70.0 CALCIFICATION OF AORTA: ICD-10-CM

## 2021-11-24 DIAGNOSIS — E78.49 OTHER HYPERLIPIDEMIA: Chronic | ICD-10-CM

## 2021-11-24 DIAGNOSIS — N18.30 CONTROLLED TYPE 2 DIABETES MELLITUS WITH STAGE 3 CHRONIC KIDNEY DISEASE, WITHOUT LONG-TERM CURRENT USE OF INSULIN: ICD-10-CM

## 2021-11-24 DIAGNOSIS — I50.32 CHRONIC DIASTOLIC CONGESTIVE HEART FAILURE: ICD-10-CM

## 2021-11-24 DIAGNOSIS — R41.89 COGNITIVE CHANGE: ICD-10-CM

## 2021-11-24 DIAGNOSIS — Z99.89 DEPENDENCE ON OTHER ENABLING MACHINES AND DEVICES: ICD-10-CM

## 2021-11-24 PROBLEM — N18.31 STAGE 3A CHRONIC KIDNEY DISEASE: Status: ACTIVE | Noted: 2021-02-09

## 2021-11-24 PROCEDURE — G0439 PR MEDICARE ANNUAL WELLNESS SUBSEQUENT VISIT: ICD-10-PCS | Mod: S$GLB,,, | Performed by: NURSE PRACTITIONER

## 2021-11-24 PROCEDURE — 99499 UNLISTED E&M SERVICE: CPT | Mod: S$GLB,,, | Performed by: NURSE PRACTITIONER

## 2021-11-24 PROCEDURE — G0439 PPPS, SUBSEQ VISIT: HCPCS | Mod: S$GLB,,, | Performed by: NURSE PRACTITIONER

## 2021-11-24 PROCEDURE — 99499 RISK ADDL DX/OHS AUDIT: ICD-10-PCS | Mod: S$GLB,,, | Performed by: NURSE PRACTITIONER

## 2021-12-14 ENCOUNTER — CLINICAL SUPPORT (OUTPATIENT)
Dept: CARDIOLOGY | Facility: HOSPITAL | Age: 86
End: 2021-12-14
Attending: NURSE PRACTITIONER
Payer: MEDICARE

## 2021-12-14 DIAGNOSIS — I48.20 CHRONIC ATRIAL FIBRILLATION: ICD-10-CM

## 2021-12-14 PROCEDURE — 93225 XTRNL ECG REC<48 HRS REC: CPT | Mod: HCNC

## 2021-12-14 PROCEDURE — 93227 XTRNL ECG REC<48 HR R&I: CPT | Mod: HCNC,,, | Performed by: STUDENT IN AN ORGANIZED HEALTH CARE EDUCATION/TRAINING PROGRAM

## 2021-12-14 PROCEDURE — 93227 HOLTER MONITOR - 24 HOUR (CUPID ONLY): ICD-10-PCS | Mod: HCNC,,, | Performed by: STUDENT IN AN ORGANIZED HEALTH CARE EDUCATION/TRAINING PROGRAM

## 2021-12-17 LAB
OHS CV EVENT MONITOR DAY: 0
OHS CV HOLTER LENGTH DECIMAL HOURS: 24
OHS CV HOLTER LENGTH HOURS: 24
OHS CV HOLTER LENGTH MINUTES: 0
OHS CV HOLTER SINUS AVERAGE HR: 61
OHS CV HOLTER SINUS MAX HR: 167
OHS CV HOLTER SINUS MIN HR: 47

## 2021-12-27 RX ORDER — ATORVASTATIN CALCIUM 20 MG/1
20 TABLET, FILM COATED ORAL DAILY
Qty: 90 TABLET | Refills: 0 | Status: ON HOLD | OUTPATIENT
Start: 2021-12-27 | End: 2022-04-12 | Stop reason: SDUPTHER

## 2021-12-27 RX ORDER — ATORVASTATIN CALCIUM 20 MG/1
TABLET, FILM COATED ORAL
Qty: 90 TABLET | Refills: 0 | Status: SHIPPED | OUTPATIENT
Start: 2021-12-27 | End: 2022-03-09

## 2022-01-03 ENCOUNTER — TELEPHONE (OUTPATIENT)
Dept: ELECTROPHYSIOLOGY | Facility: CLINIC | Age: 87
End: 2022-01-03
Payer: MEDICARE

## 2022-02-01 ENCOUNTER — OFFICE VISIT (OUTPATIENT)
Dept: OPHTHALMOLOGY | Facility: CLINIC | Age: 87
End: 2022-02-01
Payer: MEDICARE

## 2022-02-01 DIAGNOSIS — H01.9 EYELID INFLAMMATION: ICD-10-CM

## 2022-02-01 DIAGNOSIS — E11.9 NON-INSULIN DEPENDENT TYPE 2 DIABETES MELLITUS: ICD-10-CM

## 2022-02-01 DIAGNOSIS — H26.493 POSTERIOR CAPSULAR OPACIFICATION NON VISUALLY SIGNIFICANT OF BOTH EYES: ICD-10-CM

## 2022-02-01 DIAGNOSIS — Z96.1 PSEUDOPHAKIA: ICD-10-CM

## 2022-02-01 DIAGNOSIS — H04.123 DRY EYES, BILATERAL: ICD-10-CM

## 2022-02-01 DIAGNOSIS — H16.142 SUPERFICIAL PUNCTATE KERATITIS OF LEFT EYE: ICD-10-CM

## 2022-02-01 DIAGNOSIS — H40.1232 LOW-TENSION GLAUCOMA OF BOTH EYES, MODERATE STAGE: Primary | ICD-10-CM

## 2022-02-01 PROCEDURE — 92014 COMPRE OPH EXAM EST PT 1/>: CPT | Mod: HCNC,S$GLB,, | Performed by: OPHTHALMOLOGY

## 2022-02-01 PROCEDURE — 3288F PR FALLS RISK ASSESSMENT DOCUMENTED: ICD-10-PCS | Mod: HCNC,CPTII,S$GLB, | Performed by: OPHTHALMOLOGY

## 2022-02-01 PROCEDURE — 1160F RVW MEDS BY RX/DR IN RCRD: CPT | Mod: HCNC,CPTII,S$GLB, | Performed by: OPHTHALMOLOGY

## 2022-02-01 PROCEDURE — 1101F PR PT FALLS ASSESS DOC 0-1 FALLS W/OUT INJ PAST YR: ICD-10-PCS | Mod: HCNC,CPTII,S$GLB, | Performed by: OPHTHALMOLOGY

## 2022-02-01 PROCEDURE — 1159F MED LIST DOCD IN RCRD: CPT | Mod: HCNC,CPTII,S$GLB, | Performed by: OPHTHALMOLOGY

## 2022-02-01 PROCEDURE — 1160F PR REVIEW ALL MEDS BY PRESCRIBER/CLIN PHARMACIST DOCUMENTED: ICD-10-PCS | Mod: HCNC,CPTII,S$GLB, | Performed by: OPHTHALMOLOGY

## 2022-02-01 PROCEDURE — 92014 PR EYE EXAM, EST PATIENT,COMPREHESV: ICD-10-PCS | Mod: HCNC,S$GLB,, | Performed by: OPHTHALMOLOGY

## 2022-02-01 PROCEDURE — 92133 CPTRZD OPH DX IMG PST SGM ON: CPT | Mod: HCNC,S$GLB,, | Performed by: OPHTHALMOLOGY

## 2022-02-01 PROCEDURE — 92133 POSTERIOR SEGMENT OCT OPTIC NERVE(OCULAR COHERENCE TOMOGRAPHY) - OU - BOTH EYES: ICD-10-PCS | Mod: HCNC,S$GLB,, | Performed by: OPHTHALMOLOGY

## 2022-02-01 PROCEDURE — 99999 PR PBB SHADOW E&M-EST. PATIENT-LVL III: CPT | Mod: PBBFAC,HCNC,, | Performed by: OPHTHALMOLOGY

## 2022-02-01 PROCEDURE — 1126F AMNT PAIN NOTED NONE PRSNT: CPT | Mod: HCNC,CPTII,S$GLB, | Performed by: OPHTHALMOLOGY

## 2022-02-01 PROCEDURE — 99499 RISK ADDL DX/OHS AUDIT: ICD-10-PCS | Mod: HCNC,S$GLB,, | Performed by: OPHTHALMOLOGY

## 2022-02-01 PROCEDURE — 99999 PR PBB SHADOW E&M-EST. PATIENT-LVL III: ICD-10-PCS | Mod: PBBFAC,HCNC,, | Performed by: OPHTHALMOLOGY

## 2022-02-01 PROCEDURE — 99499 UNLISTED E&M SERVICE: CPT | Mod: HCNC,S$GLB,, | Performed by: OPHTHALMOLOGY

## 2022-02-01 PROCEDURE — 3288F FALL RISK ASSESSMENT DOCD: CPT | Mod: HCNC,CPTII,S$GLB, | Performed by: OPHTHALMOLOGY

## 2022-02-01 PROCEDURE — 1101F PT FALLS ASSESS-DOCD LE1/YR: CPT | Mod: HCNC,CPTII,S$GLB, | Performed by: OPHTHALMOLOGY

## 2022-02-01 PROCEDURE — 1159F PR MEDICATION LIST DOCUMENTED IN MEDICAL RECORD: ICD-10-PCS | Mod: HCNC,CPTII,S$GLB, | Performed by: OPHTHALMOLOGY

## 2022-02-01 PROCEDURE — 1126F PR PAIN SEVERITY QUANTIFIED, NO PAIN PRESENT: ICD-10-PCS | Mod: HCNC,CPTII,S$GLB, | Performed by: OPHTHALMOLOGY

## 2022-02-01 RX ORDER — LATANOPROST 50 UG/ML
1 SOLUTION/ DROPS OPHTHALMIC NIGHTLY
Qty: 7.5 ML | Refills: 3 | Status: SHIPPED | OUTPATIENT
Start: 2022-02-01 | End: 2022-04-18

## 2022-02-01 NOTE — PROGRESS NOTES
HPI     DLS: 2/18/2020    Pt here for 4 Month Check;   Pt states no eye pain or discomfort. Pt's son believes that she was taking   off the Combigan eye drops by another doctor maybe the heart doctor but   he's not sure.     Meds;   Combigan BID OU (Pt not using this eye drop)  Latanoprost QHS OU     1) LTG OD>OS   2) Blepharitis/MGD   3) PCO OU   4) PCIOL OU   5) PCIOL OU     Last edited by Salud Hartman on 2/1/2022  9:17 AM. (History)            Assessment /Plan     For exam results, see Encounter Report.    Low-tension glaucoma of both eyes, moderate stage    Posterior capsular opacification non visually significant of both eyes - Both Eyes    Pseudophakia - Both Eyes    Eyelid inflammation - Both Eyes    Non-insulin dependent type 2 diabetes mellitus        1.  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 Present since 2006.     Family history   ??  Glaucoma meds   Brimonidine BID ou, xalatan OU  H/O adverse rxn to glaucoma drops  None. COMBIGAN stopped by PCP/cardiologist.   LASERS   none  GLAUCOMA SURGERIES  : none   OTHER EYE SURGERIES  : PCIOL ou- OD- 3/15/06, 9/27/06 OS   CDR  0.85-0.9 w/ sup thinning // 0.7  Tbase  11-15 / 11-13  Tmax      15/13 (on gtts) (? Tmax off gtts)  Ttarget   12-13 ou   HVF  17 test 2002 to 2019 - -abnl high sens  // non specific - unreliable ou - stable   Gonio  +2-3 OU (narrow inf - but doubt pupillary block - PC IOL ou)  CCT  561/567  OCT  8 test 2007 to 2022 - RNFL - bord G/TI  od // nl os   HRT 14 test 2007 to 2019- MR -  Dec. S/N/I od // dec S/I os /// CDR 0.83 od // 0.74 os  Disc photos  2002, 2003 - slides // 2007, 2012, 2014, 2015, 2018   - OIS     Ttoday  12/13 (( at target of 12 - 13 ou ))  Test done today: IOP // DFE // OCT      2. PCO ou  -mild - monitor - oustide vis axis      3. MGD /eyelid Inflammation / Blepharitis  WC/LH/AT's     4. Pseudophakia ou - PC IOL ou   -status post  cataract extraction and insertion of intraocular lens - Both Eyes   -minimal PCO ou      5 CHF - hsopitalized  in early 2019   -was taken off the BB for a while - but the cardiologist says it is ok to use and she is back on it again 2/18/2019        Plan    POAG - Low Tension Component OD > OS  Just at target today at 11 ou   Target is 12 ou   CSM     - Latanoprost ou q hs (disp 3 bottles at a time)     Patient c/o of trouble with reading- recheck current Rx with good lighting conditions- patient sees 20/20- not more clear with change in Rx- continue Rx for now but use reading light.       RTC 4 months = gonio // try and minimize VF testing - pt is 89 years old

## 2022-03-09 RX ORDER — ATORVASTATIN CALCIUM 20 MG/1
TABLET, FILM COATED ORAL
Qty: 90 TABLET | Refills: 0 | Status: SHIPPED | OUTPATIENT
Start: 2022-03-09 | End: 2022-04-18 | Stop reason: SDUPTHER

## 2022-03-09 NOTE — TELEPHONE ENCOUNTER
Care Due:                  Date            Visit Type   Department     Provider  --------------------------------------------------------------------------------                                EP -                              PRIMARY      [facility]  Last Visit: 07-      CARE ([facility])   MEDICINE       Stacy Rodriguez  Next Visit: None Scheduled  None         None Found                                                            Last  Test          Frequency    Reason                     Performed    Due Date  --------------------------------------------------------------------------------    Lipid Panel.  12 months..  atorvastatin.............  11- 11-    Powered by [employer]. Reference number: [id number].   3/09/2022 2:34:00 PM CST

## 2022-03-18 ENCOUNTER — TELEPHONE (OUTPATIENT)
Dept: ELECTROPHYSIOLOGY | Facility: CLINIC | Age: 87
End: 2022-03-18
Payer: MEDICARE

## 2022-03-18 NOTE — TELEPHONE ENCOUNTER
Spoke with Ms. Blankenship  to remind her of her appt Monday afternoon with Dr. Elmore. Pt verbalized understanding.

## 2022-03-21 ENCOUNTER — OFFICE VISIT (OUTPATIENT)
Dept: ELECTROPHYSIOLOGY | Facility: CLINIC | Age: 87
End: 2022-03-21
Attending: NURSE PRACTITIONER
Payer: MEDICARE

## 2022-03-21 ENCOUNTER — HOSPITAL ENCOUNTER (OUTPATIENT)
Dept: CARDIOLOGY | Facility: CLINIC | Age: 87
Discharge: HOME | End: 2022-03-21
Attending: NURSE PRACTITIONER
Payer: MEDICARE

## 2022-03-21 VITALS
HEIGHT: 64 IN | HEART RATE: 79 BPM | DIASTOLIC BLOOD PRESSURE: 70 MMHG | WEIGHT: 132.06 LBS | BODY MASS INDEX: 22.55 KG/M2 | SYSTOLIC BLOOD PRESSURE: 118 MMHG

## 2022-03-21 DIAGNOSIS — N18.31 STAGE 3A CHRONIC KIDNEY DISEASE: ICD-10-CM

## 2022-03-21 DIAGNOSIS — I10 ESSENTIAL HYPERTENSION: Chronic | ICD-10-CM

## 2022-03-21 DIAGNOSIS — Z86.718 HISTORY OF DVT (DEEP VEIN THROMBOSIS): ICD-10-CM

## 2022-03-21 DIAGNOSIS — I48.19 PERSISTENT ATRIAL FIBRILLATION: Primary | Chronic | ICD-10-CM

## 2022-03-21 DIAGNOSIS — E78.49 OTHER HYPERLIPIDEMIA: Chronic | ICD-10-CM

## 2022-03-21 DIAGNOSIS — I48.20 CHRONIC ATRIAL FIBRILLATION: ICD-10-CM

## 2022-03-21 DIAGNOSIS — N18.30 CONTROLLED TYPE 2 DIABETES MELLITUS WITH STAGE 3 CHRONIC KIDNEY DISEASE, WITHOUT LONG-TERM CURRENT USE OF INSULIN: ICD-10-CM

## 2022-03-21 DIAGNOSIS — E11.22 CONTROLLED TYPE 2 DIABETES MELLITUS WITH STAGE 3 CHRONIC KIDNEY DISEASE, WITHOUT LONG-TERM CURRENT USE OF INSULIN: ICD-10-CM

## 2022-03-21 PROCEDURE — 3288F PR FALLS RISK ASSESSMENT DOCUMENTED: ICD-10-PCS | Mod: CPTII,S$GLB,, | Performed by: INTERNAL MEDICINE

## 2022-03-21 PROCEDURE — 1159F MED LIST DOCD IN RCRD: CPT | Mod: CPTII,S$GLB,, | Performed by: INTERNAL MEDICINE

## 2022-03-21 PROCEDURE — 99999 PR PBB SHADOW E&M-EST. PATIENT-LVL III: ICD-10-PCS | Mod: PBBFAC,,, | Performed by: INTERNAL MEDICINE

## 2022-03-21 PROCEDURE — 99214 PR OFFICE/OUTPT VISIT, EST, LEVL IV, 30-39 MIN: ICD-10-PCS | Mod: S$GLB,,, | Performed by: INTERNAL MEDICINE

## 2022-03-21 PROCEDURE — 1126F PR PAIN SEVERITY QUANTIFIED, NO PAIN PRESENT: ICD-10-PCS | Mod: CPTII,S$GLB,, | Performed by: INTERNAL MEDICINE

## 2022-03-21 PROCEDURE — 1159F PR MEDICATION LIST DOCUMENTED IN MEDICAL RECORD: ICD-10-PCS | Mod: CPTII,S$GLB,, | Performed by: INTERNAL MEDICINE

## 2022-03-21 PROCEDURE — 93010 ELECTROCARDIOGRAM REPORT: CPT | Mod: S$GLB,,, | Performed by: INTERNAL MEDICINE

## 2022-03-21 PROCEDURE — 99214 OFFICE O/P EST MOD 30 MIN: CPT | Mod: S$GLB,,, | Performed by: INTERNAL MEDICINE

## 2022-03-21 PROCEDURE — 1101F PT FALLS ASSESS-DOCD LE1/YR: CPT | Mod: CPTII,S$GLB,, | Performed by: INTERNAL MEDICINE

## 2022-03-21 PROCEDURE — 93005 RHYTHM STRIP: ICD-10-PCS | Mod: S$GLB,,, | Performed by: INTERNAL MEDICINE

## 2022-03-21 PROCEDURE — 99999 PR PBB SHADOW E&M-EST. PATIENT-LVL III: CPT | Mod: PBBFAC,,, | Performed by: INTERNAL MEDICINE

## 2022-03-21 PROCEDURE — 93010 RHYTHM STRIP: ICD-10-PCS | Mod: S$GLB,,, | Performed by: INTERNAL MEDICINE

## 2022-03-21 PROCEDURE — 1160F RVW MEDS BY RX/DR IN RCRD: CPT | Mod: CPTII,S$GLB,, | Performed by: INTERNAL MEDICINE

## 2022-03-21 PROCEDURE — 93005 ELECTROCARDIOGRAM TRACING: CPT | Mod: S$GLB,,, | Performed by: INTERNAL MEDICINE

## 2022-03-21 PROCEDURE — 1160F PR REVIEW ALL MEDS BY PRESCRIBER/CLIN PHARMACIST DOCUMENTED: ICD-10-PCS | Mod: CPTII,S$GLB,, | Performed by: INTERNAL MEDICINE

## 2022-03-21 PROCEDURE — 1101F PR PT FALLS ASSESS DOC 0-1 FALLS W/OUT INJ PAST YR: ICD-10-PCS | Mod: CPTII,S$GLB,, | Performed by: INTERNAL MEDICINE

## 2022-03-21 PROCEDURE — 3288F FALL RISK ASSESSMENT DOCD: CPT | Mod: CPTII,S$GLB,, | Performed by: INTERNAL MEDICINE

## 2022-03-21 PROCEDURE — 1126F AMNT PAIN NOTED NONE PRSNT: CPT | Mod: CPTII,S$GLB,, | Performed by: INTERNAL MEDICINE

## 2022-03-21 RX ORDER — INFLUENZA VACCINE, ADJUVANTED 15; 15; 15; 15 UG/.5ML; UG/.5ML; UG/.5ML; UG/.5ML
INJECTION, SUSPENSION INTRAMUSCULAR
COMMUNITY
Start: 2021-12-03 | End: 2022-03-28

## 2022-03-21 NOTE — PROGRESS NOTES
Subjective:     Inna Blankenship is a 89 y.o. female who was last seen in EP clinic in 1/2021. She is a 89F with HTN, DM, CKD, CHF, and DVT on Eliquis who was well until 1/2020 when she was admitted with ADHF in the setting of rate controlled AF. She was maintained on anticoagulation without AV delmi blockade and discharged home after an otherwise uneventful admission. She has since followed up in general cardiology clinic where she continued to complain of improving but persisting dyspnea.     In 11/2020 a DCCV was performed, and she was discharged on amiodarone. At her 1/2021 visit she was maintaining sinus rhythm and feeling better. However, in 2/2021 Ms. Blankenship was admitted to [facility] with ADHF in the setting of bradycardia with HR of 45 bpm.. Amiodarone was discontinued at this point. A 12/2021 Holter monitor showed normal sinus rhythm with an average HR of 61 bpm, no sustained arrhythmias.    Today in the office Ms. Blankenship feels well. She denies recurrent AF or bleeding issues on eliquis.     My interpretation of today's ECG is sinus rhythm at 79 bpm.    Current Outpatient Medications   Medication Sig    amLODIPine (NORVASC) 2.5 MG tablet Take 1 tablet (2.5 mg total) by mouth once daily.    atorvastatin (LIPITOR) 20 MG tablet Take 1 tablet (20 mg total) by mouth once daily.    ELIQUIS 2.5 mg Tab TAKE 1 TABLET TWICE DAILY    EScitalopram oxalate (LEXAPRO) 10 MG tablet Take 1 tablet (10 mg total) by mouth once daily.    FLUAD QUAD 2021-22,65Y UP,,PF, 60 mcg (15 mcg x 4)/0.5 mL Syrg     furosemide (LASIX) 20 MG tablet TAKE 1 TABLET EVERY DAY IF WEIGHT GAIN 2-3 LBS FOR 2-5 DAYS, TAKE 40MG DAILY. IF NO IMPROVEMENT, CALL MD    latanoprost 0.005 % ophthalmic solution Place 1 drop into both eyes every evening.    multivit with minerals/lutein (MULTIVITAMIN 50 PLUS ORAL) Take 1 tablet by mouth once daily.     atorvastatin (LIPITOR) 20 MG tablet TAKE 1 TABLET EVERY DAY    CALCIUM CARBONATE (CXAN-MUK-464 ORAL) Take 1  "tablet by mouth Daily. 1  Oral Every day    olmesartan (BENICAR) 40 MG tablet TAKE 1 TABLET EVERY DAY (Patient not taking: Reported on 3/21/2022)     No current facility-administered medications for this visit.       Review of Systems   Constitutional: Negative for malaise/fatigue.   Cardiovascular: Negative for chest pain, dyspnea on exertion, irregular heartbeat, leg swelling and palpitations.   Respiratory: Negative for shortness of breath.    Hematologic/Lymphatic: Negative for bleeding problem.   Skin: Negative for rash.   Musculoskeletal: Negative for myalgias.   Gastrointestinal: Negative for hematemesis, hematochezia and nausea.   Genitourinary: Negative for hematuria.   Neurological: Negative for light-headedness.   Psychiatric/Behavioral: Negative for altered mental status.   Allergic/Immunologic: Negative for persistent infections.     Objective:      /70   Pulse 79   Ht 5' 4" (1.626 m)   Wt 59.9 kg (132 lb 0.9 oz)   BMI 22.67 kg/m²     Physical Exam  Vitals and nursing note reviewed.   Constitutional:       Appearance: Normal appearance. She is well-developed.   HENT:      Head: Normocephalic.      Nose: Nose normal.   Eyes:      Pupils: Pupils are equal, round, and reactive to light.   Cardiovascular:      Rate and Rhythm: Normal rate and regular rhythm.   Pulmonary:      Effort: No respiratory distress.      Breath sounds: Normal breath sounds.   Musculoskeletal:         General: Normal range of motion.   Skin:     General: Skin is warm and dry.      Findings: No erythema.   Neurological:      Mental Status: She is alert and oriented to person, place, and time.   Psychiatric:         Speech: Speech normal.         Behavior: Behavior normal.       Lab Results   Component Value Date     07/26/2021    K 3.9 07/26/2021    MG 1.9 04/18/2021    BUN 24 07/26/2021    CREATININE 1.0 07/26/2021    ALT 15 07/26/2021    AST 24 07/26/2021    HGB 14.1 04/18/2021    HCT 45.3 04/18/2021    TSH 0.852 " 02/09/2021    LDLCALC 80.2 11/18/2020       Recent Labs   Lab 11/06/20  1038 02/08/21  1211 04/16/21  1715 04/17/21  1100   INR 1.4 H 1.3 H 1.3 H 1.3 H         Assessment:     1. Persistent atrial fibrillation    2. Essential hypertension    3. Other hyperlipidemia    4. Stage 3a chronic kidney disease    5. History of DVT (deep vein thrombosis)    6. Controlled type 2 diabetes mellitus with stage 3 chronic kidney disease, without long-term current use of insulin      Plan:     In summary, Inna Blankenship is a 89F with HTN, DM, CHF, DVT, and persistent symptomatic AF status-post DCCV and amiodarone initiation. Amiodarone was stopped due to profound bradycardia resulting in a hospitalization. She appears to be maintaining sinus rhythm.    Plan for 1 year follow-up. Should AF recur in the interim will plan for pacemaker placement and reinitiation of amiodarone.    Thank you for allowing me to participate in the care of this patient. Please do not hesitate to call me with any questions or concerns.

## 2022-03-24 ENCOUNTER — TELEPHONE (OUTPATIENT)
Dept: INTERNAL MEDICINE | Facility: CLINIC | Age: 87
End: 2022-03-24
Payer: MEDICARE

## 2022-03-24 ENCOUNTER — TELEPHONE (OUTPATIENT)
Dept: ELECTROPHYSIOLOGY | Facility: CLINIC | Age: 87
End: 2022-03-24
Payer: MEDICARE

## 2022-03-24 NOTE — TELEPHONE ENCOUNTER
----- Message from Carolyn David sent at 3/24/2022  1:13 PM CDT -----  Contact: Delia/[phone]/daughter  Patient would like to get medical advice.  Patient has a cold and her daughter would like to know what liquid cough syrup she recommends. Please call and advise.

## 2022-03-24 NOTE — TELEPHONE ENCOUNTER
Returned call to pt and spoke with daughter. Advised to use HBP product from over the counter for her symptoms.    ----- Message from Juanito Mcgovern MA sent at 3/24/2022  1:45 PM CDT -----  Regarding: FW: medication  Good afternoon Lissy  See below please advise.  Thanks    ----- Message -----  From: Emely Lopez  Sent: 3/24/2022   1:35 PM CDT  To: [facility] Wong Staff  Subject: medication                                       The pt has a cough and wants to know what she can take? Please call her back @ [phone]. Thanks, Emely

## 2022-03-28 ENCOUNTER — OFFICE VISIT (OUTPATIENT)
Dept: INTERNAL MEDICINE | Facility: CLINIC | Age: 87
End: 2022-03-28
Payer: MEDICARE

## 2022-03-28 VITALS
HEART RATE: 78 BPM | BODY MASS INDEX: 22.81 KG/M2 | TEMPERATURE: 98 F | HEIGHT: 64 IN | RESPIRATION RATE: 18 BRPM | DIASTOLIC BLOOD PRESSURE: 80 MMHG | SYSTOLIC BLOOD PRESSURE: 132 MMHG | WEIGHT: 133.63 LBS

## 2022-03-28 DIAGNOSIS — J06.9 ACUTE URI: Primary | ICD-10-CM

## 2022-03-28 DIAGNOSIS — R05.9 COUGH IN ADULT: ICD-10-CM

## 2022-03-28 PROCEDURE — 99999 PR PBB SHADOW E&M-EST. PATIENT-LVL IV: ICD-10-PCS | Mod: PBBFAC,,, | Performed by: NURSE PRACTITIONER

## 2022-03-28 PROCEDURE — 1160F PR REVIEW ALL MEDS BY PRESCRIBER/CLIN PHARMACIST DOCUMENTED: ICD-10-PCS | Mod: CPTII,S$GLB,, | Performed by: NURSE PRACTITIONER

## 2022-03-28 PROCEDURE — 99999 PR PBB SHADOW E&M-EST. PATIENT-LVL IV: CPT | Mod: PBBFAC,,, | Performed by: NURSE PRACTITIONER

## 2022-03-28 PROCEDURE — 99214 PR OFFICE/OUTPT VISIT, EST, LEVL IV, 30-39 MIN: ICD-10-PCS | Mod: S$GLB,,, | Performed by: NURSE PRACTITIONER

## 2022-03-28 PROCEDURE — 1159F MED LIST DOCD IN RCRD: CPT | Mod: CPTII,S$GLB,, | Performed by: NURSE PRACTITIONER

## 2022-03-28 PROCEDURE — 1126F PR PAIN SEVERITY QUANTIFIED, NO PAIN PRESENT: ICD-10-PCS | Mod: CPTII,S$GLB,, | Performed by: NURSE PRACTITIONER

## 2022-03-28 PROCEDURE — 1160F RVW MEDS BY RX/DR IN RCRD: CPT | Mod: CPTII,S$GLB,, | Performed by: NURSE PRACTITIONER

## 2022-03-28 PROCEDURE — 1126F AMNT PAIN NOTED NONE PRSNT: CPT | Mod: CPTII,S$GLB,, | Performed by: NURSE PRACTITIONER

## 2022-03-28 PROCEDURE — 99214 OFFICE O/P EST MOD 30 MIN: CPT | Mod: S$GLB,,, | Performed by: NURSE PRACTITIONER

## 2022-03-28 PROCEDURE — 1159F PR MEDICATION LIST DOCUMENTED IN MEDICAL RECORD: ICD-10-PCS | Mod: CPTII,S$GLB,, | Performed by: NURSE PRACTITIONER

## 2022-03-28 RX ORDER — BENZONATATE 100 MG/1
100 CAPSULE ORAL 3 TIMES DAILY PRN
Qty: 30 CAPSULE | Refills: 0 | Status: SHIPPED | OUTPATIENT
Start: 2022-03-28 | End: 2022-04-18

## 2022-03-28 NOTE — PROGRESS NOTES
"Subjective:       Patient ID: Inna Blankenship is a 89 y.o. female.    Chief Complaint: Cough    Pt of Dr. Rodriguez, here for, "bad cough"    Cough  This is a new problem. The current episode started in the past 7 days (Since last Thursday). The problem has been unchanged. The problem occurs every few hours. The cough is non-productive. Pertinent negatives include no chest pain, chills, ear congestion, ear pain, fever, headaches, heartburn, hemoptysis, myalgias, nasal congestion, postnasal drip, rash, rhinorrhea, sore throat, shortness of breath, sweats, weight loss or wheezing. She has tried nothing for the symptoms. The treatment provided no relief. There is no history of environmental allergies.     Review of Systems   Constitutional: Negative for activity change, appetite change, chills, diaphoresis, fatigue, fever, unexpected weight change and weight loss.   HENT: Negative for nasal congestion, ear pain, nosebleeds, postnasal drip, rhinorrhea, sinus pressure/congestion, sneezing and sore throat.    Respiratory: Positive for cough. Negative for hemoptysis, chest tightness, shortness of breath and wheezing.    Cardiovascular: Negative for chest pain, palpitations, leg swelling and claudication.   Gastrointestinal: Negative for abdominal pain, diarrhea, heartburn, nausea and vomiting.   Musculoskeletal: Negative for myalgias.   Integumentary:  Negative for rash.   Allergic/Immunologic: Negative for environmental allergies, food allergies and immunocompromised state.   Neurological: Negative for weakness, light-headedness, numbness and headaches.   Hematological: Negative for adenopathy. Does not bruise/bleed easily.   Psychiatric/Behavioral: Negative for suicidal ideas.     Review of patient's allergies indicates:  No Known Allergies      Current Outpatient Medications:     amLODIPine (NORVASC) 2.5 MG tablet, Take 1 tablet (2.5 mg total) by mouth once daily., Disp: 30 tablet, Rfl: 11    atorvastatin (LIPITOR) 20 MG " tablet, Take 1 tablet (20 mg total) by mouth once daily., Disp: 90 tablet, Rfl: 0    atorvastatin (LIPITOR) 20 MG tablet, TAKE 1 TABLET EVERY DAY, Disp: 90 tablet, Rfl: 0    CALCIUM CARBONATE (GETF-GNO-035 ORAL), Take 1 tablet by mouth Daily. 1  Oral Every day, Disp: , Rfl:     ELIQUIS 2.5 mg Tab, TAKE 1 TABLET TWICE DAILY, Disp: 180 tablet, Rfl: 3    EScitalopram oxalate (LEXAPRO) 10 MG tablet, Take 1 tablet (10 mg total) by mouth once daily., Disp: 30 tablet, Rfl: 11    furosemide (LASIX) 20 MG tablet, TAKE 1 TABLET EVERY DAY IF WEIGHT GAIN 2-3 LBS FOR 2-5 DAYS, TAKE 40MG DAILY. IF NO IMPROVEMENT, CALL MD, Disp: 90 tablet, Rfl: 0    latanoprost 0.005 % ophthalmic solution, Place 1 drop into both eyes every evening., Disp: 7.5 mL, Rfl: 3    multivit with minerals/lutein (MULTIVITAMIN 50 PLUS ORAL), Take 1 tablet by mouth once daily. , Disp: , Rfl:     Patient Active Problem List   Diagnosis    Essential hypertension    Other hyperlipidemia    Osteopenia    Low tension glaucoma, moderate stage - Both Eyes    MGD (meibomian gland disease) - Both Eyes    History of DVT (deep vein thrombosis)    Chronic diastolic congestive heart failure    Controlled type 2 diabetes mellitus with stage 3 chronic kidney disease, without long-term current use of insulin    Calcification of aorta    Acute on chronic diastolic congestive heart failure    Persistent atrial fibrillation    Stage 3a chronic kidney disease    Shortness of breath     Past Medical History:   Diagnosis Date    Arthritis     CHF (congestive heart failure) 12/26/2018    Chronic atrial fibrillation 8/29/2020    Chronic kidney disease, stage III (moderate) 9/11/2015    Colon adenoma     Diabetes mellitus, type II     Diet controlled    Glaucoma     Hyperlipemia     Hypertension     Osteopenia      Past Surgical History:   Procedure Laterality Date    APPENDECTOMY      CATARACT EXTRACTION W/  INTRAOCULAR LENS IMPLANT Right 03/15/2006         CATARACT EXTRACTION W/  INTRAOCULAR LENS IMPLANT Left 09/27/2006        COLONOSCOPY W/ POLYPECTOMY      EYE SURGERY      HYSTERECTOMY      TRANSESOPHAGEAL ECHOCARDIOGRAPHY N/A 11/9/2020    Procedure: ECHOCARDIOGRAM, TRANSESOPHAGEAL;  Surgeon: Marcelina Diagnostic Provider;  Location: [facility] EP LAB;  Service: Cardiology;  Laterality: N/A;    TREATMENT OF CARDIAC ARRHYTHMIA N/A 11/9/2020    Procedure: CARDIOVERSION;  Surgeon: Marvin Elmore MD;  Location: [facility] EP LAB;  Service: Cardiology;  Laterality: N/A;  AF, RAFFAELE, DCCV, MAC, GP, 3 PREP     Social History     Socioeconomic History    Marital status:    Tobacco Use    Smoking status: Never Smoker    Smokeless tobacco: Never Used   Substance and Sexual Activity    Alcohol use: Yes     Comment: wine, rarely    Drug use: No    Sexual activity: Not Currently     Social Determinants of Health     Financial Resource Strain: Low Risk     Difficulty of Paying Living Expenses: Not very hard   Food Insecurity: No Food Insecurity    Worried About Running Out of Food in the Last Year: Never true    Ran Out of Food in the Last Year: Never true   Transportation Needs: No Transportation Needs    Lack of Transportation (Medical): No    Lack of Transportation (Non-Medical): No   Physical Activity: Inactive    Days of Exercise per Week: 0 days    Minutes of Exercise per Session: 0 min   Stress: Stress Concern Present    Feeling of Stress : To some extent   Social Connections: Socially Isolated    Frequency of Communication with Friends and Family: More than three times a week    Frequency of Social Gatherings with Friends and Family: More than three times a week    Attends Adventism Services: Never    Active Member of Clubs or Organizations: No    Attends Club or Organization Meetings: Never    Marital Status:    Housing Stability: Low Risk     Unable to Pay for Housing in the Last Year: No    Number of Places Lived in  "the Last Year: 1    Unstable Housing in the Last Year: No     Family History   Problem Relation Age of Onset    Heart attack Mother     Heart disease Mother     No Known Problems Father     Cancer Sister     Hypertension Daughter     Asthma Daughter     Allergies Daughter     Cancer Daughter         ovarian    Amblyopia Neg Hx     Blindness Neg Hx     Cataracts Neg Hx     Diabetes Neg Hx     Glaucoma Neg Hx     Macular degeneration Neg Hx     Retinal detachment Neg Hx     Strabismus Neg Hx     Stroke Neg Hx     Thyroid disease Neg Hx            Objective:       Vitals:    03/28/22 1218   BP: 132/80   Pulse: 78   Resp: 18   Temp: 98.1 °F (36.7 °C)   Weight: 60.6 kg (133 lb 9.6 oz)   Height: 5' 4" (1.626 m)   PainSc: 0-No pain     Body mass index is 22.93 kg/m².    Physical Exam  Vitals and nursing note reviewed.   Constitutional:       Appearance: Normal appearance. She is normal weight.   HENT:      Head: Normocephalic.      Right Ear: Tympanic membrane, ear canal and external ear normal. There is no impacted cerumen.      Left Ear: Tympanic membrane, ear canal and external ear normal. There is no impacted cerumen.      Ears:      Comments: Hard of hearing     Nose: Nose normal. No congestion or rhinorrhea.      Comments: Clear PND noted on exam       Mouth/Throat:      Mouth: Mucous membranes are moist.   Eyes:      Extraocular Movements: Extraocular movements intact.      Conjunctiva/sclera: Conjunctivae normal.      Pupils: Pupils are equal, round, and reactive to light.   Cardiovascular:      Rate and Rhythm: Normal rate and regular rhythm.      Pulses: Normal pulses.      Heart sounds: Normal heart sounds.   Musculoskeletal:      Cervical back: Normal range of motion and neck supple. No tenderness.   Lymphadenopathy:      Cervical: No cervical adenopathy.   Skin:     General: Skin is warm and dry.   Neurological:      General: No focal deficit present.      Mental Status: She is alert and " oriented to person, place, and time.   Psychiatric:         Mood and Affect: Mood normal.         Behavior: Behavior normal.         Thought Content: Thought content normal.         Judgment: Judgment normal.         Assessment:       Problem List Items Addressed This Visit    None     Visit Diagnoses     Acute URI    -  Primary    Relevant Medications    benzonatate (TESSALON) 100 MG capsule    Cough in adult        Relevant Medications    benzonatate (TESSALON) 100 MG capsule    BMI 22.0-22.9, adult              Plan:       Inna was seen today for cough.    Diagnoses and all orders for this visit:    Acute URI  -     benzonatate (TESSALON) 100 MG capsule; Take 1 capsule (100 mg total) by mouth 3 (three) times daily as needed for Cough.    Cough in adult  -     benzonatate (TESSALON) 100 MG capsule; Take 1 capsule (100 mg total) by mouth 3 (three) times daily as needed for Cough.    BMI 22.0-22.9, adult  BMI reviewed    Pt instructed that this is viral, antibiotics not indicated.    Rest, fluids, self limiting.    May take Tylenol or Motrin otc prn pain.    Meds as prescribed--tessalon perles 1 capsule every 8 hrs as needed for cough    Self care instructions provided in AVS    Follow up if symptoms worsen or fail to improve.

## 2022-03-28 NOTE — PATIENT INSTRUCTIONS
Pt instructed that this is viral, antibiotics not indicated.    Rest, fluids, self limiting.    May take Tylenol or Motrin otc prn pain.    Meds as prescribed--tessalon perles 1 capsule every 8 hrs as needed for cough

## 2022-03-31 RX ORDER — ESCITALOPRAM OXALATE 10 MG/1
TABLET ORAL
Qty: 90 TABLET | Refills: 0 | OUTPATIENT
Start: 2022-03-31

## 2022-03-31 NOTE — TELEPHONE ENCOUNTER
No new care gaps identified.  Powered by [employer]. Reference number: [id number].   3/31/2022 2:19:30 PM CDT

## 2022-03-31 NOTE — TELEPHONE ENCOUNTER
[facility] Note  Quick DC. Inappropriate Request   Refill request requires further review by MD: NO   Medication Therapy Plan: Pharmacy is requesting new script(s) for the following medications without required information, (sig/ frequency/qty/etc)     [facility] action(s):  Quick Discontinue      Duplicate Pended Encounter(s)/ Last Prescribed Details:    Pharmacies have been requesting medications for patients without required information, (sig, frequency, qty, etc.). In addition, requests are sent for medication(s) pt. are currently not taking, and medications patients have never taken.    We have spoken to the pharmacies about these request types and advised their teams previously that we are unable to assess these New Script requests and require all details for these requests. This is a known issue and has been reported.        Medication related problems are not assessed for QDC.   Medication Reconciliation Completed? NO Were there pending details that required adjustment? NO     Automatic Epic Generated Protocol Data Below:   Requested Prescriptions   Pending Prescriptions Disp Refills    EScitalopram oxalate (LEXAPRO) 10 MG tablet [Pharmacy Med Name: ESCITALOPRAM 10MG TAB] 90 tablet 0              Appointments      Date Provider   Last Visit   7/9/2021 Stacy Rodriguez MD   Next Visit   Visit date not found Stacy Rodriguez MD        Note composed:3:20 PM 03/31/2022

## 2022-04-10 ENCOUNTER — HOSPITAL ENCOUNTER (OUTPATIENT)
Facility: HOSPITAL | Age: 87
Discharge: HOME HEALTH | End: 2022-04-12
Attending: EMERGENCY MEDICINE | Admitting: EMERGENCY MEDICINE
Payer: MEDICARE

## 2022-04-10 DIAGNOSIS — I10 PRIMARY HYPERTENSION: ICD-10-CM

## 2022-04-10 DIAGNOSIS — I16.0 HYPERTENSIVE URGENCY: ICD-10-CM

## 2022-04-10 DIAGNOSIS — I50.33 ACUTE ON CHRONIC DIASTOLIC CHF (CONGESTIVE HEART FAILURE): ICD-10-CM

## 2022-04-10 DIAGNOSIS — R00.1 BRADYCARDIA: ICD-10-CM

## 2022-04-10 DIAGNOSIS — I50.43 ACUTE ON CHRONIC COMBINED SYSTOLIC AND DIASTOLIC CONGESTIVE HEART FAILURE: ICD-10-CM

## 2022-04-10 DIAGNOSIS — R06.02 SOB (SHORTNESS OF BREATH): Primary | ICD-10-CM

## 2022-04-10 DIAGNOSIS — R07.9 CHEST PAIN: ICD-10-CM

## 2022-04-10 PROBLEM — R79.89 ELEVATED TROPONIN: Status: ACTIVE | Noted: 2022-04-10

## 2022-04-10 PROBLEM — G93.40 ACUTE ENCEPHALOPATHY: Status: ACTIVE | Noted: 2022-04-10

## 2022-04-10 LAB
ALBUMIN SERPL BCP-MCNC: 3.7 G/DL (ref 3.5–5.2)
ALLENS TEST: ABNORMAL
ALP SERPL-CCNC: 86 U/L (ref 55–135)
ALT SERPL W/O P-5'-P-CCNC: 20 U/L (ref 10–44)
ANION GAP SERPL CALC-SCNC: 13 MMOL/L (ref 8–16)
AST SERPL-CCNC: 23 U/L (ref 10–40)
BACTERIA #/AREA URNS AUTO: NORMAL /HPF
BASOPHILS # BLD AUTO: 0.07 K/UL (ref 0–0.2)
BASOPHILS NFR BLD: 0.9 % (ref 0–1.9)
BILIRUB SERPL-MCNC: 0.9 MG/DL (ref 0.1–1)
BILIRUB UR QL STRIP: NEGATIVE
BNP SERPL-MCNC: 1085 PG/ML (ref 0–99)
BUN SERPL-MCNC: 14 MG/DL (ref 10–30)
CALCIUM SERPL-MCNC: 10.3 MG/DL (ref 8.7–10.5)
CHLORIDE SERPL-SCNC: 104 MMOL/L (ref 95–110)
CLARITY UR REFRACT.AUTO: CLEAR
CO2 SERPL-SCNC: 24 MMOL/L (ref 23–29)
COLOR UR AUTO: YELLOW
CREAT SERPL-MCNC: 0.8 MG/DL (ref 0.5–1.4)
DIFFERENTIAL METHOD: NORMAL
EOSINOPHIL # BLD AUTO: 0.1 K/UL (ref 0–0.5)
EOSINOPHIL NFR BLD: 0.9 % (ref 0–8)
ERYTHROCYTE [DISTWIDTH] IN BLOOD BY AUTOMATED COUNT: 14.4 % (ref 11.5–14.5)
EST. GFR  (AFRICAN AMERICAN): >60 ML/MIN/1.73 M^2
EST. GFR  (NON AFRICAN AMERICAN): >60 ML/MIN/1.73 M^2
GLUCOSE SERPL-MCNC: 135 MG/DL (ref 70–110)
GLUCOSE UR QL STRIP: NEGATIVE
HCO3 UR-SCNC: 32.9 MMOL/L (ref 24–28)
HCT VFR BLD AUTO: 40.5 % (ref 37–48.5)
HGB BLD-MCNC: 13 G/DL (ref 12–16)
HGB UR QL STRIP: NEGATIVE
HYALINE CASTS UR QL AUTO: 0 /LPF
IMM GRANULOCYTES # BLD AUTO: 0.02 K/UL (ref 0–0.04)
IMM GRANULOCYTES NFR BLD AUTO: 0.3 % (ref 0–0.5)
KETONES UR QL STRIP: NEGATIVE
LACTATE SERPL-SCNC: 1.9 MMOL/L (ref 0.5–2.2)
LEUKOCYTE ESTERASE UR QL STRIP: NEGATIVE
LYMPHOCYTES # BLD AUTO: 1.7 K/UL (ref 1–4.8)
LYMPHOCYTES NFR BLD: 21.6 % (ref 18–48)
MAGNESIUM SERPL-MCNC: 1.8 MG/DL (ref 1.6–2.6)
MCH RBC QN AUTO: 30.3 PG (ref 27–31)
MCHC RBC AUTO-ENTMCNC: 32.1 G/DL (ref 32–36)
MCV RBC AUTO: 94 FL (ref 82–98)
MICROSCOPIC COMMENT: NORMAL
MONOCYTES # BLD AUTO: 0.5 K/UL (ref 0.3–1)
MONOCYTES NFR BLD: 6.9 % (ref 4–15)
NEUTROPHILS # BLD AUTO: 5.4 K/UL (ref 1.8–7.7)
NEUTROPHILS NFR BLD: 69.4 % (ref 38–73)
NITRITE UR QL STRIP: NEGATIVE
NRBC BLD-RTO: 0 /100 WBC
PCO2 BLDA: 41.7 MMHG (ref 35–45)
PH SMN: 7.5 [PH] (ref 7.35–7.45)
PH UR STRIP: 8 [PH] (ref 5–8)
PLATELET # BLD AUTO: 204 K/UL (ref 150–450)
PMV BLD AUTO: 11.6 FL (ref 9.2–12.9)
PO2 BLDA: 22 MMHG (ref 40–60)
POC BE: 10 MMOL/L
POC SATURATED O2: 42 % (ref 95–100)
POC TCO2: 34 MMOL/L (ref 24–29)
POCT GLUCOSE: 151 MG/DL (ref 70–110)
POTASSIUM SERPL-SCNC: 3.5 MMOL/L (ref 3.5–5.1)
PROT SERPL-MCNC: 7.7 G/DL (ref 6–8.4)
PROT UR QL STRIP: ABNORMAL
RBC # BLD AUTO: 4.29 M/UL (ref 4–5.4)
RBC #/AREA URNS AUTO: 3 /HPF (ref 0–4)
SAMPLE: ABNORMAL
SITE: ABNORMAL
SODIUM SERPL-SCNC: 141 MMOL/L (ref 136–145)
SP GR UR STRIP: 1.01 (ref 1–1.03)
T4 FREE SERPL-MCNC: 0.97 NG/DL (ref 0.71–1.51)
TROPONIN I SERPL DL<=0.01 NG/ML-MCNC: 0.09 NG/ML (ref 0–0.03)
TROPONIN I SERPL DL<=0.01 NG/ML-MCNC: 0.13 NG/ML (ref 0–0.03)
TSH SERPL DL<=0.005 MIU/L-ACNC: 5.14 UIU/ML (ref 0.4–4)
URN SPEC COLLECT METH UR: ABNORMAL
WBC # BLD AUTO: 7.81 K/UL (ref 3.9–12.7)
WBC #/AREA URNS AUTO: 1 /HPF (ref 0–5)

## 2022-04-10 PROCEDURE — 93005 ELECTROCARDIOGRAM TRACING: CPT

## 2022-04-10 PROCEDURE — G0378 HOSPITAL OBSERVATION PER HR: HCPCS

## 2022-04-10 PROCEDURE — 96365 THER/PROPH/DIAG IV INF INIT: CPT | Performed by: EMERGENCY MEDICINE

## 2022-04-10 PROCEDURE — 96366 THER/PROPH/DIAG IV INF ADDON: CPT | Performed by: EMERGENCY MEDICINE

## 2022-04-10 PROCEDURE — 96375 TX/PRO/DX INJ NEW DRUG ADDON: CPT

## 2022-04-10 PROCEDURE — 84484 ASSAY OF TROPONIN QUANT: CPT | Mod: 91

## 2022-04-10 PROCEDURE — 84439 ASSAY OF FREE THYROXINE: CPT | Performed by: EMERGENCY MEDICINE

## 2022-04-10 PROCEDURE — 63600175 PHARM REV CODE 636 W HCPCS

## 2022-04-10 PROCEDURE — 99291 CRITICAL CARE FIRST HOUR: CPT | Mod: ,,, | Performed by: EMERGENCY MEDICINE

## 2022-04-10 PROCEDURE — 93010 EKG 12-LEAD: ICD-10-PCS | Mod: ,,, | Performed by: INTERNAL MEDICINE

## 2022-04-10 PROCEDURE — 63600175 PHARM REV CODE 636 W HCPCS: Performed by: PHYSICIAN ASSISTANT

## 2022-04-10 PROCEDURE — 84484 ASSAY OF TROPONIN QUANT: CPT | Mod: 91 | Performed by: PHYSICIAN ASSISTANT

## 2022-04-10 PROCEDURE — 80053 COMPREHEN METABOLIC PANEL: CPT | Performed by: EMERGENCY MEDICINE

## 2022-04-10 PROCEDURE — 85025 COMPLETE CBC W/AUTO DIFF WBC: CPT | Performed by: EMERGENCY MEDICINE

## 2022-04-10 PROCEDURE — 83735 ASSAY OF MAGNESIUM: CPT | Performed by: EMERGENCY MEDICINE

## 2022-04-10 PROCEDURE — 99220 PR INITIAL OBSERVATION CARE,LEVL III: ICD-10-PCS | Mod: ,,, | Performed by: PHYSICIAN ASSISTANT

## 2022-04-10 PROCEDURE — 82803 BLOOD GASES ANY COMBINATION: CPT

## 2022-04-10 PROCEDURE — 84484 ASSAY OF TROPONIN QUANT: CPT | Performed by: EMERGENCY MEDICINE

## 2022-04-10 PROCEDURE — 81001 URINALYSIS AUTO W/SCOPE: CPT | Performed by: EMERGENCY MEDICINE

## 2022-04-10 PROCEDURE — 84443 ASSAY THYROID STIM HORMONE: CPT | Performed by: EMERGENCY MEDICINE

## 2022-04-10 PROCEDURE — 99291 PR CRITICAL CARE, E/M 30-74 MINUTES: ICD-10-PCS | Mod: ,,, | Performed by: EMERGENCY MEDICINE

## 2022-04-10 PROCEDURE — 93010 ELECTROCARDIOGRAM REPORT: CPT | Mod: 76,,, | Performed by: INTERNAL MEDICINE

## 2022-04-10 PROCEDURE — 25000003 PHARM REV CODE 250: Performed by: PHYSICIAN ASSISTANT

## 2022-04-10 PROCEDURE — 83605 ASSAY OF LACTIC ACID: CPT | Performed by: EMERGENCY MEDICINE

## 2022-04-10 PROCEDURE — 83880 ASSAY OF NATRIURETIC PEPTIDE: CPT | Performed by: EMERGENCY MEDICINE

## 2022-04-10 PROCEDURE — 99291 CRITICAL CARE FIRST HOUR: CPT | Mod: 25

## 2022-04-10 PROCEDURE — 99900035 HC TECH TIME PER 15 MIN (STAT)

## 2022-04-10 PROCEDURE — 99220 PR INITIAL OBSERVATION CARE,LEVL III: CPT | Mod: ,,, | Performed by: PHYSICIAN ASSISTANT

## 2022-04-10 PROCEDURE — 36415 COLL VENOUS BLD VENIPUNCTURE: CPT | Performed by: PHYSICIAN ASSISTANT

## 2022-04-10 PROCEDURE — 93010 ELECTROCARDIOGRAM REPORT: CPT | Mod: ,,, | Performed by: INTERNAL MEDICINE

## 2022-04-10 PROCEDURE — 87040 BLOOD CULTURE FOR BACTERIA: CPT | Mod: 59 | Performed by: EMERGENCY MEDICINE

## 2022-04-10 RX ORDER — AMLODIPINE BESYLATE 2.5 MG/1
2.5 TABLET ORAL DAILY
Status: DISCONTINUED | OUTPATIENT
Start: 2022-04-11 | End: 2022-04-11

## 2022-04-10 RX ORDER — TALC
6 POWDER (GRAM) TOPICAL NIGHTLY PRN
Status: DISCONTINUED | OUTPATIENT
Start: 2022-04-10 | End: 2022-04-12 | Stop reason: HOSPADM

## 2022-04-10 RX ORDER — ACETAMINOPHEN 325 MG/1
650 TABLET ORAL EVERY 4 HOURS PRN
Status: DISCONTINUED | OUTPATIENT
Start: 2022-04-10 | End: 2022-04-12 | Stop reason: HOSPADM

## 2022-04-10 RX ORDER — IBUPROFEN 200 MG
16 TABLET ORAL
Status: DISCONTINUED | OUTPATIENT
Start: 2022-04-10 | End: 2022-04-12 | Stop reason: HOSPADM

## 2022-04-10 RX ORDER — ATORVASTATIN CALCIUM 20 MG/1
20 TABLET, FILM COATED ORAL DAILY
Status: DISCONTINUED | OUTPATIENT
Start: 2022-04-11 | End: 2022-04-12 | Stop reason: HOSPADM

## 2022-04-10 RX ORDER — ONDANSETRON 8 MG/1
8 TABLET, ORALLY DISINTEGRATING ORAL EVERY 8 HOURS PRN
Status: DISCONTINUED | OUTPATIENT
Start: 2022-04-10 | End: 2022-04-12

## 2022-04-10 RX ORDER — AMLODIPINE BESYLATE 2.5 MG/1
2.5 TABLET ORAL ONCE
Status: COMPLETED | OUTPATIENT
Start: 2022-04-10 | End: 2022-04-10

## 2022-04-10 RX ORDER — PROMETHAZINE HYDROCHLORIDE 25 MG/1
25 TABLET ORAL EVERY 6 HOURS PRN
Status: DISCONTINUED | OUTPATIENT
Start: 2022-04-10 | End: 2022-04-12

## 2022-04-10 RX ORDER — MAGNESIUM SULFATE HEPTAHYDRATE 40 MG/ML
2 INJECTION, SOLUTION INTRAVENOUS ONCE
Status: COMPLETED | OUTPATIENT
Start: 2022-04-10 | End: 2022-04-10

## 2022-04-10 RX ORDER — LATANOPROST 50 UG/ML
1 SOLUTION/ DROPS OPHTHALMIC NIGHTLY
Status: DISCONTINUED | OUTPATIENT
Start: 2022-04-10 | End: 2022-04-12 | Stop reason: HOSPADM

## 2022-04-10 RX ORDER — IPRATROPIUM BROMIDE AND ALBUTEROL SULFATE 2.5; .5 MG/3ML; MG/3ML
3 SOLUTION RESPIRATORY (INHALATION) EVERY 4 HOURS PRN
Status: DISCONTINUED | OUTPATIENT
Start: 2022-04-10 | End: 2022-04-12 | Stop reason: HOSPADM

## 2022-04-10 RX ORDER — IBUPROFEN 200 MG
24 TABLET ORAL
Status: DISCONTINUED | OUTPATIENT
Start: 2022-04-10 | End: 2022-04-12 | Stop reason: HOSPADM

## 2022-04-10 RX ORDER — HYDRALAZINE HYDROCHLORIDE 25 MG/1
25 TABLET, FILM COATED ORAL EVERY 8 HOURS PRN
Status: DISCONTINUED | OUTPATIENT
Start: 2022-04-10 | End: 2022-04-12 | Stop reason: HOSPADM

## 2022-04-10 RX ORDER — SODIUM CHLORIDE 0.9 % (FLUSH) 0.9 %
10 SYRINGE (ML) INJECTION
Status: DISCONTINUED | OUTPATIENT
Start: 2022-04-10 | End: 2022-04-12 | Stop reason: HOSPADM

## 2022-04-10 RX ORDER — HYDRALAZINE HYDROCHLORIDE 20 MG/ML
10 INJECTION INTRAMUSCULAR; INTRAVENOUS
Status: COMPLETED | OUTPATIENT
Start: 2022-04-10 | End: 2022-04-10

## 2022-04-10 RX ORDER — INSULIN ASPART 100 [IU]/ML
0-5 INJECTION, SOLUTION INTRAVENOUS; SUBCUTANEOUS
Status: DISCONTINUED | OUTPATIENT
Start: 2022-04-10 | End: 2022-04-12 | Stop reason: HOSPADM

## 2022-04-10 RX ORDER — GLUCAGON 1 MG
1 KIT INJECTION
Status: DISCONTINUED | OUTPATIENT
Start: 2022-04-10 | End: 2022-04-12 | Stop reason: HOSPADM

## 2022-04-10 RX ORDER — POLYETHYLENE GLYCOL 3350 17 G/17G
17 POWDER, FOR SOLUTION ORAL DAILY PRN
Status: DISCONTINUED | OUTPATIENT
Start: 2022-04-10 | End: 2022-04-12 | Stop reason: HOSPADM

## 2022-04-10 RX ORDER — ESCITALOPRAM OXALATE 10 MG/1
10 TABLET ORAL NIGHTLY
Status: DISCONTINUED | OUTPATIENT
Start: 2022-04-10 | End: 2022-04-12 | Stop reason: HOSPADM

## 2022-04-10 RX ORDER — BISACODYL 10 MG
10 SUPPOSITORY, RECTAL RECTAL DAILY PRN
Status: DISCONTINUED | OUTPATIENT
Start: 2022-04-10 | End: 2022-04-12 | Stop reason: HOSPADM

## 2022-04-10 RX ORDER — POTASSIUM CHLORIDE 20 MEQ/1
20 TABLET, EXTENDED RELEASE ORAL ONCE
Status: COMPLETED | OUTPATIENT
Start: 2022-04-10 | End: 2022-04-10

## 2022-04-10 RX ORDER — FUROSEMIDE 10 MG/ML
20 INJECTION INTRAMUSCULAR; INTRAVENOUS
Status: DISCONTINUED | OUTPATIENT
Start: 2022-04-11 | End: 2022-04-11

## 2022-04-10 RX ORDER — BENZONATATE 100 MG/1
100 CAPSULE ORAL 3 TIMES DAILY PRN
Status: DISCONTINUED | OUTPATIENT
Start: 2022-04-10 | End: 2022-04-12 | Stop reason: HOSPADM

## 2022-04-10 RX ORDER — FUROSEMIDE 10 MG/ML
40 INJECTION INTRAMUSCULAR; INTRAVENOUS
Status: COMPLETED | OUTPATIENT
Start: 2022-04-10 | End: 2022-04-10

## 2022-04-10 RX ADMIN — HYDRALAZINE HYDROCHLORIDE 10 MG: 20 INJECTION, SOLUTION INTRAMUSCULAR; INTRAVENOUS at 06:04

## 2022-04-10 RX ADMIN — AMLODIPINE BESYLATE 2.5 MG: 2.5 TABLET ORAL at 09:04

## 2022-04-10 RX ADMIN — POTASSIUM CHLORIDE 20 MEQ: 1500 TABLET, EXTENDED RELEASE ORAL at 09:04

## 2022-04-10 RX ADMIN — MAGNESIUM SULFATE HEPTAHYDRATE 2 G: 2 INJECTION, SOLUTION INTRAVENOUS at 09:04

## 2022-04-10 RX ADMIN — LATANOPROST 1 DROP: 50 SOLUTION OPHTHALMIC at 09:04

## 2022-04-10 RX ADMIN — FUROSEMIDE 40 MG: 10 INJECTION, SOLUTION INTRAMUSCULAR; INTRAVENOUS at 06:04

## 2022-04-10 RX ADMIN — APIXABAN 2.5 MG: 2.5 TABLET, FILM COATED ORAL at 09:04

## 2022-04-10 RX ADMIN — ESCITALOPRAM OXALATE 10 MG: 10 TABLET ORAL at 09:04

## 2022-04-10 NOTE — HPI
"Inna Blankenship is a 89 y.o. 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 Patient is a poor historian. Daughter at bedside assists with hx. Patient reports being confused as she was unsure where she was upon awakening around 3am this morning. She was noted to be tachypneic at that time and stated "the vein in my neck is bigger."  Daughter also noticed increased abdominal swelling/distension today which usually indicates volume overload. Patient reports mild shortness of breath at the time of my exam which has improved since revieing IV lasix in the ED earlier today. Mental status is now at baseline per daughter at bedside. Denies fever, chills, chest pain, N/V, abdominal pain, dizziness, HA, vision changes, slurred speech, focal weakness, or facial droop. Of note, patient suffered a mechanical fall approximately 3 weeks ago where she fell off of the toilet and landed on her backside. No head trauma or LOC reported.    ED: hypertensive to 197/99 and tachypneic to RR 36, BP and RR improved s/p IV lasix and IV hydralazine. CBC, CMP grossly unremarkable. Trop 0.128, BNP 1085. EKG w/ non-specific T wave abnormalities. UA non infectious. CTH without acute procress. CXR with pulmonary congestion. Given 40mg lasix IVP x1.   "

## 2022-04-10 NOTE — ED PROVIDER NOTES
"Encounter Date: 4/10/2022       History     Chief Complaint   Patient presents with    Shortness of Breath     Can see heart beat in neck, more confused, sob     89-year-old female with medical history of CHF, hypertension, AFib on Eliquis, CKD 3, DM 2, and osteopenia presents verbal altered mental status and increased work of breathing.  Patient awoke around 3:00 a.m. was confused and unsure of where she was.  She was also noted to be tachypneic and stated the vein in my neck is bigger."  Of note, patient suffered a mechanical fall approximately 3 weeks ago where she fell off of the toilet and landed on her backside.  According to her daughter she is now at her baseline.    Denies fever, chills, diaphoresis, vision changes, headache, dizziness, lightheadedness, chest pain, pleuritic pain, wheezing, cough, abdominal pain, dysuria, constipation, diarrhea, sick contacts, and calf pain.    History is provided by the patient and assisted by the patient's daughter at bedside.        Review of patient's allergies indicates:  No Known Allergies  Past Medical History:   Diagnosis Date    Arthritis     CHF (congestive heart failure) 12/26/2018    Chronic atrial fibrillation 8/29/2020    Chronic kidney disease, stage III (moderate) 9/11/2015    Colon adenoma     Diabetes mellitus, type II     Diet controlled    Glaucoma     Hyperlipemia     Hypertension     Osteopenia      Past Surgical History:   Procedure Laterality Date    APPENDECTOMY      CATARACT EXTRACTION W/  INTRAOCULAR LENS IMPLANT Right 03/15/2006        CATARACT EXTRACTION W/  INTRAOCULAR LENS IMPLANT Left 09/27/2006        COLONOSCOPY W/ POLYPECTOMY      EYE SURGERY      HYSTERECTOMY      TRANSESOPHAGEAL ECHOCARDIOGRAPHY N/A 11/9/2020    Procedure: ECHOCARDIOGRAM, TRANSESOPHAGEAL;  Surgeon: Marcelina Diagnostic Provider;  [facility];  Service: Cardiology;  Laterality: N/A;    TREATMENT OF CARDIAC ARRHYTHMIA N/A " 11/9/2020    Procedure: CARDIOVERSION;  Surgeon: Marvin Elmore MD;  Location: [facility];  Service: Cardiology;  Laterality: N/A;  AF, RAFFAELE, DCCV, MAC, GP, 3 PREP     Family History   Problem Relation Age of Onset    Heart attack Mother     Heart disease Mother     No Known Problems Father     Cancer Sister     Hypertension Daughter     Asthma Daughter     Allergies Daughter     Cancer Daughter         ovarian    Amblyopia Neg Hx     Blindness Neg Hx     Cataracts Neg Hx     Diabetes Neg Hx     Glaucoma Neg Hx     Macular degeneration Neg Hx     Retinal detachment Neg Hx     Strabismus Neg Hx     Stroke Neg Hx     Thyroid disease Neg Hx      Social History     Tobacco Use    Smoking status: Never Smoker    Smokeless tobacco: Never Used   Substance Use Topics    Alcohol use: Yes     Comment: wine, rarely    Drug use: No     Review of Systems   Constitutional: Positive for activity change. Negative for chills, diaphoresis and fever.   HENT: Negative for sore throat.    Eyes: Negative for visual disturbance.   Respiratory: Positive for shortness of breath. Negative for cough, chest tightness and wheezing.    Cardiovascular: Negative for chest pain, palpitations and leg swelling.   Gastrointestinal: Negative for abdominal distention, abdominal pain, constipation, diarrhea, nausea and vomiting.   Endocrine: Negative for polyuria.   Genitourinary: Negative for dysuria.   Musculoskeletal: Negative for arthralgias, back pain, myalgias and neck pain.   Skin: Negative for pallor and rash.   Neurological: Negative for dizziness, tremors, syncope, facial asymmetry, weakness, light-headedness, numbness and headaches.   Psychiatric/Behavioral: Negative for confusion and decreased concentration. The patient is not nervous/anxious.        Physical Exam     Initial Vitals [04/10/22 1506]   BP Pulse Resp Temp SpO2   (!) 160/71 64 (!) 36 97.9 °F (36.6 °C) (!) 94 %      MAP       --         Physical  Exam    Nursing note and vitals reviewed.  Constitutional: She appears well-developed and well-nourished. She is not diaphoretic. No distress.   HENT:   Head: Normocephalic and atraumatic.   Right Ear: External ear normal.   Left Ear: External ear normal.   Nose: Nose normal.   Eyes: Conjunctivae and EOM are normal. Pupils are equal, round, and reactive to light.   Neck:   Normal range of motion.  Cardiovascular: Normal rate, regular rhythm, normal heart sounds and intact distal pulses. Exam reveals no gallop and no friction rub.    No murmur heard.  Pulmonary/Chest: Breath sounds normal. No respiratory distress. She has no wheezes. She has no rhonchi. She has no rales. She exhibits no tenderness.   Abdominal: Abdomen is soft. Bowel sounds are normal. She exhibits no distension. There is no abdominal tenderness. There is no rebound and no guarding.   Musculoskeletal:      Cervical back: Normal range of motion.      Right lower leg: No edema.      Left lower leg: No edema.     Neurological: She is alert and oriented to person, place, and time. She has normal strength. No cranial nerve deficit. GCS score is 15. GCS eye subscore is 4. GCS verbal subscore is 5. GCS motor subscore is 6.   Skin: Skin is warm and dry. Capillary refill takes less than 2 seconds. No rash noted.   Psychiatric: She has a normal mood and affect. Thought content normal.         ED Course   Critical Care    Date/Time: 4/10/2022 3:45 PM  Performed by: Josh Quiñonez DO  Authorized by: Josh Quiñonez DO   Direct patient critical care time: 25 minutes  Additional history critical care time: 15 minutes  Ordering / reviewing critical care time: 10 minutes  Documentation critical care time: 7 minutes  Consulting other physicians critical care time: 10 minutes  Consult with family critical care time: 5 minutes  Total critical care time (exclusive of procedural time) : 72 minutes  Critical care was necessary to treat or prevent imminent or  life-threatening deterioration of the following conditions: cardiac failure.  Critical care was time spent personally by me on the following activities: development of treatment plan with patient or surrogate, discussions with consultants, evaluation of patient's response to treatment, examination of patient, obtaining history from patient or surrogate, ordering and performing treatments and interventions, ordering and review of laboratory studies, ordering and review of radiographic studies, pulse oximetry, re-evaluation of patient's condition and review of old charts.        Labs Reviewed   COMPREHENSIVE METABOLIC PANEL - Abnormal; Notable for the following components:       Result Value    Glucose 135 (*)     All other components within normal limits   B-TYPE NATRIURETIC PEPTIDE - Abnormal; Notable for the following components:    BNP 1,085 (*)     All other components within normal limits   TROPONIN I - Abnormal; Notable for the following components:    Troponin I 0.128 (*)     All other components within normal limits   TSH - Abnormal; Notable for the following components:    TSH 5.144 (*)     All other components within normal limits   URINALYSIS, REFLEX TO URINE CULTURE - Abnormal; Notable for the following components:    Protein, UA 1+ (*)     All other components within normal limits    Narrative:     Specimen Source->Urine   TROPONIN I - Abnormal; Notable for the following components:    Troponin I 0.093 (*)     All other components within normal limits   ISTAT PROCEDURE - Abnormal; Notable for the following components:    POC PH 7.504 (*)     POC PO2 22 (*)     POC HCO3 32.9 (*)     POC SATURATED O2 42 (*)     POC TCO2 34 (*)     All other components within normal limits   CULTURE, BLOOD   CULTURE, BLOOD   CBC W/ AUTO DIFFERENTIAL   MAGNESIUM   LACTIC ACID, PLASMA   URINALYSIS MICROSCOPIC    Narrative:     Specimen Source->Urine   T4, FREE   POCT GLUCOSE, HAND-HELD DEVICE   POCT GLUCOSE MONITORING  CONTINUOUS     EKG Readings: (Independently Interpreted)   Initial Reading: No STEMI. Previous EKG: Compared with most recent EKG Previous EKG Date: 3/21/2022. Rhythm: Normal Sinus Rhythm. Ectopy: PVCs. Conduction: Normal. ST Segments: Normal ST Segments. T Waves: Normal. Axis: Normal. Clinical Impression: Normal Sinus Rhythm with PVCs     ECG Results          EKG 12-lead (In process)  Result time 04/10/22 18:52:21    In process by Interface, Lab In [facility] (04/10/22 18:52:21)                 Narrative:    Test Reason : R06.02,    Vent. Rate : 062 BPM     Atrial Rate : 062 BPM     P-R Int : 160 ms          QRS Dur : 100 ms      QT Int : 478 ms       P-R-T Axes : 071 015 043 degrees     QTc Int : 485 ms    Sinus rhythm with occasional Premature ventricular complexes  Otherwise normal ECG  When compared with ECG of 21-MAR-2022 14:08,  Premature ventricular complexes are now Present  ST no longer depressed in Inferior leads  Nonspecific T wave abnormality now evident in Anterior leads    Referred By: [email or web address]   SELF           Confirmed By:                             Imaging Results          CT Head Without Contrast (Final result)  Result time 04/10/22 17:26:12    Final result by Dejuan Cox MD (04/10/22 17:26:12)                 Impression:      No acute large vascular territory infarct or intracranial hemorrhage identified.  If persistent neurologic deficit, consider MRI brain with diffusion-weighted sequences.    Left thalamic subtle small hypoattenuating focus which could represent artifact versus age-indeterminate lacunar type infarct.  Correlate clinically.    Age-expected cerebral volume loss and chronic microvascular ischemic change.    Electronically signed by resident: Reese Gastelum  Date:    04/10/2022  Time:    17:13    Electronically signed by: Dejuan Cox MD  Date:    04/10/2022  Time:    17:26             Narrative:    EXAMINATION:  CT HEAD WITHOUT CONTRAST    CLINICAL HISTORY:  Mental status  change, unknown cause;    TECHNIQUE:  Low dose axial CT images obtained throughout the head without the use of intravenous contrast.  Axial, sagittal and coronal reconstructions were performed.    COMPARISON:  CT head 07/31/2006    FINDINGS:  Intracranial compartment:    Moderate generalized cerebral volume loss with compensatory prominence of the ventricles and sulcal widening.  No hydrocephalus.    Patchy and confluent hypoattenuation in the supratentorial periventricular white matter, nonspecific but likely representing chronic microvascular ischemic changes not out of proportion for age.  Subtle small more conspicuous area hypoattenuation at the left thalamus.  No loss of gray-white differentiation or regional sulcal effacement to suggest recent large vascular territory infarct.  No evidence of remote major territory infarct.    No intraparenchymal or extra-axial hemorrhage.    Skull/extracranial contents (limited evaluation):    No fracture. Mastoid air cells and visualized paranasal sinuses are essentially clear.  Soft tissue within the left external auditory canal, likely impacted cerumen.  Vascular calcifications noted about the skull base.                               X-Ray Chest AP Portable (Final result)  Result time 04/10/22 16:55:51    Final result by Eugenio Batista DO (04/10/22 16:55:51)                 Impression:      See above      Electronically signed by: Eugenio Batista DO  Date:    04/10/2022  Time:    16:55             Narrative:    EXAMINATION:  XR CHEST AP PORTABLE    CLINICAL HISTORY:  sob;    TECHNIQUE:  Single frontal view of the chest was performed.    COMPARISON:  04/16/2021    FINDINGS:  Previous ill-defined perihilar opacities less apparent which may represent resolving vascular congestion.  There is no new lung opacity.  No large pleural effusion or pneumothorax.  Continued atherosclerotic aorta.  Rightward deviation of the trachea slightly more apparent likely accentuated by slight  right rightward rotation.  Continued degenerative change bilateral shoulder joints.  Prominence of the cardiac silhouette which may be accentuated by technique overall less pronounced from prior..  Clinical correlation and further evaluation as warranted.                              X-Rays:   Independently Interpreted Readings:   Chest X-Ray: Cardiomegaly present. No pneumothorax or free air, no large consolidation     Medications   sodium chloride 0.9% flush 10 mL (has no administration in time range)   atorvastatin tablet 20 mg (20 mg Oral Given 4/11/22 0910)   benzonatate capsule 100 mg (has no administration in time range)   apixaban tablet 2.5 mg (2.5 mg Oral Given 4/11/22 0910)   EScitalopram oxalate tablet 10 mg (10 mg Oral Given 4/10/22 2136)   latanoprost 0.005 % ophthalmic solution 1 drop (1 drop Both Eyes Given 4/10/22 2136)   furosemide injection 20 mg (20 mg Intravenous Given 4/11/22 0910)   hydrALAZINE tablet 25 mg (has no administration in time range)   albuterol-ipratropium 2.5 mg-0.5 mg/3 mL nebulizer solution 3 mL (has no administration in time range)   melatonin tablet 6 mg (has no administration in time range)   ondansetron disintegrating tablet 8 mg (has no administration in time range)   promethazine tablet 25 mg (has no administration in time range)   polyethylene glycol packet 17 g (has no administration in time range)   bisacodyL suppository 10 mg (has no administration in time range)   acetaminophen tablet 650 mg (has no administration in time range)   glucose chewable tablet 16 g (has no administration in time range)   glucose chewable tablet 24 g (has no administration in time range)   glucagon (human recombinant) injection 1 mg (has no administration in time range)   dextrose 10% bolus 125 mL (has no administration in time range)   dextrose 10% bolus 250 mL (has no administration in time range)   insulin aspart U-100 pen 0-5 Units (has no administration in time range)   amLODIPine  tablet 5 mg (has no administration in time range)   amLODIPine tablet 2.5 mg (has no administration in time range)   furosemide injection 40 mg (40 mg Intravenous Given 4/10/22 1810)   hydrALAZINE injection 10 mg (10 mg Intravenous Given 4/10/22 1837)   amLODIPine tablet 2.5 mg (2.5 mg Oral Given 4/10/22 2146)   potassium chloride SA CR tablet 20 mEq (20 mEq Oral Given 4/10/22 2136)   magnesium sulfate 2g in water 50mL IVPB (premix) (0 g Intravenous Stopped 4/10/22 2339)     Medical Decision Making:   History:   Old Medical Records: I decided to obtain old medical records.  Old Records Summarized: records from clinic visits.       <> Summary of Records: Primary care visit 03/28 for cough which started on 03/21.  Patient was diagnosed with upper respiratory tract infection and prescribed Tessalon Perles.  Initial Assessment:   Elderly female lying comfortably in bed and not in any acute distress.  Patient is neurologically intact and following commands.  A&O x4 at this time.  Pain 0/10.  Hypertensive to 197/88 and denies visual changes, headache, and palpitations.  Differential Diagnosis:   AMS, ACS, seizure, stroke, PE, metabolic encephalopathy, hypertensive emergency, hypertensive urgency, pneumonia, URI, UTI, hypoglycemia, electrolyte abnormality.  Independently Interpreted Test(s):   I have ordered and independently interpreted X-rays - see prior notes.  I have ordered and independently interpreted EKG Reading(s) - see prior notes  Clinical Tests:   Lab Tests: Ordered and Reviewed  The following lab test(s) were unremarkable: CBC, Urinalysis, Lactate and CMP       <> Summary of Lab: CBC and CMP grossly unremarkable.  BNP 1085.  Trop 0.128.  TSH 5.144.    Radiological Study: Ordered and Reviewed  Medical Tests: Ordered and Reviewed  ED Management:  Full septic and full cardiac workup.  CT head.  Spoke with patient about code status.  Patient states she would like to talk to her family and will remain full code  until then.  Lasix 40 mg IV x1 Hydralazine 10 mg IV x 1  Discussed with Dr. Quiñonez.  Due to patient age and comorbidities she will be placed in observation for further management and workup.            Attending Attestation:   Physician Attestation Statement for Resident:  As the supervising MD   Physician Attestation Statement: I have personally seen and examined this patient.   I agree with the above history. -:   As the supervising MD I agree with the above PE.    As the supervising MD I agree with the above treatment, course, plan, and disposition.                 I have reviewed and concur with the resident's history, physical, assessment, and plan.  I have personally interviewed and examined the patient at bedside.  See below addendum for my evaluation and additional findings. I did supervise any and all procedures and was present for any critical portion, and was always immediately available for help and as a resource.     Briefly,  this is a 89 y.o.female with a history of CHF, hypertension and multiple comorbidities presenting with shortness of breath, tachypnea and hypertensive.  Initially treated hypertension and CHF exacerbation with Lasix and IV hydralazine.  Exam consistent with volume overload including chest x-ray and ECG.  UA not infectious.  CT head without acute process.  Patient was given Lasix 40 mg IV x1 in the ED.  discussed case with hospital medicine will admit for likely CHF exacerbation and hypertensive urgency.  Please see critical care note for critical care time.    Complexity:  Critical care    Final diagnoses:  [R06.02] SOB (shortness of breath) (Primary)  [I10] Primary hypertension  [I50.43] Acute on chronic combined systolic and diastolic congestive heart failure       Josh Quiñonez DO, FAAEM  Emergency Staff Physician   Dept of Emergency Medicine   [facility]  Spectralink: [id number]        Disclaimer: This note has been generated using voice-recognition software. There  may be typographical errors that have been missed during proof-reading.          ED Course as of 04/11/22 1128   Sun Apr 10, 2022   1512 BP(!): 160/71 [CH]   1512 Temp: 97.9 °F (36.6 °C) [CH]   1512 Pulse: 64 [CH]   1512 Resp(!): 36 [CH]   1512 SpO2(!): 94 % [CH]   1642 Protein, UA(!): 1+ [CH]   1642 RBC, UA: 3 [CH]   1642 WBC, UA: 1 [CH]   1642 Bacteria, UA: None [CH]   1642 TSH(!): 5.144 [CH]   1642 BNP(!): 1,085 [CH]   1642 Troponin I(!): 0.128 [CH]   1642 WBC: 7.81 [CH]   1642 Hemoglobin: 13.0 [CH]   1642 Platelets: 204 [CH]   1642 Sodium: 141 [CH]   1642 Potassium: 3.5 [CH]   1642 Chloride: 104 [CH]   1642 CO2: 24 [CH]   1642 Glucose(!): 135 [CH]   1642 BUN: 14 [CH]   1642 Creatinine: 0.8 [CH]   1642 Alkaline Phosphatase: 86 [CH]   1642 AST: 23 [CH]   1642 ALT: 20 [CH]   1642 eGFR if : >60.0 [CH]   1642 Lactate, Theodore: 1.9 [CH]   1643 BP(!): 197/88 [CH]   1643 Pulse: 64 [CH]   1643 Resp(!): 28 [CH]   1643 SpO2: 96 % [CH]   1643 Resp: 20 [CH]   1705 X-Ray Chest AP Portable [CH]   1706 X-Ray Chest AP Portable  Perihilar opacities. No pneumothorax, pneumomediastinum, widening of mediastinum, or free air under diaphragm.    [CH]   1728 Free T4: 0.97 [CH]      ED Course User Index  [CH] Matt Mcneal MD             Clinical Impression:   Final diagnoses:  [R06.02] SOB (shortness of breath) (Primary)  [I10] Primary hypertension  [I50.43] Acute on chronic combined systolic and diastolic congestive heart failure          ED Disposition Condition    Observation               Matt Mcneal MD  Resident  04/10/22 0710       Josh Quiñonez,   04/11/22 1138

## 2022-04-10 NOTE — ASSESSMENT & PLAN NOTE
Acute hypoxemic respiratory failure  - appears volume overload on exam, [id number], CXR w/ pulm edema  - Satting 94-96% RA  - good UOP s/p 40mg lasix IVP in the ED  - start IV lasix 20 mg BID  - repeat 2D echo  - optimize GDMT as able/once more euvolemic   - maintain K>4 and Mg>2  - telemetry   - strict I/Os, daily weights, fluid restriction, cardiac diet

## 2022-04-10 NOTE — ED TRIAGE NOTES
Pt brought in via personal transportation with family member. Per family member, pt woke up at 3am confused thinking she ''was in the hospital and asking if she could be checked out and go home''. Pt also became confused around 12 this afternoon stating she was ''furniture shopping''. Pt AxO X 4.

## 2022-04-10 NOTE — H&P
"[facility] - Emergency Dept  [facility] Medicine  History & Physical    Patient Name: Inna Blankenship  MRN: [id number]  Patient Class: OP- Observation  Admission Date: 4/10/2022  Attending Physician: Mik Jane MD   Primary Care Provider: Stacy Rodriguez MD         Patient information was obtained from patient, relative(s), past medical records and ER records.     Subjective:     Principal Problem:Acute on chronic diastolic congestive heart failure    Chief Complaint:   Chief Complaint   Patient presents with    Shortness of Breath     Can see heart beat in neck, more confused, sob        HPI: Inna Blankenship is a 89 y.o. 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 [place] altered mental status and increased work of breathing. Patient is a poor historian. Daughter at bedside assists with hx. Patient reports being confused as she was unsure where she was upon awakening around 3am this morning. She was noted to be tachypneic at that time and stated "the vein in my neck is bigger."  Daughter also noticed increased abdominal swelling/distension today which usually indicates volume overload. Patient reports mild shortness of breath at the time of my exam which has improved since revieing IV lasix in the ED earlier today. Mental status is now at baseline per daughter at bedside. Denies fever, chills, chest pain, N/V, abdominal pain, dizziness, HA, vision changes, slurred speech, focal weakness, or facial droop. Of note, patient suffered a mechanical fall approximately 3 weeks ago where she fell off of the toilet and landed on her backside. No head trauma or LOC reported.    ED: hypertensive to 197/99 and tachypneic to RR 36, BP and RR improved s/p IV lasix and IV hydralazine. CBC, CMP grossly unremarkable. Trop 0.128, BNP 1085. EKG w/ non-specific T wave abnormalities. UA non infectious. CTH without acute " procress. CXR with pulmonary congestion. Given 40mg lasix IVP x1.       Past Medical History:   Diagnosis Date    Arthritis     CHF (congestive heart failure) 12/26/2018    Chronic atrial fibrillation 8/29/2020    Chronic kidney disease, stage III (moderate) 9/11/2015    Colon adenoma     Diabetes mellitus, type II     Diet controlled    Glaucoma     Hyperlipemia     Hypertension     Osteopenia        Past Surgical History:   Procedure Laterality Date    APPENDECTOMY      CATARACT EXTRACTION W/  INTRAOCULAR LENS IMPLANT Right 03/15/2006        CATARACT EXTRACTION W/  INTRAOCULAR LENS IMPLANT Left 09/27/2006        COLONOSCOPY W/ POLYPECTOMY      EYE SURGERY      HYSTERECTOMY      TRANSESOPHAGEAL ECHOCARDIOGRAPHY N/A 11/9/2020    Procedure: ECHOCARDIOGRAM, TRANSESOPHAGEAL;  Surgeon: Marcelina Diagnostic Provider;  Location: [facility] EP LAB;  Service: Cardiology;  Laterality: N/A;    TREATMENT OF CARDIAC ARRHYTHMIA N/A 11/9/2020    Procedure: CARDIOVERSION;  Surgeon: Marvin Elmore MD;  Location: [facility] EP LAB;  Service: Cardiology;  Laterality: N/A;  AF, RAFFAELE, DCCV, MAC, GP, 3 PREP       Review of patient's allergies indicates:  No Known Allergies    No current facility-administered medications on file prior to encounter.     Current Outpatient Medications on File Prior to Encounter   Medication Sig    amLODIPine (NORVASC) 2.5 MG tablet Take 1 tablet (2.5 mg total) by mouth once daily.    atorvastatin (LIPITOR) 20 MG tablet Take 1 tablet (20 mg total) by mouth once daily.    atorvastatin (LIPITOR) 20 MG tablet TAKE 1 TABLET EVERY DAY    benzonatate (TESSALON) 100 MG capsule Take 1 capsule (100 mg total) by mouth 3 (three) times daily as needed for Cough.    CALCIUM CARBONATE ([id number] ORAL) Take 1 tablet by mouth Daily. 1  Oral Every day    ELIQUIS 2.5 mg Tab TAKE 1 TABLET TWICE DAILY    EScitalopram oxalate (LEXAPRO) 10 MG tablet Take 1 tablet (10 mg total) by mouth once  daily.    furosemide (LASIX) 20 MG tablet TAKE 1 TABLET EVERY DAY IF WEIGHT GAIN 2-3 LBS FOR 2-5 DAYS, TAKE 40MG DAILY. IF NO IMPROVEMENT, CALL MD    latanoprost 0.005 % ophthalmic solution Place 1 drop into both eyes every evening.    multivit with minerals/lutein (MULTIVITAMIN 50 PLUS ORAL) Take 1 tablet by mouth once daily.     [DISCONTINUED] timolol maleate 0.5% (TIMOPTIC-XR) 0.5 % SolG Place 1 drop into both eyes once daily.     Family History       Problem Relation (Age of Onset)    Allergies Daughter    Asthma Daughter    Cancer Sister, Daughter    Heart attack Mother    Heart disease Mother    Hypertension Daughter    No Known Problems Father          Tobacco Use    Smoking status: Never Smoker    Smokeless tobacco: Never Used   Substance and Sexual Activity    Alcohol use: Yes     Comment: wine, rarely    Drug use: No    Sexual activity: Not Currently     Review of Systems   Constitutional:  Negative for activity change, appetite change, chills and fever.   HENT:  Negative for congestion, trouble swallowing and voice change.    Eyes:  Negative for photophobia and visual disturbance.   Respiratory:  Positive for shortness of breath. Negative for cough, chest tightness and wheezing.    Cardiovascular:  Negative for chest pain, palpitations and leg swelling.   Gastrointestinal:  Positive for abdominal distention. Negative for abdominal pain, constipation, diarrhea, nausea and vomiting.   Endocrine: Negative for polyphagia and polyuria.   Genitourinary:  Positive for frequency (2/2 lasix). Negative for decreased urine volume, difficulty urinating, dysuria, flank pain, hematuria, menstrual problem and pelvic pain.   Musculoskeletal:  Negative for arthralgias, back pain and gait problem.   Skin:  Negative for color change and wound.   Neurological:  Negative for dizziness, tremors, seizures, syncope, facial asymmetry, speech difficulty, weakness, light-headedness and headaches.   Psychiatric/Behavioral:   Positive for confusion (resolved). Negative for agitation and behavioral problems.    Objective:     Vital Signs (Most Recent):  Temp: 97.9 °F (36.6 °C) (04/10/22 1506)  Pulse: 62 (04/10/22 1932)  Resp: 20 (04/10/22 1751)  BP: (!) 175/99 (04/10/22 1932)  SpO2: 98 % (04/10/22 1931)   Vital Signs (24h Range):  Temp:  [97.9 °F (36.6 °C)] 97.9 °F (36.6 °C)  Pulse:  [52-64] 62  Resp:  [20-36] 20  SpO2:  [94 %-98 %] 98 %  BP: (160-197)/(71-99) 175/99     Weight: 60.3 kg (133 lb)  Body mass index is 22.83 kg/m².    Physical Exam  Vitals and nursing note reviewed.   Constitutional:       General: She is not in acute distress.     Appearance: She is well-developed.   HENT:      Head: Normocephalic and atraumatic.      Mouth/Throat:      Pharynx: No oropharyngeal exudate.   Eyes:      Extraocular Movements: Extraocular movements intact.      Conjunctiva/sclera: Conjunctivae normal.   Neck:      Vascular: Hepatojugular reflux and JVD present.   Cardiovascular:      Rate and Rhythm: Normal rate and regular rhythm.      Heart sounds: Normal heart sounds.   Pulmonary:      Effort: Pulmonary effort is normal. No respiratory distress.      Breath sounds: Rales present. No wheezing.      Comments: Course BS to BLL  Abdominal:      General: Bowel sounds are normal. There is distension.      Palpations: Abdomen is soft.      Tenderness: There is no abdominal tenderness. There is no guarding or rebound.   Musculoskeletal:         General: No tenderness. Normal range of motion.      Cervical back: Normal range of motion and neck supple.   Lymphadenopathy:      Cervical: No cervical adenopathy.   Skin:     General: Skin is warm and dry.      Capillary Refill: Capillary refill takes less than 2 seconds.      Findings: No rash.   Neurological:      General: No focal deficit present.      Mental Status: She is alert and oriented to person, place, and time.      Cranial Nerves: No cranial nerve deficit.      Sensory: No sensory deficit.       Coordination: Coordination normal.      Comments: Oriented to person, place and year. At baseline per family   Psychiatric:         Behavior: Behavior normal.         Thought Content: Thought content normal.         Judgment: Judgment normal.           Significant Labs: All pertinent labs within the past 24 hours have been reviewed.  CBC:   Recent Labs   Lab 04/10/22  1548   WBC 7.81   HGB 13.0   HCT 40.5        CMP:   Recent Labs   Lab 04/10/22  1548      K 3.5      CO2 24   *   BUN 14   CREATININE 0.8   CALCIUM 10.3   PROT 7.7   ALBUMIN 3.7   BILITOT 0.9   ALKPHOS 86   AST 23   ALT 20   ANIONGAP 13   EGFRNONAA >60.0       Significant Imaging: I have reviewed all pertinent imaging results/findings within the past 24 hours.  CT Head Without Contrast  Narrative: EXAMINATION:  CT HEAD WITHOUT CONTRAST    CLINICAL HISTORY:  Mental status change, unknown cause;    TECHNIQUE:  Low dose axial CT images obtained throughout the head without the use of intravenous contrast.  Axial, sagittal and coronal reconstructions were performed.    COMPARISON:  CT head 07/31/2006    FINDINGS:  Intracranial compartment:    Moderate generalized cerebral volume loss with compensatory prominence of the ventricles and sulcal widening.  No hydrocephalus.    Patchy and confluent hypoattenuation in the supratentorial periventricular white matter, nonspecific but likely representing chronic microvascular ischemic changes not out of proportion for age.  Subtle small more conspicuous area hypoattenuation at the left thalamus.  No loss of gray-white differentiation or regional sulcal effacement to suggest recent large vascular territory infarct.  No evidence of remote major territory infarct.    No intraparenchymal or extra-axial hemorrhage.    Skull/extracranial contents (limited evaluation):    No fracture. Mastoid air cells and visualized paranasal sinuses are essentially clear.  Soft tissue within the left external  auditory canal, likely impacted cerumen.  Vascular calcifications noted about the skull base.  Impression: No acute large vascular territory infarct or intracranial hemorrhage identified.  If persistent neurologic deficit, consider MRI brain with diffusion-weighted sequences.    Left thalamic subtle small hypoattenuating focus which could represent artifact versus age-indeterminate lacunar type infarct.  Correlate clinically.    Age-expected cerebral volume loss and chronic microvascular ischemic change.    Electronically signed by resident: Reese Gastelum  Date:    04/10/2022  Time:    17:13    Electronically signed by: Dejuan Cox MD  Date:    04/10/2022  Time:    17:26  X-Ray Chest AP Portable  Narrative: EXAMINATION:  XR CHEST AP PORTABLE    CLINICAL HISTORY:  sob;    TECHNIQUE:  Single frontal view of the chest was performed.    COMPARISON:  04/16/2021    FINDINGS:  Previous ill-defined perihilar opacities less apparent which may represent resolving vascular congestion.  There is no new lung opacity.  No large pleural effusion or pneumothorax.  Continued atherosclerotic aorta.  Rightward deviation of the trachea slightly more apparent likely accentuated by slight right rightward rotation.  Continued degenerative change bilateral shoulder joints.  Prominence of the cardiac silhouette which may be accentuated by technique overall less pronounced from prior..  Clinical correlation and further evaluation as warranted.  Impression: See above    Electronically signed by: Eugenio Batista DO  Date:    04/10/2022  Time:    16:55        Assessment/Plan:     * Acute on chronic diastolic congestive heart failure  Acute hypoxemic respiratory failure  - appears volume overload on exam, [id number], CXR w/ pulm edema  - Satting 94-96% RA  - good UOP s/p 40mg lasix IVP in the ED  - start IV lasix 20 mg BID  - repeat 2D echo  - optimize GDMT as able/once more euvolemic   - maintain K>4 and Mg>2  - telemetry   - strict I/Os, daily  weights, fluid restriction, cardiac diet    Hypertensive urgency  Essential hypertension  - uncontrolled BP in the setting of volume overload  - increase amlodipine from 2.5 to 5mg daily  - PRN hydralazine   - consider adding additional BP agent if no improvement w/ diuresis/ increased amlodipine dose    Acute encephalopathy  - suspect  2/2 hypertensive encephalopathy in the setting of likely undignosed dementia, possible hypoxia +/- hypercapnia may have contributed  - mental status back to baseline  - neuro exam non focal  - CTH w/ possible age-indeterminate lacunar type infarct-- suspect artifact vs old infarct as neuro exam not consistent w findings  - VBG pending  - BP control & diuresis as above  - consider MRI brain pending clinical course     Elevated troponin  - Trop 0.128>> 0.093, EKG w/ non-specific TWAs  - no reported CP  - suspect type 2 demand ischemia from vol overload and uncontrolled HTN  - trend Tn    Stage 3a chronic kidney disease  - stable at baseline  - monitor renal fn closely w/ diuresis     Persistent atrial fibrillation  - rate controlled without BB  - continue eliquis 2.5mg BID    Controlled type 2 diabetes mellitus with stage 3 chronic kidney disease, without long-term current use of insulin  - diet controlled  - LDSSI, ACHS accuchecks  - repeat A1c  Lab Results   Component Value Date    HGBA1C 6.0 (H) 07/09/2021         History of DVT (deep vein thrombosis)  - continue eliquis     Other hyperlipidemia  - continue statin     VTE Risk Mitigation (From admission, onward)         Ordered     apixaban tablet 2.5 mg  2 times daily         04/10/22 1928     IP VTE HIGH RISK PATIENT  Once         04/10/22 1848     Place sequential compression device  Until discontinued         04/10/22 1848                   Yulia Hernandez PA-C  Department of Hospital Medicine   [facility] - Emergency Dept

## 2022-04-11 DIAGNOSIS — I50.32 CHRONIC DIASTOLIC CONGESTIVE HEART FAILURE: Primary | ICD-10-CM

## 2022-04-11 PROBLEM — I50.814: Status: ACTIVE | Noted: 2022-04-11

## 2022-04-11 PROBLEM — I27.22 PULMONARY HYPERTENSION DUE TO LEFT HEART DISEASE: Status: ACTIVE | Noted: 2022-04-11

## 2022-04-11 LAB
ANION GAP SERPL CALC-SCNC: 10 MMOL/L (ref 8–16)
ASCENDING AORTA: 3.33 CM
AV INDEX (PROSTH): 0.54
AV MEAN GRADIENT: 4 MMHG
AV PEAK GRADIENT: 8 MMHG
AV VALVE AREA: 1.71 CM2
AV VELOCITY RATIO: 0.55
BASOPHILS # BLD AUTO: 0.05 K/UL (ref 0–0.2)
BASOPHILS NFR BLD: 0.8 % (ref 0–1.9)
BSA FOR ECHO PROCEDURE: 1.64 M2
BUN SERPL-MCNC: 13 MG/DL (ref 10–30)
CALCIUM SERPL-MCNC: 10.1 MG/DL (ref 8.7–10.5)
CHLORIDE SERPL-SCNC: 103 MMOL/L (ref 95–110)
CO2 SERPL-SCNC: 27 MMOL/L (ref 23–29)
CREAT SERPL-MCNC: 0.7 MG/DL (ref 0.5–1.4)
CV ECHO LV RWT: 0.41 CM
DIFFERENTIAL METHOD: ABNORMAL
DOP CALC AO PEAK VEL: 1.41 M/S
DOP CALC AO VTI: 31.13 CM
DOP CALC LVOT AREA: 3.2 CM2
DOP CALC LVOT DIAMETER: 2.01 CM
DOP CALC LVOT PEAK VEL: 0.78 M/S
DOP CALC LVOT STROKE VOLUME: 53.15 CM3
DOP CALCLVOT PEAK VEL VTI: 16.76 CM
E WAVE DECELERATION TIME: 151.83 MSEC
E/A RATIO: 2.79
E/E' RATIO: 27 M/S
ECHO LV POSTERIOR WALL: 0.97 CM (ref 0.6–1.1)
EJECTION FRACTION: 50 %
EOSINOPHIL # BLD AUTO: 0.1 K/UL (ref 0–0.5)
EOSINOPHIL NFR BLD: 1 % (ref 0–8)
ERYTHROCYTE [DISTWIDTH] IN BLOOD BY AUTOMATED COUNT: 14.6 % (ref 11.5–14.5)
EST. GFR  (AFRICAN AMERICAN): >60 ML/MIN/1.73 M^2
EST. GFR  (NON AFRICAN AMERICAN): >60 ML/MIN/1.73 M^2
ESTIMATED AVG GLUCOSE: 117 MG/DL (ref 68–131)
FRACTIONAL SHORTENING: 26 % (ref 28–44)
GLUCOSE SERPL-MCNC: 110 MG/DL (ref 70–110)
HBA1C MFR BLD: 5.7 % (ref 4–5.6)
HCT VFR BLD AUTO: 39.9 % (ref 37–48.5)
HGB BLD-MCNC: 12.9 G/DL (ref 12–16)
IMM GRANULOCYTES # BLD AUTO: 0.01 K/UL (ref 0–0.04)
IMM GRANULOCYTES NFR BLD AUTO: 0.2 % (ref 0–0.5)
INTERVENTRICULAR SEPTUM: 1.04 CM (ref 0.6–1.1)
LA MAJOR: 6.44 CM
LA MINOR: 6.49 CM
LA WIDTH: 4.46 CM
LEFT ATRIUM SIZE: 4.53 CM
LEFT ATRIUM VOLUME INDEX MOD: 69.8 ML/M2
LEFT ATRIUM VOLUME INDEX: 67.7 ML/M2
LEFT ATRIUM VOLUME MOD: 114.49 CM3
LEFT ATRIUM VOLUME: 111.02 CM3
LEFT INTERNAL DIMENSION IN SYSTOLE: 3.47 CM (ref 2.1–4)
LEFT VENTRICLE DIASTOLIC VOLUME INDEX: 61.68 ML/M2
LEFT VENTRICLE DIASTOLIC VOLUME: 101.16 ML
LEFT VENTRICLE MASS INDEX: 100 G/M2
LEFT VENTRICLE SYSTOLIC VOLUME INDEX: 30.4 ML/M2
LEFT VENTRICLE SYSTOLIC VOLUME: 49.86 ML
LEFT VENTRICULAR INTERNAL DIMENSION IN DIASTOLE: 4.68 CM (ref 3.5–6)
LEFT VENTRICULAR MASS: 164.43 G
LV LATERAL E/E' RATIO: 20.25 M/S
LV SEPTAL E/E' RATIO: 40.5 M/S
LYMPHOCYTES # BLD AUTO: 1.2 K/UL (ref 1–4.8)
LYMPHOCYTES NFR BLD: 20.5 % (ref 18–48)
MAGNESIUM SERPL-MCNC: 2.2 MG/DL (ref 1.6–2.6)
MCH RBC QN AUTO: 30.6 PG (ref 27–31)
MCHC RBC AUTO-ENTMCNC: 32.3 G/DL (ref 32–36)
MCV RBC AUTO: 95 FL (ref 82–98)
MONOCYTES # BLD AUTO: 0.5 K/UL (ref 0.3–1)
MONOCYTES NFR BLD: 8.1 % (ref 4–15)
MV PEAK A VEL: 0.29 M/S
MV PEAK E VEL: 0.81 M/S
MV STENOSIS PRESSURE HALF TIME: 44.03 MS
MV VALVE AREA P 1/2 METHOD: 5 CM2
NEUTROPHILS # BLD AUTO: 4.2 K/UL (ref 1.8–7.7)
NEUTROPHILS NFR BLD: 69.4 % (ref 38–73)
NRBC BLD-RTO: 0 /100 WBC
PISA TR MAX VEL: 4.07 M/S
PLATELET # BLD AUTO: 162 K/UL (ref 150–450)
PMV BLD AUTO: 11.6 FL (ref 9.2–12.9)
POCT GLUCOSE: 107 MG/DL (ref 70–110)
POCT GLUCOSE: 116 MG/DL (ref 70–110)
POCT GLUCOSE: 141 MG/DL (ref 70–110)
POCT GLUCOSE: 99 MG/DL (ref 70–110)
POTASSIUM SERPL-SCNC: 3.4 MMOL/L (ref 3.5–5.1)
RA MAJOR: 6.18 CM
RA PRESSURE: 8 MMHG
RA WIDTH: 4.14 CM
RBC # BLD AUTO: 4.21 M/UL (ref 4–5.4)
RIGHT VENTRICULAR END-DIASTOLIC DIMENSION: 4.69 CM
RV TISSUE DOPPLER FREE WALL SYSTOLIC VELOCITY 1 (APICAL 4 CHAMBER VIEW): 7.76 CM/S
SINUS: 3.16 CM
SODIUM SERPL-SCNC: 140 MMOL/L (ref 136–145)
STJ: 2.74 CM
TDI LATERAL: 0.04 M/S
TDI SEPTAL: 0.02 M/S
TDI: 0.03 M/S
TR MAX PG: 66 MMHG
TRICUSPID ANNULAR PLANE SYSTOLIC EXCURSION: 1.41 CM
TROPONIN I SERPL DL<=0.01 NG/ML-MCNC: 0.17 NG/ML (ref 0–0.03)
TV REST PULMONARY ARTERY PRESSURE: 74 MMHG
WBC # BLD AUTO: 6.05 K/UL (ref 3.9–12.7)

## 2022-04-11 PROCEDURE — 83036 HEMOGLOBIN GLYCOSYLATED A1C: CPT | Performed by: PHYSICIAN ASSISTANT

## 2022-04-11 PROCEDURE — 80048 BASIC METABOLIC PNL TOTAL CA: CPT | Performed by: PHYSICIAN ASSISTANT

## 2022-04-11 PROCEDURE — 94761 N-INVAS EAR/PLS OXIMETRY MLT: CPT

## 2022-04-11 PROCEDURE — 99226 PR SUBSEQUENT OBSERVATION CARE,LEVEL III: ICD-10-PCS | Mod: ,,, | Performed by: PHYSICIAN ASSISTANT

## 2022-04-11 PROCEDURE — G0378 HOSPITAL OBSERVATION PER HR: HCPCS

## 2022-04-11 PROCEDURE — 83735 ASSAY OF MAGNESIUM: CPT | Performed by: PHYSICIAN ASSISTANT

## 2022-04-11 PROCEDURE — 63600175 PHARM REV CODE 636 W HCPCS: Performed by: INTERNAL MEDICINE

## 2022-04-11 PROCEDURE — 27000190 HC CPAP FULL FACE MASK W/VALVE

## 2022-04-11 PROCEDURE — 96365 THER/PROPH/DIAG IV INF INIT: CPT | Mod: 59 | Performed by: EMERGENCY MEDICINE

## 2022-04-11 PROCEDURE — 63600175 PHARM REV CODE 636 W HCPCS: Performed by: PHYSICIAN ASSISTANT

## 2022-04-11 PROCEDURE — 99226 PR SUBSEQUENT OBSERVATION CARE,LEVEL III: CPT | Mod: ,,, | Performed by: PHYSICIAN ASSISTANT

## 2022-04-11 PROCEDURE — 36415 COLL VENOUS BLD VENIPUNCTURE: CPT | Performed by: PHYSICIAN ASSISTANT

## 2022-04-11 PROCEDURE — 99900035 HC TECH TIME PER 15 MIN (STAT)

## 2022-04-11 PROCEDURE — 87040 BLOOD CULTURE FOR BACTERIA: CPT | Performed by: PHYSICIAN ASSISTANT

## 2022-04-11 PROCEDURE — 94660 CPAP INITIATION&MGMT: CPT

## 2022-04-11 PROCEDURE — 25000003 PHARM REV CODE 250: Performed by: INTERNAL MEDICINE

## 2022-04-11 PROCEDURE — 25000003 PHARM REV CODE 250: Performed by: PHYSICIAN ASSISTANT

## 2022-04-11 PROCEDURE — 96376 TX/PRO/DX INJ SAME DRUG ADON: CPT | Performed by: EMERGENCY MEDICINE

## 2022-04-11 PROCEDURE — 85025 COMPLETE CBC W/AUTO DIFF WBC: CPT | Performed by: PHYSICIAN ASSISTANT

## 2022-04-11 RX ORDER — AMLODIPINE BESYLATE 2.5 MG/1
2.5 TABLET ORAL ONCE
Status: COMPLETED | OUTPATIENT
Start: 2022-04-11 | End: 2022-04-11

## 2022-04-11 RX ORDER — FUROSEMIDE 10 MG/ML
20 INJECTION INTRAMUSCULAR; INTRAVENOUS 2 TIMES DAILY
Status: DISCONTINUED | OUTPATIENT
Start: 2022-04-11 | End: 2022-04-12

## 2022-04-11 RX ORDER — AMLODIPINE BESYLATE 5 MG/1
5 TABLET ORAL DAILY
Status: DISCONTINUED | OUTPATIENT
Start: 2022-04-12 | End: 2022-04-11

## 2022-04-11 RX ORDER — AMLODIPINE BESYLATE 10 MG/1
10 TABLET ORAL DAILY
Status: DISCONTINUED | OUTPATIENT
Start: 2022-04-12 | End: 2022-04-12 | Stop reason: HOSPADM

## 2022-04-11 RX ORDER — AMLODIPINE BESYLATE 5 MG/1
5 TABLET ORAL DAILY
Status: COMPLETED | OUTPATIENT
Start: 2022-04-11 | End: 2022-04-11

## 2022-04-11 RX ADMIN — LATANOPROST 1 DROP: 50 SOLUTION OPHTHALMIC at 08:04

## 2022-04-11 RX ADMIN — VANCOMYCIN HYDROCHLORIDE 1250 MG: 1.25 INJECTION, POWDER, LYOPHILIZED, FOR SOLUTION INTRAVENOUS at 07:04

## 2022-04-11 RX ADMIN — FUROSEMIDE 20 MG: 10 INJECTION, SOLUTION INTRAMUSCULAR; INTRAVENOUS at 09:04

## 2022-04-11 RX ADMIN — ATORVASTATIN CALCIUM 20 MG: 20 TABLET, FILM COATED ORAL at 09:04

## 2022-04-11 RX ADMIN — ESCITALOPRAM OXALATE 10 MG: 10 TABLET ORAL at 08:04

## 2022-04-11 RX ADMIN — APIXABAN 2.5 MG: 2.5 TABLET, FILM COATED ORAL at 08:04

## 2022-04-11 RX ADMIN — AMLODIPINE BESYLATE 5 MG: 5 TABLET ORAL at 04:04

## 2022-04-11 RX ADMIN — FUROSEMIDE 20 MG: 10 INJECTION, SOLUTION INTRAMUSCULAR; INTRAVENOUS at 05:04

## 2022-04-11 RX ADMIN — AMLODIPINE BESYLATE 2.5 MG: 2.5 TABLET ORAL at 11:04

## 2022-04-11 RX ADMIN — APIXABAN 2.5 MG: 2.5 TABLET, FILM COATED ORAL at 09:04

## 2022-04-11 RX ADMIN — AMLODIPINE BESYLATE 2.5 MG: 2.5 TABLET ORAL at 09:04

## 2022-04-11 RX ADMIN — Medication 6 MG: at 08:04

## 2022-04-11 NOTE — ASSESSMENT & PLAN NOTE
- suspect  2/2 hypertensive encephalopathy in the setting of likely undignosed dementia, possible hypoxia +/- hypercapnia may have contributed  - mental status back to baseline  - neuro exam non focal  - CTH w/ possible age-indeterminate lacunar type infarct-- suspect artifact vs old infarct as neuro exam not consistent w findings  - VBG pending  - BP control & diuresis as above  - consider MRI brain pending clinical course

## 2022-04-11 NOTE — ASSESSMENT & PLAN NOTE
Acute hypoxemic respiratory failure  Pulmonary hypertension  - appears volume overload on exam, [id number], CXR w/ pulm edema  - Satting 94-96% RA  - good UOP s/p 40mg lasix IVP in the ED  - start IV lasix 20 mg BID  - repeat echo below, worsening pulmonary hypertension  - optimize GDMT as able/once more euvolemic   - maintain K>4 and Mg>2  - telemetry   - strict I/Os, daily weights, fluid restriction, cardiac diet  - will need follow up with cardiology clinic    Results for orders placed during the hospital encounter of 04/10/22    Echo    Interpretation Summary  · The left ventricle is normal in size with eccentric hypertrophy and low normal systolic function. The estimated ejection fraction is 50%.  · Moderate right ventricular enlargement with mildly to moderately reduced right ventricular systolic function.  · Grade III left ventricular diastolic dysfunction.  · Severe biatrial enlargement.  · There is mild aortic valve stenosis. Aortic valve area is 1.7 cm2; peak velocity is 1.4 m/s; mean gradient is 4 mmHg.  · Mild mitral regurgitation.  · Moderate tricuspid regurgitation.  · The estimated PA systolic pressure is 74 mmHg.  · Intermediate central venous pressure (8 mmHg).

## 2022-04-11 NOTE — PLAN OF CARE
[facility] - Telemetry Stepdown ([facility]-7)  Discharge Assessment    Primary Care Provider: Stacy Rodriguez MD     Discharge Assessment (most recent)     BRIEF DISCHARGE ASSESSMENT - 04/11/22 1511        Discharge Planning    Assessment Type Discharge Planning Brief Assessment     Resource/Environmental Concerns none     Support Systems Children     Equipment Currently Used at Home walker, rolling;bath bench;grab bar;wheelchair     Current Living Arrangements home/apartment/condo     Patient/Family Anticipates Transition to home     Patient/Family Anticipated Services at Transition none     DME Needed Upon Discharge  none     Discharge Plan A Home Health     Discharge Plan B Home             Pt is a 89 y.o. female admitted with CHF and has a PMH of CHF, HTN and AFib on [facility]. She lives in her house with 24 hr care provided by her family. She has an aide come 3 times a week to help er bathe. She requires assistance with all ADls and IADLs. [facility] Discharge Packet given to patient and/or family with understanding verbalized.   name and number and estimated discharge date written on white board in patient's room with request to call for any questions or concerns.  Will continue to follow for needs.  Young Leyva RN,BSN

## 2022-04-11 NOTE — SUBJECTIVE & OBJECTIVE
Interval History: Patient seen at bedside with daughter. Denies complaints. Abdominal swelling improving. Continue lasix 20 mg IV BID. Echo showed significant pulmonary hypertension. BP uncontrolled, home amlodopine increased to 10 mg daily, suspect ARB will need to be added to regimen in am if not controlled.     Review of Systems   Constitutional:  Negative for chills and fatigue.   HENT:  Negative for congestion.    Respiratory:  Negative for cough, shortness of breath and wheezing.    Cardiovascular:  Negative for chest pain and leg swelling.   Gastrointestinal:  Negative for abdominal distention, abdominal pain, constipation, diarrhea and nausea.   Genitourinary:  Negative for dysuria, frequency, hematuria and urgency.   Musculoskeletal:  Negative for arthralgias and back pain.   Neurological:  Negative for dizziness, weakness, light-headedness and numbness.   Psychiatric/Behavioral:  Negative for agitation, behavioral problems and confusion.    Objective:     Vital Signs (Most Recent):  Temp: 96.7 °F (35.9 °C) (04/11/22 1554)  Pulse: (!) 56 (04/11/22 1554)  Resp: 18 (04/11/22 1554)  BP: (!) 169/77 (04/11/22 1554)  SpO2: 95 % (04/11/22 1554)   Vital Signs (24h Range):  Temp:  [96.2 °F (35.7 °C)-98 °F (36.7 °C)] 96.7 °F (35.9 °C)  Pulse:  [45-76] 56  Resp:  [16-22] 18  SpO2:  [94 %-98 %] 95 %  BP: (144-196)/(69-99) 169/77     Weight: 59.9 kg (132 lb)  Body mass index is 22.66 kg/m².    Intake/Output Summary (Last 24 hours) at 4/11/2022 1615  Last data filed at 4/11/2022 1400  Gross per 24 hour   Intake 120 ml   Output 1200 ml   Net -1080 ml      Physical Exam  Vitals and nursing note reviewed.   Constitutional:       Appearance: Normal appearance.   HENT:      Head: Normocephalic and atraumatic.      Nose: Nose normal. No congestion.      Mouth/Throat:      Mouth: Mucous membranes are moist.      Pharynx: Oropharynx is clear.   Eyes:      Extraocular Movements: Extraocular movements intact.       Conjunctiva/sclera: Conjunctivae normal.      Pupils: Pupils are equal, round, and reactive to light.   Cardiovascular:      Rate and Rhythm: Normal rate and regular rhythm.      Pulses: Normal pulses.      Heart sounds: Normal heart sounds.   Pulmonary:      Effort: Pulmonary effort is normal.      Breath sounds: Rales (faint rales) present. No wheezing.   Chest:      Chest wall: No tenderness.   Abdominal:      General: Abdomen is flat. Bowel sounds are normal. There is no distension.      Palpations: Abdomen is soft.      Tenderness: There is no abdominal tenderness. There is no guarding.   Musculoskeletal:         General: No swelling or tenderness. Normal range of motion.      Right lower leg: No edema.      Left lower leg: No edema.   Skin:     General: Skin is warm and dry.   Neurological:      General: No focal deficit present.      Mental Status: She is alert and oriented to person, place, and time.   Psychiatric:         Mood and Affect: Mood normal.         Behavior: Behavior normal.         Thought Content: Thought content normal.         Judgment: Judgment normal.       Significant Labs: All pertinent labs within the past 24 hours have been reviewed.  CBC:   Recent Labs   Lab 04/10/22  1548 04/11/22  0512   WBC 7.81 6.05   HGB 13.0 12.9   HCT 40.5 39.9    162     CMP:   Recent Labs   Lab 04/10/22  1548 04/11/22  0512    140   K 3.5 3.4*    103   CO2 24 27   * 110   BUN 14 13   CREATININE 0.8 0.7   CALCIUM 10.3 10.1   PROT 7.7  --    ALBUMIN 3.7  --    BILITOT 0.9  --    ALKPHOS 86  --    AST 23  --    ALT 20  --    ANIONGAP 13 10   EGFRNONAA >60.0 >60.0     Magnesium:   Recent Labs   Lab 04/10/22  1548 04/11/22  0512   MG 1.8 2.2     POCT Glucose:   Recent Labs   Lab 04/10/22  2146 04/11/22  0811 04/11/22  1149   POCTGLUCOSE 151* 107 116*       Significant Imaging: I have reviewed all pertinent imaging results/findings within the past 24 hours.

## 2022-04-11 NOTE — SUBJECTIVE & OBJECTIVE
Past Medical History:   Diagnosis Date    Arthritis     CHF (congestive heart failure) 12/26/2018    Chronic atrial fibrillation 8/29/2020    Chronic kidney disease, stage III (moderate) 9/11/2015    Colon adenoma     Diabetes mellitus, type II     Diet controlled    Glaucoma     Hyperlipemia     Hypertension     Osteopenia        Past Surgical History:   Procedure Laterality Date    APPENDECTOMY      CATARACT EXTRACTION W/  INTRAOCULAR LENS IMPLANT Right 03/15/2006        CATARACT EXTRACTION W/  INTRAOCULAR LENS IMPLANT Left 09/27/2006        COLONOSCOPY W/ POLYPECTOMY      EYE SURGERY      HYSTERECTOMY      TRANSESOPHAGEAL ECHOCARDIOGRAPHY N/A 11/9/2020    Procedure: ECHOCARDIOGRAM, TRANSESOPHAGEAL;  Surgeon: Marcelina Diagnostic Provider;  Location: [facility] EP LAB;  Service: Cardiology;  Laterality: N/A;    TREATMENT OF CARDIAC ARRHYTHMIA N/A 11/9/2020    Procedure: CARDIOVERSION;  Surgeon: Marvin Elmore MD;  Location: [facility] EP LAB;  Service: Cardiology;  Laterality: N/A;  AF, RAFFAELE, DCCV, MAC, GP, 3 PREP       Review of patient's allergies indicates:  No Known Allergies    No current facility-administered medications on file prior to encounter.     Current Outpatient Medications on File Prior to Encounter   Medication Sig    amLODIPine (NORVASC) 2.5 MG tablet Take 1 tablet (2.5 mg total) by mouth once daily.    atorvastatin (LIPITOR) 20 MG tablet Take 1 tablet (20 mg total) by mouth once daily.    atorvastatin (LIPITOR) 20 MG tablet TAKE 1 TABLET EVERY DAY    benzonatate (TESSALON) 100 MG capsule Take 1 capsule (100 mg total) by mouth 3 (three) times daily as needed for Cough.    CALCIUM CARBONATE (JSOG-WXO-361 ORAL) Take 1 tablet by mouth Daily. 1  Oral Every day    ELIQUIS 2.5 mg Tab TAKE 1 TABLET TWICE DAILY    EScitalopram oxalate (LEXAPRO) 10 MG tablet Take 1 tablet (10 mg total) by mouth once daily.    furosemide (LASIX) 20 MG tablet TAKE 1 TABLET EVERY DAY IF WEIGHT GAIN 2-3 LBS FOR 2-5  DAYS, TAKE 40MG DAILY. IF NO IMPROVEMENT, CALL MD    latanoprost 0.005 % ophthalmic solution Place 1 drop into both eyes every evening.    multivit with minerals/lutein (MULTIVITAMIN 50 PLUS ORAL) Take 1 tablet by mouth once daily.     [DISCONTINUED] timolol maleate 0.5% (TIMOPTIC-XR) 0.5 % SolG Place 1 drop into both eyes once daily.     Family History       Problem Relation (Age of Onset)    Allergies Daughter    Asthma Daughter    Cancer Sister, Daughter    Heart attack Mother    Heart disease Mother    Hypertension Daughter    No Known Problems Father          Tobacco Use    Smoking status: Never Smoker    Smokeless tobacco: Never Used   Substance and Sexual Activity    Alcohol use: Yes     Comment: wine, rarely    Drug use: No    Sexual activity: Not Currently     Review of Systems   Constitutional:  Negative for activity change, appetite change, chills and fever.   HENT:  Negative for congestion, trouble swallowing and voice change.    Eyes:  Negative for photophobia and visual disturbance.   Respiratory:  Positive for shortness of breath. Negative for cough, chest tightness and wheezing.    Cardiovascular:  Negative for chest pain, palpitations and leg swelling.   Gastrointestinal:  Positive for abdominal distention. Negative for abdominal pain, constipation, diarrhea, nausea and vomiting.   Endocrine: Negative for polyphagia and polyuria.   Genitourinary:  Positive for frequency (2/2 lasix). Negative for decreased urine volume, difficulty urinating, dysuria, flank pain, hematuria, menstrual problem and pelvic pain.   Musculoskeletal:  Negative for arthralgias, back pain and gait problem.   Skin:  Negative for color change and wound.   Neurological:  Negative for dizziness, tremors, seizures, syncope, facial asymmetry, speech difficulty, weakness, light-headedness and headaches.   Psychiatric/Behavioral:  Positive for confusion (resolved). Negative for agitation and behavioral problems.    Objective:      Vital Signs (Most Recent):  Temp: 97.9 °F (36.6 °C) (04/10/22 1506)  Pulse: 62 (04/10/22 1932)  Resp: 20 (04/10/22 1751)  BP: (!) 175/99 (04/10/22 1932)  SpO2: 98 % (04/10/22 1931)   Vital Signs (24h Range):  Temp:  [97.9 °F (36.6 °C)] 97.9 °F (36.6 °C)  Pulse:  [52-64] 62  Resp:  [20-36] 20  SpO2:  [94 %-98 %] 98 %  BP: (160-197)/(71-99) 175/99     Weight: 60.3 kg (133 lb)  Body mass index is 22.83 kg/m².    Physical Exam  Vitals and nursing note reviewed.   Constitutional:       General: She is not in acute distress.     Appearance: She is well-developed.   HENT:      Head: Normocephalic and atraumatic.      Mouth/Throat:      Pharynx: No oropharyngeal exudate.   Eyes:      Extraocular Movements: Extraocular movements intact.      Conjunctiva/sclera: Conjunctivae normal.   Neck:      Vascular: Hepatojugular reflux and JVD present.   Cardiovascular:      Rate and Rhythm: Normal rate and regular rhythm.      Heart sounds: Normal heart sounds.   Pulmonary:      Effort: Pulmonary effort is normal. No respiratory distress.      Breath sounds: Rales present. No wheezing.      Comments: Course BS to BLL  Abdominal:      General: Bowel sounds are normal. There is distension.      Palpations: Abdomen is soft.      Tenderness: There is no abdominal tenderness. There is no guarding or rebound.   Musculoskeletal:         General: No tenderness. Normal range of motion.      Cervical back: Normal range of motion and neck supple.   Lymphadenopathy:      Cervical: No cervical adenopathy.   Skin:     General: Skin is warm and dry.      Capillary Refill: Capillary refill takes less than 2 seconds.      Findings: No rash.   Neurological:      General: No focal deficit present.      Mental Status: She is alert and oriented to person, place, and time.      Cranial Nerves: No cranial nerve deficit.      Sensory: No sensory deficit.      Coordination: Coordination normal.      Comments: Oriented to person, place and year. At  baseline per family   Psychiatric:         Behavior: Behavior normal.         Thought Content: Thought content normal.         Judgment: Judgment normal.           Significant Labs: All pertinent labs within the past 24 hours have been reviewed.  CBC:   Recent Labs   Lab 04/10/22  1548   WBC 7.81   HGB 13.0   HCT 40.5        CMP:   Recent Labs   Lab 04/10/22  1548      K 3.5      CO2 24   *   BUN 14   CREATININE 0.8   CALCIUM 10.3   PROT 7.7   ALBUMIN 3.7   BILITOT 0.9   ALKPHOS 86   AST 23   ALT 20   ANIONGAP 13   EGFRNONAA >60.0       Significant Imaging: I have reviewed all pertinent imaging results/findings within the past 24 hours.  CT Head Without Contrast  Narrative: EXAMINATION:  CT HEAD WITHOUT CONTRAST    CLINICAL HISTORY:  Mental status change, unknown cause;    TECHNIQUE:  Low dose axial CT images obtained throughout the head without the use of intravenous contrast.  Axial, sagittal and coronal reconstructions were performed.    COMPARISON:  CT head 07/31/2006    FINDINGS:  Intracranial compartment:    Moderate generalized cerebral volume loss with compensatory prominence of the ventricles and sulcal widening.  No hydrocephalus.    Patchy and confluent hypoattenuation in the supratentorial periventricular white matter, nonspecific but likely representing chronic microvascular ischemic changes not out of proportion for age.  Subtle small more conspicuous area hypoattenuation at the left thalamus.  No loss of gray-white differentiation or regional sulcal effacement to suggest recent large vascular territory infarct.  No evidence of remote major territory infarct.    No intraparenchymal or extra-axial hemorrhage.    Skull/extracranial contents (limited evaluation):    No fracture. Mastoid air cells and visualized paranasal sinuses are essentially clear.  Soft tissue within the left external auditory canal, likely impacted cerumen.  Vascular calcifications noted about the skull  base.  Impression: No acute large vascular territory infarct or intracranial hemorrhage identified.  If persistent neurologic deficit, consider MRI brain with diffusion-weighted sequences.    Left thalamic subtle small hypoattenuating focus which could represent artifact versus age-indeterminate lacunar type infarct.  Correlate clinically.    Age-expected cerebral volume loss and chronic microvascular ischemic change.    Electronically signed by resident: Reese Gastelum  Date:    04/10/2022  Time:    17:13    Electronically signed by: Dejuan Cox MD  Date:    04/10/2022  Time:    17:26  X-Ray Chest AP Portable  Narrative: EXAMINATION:  XR CHEST AP PORTABLE    CLINICAL HISTORY:  sob;    TECHNIQUE:  Single frontal view of the chest was performed.    COMPARISON:  04/16/2021    FINDINGS:  Previous ill-defined perihilar opacities less apparent which may represent resolving vascular congestion.  There is no new lung opacity.  No large pleural effusion or pneumothorax.  Continued atherosclerotic aorta.  Rightward deviation of the trachea slightly more apparent likely accentuated by slight right rightward rotation.  Continued degenerative change bilateral shoulder joints.  Prominence of the cardiac silhouette which may be accentuated by technique overall less pronounced from prior..  Clinical correlation and further evaluation as warranted.  Impression: See above    Electronically signed by: Eugenio Batista DO  Date:    04/10/2022  Time:    16:55

## 2022-04-11 NOTE — ASSESSMENT & PLAN NOTE
- suspect  2/2 hypertensive encephalopathy in the setting of likely undignosed dementia, possible hypoxia +/- hypercapnia may have contributed  - mental status back to baseline  - neuro exam non focal  - CTH w/ possible age-indeterminate lacunar type infarct-- suspect artifact vs old infarct as neuro exam not consistent w findings  - BP control & diuresis as above  - back to mental baseline

## 2022-04-11 NOTE — HOSPITAL COURSE
Mrs. Blankenship was admitted to observation for CHF exacerbation and hypertensive urgency. Patient started on CHF pathway and IV lasix 20 mg IV BID with resolution of hypervolemia. Down 1.7L. Home amlodipine increased to 10 mg with good control of blood pressure. TTE obtained and showed EF 50%, grade III diastolic dysfunction, mild AS, mild MR, moderate TR, PASp 74 mmHg. Patient with asymptomatic bradycardia, EKG with sinus bradycardia. Qtc prolonged, potassium replaced. No concerning pauses or blocks on EKG. 1/2 blood cultures positive for strep, started on vancomycin, repeat blood cultures no growth to date. Afebrile without leukocytosis. Suspect contaminate. Discussed with patient and family, they are comfortable returning if blood cultures result positive. PT/OT evaluated, recommending HH. Patient discharged on amlodipine 10 mg and HH. Will follow up in transplant clinic. Patient and daughter verbalized understanding. All questions answered. Good contact number obtained from daughter prior to discharge.

## 2022-04-11 NOTE — ASSESSMENT & PLAN NOTE
- diet controlled  - LDSSI, ACHS accuchecks  - repeat A1c  Lab Results   Component Value Date    HGBA1C 6.0 (H) 07/09/2021

## 2022-04-11 NOTE — PROGRESS NOTES
"[facility]) nurse navigator notified of HFT[facility] candidate in need of education and introduction to 4-6 week program.      PT aao x 3 while lying in bed with daughter at bedside. Introduced self to pt as H[facility] OLAF.     Patient given "Home Care Guide for Heart Failure Patients" , "[facility]" flyer and "Daily weight and symptom tracker".  Encouraged pt and caregiver to review information.      Reviewed the following key points of HFT[facility] program with pt and family:   1.) Take your medications as directed.    2.) Weight yourself daily   3.) Follow low salt and limited fluid diet.    4.) Stop smoking and start exercising   5.) Go to your appointments and call your team.      Pt reminded to follow Symptom tracker and to call at the onset of symptoms according to tracker.     Reviewed plan for follow up once discharged to include phone calls, in person and virtual visits to assist pt optimizing their heart failure medication regimen and encouraging healthy lifestyle modifications.  Reminded pt that program will assist them over the next 4-6 weeks and then patient will be transferred to long term care provider .  Reminded pt how to contact HFT[facility] navigator via phone and or via [employer].     Pt given appointment or instructed appointment will be printed on hospital discharge paperwork.     Pt also reminded RN will call 48-72 hours after discharge to check on them.     PT and family verbalize read back of information given.  Encouraged pt and family to read over information often and contact team with any questions or concerns.       "

## 2022-04-11 NOTE — ASSESSMENT & PLAN NOTE
- Trop 0.128>> 0.093>>0.17, EKG w/ non-specific TWAs  - no reported CP  - suspect type 2 demand ischemia from vol overload and uncontrolled HTN

## 2022-04-11 NOTE — ASSESSMENT & PLAN NOTE
Essential hypertension  - uncontrolled BP in the setting of volume overload  - increase amlodipine from 2.5 to 5mg daily  - PRN hydralazine   - consider adding additional BP agent if no improvement w/ diuresis/ increased amlodipine dose

## 2022-04-11 NOTE — PROGRESS NOTES
"[facility] ([address] Medicine  Progress Note    Patient Name: Inna Blankenship  MRN: [id number]  Patient Class: OP- Observation   Admission Date: 4/10/2022  Length of Stay: 0 days  Attending Physician: Kelle Montalvo MD  Primary Care Provider: Stacy Rodriguez MD        Subjective:     Principal Problem:Acute on chronic diastolic congestive heart failure        HPI:  Inna Blankenship is a 89 y.o. 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 Patient is a poor historian. Daughter at bedside assists with hx. Patient reports being confused as she was unsure where she was upon awakening around 3am this morning. She was noted to be tachypneic at that time and stated "the vein in my neck is bigger."  Daughter also noticed increased abdominal swelling/distension today which usually indicates volume overload. Patient reports mild shortness of breath at the time of my exam which has improved since revieing IV lasix in the ED earlier today. Mental status is now at baseline per daughter at bedside. Denies fever, chills, chest pain, N/V, abdominal pain, dizziness, HA, vision changes, slurred speech, focal weakness, or facial droop. Of note, patient suffered a mechanical fall approximately 3 weeks ago where she fell off of the toilet and landed on her backside. No head trauma or LOC reported.    ED: hypertensive to 197/99 and tachypneic to RR 36, BP and RR improved s/p IV lasix and IV hydralazine. CBC, CMP grossly unremarkable. Trop 0.128, BNP 1085. EKG w/ non-specific T wave abnormalities. UA non infectious. CTH without acute procress. CXR with pulmonary congestion. Given 40mg lasix IVP x1.       Overview/Hospital Course:  Mrs. Blankenship was admitted to observation for CHF exacerbation and hypertensive urgency. Patient started " on IV lasix 20 mg IV BID.       Interval History: Patient seen at bedside with daughter. Denies complaints. Abdominal swelling improving. Continue lasix 20 mg IV BID. Echo showed significant pulmonary hypertension. BP uncontrolled, home amlodopine increased to 10 mg daily, suspect ARB will need to be added to regimen in am if not controlled.     Review of Systems   Constitutional:  Negative for chills and fatigue.   HENT:  Negative for congestion.    Respiratory:  Negative for cough, shortness of breath and wheezing.    Cardiovascular:  Negative for chest pain and leg swelling.   Gastrointestinal:  Negative for abdominal distention, abdominal pain, constipation, diarrhea and nausea.   Genitourinary:  Negative for dysuria, frequency, hematuria and urgency.   Musculoskeletal:  Negative for arthralgias and back pain.   Neurological:  Negative for dizziness, weakness, light-headedness and numbness.   Psychiatric/Behavioral:  Negative for agitation, behavioral problems and confusion.    Objective:     Vital Signs (Most Recent):  Temp: 96.7 °F (35.9 °C) (04/11/22 1554)  Pulse: (!) 56 (04/11/22 1554)  Resp: 18 (04/11/22 1554)  BP: (!) 169/77 (04/11/22 1554)  SpO2: 95 % (04/11/22 1554)   Vital Signs (24h Range):  Temp:  [96.2 °F (35.7 °C)-98 °F (36.7 °C)] 96.7 °F (35.9 °C)  Pulse:  [45-76] 56  Resp:  [16-22] 18  SpO2:  [94 %-98 %] 95 %  BP: (144-196)/(69-99) 169/77     Weight: 59.9 kg (132 lb)  Body mass index is 22.66 kg/m².    Intake/Output Summary (Last 24 hours) at 4/11/2022 1615  Last data filed at 4/11/2022 1400  Gross per 24 hour   Intake 120 ml   Output 1200 ml   Net -1080 ml      Physical Exam  Vitals and nursing note reviewed.   Constitutional:       Appearance: Normal appearance.   HENT:      Head: Normocephalic and atraumatic.      Nose: Nose normal. No congestion.      Mouth/Throat:      Mouth: Mucous membranes are moist.      Pharynx: Oropharynx is clear.   Eyes:      Extraocular Movements: Extraocular  movements intact.      Conjunctiva/sclera: Conjunctivae normal.      Pupils: Pupils are equal, round, and reactive to light.   Cardiovascular:      Rate and Rhythm: Normal rate and regular rhythm.      Pulses: Normal pulses.      Heart sounds: Normal heart sounds.   Pulmonary:      Effort: Pulmonary effort is normal.      Breath sounds: Rales (faint rales) present. No wheezing.   Chest:      Chest wall: No tenderness.   Abdominal:      General: Abdomen is flat. Bowel sounds are normal. There is no distension.      Palpations: Abdomen is soft.      Tenderness: There is no abdominal tenderness. There is no guarding.   Musculoskeletal:         General: No swelling or tenderness. Normal range of motion.      Right lower leg: No edema.      Left lower leg: No edema.   Skin:     General: Skin is warm and dry.   Neurological:      General: No focal deficit present.      Mental Status: She is alert and oriented to person, place, and time.   Psychiatric:         Mood and Affect: Mood normal.         Behavior: Behavior normal.         Thought Content: Thought content normal.         Judgment: Judgment normal.       Significant Labs: All pertinent labs within the past 24 hours have been reviewed.  CBC:   Recent Labs   Lab 04/10/22  1548 04/11/22  0512   WBC 7.81 6.05   HGB 13.0 12.9   HCT 40.5 39.9    162     CMP:   Recent Labs   Lab 04/10/22  1548 04/11/22  0512    140   K 3.5 3.4*    103   CO2 24 27   * 110   BUN 14 13   CREATININE 0.8 0.7   CALCIUM 10.3 10.1   PROT 7.7  --    ALBUMIN 3.7  --    BILITOT 0.9  --    ALKPHOS 86  --    AST 23  --    ALT 20  --    ANIONGAP 13 10   EGFRNONAA >60.0 >60.0     Magnesium:   Recent Labs   Lab 04/10/22  1548 04/11/22  0512   MG 1.8 2.2     POCT Glucose:   Recent Labs   Lab 04/10/22  2146 04/11/22  0811 04/11/22  1149   POCTGLUCOSE 151* 107 116*       Significant Imaging: I have reviewed all pertinent imaging results/findings within the past 24  hours.      Assessment/Plan:      * Acute on chronic diastolic congestive heart failure  Acute hypoxemic respiratory failure  Pulmonary hypertension  - appears volume overload on exam, [id number], CXR w/ pulm edema  - Satting 94-96% RA  - good UOP s/p 40mg lasix IVP in the ED  - start IV lasix 20 mg BID  - repeat echo below, worsening pulmonary hypertension  - optimize GDMT as able/once more euvolemic   - maintain K>4 and Mg>2  - telemetry   - strict I/Os, daily weights, fluid restriction, cardiac diet  - will need follow up with cardiology clinic    Results for orders placed during the hospital encounter of 04/10/22    Echo    Interpretation Summary  · The left ventricle is normal in size with eccentric hypertrophy and low normal systolic function. The estimated ejection fraction is 50%.  · Moderate right ventricular enlargement with mildly to moderately reduced right ventricular systolic function.  · Grade III left ventricular diastolic dysfunction.  · Severe biatrial enlargement.  · There is mild aortic valve stenosis. Aortic valve area is 1.7 cm2; peak velocity is 1.4 m/s; mean gradient is 4 mmHg.  · Mild mitral regurgitation.  · Moderate tricuspid regurgitation.  · The estimated PA systolic pressure is 74 mmHg.  · Intermediate central venous pressure (8 mmHg).    Elevated troponin  - Trop 0.128>> 0.093>>0.17, EKG w/ non-specific TWAs  - no reported CP  - suspect type 2 demand ischemia from vol overload and uncontrolled HTN    Acute encephalopathy  - suspect  2/2 hypertensive encephalopathy in the setting of likely undignosed dementia, possible hypoxia +/- hypercapnia may have contributed  - mental status back to baseline  - neuro exam non focal  - CTH w/ possible age-indeterminate lacunar type infarct-- suspect artifact vs old infarct as neuro exam not consistent w findings  - BP control & diuresis as above  - back to mental baseline    Hypertensive urgency  Essential hypertension  - uncontrolled BP in the setting  of volume overload  - increase amlodipine from 2.5 to 5mg daily--> 10 mg daily  - will likely need ARB in am if continues to be above goal  - PRN hydralazine   - consider adding additional BP agent if no improvement w/ diuresis/ increased amlodipine dose    Stage 3a chronic kidney disease  - stable at baseline  - monitor renal fn closely w/ diuresis     Persistent atrial fibrillation  - rate controlled without BB  - continue eliquis 2.5mg BID    Controlled type 2 diabetes mellitus with stage 3 chronic kidney disease, without long-term current use of insulin  - diet controlled  - LDSSI, ACHS accuchecks  - repeat A1c  Lab Results   Component Value Date    HGBA1C 6.0 (H) 07/09/2021         History of DVT (deep vein thrombosis)  - continue eliquis     Other hyperlipidemia  - continue statin       VTE Risk Mitigation (From admission, onward)         Ordered     apixaban tablet 2.5 mg  2 times daily         04/10/22 1928     IP VTE HIGH RISK PATIENT  Once         04/10/22 1848     Place sequential compression device  Until discontinued         04/10/22 1848                Discharge Planning   BEN: 4/12/2022     Code Status: Full Code   Is the patient medically ready for discharge?: No    Reason for patient still in hospital (select all that apply): Patient trending condition, Laboratory test and Treatment  Discharge Plan A: Home Health                  Jossy Cody PA-C  Department of Hospital Medicine   [facility] ([place]

## 2022-04-11 NOTE — ASSESSMENT & PLAN NOTE
Essential hypertension  - uncontrolled BP in the setting of volume overload  - increase amlodipine from 2.5 to 5mg daily--> 10 mg daily  - will likely need ARB in am if continues to be above goal  - PRN hydralazine   - consider adding additional BP agent if no improvement w/ diuresis/ increased amlodipine dose

## 2022-04-12 VITALS
SYSTOLIC BLOOD PRESSURE: 110 MMHG | BODY MASS INDEX: 22.32 KG/M2 | OXYGEN SATURATION: 95 % | HEART RATE: 48 BPM | DIASTOLIC BLOOD PRESSURE: 56 MMHG | HEIGHT: 64 IN | TEMPERATURE: 98 F | WEIGHT: 130.75 LBS | RESPIRATION RATE: 16 BRPM

## 2022-04-12 PROBLEM — R78.81 POSITIVE BLOOD CULTURE: Status: ACTIVE | Noted: 2022-04-12

## 2022-04-12 LAB
ANION GAP SERPL CALC-SCNC: 9 MMOL/L (ref 8–16)
BASOPHILS # BLD AUTO: 0.06 K/UL (ref 0–0.2)
BASOPHILS NFR BLD: 0.8 % (ref 0–1.9)
BUN SERPL-MCNC: 18 MG/DL (ref 10–30)
CALCIUM SERPL-MCNC: 9.7 MG/DL (ref 8.7–10.5)
CHLORIDE SERPL-SCNC: 101 MMOL/L (ref 95–110)
CO2 SERPL-SCNC: 29 MMOL/L (ref 23–29)
CREAT SERPL-MCNC: 1.1 MG/DL (ref 0.5–1.4)
DIFFERENTIAL METHOD: ABNORMAL
EOSINOPHIL # BLD AUTO: 0.2 K/UL (ref 0–0.5)
EOSINOPHIL NFR BLD: 2.4 % (ref 0–8)
ERYTHROCYTE [DISTWIDTH] IN BLOOD BY AUTOMATED COUNT: 14.7 % (ref 11.5–14.5)
EST. GFR  (AFRICAN AMERICAN): 49 ML/MIN/1.73 M^2
EST. GFR  (NON AFRICAN AMERICAN): 42.5 ML/MIN/1.73 M^2
GLUCOSE SERPL-MCNC: 80 MG/DL (ref 70–110)
HCT VFR BLD AUTO: 38.3 % (ref 37–48.5)
HGB BLD-MCNC: 12.2 G/DL (ref 12–16)
IMM GRANULOCYTES # BLD AUTO: 0.01 K/UL (ref 0–0.04)
IMM GRANULOCYTES NFR BLD AUTO: 0.1 % (ref 0–0.5)
LYMPHOCYTES # BLD AUTO: 1.6 K/UL (ref 1–4.8)
LYMPHOCYTES NFR BLD: 21.2 % (ref 18–48)
MAGNESIUM SERPL-MCNC: 2 MG/DL (ref 1.6–2.6)
MCH RBC QN AUTO: 30.7 PG (ref 27–31)
MCHC RBC AUTO-ENTMCNC: 31.9 G/DL (ref 32–36)
MCV RBC AUTO: 96 FL (ref 82–98)
MONOCYTES # BLD AUTO: 0.8 K/UL (ref 0.3–1)
MONOCYTES NFR BLD: 10.6 % (ref 4–15)
NEUTROPHILS # BLD AUTO: 4.8 K/UL (ref 1.8–7.7)
NEUTROPHILS NFR BLD: 64.9 % (ref 38–73)
NRBC BLD-RTO: 0 /100 WBC
PLATELET # BLD AUTO: 179 K/UL (ref 150–450)
PMV BLD AUTO: 12.3 FL (ref 9.2–12.9)
POCT GLUCOSE: 100 MG/DL (ref 70–110)
POCT GLUCOSE: 127 MG/DL (ref 70–110)
POCT GLUCOSE: 96 MG/DL (ref 70–110)
POTASSIUM SERPL-SCNC: 3.5 MMOL/L (ref 3.5–5.1)
RBC # BLD AUTO: 3.98 M/UL (ref 4–5.4)
SODIUM SERPL-SCNC: 139 MMOL/L (ref 136–145)
WBC # BLD AUTO: 7.39 K/UL (ref 3.9–12.7)

## 2022-04-12 PROCEDURE — 83735 ASSAY OF MAGNESIUM: CPT | Performed by: PHYSICIAN ASSISTANT

## 2022-04-12 PROCEDURE — 25000003 PHARM REV CODE 250: Performed by: PHYSICIAN ASSISTANT

## 2022-04-12 PROCEDURE — 93005 ELECTROCARDIOGRAM TRACING: CPT

## 2022-04-12 PROCEDURE — 93010 EKG 12-LEAD: ICD-10-PCS | Mod: ,,, | Performed by: INTERNAL MEDICINE

## 2022-04-12 PROCEDURE — 96376 TX/PRO/DX INJ SAME DRUG ADON: CPT | Performed by: EMERGENCY MEDICINE

## 2022-04-12 PROCEDURE — 97116 GAIT TRAINING THERAPY: CPT

## 2022-04-12 PROCEDURE — 97161 PT EVAL LOW COMPLEX 20 MIN: CPT

## 2022-04-12 PROCEDURE — 36415 COLL VENOUS BLD VENIPUNCTURE: CPT | Performed by: PHYSICIAN ASSISTANT

## 2022-04-12 PROCEDURE — 97530 THERAPEUTIC ACTIVITIES: CPT

## 2022-04-12 PROCEDURE — 99217 PR OBSERVATION CARE DISCHARGE: CPT | Mod: ,,, | Performed by: PHYSICIAN ASSISTANT

## 2022-04-12 PROCEDURE — 97165 OT EVAL LOW COMPLEX 30 MIN: CPT

## 2022-04-12 PROCEDURE — 85025 COMPLETE CBC W/AUTO DIFF WBC: CPT | Performed by: PHYSICIAN ASSISTANT

## 2022-04-12 PROCEDURE — 63600175 PHARM REV CODE 636 W HCPCS: Performed by: INTERNAL MEDICINE

## 2022-04-12 PROCEDURE — 97535 SELF CARE MNGMENT TRAINING: CPT

## 2022-04-12 PROCEDURE — 27000221 HC OXYGEN, UP TO 24 HOURS

## 2022-04-12 PROCEDURE — 93010 ELECTROCARDIOGRAM REPORT: CPT | Mod: ,,, | Performed by: INTERNAL MEDICINE

## 2022-04-12 PROCEDURE — 99217 PR OBSERVATION CARE DISCHARGE: ICD-10-PCS | Mod: ,,, | Performed by: PHYSICIAN ASSISTANT

## 2022-04-12 PROCEDURE — G0378 HOSPITAL OBSERVATION PER HR: HCPCS

## 2022-04-12 PROCEDURE — 80048 BASIC METABOLIC PNL TOTAL CA: CPT | Performed by: PHYSICIAN ASSISTANT

## 2022-04-12 RX ORDER — ATORVASTATIN CALCIUM 20 MG/1
20 TABLET, FILM COATED ORAL DAILY
Qty: 90 TABLET | Refills: 0 | Status: SHIPPED | OUTPATIENT
Start: 2022-04-12 | End: 2022-09-07 | Stop reason: SDUPTHER

## 2022-04-12 RX ORDER — AMLODIPINE BESYLATE 10 MG/1
10 TABLET ORAL DAILY
Qty: 30 TABLET | Refills: 3 | Status: SHIPPED | OUTPATIENT
Start: 2022-04-13 | End: 2022-07-22 | Stop reason: SDUPTHER

## 2022-04-12 RX ORDER — PROCHLORPERAZINE MALEATE 5 MG
5 TABLET ORAL 3 TIMES DAILY PRN
Status: DISCONTINUED | OUTPATIENT
Start: 2022-04-12 | End: 2022-04-12 | Stop reason: HOSPADM

## 2022-04-12 RX ORDER — POTASSIUM CHLORIDE 20 MEQ/1
40 TABLET, EXTENDED RELEASE ORAL ONCE
Status: DISCONTINUED | OUTPATIENT
Start: 2022-04-12 | End: 2022-04-12 | Stop reason: HOSPADM

## 2022-04-12 RX ADMIN — APIXABAN 2.5 MG: 2.5 TABLET, FILM COATED ORAL at 08:04

## 2022-04-12 RX ADMIN — AMLODIPINE BESYLATE 10 MG: 10 TABLET ORAL at 08:04

## 2022-04-12 RX ADMIN — FUROSEMIDE 20 MG: 10 INJECTION, SOLUTION INTRAMUSCULAR; INTRAVENOUS at 08:04

## 2022-04-12 RX ADMIN — ATORVASTATIN CALCIUM 20 MG: 20 TABLET, FILM COATED ORAL at 08:04

## 2022-04-12 NOTE — NURSING
Second night as patient's RN.   HR dropped tonight to as low as 40.  O2 sat at 90% on room air.   Pt placed on 2L sat 94%.  Leonarda notified and charge RN Lean as well.   No new orders received apart from continue monitoring and let Leonarda know if it drops low than this.   Plan of care continues.

## 2022-04-12 NOTE — PROGRESS NOTES
Pharmacokinetic Assessment Follow Up: IV Vancomycin    · Initiate intravenous Vancomycin  with loading dose of 1250 mg once (administered)   · LICO on CKD  (SrCr 0.7 ->1.1)   · Check Vancomycin level 23 hrs after initial dose to determine if scheduling subsequent doses appropriate or if intermittent dosing indicated.   · Draw Vancomycin random level on 04/12/22 @ 18:00  · Desired empiric serum trough concentration 15 - 20 mcg/mL    Pharmacy will continue to follow and monitor vancomycin.    Please contact pharmacy at extension [phone] for questions regarding this assessment.    Thank you for the consult,   Jessica Childs       Patient brief summary:  Inna Blankenship is a 89 y.o. female initiated on antimicrobial therapy with IV Vancomycin for treatment of bacteremia    The patient's current regimen is Vancomycin 750 mg every 24 hrs.     Drug Allergies:   Review of patient's allergies indicates:  No Known Allergies    Actual Body Weight:   59.3 Kg.     Renal Function:   Estimated Creatinine Clearance: 25.8 mL/min (based on SCr of 1.1 mg/dL).,     Dialysis Method (if applicable):  N/A    CBC (last 72 hours):  Recent Labs   Lab Result Units 04/10/22  1548 04/11/22  0512 04/12/22  0300   WBC K/uL 7.81 6.05 7.39   Hemoglobin g/dL 13.0 12.9 12.2   Hemoglobin A1C %  --  5.7*  --    Hematocrit % 40.5 39.9 38.3   Platelets K/uL 204 162 179   Gran % % 69.4 69.4 64.9   Lymph % % 21.6 20.5 21.2   Mono % % 6.9 8.1 10.6   Eosinophil % % 0.9 1.0 2.4   Basophil % % 0.9 0.8 0.8   Differential Method  Automated Automated Automated       Metabolic Panel (last 72 hours):  Recent Labs   Lab Result Units 04/10/22  1548 04/10/22  1612 04/11/22  0512 04/12/22  0300   Sodium mmol/L 141  --  140 139   Potassium mmol/L 3.5  --  3.4* 3.5   Chloride mmol/L 104  --  103 101   CO2 mmol/L 24  --  27 29   Glucose mg/dL 135*  --  110 80   Glucose, UA   --  Negative  --   --    BUN mg/dL 14  --  13 18   Creatinine mg/dL 0.8  --  0.7 1.1   Albumin g/dL 3.7   --   --   --    Total Bilirubin mg/dL 0.9  --   --   --    Alkaline Phosphatase U/L 86  --   --   --    AST U/L 23  --   --   --    ALT U/L 20  --   --   --    Magnesium mg/dL 1.8  --  2.2 2.0       Vancomycin Administrations:  vancomycin given in the last 96 hours                   vancomycin 1.25 g in dextrose 5% 250 mL IVPB (ready to mix) (mg) 1,250 mg New Bag 04/11/22 1938                Microbiologic Results:  Microbiology Results (last 7 days)     Procedure Component Value Units Date/Time    Blood culture #1 **CANNOT BE ORDERED STAT** [[id number]] Collected: 04/10/22 1559    Order Status: Completed Specimen: Blood from Peripheral, Antecubital, Right Updated: 04/12/22 0826     Blood Culture, Routine No growth to date    Blood culture #2 **CANNOT BE ORDERED STAT** [[id number]] Collected: 04/10/22 1549    Order Status: Completed Specimen: Blood from Peripheral, Antecubital, Left Updated: 04/12/22 0742     Blood Culture, Routine Gram stain aer bottle: Gram positive cocci in chains resembling Strep      Results called to and read back by:Yasmin Pineda RN 04/11/2022  18:12    Blood culture [[id number]] Collected: 04/11/22 1911    Order Status: Completed Specimen: Blood from Peripheral, Left Arm Updated: 04/12/22 0145     Blood Culture, Routine No Growth to date    Blood culture [[id number]] Collected: 04/11/22 1911    Order Status: Completed Specimen: Blood from Peripheral, Right Arm Updated: 04/12/22 0145     Blood Culture, Routine No Growth to date

## 2022-04-12 NOTE — NURSING
Patient Bradycardiac this night while patient is in deep sleep.   HR as low as 41.   Yulia notified   - who ordered an EKG and a CPAP.   CPAP improved patient's HR to high 40's and low 50's.   Plan of care continues.

## 2022-04-12 NOTE — PLAN OF CARE
Problem: Physical Therapy  Goal: Physical Therapy Goal  Description: Goals to be met by:     Patient will increase functional independence with mobility by performin. Supine to sit with Stand-by Assistance  2. Sit to stand transfer with Stand-by Assistance using RW  3. Gait  x 25 feet with Stand-by Assistance using Rolling Walker.   4. Lower extremity exercise program x10 reps per handout, with independence    Outcome: Ongoing, Progressing     Eval completed. Goals appropriate.

## 2022-04-12 NOTE — PLAN OF CARE
Problem: Occupational Therapy  Goal: Occupational Therapy Goal  Description: Goals to be met by: 5/3/2022     Patient will increase functional independence with ADLs by performing:    UE Dressing with Set-up Assistance.  LE Dressing with Supervision.  Grooming while standing at sink with Modified [place].  Toileting from toilet with Modified [place] for hygiene and clothing management.   Supine to sit with Modified [place].  Stand pivot transfers with Supervision.  Toilet transfer to toilet with Supervision.    Outcome: Ongoing, Progressing   Patient's goals are set.

## 2022-04-12 NOTE — ASSESSMENT & PLAN NOTE
Acute hypoxemic respiratory failure  Pulmonary hypertension  Right heart failure  - appears volume overload on exam, [id number], CXR w/ pulm edema  - Satting 94-96% RA  - good UOP s/p 40mg lasix IVP in the ED  - start IV lasix 20 mg BID  - down 1.7L with resolution  - repeat echo below, worsening pulmonary hypertension  - optimize GDMT as able/once more euvolemic --> blood pressure controlled with increase in amlodipine  - maintain K>4 and Mg>2  - telemetry   - strict I/Os, daily weights, fluid restriction, cardiac diet  - will need follow up with cardiology clinic    Results for orders placed during the hospital encounter of 04/10/22    Echo    Interpretation Summary  · The left ventricle is normal in size with eccentric hypertrophy and low normal systolic function. The estimated ejection fraction is 50%.  · Moderate right ventricular enlargement with mildly to moderately reduced right ventricular systolic function.  · Grade III left ventricular diastolic dysfunction.  · Severe biatrial enlargement.  · There is mild aortic valve stenosis. Aortic valve area is 1.7 cm2; peak velocity is 1.4 m/s; mean gradient is 4 mmHg.  · Mild mitral regurgitation.  · Moderate tricuspid regurgitation.  · The estimated PA systolic pressure is 74 mmHg.  · Intermediate central venous pressure (8 mmHg).

## 2022-04-12 NOTE — PLAN OF CARE
[facility] - Tele[facility]down ([facility]-7)  Discharge Final Note    Primary Care Provider: Stacy Rodriguez MD   Future Appointments   Date Time Provider Department Center   4/18/2022 10:00 AM LAB, APPOINTMENT [facility] LAB [facility]   4/18/2022 10:30 AM Tabatha Shannon PA-C [facility]   4/18/2022 10:30 AM [facility] HEART FAILURE NURSE [facility] [address]   4/19/2022  1:30 PM Shira Webb PA-C [facility]   6/3/2022  8:30 AM Lupis Lundberg MD [facility] OPHTHAL [facility]   6/8/2022 10:30 AM Gucci Shin MD [facility] CARDIO [facility]       Pt discharged with Home Health. [facility] accepted pt's referral and will see pt on 4/13/22.   Young Leyva, RN,BSN        Expected Discharge Date: 4/12/2022    Final Discharge Note (most recent)     Final Note - 04/12/22 1626        Final Note    Assessment Type Final Discharge Note     Anticipated Discharge Disposition Home-Health Care [facility]     Hospital Resources/Appts/Education Provided Provided patient/caregiver with written discharge plan information;Appointments scheduled and added to AVS        Post-Acute Status    Post-Acute Authorization Home Health     Home Health Status Set-up Complete/Auth obtained                 Important Message from Medicare

## 2022-04-12 NOTE — CLINICAL REVIEW
RAPID RESPONSE NURSE ROUND       Rounding completed with charge RNChristine.  Concerns verbalized at this time for bradycardia. Pt has hystoric EKG's hr 40's, and prior nightshift, pt known to be bradycardic intermittently 40's as well. Primary team aware. Remaining VS WNL. Instructed to call [phone] for further concerns or assistance.

## 2022-04-12 NOTE — PT/OT/SLP EVAL
Physical Therapy Evaluation    Patient Name:  Inna Blankenship   MRN:  [id number]  Admit Date: 4/10/2022  Admitting Diagnosis:  Acute on chronic diastolic congestive heart failure  Length of Stay: 0 days  Recent Surgery: * No surgery found *      Recommendations:     Discharge Recommendations:  home health PT   Discharge Equipment Recommendations: none   Barriers to discharge: None    Assessment:     Inna Blankenship is a 89 y.o. female admitted with a medical diagnosis of Acute on chronic diastolic congestive heart failure.      Problem List: weakness, impaired endurance, gait instability, impaired functional mobilty, impaired cardiopulmonary response to activity  Rehab Prognosis: Good; patient would benefit from acute skilled PT services to address these deficits and reach maximum level of function.      Plan:     During this hospitalization, patient to be seen 3 x/week to address the identified rehab impairments via gait training, therapeutic activities, therapeutic exercises, neuromuscular re-education and progress towards the established goals.    · Plan of Care Expires:  05/11/22    Subjective   Communicated with RN prior to session.  Patient found HOB elevated upon PT entry to room, agreeable to evaluation. Inna Blankenship's daughter present during session.    Chief Complaint: Shortness of Breath (Can see heart beat in neck, more confused, sob)    Patient/Family Comments/goals: to get better and return home   Pain/Comfort:  · Pain Rating 1: 0/10  · Pain Rating Post-Intervention 1: 0/10    Living Environment:  Living Environment: Patient lives alone, but daughter lives 2 houses from her and is always there. She lives in a [facility] with 1 CHEVY to enter. Patient has walk in shower with a shower chair and a grab bar. Patient has a raised toilet with grab bars on toilet. Patient needs assistance for ADLs (especially LE and bathing) from daughter and is modified independent using a RW for functional mobility PTA. Patient has rollator, but  "does not use it.     Objective:   Patient found with: telemetry, peripheral IV, PureWick     General Precautions: Standard, Cardiac fall   Orthopedic Precautions:N/A   Braces: N/A   Oxygen Device: Room Air  Vitals: /65 (Patient Position: Sitting)   Pulse (!) 54   Temp 97.7 °F (36.5 °C) (Oral)   Resp 16   Ht 5' 4" (1.626 m)   Wt 59.3 kg (130 lb 11.7 oz)   SpO2 95%   Breastfeeding No   BMI 22.44 kg/m²     Exams:  · Cognition:   · Alert and Cooperative  · Oriented to person and place  · Command following: Follows two-step commands  · Fluency: clear/fluent  · Hearing: [place]      · RLE ROM: WFL  · RLE Strength: grossly 4/5  · LLE ROM: WFL  · LLE Strength: grossly 4/5    Outcome Measures:  AM-PAC 6 CLICK MOBILITY  Turning over in bed (including adjusting bedclothes, sheets and blankets)?: 3  Sitting down on and standing up from a chair with arms (e.g., wheelchair, bedside commode, etc.): 3  Moving from lying on back to sitting on the side of the bed?: 3  Moving to and from a bed to a chair (including a wheelchair)?: 3  Need to walk in hospital room?: 3  Climbing 3-5 steps with a railing?: 3  Basic Mobility Total Score: 18     Functional Mobility:  Additional staff present: OT     **increased time to perform mobility     Bed Mobility:  · Supine to Sit: minimum assistance; HOB elevated  · Scooting anteriorly to EOB to have both feet planted on floor: contact guard assistance    Sitting Balance at Edge of Bed:   Assistance Level Required: Stand-by Assistance   Time: 10 minutes   Comments:   o Worked on activity tolerance sitting EOB and worked on tolerance to positional change      Transfers:   · Sit <> Stand Transfer: minimum assistance with rolling walker from EOB and toilet  · Verbal cuing for hand placement    · Verbal cuing for upright posture and forward gaze      Gait:   · Patient ambulated: 10ft + 10ft (seated rest break between trials)   · Patient required: contact guard  · Patient used: rolling " walker  · Gait Pattern observed: reciprocal gait  · Gait Deviation(s): decreased step length and decreased juanita  · Impairments due to: impaired balance, decreased strength and baseline gait  · Comments:   · Verbal cuing to keep walker close to body   · Verbal cuing for pacing     Therapeutic Activities, Exercises, & Education:   Educated pt on PT role/POC  Educated pt on importance of OOB activity and daily ambulation   Pt verbalized understanding    C&D RW and BSC left in room to assist staff and pt with mobility and to prevent hospital acquired deconditioning    T/f to chair to increase tolerance to OOB activity and to create optimal positioning for lung expansion       Patient left up in chair with all lines intact, call button in reach, RN notified and daugther present.    GOALS:   Multidisciplinary Problems     Physical Therapy Goals        Problem: Physical Therapy    Goal Priority Disciplines Outcome Goal Variances Interventions   Physical Therapy Goal     PT, PT/OT Ongoing, Progressing     Description: Goals to be met by:     Patient will increase functional independence with mobility by performin. Supine to sit with Stand-by Assistance  2. Sit to stand transfer with Stand-by Assistance using RW  3. Gait  x 25 feet with Stand-by Assistance using Rolling Walker.   4. Lower extremity exercise program x10 reps per handout, with independence                     History:     Past Medical History:   Diagnosis Date    Arthritis     CHF (congestive heart failure) 2018    Chronic atrial fibrillation 2020    Chronic kidney disease, stage III (moderate) 2015    Colon adenoma     Diabetes mellitus, type II     Diet controlled    Glaucoma     Hyperlipemia     Hypertension     Osteopenia        Past Surgical History:   Procedure Laterality Date    APPENDECTOMY      CATARACT EXTRACTION W/  INTRAOCULAR LENS IMPLANT Right 03/15/2006        CATARACT EXTRACTION W/   INTRAOCULAR LENS IMPLANT Left 09/27/2006        COLONOSCOPY W/ POLYPECTOMY      EYE SURGERY      HYSTERECTOMY      TRANSESOPHAGEAL ECHOCARDIOGRAPHY N/A 11/9/2020    Procedure: ECHOCARDIOGRAM, TRANSESOPHAGEAL;  Surgeon: Marcelina Diagnostic Provider;  Location: [facility] EP LAB;  Service: Cardiology;  Laterality: N/A;    TREATMENT OF CARDIAC ARRHYTHMIA N/A 11/9/2020    Procedure: CARDIOVERSION;  Surgeon: Marvin Elmore MD;  Location: [facility] EP LAB;  Service: Cardiology;  Laterality: N/A;  AF, RAFFAELE, DCCV, MAC, GP, 3 PREP       Time Tracking:     PT Received On: 04/12/22  PT Start Time: 0947     PT Stop Time: 1015  PT Total Time (min): 28 min     Billable Minutes: Evaluation 5, Gait Training 15 and Therapeutic Activity 8

## 2022-04-12 NOTE — PROGRESS NOTES
Pharmacokinetic Initial Assessment: IV Vancomycin    Assessment/Plan:    Initiate intravenous vancomycin with loading dose of 1250 mg once (administered) followed by a maintenance dose of vancomycin 750mg IV every 24 hours  Desired empiric serum trough concentration is 15 to 20 mcg/mL  Draw vancomycin trough level 60 min prior to third dose on 4/13/22 at approximately 1900.  Pharmacy will continue to follow and monitor vancomycin.      Please contact pharmacy at extension [phone] with any questions regarding this assessment.     Thank you for the consult,   Mik Greene       Patient brief summary:  Inna Blankenship is a 89 y.o. female initiated on antimicrobial therapy with IV Vancomycin for treatment of suspected bacteremia    Drug Allergies:   Review of patient's allergies indicates:  No Known Allergies    Actual Body Weight:   59.9 kg    Renal Function:   Estimated Creatinine Clearance: 40.6 mL/min (based on SCr of 0.7 mg/dL).,     Dialysis Method (if applicable):  N/A    CBC (last 72 hours):  Recent Labs   Lab Result Units 04/10/22  1548 04/11/22  0512   WBC K/uL 7.81 6.05   Hemoglobin g/dL 13.0 12.9   Hemoglobin A1C %  --  5.7*   Hematocrit % 40.5 39.9   Platelets K/uL 204 162   Gran % % 69.4 69.4   Lymph % % 21.6 20.5   Mono % % 6.9 8.1   Eosinophil % % 0.9 1.0   Basophil % % 0.9 0.8   Differential Method  Automated Automated       Metabolic Panel (last 72 hours):  Recent Labs   Lab Result Units 04/10/22  1548 04/10/22  1612 04/11/22  0512   Sodium mmol/L 141  --  140   Potassium mmol/L 3.5  --  3.4*   Chloride mmol/L 104  --  103   CO2 mmol/L 24  --  27   Glucose mg/dL 135*  --  110   Glucose, UA   --  Negative  --    BUN mg/dL 14  --  13   Creatinine mg/dL 0.8  --  0.7   Albumin g/dL 3.7  --   --    Total Bilirubin mg/dL 0.9  --   --    Alkaline Phosphatase U/L 86  --   --    AST U/L 23  --   --    ALT U/L 20  --   --    Magnesium mg/dL 1.8  --  2.2       Drug levels (last 3 results):  No results for input(s):  VANCOMYCINRA, VANCOMYCINPE, VANCOMYCINTR in the last 72 hours.    Microbiologic Results:  Microbiology Results (last 7 days)     Procedure Component Value Units Date/Time    Blood culture [[id number]] Collected: 04/11/22 1911    Order Status: Completed Specimen: Blood from Peripheral, Left Arm Updated: 04/12/22 0145     Blood Culture, Routine No Growth to date    Blood culture [[id number]] Collected: 04/11/22 1911    Order Status: Completed Specimen: Blood from Peripheral, Right Arm Updated: 04/12/22 0145     Blood Culture, Routine No Growth to date    Blood culture #2 **CANNOT BE ORDERED STAT** [[id number]] Collected: 04/10/22 1549    Order Status: Completed Specimen: Blood from Peripheral, Antecubital, Left Updated: 04/11/22 1813     Blood Culture, Routine Gram stain aer bottle: Gram positive cocci in chains resembling Strep      Results called to and read back by:Yasmin Pineda RN 04/11/2022  18:12    Blood culture #1 **CANNOT BE ORDERED STAT** [[id number]] Collected: 04/10/22 1559    Order Status: Sent Specimen: Blood from Peripheral, Antecubital, Right Updated: 04/10/22 1601

## 2022-04-12 NOTE — NURSING
Pt dc per MD orders. Dc and prescription instructions given.pt verbalized understanding. PIV removed catheter tip intact. Vss. Pt transported by [facility].

## 2022-04-12 NOTE — ASSESSMENT & PLAN NOTE
- 1/2 blood cultures positive for strep, identification pending  - afebrile without leukocytosis  - started on vancomycin  - repeat blood cultures NGTD X 1  - suspect contaminate given lack of systemic infectious signs  - discussed with daughter and patient, will follow up blood cultures and if not contaminate they are comfortable returning for IV antibiotics  - good call back number obtained prior to discharge

## 2022-04-12 NOTE — PLAN OF CARE
[facility] - Tele[facility] Stepdown ([place]-7)  Discharge Assessment    Primary Care Provider: Stacy Rodriguez MD     Future Appointments   Date Time Provider Department [place]   4/18/2022 10:00 AM LAB, APPOINTMENT [facility] LAB [facility]NP [facility]   4/18/2022 10:30 AM Tabatha Shannon PA-C [facility] [address]   4/18/2022 10:30 AM [facility] HEART FAILURE NURSE [facility] [address]   4/19/2022  1:30 PM Shira Webb PA-C [facility]   6/3/2022  8:30 AM Lupis Lundberg MD [facility] OPHTHAL [facility]   6/8/2022 10:30 AM Gucci Shin MD [facility] CARDIO [facility]       Pt discharged home with Home Health.    Young Leyva RN,BSN        Discharge Assessment (most recent)     BRIEF DISCHARGE ASSESSMENT - 04/11/22 1511        Discharge Planning    Assessment Type Discharge Planning Brief Assessment     Resource/Environmental Concerns none     Support Systems Children     Equipment Currently Used at Home walker, rolling;bath bench;grab bar;wheelchair     Current Living Arrangements home/apartment/condo     Patient/Family Anticipates Transition to home     Patient/Family Anticipated Services at Transition none     DME Needed Upon Discharge  none     Discharge Plan A Home Health     Discharge Plan B Home

## 2022-04-12 NOTE — PT/OT/SLP EVAL
Occupational Therapy   Co-Evaluation/Treatment    Name: Inna Blankenship  MRN: [id number]  Admitting Diagnosis:  Acute on chronic diastolic congestive heart failure  Recent Surgery: * No surgery found *      Recommendations:     Discharge Recommendations: home health OT  Discharge Equipment Recommendations:  none  Barriers to discharge:  None    Assessment:     Inna Blankenship is a 89 y.o. female with a medical diagnosis of Acute on chronic diastolic congestive heart failure. Performance deficits affecting function: weakness, impaired self care skills, impaired functional mobilty, gait instability, impaired balance, impaired endurance. Patient participated well in OT evaluation session on this date. Patient needed min A for sit<>stands and CGA for functional ambulation once upright using RW. Patient still needs assistance with ADLs as noted below, but does have assistance at home from daughter at baseline. Patient would benefit from continued skilled acute OT 3x/wk to improve functional mobility, increase independence with ADLs, and address established goals. Recommending HHOT once medically appropriate for discharge to increase maximal independence, reduce burden of care, and ensure safety.     Rehab Prognosis: Good; patient would benefit from acute skilled OT services to address these deficits and reach maximum level of function.       Plan:     Patient to be seen 3 x/week to address the above listed problems via self-care/home management, therapeutic activities, therapeutic exercises  · Plan of Care Expires: 05/12/22  · Plan of Care Reviewed with: patient, daughter    Subjective     Chief Complaint: none noted  Patient/Family Comments/goals: Patient agreed to therapy    Occupational Profile  Living Environment: Patient lives alone, but daughter lives 2 houses from her and is always there. She lives in a [facility] with 1 CHEVY to enter. Patient has walk in shower with a shower chair and a grab bar. Patient has a raised toilet with grab  bars on toilet. Patient needs assistance for ADLs (especially LE and bathing) from daughter and is modified independent using a RW for functional mobility PTA. Patient has rollator, but does not use it.     Pain/Comfort:  · Pain Rating 1: 0/10  · Pain Rating Post-Intervention 1: 0/10    Patients cultural, spiritual, Islam conflicts given the current situation:  no    Objective:     Communicated with: NSG prior to session.  Patient found HOB elevated with PureWick (all lines intact) upon OT entry to room.    General Precautions: Standard, fall   Orthopedic Precautions:N/A   Braces: N/A  Respiratory Status: Room air    Occupational Performance:    Bed Mobility:    · Patient completed Scooting/Bridging with minimum assistance anteriorly to EOB sitting EOB  · Patient completed Supine to Sit with minimum assistance    Functional Mobility/Transfers:  · Patient completed Sit <> Stand Transfer with minimum assistance  with  rolling walker and grab bar by toilet  · Patient completed Toilet Transfer bed>toilet; toilet>bedside chair with functional ambulation technique with minimum assistance with  rolling walker (due to min A needed for all sit<>stands and CGA needed for gait once upright)    Activities of Daily Living:  · Grooming: minimum assistance combing hair and washing face seated in chair  · Lower Body Dressing: total assistance for donning socks   · Total assistance due to PureWik    Cognitive/Visual Perceptual:  Cognitive/Psychosocial Skills:     -       Oriented to: Person, Place, Time and Situation   -       Follows Commands/attention:Follows multistep  commands  -       Communication: clear/fluent  -       Memory: No Deficits noted  -       Safety awareness/insight to disability: intact   -       Mood/Affect/Coping skills/emotional control: Appropriate to situation  Visual/Perceptual:      -Intact      Physical Exam:  Upper Extremity Range of Motion:     -       Right Upper Extremity: WFL  -       Left Upper  Extremity: WFL  Upper Extremity Strength: -       Right Upper Extremity: 4/5  -       Left Upper Extremity: 4/5   Strength:    -       Right Upper Extremity: WFL  -       Left Upper Extremity: WFL  Fine Motor Coordination:    -       Intact  Gross motor coordination:   WFL    AMPAC 6 Click ADL:  AMPAC Total Score: 16    Treatment & Education:  Role of OT and POC  ADL retraining  Functional mobility training  Safety  Discharge planning    Co-treatment performed due to patient's multiple deficits requiring two skilled therapists to appropriately and safely assess patient's strength and endurance while facilitating functional tasks in addition to accommodating for patient's activity tolerance.     Education:  Patient left up in chair with all lines intact, call button in reach and all needs met.     GOALS:   Multidisciplinary Problems     Occupational Therapy Goals        Problem: Occupational Therapy    Goal Priority Disciplines Outcome Interventions   Occupational Therapy Goal     OT, PT/OT Ongoing, Progressing    Description: Goals to be met by: 5/3/2022     Patient will increase functional independence with ADLs by performing:    UE Dressing with Set-up Assistance.  LE Dressing with Supervision.  Grooming while standing at sink with Modified [place].  Toileting from toilet with Modified [place] for hygiene and clothing management.   Supine to sit with Modified [place].  Stand pivot transfers with Supervision.  Toilet transfer to toilet with Supervision.                     History:     Past Medical History:   Diagnosis Date    Arthritis     CHF (congestive heart failure) 12/26/2018    Chronic atrial fibrillation 8/29/2020    Chronic kidney disease, stage III (moderate) 9/11/2015    Colon adenoma     Diabetes mellitus, type II     Diet controlled    Glaucoma     Hyperlipemia     Hypertension     Osteopenia        Past Surgical History:   Procedure Laterality Date    APPENDECTOMY       CATARACT EXTRACTION W/  INTRAOCULAR LENS IMPLANT Right 03/15/2006        CATARACT EXTRACTION W/  INTRAOCULAR LENS IMPLANT Left 09/27/2006        COLONOSCOPY W/ POLYPECTOMY      EYE SURGERY      HYSTERECTOMY      TRANSESOPHAGEAL ECHOCARDIOGRAPHY N/A 11/9/2020    Procedure: ECHOCARDIOGRAM, TRANSESOPHAGEAL;  Surgeon: Marcelina Diagnostic Provider;  Location: [facility] EP LAB;  Service: Cardiology;  Laterality: N/A;    TREATMENT OF CARDIAC ARRHYTHMIA N/A 11/9/2020    Procedure: CARDIOVERSION;  Surgeon: Marvin Elmore MD;  Location: [facility] EP LAB;  Service: Cardiology;  Laterality: N/A;  AF, RAFFAELE, DCCV, MAC, GP, 3 PREP       Time Tracking:     OT Date of Treatment: 04/12/22  OT Start Time: 0947  OT Stop Time: 1013  OT Total Time (min): 26 min    Billable Minutes:Evaluation 15  Self Care/Home Management 11    4/12/2022

## 2022-04-12 NOTE — DISCHARGE SUMMARY
"[facility] - [facility] ([place] [id number])  [facility] Medicine  Discharge Summary      Patient Name: Inna Blankenship  MRN: [id number]  Patient Class: OP- Observation  Admission Date: 4/10/2022  Hospital Length of Stay: 0 days  Discharge Date and Time: 4/12/2022  4:39 PM  Attending Physician: Kelle Montalvo MD   Discharging Provider: Jossy Cody PA-C  Primary Care Provider: Stacy Rodriguez MD  Hospital Medicine Team: [facility] E Jossy Cody PA-C    HPI:   Inna Blankenship is a 89 y.o. 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 Patient is a poor historian. Daughter at bedside assists with hx. Patient reports being confused as she was unsure where she was upon awakening around 3am this morning. She was noted to be tachypneic at that time and stated "the vein in my neck is bigger."  Daughter also noticed increased abdominal swelling/distension today which usually indicates volume overload. Patient reports mild shortness of breath at the time of my exam which has improved since revieing IV lasix in the ED earlier today. Mental status is now at baseline per daughter at bedside. Denies fever, chills, chest pain, N/V, abdominal pain, dizziness, HA, vision changes, slurred speech, focal weakness, or facial droop. Of note, patient suffered a mechanical fall approximately 3 weeks ago where she fell off of the toilet and landed on her backside. No head trauma or LOC reported.    ED: hypertensive to 197/99 and tachypneic to RR 36, BP and RR improved s/p IV lasix and IV hydralazine. CBC, CMP grossly unremarkable. Trop 0.128, BNP 1085. EKG w/ non-specific T wave abnormalities. UA non infectious. CTH without acute procress. CXR with pulmonary congestion. Given 40mg lasix IVP x1.       * No surgery found *      Hospital Course:   Mrs. Blankenship " "was admitted to observation for CHF exacerbation and hypertensive urgency. Patient started on CHF pathway and IV lasix 20 mg IV BID with resolution of hypervolemia. Down 1.7L. Troponin mildly elevated but flat, EKG without acute ischemia, suspect increased in setting of hypertensive urgency.  Home amlodipine increased to 10 mg with good control of blood pressure. TTE obtained and showed EF 50%, grade III diastolic dysfunction, mild AS, mild MR, moderate TR, PASp 74 mmHg. Patient with asymptomatic bradycardia, EKG with sinus bradycardia. Qtc prolonged, potassium replaced. No concerning pauses or blocks on EKG. 1/2 blood cultures positive for strep, started on vancomycin, repeat blood cultures no growth to date. Afebrile without leukocytosis. Suspect contaminate. Discussed with patient and family, they are comfortable returning if blood cultures result positive. PT/OT evaluated, recommending HH. Patient discharged on amlodipine 10 mg and HH. Will follow up in transplant clinic. Patient and daughter verbalized understanding. All questions answered. Good contact number obtained from daughter prior to discharge.        Goals of Care Treatment Preferences:  Code Status: Full Code      Consults:   Consults (From admission, onward)          Status Ordering Provider     Pharmacy to dose Vancomycin consult  Once        Provider:  (Not yet assigned)   "And" Linked Group Details    Acknowledged KENA WARD     Inpatient consult to Social Work/Case Management  Once        Provider:  (Not yet assigned)    Acknowledged EVE BOB     Inpatient consult to Social Work/Case Management  Once        Provider:  (Not yet assigned)    Acknowledged LEAH VARGAS            * Acute on chronic diastolic congestive heart failure  Acute hypoxemic respiratory failure  Pulmonary hypertension  Right heart failure  - appears volume overload on exam, [id number], CXR w/ pulm edema  - Satting 94-96% RA  - good UOP s/p 40mg lasix IVP in " the ED  - start IV lasix 20 mg BID  - down 1.7L with resolution  - repeat echo below, worsening pulmonary hypertension  - optimize GDMT as able/once more euvolemic --> blood pressure controlled with increase in amlodipine  - maintain K>4 and Mg>2  - telemetry   - strict I/Os, daily weights, fluid restriction, cardiac diet  - will need follow up with cardiology clinic    Results for orders placed during the hospital encounter of 04/10/22    Echo    Interpretation Summary  · The left ventricle is normal in size with eccentric hypertrophy and low normal systolic function. The estimated ejection fraction is 50%.  · Moderate right ventricular enlargement with mildly to moderately reduced right ventricular systolic function.  · Grade III left ventricular diastolic dysfunction.  · Severe biatrial enlargement.  · There is mild aortic valve stenosis. Aortic valve area is 1.7 cm2; peak velocity is 1.4 m/s; mean gradient is 4 mmHg.  · Mild mitral regurgitation.  · Moderate tricuspid regurgitation.  · The estimated PA systolic pressure is 74 mmHg.  · Intermediate central venous pressure (8 mmHg).    Positive blood culture  - 1/2 blood cultures positive for strep, identification pending  - afebrile without leukocytosis  - started on vancomycin  - repeat blood cultures NGTD X 1  - suspect contaminate given lack of systemic infectious signs  - discussed with daughter and patient, will follow up blood cultures and if not contaminate they are comfortable returning for IV antibiotics  - good call back number obtained prior to discharge      Acute encephalopathy  - suspect  2/2 hypertensive encephalopathy in the setting of likely undignosed dementia, possible hypoxia +/- hypercapnia may have contributed  - mental status back to baseline  - neuro exam non focal  - CTH w/ possible age-indeterminate lacunar type infarct-- suspect artifact vs old infarct as neuro exam not consistent w findings  - BP control & diuresis as above  - back to  mental baseline    Hypertensive urgency  Essential hypertension  - uncontrolled BP in the setting of volume overload  - increase amlodipine from 2.5 to 5mg daily--> 10 mg daily  - blood pressure now at goal        Final Active Diagnoses:    Diagnosis Date Noted POA    PRINCIPAL PROBLEM:  Acute on chronic diastolic congestive heart failure [I50.33] 08/28/2020 Yes    Positive blood culture [R78.81] 04/12/2022 Yes    Pulmonary hypertension due to left heart disease [I27.22] 04/11/2022 Yes    Right heart failure (secondary to left heart failure) [I50.814] 04/11/2022 Yes    Hypertensive urgency [I16.0] 04/10/2022 Yes    Acute encephalopathy [G93.40] 04/10/2022 Yes    Elevated troponin [R77.8] 04/10/2022 Yes    Stage 3a chronic kidney disease [N18.31] 02/09/2021 Yes    Persistent atrial fibrillation [I48.19] 11/09/2020 Yes     Chronic    Controlled type 2 diabetes mellitus with stage 3 chronic kidney disease, without long-term current use of insulin [E11.22, N18.30] 08/04/2020 Yes    History of DVT (deep vein thrombosis) [Z86.718] 07/17/2018 Not Applicable    Other hyperlipidemia [E78.49] 07/29/2012 Yes     Chronic    Essential hypertension [I10] 07/27/2012 Yes     Chronic      Problems Resolved During this Admission:       Discharged Condition: good    Disposition: Home or Self Care    Follow Up:    Patient Instructions:      Ambulatory referral/consult to [facility]   Standing Status: Future   Referral Priority: Routine Referral Type: Consultation   Referral Reason: Specialty Services Required   Number of Visits Requested: 1     Diet Cardiac     Diet diabetic     Call MD for:  temperature >100.4   Standing Status:      Call MD for:  persistent nausea and vomiting or diarrhea   Standing Status:      Call MD for:  severe uncontrolled pain   Standing Status:      Call MD for:  redness, tenderness, or signs of infection (pain, swelling, redness, odor or green/yellow discharge around incision  site)   Standing Status:      Call MD for:  difficulty breathing or increased cough   Standing Status:      Call MD for:  severe persistent headache   Standing Status:      Call MD for:  worsening rash   Standing Status:      Call MD for:  persistent dizziness, light-headedness, or visual disturbances   Standing Status:      Call MD for:  increased confusion or weakness   Standing Status:      Activity as tolerated     Ambulatory Request for Ready Responder Services   Standing Status: Future Standing Exp. Date: 04/12/23     Order Specific Question Answer Comments   Program referred to: Other CHF       Significant Diagnostic Studies: Labs:   CMP   Recent Labs   Lab 04/10/22  1548 04/11/22  0512 04/12/22  0300    140 139   K 3.5 3.4* 3.5    103 101   CO2 24 27 29   * 110 80   BUN 14 13 18   CREATININE 0.8 0.7 1.1   CALCIUM 10.3 10.1 9.7   PROT 7.7  --   --    ALBUMIN 3.7  --   --    BILITOT 0.9  --   --    ALKPHOS 86  --   --    AST 23  --   --    ALT 20  --   --    ANIONGAP 13 10 9   ESTGFRAFRICA >60.0 >60.0 49.0*   EGFRNONAA >60.0 >60.0 42.5*   , CBC   Recent Labs   Lab 04/10/22  1548 04/11/22  0512 04/12/22  0300   WBC 7.81 6.05 7.39   HGB 13.0 12.9 12.2   HCT 40.5 39.9 38.3    162 179    and Troponin   Microbiology:   Blood Culture   Lab Results   Component Value Date    LABBLOO No Growth to date 04/11/2022    LABBLOO No Growth to date 04/11/2022     Cardiac Graphics: Echocardiogram:   Transthoracic echo (TTE) complete (Cupid Only):   Results for orders placed or performed during the hospital encounter of 04/10/22   Echo   Result Value Ref Range    Ascending aorta 3.33 cm    STJ 2.74 cm    AV mean gradient 4 mmHg    Ao peak perla 1.41 m/s    Ao VTI 31.13 cm    IVS 1.04 0.6 - 1.1 cm    LA size 4.53 cm    Left Atrium Major Axis 6.44 cm    Left Atrium Minor Axis 6.49 cm    LVIDd 4.68 3.5 - 6.0 cm    LVIDs 3.47 2.1 - 4.0 cm    LVOT diameter 2.01 cm    LVOT peak VTI 16.76 cm    Posterior Wall  0.97 0.6 - 1.1 cm    MV Peak A David 0.29 m/s    E wave deceleration time 151.83 msec    MV Peak E David 0.81 m/s    RA Major Axis 6.18 cm    RA Width 4.14 cm    RVDD 4.69 cm    Sinus 3.16 cm    TAPSE 1.41 cm    TR Max David 4.07 m/s    TDI LATERAL 0.04 m/s    TDI SEPTAL 0.02 m/s    LA WIDTH 4.46 cm    MV stenosis pressure 1/2 time 44.03 ms    LV Diastolic Volume 101.16 mL    LV Systolic Volume 49.86 mL    RV S' 7.76 cm/s    LVOT peak david 0.78 m/s    LA volume (mod) 114.49 cm3    LV LATERAL E/E' RATIO 20.25 m/s    LV SEPTAL E/E' RATIO 40.50 m/s    FS 26 %    LA volume 111.02 cm3    LV mass 164.43 g    Left Ventricle Relative Wall Thickness 0.41 cm    AV valve area 1.71 cm2    AV Velocity Ratio 0.55     AV index (prosthetic) 0.54     MV valve area p 1/2 method 5.00 cm2    E/A ratio 2.79     Mean e' 0.03 m/s    LVOT area 3.2 cm2    LVOT stroke volume 53.15 cm3    AV peak gradient 8 mmHg    E/E' ratio 27.00 m/s    LV Systolic Volume Index 30.4 mL/m2    LV Diastolic Volume Index 61.68 mL/m2    LA Volume Index 67.7 mL/m2    LV Mass Index 100 g/m2    Triscuspid Valve Regurgitation Peak Gradient 66 mmHg    LA Volume Index (Mod) 69.8 mL/m2    BSA 1.64 m2    Right Atrial Pressure (from IVC) 8 mmHg    EF 50 %    TV rest pulmonary artery pressure 74 mmHg    Narrative    · The left ventricle is normal in size with eccentric hypertrophy and low   normal systolic function. The estimated ejection fraction is 50%.  · Moderate right ventricular enlargement with mildly to moderately reduced   right ventricular systolic function.  · Grade III left ventricular diastolic dysfunction.  · Severe biatrial enlargement.  · There is mild aortic valve stenosis. Aortic valve area is 1.7 cm2; peak   velocity is 1.4 m/s; mean gradient is 4 mmHg.  · Mild mitral regurgitation.  · Moderate tricuspid regurgitation.  · The estimated PA systolic pressure is 74 mmHg.  · Intermediate central venous pressure (8 mmHg).          Pending Diagnostic Studies:        None           Medications:  Reconciled Home Medications:      Medication List        CHANGE how you take these medications      amLODIPine 10 MG tablet  Commonly known as: NORVASC  Take 1 tablet (10 mg total) by mouth once daily.  Start taking on: April 13, 2022  What changed:   medication strength  how much to take            CONTINUE taking these medications      * atorvastatin 20 MG tablet  Commonly known as: LIPITOR  TAKE 1 TABLET EVERY DAY     * atorvastatin 20 MG tablet  Commonly known as: LIPITOR  Take 1 tablet (20 mg total) by mouth once daily.     benzonatate 100 MG capsule  Commonly known as: TESSALON  Take 1 capsule (100 mg total) by mouth 3 (three) times daily as needed for Cough.     ELIQUIS 2.5 mg Tab  Generic drug: apixaban  TAKE 1 TABLET TWICE DAILY     EScitalopram oxalate 10 MG tablet  Commonly known as: LEXAPRO  Take 1 tablet (10 mg total) by mouth once daily.     furosemide 20 MG tablet  Commonly known as: LASIX  TAKE 1 TABLET EVERY DAY IF WEIGHT GAIN 2-3 LBS FOR 2-5 DAYS, TAKE 40MG DAILY. IF NO IMPROVEMENT, CALL MD     latanoprost 0.005 % ophthalmic solution  Place 1 drop into both eyes every evening.     MULTIVITAMIN 50 PLUS ORAL  Take 1 tablet by mouth once daily.     [id number] ORAL  Take 1 tablet by mouth Daily. 1  Oral Every day           * This list has 2 medication(s) that are the same as other medications prescribed for you. Read the directions carefully, and ask your doctor or other care provider to review them with you.                STOP taking these medications      timolol maleate 0.5% 0.5 % Solg  Commonly known as: TIMOPTIC-XE              Indwelling Lines/Drains at time of discharge:   Lines/Drains/Airways       None                   Time spent on the discharge of patient: 36 minutes         Jossy Cody PA-C  Department of Hospital Medicine  [facility] - Tele[facility] Stepdown ([place]-[facility])

## 2022-04-12 NOTE — ASSESSMENT & PLAN NOTE
Essential hypertension  - uncontrolled BP in the setting of volume overload  - increase amlodipine from 2.5 to 5mg daily--> 10 mg daily  - blood pressure now at goal

## 2022-04-12 NOTE — PLAN OF CARE
Home Health Referrals sent via [facility] [employer].     04/12/22 2521   Post-Acute Status   Post-Acute Authorization Home Health   Home Health Status Referrals Sent   Discharge Plan   Discharge Plan A Home Health   Discharge Plan B Home Health   Young Leyva RN,BSN

## 2022-04-12 NOTE — PLAN OF CARE
[facility] - Telemetry Stepdown ([place] [id number])      HOME HEALTH ORDERS  FACE TO FACE ENCOUNTER    Patient Name: Inna Blankenship  YOB: 1926    PCP: Stacy Rodriguez MD   PCP Address: [address] [facility] [place] [address]  PCP Phone Number: [phone]  PCP Fax: [phone]    Encounter Date: 4/10/22    Admit to Home Health    Diagnoses:  Active Hospital Problems    Diagnosis  POA    *Acute on chronic diastolic congestive heart failure [I50.33]  Yes    Pulmonary hypertension due to left heart disease [I27.22]  Yes    Right heart failure (secondary to left heart failure) [I50.814]  Yes    Hypertensive urgency [I16.0]  Yes    Acute encephalopathy [G93.40]  Yes    Elevated troponin [R77.8]  Yes    Stage 3a chronic kidney disease [N18.31]  Yes    Persistent atrial fibrillation [I48.19]  Yes     Chronic    Controlled type 2 diabetes mellitus with stage 3 chronic kidney disease, without long-term current use of insulin [E11.22, N18.30]  Yes    History of DVT (deep vein thrombosis) [Z86.718]  Not Applicable    Other hyperlipidemia [E78.49]  Yes     Chronic    Essential hypertension [I10]  Yes     Chronic      Resolved Hospital Problems   No resolved problems to display.       Follow Up Appointments:  Future Appointments   Date Time Provider Department Center   4/18/2022 10:00 AM LAB, APPOINTMENT [facility]   4/18/2022 10:30 AM Tabatha Shannon PA-C [facility]   4/18/2022 10:30 AM [facility] HEART FAILURE NURSE [facility]   6/3/2022  8:30 AM Lupis Lundberg MD [facility] OPHTHAL [facility]   6/8/2022 10:30 AM Gucci Shin MD [facility] CARDIO [facility]       Allergies:Review of patient's allergies indicates:  No Known Allergies    Medications: Review discharge medications with patient and family and provide education.    Current Facility-Administered Medications   Medication Dose Route Frequency Provider Last Rate Last Admin    acetaminophen tablet 650 mg  650 mg  Oral Q4H PRN Yulia Hernandez PA-C        albuterol-ipratropium 2.5 mg-0.5 mg/3 mL nebulizer solution 3 mL  3 mL Nebulization Q4H PRN Yulia Hernandez PA-C        amLODIPine tablet 10 mg  10 mg Oral Daily Jossy Cody PA-C   10 mg at 04/12/22 0842    apixaban tablet 2.5 mg  2.5 mg Oral BID Yulia Hernandez PA-C   2.5 mg at 04/12/22 0842    atorvastatin tablet 20 mg  20 mg Oral Daily Yulia Hernandez PA-C   20 mg at 04/12/22 0842    benzonatate capsule 100 mg  100 mg Oral TID PRN Yulia Hernandez PA-C        bisacodyL suppository 10 mg  10 mg Rectal Daily PRN Yulia Hernandez PA-C        dextrose 10% bolus 125 mL  12.5 g Intravenous PRN Yulia Hernandez PA-C        dextrose 10% bolus 250 mL  25 g Intravenous PRN Yulia Hernandez PA-C        EScitalopram oxalate tablet 10 mg  10 mg Oral QHS Yulia Hernandez PA-C   10 mg at 04/11/22 2035    glucagon (human recombinant) injection 1 mg  1 mg Intramuscular PRN Yulia Hernandez PA-C        glucose chewable tablet 16 g  16 g Oral PRN Yulia Hernandez PA-C        glucose chewable tablet 24 g  24 g Oral PRN Yulia Hernandez PA-C        hydrALAZINE tablet 25 mg  25 mg Oral Q8H PRN Yulia Hernandez PA-C        insulin aspart U-100 pen 0-5 Units  0-5 Units Subcutaneous QID (AC + HS) PRN Yulia Hernandez PA-C        latanoprost 0.005 % ophthalmic solution 1 drop  1 drop Both Eyes QHS Yulia Hernandez PA-C   1 drop at 04/11/22 2035    melatonin tablet 6 mg  6 mg Oral Nightly PRN Yulia Hernandez PA-C   6 mg at 04/11/22 2035    polyethylene glycol packet 17 g  17 g Oral Daily PRN Yulia Hernandez PA-C        potassium chloride SA CR tablet 40 mEq  40 mEq Oral Once Kelle Montalvo MD        prochlorperazine tablet 5 mg  5 mg Oral TID PRN Kelle Montalvo MD        sodium chloride 0.9% flush 10 mL  10 mL Intravenous PRN Yulia Hernandez PA-C        vancomycin - pharmacy to dose    Intravenous pharmacy to manage frequency Jossy Cody PA-C         Current Discharge Medication List      CONTINUE these medications which have CHANGED    Details   amLODIPine (NORVASC) 10 MG tablet Take 1 tablet (10 mg total) by mouth once daily.  Qty: 30 tablet, Refills: 3    Comments: .      !! atorvastatin (LIPITOR) 20 MG tablet Take 1 tablet (20 mg total) by mouth once daily.  Qty: 90 tablet, Refills: 0       !! - Potential duplicate medications found. Please discuss with provider.      CONTINUE these medications which have NOT CHANGED    Details   !! atorvastatin (LIPITOR) 20 MG tablet TAKE 1 TABLET EVERY DAY  Qty: 90 tablet, Refills: 0      benzonatate (TESSALON) 100 MG capsule Take 1 capsule (100 mg total) by mouth 3 (three) times daily as needed for Cough.  Qty: 30 capsule, Refills: 0    Associated Diagnoses: Cough in adult; Acute URI      CALCIUM CARBONATE (VRDE-RTT-573 ORAL) Take 1 tablet by mouth Daily. 1  Oral Every day      ELIQUIS 2.5 mg Tab TAKE 1 TABLET TWICE DAILY  Qty: 180 tablet, Refills: 3      EScitalopram oxalate (LEXAPRO) 10 MG tablet Take 1 tablet (10 mg total) by mouth once daily.  Qty: 30 tablet, Refills: 11      furosemide (LASIX) 20 MG tablet TAKE 1 TABLET EVERY DAY IF WEIGHT GAIN 2-3 LBS FOR 2-5 DAYS, TAKE 40MG DAILY. IF NO IMPROVEMENT, CALL MD  Qty: 90 tablet, Refills: 0      latanoprost 0.005 % ophthalmic solution Place 1 drop into both eyes every evening.  Qty: 7.5 mL, Refills: 3    Associated Diagnoses: Low-tension glaucoma of both eyes, moderate stage      multivit with minerals/lutein (MULTIVITAMIN 50 PLUS ORAL) Take 1 tablet by mouth once daily.        !! - Potential duplicate medications found. Please discuss with provider.      STOP taking these medications       timolol maleate 0.5% (TIMOPTIC-XR) 0.5 % SolG Comments:   Reason for Stopping:                 I have seen and examined this patient within the last 30 days. My clinical findings that support the need for the  home health skilled services and home bound status are the following:no   Weakness/numbness causing balance and gait disturbance due to Heart Failure making it taxing to leave home.     Diet:   cardiac diet, diabetic diet 2000 calorie, fluid restriction and 2 gram sodium diet    Labs:  SN to perform labs:  BMP: once; in 1 week and Report Lab results to PCP.    Referrals/ Consults  Physical Therapy to evaluate and treat. Evaluate for home safety and equipment needs; Establish/upgrade home exercise program. Perform / instruct on therapeutic exercises, gait training, transfer training, and Range of Motion.  Occupational Therapy to evaluate and treat. Evaluate home environment for safety and equipment needs. Perform/Instruct on transfers, ADL training, ROM, and therapeutic exercises.   to evaluate for community resources/long-range planning.  Aide to provide assistance with personal care, ADLs, and vital signs.    Activities:   activity as tolerated    Nursing:   Agency to admit patient within 24 hours of hospital discharge unless specified on physician order or at patient request    SN to complete comprehensive assessment including routine vital signs. Instruct on disease process and s/s of complications to report to MD. Review/verify medication list sent home with the patient at time of discharge  and instruct patient/caregiver as needed. Frequency may be adjusted depending on start of care date.     Skilled nurse to perform up to 3 visits PRN for symptoms related to diagnosis    Notify MD if SBP > 160 or < 90; DBP > 90 or < 50; HR > 120 or < 50; Temp > 101; O2 < 88%;    Ok to schedule additional visits based on staff availability and patient request on consecutive days within the home health episode.    When multiple disciplines ordered:    Start of Care occurs on Sunday - Wednesday schedule remaining discipline evaluations as ordered on separate consecutive days following the start of care.    Thursday  SOC -schedule subsequent evaluations Friday and Monday the following week.     Friday - Saturday SOC - schedule subsequent discipline evaluations on consecutive days starting Monday of the following week.    For all post-discharge communication and subsequent orders please contact patient's primary care physician.    Miscellaneous   Heart Failure:      SN to instruct on the following:    Instruct on the definition of CHF.   Instruct on the signs/sympoms of CHF to be reported.   Instruct on and monitor daily weights.   Instruct on factors that cause exacerbation.   Instruct on action, dose, schedule, and side effects of medications.   Instruct on diet as prescribed.   Instruct on activity allowed.   Instruct on life-style modifications for life long management of CHF   SN to assess compliance with daily weights, diet, medications, fluid retention,    safety precautions, activities permitted and life-style modifications.   Additional 1-2 SN visits per week as needed for signs and symptoms     of CHF exacerbation.      For Weight Gain > 2-3 lbs in 1 day or 4-6 lbs over 1 week notify PCP:      Home Health Aide:  Nursing Three times weekly, Physical Therapy Three times weekly, Occupational Therapy Three times weekly, Medical Social Work Weekly and Home Health Aide Three times weekly    Wound Care Orders  no    I certify that this patient is confined to her home and needs intermittent skilled nursing care, physical therapy and occupational therapy.

## 2022-04-13 ENCOUNTER — TELEPHONE (OUTPATIENT)
Dept: CARDIOLOGY | Facility: CLINIC | Age: 87
End: 2022-04-13
Payer: MEDICARE

## 2022-04-13 LAB
BACTERIA BLD CULT: ABNORMAL

## 2022-04-13 PROCEDURE — G0180 MD CERTIFICATION HHA PATIENT: HCPCS | Mod: ,,, | Performed by: INTERNAL MEDICINE

## 2022-04-13 PROCEDURE — G0180 PR HOME HEALTH MD CERTIFICATION: ICD-10-PCS | Mod: ,,, | Performed by: INTERNAL MEDICINE

## 2022-04-13 NOTE — TELEPHONE ENCOUNTER
"[facility]) hospital discharge 48-72 hour phone follow up completed.     Most Recent Hospital Discharge Date: 4/13/22  Last admission Diagnosis/chief complaint:SOB, confused    TCC RN Navigator spoke with daughter Cara    Current Patient reported weight: 134 lbs    Pt reports the following:  []  Shortness of Breath with Activity  []  Shortness of Breath at rest   []  Fatigue  []  Edema   [] Chest pain or tightness  [] Weight Increase since discharge  [x] None of the above    Medications:    Discharge medication reviewed with pt.  Pt reports having medication list available and has all medications at home for use per list.     Education:   Confirmed pt received "Home Care Guide for Heart Failure Patients" while admitted.   Reviewed key points as listed below.      Recommend 2 -3 gram sodium restriction and 1500 cc-2000 cc fluid restriction.   Encourage physical activity with graded exercise program.   Requested patient to weigh themselves daily, and to notify us if their weight increases by more than 3 lbs in 1 day or 5 lbs in 3 days.   o Reminded patient to use "Daily weight and symptom tracker" to record and guide patient on when and how to call [facility]. PT may also use symptom tracker if no scale available  o Pt reports being in the GREEN (color) Zone. If in yellow/red, reminded that they should be calling [facility] today or now.     Watch for these Signs and Symptoms: If any of these occur, contact [facility] immediately:   Increase in shortness of breath with movement   Increase in swelling in your legs and ankles   Weight gain of more than 3 pounds in a day or 5 pounds in 3 days.   Difficulty breathing when you are lying down   Worsening fatigue or tiredness   Stomach bloating, a full feeling or a loss of appetite   Increased coughing--especially when you are lying down      Pt was able to verbalize back to RN in their own words correct diet/fluid restrictions, necessity " for exercise, warning signs and symptoms, when and how to contact their TCC team.      Pt educated on follow-up plan while in [facility] program to include:   Week 1 -  F/u appt with Provider and RN    Week 2-5 - In person/ Virtual/ phone call check ins    Week 5-7 - Pt will discharge from [facility] and transition to longterm care provider (Cardiology/PCP/ Advanced Heart Failure).      Patient active on myChart? no      Pt given the following contact information for ease of communication: [phone] (Mon-Fri, 8a-5p) & for urgent issues on the weekend to page the Heart Transplant MD on call.  Pt also encouraged utilize [facility] messaging as well.      Will follow up with pt at first clinic visit and RN navigator available for pt questions, issues or concerns.

## 2022-04-15 LAB — BACTERIA BLD CULT: NORMAL

## 2022-04-16 LAB
BACTERIA BLD CULT: NORMAL
BACTERIA BLD CULT: NORMAL

## 2022-04-18 ENCOUNTER — OFFICE VISIT (OUTPATIENT)
Dept: CARDIOLOGY | Facility: CLINIC | Age: 87
End: 2022-04-18
Payer: MEDICARE

## 2022-04-18 ENCOUNTER — PES CALL (OUTPATIENT)
Dept: ADMINISTRATIVE | Facility: CLINIC | Age: 87
End: 2022-04-18
Payer: MEDICARE

## 2022-04-18 ENCOUNTER — CLINICAL SUPPORT (OUTPATIENT)
Dept: CARDIOLOGY | Facility: CLINIC | Age: 87
End: 2022-04-18
Payer: MEDICARE

## 2022-04-18 ENCOUNTER — LAB VISIT (OUTPATIENT)
Dept: LAB | Facility: HOSPITAL | Age: 87
End: 2022-04-18
Payer: MEDICARE

## 2022-04-18 ENCOUNTER — OFFICE VISIT (OUTPATIENT)
Dept: INTERNAL MEDICINE | Facility: CLINIC | Age: 87
End: 2022-04-18
Payer: MEDICARE

## 2022-04-18 VITALS
SYSTOLIC BLOOD PRESSURE: 161 MMHG | HEIGHT: 64 IN | HEART RATE: 61 BPM | BODY MASS INDEX: 22.28 KG/M2 | DIASTOLIC BLOOD PRESSURE: 70 MMHG | WEIGHT: 130.5 LBS

## 2022-04-18 VITALS
HEART RATE: 61 BPM | DIASTOLIC BLOOD PRESSURE: 64 MMHG | WEIGHT: 130.94 LBS | SYSTOLIC BLOOD PRESSURE: 130 MMHG | BODY MASS INDEX: 22.35 KG/M2 | HEIGHT: 64 IN | OXYGEN SATURATION: 56 %

## 2022-04-18 DIAGNOSIS — I10 ESSENTIAL HYPERTENSION: Chronic | ICD-10-CM

## 2022-04-18 DIAGNOSIS — I27.22 PULMONARY HYPERTENSION DUE TO LEFT HEART DISEASE: ICD-10-CM

## 2022-04-18 DIAGNOSIS — N18.30 CONTROLLED TYPE 2 DIABETES MELLITUS WITH STAGE 3 CHRONIC KIDNEY DISEASE, WITHOUT LONG-TERM CURRENT USE OF INSULIN: ICD-10-CM

## 2022-04-18 DIAGNOSIS — N18.31 STAGE 3A CHRONIC KIDNEY DISEASE: ICD-10-CM

## 2022-04-18 DIAGNOSIS — I50.32 CHRONIC DIASTOLIC CONGESTIVE HEART FAILURE: Primary | ICD-10-CM

## 2022-04-18 DIAGNOSIS — E78.49 OTHER HYPERLIPIDEMIA: Chronic | ICD-10-CM

## 2022-04-18 DIAGNOSIS — I50.814: ICD-10-CM

## 2022-04-18 DIAGNOSIS — I50.32 CHRONIC DIASTOLIC CONGESTIVE HEART FAILURE: ICD-10-CM

## 2022-04-18 DIAGNOSIS — I48.19 PERSISTENT ATRIAL FIBRILLATION: Chronic | ICD-10-CM

## 2022-04-18 DIAGNOSIS — Z09 HOSPITAL DISCHARGE FOLLOW-UP: Primary | ICD-10-CM

## 2022-04-18 DIAGNOSIS — I10 ESSENTIAL HYPERTENSION: ICD-10-CM

## 2022-04-18 DIAGNOSIS — E11.22 CONTROLLED TYPE 2 DIABETES MELLITUS WITH STAGE 3 CHRONIC KIDNEY DISEASE, WITHOUT LONG-TERM CURRENT USE OF INSULIN: ICD-10-CM

## 2022-04-18 LAB
ALBUMIN SERPL BCP-MCNC: 3.6 G/DL (ref 3.5–5.2)
ALP SERPL-CCNC: 86 U/L (ref 55–135)
ALT SERPL W/O P-5'-P-CCNC: 17 U/L (ref 10–44)
ANION GAP SERPL CALC-SCNC: 10 MMOL/L (ref 8–16)
AST SERPL-CCNC: 26 U/L (ref 10–40)
BASOPHILS # BLD AUTO: 0.06 K/UL (ref 0–0.2)
BASOPHILS NFR BLD: 1 % (ref 0–1.9)
BILIRUB SERPL-MCNC: 0.7 MG/DL (ref 0.1–1)
BNP SERPL-MCNC: 270 PG/ML (ref 0–99)
BUN SERPL-MCNC: 31 MG/DL (ref 10–30)
CALCIUM SERPL-MCNC: 10.8 MG/DL (ref 8.7–10.5)
CHLORIDE SERPL-SCNC: 103 MMOL/L (ref 95–110)
CO2 SERPL-SCNC: 28 MMOL/L (ref 23–29)
CREAT SERPL-MCNC: 1.1 MG/DL (ref 0.5–1.4)
DIFFERENTIAL METHOD: ABNORMAL
EOSINOPHIL # BLD AUTO: 0.1 K/UL (ref 0–0.5)
EOSINOPHIL NFR BLD: 2 % (ref 0–8)
ERYTHROCYTE [DISTWIDTH] IN BLOOD BY AUTOMATED COUNT: 14.6 % (ref 11.5–14.5)
EST. GFR  (AFRICAN AMERICAN): 49 ML/MIN/1.73 M^2
EST. GFR  (NON AFRICAN AMERICAN): 42.5 ML/MIN/1.73 M^2
FERRITIN SERPL-MCNC: 126 NG/ML (ref 20–300)
GLUCOSE SERPL-MCNC: 128 MG/DL (ref 70–110)
HCT VFR BLD AUTO: 41.3 % (ref 37–48.5)
HGB BLD-MCNC: 13.2 G/DL (ref 12–16)
IMM GRANULOCYTES # BLD AUTO: 0.01 K/UL (ref 0–0.04)
IMM GRANULOCYTES NFR BLD AUTO: 0.2 % (ref 0–0.5)
IRON SERPL-MCNC: 96 UG/DL (ref 30–160)
LYMPHOCYTES # BLD AUTO: 1.3 K/UL (ref 1–4.8)
LYMPHOCYTES NFR BLD: 21.5 % (ref 18–48)
MAGNESIUM SERPL-MCNC: 1.9 MG/DL (ref 1.6–2.6)
MCH RBC QN AUTO: 30.2 PG (ref 27–31)
MCHC RBC AUTO-ENTMCNC: 32 G/DL (ref 32–36)
MCV RBC AUTO: 95 FL (ref 82–98)
MONOCYTES # BLD AUTO: 0.6 K/UL (ref 0.3–1)
MONOCYTES NFR BLD: 9.3 % (ref 4–15)
NEUTROPHILS # BLD AUTO: 4 K/UL (ref 1.8–7.7)
NEUTROPHILS NFR BLD: 66 % (ref 38–73)
NRBC BLD-RTO: 0 /100 WBC
PHOSPHATE SERPL-MCNC: 2.7 MG/DL (ref 2.7–4.5)
PLATELET # BLD AUTO: 180 K/UL (ref 150–450)
PMV BLD AUTO: 12.1 FL (ref 9.2–12.9)
POTASSIUM SERPL-SCNC: 3.7 MMOL/L (ref 3.5–5.1)
PROT SERPL-MCNC: 7.6 G/DL (ref 6–8.4)
RBC # BLD AUTO: 4.37 M/UL (ref 4–5.4)
SATURATED IRON: 33 % (ref 20–50)
SODIUM SERPL-SCNC: 141 MMOL/L (ref 136–145)
TOTAL IRON BINDING CAPACITY: 287 UG/DL (ref 250–450)
TRANSFERRIN SERPL-MCNC: 194 MG/DL (ref 200–375)
WBC # BLD AUTO: 5.99 K/UL (ref 3.9–12.7)

## 2022-04-18 PROCEDURE — 99214 OFFICE O/P EST MOD 30 MIN: CPT | Mod: S$GLB,,,

## 2022-04-18 PROCEDURE — 99999 PR PBB SHADOW E&M-EST. PATIENT-LVL III: CPT | Mod: PBBFAC,,,

## 2022-04-18 PROCEDURE — 1159F PR MEDICATION LIST DOCUMENTED IN MEDICAL RECORD: ICD-10-PCS | Mod: CPTII,S$GLB,,

## 2022-04-18 PROCEDURE — 84466 ASSAY OF TRANSFERRIN: CPT

## 2022-04-18 PROCEDURE — 3288F FALL RISK ASSESSMENT DOCD: CPT | Mod: CPTII,S$GLB,,

## 2022-04-18 PROCEDURE — 80053 COMPREHEN METABOLIC PANEL: CPT

## 2022-04-18 PROCEDURE — 1126F PR PAIN SEVERITY QUANTIFIED, NO PAIN PRESENT: ICD-10-PCS | Mod: CPTII,S$GLB,,

## 2022-04-18 PROCEDURE — 99999 PR PBB SHADOW E&M-EST. PATIENT-LVL III: CPT | Mod: PBBFAC,GC,, | Performed by: STUDENT IN AN ORGANIZED HEALTH CARE EDUCATION/TRAINING PROGRAM

## 2022-04-18 PROCEDURE — 99214 PR OFFICE/OUTPT VISIT, EST, LEVL IV, 30-39 MIN: ICD-10-PCS | Mod: S$GLB,,,

## 2022-04-18 PROCEDURE — 1159F MED LIST DOCD IN RCRD: CPT | Mod: CPTII,S$GLB,,

## 2022-04-18 PROCEDURE — 99213 OFFICE O/P EST LOW 20 MIN: CPT | Mod: GC,S$GLB,, | Performed by: STUDENT IN AN ORGANIZED HEALTH CARE EDUCATION/TRAINING PROGRAM

## 2022-04-18 PROCEDURE — 83735 ASSAY OF MAGNESIUM: CPT

## 2022-04-18 PROCEDURE — 3288F PR FALLS RISK ASSESSMENT DOCUMENTED: ICD-10-PCS | Mod: CPTII,S$GLB,,

## 2022-04-18 PROCEDURE — 84100 ASSAY OF PHOSPHORUS: CPT

## 2022-04-18 PROCEDURE — 1160F PR REVIEW ALL MEDS BY PRESCRIBER/CLIN PHARMACIST DOCUMENTED: ICD-10-PCS | Mod: CPTII,S$GLB,,

## 2022-04-18 PROCEDURE — 1126F AMNT PAIN NOTED NONE PRSNT: CPT | Mod: CPTII,S$GLB,,

## 2022-04-18 PROCEDURE — 99999 PR PBB SHADOW E&M-EST. PATIENT-LVL III: ICD-10-PCS | Mod: PBBFAC,,,

## 2022-04-18 PROCEDURE — 99999 PR PBB SHADOW E&M-EST. PATIENT-LVL III: ICD-10-PCS | Mod: PBBFAC,GC,, | Performed by: STUDENT IN AN ORGANIZED HEALTH CARE EDUCATION/TRAINING PROGRAM

## 2022-04-18 PROCEDURE — 1160F RVW MEDS BY RX/DR IN RCRD: CPT | Mod: CPTII,S$GLB,,

## 2022-04-18 PROCEDURE — 99213 PR OFFICE/OUTPT VISIT, EST, LEVL III, 20-29 MIN: ICD-10-PCS | Mod: GC,S$GLB,, | Performed by: STUDENT IN AN ORGANIZED HEALTH CARE EDUCATION/TRAINING PROGRAM

## 2022-04-18 PROCEDURE — 83880 ASSAY OF NATRIURETIC PEPTIDE: CPT

## 2022-04-18 PROCEDURE — 1101F PT FALLS ASSESS-DOCD LE1/YR: CPT | Mod: CPTII,S$GLB,,

## 2022-04-18 PROCEDURE — 82728 ASSAY OF FERRITIN: CPT

## 2022-04-18 PROCEDURE — 1101F PR PT FALLS ASSESS DOC 0-1 FALLS W/OUT INJ PAST YR: ICD-10-PCS | Mod: CPTII,S$GLB,,

## 2022-04-18 PROCEDURE — 85025 COMPLETE CBC W/AUTO DIFF WBC: CPT

## 2022-04-18 RX ORDER — LISINOPRIL 5 MG/1
5 TABLET ORAL DAILY
Qty: 90 TABLET | Refills: 3 | Status: SHIPPED | OUTPATIENT
Start: 2022-04-18 | End: 2022-05-27 | Stop reason: DRUGHIGH

## 2022-04-18 NOTE — PROGRESS NOTES
Refer to provider note for details.   Goal Outcome Evaluation:  Plan of Care Reviewed With: patient

## 2022-04-18 NOTE — PROGRESS NOTES
Clinic Note  4/18/2022      Subjective:       Patient ID:  Inna is a 89 y.o. female being seen for an urgent care visit.    Chief Complaint: Follow-up    Inna Blankenship is a 89 y.o. 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     Patient presented with encephalopathy which improved after BP was better controlled. TTE obtained and showed EF 50%, grade III diastolic dysfunction, mild AS, mild MR, moderate TR, PASp 74 mmHg. She was diuresed adequately and was discharged on amlodipine 10mg and to conitnue lasix 20mg daily. During hospital course, she was found to have 1/2 blood cultures positive for strep that was suspected as contaminant, otherwise there were no other findings indicative of an infection. She was discharged to follow up with the cardiology clinic.     Today, the patient is accompanied by her daughter. Patient and daughter states she feels well and is at her baseline. She reports intermittent dyspnea only with exertion that happens infrequently, which she states is chronic. No weight gain or LE edema. She has been compliant with her medications and avoids salt in her diet. She hasn't checked her BP at home because she ran out of batteries. She reports intermittent lightheadedness only when she gets out of bed in a hurry. She has no other symptoms. No new falls were reported.       Review of Systems   Constitutional: Negative for fever, malaise/fatigue and weight loss.   Eyes: Negative for blurred vision and double vision.   Respiratory: Positive for shortness of breath (Intermittent CURRY (at baseline)).    Cardiovascular: Negative for chest pain, leg swelling and PND.   Gastrointestinal: Negative for abdominal pain, constipation, nausea and vomiting.   Musculoskeletal: Negative for falls.   Neurological: Negative for  headaches.   Psychiatric/Behavioral: The patient is not nervous/anxious and does not have insomnia.        Medication List with Changes/Refills   Current Medications    AMLODIPINE (NORVASC) 10 MG TABLET    Take 1 tablet (10 mg total) by mouth once daily.    ATORVASTATIN (LIPITOR) 20 MG TABLET    TAKE 1 TABLET EVERY DAY    ATORVASTATIN (LIPITOR) 20 MG TABLET    Take 1 tablet (20 mg total) by mouth once daily.    BENZONATATE (TESSALON) 100 MG CAPSULE    Take 1 capsule (100 mg total) by mouth 3 (three) times daily as needed for Cough.    CALCIUM CARBONATE (VBTZ-CKT-543 ORAL)    Take 1 tablet by mouth Daily. 1  Oral Every day    ELIQUIS 2.5 MG TAB    TAKE 1 TABLET TWICE DAILY    ESCITALOPRAM OXALATE (LEXAPRO) 10 MG TABLET    Take 1 tablet (10 mg total) by mouth once daily.    FUROSEMIDE (LASIX) 20 MG TABLET    TAKE 1 TABLET EVERY DAY IF WEIGHT GAIN 2-3 LBS FOR 2-5 DAYS, TAKE 40MG DAILY. IF NO IMPROVEMENT, CALL MD    LATANOPROST 0.005 % OPHTHALMIC SOLUTION    Place 1 drop into both eyes every evening.    MULTIVIT WITH MINERALS/LUTEIN (MULTIVITAMIN 50 PLUS ORAL)    Take 1 tablet by mouth once daily.        Patient Active Problem List   Diagnosis    Essential hypertension    Other hyperlipidemia    Osteopenia    Low tension glaucoma, moderate stage - Both Eyes    MGD (meibomian gland disease) - Both Eyes    History of DVT (deep vein thrombosis)    Chronic diastolic congestive heart failure    Controlled type 2 diabetes mellitus with stage 3 chronic kidney disease, without long-term current use of insulin    Calcification of aorta    Acute on chronic diastolic congestive heart failure    Persistent atrial fibrillation    Stage 3a chronic kidney disease    Shortness of breath    Hypertensive urgency    Acute encephalopathy    Elevated troponin    Pulmonary hypertension due to left heart disease    Right heart failure (secondary to left heart failure)    Positive blood culture           Objective:     "  There were no vitals taken for this visit.  Estimated body mass index is 22.44 kg/m² as calculated from the following:    Height as of 4/11/22: 5' 4" (1.626 m).    Weight as of 4/12/22: 59.3 kg (130 lb 11.7 oz).  Physical Exam  Constitutional:       General: She is not in acute distress.     Appearance: Normal appearance. She is normal weight. She is not ill-appearing, toxic-appearing or diaphoretic.   HENT:      Head: Normocephalic and atraumatic.      Nose: Nose normal.   Eyes:      Extraocular Movements: Extraocular movements intact.   Cardiovascular:      Rate and Rhythm: Regular rhythm. Bradycardia present.      Pulses: Normal pulses.      Heart sounds: Normal heart sounds. No murmur heard.  Pulmonary:      Effort: Pulmonary effort is normal. No respiratory distress.      Breath sounds: Normal breath sounds. No wheezing or rales.   Abdominal:      General: Abdomen is flat. Bowel sounds are normal. There is no distension.      Tenderness: There is no abdominal tenderness.   Musculoskeletal:         General: No swelling or tenderness. Normal range of motion.      Cervical back: Normal range of motion.   Skin:     General: Skin is warm.      Coloration: Skin is not jaundiced or pale.   Neurological:      Mental Status: She is alert and oriented to person, place, and time. Mental status is at baseline.   Psychiatric:         Mood and Affect: Mood normal.         Behavior: Behavior normal.         Thought Content: Thought content normal.           Assessment and Plan:     Inna was seen today for follow-up.    Diagnoses and all orders for this visit:    Hospital discharge follow-up   - Patient reports feeling well and is back to her baseline. Labs and imaging studies were reviewed and appeared stable.    Essential hypertension   - Controlled. Encouraged patient to check her BP at home. Continue amlodipine 10mg daily    Chronic diastolic congestive heart failure   - Euvolemic today. Continue lasix 20mg daily.    - " Follow up with cardiology.    Stage 3a chronic kidney disease   - Stable. Discharging sCr 1.1 is within her baseline.        Follow Up:   Follow up in about 2 months (around 6/18/2022).        Plan discussed with attending Dr. Monique, further recommendations as per attending addendum. Please feel free to call with any questions or concerns.        Adán Oswald MD  Internal Medicine  Resident Physician - PGY3

## 2022-04-18 NOTE — PROGRESS NOTES
[facility] Provider Note (Initial Clinic) Consult Note    Date of original referral: 4/11/22  Age: 89 y.o.  Gender: female  Ethnicity: Black or   Number of admissions for CHF within the preceding year: 1   Duration of CHF: unsure, <10yrs  Type of Congestive Heart Failure: Combined   Etiology: unspecified  Social history: denies smoking history   Enrolled in Infusion suite: no    Diagnostic Labs:   EKG - 04/12/2022  CXR - 04/10/2022  ECHO - 04/11/2022  Stress test -   Stress echo -   Pharmacologic stress -   Cardiac catheterization -    Cardiac MRI -     Lab Results   Component Value Date     04/12/2022     04/11/2022    K 3.5 04/12/2022    K 3.4 (L) 04/11/2022     04/12/2022     04/11/2022    CO2 29 04/12/2022    CO2 27 04/11/2022    GLU 80 04/12/2022     04/11/2022    BUN 18 04/12/2022    BUN 13 04/11/2022    CREATININE 1.1 04/12/2022    CREATININE 0.7 04/11/2022    CALCIUM 9.7 04/12/2022    CALCIUM 10.1 04/11/2022    PROT 7.7 04/10/2022    PROT 7.5 07/26/2021    ALBUMIN 3.7 04/10/2022    ALBUMIN 3.4 (L) 07/26/2021    BILITOT 0.9 04/10/2022    BILITOT 0.8 07/26/2021    ALKPHOS 86 04/10/2022    ALKPHOS 92 07/26/2021    AST 23 04/10/2022    AST 24 07/26/2021    ALT 20 04/10/2022    ALT 15 07/26/2021    ANIONGAP 9 04/12/2022    ANIONGAP 10 04/11/2022    ESTGFRAFRICA 49.0 (A) 04/12/2022    ESTGFRAFRICA >60.0 04/11/2022    EGFRNONAA 42.5 (A) 04/12/2022    EGFRNONAA >60.0 04/11/2022       Lab Results   Component Value Date    WBC 7.39 04/12/2022    WBC 6.05 04/11/2022    RBC 3.98 (L) 04/12/2022    RBC 4.21 04/11/2022    HGB 12.2 04/12/2022    HGB 12.9 04/11/2022    HCT 38.3 04/12/2022    HCT 39.9 04/11/2022    MCV 96 04/12/2022    MCV 95 04/11/2022    MCH 30.7 04/12/2022    MCH 30.6 04/11/2022    MCHC 31.9 (L) 04/12/2022    MCHC 32.3 04/11/2022    RDW 14.7 (H) 04/12/2022    RDW 14.6 (H) 04/11/2022     04/12/2022     04/11/2022    MPV 12.3 04/12/2022    MPV 11.6  04/11/2022    IMMGR 0.1 04/12/2022    IMMGR 0.2 04/11/2022    IGABS 0.01 04/12/2022    IGABS 0.01 04/11/2022    LYMPH 1.6 04/12/2022    LYMPH 21.2 04/12/2022    MONO 0.8 04/12/2022    MONO 10.6 04/12/2022    EOS 0.2 04/12/2022    EOS 0.1 04/11/2022    BASO 0.06 04/12/2022    BASO 0.05 04/11/2022    NRBC 0 04/12/2022    NRBC 0 04/11/2022    GRAN 4.8 04/12/2022    GRAN 64.9 04/12/2022    EOSINOPHIL 2.4 04/12/2022    EOSINOPHIL 1.0 04/11/2022    BASOPHIL 0.8 04/12/2022    BASOPHIL 0.8 04/11/2022       Lab Results   Component Value Date    BNP 1,085 (H) 04/10/2022     (H) 04/16/2021    MG 2.0 04/12/2022    MG 2.2 04/11/2022    PHOS 3.3 04/18/2021    PHOS 3.0 04/17/2021    TROPONINI 0.170 (H) 04/10/2022    TROPONINI 0.093 (H) 04/10/2022    HGBA1C 5.7 (H) 04/11/2022    HGBA1C 6.0 (H) 07/09/2021    TSH 5.144 (H) 04/10/2022    TSH 0.852 02/09/2021    FREET4 0.97 04/10/2022    FREET4 1.06 01/22/2021       Lab Results   Component Value Date    FERRITIN 593.2 (H) 06/15/2005    CHOL 142 11/18/2020    TRIG 49 11/18/2020    HDL 52 11/18/2020    LDLCALC 80.2 11/18/2020    CHOLHDL 36.6 11/18/2020    TOTALCHOLEST 2.7 11/18/2020    NONHDLCHOL 90 11/18/2020    COLORU Yellow 04/10/2022    APPEARANCEUA Clear 04/10/2022    PHUR 8.0 04/10/2022    SPECGRAV 1.010 04/10/2022    PROTEINUA 1+ (A) 04/10/2022    GLUCUA Negative 04/10/2022    KETONESU Negative 04/10/2022    BILIRUBINUA Negative 04/10/2022    OCCULTUA Negative 04/10/2022    NITRITE Negative 04/10/2022    LEUKOCYTESUR Negative 04/10/2022       No implanted cardiac devices    Current Outpatient Medications on File Prior to Visit   Medication Sig Dispense Refill    amLODIPine (NORVASC) 10 MG tablet Take 1 tablet (10 mg total) by mouth once daily. 30 tablet 3    atorvastatin (LIPITOR) 20 MG tablet TAKE 1 TABLET EVERY DAY 90 tablet 0    atorvastatin (LIPITOR) 20 MG tablet Take 1 tablet (20 mg total) by mouth once daily. 90 tablet 0    benzonatate (TESSALON) 100 MG capsule  Take 1 capsule (100 mg total) by mouth 3 (three) times daily as needed for Cough. 30 capsule 0    CALCIUM CARBONATE (ZIRM-KKR-263 ORAL) Take 1 tablet by mouth Daily. 1  Oral Every day      ELIQUIS 2.5 mg Tab TAKE 1 TABLET TWICE DAILY 180 tablet 3    EScitalopram oxalate (LEXAPRO) 10 MG tablet Take 1 tablet (10 mg total) by mouth once daily. 30 tablet 11    furosemide (LASIX) 20 MG tablet TAKE 1 TABLET EVERY DAY IF WEIGHT GAIN 2-3 LBS FOR 2-5 DAYS, TAKE 40MG DAILY. IF NO IMPROVEMENT, CALL MD 90 tablet 0    latanoprost 0.005 % ophthalmic solution Place 1 drop into both eyes every evening. 7.5 mL 3    multivit with minerals/lutein (MULTIVITAMIN 50 PLUS ORAL) Take 1 tablet by mouth once daily.       [DISCONTINUED] timolol maleate 0.5% (TIMOPTIC-XR) 0.5 % SolG Place 1 drop into both eyes once daily. 5 mL 12     No current facility-administered medications on file prior to visit.         HPI:  Patient can walk minimally (couple feet) and pace slow. Presents to clinic in wheelchair. Denies SOB at rest   Patient sleeps on 2-3 number of pillows at BL   Patient wakes up SOB, has to get out of bed, associated cough- denies   Palpitations - denies   Dizzy, light-headed, pre-syncope or syncope- endorses episode of lightheadedness yesterday morning lasting 30mins-1hr in duration while laying in bed. Denies presyncope/syncope   Since discharge frequency of performing weights, home weight and weight change- performing daily weights, reports weight stable 132-134lbs on home scale   Other information felt pertinent to HPI: Ms. 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 She was admitted to observation for CHF exacerbation and hypertensive urgency. Patient started on CHF pathway and IV lasix 20 mg IV BID with resolution of hypervolemia. Down  1.7L. Troponin mildly elevated but flat, EKG without acute ischemia, suspect increased in setting of hypertensive urgency. Home amlodipine increased to 10 mg with good control of blood pressure. TTE obtained and showed EF 50%, grade III diastolic dysfunction, mild AS, mild MR, moderate TR, PASp 74 mmHg. Patient with asymptomatic bradycardia, EKG with sinus bradycardia. Qtc prolonged, potassium replaced. No concerning pauses or blocks on EKG. 1/2 blood cultures positive for strep, started on vancomycin, repeat blood cultures no growth to date.     4/18/22: Today she presents to clinic accompanied by daughter. Denies worsening SOB/LE swelling. BP elevated in clinic, but she has not yet taken morning medications.     PHYSICAL:   Vitals:    04/18/22 1034   BP: (!) 161/70   Pulse: 61      Wt Readings from Last 3 Encounters:   04/18/22 59.2 kg (130 lb 8 oz)   04/12/22 59.3 kg (130 lb 11.7 oz)   03/28/22 60.6 kg (133 lb 9.6 oz)       JVD: no   Heart rhythm: regular  Cardiac murmur: No    S3: no  S4: no  Lungs: clear  Hepatojugular reflux: no  Edema: no      Echo 4/11/22:  · The left ventricle is normal in size with eccentric hypertrophy and low normal systolic function. The estimated ejection fraction is 50%.  · Moderate right ventricular enlargement with mildly to moderately reduced right ventricular systolic function.  · Grade III left ventricular diastolic dysfunction.  · Severe biatrial enlargement.  · There is mild aortic valve stenosis. Aortic valve area is 1.7 cm2; peak velocity is 1.4 m/s; mean gradient is 4 mmHg.  · Mild mitral regurgitation.  · Moderate tricuspid regurgitation.  · The estimated PA systolic pressure is 74 mmHg.  · Intermediate central venous pressure (8 mmHg).    ASSESSMENT: Chronic combined systolic and diastolic HF    PLAN:      Patient Instructions:    Instruct the patient to notify this clinic if HH, a physician or an advanced care provider wants to change medication one of their HF  medications    Activity and Diet restrictions:   o Recommend 2-3 gram sodium restriction and 1500cc- 2000cc fluid restriction.  o Encourage physical activity with graded exercise program.  o Requested patient to weigh themselves daily, and to notify us if their weight increases by more than 3 lbs in 1 day or 5 lbs in 3 days.    Assigned dry weight on home scale: 132-134lbs  Medication changes (include current dose and changed dose): NYHA Class III symptoms. Not on BB therapy 2/2 bradycardia. Continue lasix 20mg daily with instructions to take additional 20mg prn for worsening fluid symptoms. Unfortunately did not take morning medications prior to clinic visit, so hypertensive in clinic. Start low dose lisinopril for additional BP management/GDMT with close monitoring of BP. Prior TTE 4/2021 suspicious of cardiac amyloidosis, but  previous amyloid labs negative (2005). Plan to start aldactone 25mg daily next visit as labs allow  Upcoming labs and date anticipated: RTC in 2 weeks for repeat labs and follow up    Tabatha Shannon PA-C

## 2022-04-18 NOTE — PROGRESS NOTES
I have reviewed the notes, assessments, and/or procedures performed this visit, and I concur with the documentation for Ms. Inna Krzysztof.    Estefania Monique MD  Hospital Medicine Assistant Staff  [facility]

## 2022-04-20 ENCOUNTER — CARE AT HOME (OUTPATIENT)
Dept: HOME HEALTH SERVICES | Facility: CLINIC | Age: 87
End: 2022-04-20
Payer: MEDICARE

## 2022-04-20 DIAGNOSIS — I16.0 HYPERTENSIVE URGENCY: ICD-10-CM

## 2022-04-20 DIAGNOSIS — I48.19 PERSISTENT ATRIAL FIBRILLATION: Chronic | ICD-10-CM

## 2022-04-20 DIAGNOSIS — E78.49 OTHER HYPERLIPIDEMIA: Chronic | ICD-10-CM

## 2022-04-20 DIAGNOSIS — I10 ESSENTIAL HYPERTENSION: Chronic | ICD-10-CM

## 2022-04-20 DIAGNOSIS — N18.30 CONTROLLED TYPE 2 DIABETES MELLITUS WITH STAGE 3 CHRONIC KIDNEY DISEASE, WITHOUT LONG-TERM CURRENT USE OF INSULIN: ICD-10-CM

## 2022-04-20 DIAGNOSIS — E11.22 CONTROLLED TYPE 2 DIABETES MELLITUS WITH STAGE 3 CHRONIC KIDNEY DISEASE, WITHOUT LONG-TERM CURRENT USE OF INSULIN: ICD-10-CM

## 2022-04-20 DIAGNOSIS — I50.32 CHRONIC DIASTOLIC CONGESTIVE HEART FAILURE: ICD-10-CM

## 2022-04-20 PROCEDURE — 99350 PR HOME VISIT,ESTAB PATIENT,LEVEL IV: ICD-10-PCS | Mod: S$GLB,,, | Performed by: NURSE PRACTITIONER

## 2022-04-20 PROCEDURE — 99497 PR ADVNCD CARE PLAN 30 MIN: ICD-10-PCS | Mod: S$GLB,,, | Performed by: NURSE PRACTITIONER

## 2022-04-20 PROCEDURE — 99497 ADVNCD CARE PLAN 30 MIN: CPT | Mod: S$GLB,,, | Performed by: NURSE PRACTITIONER

## 2022-04-20 PROCEDURE — 99350 HOME/RES VST EST HIGH MDM 60: CPT | Mod: S$GLB,,, | Performed by: NURSE PRACTITIONER

## 2022-04-21 VITALS
HEART RATE: 56 BPM | TEMPERATURE: 98 F | OXYGEN SATURATION: 98 % | SYSTOLIC BLOOD PRESSURE: 131 MMHG | DIASTOLIC BLOOD PRESSURE: 60 MMHG | RESPIRATION RATE: 20 BRPM

## 2022-04-21 PROBLEM — I16.0 HYPERTENSIVE URGENCY: Status: RESOLVED | Noted: 2022-04-10 | Resolved: 2022-04-21

## 2022-04-22 NOTE — ASSESSMENT & PLAN NOTE
1. Denies chest pain, SOB  2. Continue medication as prescribed  3. Keep scheduled follow up appts  4. Activity and Diet restrictions:   ? Recommend 2-3 gram sodium restriction and 1500cc- 2000cc fluid restriction.  ? Encourage physical activity with graded exercise program.  ? Requested patient to weigh themselves daily, and to notify us if their weight increases by more than 3 lbs in 1 day or 5 lbs in 3 days.

## 2022-04-22 NOTE — PROGRESS NOTES
"[facility]  Medical Home Visit    Visit Date: 4/20/2022  Encounter Provider: Maria G Álvarez NP  PCP:  Stacy Rodriguez MD    Subjective:      Patient ID: Inna Blankenship is a 89 y.o. female.    Consult Requested By:  Jossy Cody  Reason for Consult: Medical Visit by Home Care Provider    The patient is being seen at home due to a physical debility that presents a taxing effort to leave the home, to mitigate high risk of hospital readmission or due to the limited availability of reliable or safe options for transportation to the point of access to the provider. The visit meets the criteria for medical necessity as defined by CMS as "health-care services needed to prevent, diagnose, or treat an illness, injury, condition, disease, or its symptoms and that meet accepted standards of medicine." Prior to treatment on this visit the chart was reviewed and patient consent was obtained.    Chief Complaint: Follow-up for chronic medical conditions and medication review    HPI:   Inna Blankenship is a 89 y.o. female with a PMHx of CHF, hypertension, AFib on Eliquis, CKD 3, DM 2, and osteopenia.  Recent hospital stay for CHF exacerbation.  She has followed up with Cardiology since hospital discharge.      Today:  Ms. Inna Blankenship is a 89 y.o. female. Inna presents at baseline state of health as reported by patient and caregiver. She is found resting in bed, no signs of distress.  She lives alone and daughter lives 2 houses down.  Daughter checks on her frequently throughout the day.  VSS. Denies SOB, chest pain, fevers, chills, cough, congestion, change in bladder habits, change in bowel habits. She is current with [facility].  Medications reviewed. Reports taking all medications as prescribed. Reports good bm pattern, sleep pattern, appetite.  No other needs identified at this time. Risks of environmental exposure to coronavirus discussed including: social distancing, hand hygiene, and limiting departures from the home " for necessities only.  Reports understanding and willingness to comply.      Attestation: Screening criteria to assess the level of the patient's risk for infection with COVID-19 as recommended by the CDC at the time of the above documented home visit concluded appropriateness to proceed. Universal precautions were maintained at all times, including provider use of >60% alcohol gel hand  immediately prior to entry and upon departing the patient's home as well as cleaning of equipment used in home visit with antibacterial/germicidal disposable wipes.     Review of Systems   Constitutional: Negative for chills and fever.   HENT: Negative for congestion, postnasal drip and rhinorrhea.    Eyes: Negative for visual disturbance.   Respiratory: Negative for chest tightness and shortness of breath.    Cardiovascular: Negative for chest pain and leg swelling.   Gastrointestinal: Negative for abdominal pain, diarrhea, nausea and vomiting.   Genitourinary: Negative for difficulty urinating.   Musculoskeletal: Negative for arthralgias and myalgias.   Skin: Negative for color change.   Neurological: Negative for dizziness and weakness.   Hematological: Does not bruise/bleed easily.   Psychiatric/Behavioral: Negative for agitation.       Assessments:  · Environmental: single story home, no steps to enter, adequate lighting and temperature control  · Functional Status:Assistance with ADL's/IADL's, ambulates with assistance of a cane/walker, continent of bowel and bladder  · Safety: Fall Precautions, COVID Precautions/Social Distancing/Mask Use  · Nutritional: Adequate  · Home Health: [facility]   · DME/Supplies: RW, raised toilet seat        Ethical / Legal: Advance Care Planning   Capacity to make medical decisions:  Yes, Conflict No  · Surrogate decision maker:  Name Cara, Relationship: Daughter  · Advance Directives:  No  · HCPOA: No  · [place]:  No  · Code Status:  Full code    Advanced Care Directives, HCPOA and  [place] forms left in the home for family review, discussion and signing with instructions to return upon their next provider encounter for inclusion to the medical record.  Discussed Advance Care Planning for 20 minutes.     Objective:     Vitals:    04/20/22 1500   BP: 131/60   Pulse: (!) 56   Resp: 20   Temp: 98.2 °F (36.8 °C)   SpO2: 98%   PainSc: 0-No pain     There is no height or weight on file to calculate BMI.    Physical Exam  HENT:      Head: Normocephalic and atraumatic.      Nose: No congestion or rhinorrhea.      Mouth/Throat:      Mouth: Mucous membranes are moist.   Cardiovascular:      Rate and Rhythm: Normal rate.      Pulses: Normal pulses.   Pulmonary:      Effort: No respiratory distress.      Breath sounds: Normal breath sounds.   Abdominal:      General: Bowel sounds are normal.      Palpations: Abdomen is soft.   Musculoskeletal:      Cervical back: Normal range of motion and neck supple.      Right lower leg: No edema.      Left lower leg: No edema.   Skin:     General: Skin is warm and dry.   Neurological:      Mental Status: She is alert. Mental status is at baseline.   Psychiatric:         Mood and Affect: Mood normal.         Assessment:     1. Hypertensive urgency    2. Essential hypertension    3. Other hyperlipidemia    4. Persistent atrial fibrillation    5. Chronic diastolic congestive heart failure    6. Controlled type 2 diabetes mellitus with stage 3 chronic kidney disease, without long-term current use of insulin      Plan:        Inna was seen today for establish care.    Diagnoses and all orders for this visit:        Problem List Items Addressed This Visit        Cardiac/Vascular    Essential hypertension (Chronic)    Current Assessment & Plan     Stable  Continue medications as prescribed  Low Na diet            Other hyperlipidemia (Chronic)    Current Assessment & Plan     Denies chest pain  Continue statin            Persistent atrial fibrillation (Chronic)    Current  Assessment & Plan     Heart rate controlled  Continue medications as prescribed  Continue to follow with Cardiology            Chronic diastolic congestive heart failure    Current Assessment & Plan     1. Denies chest pain, SOB  2. Continue medication as prescribed  3. Keep scheduled follow up appts  4. Activity and Diet restrictions:   ? Recommend 2-3 gram sodium restriction and 1500cc- 2000cc fluid restriction.  ? Encourage physical activity with graded exercise program.  ? Requested patient to weigh themselves daily, and to notify us if their weight increases by more than 3 lbs in 1 day or 5 lbs in 3 days.             RESOLVED: Hypertensive urgency       Endocrine    Controlled type 2 diabetes mellitus with stage 3 chronic kidney disease, without long-term current use of insulin    Current Assessment & Plan     Diabetic diet   DM and CKD followed by PCP                      Were controlled substances prescribed? NO    Instructions:  - [facility] Nurse Practitioner to schedule home follow-up visit with patient in 4-6 weeks or as needed.  - Continue all medications, treatments and therapies as ordered.   - Follow all instructions, recommendations as discussed.  - Maintain Safety Precautions at all times.  - Attend all medical appointments as scheduled.  - For worsening symptoms: call Primary Care Physician or Nurse Practitioner.  - For emergencies, call 911 or immediately report to the nearest emergency room.  - Limit Risks of environmental exposure to coronavirus/COVID-19 as discussed including: social distancing, hand hygiene, and limiting departures from the home for necessities only.         Follow Up Appointments:   Future Appointments   Date Time Provider Department Center   5/2/2022 10:00 AM LAB, APPOINTMENT [facility]   5/2/2022 10:30 AM Tabatha Shannon PA-C [facility]   6/3/2022  8:30 AM Lupis Lundberg MD [facility]   6/8/2022 10:30 AM Gucci Shin,  MD [facility]   6/17/2022 10:00 AM Stacy Rodriguez MD [facility]       Patient consent was obtained prior to treatment on this visit.    Signature:       Maria G Álvarez, MSN, APRN, FNP-C  [facility]    Total face-to-face time was 60 min, >50% of this was spent on counseling and coordination of care. The following issues were discussed: primary and secondary diagnoses, co-morbidities, prescribed medications, treatment modalities, importance of compliance with medical advice and directives for follow-up care

## 2022-04-22 NOTE — PATIENT INSTRUCTIONS
Instructions:  - [facility] to schedule home follow-up visit with patient in 4-6 weeks or as needed.  - Continue all medications, treatments and therapies as ordered.   - Follow all instructions, recommendations as discussed.  - Maintain Safety Precautions at all times.  - Attend all medical appointments as scheduled.  - For worsening symptoms: call Primary Care Physician or Nurse Practitioner.  - For emergencies, call 911 or immediately report to the nearest emergency room.  - Limit Risks of environmental exposure to coronavirus/COVID-19 as discussed including: social distancing, hand hygiene, and limiting departures from the home for necessities only.

## 2022-04-26 ENCOUNTER — EXTERNAL HOME HEALTH (OUTPATIENT)
Dept: HOME HEALTH SERVICES | Facility: HOSPITAL | Age: 87
End: 2022-04-26
Payer: MEDICARE

## 2022-04-28 ENCOUNTER — TELEPHONE (OUTPATIENT)
Dept: CARDIOLOGY | Facility: CLINIC | Age: 87
End: 2022-04-28
Payer: MEDICARE

## 2022-04-28 NOTE — TELEPHONE ENCOUNTER
"[facility]) weekly phone follow up / triage call completed.     TCC RN Navigator spoke with mother    Current Patient reported weight: 125lbs     Recent Patient reported blood pressure and heart rate: "great"    Pt reports the following:  []  Shortness of Breath with Activity  []  Shortness of Breath at rest   []  Fatigue  []  Edema   [] Chest pain or tightness  [] Weight Increase since discharge  [x] None of the above    Pt reports using "Daily weight and symptom tracker".     Pt reports being in the GREEN (color) Zone. If in yellow/red, reminded that they should be calling [facility] today or now.     Medications:    Medication compliance reviewed with pt.  Pt reports having medication list available and has all medications at home for use per list.     Education:   Confirmed pt still has "[facility] Home Care Guide"  .     Reminded of key points as listed below.      Recommend 2 -3 gram sodium restriction and 1500 cc-2000 cc fluid restriction.   Encourage physical activity with graded exercise program.   Requested patient to weigh themselves daily, and to notify us if their weight increases by more than 3 lbs in 1 day or 5 lbs in 3 days.   o Reminded to use "Daily weight and symptom tracker".  Even if pt does not have a scale, to use symptom tracker.       Watch for these Signs and Symptoms: If any of these occur, contact [facility] immediately:   Increase in shortness of breath with movement   Increase in swelling in your legs and ankles   Weight gain of more than 3 pounds in a day or 5 pounds in 3 days.   Difficulty breathing when you are lying down   Worsening fatigue or tiredness   Stomach bloating, a full feeling or a loss of appetite   Increased coughing--especially when you are lying down      Pt was able to verbalize back to RN in their own words correct diet/fluid restrictions, necessity for exercise, warning signs and symptoms, when and " how to contact their [facility] team.      Pt reminded of upcoming appointment.  PT reports they will attend.       Pt reminded of how and when to contact [facility]:  [phone] (Mon-Fri, 8a-5p) & for urgent issues on the weekend to page the Heart Transplant MD on call.  Pt also encouraged utilize [facility] messaging as well.      Pt  verbalized understanding and in agreement of plan.       Will follow up with pt at next clinic visit and RN navigator available for pt questions, issues or concerns.

## 2022-05-02 ENCOUNTER — OFFICE VISIT (OUTPATIENT)
Dept: CARDIOLOGY | Facility: CLINIC | Age: 87
End: 2022-05-02
Payer: MEDICARE

## 2022-05-02 ENCOUNTER — LAB VISIT (OUTPATIENT)
Dept: LAB | Facility: HOSPITAL | Age: 87
End: 2022-05-02
Payer: MEDICARE

## 2022-05-02 ENCOUNTER — EDUCATION (OUTPATIENT)
Dept: TRANSPLANT | Facility: CLINIC | Age: 87
End: 2022-05-02
Payer: MEDICARE

## 2022-05-02 VITALS
HEIGHT: 64 IN | SYSTOLIC BLOOD PRESSURE: 192 MMHG | TEMPERATURE: 98 F | OXYGEN SATURATION: 95 % | HEART RATE: 73 BPM | BODY MASS INDEX: 22.64 KG/M2 | RESPIRATION RATE: 20 BRPM | WEIGHT: 132.63 LBS | DIASTOLIC BLOOD PRESSURE: 79 MMHG

## 2022-05-02 DIAGNOSIS — E11.22 CONTROLLED TYPE 2 DIABETES MELLITUS WITH STAGE 3 CHRONIC KIDNEY DISEASE, WITHOUT LONG-TERM CURRENT USE OF INSULIN: ICD-10-CM

## 2022-05-02 DIAGNOSIS — N18.31 STAGE 3A CHRONIC KIDNEY DISEASE: ICD-10-CM

## 2022-05-02 DIAGNOSIS — I10 ESSENTIAL HYPERTENSION: Primary | Chronic | ICD-10-CM

## 2022-05-02 DIAGNOSIS — I50.32 CHRONIC DIASTOLIC CONGESTIVE HEART FAILURE: ICD-10-CM

## 2022-05-02 DIAGNOSIS — E78.49 OTHER HYPERLIPIDEMIA: Chronic | ICD-10-CM

## 2022-05-02 DIAGNOSIS — I48.19 PERSISTENT ATRIAL FIBRILLATION: Chronic | ICD-10-CM

## 2022-05-02 DIAGNOSIS — N18.30 CONTROLLED TYPE 2 DIABETES MELLITUS WITH STAGE 3 CHRONIC KIDNEY DISEASE, WITHOUT LONG-TERM CURRENT USE OF INSULIN: ICD-10-CM

## 2022-05-02 DIAGNOSIS — I50.814: ICD-10-CM

## 2022-05-02 LAB
ALBUMIN SERPL BCP-MCNC: 3.7 G/DL (ref 3.5–5.2)
ALP SERPL-CCNC: 91 U/L (ref 55–135)
ALT SERPL W/O P-5'-P-CCNC: 17 U/L (ref 10–44)
ANION GAP SERPL CALC-SCNC: 7 MMOL/L (ref 8–16)
AST SERPL-CCNC: 24 U/L (ref 10–40)
BILIRUB SERPL-MCNC: 0.8 MG/DL (ref 0.1–1)
BNP SERPL-MCNC: 387 PG/ML (ref 0–99)
BUN SERPL-MCNC: 14 MG/DL (ref 10–30)
CALCIUM SERPL-MCNC: 10.2 MG/DL (ref 8.7–10.5)
CHLORIDE SERPL-SCNC: 106 MMOL/L (ref 95–110)
CO2 SERPL-SCNC: 27 MMOL/L (ref 23–29)
CREAT SERPL-MCNC: 0.9 MG/DL (ref 0.5–1.4)
EST. GFR  (AFRICAN AMERICAN): >60 ML/MIN/1.73 M^2
EST. GFR  (NON AFRICAN AMERICAN): 54.1 ML/MIN/1.73 M^2
GLUCOSE SERPL-MCNC: 115 MG/DL (ref 70–110)
MAGNESIUM SERPL-MCNC: 1.8 MG/DL (ref 1.6–2.6)
PHOSPHATE SERPL-MCNC: 2.5 MG/DL (ref 2.7–4.5)
POTASSIUM SERPL-SCNC: 3.7 MMOL/L (ref 3.5–5.1)
PROT SERPL-MCNC: 7.7 G/DL (ref 6–8.4)
SODIUM SERPL-SCNC: 140 MMOL/L (ref 136–145)

## 2022-05-02 PROCEDURE — 83880 ASSAY OF NATRIURETIC PEPTIDE: CPT

## 2022-05-02 PROCEDURE — 84100 ASSAY OF PHOSPHORUS: CPT

## 2022-05-02 PROCEDURE — 99214 PR OFFICE/OUTPT VISIT, EST, LEVL IV, 30-39 MIN: ICD-10-PCS | Mod: S$GLB,,,

## 2022-05-02 PROCEDURE — 1126F PR PAIN SEVERITY QUANTIFIED, NO PAIN PRESENT: ICD-10-PCS | Mod: CPTII,S$GLB,,

## 2022-05-02 PROCEDURE — 99214 OFFICE O/P EST MOD 30 MIN: CPT | Mod: S$GLB,,,

## 2022-05-02 PROCEDURE — 83735 ASSAY OF MAGNESIUM: CPT

## 2022-05-02 PROCEDURE — 99999 PR PBB SHADOW E&M-EST. PATIENT-LVL IV: ICD-10-PCS | Mod: PBBFAC,,,

## 2022-05-02 PROCEDURE — 1159F MED LIST DOCD IN RCRD: CPT | Mod: CPTII,S$GLB,,

## 2022-05-02 PROCEDURE — 1160F RVW MEDS BY RX/DR IN RCRD: CPT | Mod: CPTII,S$GLB,,

## 2022-05-02 PROCEDURE — 1160F PR REVIEW ALL MEDS BY PRESCRIBER/CLIN PHARMACIST DOCUMENTED: ICD-10-PCS | Mod: CPTII,S$GLB,,

## 2022-05-02 PROCEDURE — 99999 PR PBB SHADOW E&M-EST. PATIENT-LVL IV: CPT | Mod: PBBFAC,,,

## 2022-05-02 PROCEDURE — 1159F PR MEDICATION LIST DOCUMENTED IN MEDICAL RECORD: ICD-10-PCS | Mod: CPTII,S$GLB,,

## 2022-05-02 PROCEDURE — 80053 COMPREHEN METABOLIC PANEL: CPT

## 2022-05-02 PROCEDURE — 1126F AMNT PAIN NOTED NONE PRSNT: CPT | Mod: CPTII,S$GLB,,

## 2022-05-02 NOTE — PROGRESS NOTES
Met with pt discussing 2g Na diet and 1500ml fluid restriction      Pt reports good yousif, no n/v/d/c, no prob chew /swallow.      Pt reports eating two meals per day. Pt eats eggs with grits and sausage or grits/oatmeal. breakfast varies. Pt will consistently drink one 8oz cup of coffee. Pt will then eat a second meal later in the day that will be a home meal or restaurant meal. Pt states she usually eats at home but likes hot sausage sandwiches and fried oyster po boys. Pt drinks 1500 ml of fluid with consumption of coffee, 1 bottle of water, and small glass of wine          recc'd pt to meet Na of 600 mg per meal and 2000 ml fluid. Pt was given recc to limit fluid and cut out alcohol within a timely manner. Will follow up in tcc as needed. Contact info and nutrition education was given

## 2022-05-02 NOTE — PROGRESS NOTES
HF TCC Provider Note (Follow-up) Consult Note      HPI:     Patient can walk minimally (couple feet) and pace slow. Presents to clinic in wheelchair. Denies SOB at rest              Patient sleeps on 2-3 number of pillows at BL              Patient wakes up SOB, has to get out of bed, associated cough- denies              Palpitations - denies              Dizzy, light-headed, pre-syncope or syncope- denies              Since discharge frequency of performing weights, home weight and weight change- performing daily weights, reports weight stable 128-129lbs on home scale. 126lbs on home scale this morning.               Other information felt pertinent to HPI: Ms. Inna Blankenship is a 89 yo female with a PMHx of CHF (suspicious of cardiac amyloidosis per TTE 4/2021- previous amyloid labs negative), HTN, AFib on Eliquis, bradycardia, CKD 3, DM 2, and osteopenia who presents to second HFTCC visit. She was admitted to observation for CHF exacerbation and hypertensive urgency. Patient started on CHF pathway and IV lasix 20 mg IV BID with resolution of hypervolemia. Down 1.7L. Troponin mildly elevated but flat, EKG without acute ischemia, suspect increased in setting of hypertensive urgency. Home amlodipine increased to 10 mg with good control of blood pressure. TTE obtained and showed EF 50%, grade III diastolic dysfunction, mild AS, mild MR, moderate TR, PASp 74 mmHg. Patient with asymptomatic bradycardia, EKG with sinus bradycardia. Qtc prolonged, potassium replaced. No concerning pauses or blocks on EKG. 1/2 blood cultures positive for strep, started on vancomycin, repeat blood cultures no growth to date.      4/18/22: Today she presents to clinic accompanied by daughter. Denies worsening SOB/LE swelling. BP elevated in clinic, but she has not yet taken morning medications.    5/2/22: Today her BP is again elevated in clinic, but she has not yet taken morning medications. Otherwise asymptomatic, denies CP, dizziness, HA,  blurred vision/diplopia, audible pulse, new onset weakness. Denies worsening SOB/swelling. Last visit started low dose lisinopril which she is tolerating well. Home BP in 110-120s/60s.     PHYSICAL:   Vitals:    05/02/22 1021   BP: (!) 192/79   Pulse: 73   Resp: 20   Temp: 98.3 °F (36.8 °C)      Wt Readings from Last 3 Encounters:   05/02/22 60.1 kg (132 lb 9.6 oz)   04/18/22 59.2 kg (130 lb 8 oz)   04/12/22 59.3 kg (130 lb 11.7 oz)     JVD: no   Heart rhythm: regular  Cardiac murmur: No    S3: no  S4: no  Lungs: clear  Hepatojugular reflux: yes  Edema: trace BLE edema    Lab Results   Component Value Date     05/02/2022    K 3.7 05/02/2022    MG 1.8 05/02/2022     05/02/2022    CO2 27 05/02/2022    BUN 14 05/02/2022    CREATININE 0.9 05/02/2022     (H) 05/02/2022    CALCIUM 10.2 05/02/2022    AST 24 05/02/2022    ALT 17 05/02/2022    ALBUMIN 3.7 05/02/2022    PROT 7.7 05/02/2022    BILITOT 0.8 05/02/2022     Lab Results   Component Value Date     (H) 05/02/2022     (H) 04/18/2022    BNP 1,085 (H) 04/10/2022       ASSESSMENT: Chronic combined systolic and diastolic HF    PLAN:      Patient Instructions:    Instruct the patient to notify this clinic if HH, a physician or an advanced care provider wants to change medication one of their HF medications    Activity and Diet restrictions:   o Recommend 2-3 gram sodium restriction and 1500cc- 2000cc fluid restriction.  o Encourage physical activity with graded exercise program.  o Requested patient to weigh themselves daily, and to notify us if their weight increases by more than 3 lbs in 1 day or 5 lbs in 3 days.    Assigned dry weight on home scale: 128-129lbs  Medication changes (include current dose and changed dose): NYHA Class III symptoms. Not on BB therapy 2/2 bradycardia. Continue lasix 20mg daily with instructions to take additional 20mg prn for worsening fluid symptoms. Unfortunately did not take morning medications prior to  clinic visit, so hypertensive in clinic. Home BP in 110-120s/60s following morning medications. Continue lisinopril 5mg daily. Prior TTE 4/2021 suspicious of cardiac amyloidosis, but previous amyloid labs negative (2005). Plan to start aldactone 25mg daily next visit as labs allow.    RD education provided during visit.    Upcoming labs and date anticipated: RTC in 2.5 weeks for repeat labs and follow up    Tabatha Shannon PA-C

## 2022-05-10 ENCOUNTER — TELEPHONE (OUTPATIENT)
Dept: CARDIOLOGY | Facility: CLINIC | Age: 87
End: 2022-05-10
Payer: MEDICARE

## 2022-05-10 NOTE — TELEPHONE ENCOUNTER
"[facility]) weekly phone follow up / triage call completed.     TCC RN Navigator spoke with daughter Cara    Current Patient reported weight: 130lbs     Pt reports the following:  []  Shortness of Breath with Activity  []  Shortness of Breath at rest   []  Fatigue  []  Edema   [] Chest pain or tightness  [x] Weight Increase since discharge - daughter gave additional lasix today.    [] None of the above    Pt reports using "Daily weight and symptom tracker".     Pt reports being in the GREEN to yellow (color) Zone. If in yellow/red, reminded that they should be calling HFTCC today or now.     Medications:    Medication compliance reviewed with pt.  Pt reports having medication list available and has all medications at home for use per list.     Education:   Confirmed pt still has "[facility] Home Care Guide"  .     Reminded of key points as listed below.      Recommend 2 -3 gram sodium restriction and 1500 cc-2000 cc fluid restriction.   Encourage physical activity with graded exercise program.   Requested patient to weigh themselves daily, and to notify us if their weight increases by more than 3 lbs in 1 day or 5 lbs in 3 days.   o Reminded to use "Daily weight and symptom tracker".  Even if pt does not have a scale, to use symptom tracker.       Watch for these Signs and Symptoms: If any of these occur, contact HFTCC immediately:   Increase in shortness of breath with movement   Increase in swelling in your legs and ankles   Weight gain of more than 3 pounds in a day or 5 pounds in 3 days.   Difficulty breathing when you are lying down   Worsening fatigue or tiredness   Stomach bloating, a full feeling or a loss of appetite   Increased coughing--especially when you are lying down      Pt was able to verbalize back to RN in their own words correct diet/fluid restrictions, necessity for exercise, warning signs and symptoms, when and how to " contact their HFTCC team.      Pt reminded of upcoming appointment.  PT reports they will attend.       Pt reminded of how and when to contact [facility]:  [phone] (Mon-Fri, 8a-5p) & for urgent issues on the weekend to page the Heart Transplant MD on call.  Pt also encouraged utilize [facility] messaging as well.      Pt  verbalized understanding and in agreement of plan.       Will follow up with pt at next clinic visit and RN navigator available for pt questions, issues or concerns.

## 2022-05-18 ENCOUNTER — OFFICE VISIT (OUTPATIENT)
Dept: CARDIOLOGY | Facility: CLINIC | Age: 87
End: 2022-05-18
Payer: MEDICARE

## 2022-05-18 ENCOUNTER — LAB VISIT (OUTPATIENT)
Dept: LAB | Facility: HOSPITAL | Age: 87
End: 2022-05-18
Payer: MEDICARE

## 2022-05-18 VITALS
HEART RATE: 68 BPM | HEIGHT: 64 IN | SYSTOLIC BLOOD PRESSURE: 157 MMHG | BODY MASS INDEX: 22.99 KG/M2 | DIASTOLIC BLOOD PRESSURE: 68 MMHG | WEIGHT: 134.69 LBS

## 2022-05-18 DIAGNOSIS — I50.32 CHRONIC DIASTOLIC CONGESTIVE HEART FAILURE: ICD-10-CM

## 2022-05-18 DIAGNOSIS — I48.19 PERSISTENT ATRIAL FIBRILLATION: Chronic | ICD-10-CM

## 2022-05-18 DIAGNOSIS — I50.814: ICD-10-CM

## 2022-05-18 DIAGNOSIS — N18.31 STAGE 3A CHRONIC KIDNEY DISEASE: ICD-10-CM

## 2022-05-18 DIAGNOSIS — E11.22 CONTROLLED TYPE 2 DIABETES MELLITUS WITH STAGE 3 CHRONIC KIDNEY DISEASE, WITHOUT LONG-TERM CURRENT USE OF INSULIN: ICD-10-CM

## 2022-05-18 DIAGNOSIS — E78.49 OTHER HYPERLIPIDEMIA: Chronic | ICD-10-CM

## 2022-05-18 DIAGNOSIS — I50.32 CHRONIC DIASTOLIC CONGESTIVE HEART FAILURE: Primary | ICD-10-CM

## 2022-05-18 DIAGNOSIS — N18.30 CONTROLLED TYPE 2 DIABETES MELLITUS WITH STAGE 3 CHRONIC KIDNEY DISEASE, WITHOUT LONG-TERM CURRENT USE OF INSULIN: ICD-10-CM

## 2022-05-18 DIAGNOSIS — I27.22 PULMONARY HYPERTENSION DUE TO LEFT HEART DISEASE: ICD-10-CM

## 2022-05-18 DIAGNOSIS — I10 ESSENTIAL HYPERTENSION: Chronic | ICD-10-CM

## 2022-05-18 LAB
ALBUMIN SERPL BCP-MCNC: 3.6 G/DL (ref 3.5–5.2)
ALP SERPL-CCNC: 87 U/L (ref 55–135)
ALT SERPL W/O P-5'-P-CCNC: 17 U/L (ref 10–44)
ANION GAP SERPL CALC-SCNC: 8 MMOL/L (ref 8–16)
AST SERPL-CCNC: 21 U/L (ref 10–40)
BILIRUB SERPL-MCNC: 0.8 MG/DL (ref 0.1–1)
BNP SERPL-MCNC: 644 PG/ML (ref 0–99)
BUN SERPL-MCNC: 18 MG/DL (ref 10–30)
CALCIUM SERPL-MCNC: 10.1 MG/DL (ref 8.7–10.5)
CHLORIDE SERPL-SCNC: 106 MMOL/L (ref 95–110)
CO2 SERPL-SCNC: 27 MMOL/L (ref 23–29)
CREAT SERPL-MCNC: 1 MG/DL (ref 0.5–1.4)
EST. GFR  (AFRICAN AMERICAN): 54.9 ML/MIN/1.73 M^2
EST. GFR  (NON AFRICAN AMERICAN): 47.7 ML/MIN/1.73 M^2
GLUCOSE SERPL-MCNC: 99 MG/DL (ref 70–110)
MAGNESIUM SERPL-MCNC: 1.8 MG/DL (ref 1.6–2.6)
PHOSPHATE SERPL-MCNC: 2.8 MG/DL (ref 2.7–4.5)
POTASSIUM SERPL-SCNC: 3.6 MMOL/L (ref 3.5–5.1)
PROT SERPL-MCNC: 7.4 G/DL (ref 6–8.4)
SODIUM SERPL-SCNC: 141 MMOL/L (ref 136–145)

## 2022-05-18 PROCEDURE — 1159F MED LIST DOCD IN RCRD: CPT | Mod: CPTII,S$GLB,,

## 2022-05-18 PROCEDURE — 3288F PR FALLS RISK ASSESSMENT DOCUMENTED: ICD-10-PCS | Mod: CPTII,S$GLB,,

## 2022-05-18 PROCEDURE — 1101F PT FALLS ASSESS-DOCD LE1/YR: CPT | Mod: CPTII,S$GLB,,

## 2022-05-18 PROCEDURE — 80053 COMPREHEN METABOLIC PANEL: CPT

## 2022-05-18 PROCEDURE — 3288F FALL RISK ASSESSMENT DOCD: CPT | Mod: CPTII,S$GLB,,

## 2022-05-18 PROCEDURE — 1160F RVW MEDS BY RX/DR IN RCRD: CPT | Mod: CPTII,S$GLB,,

## 2022-05-18 PROCEDURE — 84100 ASSAY OF PHOSPHORUS: CPT

## 2022-05-18 PROCEDURE — 1126F AMNT PAIN NOTED NONE PRSNT: CPT | Mod: CPTII,S$GLB,,

## 2022-05-18 PROCEDURE — 83880 ASSAY OF NATRIURETIC PEPTIDE: CPT

## 2022-05-18 PROCEDURE — 1126F PR PAIN SEVERITY QUANTIFIED, NO PAIN PRESENT: ICD-10-PCS | Mod: CPTII,S$GLB,,

## 2022-05-18 PROCEDURE — 83735 ASSAY OF MAGNESIUM: CPT

## 2022-05-18 PROCEDURE — 99214 OFFICE O/P EST MOD 30 MIN: CPT | Mod: S$GLB,,,

## 2022-05-18 PROCEDURE — 99214 PR OFFICE/OUTPT VISIT, EST, LEVL IV, 30-39 MIN: ICD-10-PCS | Mod: S$GLB,,,

## 2022-05-18 PROCEDURE — 1160F PR REVIEW ALL MEDS BY PRESCRIBER/CLIN PHARMACIST DOCUMENTED: ICD-10-PCS | Mod: CPTII,S$GLB,,

## 2022-05-18 PROCEDURE — 99999 PR PBB SHADOW E&M-EST. PATIENT-LVL IV: ICD-10-PCS | Mod: PBBFAC,,,

## 2022-05-18 PROCEDURE — 1101F PR PT FALLS ASSESS DOC 0-1 FALLS W/OUT INJ PAST YR: ICD-10-PCS | Mod: CPTII,S$GLB,,

## 2022-05-18 PROCEDURE — 99999 PR PBB SHADOW E&M-EST. PATIENT-LVL IV: CPT | Mod: PBBFAC,,,

## 2022-05-18 PROCEDURE — 1159F PR MEDICATION LIST DOCUMENTED IN MEDICAL RECORD: ICD-10-PCS | Mod: CPTII,S$GLB,,

## 2022-05-18 RX ORDER — FUROSEMIDE 20 MG/1
20 TABLET ORAL DAILY
Qty: 90 TABLET | Refills: 3 | Status: SHIPPED | OUTPATIENT
Start: 2022-05-18 | End: 2022-08-01 | Stop reason: SDUPTHER

## 2022-05-18 RX ORDER — FUROSEMIDE 20 MG/1
TABLET ORAL
Qty: 90 TABLET | Refills: 1 | Status: CANCELLED | OUTPATIENT
Start: 2022-05-18 | End: 2023-05-18

## 2022-05-18 NOTE — PATIENT INSTRUCTIONS
Take lasix twice daily for the next 2 days.    Resume lasix once daily on Friday.    Hold lisinopril and closely monitor blood pressure. Call us next week with blood pressure readings.

## 2022-05-18 NOTE — PROGRESS NOTES
HF TCC Provider Note (Follow-up) Consult Note      HPI:     Patient can walk minimally (couple feet) and pace slow. Presents to clinic in wheelchair. Denies worsening SOB or SOB at rest              Patient sleeps on 2-3 number of pillows at BL              Patient wakes up SOB, has to get out of bed, associated cough- denies. Today endorses nighttime dry cough for the past week              Palpitations - denies              Dizzy, light-headed, pre-syncope or syncope- denies              Since discharge frequency of performing weights, home weight and weight change- performing daily weights, previously weight stable at 128-129lbs on home scale. 130lbs on home scale today              Other information felt pertinent to HPI: Ms. Inna Blankenship is a 89 yo female with a PMHx of CHF (suspicious of cardiac amyloidosis per TTE 4/2021- previous amyloid labs negative), HTN, AFib on Eliquis, bradycardia, CKD 3, DM 2, and osteopenia who presents to third HFTCC visit. She was admitted to observation for CHF exacerbation and hypertensive urgency. Patient started on CHF pathway and IV lasix 20 mg IV BID with resolution of hypervolemia. Down 1.7L. Troponin mildly elevated but flat, EKG without acute ischemia, suspect increased in setting of hypertensive urgency. Home amlodipine increased to 10 mg with good control of blood pressure. TTE obtained and showed EF 50%, grade III diastolic dysfunction, mild AS, mild MR, moderate TR, PASp 74 mmHg. Patient with asymptomatic bradycardia, EKG with sinus bradycardia. Qtc prolonged, potassium replaced. No concerning pauses or blocks on EKG. 1/2 blood cultures positive for strep, started on vancomycin, repeat blood cultures no growth to date.      4/18/22: Today she presents to clinic accompanied by daughter. Denies worsening SOB/LE swelling. BP elevated in clinic, but she has not yet taken morning medications.    5/2/22: Today her BP is again elevated in clinic, but she has not yet taken  morning medications. Otherwise asymptomatic, denies CP, dizziness, HA, blurred vision/diplopia, audible pulse, new onset weakness. Denies worsening SOB/swelling. Last visit started low dose lisinopril which she is tolerating well. Home BP in 110-120s/60s.    5/18/22: Today her main complaint is morning fatigue since starting lisinopril 5mg daily. Endorses mild LE swelling for the past week, but denies noted worsening SOB. Currently taking lasix daily and has not required additional doses.      PHYSICAL:   Vitals:    05/18/22 1004   BP: (!) 157/68   Pulse: 68      Wt Readings from Last 3 Encounters:   05/18/22 61.1 kg (134 lb 11.2 oz)   05/02/22 60.1 kg (132 lb 9.6 oz)   04/18/22 59.2 kg (130 lb 8 oz)     JVD: measured at level of clavicle sitting  Heart rhythm: regular  Cardiac murmur: No    S3: no  S4: yes  Lungs: clear  Hepatojugular reflux: yes  Edema: 1+ BLE edema in ankles    Lab Results   Component Value Date     05/18/2022    K 3.6 05/18/2022    MG 1.8 05/18/2022     05/18/2022    CO2 27 05/18/2022    BUN 18 05/18/2022    CREATININE 1.0 05/18/2022    GLU 99 05/18/2022    CALCIUM 10.1 05/18/2022    AST 21 05/18/2022    ALT 17 05/18/2022    ALBUMIN 3.6 05/18/2022    PROT 7.4 05/18/2022    BILITOT 0.8 05/18/2022     Lab Results   Component Value Date     (H) 05/18/2022     (H) 05/02/2022     (H) 04/18/2022       ASSESSMENT: Chronic combined systolic and diastolic HF    PLAN:      Patient Instructions:    Instruct the patient to notify this clinic if HH, a physician or an advanced care provider wants to change medication one of their HF medications    Activity and Diet restrictions:   o Recommend 2-3 gram sodium restriction and 1500cc- 2000cc fluid restriction.  o Encourage physical activity with graded exercise program.  o Requested patient to weigh themselves daily, and to notify us if their weight increases by more than 3 lbs in 1 day or 5 lbs in 3 days.    Assigned dry  weight on home scale: 128-129lbs  Medication changes (include current dose and changed dose): NYHA Class III symptoms. Not on BB therapy 2/2 bradycardia. Instructed to increase lasix frequency to 20mg BID for the next 2 days, and then resume lasix 20mg daily. Additional instructions provided for future prn lasix dosing. Hold lisinopril due to subjective associated fatigue with close monitoring of symptoms and BP. Will reassess BP readings next week with tentative plan to resume lisinopril as needed vs starting different antihypertensive. Prior TTE 4/2021 suspicious of cardiac amyloidosis, but previous amyloid workup negative (2005). Recommend starting aldactone 25mg daily in future as tolerates.    Upcoming labs and date anticipated: Plan for tentative phone discharge next week pending reported BP readings.    Tabatha Shannon PA-C

## 2022-05-26 ENCOUNTER — TELEPHONE (OUTPATIENT)
Dept: CARDIOLOGY | Facility: CLINIC | Age: 87
End: 2022-05-26
Payer: MEDICARE

## 2022-05-26 ENCOUNTER — TELEPHONE (OUTPATIENT)
Dept: INTERNAL MEDICINE | Facility: CLINIC | Age: 87
End: 2022-05-26
Payer: MEDICARE

## 2022-05-26 NOTE — TELEPHONE ENCOUNTER
"Pt son called reporting concerns for pt that she "is not feeling well".      PT son conference called pt daughter.  Spoke with both.      Current Patient reported weight:  "same"     Recent Patient reported blood pressure and heart rate: "little high but mom is annoyed".     Pt reports the following:  []  Shortness of Breath with Activity  []  Shortness of Breath at rest   []  Fatigue  []  Edema   [] Chest pain or tightness  [] Weight Increase since discharge  [x] None of the above    Pt and son reports pt just keeps reporting "not feeling well".  She denies any HF symptoms, as above.  Pt does not want to "go to the doctor".  PT son and daughter report that she has had UTI prior that caused similar symptoms.      Medications:    Medication compliance reviewed with pt.  Pt reports having medication list available and has all medications at home for use per list.     Reviewed above with Tabatha Shannon, plan is for pt to call PCP for UTI eval.  Explained to pt family and they agree with plan.  Contact information for PCP shared.         Pt reminded of how and when to contact [facility]:  [phone] (Mon-Fri, 8a-5p) & for urgent issues on the weekend to page the Heart Transplant MD on call.  Pt also encouraged utilize [facility] messaging as well.      Pt **family verbalized understanding and in agreement of plan.       Will follow up with pt at next clinic visit and RN navigator available for pt questions, issues or concerns.   "

## 2022-05-26 NOTE — TELEPHONE ENCOUNTER
Called and spoke to daughter Cara about her mothers needing to come in sooner to see Dr Rodriguez. Patient of not feeling well. Medication change recently. Daughters want patient checked out. Appointment rescheduled for 6/1@10 am.

## 2022-05-27 RX ORDER — LISINOPRIL 2.5 MG/1
2.5 TABLET ORAL DAILY
Qty: 90 TABLET | Refills: 3 | Status: SHIPPED | OUTPATIENT
Start: 2022-05-27 | End: 2022-06-22

## 2022-05-27 NOTE — TELEPHONE ENCOUNTER
"[facility] Failure [facility](H[facility]) DISCHARGE VISIT - PHONE     Called and spoke to pt daughter     Most Recent Hospital Discharge Date: 4/12/22  Last admission Diagnosis/chief complaint:SOB.      BP 140s/60s, today 155/60.        Pt reports the following:  []  Shortness of Breath with activity  []  Shortness of Breath at rest   []  Fatigue  []  Edema   [] Chest pain or tightness  [] Weight Increase since discharge  [x] None of the above    Medications:    Medication reconciliation completed today per RN.  Pt reports having all medications available and understands how to take them appropriately. Reminded pt to call prior to making any changes to medications.     Education:   [x] Confirmed pt still has  "Heart Failure Transitional Care Clinic Home Care Guide" .   Reviewed key points as listed below.      Recommend 2 gram sodium restriction and 1500cc fluid restriction.   Encourage physical activity with graded exercise program.   Requested patient to weigh themselves daily, and to notify us if their weight increases by more than 3 lbs in 1 day or 5 lbs in 3 days.     [x] Reviewed completed "Daily Weight and Symptom Tracker".  Reviewed with patient when and how to call HFTCC according to "Yellow Zone" and "Red Zone".       Watch for these Signs and Symptoms: If any of these occur, contact HFTCC immediately:   Increase in shortness of breath with movement   Increase in swelling in your legs and ankles   Weight gain of more than 3 pounds in a night or 5 pounds in 3 days.   Difficulty breathing when you are lying down   Worsening fatigue or tiredness   Stomach bloating, a full feeling or a loss of appetite   Increased coughing--especially when you are lying down    MyChart and Care Companion:   Patient active on myChart? Yes, but patient does not use regularly    HF TCC Program Plan:  Pt has successfully completed HFTCC program.  Pt care to be transferred to Cardiology for long term care.     Pt " educated on how to call their offices and how to call [facility] On call in the event of an after hour issue.    PT reminded to continue to follow recommendations made during the HFTCC program to include monitoring daily weights, taking medications according to list, following up to appointments per provider recommendations, stop smoking/ start exercising and following a heart friendly low salt, low fluid diet.      Pt was able to verbalize back to RN in their own words correct diet/fluid restrictions, necessity for exercise, warning signs and symptoms, when and how to contact their  Long term care team .      Plan:     Pt doing very well without any complaints today.  BP continues to be 140s/70s at home. Reviewed with Tabatha Shannon -     Plan is for pt to start taking 2.5 mg lisinopril daily.        [x]  Discussed upcoming appointments and/or plan for follow-up care with his/her PCP/Cardiology - PCP appt 6/1/22.    Electronic hand off completed : epic note per Tabatha Shannon PA-C.     Please refer to provider note for additional details and assessment.

## 2022-06-01 ENCOUNTER — OFFICE VISIT (OUTPATIENT)
Dept: INTERNAL MEDICINE | Facility: CLINIC | Age: 87
End: 2022-06-01
Payer: MEDICARE

## 2022-06-01 ENCOUNTER — LAB VISIT (OUTPATIENT)
Dept: LAB | Facility: HOSPITAL | Age: 87
End: 2022-06-01
Attending: INTERNAL MEDICINE
Payer: MEDICARE

## 2022-06-01 DIAGNOSIS — I10 HYPERTENSION, UNSPECIFIED TYPE: ICD-10-CM

## 2022-06-01 DIAGNOSIS — I70.0 CALCIFICATION OF AORTA: ICD-10-CM

## 2022-06-01 DIAGNOSIS — I10 HYPERTENSION, UNSPECIFIED TYPE: Primary | ICD-10-CM

## 2022-06-01 DIAGNOSIS — Z00.00 PREVENTATIVE HEALTH CARE: ICD-10-CM

## 2022-06-01 LAB
ALBUMIN SERPL BCP-MCNC: 3.8 G/DL (ref 3.5–5.2)
ALP SERPL-CCNC: 90 U/L (ref 55–135)
ALT SERPL W/O P-5'-P-CCNC: 15 U/L (ref 10–44)
ANION GAP SERPL CALC-SCNC: 10 MMOL/L (ref 8–16)
AST SERPL-CCNC: 21 U/L (ref 10–40)
BASOPHILS # BLD AUTO: 0.05 K/UL (ref 0–0.2)
BASOPHILS NFR BLD: 0.8 % (ref 0–1.9)
BILIRUB SERPL-MCNC: 0.9 MG/DL (ref 0.1–1)
BUN SERPL-MCNC: 17 MG/DL (ref 10–30)
CALCIUM SERPL-MCNC: 10.2 MG/DL (ref 8.7–10.5)
CHLORIDE SERPL-SCNC: 108 MMOL/L (ref 95–110)
CO2 SERPL-SCNC: 21 MMOL/L (ref 23–29)
CREAT SERPL-MCNC: 0.9 MG/DL (ref 0.5–1.4)
DIFFERENTIAL METHOD: ABNORMAL
EOSINOPHIL # BLD AUTO: 0.1 K/UL (ref 0–0.5)
EOSINOPHIL NFR BLD: 1 % (ref 0–8)
ERYTHROCYTE [DISTWIDTH] IN BLOOD BY AUTOMATED COUNT: 15.1 % (ref 11.5–14.5)
EST. GFR  (AFRICAN AMERICAN): >60 ML/MIN/1.73 M^2
EST. GFR  (NON AFRICAN AMERICAN): 54.1 ML/MIN/1.73 M^2
GLUCOSE SERPL-MCNC: 197 MG/DL (ref 70–110)
HCT VFR BLD AUTO: 37.8 % (ref 37–48.5)
HGB BLD-MCNC: 11.8 G/DL (ref 12–16)
IMM GRANULOCYTES # BLD AUTO: 0.01 K/UL (ref 0–0.04)
IMM GRANULOCYTES NFR BLD AUTO: 0.2 % (ref 0–0.5)
LYMPHOCYTES # BLD AUTO: 1.2 K/UL (ref 1–4.8)
LYMPHOCYTES NFR BLD: 20.1 % (ref 18–48)
MCH RBC QN AUTO: 30 PG (ref 27–31)
MCHC RBC AUTO-ENTMCNC: 31.2 G/DL (ref 32–36)
MCV RBC AUTO: 96 FL (ref 82–98)
MONOCYTES # BLD AUTO: 0.4 K/UL (ref 0.3–1)
MONOCYTES NFR BLD: 6.9 % (ref 4–15)
NEUTROPHILS # BLD AUTO: 4.2 K/UL (ref 1.8–7.7)
NEUTROPHILS NFR BLD: 71 % (ref 38–73)
NRBC BLD-RTO: 0 /100 WBC
PLATELET # BLD AUTO: 192 K/UL (ref 150–450)
PMV BLD AUTO: 11.7 FL (ref 9.2–12.9)
POTASSIUM SERPL-SCNC: 4.1 MMOL/L (ref 3.5–5.1)
PROT SERPL-MCNC: 7.7 G/DL (ref 6–8.4)
RBC # BLD AUTO: 3.93 M/UL (ref 4–5.4)
SODIUM SERPL-SCNC: 139 MMOL/L (ref 136–145)
WBC # BLD AUTO: 5.91 K/UL (ref 3.9–12.7)

## 2022-06-01 PROCEDURE — 1126F PR PAIN SEVERITY QUANTIFIED, NO PAIN PRESENT: ICD-10-PCS | Mod: CPTII,S$GLB,, | Performed by: INTERNAL MEDICINE

## 2022-06-01 PROCEDURE — 3288F FALL RISK ASSESSMENT DOCD: CPT | Mod: CPTII,S$GLB,, | Performed by: INTERNAL MEDICINE

## 2022-06-01 PROCEDURE — 80053 COMPREHEN METABOLIC PANEL: CPT | Performed by: INTERNAL MEDICINE

## 2022-06-01 PROCEDURE — 1101F PT FALLS ASSESS-DOCD LE1/YR: CPT | Mod: CPTII,S$GLB,, | Performed by: INTERNAL MEDICINE

## 2022-06-01 PROCEDURE — 99999 PR PBB SHADOW E&M-EST. PATIENT-LVL III: ICD-10-PCS | Mod: PBBFAC,,, | Performed by: INTERNAL MEDICINE

## 2022-06-01 PROCEDURE — 1126F AMNT PAIN NOTED NONE PRSNT: CPT | Mod: CPTII,S$GLB,, | Performed by: INTERNAL MEDICINE

## 2022-06-01 PROCEDURE — 99999 PR PBB SHADOW E&M-EST. PATIENT-LVL III: CPT | Mod: PBBFAC,,, | Performed by: INTERNAL MEDICINE

## 2022-06-01 PROCEDURE — 99397 PER PM REEVAL EST PAT 65+ YR: CPT | Mod: S$GLB,,, | Performed by: INTERNAL MEDICINE

## 2022-06-01 PROCEDURE — 1101F PR PT FALLS ASSESS DOC 0-1 FALLS W/OUT INJ PAST YR: ICD-10-PCS | Mod: CPTII,S$GLB,, | Performed by: INTERNAL MEDICINE

## 2022-06-01 PROCEDURE — 99397 PR PREVENTIVE VISIT,EST,65 & OVER: ICD-10-PCS | Mod: S$GLB,,, | Performed by: INTERNAL MEDICINE

## 2022-06-01 PROCEDURE — 85025 COMPLETE CBC W/AUTO DIFF WBC: CPT | Performed by: INTERNAL MEDICINE

## 2022-06-01 PROCEDURE — 3288F PR FALLS RISK ASSESSMENT DOCUMENTED: ICD-10-PCS | Mod: CPTII,S$GLB,, | Performed by: INTERNAL MEDICINE

## 2022-06-01 PROCEDURE — 36415 COLL VENOUS BLD VENIPUNCTURE: CPT | Performed by: INTERNAL MEDICINE

## 2022-06-02 NOTE — PROGRESS NOTES
HPI     Glaucoma     Comments: 4 month ck and pt states no changes since last exam               Comments     DLS: 2/1/22    1) LTG OD>OS  2) Blepharitis/MGD  3) PCO OU  4) PCIOL OU  5) PCIOL OU    MEDS:  Combigan BID OU = NOT USING DROPS  Latanoprost QHS OU = NOT USING DROPS  *PT STATES SHE WAS TOLD TO STOP DROPS PER CARDIOLOGY**          Last edited by Lamar Tinajero MA on 6/3/2022  8:28 AM. (History)              Assessment /Plan     For exam results, see Encounter Report.    Low-tension glaucoma of both eyes, moderate stage    Posterior capsular opacification non visually significant of both eyes - Both Eyes    Eyelid inflammation - Both Eyes    Non-insulin dependent type 2 diabetes mellitus    Superficial punctate keratitis of left eye    Dry eyes, bilateral    Pseudophakia - Both Eyes        1.  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 Present since 2006.     Family history   ??  Glaucoma meds   Off all gtts ( family states they were told to stop all glaucoma drops by cardiologist )   H/O adverse rxn to glaucoma drops  Avoid all BB -  COMBIGAN / timolol  stopped by PCP/cardiologist.   LASERS   none  GLAUCOMA SURGERIES  : none   OTHER EYE SURGERIES  : PCIOL ou- OD- 3/15/06, 9/27/06 OS   CDR  0.85-0.9 w/ sup thinning // 0.7  Tbase  11-15 / 11-13  Tmax      15/15 (off  gtts 6/3/2022)   Ttarget   12-13 ou   HVF  17 test 2002 to 2019 - -abnl high sens  // non specific - unreliable ou - stable   Gonio  +2-3 OU (narrow inf - but doubt pupillary block - PC IOL ou)  CCT  561/567  OCT  8 test 2007 to 2022 - RNFL - bord G/TI  od // nl os   HRT 14 test 2007 to 2019- MR -  Dec. S/N/I od // dec S/I os /// CDR 0.83 od // 0.74 os  Disc photos  2002, 2003 - slides // 2007, 2012, 2014, 2015, 2018   - OIS     Ttoday  15/15 (( at target of 12 - 13 ou ))  Test done today: IOP // review glaucoma drops and re-start latanoprost - not known to  have cardiac side effects      2. PCO ou  -mild - monitor - oustide vis axis      3. MGD /eyelid Inflammation / Blepharitis  WC/LH/AT's     4. Pseudophakia ou - PC IOL ou   -status post cataract extraction and insertion of intraocular lens - Both Eyes   -minimal PCO ou      5 CHF - hsopitalized  in early 2019   -was taken off the BB for a while - but the cardiologist says it is ok to use and she is back on it again 2/18/2019        Plan    POAG - Low Tension Component OD > OS  Above target ou today - somehow got off ALL glaucoma drops - not just the BB's   Target is 12 ou   Re-start latanoprost - 1 drop ou q day (( message sent to cardiologist Tabatha Shannon) )     - Latanoprost ou q hs (disp 3 bottles at a time)     Patient c/o of trouble with reading- recheck current Rx with good lighting conditions- patient sees 20/20- not more clear with change in Rx- continue Rx for now but use reading light.     Will message cardiolgist about re-starting latanoprost ( Tabatha Shannon)     RTC 4 months = IOP back on latanoprost ou // gonio  - pt is 89 years old - NO MORE VF testing

## 2022-06-03 ENCOUNTER — OFFICE VISIT (OUTPATIENT)
Dept: OPHTHALMOLOGY | Facility: CLINIC | Age: 87
End: 2022-06-03
Payer: MEDICARE

## 2022-06-03 DIAGNOSIS — H16.142 SUPERFICIAL PUNCTATE KERATITIS OF LEFT EYE: ICD-10-CM

## 2022-06-03 DIAGNOSIS — H40.1232 LOW-TENSION GLAUCOMA OF BOTH EYES, MODERATE STAGE: Primary | ICD-10-CM

## 2022-06-03 DIAGNOSIS — H26.493 POSTERIOR CAPSULAR OPACIFICATION NON VISUALLY SIGNIFICANT OF BOTH EYES: ICD-10-CM

## 2022-06-03 DIAGNOSIS — Z96.1 PSEUDOPHAKIA: ICD-10-CM

## 2022-06-03 DIAGNOSIS — H04.123 DRY EYES, BILATERAL: ICD-10-CM

## 2022-06-03 DIAGNOSIS — E11.9 NON-INSULIN DEPENDENT TYPE 2 DIABETES MELLITUS: ICD-10-CM

## 2022-06-03 DIAGNOSIS — H01.9 EYELID INFLAMMATION: ICD-10-CM

## 2022-06-03 PROCEDURE — 1126F PR PAIN SEVERITY QUANTIFIED, NO PAIN PRESENT: ICD-10-PCS | Mod: CPTII,S$GLB,, | Performed by: OPHTHALMOLOGY

## 2022-06-03 PROCEDURE — 1160F RVW MEDS BY RX/DR IN RCRD: CPT | Mod: CPTII,S$GLB,, | Performed by: OPHTHALMOLOGY

## 2022-06-03 PROCEDURE — 1101F PT FALLS ASSESS-DOCD LE1/YR: CPT | Mod: CPTII,S$GLB,, | Performed by: OPHTHALMOLOGY

## 2022-06-03 PROCEDURE — 99999 PR PBB SHADOW E&M-EST. PATIENT-LVL III: CPT | Mod: PBBFAC,,, | Performed by: OPHTHALMOLOGY

## 2022-06-03 PROCEDURE — 1101F PR PT FALLS ASSESS DOC 0-1 FALLS W/OUT INJ PAST YR: ICD-10-PCS | Mod: CPTII,S$GLB,, | Performed by: OPHTHALMOLOGY

## 2022-06-03 PROCEDURE — 92012 PR EYE EXAM, EST PATIENT,INTERMED: ICD-10-PCS | Mod: S$GLB,,, | Performed by: OPHTHALMOLOGY

## 2022-06-03 PROCEDURE — 3288F PR FALLS RISK ASSESSMENT DOCUMENTED: ICD-10-PCS | Mod: CPTII,S$GLB,, | Performed by: OPHTHALMOLOGY

## 2022-06-03 PROCEDURE — 3288F FALL RISK ASSESSMENT DOCD: CPT | Mod: CPTII,S$GLB,, | Performed by: OPHTHALMOLOGY

## 2022-06-03 PROCEDURE — 1126F AMNT PAIN NOTED NONE PRSNT: CPT | Mod: CPTII,S$GLB,, | Performed by: OPHTHALMOLOGY

## 2022-06-03 PROCEDURE — 1159F PR MEDICATION LIST DOCUMENTED IN MEDICAL RECORD: ICD-10-PCS | Mod: CPTII,S$GLB,, | Performed by: OPHTHALMOLOGY

## 2022-06-03 PROCEDURE — 92012 INTRM OPH EXAM EST PATIENT: CPT | Mod: S$GLB,,, | Performed by: OPHTHALMOLOGY

## 2022-06-03 PROCEDURE — 99999 PR PBB SHADOW E&M-EST. PATIENT-LVL III: ICD-10-PCS | Mod: PBBFAC,,, | Performed by: OPHTHALMOLOGY

## 2022-06-03 PROCEDURE — 1160F PR REVIEW ALL MEDS BY PRESCRIBER/CLIN PHARMACIST DOCUMENTED: ICD-10-PCS | Mod: CPTII,S$GLB,, | Performed by: OPHTHALMOLOGY

## 2022-06-03 PROCEDURE — 1159F MED LIST DOCD IN RCRD: CPT | Mod: CPTII,S$GLB,, | Performed by: OPHTHALMOLOGY

## 2022-06-03 RX ORDER — LATANOPROST 50 UG/ML
1 SOLUTION/ DROPS OPHTHALMIC DAILY
Qty: 7.5 ML | Refills: 3 | Status: SHIPPED | OUTPATIENT
Start: 2022-06-03 | End: 2023-09-22

## 2022-06-03 NOTE — Clinical Note
Hello, you saw this patient 5/18/2022. The family understood you or the after visit summary to indicate that they should stop ALL glaucoma drops which she was using for low/normal tension glaucoma. She had already been instructed to stop the beta blockers such as combigan or timolol. But they also stopped the latanoprost - which is a prostaglandin analog. And to the best of my knowledge does not have any cardiac contra- indications. I have asked them to re-start latanoprost - one drop in each eye once a day. If for any reason this is not ok with you , please let me know .

## 2022-06-04 VITALS
WEIGHT: 121.25 LBS | OXYGEN SATURATION: 95 % | HEART RATE: 65 BPM | DIASTOLIC BLOOD PRESSURE: 72 MMHG | HEIGHT: 64 IN | TEMPERATURE: 99 F | BODY MASS INDEX: 20.7 KG/M2 | SYSTOLIC BLOOD PRESSURE: 138 MMHG

## 2022-06-04 NOTE — PROGRESS NOTES
Subjective:       Patient ID: Inna Blankenship is a 89 y.o. female.    Chief Complaint: Annual Exam    HPI  She is here for annual exam.  Currently without complaint    PAST MEDICAL HISTORY: Hypertension, hyperlipidemia, diabetes  (diet   Controlled), congestive heart failure, pulmonary hypertension, aortic regurgitation,  atrial fibrillation, DVT, glaucoma , colon adenoma. She had a colonoscopy February 2011     PAST SURGICAL HISTORY: Hysterectomy.     MEDICATIONS:  Xalatan drops, Lipitor 20 mg daily, Eliquis 2.5 mg twice a day ,  Lasix 20 mg daily, Norvasc 10 mg daily    ALLERGIES: No known drug allergies.        Review of Systems   Constitutional: Negative for chills, fatigue, fever and unexpected weight change.   Respiratory: Negative for chest tightness and shortness of breath.    Cardiovascular: Negative for chest pain and palpitations.   Gastrointestinal: Negative for abdominal pain and blood in stool.   Neurological: Negative for dizziness, syncope, numbness and headaches.       Objective:      Physical Exam  HENT:      Right Ear: External ear normal.      Left Ear: External ear normal.      Nose: Nose normal.      Mouth/Throat:      Mouth: Mucous membranes are moist.      Pharynx: Oropharynx is clear.   Eyes:      Pupils: Pupils are equal, round, and reactive to light.   Cardiovascular:      Rate and Rhythm: Normal rate and regular rhythm.      Heart sounds: Murmur heard.   Pulmonary:      Breath sounds: Normal breath sounds.   Chest:   Breasts:      Right: No axillary adenopathy.      Left: No axillary adenopathy.       Abdominal:      General: There is no distension.      Palpations: There is no hepatomegaly or splenomegaly.      Tenderness: There is no abdominal tenderness.   Musculoskeletal:      Cervical back: Normal range of motion.   Lymphadenopathy:      Cervical: No cervical adenopathy.      Upper Body:      Right upper body: No axillary adenopathy.      Left upper body: No axillary adenopathy.    Neurological:      Cranial Nerves: No cranial nerve deficit.      Sensory: No sensory deficit.      Motor: Motor function is intact.      Deep Tendon Reflexes: Reflexes are normal and symmetric.         Assessment/Plan       Assessment and plan:  Annual exam.  Check CMP and CBC.  Discussed mammogram, breast exam, bone density, Pap smear, pelvic exam, colonoscopy.  She declined all

## 2022-06-09 ENCOUNTER — TELEPHONE (OUTPATIENT)
Dept: INTERNAL MEDICINE | Facility: CLINIC | Age: 87
End: 2022-06-09
Payer: MEDICARE

## 2022-06-09 NOTE — TELEPHONE ENCOUNTER
----- Message from Elizabeth Styles MA sent at 6/9/2022  9:27 AM CDT -----  Contact: [facility] [place] - [phone]    ----- Message -----  From: Serena Anna  Sent: 6/9/2022   9:10 AM CDT  To: Jennifer CASTILLO Staff    Caller:  [facility] [place] - [phone]    Reason: requesting order to be submitted / to complete re-cert to insure heart failure is under control before being discharged  // if [place] doesn't answer, please leave verbal confirmation on her voicemail

## 2022-06-12 PROCEDURE — G0179 PR HOME HEALTH MD RECERTIFICATION: ICD-10-PCS | Mod: ,,, | Performed by: INTERNAL MEDICINE

## 2022-06-12 PROCEDURE — G0179 MD RECERTIFICATION HHA PT: HCPCS | Mod: ,,, | Performed by: INTERNAL MEDICINE

## 2022-06-15 ENCOUNTER — CARE AT HOME (OUTPATIENT)
Dept: HOME HEALTH SERVICES | Facility: CLINIC | Age: 87
End: 2022-06-15
Payer: MEDICARE

## 2022-06-15 DIAGNOSIS — N18.30 CONTROLLED TYPE 2 DIABETES MELLITUS WITH STAGE 3 CHRONIC KIDNEY DISEASE, WITHOUT LONG-TERM CURRENT USE OF INSULIN: ICD-10-CM

## 2022-06-15 DIAGNOSIS — I48.19 PERSISTENT ATRIAL FIBRILLATION: Chronic | ICD-10-CM

## 2022-06-15 DIAGNOSIS — I10 ESSENTIAL HYPERTENSION: Chronic | ICD-10-CM

## 2022-06-15 DIAGNOSIS — I50.32 CHRONIC DIASTOLIC CONGESTIVE HEART FAILURE: ICD-10-CM

## 2022-06-15 DIAGNOSIS — N18.31 STAGE 3A CHRONIC KIDNEY DISEASE: ICD-10-CM

## 2022-06-15 DIAGNOSIS — E11.22 CONTROLLED TYPE 2 DIABETES MELLITUS WITH STAGE 3 CHRONIC KIDNEY DISEASE, WITHOUT LONG-TERM CURRENT USE OF INSULIN: ICD-10-CM

## 2022-06-15 PROCEDURE — 99350 HOME/RES VST EST HIGH MDM 60: CPT | Mod: S$GLB,,, | Performed by: NURSE PRACTITIONER

## 2022-06-15 PROCEDURE — 99350 PR HOME VISIT,ESTAB PATIENT,LEVEL IV: ICD-10-PCS | Mod: S$GLB,,, | Performed by: NURSE PRACTITIONER

## 2022-06-22 ENCOUNTER — OFFICE VISIT (OUTPATIENT)
Dept: CARDIOLOGY | Facility: CLINIC | Age: 87
End: 2022-06-22
Payer: MEDICARE

## 2022-06-22 VITALS
DIASTOLIC BLOOD PRESSURE: 64 MMHG | HEART RATE: 62 BPM | HEIGHT: 64 IN | SYSTOLIC BLOOD PRESSURE: 158 MMHG | BODY MASS INDEX: 23.04 KG/M2 | WEIGHT: 134.94 LBS

## 2022-06-22 DIAGNOSIS — I50.32 CHRONIC DIASTOLIC CONGESTIVE HEART FAILURE: ICD-10-CM

## 2022-06-22 DIAGNOSIS — E11.22 CONTROLLED TYPE 2 DIABETES MELLITUS WITH STAGE 3 CHRONIC KIDNEY DISEASE, WITHOUT LONG-TERM CURRENT USE OF INSULIN: ICD-10-CM

## 2022-06-22 DIAGNOSIS — N18.31 STAGE 3A CHRONIC KIDNEY DISEASE: ICD-10-CM

## 2022-06-22 DIAGNOSIS — N18.30 CONTROLLED TYPE 2 DIABETES MELLITUS WITH STAGE 3 CHRONIC KIDNEY DISEASE, WITHOUT LONG-TERM CURRENT USE OF INSULIN: ICD-10-CM

## 2022-06-22 DIAGNOSIS — I50.33 ACUTE ON CHRONIC DIASTOLIC CONGESTIVE HEART FAILURE: ICD-10-CM

## 2022-06-22 DIAGNOSIS — E78.49 OTHER HYPERLIPIDEMIA: Chronic | ICD-10-CM

## 2022-06-22 DIAGNOSIS — Z86.718 HISTORY OF DVT (DEEP VEIN THROMBOSIS): ICD-10-CM

## 2022-06-22 DIAGNOSIS — I27.22 PULMONARY HYPERTENSION DUE TO LEFT HEART DISEASE: Primary | ICD-10-CM

## 2022-06-22 DIAGNOSIS — I48.19 PERSISTENT ATRIAL FIBRILLATION: Chronic | ICD-10-CM

## 2022-06-22 DIAGNOSIS — I70.0 CALCIFICATION OF AORTA: ICD-10-CM

## 2022-06-22 DIAGNOSIS — I10 ESSENTIAL HYPERTENSION: Chronic | ICD-10-CM

## 2022-06-22 DIAGNOSIS — I50.814: ICD-10-CM

## 2022-06-22 PROCEDURE — 1126F AMNT PAIN NOTED NONE PRSNT: CPT | Mod: CPTII,S$GLB,, | Performed by: INTERNAL MEDICINE

## 2022-06-22 PROCEDURE — 99214 PR OFFICE/OUTPT VISIT, EST, LEVL IV, 30-39 MIN: ICD-10-PCS | Mod: S$GLB,,, | Performed by: INTERNAL MEDICINE

## 2022-06-22 PROCEDURE — 99999 PR PBB SHADOW E&M-EST. PATIENT-LVL III: ICD-10-PCS | Mod: PBBFAC,,, | Performed by: INTERNAL MEDICINE

## 2022-06-22 PROCEDURE — 1101F PT FALLS ASSESS-DOCD LE1/YR: CPT | Mod: CPTII,S$GLB,, | Performed by: INTERNAL MEDICINE

## 2022-06-22 PROCEDURE — 99999 PR PBB SHADOW E&M-EST. PATIENT-LVL III: CPT | Mod: PBBFAC,,, | Performed by: INTERNAL MEDICINE

## 2022-06-22 PROCEDURE — 1101F PR PT FALLS ASSESS DOC 0-1 FALLS W/OUT INJ PAST YR: ICD-10-PCS | Mod: CPTII,S$GLB,, | Performed by: INTERNAL MEDICINE

## 2022-06-22 PROCEDURE — 1159F PR MEDICATION LIST DOCUMENTED IN MEDICAL RECORD: ICD-10-PCS | Mod: CPTII,S$GLB,, | Performed by: INTERNAL MEDICINE

## 2022-06-22 PROCEDURE — 1159F MED LIST DOCD IN RCRD: CPT | Mod: CPTII,S$GLB,, | Performed by: INTERNAL MEDICINE

## 2022-06-22 PROCEDURE — 99214 OFFICE O/P EST MOD 30 MIN: CPT | Mod: S$GLB,,, | Performed by: INTERNAL MEDICINE

## 2022-06-22 PROCEDURE — 1126F PR PAIN SEVERITY QUANTIFIED, NO PAIN PRESENT: ICD-10-PCS | Mod: CPTII,S$GLB,, | Performed by: INTERNAL MEDICINE

## 2022-06-22 PROCEDURE — 3288F PR FALLS RISK ASSESSMENT DOCUMENTED: ICD-10-PCS | Mod: CPTII,S$GLB,, | Performed by: INTERNAL MEDICINE

## 2022-06-22 PROCEDURE — 3288F FALL RISK ASSESSMENT DOCD: CPT | Mod: CPTII,S$GLB,, | Performed by: INTERNAL MEDICINE

## 2022-06-22 RX ORDER — LISINOPRIL 5 MG/1
5 TABLET ORAL DAILY
Qty: 90 TABLET | Refills: 3 | Status: SHIPPED | OUTPATIENT
Start: 2022-06-22 | End: 2022-08-03 | Stop reason: ALTCHOICE

## 2022-06-22 NOTE — PROGRESS NOTES
Subjective:    Patient ID:  Inna Blankenship is a 89 y.o. female who presents for follow-up of HFpEF    HPI     The patient is a 89  year old female followed with HFpEF, paroxsymal  atrial fibrillation ,diabetes, hypertension, hyperlipidemia, DVT  and CKD. She was admitted 4/10/22 for 2 days with an exacerbation of CHF. Her weight is 7# above her last visit, Her BPs have been high.  Lab Results   Component Value Date     06/01/2022    K 4.1 06/01/2022     06/01/2022    CO2 21 (L) 06/01/2022    BUN 17 06/01/2022    CREATININE 0.9 06/01/2022     (H) 06/01/2022    HGBA1C 5.7 (H) 04/11/2022    MG 1.8 05/18/2022    AST 21 06/01/2022    ALT 15 06/01/2022    ALBUMIN 3.8 06/01/2022    PROT 7.7 06/01/2022    BILITOT 0.9 06/01/2022    WBC 5.91 06/01/2022    HGB 11.8 (L) 06/01/2022    HCT 37.8 06/01/2022    MCV 96 06/01/2022     06/01/2022    INR 1.3 (H) 04/17/2021    TSH 5.144 (H) 04/10/2022         Lab Results   Component Value Date    CHOL 142 11/18/2020    HDL 52 11/18/2020    TRIG 49 11/18/2020       Lab Results   Component Value Date    LDLCALC 80.2 11/18/2020       Past Medical History:   Diagnosis Date    Arthritis     CHF (congestive heart failure) 12/26/2018    Chronic atrial fibrillation 8/29/2020    Chronic kidney disease, stage III (moderate) 9/11/2015    Colon adenoma     Diabetes mellitus, type II     Diet controlled    Glaucoma     Hyperlipemia     Hypertension     Osteopenia        Current Outpatient Medications:     amLODIPine (NORVASC) 10 MG tablet, Take 1 tablet (10 mg total) by mouth once daily., Disp: 30 tablet, Rfl: 3    atorvastatin (LIPITOR) 20 MG tablet, Take 1 tablet (20 mg total) by mouth once daily., Disp: 90 tablet, Rfl: 0    CALCIUM CARBONATE ([id number] ORAL), Take 1 tablet by mouth Daily. 1  Oral Every day, Disp: , Rfl:     ELIQUIS 2.5 mg Tab, TAKE 1 TABLET TWICE DAILY, Disp: 180 tablet, Rfl: 3    EScitalopram oxalate (LEXAPRO) 10 MG tablet, Take 1 tablet  (10 mg total) by mouth once daily., Disp: 30 tablet, Rfl: 11    furosemide (LASIX) 20 MG tablet, Take 1 tablet (20 mg total) by mouth once daily. Take an additional 20mg lasix in the afternoon as needed for worsening shortness of breath, swelling, or 3lbs weight gain in 1 day/5lb weight gain in 3 days, Disp: 90 tablet, Rfl: 3    latanoprost 0.005 % ophthalmic solution, Place 1 drop into both eyes once daily., Disp: 7.5 mL, Rfl: 3    multivit with minerals/lutein (MULTIVITAMIN 50 PLUS ORAL), Take 1 tablet by mouth once daily. , Disp: , Rfl:     lisinopriL (PRINIVIL,ZESTRIL) 5 MG tablet, Take 1 tablet (5 mg total) by mouth once daily., Disp: 90 tablet, Rfl: 3          Review of Systems   Constitutional: Positive for weight gain. Negative for decreased appetite, diaphoresis, fever, malaise/fatigue and weight loss.   HENT: Negative for congestion, ear discharge, ear pain and nosebleeds.    Eyes: Negative for blurred vision, double vision and visual disturbance.   Cardiovascular: Negative for chest pain, claudication, cyanosis, dyspnea on exertion, irregular heartbeat, leg swelling, near-syncope, orthopnea, palpitations, paroxysmal nocturnal dyspnea and syncope.   Respiratory: Negative for cough, hemoptysis, shortness of breath, sleep disturbances due to breathing, snoring, sputum production and wheezing.    Endocrine: Negative for polydipsia, polyphagia and polyuria.   Hematologic/Lymphatic: Negative for adenopathy and bleeding problem. Does not bruise/bleed easily.   Skin: Negative for color change, nail changes, poor wound healing and rash.   Musculoskeletal: Negative for muscle cramps and muscle weakness.   Gastrointestinal: Negative for abdominal pain, anorexia, change in bowel habit, hematochezia, nausea and vomiting.   Genitourinary: Negative for dysuria, frequency and hematuria.   Neurological: Negative for brief paralysis, difficulty with concentration, excessive daytime sleepiness, dizziness, focal  "weakness, headaches, light-headedness, seizures, vertigo and weakness.   Psychiatric/Behavioral: Negative for altered mental status and depression.   Allergic/Immunologic: Negative for persistent infections.        Objective:BP (!) 158/64 (BP Location: Left arm, Patient Position: Sitting, BP Method: Medium (Automatic))   Pulse 62   Ht 5' 4" (1.626 m)   Wt 61.2 kg (134 lb 14.7 oz)   BMI 23.16 kg/m²             Physical Exam  Constitutional:       Appearance: Normal appearance. She is well-developed and normal weight.   HENT:      Head: Normocephalic.      Right Ear: External ear normal.      Left Ear: External ear normal.      Nose: Nose normal.   Eyes:      General: No scleral icterus.     Conjunctiva/sclera: Conjunctivae normal.      Pupils: Pupils are equal, round, and reactive to light.   Neck:      Thyroid: No thyromegaly.      Vascular: No JVD.      Trachea: No tracheal deviation.   Cardiovascular:      Rate and Rhythm: Normal rate and regular rhythm.      Pulses: Intact distal pulses.      Heart sounds: Normal heart sounds. No murmur heard.    No friction rub. No gallop.      Comments: No edema  Pulmonary:      Effort: Pulmonary effort is normal. No respiratory distress.      Breath sounds: Normal breath sounds. No wheezing or rales.   Chest:      Chest wall: No tenderness.   Abdominal:      General: Bowel sounds are normal. There is no distension.      Palpations: Abdomen is soft.      Tenderness: There is no abdominal tenderness. There is no guarding.   Musculoskeletal:         General: No tenderness. Normal range of motion.      Cervical back: Normal range of motion.   Lymphadenopathy:      Comments: Palpation of lymph nodes of neck and groin normal   Skin:     General: Skin is warm and dry.      Coloration: Skin is not pale.      Findings: No erythema or rash.      Comments: Palpation of skin normal   Neurological:      Mental Status: She is alert and oriented to person, place, and time.      Cranial " Nerves: No cranial nerve deficit.      Motor: No abnormal muscle tone.      Coordination: Coordination normal.   Psychiatric:         Behavior: Behavior normal.         Thought Content: Thought content normal.         Judgment: Judgment normal.           Assessment:       1. Pulmonary hypertension due to left heart disease    2. Acute on chronic diastolic congestive heart failure    3. Calcification of aorta    4. Chronic diastolic congestive heart failure    5. Essential hypertension    6. Other hyperlipidemia    7. Persistent atrial fibrillation    8. 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 Stage 3a chronic kidney disease    10. History of DVT (deep vein thrombosis)    11. Controlled type 2 diabetes mellitus with stage 3 chronic kidney disease, without long-term current use of insulin         Plan:       Inna was seen today for follow-up.    Diagnoses and all orders for this visit:    Pulmonary hypertension due to left heart disease    Acute on chronic diastolic congestive heart failure    Calcification of aorta    Chronic diastolic congestive heart failure  -     Basic Metabolic Panel; Future; Expected date: 12/22/2022    Essential hypertension  -     lisinopriL (PRINIVIL,ZESTRIL) 5 MG tablet; Take 1 tablet (5 mg total) by mouth once daily.  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

## 2022-06-29 ENCOUNTER — EXTERNAL HOME HEALTH (OUTPATIENT)
Dept: HOME HEALTH SERVICES | Facility: HOSPITAL | Age: 87
End: 2022-06-29
Payer: MEDICARE

## 2022-07-04 VITALS
DIASTOLIC BLOOD PRESSURE: 84 MMHG | OXYGEN SATURATION: 97 % | RESPIRATION RATE: 18 BRPM | SYSTOLIC BLOOD PRESSURE: 122 MMHG | TEMPERATURE: 98 F | HEART RATE: 76 BPM

## 2022-07-04 PROBLEM — G93.40 ACUTE ENCEPHALOPATHY: Status: RESOLVED | Noted: 2022-04-10 | Resolved: 2022-07-04

## 2022-07-05 RX ORDER — ESCITALOPRAM OXALATE 10 MG/1
TABLET ORAL
Qty: 90 TABLET | Refills: 0 | OUTPATIENT
Start: 2022-07-05

## 2022-07-05 NOTE — ASSESSMENT & PLAN NOTE
1. Denies chest pain, SOB, appears euvolemic   2. Continue medication as prescribed  3. Keep scheduled follow up appts  4. Activity and Diet restrictions:   ? Recommend 2-3 gram sodium restriction and 1500cc- 2000cc fluid restriction.  ? Encourage physical activity with graded exercise program.  ? Requested patient to weigh themselves daily, and to notify us if their weight increases by more than 3 lbs in 1 day or 5 lbs in 3 days.

## 2022-07-05 NOTE — PATIENT INSTRUCTIONS
Instructions:  - [facility] Practitioner to schedule home follow-up visit with patient as needed.  - Continue all medications, treatments and therapies as ordered.   - Follow all instructions, recommendations as discussed.  - Maintain Safety Precautions at all times.  - Attend all medical appointments as scheduled.  - For worsening symptoms: call Primary Care Physician or Nurse Practitioner.  - For emergencies, call 911 or immediately report to the nearest emergency room.  - Limit Risks of environmental exposure to coronavirus/COVID-19 as discussed including: social distancing, hand hygiene, and limiting departures from the home for necessities only.

## 2022-07-05 NOTE — PROGRESS NOTES
".  [facility]  Medical [facility] Visit    Visit Date: 6/15/2022  Encounter Provider: Maria G Álvarez NP  PCP:  Stacy Rodriguez MD    Subjective:      Patient ID: Inna Blankenship is a 89 y.o. female.    Consult Requested By:  No ref. provider found  Reason for Consult: Medical Visit by Home Care Provider    The patient is being seen at home due to a physical debility that presents a taxing effort to leave the home, to mitigate high risk of hospital readmission or due to the limited availability of reliable or safe options for transportation to the point of access to the provider. The visit meets the criteria for medical necessity as defined by CMS as "health-care services needed to prevent, diagnose, or treat an illness, injury, condition, disease, or its symptoms and that meet accepted standards of medicine." Prior to treatment on this visit the chart was reviewed and patient consent was obtained.    Chief Complaint: Follow-up for chronic medical conditions and medication review    HPI:   Inna Blankenship is a 89 y.o. female with a PMHx of CHF, hypertension, AFib on Eliquis, CKD 3, DM 2, and osteopenia.  She follows with PCP and cardiology.      Today:  Ms. Inna Blankenship is a 89 y.o. female. Inna presents at baseline state of health as reported by patient and caregiver. She is found resting in bed, no signs of distress.  She lives alone and daughter lives 2 houses down.  Daughter checks on her frequently throughout the day.  VSS. She appears euvolemic on exam. Denies SOB, chest pain, fevers, chills, cough, congestion, change in bladder habits, change in bowel habits. Medications reviewed. Reports taking all medications as prescribed. Reports good bm pattern, sleep pattern, appetite.  Patient and daughter are aware of upcoming appts. No other needs identified at this time. Risks of environmental exposure to coronavirus discussed including: social distancing, hand hygiene, and limiting departures from the home for necessities " only.  Reports understanding and willingness to comply.      Attestation: Screening criteria to assess the level of the patient's risk for infection with COVID-19 as recommended by the CDC at the time of the above documented home visit concluded appropriateness to proceed. Universal precautions were maintained at all times, including provider use of >60% alcohol gel hand  immediately prior to entry and upon departing the patient's home as well as cleaning of equipment used in home visit with antibacterial/germicidal disposable wipes.     Review of Systems   Constitutional: Negative for chills and fever.   HENT: Negative for congestion, postnasal drip and rhinorrhea.    Eyes: Negative for visual disturbance.   Respiratory: Negative for chest tightness and shortness of breath.    Cardiovascular: Negative for chest pain and leg swelling.   Gastrointestinal: Negative for abdominal pain, diarrhea, nausea and vomiting.   Genitourinary: Negative for difficulty urinating.   Musculoskeletal: Negative for arthralgias and myalgias.   Skin: Negative for color change.   Neurological: Negative for dizziness and weakness.   Hematological: Does not bruise/bleed easily.   Psychiatric/Behavioral: Negative for agitation.       Assessments:  · Environmental: single story home, no steps to enter, adequate lighting and temperature control  · Functional Status:Assistance with ADL's/IADL's, ambulates with assistance of a cane/walker, continent of bowel and bladder  · Safety: Fall Precautions, COVID Precautions/Social Distancing/Mask Use  · Nutritional: Adequate  · Home Health: completed  · DME/Supplies: RW, raised toilet seat            Objective:     Vitals:    06/15/22 1300   BP: 122/84   Pulse: 76   Resp: 18   Temp: 98.2 °F (36.8 °C)   SpO2: 97%   PainSc: 0-No pain     There is no height or weight on file to calculate BMI.    Physical Exam  HENT:      Head: Normocephalic and atraumatic.      Nose: No congestion or rhinorrhea.       Mouth/Throat:      Mouth: Mucous membranes are moist.   Cardiovascular:      Rate and Rhythm: Normal rate.      Pulses: Normal pulses.   Pulmonary:      Effort: No respiratory distress.      Breath sounds: Normal breath sounds.   Abdominal:      General: Bowel sounds are normal.      Palpations: Abdomen is soft.   Musculoskeletal:      Cervical back: Normal range of motion and neck supple.      Right lower leg: No edema.      Left lower leg: No edema.   Skin:     General: Skin is warm and dry.   Neurological:      Mental Status: She is alert. Mental status is at baseline.   Psychiatric:         Mood and Affect: Mood normal.         Assessment:     1. Essential hypertension    2. Persistent atrial fibrillation    3. Chronic diastolic congestive heart failure    4. Stage 3a chronic kidney disease    5. Controlled type 2 diabetes mellitus with stage 3 chronic kidney disease, without long-term current use of insulin      Plan:        Inna was seen today for establish care.    Diagnoses and all orders for this visit:        Problem List Items Addressed This Visit        Cardiac/Vascular    Essential hypertension (Chronic)    Current Assessment & Plan     Chronic, stable  Continue amlodipine, lisinopril, lasix as prescribed  Low Na diet            Persistent atrial fibrillation (Chronic)    Current Assessment & Plan     Heart rate controlled  Continue eliquis   Continue to follow with Cardiology            Chronic diastolic congestive heart failure    Current Assessment & Plan     1. Denies chest pain, SOB, appears euvolemic   2. Continue medication as prescribed  3. Keep scheduled follow up appts  4. Activity and Diet restrictions:   ? Recommend 2-3 gram sodium restriction and 1500cc- 2000cc fluid restriction.  ? Encourage physical activity with graded exercise program.  ? Requested patient to weigh themselves daily, and to notify us if their weight increases by more than 3 lbs in 1 day or 5 lbs in 3 days.                 Renal/    Stage 3a chronic kidney disease    Current Assessment & Plan     Chronic  Reports voiding well  Continue to follow with PCP               Endocrine    Controlled type 2 diabetes mellitus with stage 3 chronic kidney disease, without long-term current use of insulin    Current Assessment & Plan     Controlled  Continue diabetic diet                     Were controlled substances prescribed? No    Instructions:  - [facility] Nurse Practitioner to schedule home follow-up visit with patient as needed.  - Continue all medications, treatments and therapies as ordered.   - Follow all instructions, recommendations as discussed.  - Maintain Safety Precautions at all times.  - Attend all medical appointments as scheduled.  - For worsening symptoms: call Primary Care Physician or Nurse Practitioner.  - For emergencies, call 911 or immediately report to the nearest emergency room.  - Limit Risks of environmental exposure to coronavirus/COVID-19 as discussed including: social distancing, hand hygiene, and limiting departures from the home for necessities only.         Follow Up Appointments:   Future Appointments   Date Time Provider Department Center   10/7/2022  8:00 AM Lupis Lundberg MD [facility]   12/1/2022 10:00 AM Stacy Rodriguez MD [facility]       Patient consent was obtained prior to treatment on this visit.    Signature:       Maria G Álvarez, MSN, APRN, FNP-C  [facility]    Total face-to-face time was 60 min, >50% of this was spent on counseling and coordination of care. The following issues were discussed: primary and secondary diagnoses, co-morbidities, prescribed medications, treatment modalities, importance of compliance with medical advice and directives for follow-up care

## 2022-07-05 NOTE — TELEPHONE ENCOUNTER
No new care gaps identified.  [facility] Embedded Care Gaps. Reference number: [id number]. 7/05/2022   2:52:34 PM CDT

## 2022-07-05 NOTE — TELEPHONE ENCOUNTER
Refill Decision Note   Inna Blankenship  is requesting a refill authorization.  Brief Assessment and Rationale for Refill:  Quick Discontinue     Medication Therapy Plan:    Pharmacy is requesting new scripts for the following medications without required information, (sig/ frequency/qty/etc)      Medication Reconciliation Completed: No     Comments: Pharmacies have been requesting medications for patients without required information, (sig, frequency, qty, etc.). In addition, requests are sent for medication(s) pt. are currently not taking, and medications patients have never taken.    We have spoken to the pharmacies about these request types and advised their teams previously that we are unable to assess these New Script requests and require all details for these requests. This is a known issue and has been reported.     Note composed:3:34 PM 07/05/2022

## 2022-07-22 RX ORDER — AMLODIPINE BESYLATE 10 MG/1
10 TABLET ORAL DAILY
Qty: 90 TABLET | Refills: 0 | Status: SHIPPED | OUTPATIENT
Start: 2022-07-22 | End: 2022-09-07 | Stop reason: SDUPTHER

## 2022-07-26 RX ORDER — FUROSEMIDE 20 MG/1
TABLET ORAL
Qty: 180 TABLET | Refills: 0 | OUTPATIENT
Start: 2022-07-26

## 2022-07-26 NOTE — TELEPHONE ENCOUNTER
[facility] Note  Quick DC. Inappropriate Request   Refill request requires further review by MD: NO   Medication Therapy Plan: Pharmacy is requesting new script(s) for the following medications without required information, (sig/ frequency/qty/etc)     [facility] action(s):  Quick Discontinue      Duplicate Pended Encounter(s)/ Last Prescribed Details:    Pharmacies have been requesting medications for patients without required information, (sig, frequency, qty, etc.). In addition, requests are sent for medication(s) pt. are currently not taking, and medications patients have never taken.    We have spoken to the pharmacies about these request types and advised their teams previously that we are unable to assess these New Script requests and require all details for these requests. This is a known issue and has been reported.        Medication related problems are not assessed for [facility].   Medication Reconciliation Completed? NO Were there pending details that required adjustment? NO     Automatic Epic Generated Protocol Data Below:   Requested Prescriptions   Pending Prescriptions Disp Refills    furosemide (LASIX) 20 MG tablet [Pharmacy Med Name: FUROSEMIDE 20MG TAB] 180 tablet 0              Appointments      Date Provider   Last Visit   6/1/2022 Stacy Rodriguez MD   Next Visit   12/1/2022 Stacy Rodriguez MD        Note composed:2:55 PM 07/26/2022

## 2022-07-26 NOTE — TELEPHONE ENCOUNTER
No new care gaps identified.  [facility] Embedded Care Gaps. Reference number: [id number]. 7/26/2022   2:44:33 PM CDT

## 2022-07-31 ENCOUNTER — HOSPITAL ENCOUNTER (EMERGENCY)
Facility: HOSPITAL | Age: 87
Discharge: HOME | End: 2022-08-01
Attending: EMERGENCY MEDICINE
Payer: MEDICARE

## 2022-07-31 VITALS
BODY MASS INDEX: 21.51 KG/M2 | SYSTOLIC BLOOD PRESSURE: 130 MMHG | OXYGEN SATURATION: 99 % | DIASTOLIC BLOOD PRESSURE: 70 MMHG | TEMPERATURE: 98 F | HEIGHT: 64 IN | RESPIRATION RATE: 16 BRPM | WEIGHT: 126 LBS | HEART RATE: 63 BPM

## 2022-07-31 DIAGNOSIS — R05.9 COUGH: ICD-10-CM

## 2022-07-31 DIAGNOSIS — R41.0 OCCASIONAL CONFUSION: ICD-10-CM

## 2022-07-31 DIAGNOSIS — I50.32 CHRONIC DIASTOLIC CONGESTIVE HEART FAILURE: Primary | ICD-10-CM

## 2022-07-31 LAB
ALBUMIN SERPL BCP-MCNC: 3.8 G/DL (ref 3.5–5.2)
ALP SERPL-CCNC: 106 U/L (ref 55–135)
ALT SERPL W/O P-5'-P-CCNC: 20 U/L (ref 10–44)
ANION GAP SERPL CALC-SCNC: 12 MMOL/L (ref 8–16)
AST SERPL-CCNC: 31 U/L (ref 10–40)
BASOPHILS # BLD AUTO: 0.05 K/UL (ref 0–0.2)
BASOPHILS NFR BLD: 0.8 % (ref 0–1.9)
BILIRUB SERPL-MCNC: 0.7 MG/DL (ref 0.1–1)
BNP SERPL-MCNC: 678 PG/ML (ref 0–99)
BUN SERPL-MCNC: 15 MG/DL (ref 10–30)
CALCIUM SERPL-MCNC: 10 MG/DL (ref 8.7–10.5)
CHLORIDE SERPL-SCNC: 105 MMOL/L (ref 95–110)
CO2 SERPL-SCNC: 21 MMOL/L (ref 23–29)
CREAT SERPL-MCNC: 1 MG/DL (ref 0.5–1.4)
DIFFERENTIAL METHOD: NORMAL
EOSINOPHIL # BLD AUTO: 0.2 K/UL (ref 0–0.5)
EOSINOPHIL NFR BLD: 2.4 % (ref 0–8)
ERYTHROCYTE [DISTWIDTH] IN BLOOD BY AUTOMATED COUNT: 14.3 % (ref 11.5–14.5)
EST. GFR  (AFRICAN AMERICAN): 54.9 ML/MIN/1.73 M^2
EST. GFR  (NON AFRICAN AMERICAN): 47.7 ML/MIN/1.73 M^2
GLUCOSE SERPL-MCNC: 106 MG/DL (ref 70–110)
HCT VFR BLD AUTO: 39.7 % (ref 37–48.5)
HGB BLD-MCNC: 12.9 G/DL (ref 12–16)
IMM GRANULOCYTES # BLD AUTO: 0.01 K/UL (ref 0–0.04)
IMM GRANULOCYTES NFR BLD AUTO: 0.2 % (ref 0–0.5)
LYMPHOCYTES # BLD AUTO: 1.7 K/UL (ref 1–4.8)
LYMPHOCYTES NFR BLD: 27.8 % (ref 18–48)
MCH RBC QN AUTO: 30.7 PG (ref 27–31)
MCHC RBC AUTO-ENTMCNC: 32.5 G/DL (ref 32–36)
MCV RBC AUTO: 95 FL (ref 82–98)
MONOCYTES # BLD AUTO: 0.6 K/UL (ref 0.3–1)
MONOCYTES NFR BLD: 9.9 % (ref 4–15)
NEUTROPHILS # BLD AUTO: 3.6 K/UL (ref 1.8–7.7)
NEUTROPHILS NFR BLD: 58.9 % (ref 38–73)
NRBC BLD-RTO: 0 /100 WBC
PLATELET # BLD AUTO: 162 K/UL (ref 150–450)
PMV BLD AUTO: 10.7 FL (ref 9.2–12.9)
POTASSIUM SERPL-SCNC: 3.6 MMOL/L (ref 3.5–5.1)
PROT SERPL-MCNC: 7.8 G/DL (ref 6–8.4)
RBC # BLD AUTO: 4.2 M/UL (ref 4–5.4)
SARS-COV-2 RDRP RESP QL NAA+PROBE: NEGATIVE
SODIUM SERPL-SCNC: 138 MMOL/L (ref 136–145)
TROPONIN I SERPL DL<=0.01 NG/ML-MCNC: 0.08 NG/ML (ref 0–0.03)
WBC # BLD AUTO: 6.16 K/UL (ref 3.9–12.7)

## 2022-07-31 PROCEDURE — 99285 EMERGENCY DEPT VISIT HI MDM: CPT | Mod: CS,,, | Performed by: EMERGENCY MEDICINE

## 2022-07-31 PROCEDURE — 83880 ASSAY OF NATRIURETIC PEPTIDE: CPT | Performed by: PHYSICIAN ASSISTANT

## 2022-07-31 PROCEDURE — 93005 ELECTROCARDIOGRAM TRACING: CPT

## 2022-07-31 PROCEDURE — 93010 EKG 12-LEAD: ICD-10-PCS | Mod: ,,, | Performed by: INTERNAL MEDICINE

## 2022-07-31 PROCEDURE — 99285 EMERGENCY DEPT VISIT HI MDM: CPT | Mod: 25

## 2022-07-31 PROCEDURE — 99285 PR EMERGENCY DEPT VISIT,LEVEL V: ICD-10-PCS | Mod: CS,,, | Performed by: EMERGENCY MEDICINE

## 2022-07-31 PROCEDURE — U0002 COVID-19 LAB TEST NON-CDC: HCPCS | Performed by: EMERGENCY MEDICINE

## 2022-07-31 PROCEDURE — 85025 COMPLETE CBC W/AUTO DIFF WBC: CPT | Performed by: PHYSICIAN ASSISTANT

## 2022-07-31 PROCEDURE — 80053 COMPREHEN METABOLIC PANEL: CPT | Performed by: PHYSICIAN ASSISTANT

## 2022-07-31 PROCEDURE — 84484 ASSAY OF TROPONIN QUANT: CPT | Performed by: PHYSICIAN ASSISTANT

## 2022-07-31 PROCEDURE — 93010 ELECTROCARDIOGRAM REPORT: CPT | Mod: ,,, | Performed by: INTERNAL MEDICINE

## 2022-08-01 RX ORDER — FUROSEMIDE 20 MG/1
20 TABLET ORAL DAILY
Qty: 90 TABLET | Refills: 0 | Status: SHIPPED | OUTPATIENT
Start: 2022-08-01 | End: 2022-08-03 | Stop reason: SDUPTHER

## 2022-08-01 NOTE — ED TRIAGE NOTES
"Inna Blankenship, a 89 y.o. female presents to the ED w/ complaint of L sided HA described as "soreness", dry cough x 4 days, sob. Denies cp/n/v/d. Hx CHF    Triage note:  Chief Complaint   Patient presents with    Headache     Pt c/o HA and cough x 2 days. Daughter reports she is confused with her days lately. Pt is AAOx 4 at this time. Ambulates with a walker. Denies fevers, chills, dysuria.      Review of patient's allergies indicates:  No Known Allergies  Past Medical History:   Diagnosis Date    Arthritis     CHF (congestive heart failure) 12/26/2018    Chronic atrial fibrillation 8/29/2020    Chronic kidney disease, stage III (moderate) 9/11/2015    Colon adenoma     Diabetes mellitus, type II     Diet controlled    Glaucoma     Hyperlipemia     Hypertension     Osteopenia      "

## 2022-08-01 NOTE — TELEPHONE ENCOUNTER
No new care gaps identified.  [facility] Embedded Care Gaps. Reference number: [id number]. 8/01/2022   11:52:36 AM DARIOT

## 2022-08-01 NOTE — TELEPHONE ENCOUNTER
----- Message from Laurel David sent at 8/1/2022 10:12 AM CDT -----  Contact: Daughter andrew) [phone]  Requesting an RX refill or new RX.    Is this a refill or new RX: refill    RX name and strength (copy/paste from chart):  furosemide (LASIX) 20 MG tablet    Is this a 30 day or 90 day RX: 90    Pharmacy name and phone # (copy/paste from chart):     [facility] ([facility]) - [place], [place] - [address]  [address] [address]  Phone: [phone] Fax: [phone]

## 2022-08-01 NOTE — ED PROVIDER NOTES
Encounter Date: 7/31/2022       History     Chief Complaint   Patient presents with    Headache     Pt c/o HA and cough x 2 days. Daughter reports she is confused with her days lately. Pt is AAOx 4 at this time. Ambulates with a walker. Denies fevers, chills, dysuria.      The patient is a 89 year old female who has a documented past medical history of CHF, HTN, A Fib on Eliquis, CKD III, DM II, and arthritis. She lives in a private residence with her daughter, who is present and contributing to the history. The patient has been c/o a left sided headache since she woke up this morning. She denies any fall or trauma. She has not tried anything for the pain. She denies any slurred speech, facial droop, decreased activity, numbness, weakness, or trouble walking. Her daughter states that she has been more confused for several months. The example that she provides is that the patient has been more forgetful and frequently does not know what day of the week it is. She also states that the patient has been coughing since yesterday. She states that the cough is dry and non-productive. No fever reported. No vomiting or diarrhea. She reports normal appetite and PO intake. She reports normal regular bowel movements and urine output. She has been vaccinated for Covid. She denies any SOB or trouble breathing. She has been ambulating at home using her walker at her baseline. She has been taking her medications as directed.                Review of patient's allergies indicates:  No Known Allergies  Past Medical History:   Diagnosis Date    Arthritis     CHF (congestive heart failure) 12/26/2018    Chronic atrial fibrillation 8/29/2020    Chronic kidney disease, stage III (moderate) 9/11/2015    Colon adenoma     Diabetes mellitus, type II     Diet controlled    Glaucoma     Hyperlipemia     Hypertension     Osteopenia      Past Surgical History:   Procedure Laterality Date    APPENDECTOMY      CATARACT EXTRACTION W/   INTRAOCULAR LENS IMPLANT Right 03/15/2006        CATARACT EXTRACTION W/  INTRAOCULAR LENS IMPLANT Left 09/27/2006        COLONOSCOPY W/ POLYPECTOMY      EYE SURGERY      HYSTERECTOMY      TRANSESOPHAGEAL ECHOCARDIOGRAPHY N/A 11/9/2020    Procedure: ECHOCARDIOGRAM, TRANSESOPHAGEAL;  Surgeon: Marcelina Diagnostic Provider;  Location: [facility] EP LAB;  Service: Cardiology;  Laterality: N/A;    TREATMENT OF CARDIAC ARRHYTHMIA N/A 11/9/2020    Procedure: CARDIOVERSION;  Surgeon: Marvin Elmore MD;  Location: [facility] EP LAB;  Service: Cardiology;  Laterality: N/A;  AF, RAFFAELE, DCCV, MAC, GP, 3 PREP     Family History   Problem Relation Age of Onset    Heart attack Mother     Heart disease Mother     No Known Problems Father     Cancer Sister     Hypertension Daughter     Asthma Daughter     Allergies Daughter     Cancer Daughter         ovarian    Amblyopia Neg Hx     Blindness Neg Hx     Cataracts Neg Hx     Diabetes Neg Hx     Glaucoma Neg Hx     Macular degeneration Neg Hx     Retinal detachment Neg Hx     Strabismus Neg Hx     Stroke Neg Hx     Thyroid disease Neg Hx      Social History     Tobacco Use    Smoking status: Never Smoker    Smokeless tobacco: Never Used   Substance Use Topics    Alcohol use: Yes     Comment: wine, rarely    Drug use: No     Review of Systems   Constitutional: Negative for activity change, appetite change, diaphoresis, fatigue and fever.   HENT: Negative for congestion, facial swelling, rhinorrhea, sore throat and trouble swallowing.    Eyes: Negative for visual disturbance.   Respiratory: Positive for cough. Negative for shortness of breath and wheezing.    Cardiovascular: Negative for chest pain, palpitations and leg swelling.   Gastrointestinal: Negative for abdominal distention, abdominal pain, blood in stool, constipation, diarrhea and nausea.   Genitourinary: Negative for decreased urine volume, difficulty urinating, dysuria and flank pain.    Musculoskeletal: Negative for back pain, gait problem, myalgias and neck pain.   Skin: Negative for color change, rash and wound.   Neurological: Positive for headaches. Negative for dizziness, seizures, syncope, facial asymmetry, speech difficulty, weakness, light-headedness and numbness.   Psychiatric/Behavioral: Positive for confusion.       Physical Exam     Initial Vitals [07/31/22 2046]   BP Pulse Resp Temp SpO2   (!) 161/72 63 18 98.8 °F (37.1 °C) 97 %      MAP       --         Physical Exam    Nursing note and vitals reviewed.  Constitutional: She appears well-developed and well-nourished. She is not diaphoretic. No distress.   Alert and ambulatory with walker. Well appearing. No obvious distress.    HENT:   Head: Normocephalic.   Mouth/Throat: Oropharynx is clear and moist.   Hard of hearing   Eyes: Conjunctivae are normal.   Neck: Neck supple.   Cardiovascular: Normal rate.   Pulmonary/Chest: No respiratory distress.   No tachypnea. No hypoxia. No increased work of breathing. Not coughing during interview/exam.    Abdominal: Abdomen is soft. She exhibits no distension. There is no abdominal tenderness.   Musculoskeletal:         General: No tenderness. Normal range of motion.      Cervical back: Neck supple.     Neurological: She is alert and oriented to person, place, and time. She has normal strength. No sensory deficit.   No facial droop. No dysarthria. 5/5 strength. No focal deficit.    Skin: Skin is warm and dry. No rash noted.   Psychiatric: She has a normal mood and affect. Her behavior is normal.         ED Course   Procedures  Labs Reviewed   COMPREHENSIVE METABOLIC PANEL - Abnormal; Notable for the following components:       Result Value    CO2 21 (*)     eGFR if  54.9 (*)     eGFR if non  47.7 (*)     All other components within normal limits   TROPONIN I - Abnormal; Notable for the following components:    Troponin I 0.079 (*)     All other components within  normal limits   B-TYPE NATRIURETIC PEPTIDE - Abnormal; Notable for the following components:     (*)     All other components within normal limits   CBC W/ AUTO DIFFERENTIAL   SARS-COV-2 RNA AMPLIFICATION, QUAL    Narrative:     Is the patient symptomatic?->Yes  Is testing needed for patient travel?->No  Is this needed for pre-procedure or pre-op testing?->No     Results for orders placed or performed during the hospital encounter of 07/31/22   CBC auto differential   Result Value Ref Range    WBC 6.16 3.90 - 12.70 K/uL    RBC 4.20 4.00 - 5.40 M/uL    Hemoglobin 12.9 12.0 - 16.0 g/dL    Hematocrit 39.7 37.0 - 48.5 %    MCV 95 82 - 98 fL    MCH 30.7 27.0 - 31.0 pg    MCHC 32.5 32.0 - 36.0 g/dL    RDW 14.3 11.5 - 14.5 %    Platelets 162 150 - 450 K/uL    MPV 10.7 9.2 - 12.9 fL    Immature Granulocytes 0.2 0.0 - 0.5 %    Gran # (ANC) 3.6 1.8 - 7.7 K/uL    Immature Grans (Abs) 0.01 0.00 - 0.04 K/uL    Lymph # 1.7 1.0 - 4.8 K/uL    Mono # 0.6 0.3 - 1.0 K/uL    Eos # 0.2 0.0 - 0.5 K/uL    Baso # 0.05 0.00 - 0.20 K/uL    nRBC 0 0 /100 WBC    Gran % 58.9 38.0 - 73.0 %    Lymph % 27.8 18.0 - 48.0 %    Mono % 9.9 4.0 - 15.0 %    Eosinophil % 2.4 0.0 - 8.0 %    Basophil % 0.8 0.0 - 1.9 %    Differential Method Automated    Comprehensive metabolic panel   Result Value Ref Range    Sodium 138 136 - 145 mmol/L    Potassium 3.6 3.5 - 5.1 mmol/L    Chloride 105 95 - 110 mmol/L    CO2 21 (L) 23 - 29 mmol/L    Glucose 106 70 - 110 mg/dL    BUN 15 10 - 30 mg/dL    Creatinine 1.0 0.5 - 1.4 mg/dL    Calcium 10.0 8.7 - 10.5 mg/dL    Total Protein 7.8 6.0 - 8.4 g/dL    Albumin 3.8 3.5 - 5.2 g/dL    Total Bilirubin 0.7 0.1 - 1.0 mg/dL    Alkaline Phosphatase 106 55 - 135 U/L    AST 31 10 - 40 U/L    ALT 20 10 - 44 U/L    Anion Gap 12 8 - 16 mmol/L    eGFR if African American 54.9 (A) >60 mL/min/1.73 m^2    eGFR if non  47.7 (A) >60 mL/min/1.73 m^2   Troponin I   Result Value Ref Range    Troponin I 0.079 (H) 0.000 -  0.026 ng/mL   Brain natriuretic peptide   Result Value Ref Range     (H) 0 - 99 pg/mL   COVID-19 Rapid Screening   Result Value Ref Range    SARS-CoV-2 RNA, Amplification, Qual Negative Negative       EKG Readings: (Independently Interpreted)   Initial Reading: No STEMI. Rhythm: Normal Sinus Rhythm. Heart Rate: 61. ST Segments: Normal ST Segments. T Waves: Normal.   ER attending physician reviewed   No change in comparison to previous        Imaging Results          X-Ray Chest AP Portable (Final result)  Result time 07/31/22 23:31:41    Final result by Sam Gibbs MD (07/31/22 23:31:41)                 Impression:      Stable cardiomegaly with central vascular congestion and mild perihilar edema.    Slight blunting of the CP angles, similar to prior, possibly representing scarring or trace effusions.      Electronically signed by: Sam Gibbs MD  Date:    07/31/2022  Time:    23:31             Narrative:    EXAMINATION:  XR CHEST AP PORTABLE    CLINICAL HISTORY:  Cough, unspecified    TECHNIQUE:  Single frontal view of the chest was performed.    COMPARISON:  04/10/2022.    FINDINGS:  Slight blunting of the CP angles, similar to prior, possibly representing scarring or trace effusions.    No consolidation, large pleural effusion or pneumothorax.    Stable cardiomegaly with central vascular congestion and mild perihilar edema.                               CT Head Without Contrast (Final result)  Result time 07/31/22 23:25:07    Final result by Michelle Dumont MD (07/31/22 23:25:07)                 Impression:      1. No CT evidence of acute intracranial abnormality.  If the patient's reported headaches are sufficiently clinically suspicious, or associated with signs of elevated ICP, focal neurologic deficits, nausea, or vomiting, further evaluation with MRI is recommended if there are no clinical contraindications.  2. Generalized cerebral volume loss and findings suggestive of chronic  microvascular ischemic change.      Electronically signed by: Michelle Dumont MD  Date:    07/31/2022  Time:    23:25             Narrative:    EXAMINATION:  CT HEAD WITHOUT CONTRAST    CLINICAL HISTORY:  Mental status change, unknown cause; headache    TECHNIQUE:  Low dose axial images were obtained through the head.  Coronal and sagittal reformations were also performed. Contrast was not administered.    COMPARISON:  04/10/2022    FINDINGS:  There is no acute intracranial hemorrhage, hydrocephalus, midline shift or mass effect. There is generalized cerebral volume loss with compensatory prominence of cerebral sulci and the ventricular system.  There is hypoattenuation within the supratentorial white matter which is nonspecific but likely reflect sequela of chronic microvascular ischemic change.  Gray-white matter differentiation is otherwise maintained.  The basal cisterns are patent. The mastoid air cells and paranasal sinuses are clear of acute process.  Heterogeneous material in the left external auditory canal, likely cerumen although correlation with physical exam advised.  The visualized bones of the calvarium demonstrate no acute osseous abnormality.                                 Medications - No data to display  Medical Decision Making:   History:   I obtained history from: someone other than patient.  Old Medical Records: I decided to obtain old medical records.  Initial Assessment:   89 yo female, brought to the ER by daughter who reports noticing pt having a slight cough x 2 days. Daughter also reports patient has been more forgetful, often mixing up the day of week, for several months, not new or worse. Pt reports having a left sided headache since waking up this morning, no fall or trauma, no pre-arrival treatment, no neuro symptoms reported otherwise. Pt activity, behavior, PO intake, bowel/bladder habits at baseline.     Differential Diagnosis:   CHF exacerbation, Covid, URI, CVA, dementia,  dehydration, electrolyte derangement, advanced age, etc    Clinical Tests:   Lab Tests: Ordered and Reviewed  Radiological Study: Ordered and Reviewed  Medical Tests: Ordered and Reviewed  ED Management:  Vital signs reviewed - benign   Records reviewed   Labs completed - baseline - Covid negative   I discussed the case in detail with the ER attending physician, who also evaluated the patient, recommends discharge home with close PCP follow up   Pt eager for discharge   Pt advised to follow up with PCP in the next 1-2 days   Pt advised to return to the ER if worse in any way       Additional MDM:   EKG: I have independently interpreted EKG(s) - see notes.   X-Rays: I have independently interpreted X-Ray(s) - see notes.                    Clinical Impression:   Final diagnoses:  [R05.9] Cough  [I50.32] Chronic diastolic congestive heart failure (Primary)  [R41.0] Occasional confusion          ED Disposition Condition    Discharge Stable        ED Prescriptions     None        Follow-up Information     Follow up With Specialties Details Why Contact Info    Stacy Rodriguez MD Internal Medicine Schedule an appointment as soon as possible for a visit in 1 day  [address]  [place] [address]  [phone]      [facility] - Emergency Dept Emergency Medicine  If symptoms worsen [address] [facility] [id number] [phone]           Gerda Causey PA-C  08/01/22 0045

## 2022-08-03 ENCOUNTER — OFFICE VISIT (OUTPATIENT)
Dept: INTERNAL MEDICINE | Facility: CLINIC | Age: 87
End: 2022-08-03
Payer: MEDICARE

## 2022-08-03 DIAGNOSIS — E78.5 HYPERLIPIDEMIA, UNSPECIFIED HYPERLIPIDEMIA TYPE: ICD-10-CM

## 2022-08-03 DIAGNOSIS — I10 HYPERTENSION, UNSPECIFIED TYPE: Primary | ICD-10-CM

## 2022-08-03 PROCEDURE — 1101F PR PT FALLS ASSESS DOC 0-1 FALLS W/OUT INJ PAST YR: ICD-10-PCS | Mod: CPTII,S$GLB,, | Performed by: INTERNAL MEDICINE

## 2022-08-03 PROCEDURE — 1159F PR MEDICATION LIST DOCUMENTED IN MEDICAL RECORD: ICD-10-PCS | Mod: CPTII,S$GLB,, | Performed by: INTERNAL MEDICINE

## 2022-08-03 PROCEDURE — 3288F FALL RISK ASSESSMENT DOCD: CPT | Mod: CPTII,S$GLB,, | Performed by: INTERNAL MEDICINE

## 2022-08-03 PROCEDURE — 1126F AMNT PAIN NOTED NONE PRSNT: CPT | Mod: CPTII,S$GLB,, | Performed by: INTERNAL MEDICINE

## 2022-08-03 PROCEDURE — 99214 OFFICE O/P EST MOD 30 MIN: CPT | Mod: S$GLB,,, | Performed by: INTERNAL MEDICINE

## 2022-08-03 PROCEDURE — 99999 PR PBB SHADOW E&M-EST. PATIENT-LVL III: ICD-10-PCS | Mod: PBBFAC,,, | Performed by: INTERNAL MEDICINE

## 2022-08-03 PROCEDURE — 99999 PR PBB SHADOW E&M-EST. PATIENT-LVL III: CPT | Mod: PBBFAC,,, | Performed by: INTERNAL MEDICINE

## 2022-08-03 PROCEDURE — 1126F PR PAIN SEVERITY QUANTIFIED, NO PAIN PRESENT: ICD-10-PCS | Mod: CPTII,S$GLB,, | Performed by: INTERNAL MEDICINE

## 2022-08-03 PROCEDURE — 1159F MED LIST DOCD IN RCRD: CPT | Mod: CPTII,S$GLB,, | Performed by: INTERNAL MEDICINE

## 2022-08-03 PROCEDURE — 3288F PR FALLS RISK ASSESSMENT DOCUMENTED: ICD-10-PCS | Mod: CPTII,S$GLB,, | Performed by: INTERNAL MEDICINE

## 2022-08-03 PROCEDURE — 99214 PR OFFICE/OUTPT VISIT, EST, LEVL IV, 30-39 MIN: ICD-10-PCS | Mod: S$GLB,,, | Performed by: INTERNAL MEDICINE

## 2022-08-03 PROCEDURE — 1101F PT FALLS ASSESS-DOCD LE1/YR: CPT | Mod: CPTII,S$GLB,, | Performed by: INTERNAL MEDICINE

## 2022-08-03 RX ORDER — LOSARTAN POTASSIUM 25 MG/1
25 TABLET ORAL DAILY
Qty: 90 TABLET | Refills: 1 | Status: SHIPPED | OUTPATIENT
Start: 2022-08-03 | End: 2022-12-19

## 2022-08-03 RX ORDER — FUROSEMIDE 20 MG/1
20 TABLET ORAL DAILY
Qty: 90 TABLET | Refills: 0 | Status: SHIPPED | OUTPATIENT
Start: 2022-08-03 | End: 2022-11-28 | Stop reason: SDUPTHER

## 2022-08-07 VITALS
SYSTOLIC BLOOD PRESSURE: 136 MMHG | HEIGHT: 64 IN | DIASTOLIC BLOOD PRESSURE: 64 MMHG | BODY MASS INDEX: 24.01 KG/M2 | TEMPERATURE: 99 F | WEIGHT: 140.63 LBS | OXYGEN SATURATION: 97 % | HEART RATE: 86 BPM

## 2022-08-08 NOTE — PROGRESS NOTES
Subjective:       Patient ID: Inna Blankenship is a 89 y.o. female.    Chief Complaint: Hypertension    HPI  She returns for management of hypertension.  She has had hypertension for over a year.  Current treatment has included medications outlined in medication list.  She denies chest pain or shortness of breath.  No palpitations.  Denies left arm or neck pain.  She has hyperlipidemia.  Currently on Lipitor    Medications:  See med list    Social history:  Does not smoke, does not drink alcohol      Review of Systems   Constitutional: Negative for chills, fatigue, fever and unexpected weight change.   Respiratory: Negative for chest tightness and shortness of breath.    Cardiovascular: Negative for chest pain and palpitations.   Gastrointestinal: Negative for abdominal pain and blood in stool.   Neurological: Negative for dizziness, syncope, numbness and headaches.       Objective:      Physical Exam  HENT:      Right Ear: External ear normal.      Left Ear: External ear normal.      Nose: Nose normal.      Mouth/Throat:      Mouth: Mucous membranes are moist.      Pharynx: Oropharynx is clear.   Eyes:      Pupils: Pupils are equal, round, and reactive to light.   Cardiovascular:      Rate and Rhythm: Normal rate and regular rhythm.      Heart sounds: Murmur heard.   Pulmonary:      Breath sounds: Normal breath sounds.   Chest:   Breasts:      Right: No axillary adenopathy.      Left: No axillary adenopathy.       Abdominal:      General: There is no distension.      Palpations: There is no hepatomegaly or splenomegaly.      Tenderness: There is no abdominal tenderness.   Musculoskeletal:      Cervical back: Normal range of motion.   Lymphadenopathy:      Cervical: No cervical adenopathy.      Upper Body:      Right upper body: No axillary adenopathy.      Left upper body: No axillary adenopathy.   Neurological:      Cranial Nerves: No cranial nerve deficit.      Sensory: No sensory deficit.      Motor: Motor function is  intact.      Deep Tendon Reflexes: Reflexes are normal and symmetric.         Assessment/Plan       Assessment and plan:  1.  Hypertension:  Discontinue lisinopril.  Start Cozaar 50 mg daily.  Return to clinic in 1 month blood pressure check  2. Hyperlipidemia:  Continue Lipitor  3. She was here for urgent care only appointment.  She will return to clinic for a physical

## 2022-09-06 NOTE — TELEPHONE ENCOUNTER
----- Message from Melanie Jensen sent at 7/22/2022 10:34 AM CDT -----  Contact: Cara/Daughter [phone]  Requesting an RX refill or new RX.  Is this a refill or new RX: Refill  RX name and strength: amLODIPine (NORVASC) 10 MG tablet  Is this a 30 day or 90 day RX: 90 day with refills  Patient advised refills can take 72 hours and [facility] can be used for refills?:  yes  Pharmacy name and phone #:   [facility] ([facility]) - [place], [place] - [address]  [address] [address]  Phone: [phone] Fax: [phone]    Comments:     Thank You      
No new care gaps identified.  [facility] Embedded Care Gaps. Reference number: [id number]. 7/22/2022   10:43:55 AM DARIOT  
No

## 2022-09-07 ENCOUNTER — LAB VISIT (OUTPATIENT)
Dept: LAB | Facility: HOSPITAL | Age: 87
End: 2022-09-07
Attending: INTERNAL MEDICINE
Payer: MEDICARE

## 2022-09-07 ENCOUNTER — OFFICE VISIT (OUTPATIENT)
Dept: INTERNAL MEDICINE | Facility: CLINIC | Age: 87
End: 2022-09-07
Payer: MEDICARE

## 2022-09-07 DIAGNOSIS — I10 HYPERTENSION, UNSPECIFIED TYPE: Primary | ICD-10-CM

## 2022-09-07 DIAGNOSIS — E11.9 DIABETES MELLITUS WITHOUT COMPLICATION: ICD-10-CM

## 2022-09-07 DIAGNOSIS — I10 HYPERTENSION, UNSPECIFIED TYPE: ICD-10-CM

## 2022-09-07 LAB
ANION GAP SERPL CALC-SCNC: 12 MMOL/L (ref 8–16)
BUN SERPL-MCNC: 20 MG/DL (ref 10–30)
CALCIUM SERPL-MCNC: 10.4 MG/DL (ref 8.7–10.5)
CHLORIDE SERPL-SCNC: 107 MMOL/L (ref 95–110)
CO2 SERPL-SCNC: 22 MMOL/L (ref 23–29)
CREAT SERPL-MCNC: 1 MG/DL (ref 0.5–1.4)
EST. GFR  (NO RACE VARIABLE): 51.6 ML/MIN/1.73 M^2
ESTIMATED AVG GLUCOSE: 114 MG/DL (ref 68–131)
GLUCOSE SERPL-MCNC: 108 MG/DL (ref 70–110)
HBA1C MFR BLD: 5.6 % (ref 4–5.6)
POTASSIUM SERPL-SCNC: 4.1 MMOL/L (ref 3.5–5.1)
SODIUM SERPL-SCNC: 141 MMOL/L (ref 136–145)

## 2022-09-07 PROCEDURE — 99214 OFFICE O/P EST MOD 30 MIN: CPT | Mod: S$GLB,,, | Performed by: INTERNAL MEDICINE

## 2022-09-07 PROCEDURE — 1101F PT FALLS ASSESS-DOCD LE1/YR: CPT | Mod: CPTII,S$GLB,, | Performed by: INTERNAL MEDICINE

## 2022-09-07 PROCEDURE — 99999 PR PBB SHADOW E&M-EST. PATIENT-LVL III: CPT | Mod: PBBFAC,,, | Performed by: INTERNAL MEDICINE

## 2022-09-07 PROCEDURE — 3288F FALL RISK ASSESSMENT DOCD: CPT | Mod: CPTII,S$GLB,, | Performed by: INTERNAL MEDICINE

## 2022-09-07 PROCEDURE — 1101F PR PT FALLS ASSESS DOC 0-1 FALLS W/OUT INJ PAST YR: ICD-10-PCS | Mod: CPTII,S$GLB,, | Performed by: INTERNAL MEDICINE

## 2022-09-07 PROCEDURE — 1159F PR MEDICATION LIST DOCUMENTED IN MEDICAL RECORD: ICD-10-PCS | Mod: CPTII,S$GLB,, | Performed by: INTERNAL MEDICINE

## 2022-09-07 PROCEDURE — 1159F MED LIST DOCD IN RCRD: CPT | Mod: CPTII,S$GLB,, | Performed by: INTERNAL MEDICINE

## 2022-09-07 PROCEDURE — 99214 PR OFFICE/OUTPT VISIT, EST, LEVL IV, 30-39 MIN: ICD-10-PCS | Mod: S$GLB,,, | Performed by: INTERNAL MEDICINE

## 2022-09-07 PROCEDURE — 99499 UNLISTED E&M SERVICE: CPT | Mod: HCNC,S$GLB,, | Performed by: INTERNAL MEDICINE

## 2022-09-07 PROCEDURE — 36415 COLL VENOUS BLD VENIPUNCTURE: CPT | Performed by: INTERNAL MEDICINE

## 2022-09-07 PROCEDURE — 3288F PR FALLS RISK ASSESSMENT DOCUMENTED: ICD-10-PCS | Mod: CPTII,S$GLB,, | Performed by: INTERNAL MEDICINE

## 2022-09-07 PROCEDURE — 99999 PR PBB SHADOW E&M-EST. PATIENT-LVL III: ICD-10-PCS | Mod: PBBFAC,,, | Performed by: INTERNAL MEDICINE

## 2022-09-07 PROCEDURE — 1126F AMNT PAIN NOTED NONE PRSNT: CPT | Mod: CPTII,S$GLB,, | Performed by: INTERNAL MEDICINE

## 2022-09-07 PROCEDURE — 80048 BASIC METABOLIC PNL TOTAL CA: CPT | Performed by: INTERNAL MEDICINE

## 2022-09-07 PROCEDURE — 99499 RISK ADDL DX/OHS AUDIT: ICD-10-PCS | Mod: HCNC,S$GLB,, | Performed by: INTERNAL MEDICINE

## 2022-09-07 PROCEDURE — 1126F PR PAIN SEVERITY QUANTIFIED, NO PAIN PRESENT: ICD-10-PCS | Mod: CPTII,S$GLB,, | Performed by: INTERNAL MEDICINE

## 2022-09-07 PROCEDURE — 83036 HEMOGLOBIN GLYCOSYLATED A1C: CPT | Performed by: INTERNAL MEDICINE

## 2022-09-07 RX ORDER — ATORVASTATIN CALCIUM 20 MG/1
20 TABLET, FILM COATED ORAL DAILY
Qty: 90 TABLET | Refills: 1 | Status: SHIPPED | OUTPATIENT
Start: 2022-09-07 | End: 2023-02-22

## 2022-09-07 RX ORDER — LISINOPRIL 2.5 MG/1
2.5 TABLET ORAL DAILY
COMMUNITY
Start: 2022-06-28 | End: 2022-09-07 | Stop reason: SDUPTHER

## 2022-09-07 RX ORDER — AMLODIPINE BESYLATE 10 MG/1
10 TABLET ORAL DAILY
Qty: 90 TABLET | Refills: 1 | Status: SHIPPED | OUTPATIENT
Start: 2022-09-07 | End: 2022-12-30

## 2022-09-07 RX ORDER — ESCITALOPRAM OXALATE 10 MG/1
10 TABLET ORAL DAILY
Qty: 90 TABLET | Refills: 1 | Status: SHIPPED | OUTPATIENT
Start: 2022-09-07 | End: 2023-02-22

## 2022-09-10 VITALS
HEIGHT: 64 IN | SYSTOLIC BLOOD PRESSURE: 136 MMHG | DIASTOLIC BLOOD PRESSURE: 62 MMHG | OXYGEN SATURATION: 95 % | TEMPERATURE: 99 F | BODY MASS INDEX: 23.68 KG/M2 | WEIGHT: 138.69 LBS | HEART RATE: 67 BPM

## 2022-09-10 NOTE — PROGRESS NOTES
Subjective:       Patient ID: Inna Blankenship is a 89 y.o. female.    Chief Complaint: Hypertension    HPI  She returns for management of hypertension.  She has had hypertension for over a year.  Current treatment has included medications outlined in medication list.  She denies chest pain or shortness of breath.  No palpitations.  Denies left arm or neck pain.  She has diabetes.  Denies polyuria, polydipsia     Medications: See med list     Social history:  Does not smoke, does not drink alcohol       Review of Systems   Constitutional:  Negative for chills, fatigue, fever and unexpected weight change.   Respiratory:  Negative for chest tightness and shortness of breath.    Cardiovascular:  Negative for chest pain and palpitations.   Gastrointestinal:  Negative for abdominal pain and blood in stool.   Neurological:  Negative for dizziness, syncope, numbness and headaches.     Objective:      Physical Exam  HENT:      Right Ear: External ear normal.      Left Ear: External ear normal.      Nose: Nose normal.      Mouth/Throat:      Mouth: Mucous membranes are moist.      Pharynx: Oropharynx is clear.   Eyes:      Pupils: Pupils are equal, round, and reactive to light.   Cardiovascular:      Rate and Rhythm: Normal rate and regular rhythm.      Heart sounds: Murmur heard.   Pulmonary:      Breath sounds: Normal breath sounds.   Abdominal:      General: There is no distension.      Palpations: There is no hepatomegaly or splenomegaly.      Tenderness: There is no abdominal tenderness.   Musculoskeletal:      Cervical back: Normal range of motion.   Lymphadenopathy:      Cervical: No cervical adenopathy.      Upper Body:      Right upper body: No axillary adenopathy.      Left upper body: No axillary adenopathy.   Neurological:      Cranial Nerves: No cranial nerve deficit.      Sensory: No sensory deficit.      Motor: Motor function is intact.      Deep Tendon Reflexes: Reflexes are normal and symmetric.        Assessment/Plan       Assessment and plan:  1.  Hypertension:  Check BMP  2.  Diabetes:  Check A1c

## 2022-10-04 NOTE — PROGRESS NOTES
HPI    DLS: 6/03/2022    Pt here for 4 Month IOP Check;  Pt states no eye pain or discomfort.     Meds:  Latanoprost QHS OU  Systane BID OU    1) LTG OD>OS   2) Blepharitis/MGD   3) PCO OU   4) PCIOL OU   5) PCIOL OU      Last edited by Salud Hartman on 10/7/2022  8:28 AM.    .        Assessment /Plan     For exam results, see Encounter Report.    Low-tension glaucoma of both eyes, moderate stage    Posterior capsular opacification non visually significant of both eyes - Both Eyes    Eyelid inflammation - Both Eyes    Non-insulin dependent type 2 diabetes mellitus    Superficial punctate keratitis of left eye    Dry eyes, bilateral    Pseudophakia - Both Eyes      1.  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 Present since 2006.     Family history   ??  Glaucoma meds   Off all gtts ( family states they were told to stop all glaucoma drops by cardiologist )   H/O adverse rxn to glaucoma drops  Avoid all BB -  COMBIGAN / timolol  stopped by PCP/cardiologist.   LASERS   none  GLAUCOMA SURGERIES  : none   OTHER EYE SURGERIES  : PCIOL ou- OD- 3/15/06, 9/27/06 OS   CDR  0.85-0.9 w/ sup thinning // 0.7  Tbase  11-15 / 11-13  Tmax      15/15 (off  gtts 6/3/2022)   Ttarget   12-13 ou   HVF  17 test 2002 to 2019 - -abnl high sens  // non specific - unreliable ou - stable   Gonio  +2-3 OU (narrow inf - but doubt pupillary block - PC IOL ou)  CCT  561/567  OCT  8 test 2007 to 2022 - RNFL - bord G/TI  od // nl os   HRT 14 test 2007 to 2019- MR -  Dec. S/N/I od // dec S/I os /// CDR 0.83 od // 0.74 os  Disc photos  2002, 2003 - slides // 2007, 2012, 2014, 2015, 2018   - OIS     Ttoday  14/14   (( at target of 12 - 13 ou ))  Test done today: IOP ?? Back on latanoprost      2. PCO ou  -mild - monitor - oustide vis axis      3. MGD /eyelid Inflammation / Blepharitis  WC/LH/AT's     4. Pseudophakia ou - PC IOL ou   -status post cataract  extraction and insertion of intraocular lens - Both Eyes   -minimal PCO ou      5 CHF - hsopitalized  in early 2019   -was taken off the BB for a while - but the cardiologist says it is ok to use and she is back on it again 2/18/2019    6. Dry eyes   + SPK - ok to use AT's        Plan    POAG - Low Tension Component OD > OS  Above target ou today - somehow got off ALL glaucoma drops - not just the BB's   Target is 12 ou   Re-start latanoprost - 1 drop ou q day (( message sent to cardiologist Tabatha Shannon) )     - Latanoprost ou q hs (disp 3 bottles at a time)     Patient c/o of trouble with reading- recheck current Rx with good lighting conditions- patient sees 20/20- not more clear with change in Rx- continue Rx for now but use reading light. -- add AT's qid - may help pt has SPK (10/2022)     Will message cardiolgist about re-starting latanoprost ( Tabatha Shannon)     RTC 4 months = IOP back on latanoprost ou // gonio  - pt is 89 years old - NO MORE VF testing

## 2022-10-07 ENCOUNTER — OFFICE VISIT (OUTPATIENT)
Dept: OPHTHALMOLOGY | Facility: CLINIC | Age: 87
End: 2022-10-07
Payer: MEDICARE

## 2022-10-07 DIAGNOSIS — H04.123 DRY EYES, BILATERAL: ICD-10-CM

## 2022-10-07 DIAGNOSIS — H16.142 SUPERFICIAL PUNCTATE KERATITIS OF LEFT EYE: ICD-10-CM

## 2022-10-07 DIAGNOSIS — H26.493 POSTERIOR CAPSULAR OPACIFICATION NON VISUALLY SIGNIFICANT OF BOTH EYES: ICD-10-CM

## 2022-10-07 DIAGNOSIS — H01.9 EYELID INFLAMMATION: ICD-10-CM

## 2022-10-07 DIAGNOSIS — E11.9 NON-INSULIN DEPENDENT TYPE 2 DIABETES MELLITUS: ICD-10-CM

## 2022-10-07 DIAGNOSIS — H40.1232 LOW-TENSION GLAUCOMA OF BOTH EYES, MODERATE STAGE: Primary | ICD-10-CM

## 2022-10-07 DIAGNOSIS — Z96.1 PSEUDOPHAKIA: ICD-10-CM

## 2022-10-07 PROCEDURE — 1160F PR REVIEW ALL MEDS BY PRESCRIBER/CLIN PHARMACIST DOCUMENTED: ICD-10-PCS | Mod: CPTII,S$GLB,, | Performed by: OPHTHALMOLOGY

## 2022-10-07 PROCEDURE — 92012 INTRM OPH EXAM EST PATIENT: CPT | Mod: S$GLB,,, | Performed by: OPHTHALMOLOGY

## 2022-10-07 PROCEDURE — 99499 RISK ADDL DX/OHS AUDIT: ICD-10-PCS | Mod: HCNC,S$GLB,, | Performed by: OPHTHALMOLOGY

## 2022-10-07 PROCEDURE — 1101F PR PT FALLS ASSESS DOC 0-1 FALLS W/OUT INJ PAST YR: ICD-10-PCS | Mod: CPTII,S$GLB,, | Performed by: OPHTHALMOLOGY

## 2022-10-07 PROCEDURE — 99499 UNLISTED E&M SERVICE: CPT | Mod: HCNC,S$GLB,, | Performed by: OPHTHALMOLOGY

## 2022-10-07 PROCEDURE — 1159F MED LIST DOCD IN RCRD: CPT | Mod: CPTII,S$GLB,, | Performed by: OPHTHALMOLOGY

## 2022-10-07 PROCEDURE — 1126F PR PAIN SEVERITY QUANTIFIED, NO PAIN PRESENT: ICD-10-PCS | Mod: CPTII,S$GLB,, | Performed by: OPHTHALMOLOGY

## 2022-10-07 PROCEDURE — 99999 PR PBB SHADOW E&M-EST. PATIENT-LVL II: ICD-10-PCS | Mod: PBBFAC,,, | Performed by: OPHTHALMOLOGY

## 2022-10-07 PROCEDURE — 1101F PT FALLS ASSESS-DOCD LE1/YR: CPT | Mod: CPTII,S$GLB,, | Performed by: OPHTHALMOLOGY

## 2022-10-07 PROCEDURE — 1126F AMNT PAIN NOTED NONE PRSNT: CPT | Mod: CPTII,S$GLB,, | Performed by: OPHTHALMOLOGY

## 2022-10-07 PROCEDURE — 99999 PR PBB SHADOW E&M-EST. PATIENT-LVL II: CPT | Mod: PBBFAC,,, | Performed by: OPHTHALMOLOGY

## 2022-10-07 PROCEDURE — 1159F PR MEDICATION LIST DOCUMENTED IN MEDICAL RECORD: ICD-10-PCS | Mod: CPTII,S$GLB,, | Performed by: OPHTHALMOLOGY

## 2022-10-07 PROCEDURE — 3288F FALL RISK ASSESSMENT DOCD: CPT | Mod: CPTII,S$GLB,, | Performed by: OPHTHALMOLOGY

## 2022-10-07 PROCEDURE — 1160F RVW MEDS BY RX/DR IN RCRD: CPT | Mod: CPTII,S$GLB,, | Performed by: OPHTHALMOLOGY

## 2022-10-07 PROCEDURE — 3288F PR FALLS RISK ASSESSMENT DOCUMENTED: ICD-10-PCS | Mod: CPTII,S$GLB,, | Performed by: OPHTHALMOLOGY

## 2022-10-07 PROCEDURE — 92012 PR EYE EXAM, EST PATIENT,INTERMED: ICD-10-PCS | Mod: S$GLB,,, | Performed by: OPHTHALMOLOGY

## 2022-11-18 ENCOUNTER — PES CALL (OUTPATIENT)
Dept: ADMINISTRATIVE | Facility: CLINIC | Age: 87
End: 2022-11-18
Payer: MEDICARE

## 2022-11-28 RX ORDER — FUROSEMIDE 20 MG/1
20 TABLET ORAL DAILY
Qty: 90 TABLET | Refills: 0 | Status: SHIPPED | OUTPATIENT
Start: 2022-11-28 | End: 2022-12-01 | Stop reason: SDUPTHER

## 2022-11-28 NOTE — TELEPHONE ENCOUNTER
Care Due:                  Date            Visit Type   Department     Provider  --------------------------------------------------------------------------------                                EP -                              PRIMARY      [facility] INTERNAL  Last Visit: 09-      CARE ([facility])   MEDICINE       Stacy Rodriguez                              [facility]                              PRIMARY      [facility] INTERNAL  Next Visit: 12-      CARE ([facility])   MEDICINE       Stacy Rodriguez                                                            Last  Test          Frequency    Reason                     Performed    Due Date  --------------------------------------------------------------------------------    Lipid Panel.  12 months..  atorvastatin.............  Not Found    Overdue    [employer] Embedded Care Gaps. Reference number: [id number]. 11/28/2022   1:14:38 PM CST

## 2022-11-28 NOTE — TELEPHONE ENCOUNTER
----- Message from Arleen Fisher sent at 11/28/2022 12:23 PM CST -----  Contact: Cara @ [phone]  Requesting an RX refill or new RX.  Is this a refill or new RX:   RX name and strength furosemide (LASIX) 20 MG tablet      Is this a 30 day or 90 day RX: 90  Pharmacy name and phone # [facility] - [place], [place] - [address]   The doctors have asked that we provide their patients with the following 2 reminders -- prescription refills can take up to 72 hours, and a friendly reminder that in the future you can use your [facility] account to request refills: yes

## 2022-12-01 ENCOUNTER — IMMUNIZATION (OUTPATIENT)
Dept: INTERNAL MEDICINE | Facility: CLINIC | Age: 87
End: 2022-12-01
Payer: MEDICARE

## 2022-12-01 ENCOUNTER — OFFICE VISIT (OUTPATIENT)
Dept: INTERNAL MEDICINE | Facility: CLINIC | Age: 87
End: 2022-12-01
Payer: MEDICARE

## 2022-12-01 ENCOUNTER — LAB VISIT (OUTPATIENT)
Dept: LAB | Facility: HOSPITAL | Age: 87
End: 2022-12-01
Attending: INTERNAL MEDICINE
Payer: MEDICARE

## 2022-12-01 DIAGNOSIS — Z23 NEEDS FLU SHOT: Primary | ICD-10-CM

## 2022-12-01 DIAGNOSIS — I10 HYPERTENSION, UNSPECIFIED TYPE: Primary | ICD-10-CM

## 2022-12-01 DIAGNOSIS — I10 HYPERTENSION, UNSPECIFIED TYPE: ICD-10-CM

## 2022-12-01 DIAGNOSIS — E11.9 DIABETES MELLITUS WITHOUT COMPLICATION: ICD-10-CM

## 2022-12-01 LAB
ALBUMIN SERPL BCP-MCNC: 4.1 G/DL (ref 3.5–5.2)
ALP SERPL-CCNC: 105 U/L (ref 55–135)
ALT SERPL W/O P-5'-P-CCNC: 21 U/L (ref 10–44)
ANION GAP SERPL CALC-SCNC: 9 MMOL/L (ref 8–16)
AST SERPL-CCNC: 26 U/L (ref 10–40)
BILIRUB SERPL-MCNC: 1.2 MG/DL (ref 0.1–1)
BUN SERPL-MCNC: 18 MG/DL (ref 10–30)
CALCIUM SERPL-MCNC: 10.4 MG/DL (ref 8.7–10.5)
CHLORIDE SERPL-SCNC: 107 MMOL/L (ref 95–110)
CHOLEST SERPL-MCNC: 174 MG/DL (ref 120–199)
CHOLEST/HDLC SERPL: 2.6 {RATIO} (ref 2–5)
CO2 SERPL-SCNC: 25 MMOL/L (ref 23–29)
CREAT SERPL-MCNC: 1 MG/DL (ref 0.5–1.4)
EST. GFR  (NO RACE VARIABLE): 51.6 ML/MIN/1.73 M^2
ESTIMATED AVG GLUCOSE: 114 MG/DL (ref 68–131)
GLUCOSE SERPL-MCNC: 93 MG/DL (ref 70–110)
HBA1C MFR BLD: 5.6 % (ref 4–5.6)
HDLC SERPL-MCNC: 67 MG/DL (ref 40–75)
HDLC SERPL: 38.5 % (ref 20–50)
LDLC SERPL CALC-MCNC: 99.2 MG/DL (ref 63–159)
NONHDLC SERPL-MCNC: 107 MG/DL
POTASSIUM SERPL-SCNC: 4.3 MMOL/L (ref 3.5–5.1)
PROT SERPL-MCNC: 8.1 G/DL (ref 6–8.4)
SODIUM SERPL-SCNC: 141 MMOL/L (ref 136–145)
TRIGL SERPL-MCNC: 39 MG/DL (ref 30–150)

## 2022-12-01 PROCEDURE — 1159F PR MEDICATION LIST DOCUMENTED IN MEDICAL RECORD: ICD-10-PCS | Mod: CPTII,S$GLB,, | Performed by: INTERNAL MEDICINE

## 2022-12-01 PROCEDURE — 1101F PT FALLS ASSESS-DOCD LE1/YR: CPT | Mod: CPTII,S$GLB,, | Performed by: INTERNAL MEDICINE

## 2022-12-01 PROCEDURE — 83036 HEMOGLOBIN GLYCOSYLATED A1C: CPT | Performed by: INTERNAL MEDICINE

## 2022-12-01 PROCEDURE — 80053 COMPREHEN METABOLIC PANEL: CPT | Performed by: INTERNAL MEDICINE

## 2022-12-01 PROCEDURE — 3288F PR FALLS RISK ASSESSMENT DOCUMENTED: ICD-10-PCS | Mod: CPTII,S$GLB,, | Performed by: INTERNAL MEDICINE

## 2022-12-01 PROCEDURE — 90694 FLU VACCINE - QUADRIVALENT - ADJUVANTED: ICD-10-PCS | Mod: S$GLB,,, | Performed by: INTERNAL MEDICINE

## 2022-12-01 PROCEDURE — 99499 UNLISTED E&M SERVICE: CPT | Mod: HCNC,S$GLB,, | Performed by: INTERNAL MEDICINE

## 2022-12-01 PROCEDURE — 99499 RISK ADDL DX/OHS AUDIT: ICD-10-PCS | Mod: HCNC,S$GLB,, | Performed by: INTERNAL MEDICINE

## 2022-12-01 PROCEDURE — 1159F MED LIST DOCD IN RCRD: CPT | Mod: CPTII,S$GLB,, | Performed by: INTERNAL MEDICINE

## 2022-12-01 PROCEDURE — 1126F AMNT PAIN NOTED NONE PRSNT: CPT | Mod: CPTII,S$GLB,, | Performed by: INTERNAL MEDICINE

## 2022-12-01 PROCEDURE — 3288F FALL RISK ASSESSMENT DOCD: CPT | Mod: CPTII,S$GLB,, | Performed by: INTERNAL MEDICINE

## 2022-12-01 PROCEDURE — 80061 LIPID PANEL: CPT | Performed by: INTERNAL MEDICINE

## 2022-12-01 PROCEDURE — G0008 FLU VACCINE - QUADRIVALENT - ADJUVANTED: ICD-10-PCS | Mod: S$GLB,,, | Performed by: INTERNAL MEDICINE

## 2022-12-01 PROCEDURE — 99999 PR PBB SHADOW E&M-EST. PATIENT-LVL III: CPT | Mod: PBBFAC,,, | Performed by: INTERNAL MEDICINE

## 2022-12-01 PROCEDURE — 36415 COLL VENOUS BLD VENIPUNCTURE: CPT | Performed by: INTERNAL MEDICINE

## 2022-12-01 PROCEDURE — 99999 PR PBB SHADOW E&M-EST. PATIENT-LVL III: ICD-10-PCS | Mod: PBBFAC,,, | Performed by: INTERNAL MEDICINE

## 2022-12-01 PROCEDURE — 1126F PR PAIN SEVERITY QUANTIFIED, NO PAIN PRESENT: ICD-10-PCS | Mod: CPTII,S$GLB,, | Performed by: INTERNAL MEDICINE

## 2022-12-01 PROCEDURE — 99214 OFFICE O/P EST MOD 30 MIN: CPT | Mod: 25,S$GLB,, | Performed by: INTERNAL MEDICINE

## 2022-12-01 PROCEDURE — G0008 ADMIN INFLUENZA VIRUS VAC: HCPCS | Mod: S$GLB,,, | Performed by: INTERNAL MEDICINE

## 2022-12-01 PROCEDURE — 90694 VACC AIIV4 NO PRSRV 0.5ML IM: CPT | Mod: S$GLB,,, | Performed by: INTERNAL MEDICINE

## 2022-12-01 PROCEDURE — 99214 PR OFFICE/OUTPT VISIT, EST, LEVL IV, 30-39 MIN: ICD-10-PCS | Mod: 25,S$GLB,, | Performed by: INTERNAL MEDICINE

## 2022-12-01 PROCEDURE — 1101F PR PT FALLS ASSESS DOC 0-1 FALLS W/OUT INJ PAST YR: ICD-10-PCS | Mod: CPTII,S$GLB,, | Performed by: INTERNAL MEDICINE

## 2022-12-01 RX ORDER — FUROSEMIDE 20 MG/1
20 TABLET ORAL DAILY
Qty: 10 TABLET | Refills: 0 | Status: SHIPPED | OUTPATIENT
Start: 2022-12-01 | End: 2022-12-12 | Stop reason: SDUPTHER

## 2022-12-01 NOTE — PROGRESS NOTES
Patient received vaccine FluAD, advised to remain in area for 15 minutes; tolerated injection well.

## 2022-12-04 VITALS
HEIGHT: 64 IN | SYSTOLIC BLOOD PRESSURE: 128 MMHG | WEIGHT: 135.81 LBS | DIASTOLIC BLOOD PRESSURE: 64 MMHG | BODY MASS INDEX: 23.18 KG/M2 | TEMPERATURE: 99 F | HEART RATE: 66 BPM

## 2022-12-04 NOTE — PROGRESS NOTES
Subjective:       Patient ID: Inna Blankenship is a 89 y.o. female.    Chief Complaint: Hypertension    HPI  She returns for management of hypertension.  She has had hypertension for over a year.  Current treatment has included medications outlined in medication list.  She denies chest pain or shortness of breath.  No palpitations.  Denies left arm or neck pain.  She has diabetes.  Denies polyuria, polydipsia     Medications:  See med list     Social history:  Does not smoke, does not drink alcohol       Review of Systems   Constitutional:  Negative for chills, fatigue, fever and unexpected weight change.   Respiratory:  Negative for chest tightness and shortness of breath.    Cardiovascular:  Negative for chest pain and palpitations.   Gastrointestinal:  Negative for abdominal pain and blood in stool.   Neurological:  Negative for dizziness, syncope, numbness and headaches.     Objective:      Physical Exam  HENT:      Right Ear: External ear normal.      Left Ear: External ear normal.      Nose: Nose normal.      Mouth/Throat:      Mouth: Mucous membranes are moist.      Pharynx: Oropharynx is clear.   Eyes:      Pupils: Pupils are equal, round, and reactive to light.   Cardiovascular:      Rate and Rhythm: Normal rate and regular rhythm.      Heart sounds: Murmur heard.   Pulmonary:      Breath sounds: Normal breath sounds.   Abdominal:      General: There is no distension.      Palpations: There is no hepatomegaly or splenomegaly.      Tenderness: There is no abdominal tenderness.   Musculoskeletal:      Cervical back: Normal range of motion.   Lymphadenopathy:      Cervical: No cervical adenopathy.      Upper Body:      Right upper body: No axillary adenopathy.      Left upper body: No axillary adenopathy.   Neurological:      Cranial Nerves: No cranial nerve deficit.      Sensory: No sensory deficit.      Motor: Motor function is intact.      Deep Tendon Reflexes: Reflexes are normal and symmetric.        Assessment/Plan       Assessment and plan:  1.  Hypertension:  Check CMP and lipid panel  2.  Diabetes:  Check A1c

## 2022-12-12 RX ORDER — FUROSEMIDE 20 MG/1
20 TABLET ORAL DAILY
Qty: 10 TABLET | Refills: 0 | Status: SHIPPED | OUTPATIENT
Start: 2022-12-12 | End: 2022-12-15 | Stop reason: SDUPTHER

## 2022-12-12 NOTE — TELEPHONE ENCOUNTER
----- Message from Saira Pichardo sent at 12/12/2022 10:47 AM CST -----  Regarding: regular and interim refill needed  Contact: Alonso Sims [phone]  Patient is  our of medication.     Requesting an RX refill or new RX.  Is this a refill or new RX: refill  RX name and strength (furosemide (LASIX) 20 MG tablet  Is this a 30 day or 90 day RX: 90  Pharmacy name and phone # [email or web address]    The doctors have asked that we provide their patients with the following 2 reminders -- prescription refills can take up to 72 hours, and a friendly reminder that in the future you can use your [facility] account to request refills: yes      2  Requesting an RX refill or new RX.  Is this a refill or new RX: refill  RX name and strength (furosemide (LASIX) 20 MG tablet  Is this a 30 day or 90 day RX: interim   Pharmacy name and phone #   [facility] - [facility] [address]  [address] [facility] [address]  Phone: [phone] Fax: [phone]    [address] [place] - [address]  [address] [place] [address]  Phone: [phone] Fax: [phone]    he doctors have asked that we provide their patients with the following 2 reminders -- prescription refills can take up to 72 hours, and a friendly reminder that in the future you can use your [facility] account to request refills: yes

## 2022-12-12 NOTE — TELEPHONE ENCOUNTER
No new care gaps identified.  [facility] Embedded Care Gaps. Reference number: [id number]. 12/12/2022   10:55:57 AM CST

## 2022-12-14 RX ORDER — FUROSEMIDE 20 MG/1
20 TABLET ORAL DAILY
Qty: 10 TABLET | Refills: 0 | Status: CANCELLED | OUTPATIENT
Start: 2022-12-14

## 2022-12-14 RX ORDER — FUROSEMIDE 20 MG/1
20 TABLET ORAL DAILY
Qty: 10 TABLET | Refills: 0 | OUTPATIENT
Start: 2022-12-14

## 2022-12-14 NOTE — TELEPHONE ENCOUNTER
----- Message from Saira Pichardo sent at 12/14/2022  1:34 PM CST -----  Regarding: interim refill  Contact: brie Albarran [phone]  Patient has been out of medication for 3 days.  Waiting on refill from [facility]. Needs interim refill. Please call when rx has been sent to the pharmacy      Requesting an RX refill or new RX.  Is this a refill or new RX: interim refill  RX name and strength furosemide (LASIX) 20 MG tablet  Is this a 30 day or 90 day RX:   Pharmacy name and phone # (   [facility] - [place] - [address]  [address] [place] [address]  Phone: [phone] Fax: [phone]    The doctors have asked that we provide their patients with the following 2 reminders -- prescription refills can take up to 72 hours, and a friendly reminder that in the future you can use your [facility] account to request refills: yes

## 2022-12-14 NOTE — TELEPHONE ENCOUNTER
No new care gaps identified.  [facility] Embedded Care Gaps. Reference number: [id number]. 12/14/2022   10:43:07 AM CST

## 2022-12-14 NOTE — TELEPHONE ENCOUNTER
Pt is requesting meds be sent to the [facility]. Also wanted to know if she could get a 90 day supply

## 2022-12-14 NOTE — TELEPHONE ENCOUNTER
----- Message from Lisa Prabhakar sent at 12/14/2022 10:33 AM CST -----  Contact: Deion Valerio) [phone]  Type:  RX Refill Request    Who Called: Deion Valerio)   Refill or New Rx: Refill  RX Name and Strength: furosemide (LASIX) 20 MG tablet  How is the patient currently taking it? (ex. 1XDay): Take 1 tablet (20 mg total) by mouth once daily. Take an additional 20mg lasix in the afternoon as needed for worsening shortness of breath, swelling, or 3lbs weight gain in 1 day/5lb weight gain in 3 days - Oral  Is this a 30 day or 90 day RX: See below  Preferred Pharmacy with phone number: [facility] - [id number] - [address] [phone]  Local or Mail Order: Local  Would the patient rather a call back or a response via [facility]? Phone call  Best Call Back Number: [phone]  Additional Information: Patient has been out of medication for 4 days. Asking for a local supply to hold her over while she waits for delivery from [facility].

## 2022-12-14 NOTE — TELEPHONE ENCOUNTER
No new care gaps identified.  [facility] Embedded Care Gaps. Reference number: [id number]. 12/14/2022   1:52:17 PM CST

## 2022-12-15 ENCOUNTER — TELEPHONE (OUTPATIENT)
Dept: INTERNAL MEDICINE | Facility: CLINIC | Age: 87
End: 2022-12-15
Payer: MEDICARE

## 2022-12-15 RX ORDER — FUROSEMIDE 20 MG/1
20 TABLET ORAL DAILY
Qty: 90 TABLET | Refills: 0 | Status: SHIPPED | OUTPATIENT
Start: 2022-12-15 | End: 2022-12-15 | Stop reason: SDUPTHER

## 2022-12-15 RX ORDER — FUROSEMIDE 20 MG/1
20 TABLET ORAL DAILY
Qty: 14 TABLET | Refills: 0 | Status: SHIPPED | OUTPATIENT
Start: 2022-12-15 | End: 2022-12-27 | Stop reason: SDUPTHER

## 2022-12-15 NOTE — TELEPHONE ENCOUNTER
----- Message from Milo Gomez sent at 12/15/2022  8:28 AM CST -----  Contact: [phone]  Pt son Deion needs a call back about his mother's meds. Please call pt back. Pt needs a refill on a meds that she has been out of since Sunday but he doesn't know the name of it.

## 2022-12-15 NOTE — TELEPHONE ENCOUNTER
I sent the prescription to [facility], but I also sent a small prescription to [facility] since she is out of it

## 2022-12-15 NOTE — TELEPHONE ENCOUNTER
----- Message from Milo Gomez sent at 12/15/2022  8:28 AM CST -----  Contact: [phone]  Pt son Deion needs a call back about his mother's meds. Please call pt back. Pt needs a refill on a meds that she has been out of since Sunday but he doesn't know the name of it.

## 2022-12-19 ENCOUNTER — TELEPHONE (OUTPATIENT)
Dept: INTERNAL MEDICINE | Facility: CLINIC | Age: 87
End: 2022-12-19
Payer: MEDICARE

## 2022-12-19 RX ORDER — LOSARTAN POTASSIUM 25 MG/1
25 TABLET ORAL DAILY
Qty: 90 TABLET | Refills: 3 | Status: SHIPPED | OUTPATIENT
Start: 2022-12-19 | End: 2024-01-10

## 2022-12-19 NOTE — TELEPHONE ENCOUNTER
No new care gaps identified.  [facility] Embedded Care Gaps. Reference number: [id number]. 12/19/2022   12:22:49 AM CST

## 2022-12-19 NOTE — TELEPHONE ENCOUNTER
----- Message from Jane Moscoso sent at 12/19/2022  4:38 PM CST -----  Contact: [phone]  Pts grandchild Is calling they have been trying to get this filled for over a week please give return call ASAp pt has been prescribed 14 days at [facility] but she is needing her 90 script to be filled     Requesting an RX refill or new RX.  Is this a refill or new RX: refill  RX name and strength (copy/paste from chart):  furosemide (LASIX) 20 MG tablet  Is this a 30 day or 90 day RX: 90  Pharmacy name and phone # (copy/paste from chart):    [facility] - [place], [place] - [address]  [address] [facility] [address]  Phone: [phone] Fax: [phone]         The doctors have asked that we provide their patients with the following 2 reminders -- prescription refills can take up to 72 hours, and a friendly reminder that in the future you can use your [facility] account to request refills: yes

## 2022-12-19 NOTE — TELEPHONE ENCOUNTER
----- Message from Milo Gomez sent at 12/19/2022 11:08 AM CST -----  Contact: [phone]  Requesting an RX refill or new RX.  Is this a refill or new RX: refill  RX name and strength (copy/paste from chart):  furosemide (LASIX) 20 MG tablet  Is this a 30 day or 90 day RX: 90  Pharmacy name and phone # (copy/paste from chart):    [facility] - [facility] [address]  [address] [facility] [address]  Phone: [phone] Fax: [phone]  The doctors have asked that we provide their patients with the following 2 reminders -- prescription refills can take up to 72 hours, and a friendly reminder that in the future you can use your [facility] account to request refills: call back

## 2022-12-20 NOTE — TELEPHONE ENCOUNTER
Refill Decision Note   Inna Blankenship  is requesting a refill authorization.  Brief Assessment and Rationale for Refill:  Approve     Medication Therapy Plan:       Medication Reconciliation Completed: No   Comments:     No Care Gaps recommended.     Note composed:6:37 PM 12/19/2022

## 2022-12-20 NOTE — TELEPHONE ENCOUNTER
Called [facility] pharmacy this morning. Pharmacy received lasix prescription on 12/12. Spoke to Vielka about needing a clarification in directions. Clarified directions with tech and the prescription should be sent out soon. Will notify family.   no distress

## 2022-12-27 ENCOUNTER — PATIENT MESSAGE (OUTPATIENT)
Dept: INTERNAL MEDICINE | Facility: CLINIC | Age: 87
End: 2022-12-27
Payer: MEDICARE

## 2022-12-27 ENCOUNTER — TELEPHONE (OUTPATIENT)
Dept: INTERNAL MEDICINE | Facility: CLINIC | Age: 87
End: 2022-12-27
Payer: MEDICARE

## 2022-12-27 RX ORDER — FUROSEMIDE 20 MG/1
20 TABLET ORAL DAILY
Qty: 14 TABLET | Refills: 0 | Status: SHIPPED | OUTPATIENT
Start: 2022-12-27 | End: 2022-12-30 | Stop reason: SDUPTHER

## 2022-12-27 NOTE — TELEPHONE ENCOUNTER
Grandson called about patients lasix not coming until Thursday according to [facility] pharmacy. Patient did get a 14 day supply and is down to her last lasix. Family concerned about can she get 2 pills to last until Thursday.  Informed the family that not certain that the insurance will pay for 2 pills for 2 days and they would have to call the pharmacy the local one for that answer. Also informed them that only  can make that determination. Will send to PCP for advisement.

## 2022-12-30 ENCOUNTER — TELEPHONE (OUTPATIENT)
Dept: INTERNAL MEDICINE | Facility: CLINIC | Age: 87
End: 2022-12-30
Payer: MEDICARE

## 2022-12-30 RX ORDER — FUROSEMIDE 20 MG/1
20 TABLET ORAL DAILY
Qty: 90 TABLET | Refills: 0 | Status: ON HOLD | OUTPATIENT
Start: 2022-12-30 | End: 2023-02-03 | Stop reason: HOSPADM

## 2022-12-30 NOTE — TELEPHONE ENCOUNTER
----- Message from Lynn Ryan sent at 12/30/2022  3:19 PM CST -----  Contact: [phone]  Pt's daughter called to advise that Alonso has yet to get the RX furosemide (LASIX) 20 MG tablet. She has received the emergency pills for her to make it until the RX is mailed out; however she received a letter saying they have yet to receive the RX. Please Advise

## 2023-01-30 ENCOUNTER — TELEPHONE (OUTPATIENT)
Dept: INTERNAL MEDICINE | Facility: CLINIC | Age: 88
End: 2023-01-30
Payer: MEDICARE

## 2023-01-30 ENCOUNTER — HOSPITAL ENCOUNTER (INPATIENT)
Facility: HOSPITAL | Age: 88
LOS: 1 days | Discharge: HOME HEALTH | DRG: 291 | End: 2023-02-03
Attending: EMERGENCY MEDICINE | Admitting: STUDENT IN AN ORGANIZED HEALTH CARE EDUCATION/TRAINING PROGRAM
Payer: MEDICARE

## 2023-01-30 DIAGNOSIS — R06.02 SHORTNESS OF BREATH: ICD-10-CM

## 2023-01-30 DIAGNOSIS — R07.9 CHEST PAIN: ICD-10-CM

## 2023-01-30 DIAGNOSIS — I50.33 ACUTE ON CHRONIC DIASTOLIC HEART FAILURE: ICD-10-CM

## 2023-01-30 DIAGNOSIS — R06.09 DYSPNEA ON EXERTION: ICD-10-CM

## 2023-01-30 DIAGNOSIS — I50.9 CONGESTIVE HEART FAILURE, UNSPECIFIED HF CHRONICITY, UNSPECIFIED HEART FAILURE TYPE: ICD-10-CM

## 2023-01-30 DIAGNOSIS — I50.33 ACUTE ON CHRONIC DIASTOLIC CONGESTIVE HEART FAILURE: Primary | ICD-10-CM

## 2023-01-30 DIAGNOSIS — I50.20 HFREF (HEART FAILURE WITH REDUCED EJECTION FRACTION): ICD-10-CM

## 2023-01-30 DIAGNOSIS — J81.0 ACUTE PULMONARY EDEMA: ICD-10-CM

## 2023-01-30 DIAGNOSIS — R06.02 SOB (SHORTNESS OF BREATH): ICD-10-CM

## 2023-01-30 PROBLEM — J96.01 ACUTE HYPOXEMIC RESPIRATORY FAILURE: Status: ACTIVE | Noted: 2023-01-30

## 2023-01-30 LAB
ALBUMIN SERPL BCP-MCNC: 4 G/DL (ref 3.5–5.2)
ALP SERPL-CCNC: 110 U/L (ref 55–135)
ALT SERPL W/O P-5'-P-CCNC: 24 U/L (ref 10–44)
ANION GAP SERPL CALC-SCNC: 12 MMOL/L (ref 8–16)
AST SERPL-CCNC: 41 U/L (ref 10–40)
BASOPHILS # BLD AUTO: 0.05 K/UL (ref 0–0.2)
BASOPHILS NFR BLD: 0.9 % (ref 0–1.9)
BILIRUB SERPL-MCNC: 0.9 MG/DL (ref 0.1–1)
BNP SERPL-MCNC: 894 PG/ML (ref 0–99)
BUN SERPL-MCNC: 17 MG/DL (ref 10–30)
CALCIUM SERPL-MCNC: 10.5 MG/DL (ref 8.7–10.5)
CHLORIDE SERPL-SCNC: 106 MMOL/L (ref 95–110)
CO2 SERPL-SCNC: 23 MMOL/L (ref 23–29)
CREAT SERPL-MCNC: 1.1 MG/DL (ref 0.5–1.4)
DIFFERENTIAL METHOD: ABNORMAL
EOSINOPHIL # BLD AUTO: 0.1 K/UL (ref 0–0.5)
EOSINOPHIL NFR BLD: 2.3 % (ref 0–8)
ERYTHROCYTE [DISTWIDTH] IN BLOOD BY AUTOMATED COUNT: 16.1 % (ref 11.5–14.5)
EST. GFR  (NO RACE VARIABLE): 46 ML/MIN/1.73 M^2
ESTIMATED AVG GLUCOSE: 120 MG/DL (ref 68–131)
GLUCOSE SERPL-MCNC: 114 MG/DL (ref 70–110)
HBA1C MFR BLD: 5.8 % (ref 4–5.6)
HCT VFR BLD AUTO: 41.2 % (ref 37–48.5)
HGB BLD-MCNC: 12.9 G/DL (ref 12–16)
IMM GRANULOCYTES # BLD AUTO: 0.01 K/UL (ref 0–0.04)
IMM GRANULOCYTES NFR BLD AUTO: 0.2 % (ref 0–0.5)
INFLUENZA A, MOLECULAR: NEGATIVE
INFLUENZA B, MOLECULAR: NEGATIVE
LYMPHOCYTES # BLD AUTO: 1.6 K/UL (ref 1–4.8)
LYMPHOCYTES NFR BLD: 28 % (ref 18–48)
MCH RBC QN AUTO: 30 PG (ref 27–31)
MCHC RBC AUTO-ENTMCNC: 31.3 G/DL (ref 32–36)
MCV RBC AUTO: 96 FL (ref 82–98)
MONOCYTES # BLD AUTO: 0.5 K/UL (ref 0.3–1)
MONOCYTES NFR BLD: 8.6 % (ref 4–15)
NEUTROPHILS # BLD AUTO: 3.4 K/UL (ref 1.8–7.7)
NEUTROPHILS NFR BLD: 60 % (ref 38–73)
NRBC BLD-RTO: 0 /100 WBC
PLATELET # BLD AUTO: 172 K/UL (ref 150–450)
PMV BLD AUTO: 11.5 FL (ref 9.2–12.9)
POTASSIUM SERPL-SCNC: 4.2 MMOL/L (ref 3.5–5.1)
PROT SERPL-MCNC: 8.3 G/DL (ref 6–8.4)
RBC # BLD AUTO: 4.3 M/UL (ref 4–5.4)
SARS-COV-2 RDRP RESP QL NAA+PROBE: NEGATIVE
SODIUM SERPL-SCNC: 141 MMOL/L (ref 136–145)
SPECIMEN SOURCE: NORMAL
TROPONIN I SERPL DL<=0.01 NG/ML-MCNC: 0.12 NG/ML (ref 0–0.03)
TROPONIN I SERPL DL<=0.01 NG/ML-MCNC: 0.13 NG/ML (ref 0–0.03)
WBC # BLD AUTO: 5.71 K/UL (ref 3.9–12.7)

## 2023-01-30 PROCEDURE — 85025 COMPLETE CBC W/AUTO DIFF WBC: CPT | Mod: HCNC | Performed by: PHYSICIAN ASSISTANT

## 2023-01-30 PROCEDURE — 99291 PR CRITICAL CARE, E/M 30-74 MINUTES: ICD-10-PCS | Mod: CS,,, | Performed by: EMERGENCY MEDICINE

## 2023-01-30 PROCEDURE — 93010 ELECTROCARDIOGRAM REPORT: CPT | Mod: HCNC,,, | Performed by: INTERNAL MEDICINE

## 2023-01-30 PROCEDURE — 96374 THER/PROPH/DIAG INJ IV PUSH: CPT | Mod: HCNC

## 2023-01-30 PROCEDURE — 99291 CRITICAL CARE FIRST HOUR: CPT | Mod: CS,,, | Performed by: EMERGENCY MEDICINE

## 2023-01-30 PROCEDURE — 25000003 PHARM REV CODE 250: Mod: HCNC

## 2023-01-30 PROCEDURE — 99291 CRITICAL CARE FIRST HOUR: CPT | Mod: 25,HCNC

## 2023-01-30 PROCEDURE — 84484 ASSAY OF TROPONIN QUANT: CPT | Mod: 91,HCNC | Performed by: PHYSICIAN ASSISTANT

## 2023-01-30 PROCEDURE — U0002 COVID-19 LAB TEST NON-CDC: HCPCS | Mod: HCNC | Performed by: EMERGENCY MEDICINE

## 2023-01-30 PROCEDURE — 83036 HEMOGLOBIN GLYCOSYLATED A1C: CPT | Mod: HCNC

## 2023-01-30 PROCEDURE — 84484 ASSAY OF TROPONIN QUANT: CPT | Mod: HCNC

## 2023-01-30 PROCEDURE — 83880 ASSAY OF NATRIURETIC PEPTIDE: CPT | Mod: HCNC | Performed by: PHYSICIAN ASSISTANT

## 2023-01-30 PROCEDURE — 93010 EKG 12-LEAD: ICD-10-PCS | Mod: HCNC,,, | Performed by: INTERNAL MEDICINE

## 2023-01-30 PROCEDURE — 99223 1ST HOSP IP/OBS HIGH 75: CPT | Mod: HCNC,,,

## 2023-01-30 PROCEDURE — G0378 HOSPITAL OBSERVATION PER HR: HCPCS | Mod: HCNC

## 2023-01-30 PROCEDURE — 87502 INFLUENZA DNA AMP PROBE: CPT | Mod: HCNC | Performed by: PHYSICIAN ASSISTANT

## 2023-01-30 PROCEDURE — 99223 PR INITIAL HOSPITAL CARE,LEVL III: ICD-10-PCS | Mod: HCNC,,,

## 2023-01-30 PROCEDURE — 87502 INFLUENZA DNA AMP PROBE: CPT | Mod: HCNC

## 2023-01-30 PROCEDURE — 80053 COMPREHEN METABOLIC PANEL: CPT | Mod: HCNC | Performed by: PHYSICIAN ASSISTANT

## 2023-01-30 PROCEDURE — 63600175 PHARM REV CODE 636 W HCPCS: Mod: HCNC | Performed by: EMERGENCY MEDICINE

## 2023-01-30 PROCEDURE — 94761 N-INVAS EAR/PLS OXIMETRY MLT: CPT | Mod: HCNC

## 2023-01-30 PROCEDURE — 93005 ELECTROCARDIOGRAM TRACING: CPT | Mod: HCNC

## 2023-01-30 RX ORDER — FUROSEMIDE 10 MG/ML
20 INJECTION INTRAMUSCULAR; INTRAVENOUS 2 TIMES DAILY
Status: DISCONTINUED | OUTPATIENT
Start: 2023-01-31 | End: 2023-01-31

## 2023-01-30 RX ORDER — IPRATROPIUM BROMIDE AND ALBUTEROL SULFATE 2.5; .5 MG/3ML; MG/3ML
3 SOLUTION RESPIRATORY (INHALATION) EVERY 6 HOURS PRN
Status: DISCONTINUED | OUTPATIENT
Start: 2023-01-30 | End: 2023-02-03 | Stop reason: HOSPADM

## 2023-01-30 RX ORDER — LATANOPROST 50 UG/ML
1 SOLUTION/ DROPS OPHTHALMIC DAILY
Status: DISCONTINUED | OUTPATIENT
Start: 2023-01-31 | End: 2023-02-03 | Stop reason: HOSPADM

## 2023-01-30 RX ORDER — ATORVASTATIN CALCIUM 20 MG/1
20 TABLET, FILM COATED ORAL DAILY
Status: DISCONTINUED | OUTPATIENT
Start: 2023-01-31 | End: 2023-02-03 | Stop reason: HOSPADM

## 2023-01-30 RX ORDER — NALOXONE HCL 0.4 MG/ML
0.02 VIAL (ML) INJECTION
Status: DISCONTINUED | OUTPATIENT
Start: 2023-01-30 | End: 2023-02-03 | Stop reason: HOSPADM

## 2023-01-30 RX ORDER — FUROSEMIDE 10 MG/ML
80 INJECTION INTRAMUSCULAR; INTRAVENOUS
Status: COMPLETED | OUTPATIENT
Start: 2023-01-30 | End: 2023-01-30

## 2023-01-30 RX ORDER — SENNOSIDES 8.6 MG/1
8.6 TABLET ORAL 2 TIMES DAILY
Status: DISCONTINUED | OUTPATIENT
Start: 2023-01-31 | End: 2023-02-03 | Stop reason: HOSPADM

## 2023-01-30 RX ORDER — POLYETHYLENE GLYCOL 3350 17 G/17G
17 POWDER, FOR SOLUTION ORAL DAILY
Status: DISCONTINUED | OUTPATIENT
Start: 2023-01-31 | End: 2023-02-03 | Stop reason: HOSPADM

## 2023-01-30 RX ORDER — SODIUM CHLORIDE 0.9 % (FLUSH) 0.9 %
10 SYRINGE (ML) INJECTION
Status: DISCONTINUED | OUTPATIENT
Start: 2023-01-30 | End: 2023-02-03 | Stop reason: HOSPADM

## 2023-01-30 RX ORDER — MAG HYDROX/ALUMINUM HYD/SIMETH 200-200-20
30 SUSPENSION, ORAL (FINAL DOSE FORM) ORAL 4 TIMES DAILY PRN
Status: DISCONTINUED | OUTPATIENT
Start: 2023-01-30 | End: 2023-02-03 | Stop reason: HOSPADM

## 2023-01-30 RX ORDER — LOSARTAN POTASSIUM 25 MG/1
25 TABLET ORAL DAILY
Status: DISCONTINUED | OUTPATIENT
Start: 2023-01-31 | End: 2023-02-01

## 2023-01-30 RX ORDER — IBUPROFEN 200 MG
16 TABLET ORAL
Status: DISCONTINUED | OUTPATIENT
Start: 2023-01-30 | End: 2023-02-03 | Stop reason: HOSPADM

## 2023-01-30 RX ORDER — ONDANSETRON 2 MG/ML
4 INJECTION INTRAMUSCULAR; INTRAVENOUS EVERY 8 HOURS PRN
Status: DISCONTINUED | OUTPATIENT
Start: 2023-01-30 | End: 2023-02-03 | Stop reason: HOSPADM

## 2023-01-30 RX ORDER — ONDANSETRON 4 MG/1
4 TABLET, ORALLY DISINTEGRATING ORAL EVERY 8 HOURS PRN
Status: DISCONTINUED | OUTPATIENT
Start: 2023-01-30 | End: 2023-02-03 | Stop reason: HOSPADM

## 2023-01-30 RX ORDER — ESCITALOPRAM OXALATE 10 MG/1
10 TABLET ORAL DAILY
Status: DISCONTINUED | OUTPATIENT
Start: 2023-01-31 | End: 2023-02-03 | Stop reason: HOSPADM

## 2023-01-30 RX ORDER — GLUCAGON 1 MG
1 KIT INJECTION
Status: DISCONTINUED | OUTPATIENT
Start: 2023-01-30 | End: 2023-02-03 | Stop reason: HOSPADM

## 2023-01-30 RX ORDER — INSULIN ASPART 100 [IU]/ML
0-5 INJECTION, SOLUTION INTRAVENOUS; SUBCUTANEOUS
Status: DISCONTINUED | OUTPATIENT
Start: 2023-01-30 | End: 2023-02-03 | Stop reason: HOSPADM

## 2023-01-30 RX ORDER — ACETAMINOPHEN 325 MG/1
650 TABLET ORAL EVERY 8 HOURS PRN
Status: DISCONTINUED | OUTPATIENT
Start: 2023-01-30 | End: 2023-02-03 | Stop reason: HOSPADM

## 2023-01-30 RX ORDER — AMLODIPINE BESYLATE 10 MG/1
10 TABLET ORAL DAILY
Status: DISCONTINUED | OUTPATIENT
Start: 2023-01-31 | End: 2023-02-01

## 2023-01-30 RX ORDER — IBUPROFEN 200 MG
24 TABLET ORAL
Status: DISCONTINUED | OUTPATIENT
Start: 2023-01-30 | End: 2023-02-03 | Stop reason: HOSPADM

## 2023-01-30 RX ORDER — TALC
6 POWDER (GRAM) TOPICAL NIGHTLY PRN
Status: DISCONTINUED | OUTPATIENT
Start: 2023-01-30 | End: 2023-02-03 | Stop reason: HOSPADM

## 2023-01-30 RX ORDER — ACETAMINOPHEN 325 MG/1
650 TABLET ORAL EVERY 4 HOURS PRN
Status: DISCONTINUED | OUTPATIENT
Start: 2023-01-30 | End: 2023-02-03 | Stop reason: HOSPADM

## 2023-01-30 RX ADMIN — FUROSEMIDE 80 MG: 10 INJECTION, SOLUTION INTRAMUSCULAR; INTRAVENOUS at 09:01

## 2023-01-30 RX ADMIN — APIXABAN 2.5 MG: 2.5 TABLET, FILM COATED ORAL at 11:01

## 2023-01-30 NOTE — TELEPHONE ENCOUNTER
Called and spoke to patients daughter Cara. Patient weighed 124 last Friday. Today she weighs 130. Daughter noticed swelling in her lower extremities and increased her water pills by giving her 2 today. Suggested to patient to reach out to cardiologist. Patient has an appt with her cardiologist next week. Patients daughter wants to know what else should she mascorro. Encourage her to be seen at  or ER or to make an appt with another provider if available.

## 2023-01-30 NOTE — TELEPHONE ENCOUNTER
----- Message from Gabriela Mota sent at 1/30/2023  9:35 AM CST -----  Contact: Cara (daughter)  [phone]  Pt daughter Cara requesting a call stated need help with pt stated in regards to fluid. .    Please call and advise

## 2023-01-31 ENCOUNTER — DOCUMENTATION ONLY (OUTPATIENT)
Dept: CARDIOLOGY | Facility: CLINIC | Age: 88
End: 2023-01-31
Payer: MEDICARE

## 2023-01-31 LAB
ALLENS TEST: ABNORMAL
ANION GAP SERPL CALC-SCNC: 8 MMOL/L (ref 8–16)
ASCENDING AORTA: 3.06 CM
AV INDEX (PROSTH): 0.56
AV MEAN GRADIENT: 3 MMHG
AV PEAK GRADIENT: 7 MMHG
AV VALVE AREA: 2.02 CM2
AV VELOCITY RATIO: 0.47
BSA FOR ECHO PROCEDURE: 1.66 M2
BUN SERPL-MCNC: 17 MG/DL (ref 10–30)
CALCIUM SERPL-MCNC: 10.2 MG/DL (ref 8.7–10.5)
CHLORIDE SERPL-SCNC: 103 MMOL/L (ref 95–110)
CO2 SERPL-SCNC: 26 MMOL/L (ref 23–29)
CREAT SERPL-MCNC: 0.9 MG/DL (ref 0.5–1.4)
CV ECHO LV RWT: 0.44 CM
DELSYS: ABNORMAL
DOP CALC AO PEAK VEL: 1.34 M/S
DOP CALC AO VTI: 26.66 CM
DOP CALC LVOT AREA: 3.6 CM2
DOP CALC LVOT DIAMETER: 2.15 CM
DOP CALC LVOT PEAK VEL: 0.63 M/S
DOP CALC LVOT STROKE VOLUME: 53.78 CM3
DOP CALCLVOT PEAK VEL VTI: 14.82 CM
E WAVE DECELERATION TIME: 248.88 MSEC
E/A RATIO: 1.12
E/E' RATIO: 19.33 M/S
ECHO LV POSTERIOR WALL: 1.02 CM (ref 0.6–1.1)
EJECTION FRACTION: 45 %
EST. GFR  (NO RACE VARIABLE): 58.5 ML/MIN/1.73 M^2
FRACTIONAL SHORTENING: 29 % (ref 28–44)
GLUCOSE SERPL-MCNC: 96 MG/DL (ref 70–110)
HCO3 UR-SCNC: 31.3 MMOL/L (ref 24–28)
INTERVENTRICULAR SEPTUM: 1.05 CM (ref 0.6–1.1)
LA MAJOR: 7 CM
LA MINOR: 6.85 CM
LA WIDTH: 4.98 CM
LEFT ATRIUM SIZE: 4.8 CM
LEFT ATRIUM VOLUME INDEX MOD: 68.2 ML/M2
LEFT ATRIUM VOLUME INDEX: 84.8 ML/M2
LEFT ATRIUM VOLUME MOD: 113.23 CM3
LEFT ATRIUM VOLUME: 140.69 CM3
LEFT INTERNAL DIMENSION IN SYSTOLE: 3.31 CM (ref 2.1–4)
LEFT VENTRICLE DIASTOLIC VOLUME INDEX: 59.57 ML/M2
LEFT VENTRICLE DIASTOLIC VOLUME: 98.88 ML
LEFT VENTRICLE MASS INDEX: 101 G/M2
LEFT VENTRICLE SYSTOLIC VOLUME INDEX: 26.8 ML/M2
LEFT VENTRICLE SYSTOLIC VOLUME: 44.5 ML
LEFT VENTRICULAR INTERNAL DIMENSION IN DIASTOLE: 4.63 CM (ref 3.5–6)
LEFT VENTRICULAR MASS: 168.26 G
LV LATERAL E/E' RATIO: 14.5 M/S
LV SEPTAL E/E' RATIO: 29 M/S
MAGNESIUM SERPL-MCNC: 1.9 MG/DL (ref 1.6–2.6)
MV PEAK A VEL: 0.52 M/S
MV PEAK E VEL: 0.58 M/S
MV STENOSIS PRESSURE HALF TIME: 72.18 MS
MV VALVE AREA P 1/2 METHOD: 3.05 CM2
PCO2 BLDA: 49.3 MMHG (ref 35–45)
PH SMN: 7.41 [PH] (ref 7.35–7.45)
PHOSPHATE SERPL-MCNC: 3.4 MG/DL (ref 2.7–4.5)
PISA TR MAX VEL: 3.73 M/S
PO2 BLDA: 83 MMHG (ref 80–100)
POC BE: 7 MMOL/L
POC SATURATED O2: 96 % (ref 95–100)
POC TCO2: 33 MMOL/L (ref 23–27)
POCT GLUCOSE: 105 MG/DL (ref 70–110)
POCT GLUCOSE: 122 MG/DL (ref 70–110)
POCT GLUCOSE: 95 MG/DL (ref 70–110)
POTASSIUM SERPL-SCNC: 3.5 MMOL/L (ref 3.5–5.1)
QEF: 42 %
RA MAJOR: 6.15 CM
RA PRESSURE: 15 MMHG
RA WIDTH: 3.74 CM
RIGHT VENTRICULAR END-DIASTOLIC DIMENSION: 5.08 CM
SAMPLE: ABNORMAL
SINUS: 3.38 CM
SITE: ABNORMAL
SODIUM SERPL-SCNC: 137 MMOL/L (ref 136–145)
STJ: 2.76 CM
TDI LATERAL: 0.04 M/S
TDI SEPTAL: 0.02 M/S
TDI: 0.03 M/S
TR MAX PG: 56 MMHG
TRICUSPID ANNULAR PLANE SYSTOLIC EXCURSION: 1.21 CM
TROPONIN I SERPL DL<=0.01 NG/ML-MCNC: 0.11 NG/ML (ref 0–0.03)
TV REST PULMONARY ARTERY PRESSURE: 71 MMHG

## 2023-01-31 PROCEDURE — 99214 PR OFFICE/OUTPT VISIT, EST, LEVL IV, 30-39 MIN: ICD-10-PCS | Mod: ,,, | Performed by: STUDENT IN AN ORGANIZED HEALTH CARE EDUCATION/TRAINING PROGRAM

## 2023-01-31 PROCEDURE — 99214 OFFICE O/P EST MOD 30 MIN: CPT | Mod: ,,, | Performed by: STUDENT IN AN ORGANIZED HEALTH CARE EDUCATION/TRAINING PROGRAM

## 2023-01-31 PROCEDURE — 36600 WITHDRAWAL OF ARTERIAL BLOOD: CPT | Mod: HCNC

## 2023-01-31 PROCEDURE — G0378 HOSPITAL OBSERVATION PER HR: HCPCS | Mod: HCNC

## 2023-01-31 PROCEDURE — 84165 PATHOLOGIST INTERPRETATION SPE: ICD-10-PCS | Mod: 26,HCNC,, | Performed by: PATHOLOGY

## 2023-01-31 PROCEDURE — 99233 PR SUBSEQUENT HOSPITAL CARE,LEVL III: ICD-10-PCS | Mod: HCNC,,,

## 2023-01-31 PROCEDURE — 99900035 HC TECH TIME PER 15 MIN (STAT): Mod: HCNC

## 2023-01-31 PROCEDURE — 83735 ASSAY OF MAGNESIUM: CPT | Mod: HCNC

## 2023-01-31 PROCEDURE — 25000003 PHARM REV CODE 250: Mod: HCNC

## 2023-01-31 PROCEDURE — 63600175 PHARM REV CODE 636 W HCPCS: Mod: HCNC

## 2023-01-31 PROCEDURE — 84165 PROTEIN E-PHORESIS SERUM: CPT | Mod: 26,HCNC,, | Performed by: PATHOLOGY

## 2023-01-31 PROCEDURE — 83521 IG LIGHT CHAINS FREE EACH: CPT | Mod: 59,HCNC

## 2023-01-31 PROCEDURE — 84165 PROTEIN E-PHORESIS SERUM: CPT | Mod: HCNC

## 2023-01-31 PROCEDURE — 84100 ASSAY OF PHOSPHORUS: CPT | Mod: HCNC

## 2023-01-31 PROCEDURE — 86334 IMMUNOFIX E-PHORESIS SERUM: CPT | Mod: 26,HCNC,, | Performed by: PATHOLOGY

## 2023-01-31 PROCEDURE — 99233 SBSQ HOSP IP/OBS HIGH 50: CPT | Mod: HCNC,,,

## 2023-01-31 PROCEDURE — 99497 PR ADVNCD CARE PLAN 30 MIN: ICD-10-PCS | Mod: HCNC,,,

## 2023-01-31 PROCEDURE — 86334 IMMUNOFIX E-PHORESIS SERUM: CPT | Mod: HCNC

## 2023-01-31 PROCEDURE — 82803 BLOOD GASES ANY COMBINATION: CPT | Mod: HCNC

## 2023-01-31 PROCEDURE — 80048 BASIC METABOLIC PNL TOTAL CA: CPT | Mod: HCNC

## 2023-01-31 PROCEDURE — 99497 ADVNCD CARE PLAN 30 MIN: CPT | Mod: HCNC,,,

## 2023-01-31 PROCEDURE — 82962 GLUCOSE BLOOD TEST: CPT | Mod: HCNC

## 2023-01-31 PROCEDURE — 86334 PATHOLOGIST INTERPRETATION IFE: ICD-10-PCS | Mod: 26,HCNC,, | Performed by: PATHOLOGY

## 2023-01-31 PROCEDURE — 96376 TX/PRO/DX INJ SAME DRUG ADON: CPT | Mod: HCNC

## 2023-01-31 RX ORDER — FUROSEMIDE 10 MG/ML
40 INJECTION INTRAMUSCULAR; INTRAVENOUS 2 TIMES DAILY
Status: DISCONTINUED | OUTPATIENT
Start: 2023-01-31 | End: 2023-02-02

## 2023-01-31 RX ADMIN — SENNOSIDES 8.6 MG: 8.6 TABLET, FILM COATED ORAL at 09:01

## 2023-01-31 RX ADMIN — ESCITALOPRAM OXALATE 10 MG: 5 TABLET, FILM COATED ORAL at 08:01

## 2023-01-31 RX ADMIN — LATANOPROST 1 DROP: 50 SOLUTION OPHTHALMIC at 10:01

## 2023-01-31 RX ADMIN — FUROSEMIDE 40 MG: 10 INJECTION, SOLUTION INTRAMUSCULAR; INTRAVENOUS at 08:01

## 2023-01-31 RX ADMIN — APIXABAN 2.5 MG: 2.5 TABLET, FILM COATED ORAL at 09:01

## 2023-01-31 RX ADMIN — LOSARTAN POTASSIUM 25 MG: 25 TABLET, FILM COATED ORAL at 09:01

## 2023-01-31 RX ADMIN — SENNOSIDES 8.6 MG: 8.6 TABLET, FILM COATED ORAL at 08:01

## 2023-01-31 RX ADMIN — ATORVASTATIN CALCIUM 20 MG: 20 TABLET, FILM COATED ORAL at 09:01

## 2023-01-31 RX ADMIN — POLYETHYLENE GLYCOL 3350 17 G: 17 POWDER, FOR SOLUTION ORAL at 08:01

## 2023-01-31 RX ADMIN — AMLODIPINE BESYLATE 10 MG: 10 TABLET ORAL at 08:01

## 2023-01-31 RX ADMIN — FUROSEMIDE 40 MG: 10 INJECTION, SOLUTION INTRAMUSCULAR; INTRAVENOUS at 05:01

## 2023-01-31 RX ADMIN — APIXABAN 2.5 MG: 2.5 TABLET, FILM COATED ORAL at 08:01

## 2023-01-31 NOTE — SUBJECTIVE & OBJECTIVE
Interval History: Pt seen and examined by me this morning. INDERJIT since admission. Pt hypoxic to 87% on 4L, which increased to 94% on 5L. Pt reports shortness of breath but denies additional complaints. Pt's daughter, Cara, reports pt has become significantly short-winded with even minimal activity at home over the past 3 weeks. Pt ambulates with a walker at baseline and requires multiple breaks due to SOB. Pt and family denies any recent changes in diet or medications, and the patient lives with her daughter.     Review of Systems   Constitutional:  Negative for activity change, chills and fever.   HENT:  Negative for trouble swallowing.    Eyes:  Negative for photophobia and visual disturbance.   Respiratory:  Positive for cough and shortness of breath. Negative for chest tightness.    Cardiovascular:  Negative for chest pain, palpitations and leg swelling.   Gastrointestinal:  Negative for abdominal pain, constipation, diarrhea, nausea and vomiting.   Genitourinary:  Negative for dysuria, frequency and hematuria.   Musculoskeletal:  Negative for back pain, gait problem and neck pain.   Skin:  Negative for rash and wound.   Neurological:  Negative for dizziness, syncope, speech difficulty, weakness and light-headedness.   Psychiatric/Behavioral:  Negative for agitation and dysphoric mood. The patient is not nervous/anxious.    Objective:     Vital Signs (Most Recent):  Temp: 97.6 °F (36.4 °C) (01/31/23 0803)  Pulse: (!) 55 (01/31/23 1051)  Resp: 20 (01/31/23 1051)  BP: (!) 141/63 (01/31/23 1051)  SpO2: 98 % (01/31/23 1051)   Vital Signs (24h Range):  Temp:  [97.6 °F (36.4 °C)-98.8 °F (37.1 °C)] 97.6 °F (36.4 °C)  Pulse:  [55-79] 55  Resp:  [18-28] 20  SpO2:  [92 %-99 %] 98 %  BP: (121-163)/(56-79) 141/63     Weight: 61.2 kg (135 lb)  Body mass index is 23.17 kg/m².    Intake/Output Summary (Last 24 hours) at 1/31/2023 1419  Last data filed at 1/31/2023 0945  Gross per 24 hour   Intake --   Output 1200 ml   Net  -1200 ml      Physical Exam  Vitals and nursing note reviewed.   Constitutional:       General: She is not in acute distress.     Appearance: She is well-developed. She is not toxic-appearing.   HENT:      Head: Normocephalic and atraumatic.      Mouth/Throat:      Mouth: Mucous membranes are moist.   Eyes:      Conjunctiva/sclera: Conjunctivae normal.      Pupils: Pupils are equal, round, and reactive to light.   Neck:      Vascular: JVD present.   Cardiovascular:      Rate and Rhythm: Regular rhythm. Bradycardia present.      Heart sounds: Normal heart sounds. No murmur heard.    No gallop.   Pulmonary:      Effort: Pulmonary effort is normal. No respiratory distress.      Breath sounds: Rales (bibasilar) present. No wheezing.      Comments: Sating 87% on 4L, which increased to 94% on 5L  Abdominal:      General: Bowel sounds are normal. There is no distension.      Palpations: Abdomen is soft.      Tenderness: There is no abdominal tenderness.   Musculoskeletal:         General: No tenderness. Normal range of motion.      Cervical back: Normal range of motion and neck supple.      Right lower leg: No edema.      Left lower leg: No edema.   Skin:     General: Skin is warm and dry.      Capillary Refill: Capillary refill takes less than 2 seconds.      Findings: No rash.   Neurological:      Mental Status: She is alert.      Cranial Nerves: No cranial nerve deficit.      Sensory: No sensory deficit.      Coordination: Coordination normal.      Comments: Pt intermittently confused throughout interview, AAO to person and place but not time   Psychiatric:         Behavior: Behavior normal.         Thought Content: Thought content normal.         Judgment: Judgment normal.       Significant Labs: All pertinent labs within the past 24 hours have been reviewed.  CBC:   Recent Labs   Lab 01/30/23  1853   WBC 5.71   HGB 12.9   HCT 41.2        CMP:   Recent Labs   Lab 01/30/23 1853 01/31/23  0327    137   K  4.2 3.5    103   CO2 23 26   * 96   BUN 17 17   CREATININE 1.1 0.9   CALCIUM 10.5 10.2   PROT 8.3  --    ALBUMIN 4.0  --    BILITOT 0.9  --    ALKPHOS 110  --    AST 41*  --    ALT 24  --    ANIONGAP 12 8     Troponin:   Recent Labs   Lab 01/30/23  1853 01/30/23  2221 01/30/23  2358   TROPONINI 0.126* 0.122* 0.114*       Significant Imaging: I have reviewed all pertinent imaging results/findings within the past 24 hours.    Echo  · The left ventricle is normal in size with concentric hypertrophy and   mildly decreased systolic function.  · Severe left atrial enlargement.  · The estimated ejection fraction is 45%.  · Grade II left ventricular diastolic dysfunction.  · Moderate mitral regurgitation.  · Moderate tricuspid regurgitation.  · Mild pulmonic regurgitation.  · Cannot exclude infiltrative disease, preserved apical strain.  · The quantitatively derived ejection fraction is 42%.  · There are segmental left ventricular wall motion abnormalities.  · The left ventricular global longitudinal strain is -11%.  · Mild right ventricular enlargement with mildly reduced right ventricular   systolic function.  · Severe right atrial enlargement.  · Mild aortic regurgitation.  · Elevated central venous pressure (15 mmHg).  · The estimated PA systolic pressure is 71 mmHg.  · There is pulmonary hypertension.

## 2023-01-31 NOTE — SUBJECTIVE & OBJECTIVE
Past Medical History:   Diagnosis Date    Acute hypoxemic respiratory failure 1/30/2023    Arthritis     CHF (congestive heart failure) 12/26/2018    Chronic atrial fibrillation 8/29/2020    Chronic kidney disease, stage III (moderate) 9/11/2015    Colon adenoma     Diabetes mellitus, type II     Diet controlled    Glaucoma     Hyperlipemia     Hypertension     Osteopenia        Past Surgical History:   Procedure Laterality Date    APPENDECTOMY      CATARACT EXTRACTION W/  INTRAOCULAR LENS IMPLANT Right 03/15/2006        CATARACT EXTRACTION W/  INTRAOCULAR LENS IMPLANT Left 09/27/2006        COLONOSCOPY W/ POLYPECTOMY      EYE SURGERY      HYSTERECTOMY      TRANSESOPHAGEAL ECHOCARDIOGRAPHY N/A 11/9/2020    Procedure: ECHOCARDIOGRAM, TRANSESOPHAGEAL;  Surgeon: Marcelina Diagnostic Provider;  Location: [facility] EP LAB;  Service: Cardiology;  Laterality: N/A;    TREATMENT OF CARDIAC ARRHYTHMIA N/A 11/9/2020    Procedure: CARDIOVERSION;  Surgeon: Marvin Elmore MD;  Location: [facility] EP LAB;  Service: Cardiology;  Laterality: N/A;  AF, RAFFAELE, DCCV, MAC, GP, 3 PREP       Review of patient's allergies indicates:  No Known Allergies    No current facility-administered medications on file prior to encounter.     Current Outpatient Medications on File Prior to Encounter   Medication Sig    amLODIPine (NORVASC) 10 MG tablet TAKE 1 TABLET (10 MG TOTAL) BY MOUTH ONCE DAILY.    atorvastatin (LIPITOR) 20 MG tablet Take 1 tablet (20 mg total) by mouth once daily.    CALCIUM CARBONATE ([id number]-SXT-219 ORAL) Take 1 tablet by mouth Daily. 1  Oral Every day    ELIQUIS 2.5 mg Tab TAKE 1 TABLET TWICE DAILY    EScitalopram oxalate (LEXAPRO) 10 MG tablet Take 1 tablet (10 mg total) by mouth once daily.    furosemide (LASIX) 20 MG tablet Take 1 tablet (20 mg total) by mouth once daily. Take an additional 20mg lasix in the afternoon as needed for worsening shortness of breath, swelling, or 3lbs weight gain in 1 day/5lb weight gain  in 3 days    latanoprost 0.005 % ophthalmic solution Place 1 drop into both eyes once daily.    losartan (COZAAR) 25 MG tablet Take 1 tablet (25 mg total) by mouth once daily.    multivit with minerals/lutein (MULTIVITAMIN 50 PLUS ORAL) Take 1 tablet by mouth once daily.      Family History       Problem Relation (Age of Onset)    Allergies Daughter    Asthma Daughter    Cancer Sister, Daughter    Heart attack Mother    Heart disease Mother    Hypertension Daughter    No Known Problems Father          Tobacco Use    Smoking status: Never    Smokeless tobacco: Never   Substance and Sexual Activity    Alcohol use: Yes     Comment: wine, rarely    Drug use: No    Sexual activity: Not Currently     Review of Systems   Constitutional: Negative for chills and fever.   Cardiovascular:  Positive for dyspnea on exertion. Negative for chest pain, leg swelling, orthopnea and palpitations.   Respiratory:  Positive for cough and shortness of breath. Negative for sputum production.    Gastrointestinal: Negative.    Genitourinary: Negative.    Neurological: Negative.    Objective:     Vital Signs (Most Recent):  Temp: 97.6 °F (36.4 °C) (01/31/23 0803)  Pulse: (!) 55 (01/31/23 1051)  Resp: 20 (01/31/23 1051)  BP: (!) 141/63 (01/31/23 1051)  SpO2: 98 % (01/31/23 1051)   Vital Signs (24h Range):  Temp:  [97.6 °F (36.4 °C)-98.8 °F (37.1 °C)] 97.6 °F (36.4 °C)  Pulse:  [55-79] 55  Resp:  [18-28] 20  SpO2:  [92 %-99 %] 98 %  BP: (121-163)/(56-79) 141/63     Weight: 61.2 kg (135 lb)  Body mass index is 23.17 kg/m².    SpO2: 98 %         Intake/Output Summary (Last 24 hours) at 1/31/2023 1301  Last data filed at 1/31/2023 0945  Gross per 24 hour   Intake --   Output 1200 ml   Net -1200 ml       Lines/Drains/Airways       Peripheral Intravenous Line  Duration                  Peripheral IV - Single Lumen 01/30/23 1900 20 G Anterior;Distal;Left Forearm <1 day                    Physical Exam  Vitals and nursing note reviewed.    Constitutional:       General: She is not in acute distress.  HENT:      Head: Normocephalic and atraumatic.      Mouth/Throat:      Mouth: Mucous membranes are moist.      Pharynx: Oropharynx is clear.   Eyes:      General: No scleral icterus.     Extraocular Movements: Extraocular movements intact.      Conjunctiva/sclera: Conjunctivae normal.   Neck:      Vascular: JVD present.   Cardiovascular:      Rate and Rhythm: Normal rate and regular rhythm.   Pulmonary:      Effort: Pulmonary effort is normal. No respiratory distress.      Breath sounds: Rales (mild bibasilar) present. No wheezing.   Abdominal:      General: There is no distension.      Palpations: Abdomen is soft.      Tenderness: There is no abdominal tenderness. There is no guarding.   Musculoskeletal:         General: No tenderness. Normal range of motion.      Cervical back: Normal range of motion and neck supple.      Right lower leg: No edema.      Left lower leg: No edema.   Skin:     General: Skin is warm and dry.   Neurological:      General: No focal deficit present.      Mental Status: She is alert and oriented to person, place, and time.       Significant Labs: CMP   Recent Labs   Lab 01/30/23  1853 01/31/23  0327    137   K 4.2 3.5    103   CO2 23 26   * 96   BUN 17 17   CREATININE 1.1 0.9   CALCIUM 10.5 10.2   PROT 8.3  --    ALBUMIN 4.0  --    BILITOT 0.9  --    ALKPHOS 110  --    AST 41*  --    ALT 24  --    ANIONGAP 12 8   , CBC   Recent Labs   Lab 01/30/23  1853   WBC 5.71   HGB 12.9   HCT 41.2      , Lipid Panel No results for input(s): CHOL, HDL, LDLCALC, TRIG, CHOLHDL in the last 48 hours., Troponin   Recent Labs   Lab 01/30/23  1853 01/30/23  2221 01/30/23  2358   TROPONINI 0.126* 0.122* 0.114*   , and All pertinent lab results from the last 24 hours have been reviewed.    Significant Imaging: Echocardiogram: 2D echo with color flow doppler: No results found for this or any previous visit. and  Transthoracic echo (TTE) complete (Cupid Only):   Results for orders placed or performed during the hospital encounter of 01/30/23   Echo   Result Value Ref Range    BSA 1.66 m2    TDI SEPTAL 0.02 m/s    LV LATERAL E/E' RATIO 14.50 m/s    LV SEPTAL E/E' RATIO 29.00 m/s    LA WIDTH 4.98 cm    TDI LATERAL 0.04 m/s    LVIDd 4.63 3.5 - 6.0 cm    IVS 1.05 0.6 - 1.1 cm    Posterior Wall 1.02 0.6 - 1.1 cm    LVIDs 3.31 2.1 - 4.0 cm    FS 29 28 - 44 %    LA volume 140.69 cm3    Sinus 3.38 cm    STJ 2.76 cm    Ascending aorta 3.06 cm    LV mass 168.26 g    LA size 4.80 cm    RVDD 5.08 cm    TAPSE 1.21 cm    Left Ventricle Relative Wall Thickness 0.44 cm    AV mean gradient 3 mmHg    AV valve area 2.02 cm2    AV Velocity Ratio 0.47     AV index (prosthetic) 0.56     MV valve area p 1/2 method 3.05 cm2    E/A ratio 1.12     Mean e' 0.03 m/s    E wave deceleration time 248.88 msec    LVOT diameter 2.15 cm    LVOT area 3.6 cm2    LVOT peak david 0.63 m/s    LVOT peak VTI 14.82 cm    Ao peak david 1.34 m/s    Ao VTI 26.66 cm    LVOT stroke volume 53.78 cm3    AV peak gradient 7 mmHg    E/E' ratio 19.33 m/s    MV Peak E David 0.58 m/s    TR Max David 3.73 m/s    MV stenosis pressure 1/2 time 72.18 ms    MV Peak A David 0.52 m/s    LV Systolic Volume 44.50 mL    LV Systolic Volume Index 26.8 mL/m2    LV Diastolic Volume 98.88 mL    LV Diastolic Volume Index 59.57 mL/m2    LA Volume Index 84.8 mL/m2    LV Mass Index 101 g/m2    RA Major Axis 6.15 cm    Left Atrium Minor Axis 6.85 cm    Left Atrium Major Axis 7.00 cm    Triscuspid Valve Regurgitation Peak Gradient 56 mmHg    LA Volume Index (Mod) 68.2 mL/m2    LA volume (mod) 113.23 cm3    RA Width 3.74 cm    Right Atrial Pressure (from IVC) 15 mmHg    QEF 42 %    EF 45 %    TV rest pulmonary artery pressure 71 mmHg    Narrative    · The left ventricle is normal in size with concentric hypertrophy and   mildly decreased systolic function.  · Severe left atrial enlargement.  · The estimated  ejection fraction is 45%.  · Grade II left ventricular diastolic dysfunction.  · Moderate mitral regurgitation.  · Moderate tricuspid regurgitation.  · Mild pulmonic regurgitation.  · Cannot exclude infiltrative disease, preserved apical strain.  · The quantitatively derived ejection fraction is 42%.  · There are segmental left ventricular wall motion abnormalities.  · The left ventricular global longitudinal strain is -11%.  · Mild right ventricular enlargement with mildly reduced right ventricular   systolic function.  · Severe right atrial enlargement.  · Mild aortic regurgitation.  · Elevated central venous pressure (15 mmHg).  · The estimated PA systolic pressure is 71 mmHg.  · There is pulmonary hypertension.       and EK/30: SR with HR 65 and occasional PACs present with nonspecific T wave abnormalities more prominent in lateral leads

## 2023-01-31 NOTE — CONSULTS
[facility] - Emergency Dept  Cardiology  Consult Note    Patient Name: Inna Blankenship  MRN: [id number]  Admission Date: 1/30/2023  Hospital Length of Stay: 0 days  Code Status: Full Code   Attending Provider: Nay Calhoun MD   Consulting Provider: Josef Lee MD  Primary Care Physician: Stacy Rodriguez MD  Principal Problem:Acute on chronic diastolic congestive heart failure    Patient information was obtained from patient, relative(s), past medical records and ER records.     Inpatient consult to Cardiology  Consult performed by: Josef Lee MD  Consult ordered by: Zenaida David PA-C      Subjective:     Chief Complaint:  CURRY associated with nonproductive cough     HPI:   Ms. Blankenship is a 89yoF with PMHx of HFpEF, HTN, HLD, afib on Eliquis, CKD, DM2 who initially presented to 1 week of progressively worsening dyspnea on exertion associated with a nonproductive cough.  Patient follows closely with [facility] cardiology in clinic.  Attempted to take additional doses of home Lasix (40mg instead of home 20mg) without relief.  Denies chest pain, palpitations, orthopnea, LE edema, lightheadedness, or dizziness.  Cardiology was consulted for acute on chronic combined CHF with new wall motion abnormalities noted on echo.      Past Medical History:   Diagnosis Date    Acute hypoxemic respiratory failure 1/30/2023    Arthritis     CHF (congestive heart failure) 12/26/2018    Chronic atrial fibrillation 8/29/2020    Chronic kidney disease, stage III (moderate) 9/11/2015    Colon adenoma     Diabetes mellitus, type II     Diet controlled    Glaucoma     Hyperlipemia     Hypertension     Osteopenia        Past Surgical History:   Procedure Laterality Date    APPENDECTOMY      CATARACT EXTRACTION W/  INTRAOCULAR LENS IMPLANT Right 03/15/2006        CATARACT EXTRACTION W/  INTRAOCULAR LENS IMPLANT Left 09/27/2006        COLONOSCOPY W/ POLYPECTOMY      EYE SURGERY      HYSTERECTOMY      TRANSESOPHAGEAL  ECHOCARDIOGRAPHY N/A 11/9/2020    Procedure: ECHOCARDIOGRAM, TRANSESOPHAGEAL;  Surgeon: Marcelina Diagnostic Provider;  Location: [facility] EP LAB;  Service: Cardiology;  Laterality: N/A;    TREATMENT OF CARDIAC ARRHYTHMIA N/A 11/9/2020    Procedure: CARDIOVERSION;  Surgeon: Marvin Elmore MD;  Location: [facility] EP LAB;  Service: Cardiology;  Laterality: N/A;  AF, RAFFAELE, DCCV, MAC, GP, 3 PREP       Review of patient's allergies indicates:  No Known Allergies    No current facility-administered medications on file prior to encounter.     Current Outpatient Medications on File Prior to Encounter   Medication Sig    amLODIPine (NORVASC) 10 MG tablet TAKE 1 TABLET (10 MG TOTAL) BY MOUTH ONCE DAILY.    atorvastatin (LIPITOR) 20 MG tablet Take 1 tablet (20 mg total) by mouth once daily.    CALCIUM CARBONATE ([id number]-806 ORAL) Take 1 tablet by mouth Daily. 1  Oral Every day    ELIQUIS 2.5 mg Tab TAKE 1 TABLET TWICE DAILY    EScitalopram oxalate (LEXAPRO) 10 MG tablet Take 1 tablet (10 mg total) by mouth once daily.    furosemide (LASIX) 20 MG tablet Take 1 tablet (20 mg total) by mouth once daily. Take an additional 20mg lasix in the afternoon as needed for worsening shortness of breath, swelling, or 3lbs weight gain in 1 day/5lb weight gain in 3 days    latanoprost 0.005 % ophthalmic solution Place 1 drop into both eyes once daily.    losartan (COZAAR) 25 MG tablet Take 1 tablet (25 mg total) by mouth once daily.    multivit with minerals/lutein (MULTIVITAMIN 50 PLUS ORAL) Take 1 tablet by mouth once daily.      Family History       Problem Relation (Age of Onset)    Allergies Daughter    Asthma Daughter    Cancer Sister, Daughter    Heart attack Mother    Heart disease Mother    Hypertension Daughter    No Known Problems Father          Tobacco Use    Smoking status: Never    Smokeless tobacco: Never   Substance and Sexual Activity    Alcohol use: Yes     Comment: wine, rarely    Drug use: No    Sexual activity: Not Currently      Review of Systems   Constitutional: Negative for chills and fever.   Cardiovascular:  Positive for dyspnea on exertion. Negative for chest pain, leg swelling, orthopnea and palpitations.   Respiratory:  Positive for cough and shortness of breath. Negative for sputum production.    Gastrointestinal: Negative.    Genitourinary: Negative.    Neurological: Negative.    Objective:     Vital Signs (Most Recent):  Temp: 97.6 °F (36.4 °C) (01/31/23 0803)  Pulse: (!) 55 (01/31/23 1051)  Resp: 20 (01/31/23 1051)  BP: (!) 141/63 (01/31/23 1051)  SpO2: 98 % (01/31/23 1051)   Vital Signs (24h Range):  Temp:  [97.6 °F (36.4 °C)-98.8 °F (37.1 °C)] 97.6 °F (36.4 °C)  Pulse:  [55-79] 55  Resp:  [18-28] 20  SpO2:  [92 %-99 %] 98 %  BP: (121-163)/(56-79) 141/63     Weight: 61.2 kg (135 lb)  Body mass index is 23.17 kg/m².    SpO2: 98 %         Intake/Output Summary (Last 24 hours) at 1/31/2023 1301  Last data filed at 1/31/2023 0945  Gross per 24 hour   Intake --   Output 1200 ml   Net -1200 ml       Lines/Drains/Airways       Peripheral Intravenous Line  Duration                  Peripheral IV - Single Lumen 01/30/23 1900 20 G Anterior;Distal;Left Forearm <1 day                    Physical Exam  Vitals and nursing note reviewed.   Constitutional:       General: She is not in acute distress.  HENT:      Head: Normocephalic and atraumatic.      Mouth/Throat:      Mouth: Mucous membranes are moist.      Pharynx: Oropharynx is clear.   Eyes:      General: No scleral icterus.     Extraocular Movements: Extraocular movements intact.      Conjunctiva/sclera: Conjunctivae normal.   Neck:      Vascular: JVD present.   Cardiovascular:      Rate and Rhythm: Normal rate and regular rhythm.   Pulmonary:      Effort: Pulmonary effort is normal. No respiratory distress.      Breath sounds: Rales (mild bibasilar) present. No wheezing.   Abdominal:      General: There is no distension.      Palpations: Abdomen is soft.      Tenderness: There is  no abdominal tenderness. There is no guarding.   Musculoskeletal:         General: No tenderness. Normal range of motion.      Cervical back: Normal range of motion and neck supple.      Right lower leg: No edema.      Left lower leg: No edema.   Skin:     General: Skin is warm and dry.   Neurological:      General: No focal deficit present.      Mental Status: She is alert and oriented to person, place, and time.       Significant Labs: CMP   Recent Labs   Lab 01/30/23  1853 01/31/23  0327    137   K 4.2 3.5    103   CO2 23 26   * 96   BUN 17 17   CREATININE 1.1 0.9   CALCIUM 10.5 10.2   PROT 8.3  --    ALBUMIN 4.0  --    BILITOT 0.9  --    ALKPHOS 110  --    AST 41*  --    ALT 24  --    ANIONGAP 12 8   , CBC   Recent Labs   Lab 01/30/23  1853   WBC 5.71   HGB 12.9   HCT 41.2      , Lipid Panel No results for input(s): CHOL, HDL, LDLCALC, TRIG, CHOLHDL in the last 48 hours., Troponin   Recent Labs   Lab 01/30/23  1853 01/30/23  2221 01/30/23  2358   TROPONINI 0.126* 0.122* 0.114*   , and All pertinent lab results from the last 24 hours have been reviewed.    Significant Imaging: Echocardiogram: 2D echo with color flow doppler: No results found for this or any previous visit. and Transthoracic echo (TTE) complete (Cupid Only):   Results for orders placed or performed during the hospital encounter of 01/30/23   Echo   Result Value Ref Range    BSA 1.66 m2    TDI SEPTAL 0.02 m/s    LV LATERAL E/E' RATIO 14.50 m/s    LV SEPTAL E/E' RATIO 29.00 m/s    LA WIDTH 4.98 cm    TDI LATERAL 0.04 m/s    LVIDd 4.63 3.5 - 6.0 cm    IVS 1.05 0.6 - 1.1 cm    Posterior Wall 1.02 0.6 - 1.1 cm    LVIDs 3.31 2.1 - 4.0 cm    FS 29 28 - 44 %    LA volume 140.69 cm3    Sinus 3.38 cm    STJ 2.76 cm    Ascending aorta 3.06 cm    LV mass 168.26 g    LA size 4.80 cm    RVDD 5.08 cm    TAPSE 1.21 cm    Left Ventricle Relative Wall Thickness 0.44 cm    AV mean gradient 3 mmHg    AV valve area 2.02 cm2    AV Velocity  Ratio 0.47     AV index (prosthetic) 0.56     MV valve area p 1/2 method 3.05 cm2    E/A ratio 1.12     Mean e' 0.03 m/s    E wave deceleration time 248.88 msec    LVOT diameter 2.15 cm    LVOT area 3.6 cm2    LVOT peak david 0.63 m/s    LVOT peak VTI 14.82 cm    Ao peak david 1.34 m/s    Ao VTI 26.66 cm    LVOT stroke volume 53.78 cm3    AV peak gradient 7 mmHg    E/E' ratio 19.33 m/s    MV Peak E David 0.58 m/s    TR Max Dvaid 3.73 m/s    MV stenosis pressure 1/2 time 72.18 ms    MV Peak A David 0.52 m/s    LV Systolic Volume 44.50 mL    LV Systolic Volume Index 26.8 mL/m2    LV Diastolic Volume 98.88 mL    LV Diastolic Volume Index 59.57 mL/m2    LA Volume Index 84.8 mL/m2    LV Mass Index 101 g/m2    RA Major Axis 6.15 cm    Left Atrium Minor Axis 6.85 cm    Left Atrium Major Axis 7.00 cm    Triscuspid Valve Regurgitation Peak Gradient 56 mmHg    LA Volume Index (Mod) 68.2 mL/m2    LA volume (mod) 113.23 cm3    RA Width 3.74 cm    Right Atrial Pressure (from IVC) 15 mmHg    QEF 42 %    EF 45 %    TV rest pulmonary artery pressure 71 mmHg    Narrative    · The left ventricle is normal in size with concentric hypertrophy and   mildly decreased systolic function.  · Severe left atrial enlargement.  · The estimated ejection fraction is 45%.  · Grade II left ventricular diastolic dysfunction.  · Moderate mitral regurgitation.  · Moderate tricuspid regurgitation.  · Mild pulmonic regurgitation.  · Cannot exclude infiltrative disease, preserved apical strain.  · The quantitatively derived ejection fraction is 42%.  · There are segmental left ventricular wall motion abnormalities.  · The left ventricular global longitudinal strain is -11%.  · Mild right ventricular enlargement with mildly reduced right ventricular   systolic function.  · Severe right atrial enlargement.  · Mild aortic regurgitation.  · Elevated central venous pressure (15 mmHg).  · The estimated PA systolic pressure is 71 mmHg.  · There is pulmonary  hypertension.       and EK/30: SR with HR 65 and occasional PACs present with nonspecific T wave abnormalities more prominent in lateral leads    Assessment and Plan:     * Acute on chronic diastolic congestive heart failure  Patient with known HFpEF, follows closely in [facility] clinic.  Presented with progressively worsening dyspnea despite increase in PO diuretics.  Trop flat and unremarkable.  BNP elevated to 894 on admission.  CXR similar to prior with mild central vascular congestion.  Patient with history of asymptomatic bradycardia and not able to tolerate BB.  Some consideration outpatient of amyloid deposition from echo in  however negative amyloid labs in .    Echo    Interpretation Summary  · The left ventricle is normal in size with concentric hypertrophy and mildly decreased systolic function.  · Severe left atrial enlargement.  · The estimated ejection fraction is 45%.  · Grade II left ventricular diastolic dysfunction.  · Moderate mitral regurgitation.  · Moderate tricuspid regurgitation.  · Mild pulmonic regurgitation.  · Cannot exclude infiltrative disease, preserved apical strain.  · The quantitatively derived ejection fraction is 42%.  · There are segmental left ventricular wall motion abnormalities.  · The left ventricular global longitudinal strain is -11%.  · Mild right ventricular enlargement with mildly reduced right ventricular systolic function.  · Severe right atrial enlargement.  · Mild aortic regurgitation.  · Elevated central venous pressure (15 mmHg).  · The estimated PA systolic pressure is 71 mmHg.  · There is pulmonary hypertension.    Prior echo on 2022 with EF 50% and Grade III LVDD.       - Recommend continued diuresis with IV Lasix 40 BID  - strict I/Os and daily weights   - Maintain on telemetry and daily EKGs   - Maintain electrolytes: Mag >2 & K+ >4    - Ambulate as tolerated   - Continue home losartan and lipitor   - Recommend amyloid workup with SPEP,  UPEP, Free light chains, KAVON, and PYP for tomorrow morning          VTE Risk Mitigation (From admission, onward)           Ordered     apixaban tablet 2.5 mg  2 times daily         01/30/23 2142     Reason for No Pharmacological VTE Prophylaxis  Once        Question:  Reasons:  Answer:  Already adequately anticoagulated on oral Anticoagulants    01/30/23 2142     IP VTE HIGH RISK PATIENT  Once         01/30/23 2142     Place sequential compression device  Until discontinued         01/30/23 2142                    Thank you for your consult. I will follow-up with patient. Please contact us if you have any additional questions.    Josef Lee MD  Cardiology   [facility]     Never smoker

## 2023-01-31 NOTE — ED NOTES
Patient identifiers for Inna Blankenship 89 y.o. female checked and correct.  Chief Complaint   Patient presents with    Shortness of Breath     Family doubled up on fluid pills today, pulse ox was low at home    Cough     Past Medical History:   Diagnosis Date    Arthritis     CHF (congestive heart failure) 12/26/2018    Chronic atrial fibrillation 8/29/2020    Chronic kidney disease, stage III (moderate) 9/11/2015    Colon adenoma     Diabetes mellitus, type II     Diet controlled    Glaucoma     Hyperlipemia     Hypertension     Osteopenia      Allergies reported: Review of patient's allergies indicates:  No Known Allergies      LOC: Patient is awake, alert, and aware of environment with an appropriate affect. Patient is oriented x 3 and speaking appropriately.  APPEARANCE: Patient resting comfortably and in no acute distress. Patient is clean and well groomed, patient's clothing is properly fastened.  HEENT: wnl  SKIN: The skin is warm and dry. Patient has normal skin turgor and moist mucus membranes. Skin is intact; no bruising or breakdown noted.  MUSKULOSKELETAL: Patient is moving all extremities well, no obvious deformities noted. Pulses intact. GEN weakness noted  RESPIRATORY: Airway is open and patent. Respirations are spontaneous and non-labored BBS decreased, 2l nc CARDIAC: Patient has a normal rate and rhythm. ** on cardiac monitor,No peripheral edema noted.   ABDOMEN: No distention noted. Bowel sounds active in all 4 quadrants. Soft and non-tender upon palpation.  NEUROLOGICAL: pupils **mm, PERRL. Facial expression is symmetrical. Hand grasps are equal bilaterally. Normal sensation in all extremities when touched with finger.

## 2023-01-31 NOTE — ASSESSMENT & PLAN NOTE
Patient's FSGs are controlled on current medication regimen.  Last A1c reviewed-   Lab Results   Component Value Date    HGBA1C 5.8 (H) 01/30/2023     Most recent fingerstick glucose reviewed- No results for input(s): POCTGLUCOSE in the last 24 hours.  Current correctional scale  Low  Maintain anti-hyperglycemic dose as follows-   Antihyperglycemics (From admission, onward)    Start     Stop Route Frequency Ordered    01/30/23 2251  insulin aspart U-100 pen 0-5 Units         -- SubQ Before meals & nightly PRN 01/30/23 2201        Hold Oral hypoglycemics while patient is in the hospital.

## 2023-01-31 NOTE — H&P
[facility]  Hospital Medicine  History & Physical    Patient Name: Inna Blankenship  MRN: [id number]  Patient Class: OP- Observation  Admission Date: 1/30/2023  Attending Physician: Nay Calhoun MD   Primary Care Provider: Stacy Rodriguez MD         Patient information was obtained from patient, relative(s), past medical records, and ER records.     Subjective:     Principal Problem:Acute on chronic diastolic congestive heart failure    Chief Complaint:   Chief Complaint   Patient presents with    Shortness of Breath     Family doubled up on fluid pills today, pulse ox was low at home    Cough        HPI: Inna Blankenship is a 89 year old female with chronic diastolic heart failure, hypertension, hyperlipidemia, afib on OAC, CKD and T2DM being admitted to hospital medicine for management of acute on chronic diastolic heart failure. Patient's daughter Cara at bedside also providing history. Reports progressive dyspnea on exertion over the past 1 week. Endorses associated non-productive nightly cough. Took increase home lasix dose to 40 mg over the past two days without significant improvement. States symptoms are consistent with previous CHF exacerbations. Denies fever, chills, nausea, vomiting, abdominal pain, chest pain, dysuria, hematuria, chest pain, extremity numbness. Denies tobacco, alcohol or drug use.       In ED: hypoxic to 93% on RA, tachypneic to 28 RR, HR 60 and SpO2 155/72. BNP elevated to 849 (baseline ~650) and troponin elevated to 0.126 (similar to previous elevations). CBC an CMP grossly unremarkable. Flu, COVID and RSV negative. CXR demonstrating cardiomegaly with central vascular congestion and mild perihilar edema stable from prior. EKG NSR with PACs and nonspecific t wave abnormalities. Received 80 mg IV lasix x1 in the ED.       Past Medical History:   Diagnosis Date    Acute hypoxemic respiratory failure 1/30/2023    Arthritis     CHF (congestive heart failure) 12/26/2018    Chronic  atrial fibrillation 8/29/2020    Chronic kidney disease, stage III (moderate) 9/11/2015    Colon adenoma     Diabetes mellitus, type II     Diet controlled    Glaucoma     Hyperlipemia     Hypertension     Osteopenia        Past Surgical History:   Procedure Laterality Date    APPENDECTOMY      CATARACT EXTRACTION W/  INTRAOCULAR LENS IMPLANT Right 03/15/2006        CATARACT EXTRACTION W/  INTRAOCULAR LENS IMPLANT Left 09/27/2006        COLONOSCOPY W/ POLYPECTOMY      EYE SURGERY      HYSTERECTOMY      TRANSESOPHAGEAL ECHOCARDIOGRAPHY N/A 11/9/2020    Procedure: ECHOCARDIOGRAM, TRANSESOPHAGEAL;  Surgeon: Marcelina Diagnostic Provider;  Location: [facility] EP LAB;  Service: Cardiology;  Laterality: N/A;    TREATMENT OF CARDIAC ARRHYTHMIA N/A 11/9/2020    Procedure: CARDIOVERSION;  Surgeon: Marvin Elmore MD;  Location: [facility] EP LAB;  Service: Cardiology;  Laterality: N/A;  AF, RAFFAELE, DCCV, MAC, GP, 3 PREP       Review of patient's allergies indicates:  No Known Allergies    No current facility-administered medications on file prior to encounter.     Current Outpatient Medications on File Prior to Encounter   Medication Sig    amLODIPine (NORVASC) 10 MG tablet TAKE 1 TABLET (10 MG TOTAL) BY MOUTH ONCE DAILY.    atorvastatin (LIPITOR) 20 MG tablet Take 1 tablet (20 mg total) by mouth once daily.    CALCIUM CARBONATE ([id number] ORAL) Take 1 tablet by mouth Daily. 1  Oral Every day    ELIQUIS 2.5 mg Tab TAKE 1 TABLET TWICE DAILY    EScitalopram oxalate (LEXAPRO) 10 MG tablet Take 1 tablet (10 mg total) by mouth once daily.    furosemide (LASIX) 20 MG tablet Take 1 tablet (20 mg total) by mouth once daily. Take an additional 20mg lasix in the afternoon as needed for worsening shortness of breath, swelling, or 3lbs weight gain in 1 day/5lb weight gain in 3 days    latanoprost 0.005 % ophthalmic solution Place 1 drop into both eyes once daily.    losartan (COZAAR) 25 MG tablet Take 1 tablet (25 mg total) by  mouth once daily.    multivit with minerals/lutein (MULTIVITAMIN 50 PLUS ORAL) Take 1 tablet by mouth once daily.      Family History       Problem Relation (Age of Onset)    Allergies Daughter    Asthma Daughter    Cancer Sister, Daughter    Heart attack Mother    Heart disease Mother    Hypertension Daughter    No Known Problems Father          Tobacco Use    Smoking status: Never    Smokeless tobacco: Never   Substance and Sexual Activity    Alcohol use: Yes     Comment: wine, rarely    Drug use: No    Sexual activity: Not Currently     Review of Systems   Constitutional:  Negative for activity change, chills and fever.   HENT:  Negative for trouble swallowing.    Eyes:  Negative for photophobia and visual disturbance.   Respiratory:  Positive for cough and shortness of breath. Negative for chest tightness.    Cardiovascular:  Negative for chest pain, palpitations and leg swelling.   Gastrointestinal:  Negative for abdominal pain, constipation, diarrhea, nausea and vomiting.   Genitourinary:  Negative for dysuria, frequency and hematuria.   Musculoskeletal:  Negative for back pain, gait problem and neck pain.   Skin:  Negative for rash and wound.   Neurological:  Negative for dizziness, syncope, speech difficulty, weakness and light-headedness.   Psychiatric/Behavioral:  Negative for agitation and confusion. The patient is not nervous/anxious.    Objective:     Vital Signs (Most Recent):  Temp: 97.7 °F (36.5 °C) (01/31/23 0012)  Pulse: 72 (01/30/23 2346)  Resp: 20 (01/30/23 2346)  BP: (!) 163/79 (01/30/23 2346)  SpO2: 99 % (01/30/23 2346)   Vital Signs (24h Range):  Temp:  [97.7 °F (36.5 °C)-98.8 °F (37.1 °C)] 97.7 °F (36.5 °C)  Pulse:  [60-79] 72  Resp:  [20-28] 20  SpO2:  [92 %-99 %] 99 %  BP: (121-163)/(56-79) 163/79     Weight: 61.2 kg (135 lb)  Body mass index is 23.17 kg/m².    Physical Exam  Vitals and nursing note reviewed.   Constitutional:       General: She is not in acute distress.     Appearance: She  is well-developed. She is not toxic-appearing.   HENT:      Head: Normocephalic and atraumatic.      Mouth/Throat:      Pharynx: No oropharyngeal exudate.   Eyes:      Conjunctiva/sclera: Conjunctivae normal.      Pupils: Pupils are equal, round, and reactive to light.   Neck:      Vascular: JVD present.   Cardiovascular:      Rate and Rhythm: Regular rhythm. Bradycardia present.      Heart sounds: Normal heart sounds. No murmur heard.    No gallop.   Pulmonary:      Effort: Pulmonary effort is normal. No respiratory distress.      Breath sounds: Rales (bilateral bibasilar) present. No wheezing.   Abdominal:      General: Bowel sounds are normal. There is no distension.      Palpations: Abdomen is soft.      Tenderness: There is no abdominal tenderness.   Musculoskeletal:         General: No tenderness. Normal range of motion.      Cervical back: Normal range of motion and neck supple.      Right lower leg: No edema.      Left lower leg: No edema.   Lymphadenopathy:      Cervical: No cervical adenopathy.   Skin:     General: Skin is warm and dry.      Capillary Refill: Capillary refill takes less than 2 seconds.      Findings: No rash.   Neurological:      Mental Status: She is alert and oriented to person, place, and time. Mental status is at baseline.      Cranial Nerves: No cranial nerve deficit.      Sensory: No sensory deficit.      Coordination: Coordination normal.   Psychiatric:         Behavior: Behavior normal.         Thought Content: Thought content normal.         Judgment: Judgment normal.         CRANIAL NERVES     CN III, IV, VI   Pupils are equal, round, and reactive to light.     Significant Labs: All pertinent labs within the past 24 hours have been reviewed.  CBC:   Recent Labs   Lab 01/30/23  1853   WBC 5.71   HGB 12.9   HCT 41.2        CMP:   Recent Labs   Lab 01/30/23  1853      K 4.2      CO2 23   *   BUN 17   CREATININE 1.1   CALCIUM 10.5   PROT 8.3   ALBUMIN 4.0    BILITOT 0.9   ALKPHOS 110   AST 41*   ALT 24   ANIONGAP 12     Cardiac Markers:   Recent Labs   Lab 01/30/23  1853   *     Troponin:   Recent Labs   Lab 01/30/23  1853 01/30/23  2221   TROPONINI 0.126* 0.122*       Significant Imaging: I have reviewed all pertinent imaging results/findings within the past 24 hours.  Imaging Results              X-Ray Chest AP Portable (Final result)  Result time 01/30/23 20:17:23      Final result by Ronny Ng DO (01/30/23 20:17:23)                   Impression:      Cardiomegaly with central vascular congestion and mild perihilar edema stable from prior.  No new focal consolidation.      Electronically signed by: Ronny Ng  Date:    01/30/2023  Time:    20:17               Narrative:    EXAMINATION:  XR CHEST AP PORTABLE    CLINICAL HISTORY:  COVID-19;    TECHNIQUE:  Single frontal view of the chest was performed.    COMPARISON:  07/31/2022.    FINDINGS:  The lungs are well expanded.  There are continued bilateral interstitial opacities with pulmonary vascular congestion.  There is no large focal consolidation.  The pleural spaces are clear.  The cardiac silhouette is enlarged, unchanged.  The visualized osseous structures demonstrate degenerative changes.                                    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 CHF is currently uncontrolled due to JVD, Dyspnea not returned to baseline after 1 doses of IV diuretic, Rales/crackles on pulmonary exam and Pulmonary edema/pleural effusion on CXR. Latest ECHO performed and demonstrates- Results for orders placed during the hospital encounter of 04/10/22    Echo  Interpretation Summary  · The left ventricle is normal in size with eccentric hypertrophy and low normal  systolic function. The estimated ejection fraction is 50%.  · Moderate right ventricular enlargement with mildly to moderately reduced right ventricular systolic function.  · Grade III left ventricular diastolic dysfunction.  · Severe biatrial enlargement.  · There is mild aortic valve stenosis. Aortic valve area is 1.7 cm2; peak velocity is 1.4 m/s; mean gradient is 4 mmHg.  · Mild mitral regurgitation.  · Moderate tricuspid regurgitation.  · The estimated PA systolic pressure is 74 mmHg.  · Intermediate central venous pressure (8 mmHg).  . Continue ACE/ARB and monitor clinical status closely. Monitor on telemetry. Patient is on CHF pathway.  Monitor strict Is&Os and daily weights.  Place on fluid restriction of 1.5 L. Continue to stress to patient importance of self efficacy and  on diet for CHF. Last BNP reviewed- and noted below   Recent Labs   Lab 01/30/23  1853   *     Recent Labs   Lab 01/30/23  2221   TROPONINI 0.122*     - trend trop x3  - TTE penidng   - 20 mg IV lasix BID   - fluid restriction  - strict I/Os with daily weights   - TTE pending   - low sodium and diabetic diet     Acute hypoxemic respiratory failure  Dyspnea on exertion   Patient with Hypoxic Respiratory failure which is Acute on chronic.  she is not on home oxygen. Supplemental oxygen was provided and noted-  .   Signs/symptoms of respiratory failure include- tachypnea. Contributing diagnoses includes - CHF Labs and images were reviewed. Patient Has not had a recent ABG. Will treat underlying causes and adjust management of respiratory failure as follows- see acute on chronic CHF     Persistent atrial fibrillation  Patient with Persistent (7 days or more) atrial fibrillation which is controlled currently with none. Patient is currently in sinus rhythm.[id number] Score: 4. HASBLED. Anticoagulation indicated. Anticoagulation done with eliquis 2.5 mg BID .  - monitor on telemetry     Stage 3a chronic kidney disease  - Cr 1.1 on  admission, consistent with baseline  - monitor on serial bmp    Controlled type 2 diabetes mellitus with stage 3 chronic kidney disease, without long-term current use of insulin  Patient's FSGs are controlled on current medication regimen.  Last A1c reviewed-   Lab Results   Component Value Date    HGBA1C 5.8 (H) 01/30/2023     Most recent fingerstick glucose reviewed- No results for input(s): POCTGLUCOSE in the last 24 hours.  Current correctional scale  Low  Maintain anti-hyperglycemic dose as follows-   Antihyperglycemics (From admission, onward)      Start     Stop Route Frequency Ordered    01/30/23 2251  insulin aspart U-100 pen 0-5 Units         -- SubQ Before meals & nightly PRN 01/30/23 2201          Hold Oral hypoglycemics while patient is in the hospital.    History of DVT (deep vein thrombosis)  - continue eliquis 2.5 mg BID     Low tension glaucoma, moderate stage - Both Eyes  Continue home latanoprost 0.005 % ophthalmic solution    Osteopenia  - chronic  - no acute issues  - takes CALCIUM CARBONATE ([id number] ORAL) at home     Other hyperlipidemia  Lab Results   Component Value Date    LDLCALC 99.2 12/01/2022   - continue home atorvastatin (LIPITOR) 20 MG tablet    Essential hypertension  - controlled   - continue home medications amLODIPine (NORVASC) 10 MG tablet and losartan (COZAAR) 25 MG tablet  - vitals q4h      VTE Risk Mitigation (From admission, onward)           Ordered     apixaban tablet 2.5 mg  2 times daily         01/30/23 2142     Reason for No Pharmacological VTE Prophylaxis  Once        Question:  Reasons:  Answer:  Already adequately anticoagulated on oral Anticoagulants    01/30/23 2142     IP VTE HIGH RISK PATIENT  Once         01/30/23 2142     Place sequential compression device  Until discontinued         01/30/23 2142                       Elaine Muller PA-C  Department of Hospital Medicine   [facility]

## 2023-01-31 NOTE — H&P
[facility]  Hospital Medicine  History & Physical    Patient Name: Inna Blankenship  MRN: [id number]  Patient Class: OP- Observation  Admission Date: 1/30/2023  Attending Physician: Nay Calhoun MD   Primary Care Provider: Stacy Rodriguez MD         Patient information was obtained from patient, relative(s), past medical records and ER records.     Subjective:     Principal Problem:Acute on chronic diastolic congestive heart failure    Chief Complaint:   Chief Complaint   Patient presents with    Shortness of Breath     Family doubled up on fluid pills today, pulse ox was low at home    Cough        HPI: Inna Blankenship is a 89 year old female with chronic diastolic heart failure, hypertension, hyperlipidemia, afib on OAC, CKD and T2DM being admitted to hospital medicine for management of acute on chronic diastolic heart failure. Patient's daughter Cara at bedside also providing history. Reports progressive dyspnea on exertion over the past 1 week. Endorses associated non-productive nightly cough. Took increase home lasix dose to 40 mg over the past two days without significant improvement. States symptoms are consistent with previous CHF exacerbations. Denies fever, chills, nausea, vomiting, abdominal pain, chest pain, dysuria, hematuria, chest pain, extremity numbness. Denies tobacco, alcohol or drug use.       In ED: hypoxic to 93% on RA, tachypneic to 28 RR, HR 60 and SpO2 155/72. BNP elevated to 849 (baseline ~650) and troponin elevated to 0.126 (similar to previous elevations). CBC an CMP grossly unremarkable. Flu, COVID and RSV negative. CXR demonstrating cardiomegaly with central vascular congestion and mild perihilar edema stable from prior. EKG NSR with PACs and nonspecific t wave abnormalities. Received 80 mg IV lasix x1 in the ED.       Interval History: Pt seen and examined by me this morning. INDERJIT since admission. Pt hypoxic to 87% on 4L, which increased to 94% on 5L. Pt reports shortness of  breath but denies additional complaints. Pt's daughter, Cara, reports pt has become significantly short-winded with even minimal activity at home over the past 3 weeks. Pt ambulates with a walker at baseline and requires multiple breaks due to SOB. Pt and family denies any recent changes in diet or medications, and the patient lives with her daughter.     Review of Systems   Constitutional:  Negative for activity change, chills and fever.   HENT:  Negative for trouble swallowing.    Eyes:  Negative for photophobia and visual disturbance.   Respiratory:  Positive for cough and shortness of breath. Negative for chest tightness.    Cardiovascular:  Negative for chest pain, palpitations and leg swelling.   Gastrointestinal:  Negative for abdominal pain, constipation, diarrhea, nausea and vomiting.   Genitourinary:  Negative for dysuria, frequency and hematuria.   Musculoskeletal:  Negative for back pain, gait problem and neck pain.   Skin:  Negative for rash and wound.   Neurological:  Negative for dizziness, syncope, speech difficulty, weakness and light-headedness.   Psychiatric/Behavioral:  Negative for agitation and dysphoric mood. The patient is not nervous/anxious.    Objective:     Vital Signs (Most Recent):  Temp: 97.6 °F (36.4 °C) (01/31/23 0803)  Pulse: (!) 55 (01/31/23 1051)  Resp: 20 (01/31/23 1051)  BP: (!) 141/63 (01/31/23 1051)  SpO2: 98 % (01/31/23 1051)   Vital Signs (24h Range):  Temp:  [97.6 °F (36.4 °C)-98.8 °F (37.1 °C)] 97.6 °F (36.4 °C)  Pulse:  [55-79] 55  Resp:  [18-28] 20  SpO2:  [92 %-99 %] 98 %  BP: (121-163)/(56-79) 141/63     Weight: 61.2 kg (135 lb)  Body mass index is 23.17 kg/m².    Intake/Output Summary (Last 24 hours) at 1/31/2023 1418  Last data filed at 1/31/2023 0945  Gross per 24 hour   Intake --   Output 1200 ml   Net -1200 ml      Physical Exam  Vitals and nursing note reviewed.   Constitutional:       General: She is not in acute distress.     Appearance: She is  well-developed. She is not toxic-appearing.   HENT:      Head: Normocephalic and atraumatic.      Mouth/Throat:      Mouth: Mucous membranes are moist.   Eyes:      Conjunctiva/sclera: Conjunctivae normal.      Pupils: Pupils are equal, round, and reactive to light.   Neck:      Vascular: JVD present.   Cardiovascular:      Rate and Rhythm: Regular rhythm. Bradycardia present.      Heart sounds: Normal heart sounds. No murmur heard.    No gallop.   Pulmonary:      Effort: Pulmonary effort is normal. No respiratory distress.      Breath sounds: Rales (bibasilar) present. No wheezing.      Comments: Sating 87% on 4L, which increased to 94% on 5L  Abdominal:      General: Bowel sounds are normal. There is no distension.      Palpations: Abdomen is soft.      Tenderness: There is no abdominal tenderness.   Musculoskeletal:         General: No tenderness. Normal range of motion.      Cervical back: Normal range of motion and neck supple.      Right lower leg: No edema.      Left lower leg: No edema.   Skin:     General: Skin is warm and dry.      Capillary Refill: Capillary refill takes less than 2 seconds.      Findings: No rash.   Neurological:      Mental Status: She is alert.      Cranial Nerves: No cranial nerve deficit.      Sensory: No sensory deficit.      Coordination: Coordination normal.      Comments: Pt intermittently confused throughout interview, AAO to person and place but not time   Psychiatric:         Behavior: Behavior normal.         Thought Content: Thought content normal.         Judgment: Judgment normal.       Significant Labs: All pertinent labs within the past 24 hours have been reviewed.  CBC:   Recent Labs   Lab 01/30/23  1853   WBC 5.71   HGB 12.9   HCT 41.2        CMP:   Recent Labs   Lab 01/30/23  1853 01/31/23  0327    137   K 4.2 3.5    103   CO2 23 26   * 96   BUN 17 17   CREATININE 1.1 0.9   CALCIUM 10.5 10.2   PROT 8.3  --    ALBUMIN 4.0  --    BILITOT 0.9   --    ALKPHOS 110  --    AST 41*  --    ALT 24  --    ANIONGAP 12 8     Troponin:   Recent Labs   Lab 01/30/23  1853 01/30/23  2221 01/30/23  2358   TROPONINI 0.126* 0.122* 0.114*       Significant Imaging: I have reviewed all pertinent imaging results/findings within the past 24 hours.    Echo  · The left ventricle is normal in size with concentric hypertrophy and   mildly decreased systolic function.  · Severe left atrial enlargement.  · The estimated ejection fraction is 45%.  · Grade II left ventricular diastolic dysfunction.  · Moderate mitral regurgitation.  · Moderate tricuspid regurgitation.  · Mild pulmonic regurgitation.  · Cannot exclude infiltrative disease, preserved apical strain.  · The quantitatively derived ejection fraction is 42%.  · There are segmental left ventricular wall motion abnormalities.  · The left ventricular global longitudinal strain is -11%.  · Mild right ventricular enlargement with mildly reduced right ventricular   systolic function.  · Severe right atrial enlargement.  · Mild aortic regurgitation.  · Elevated central venous pressure (15 mmHg).  · The estimated PA systolic pressure is 71 mmHg.  · There is pulmonary hypertension.          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 CHF is currently uncontrolled due to volume overload due to: JVD, Dyspnea not returned to baseline after 2 doses of IV diuretic, Rales/crackles on pulmonary exam and Pulmonary edema/pleural effusion on CXR. Pt is on CHF pathway.   - Patient decompensated on admit (1/30/2023). (+)Subjective SOB, (+)JVD, (+)orthnopnea.   - Last BNP reviewed- and noted below Recent Labs   Lab 01/30/23  1853   *   -  Troponins x3 elevated but flat  - CXR with pulmonary congestions   - EKG without acute ST changes   - Last echo performed and demonstrates- Results for orders placed during the hospital encounter of 01/30/23  Echo  Interpretation Summary  · The left ventricle is normal in size with concentric hypertrophy and mildly decreased systolic function.  · Severe left atrial enlargement.  · The estimated ejection fraction is 45%.  · Grade II left ventricular diastolic dysfunction.  · Moderate mitral regurgitation.  · Moderate tricuspid regurgitation.  · Mild pulmonic regurgitation.  · Cannot exclude infiltrative disease, preserved apical strain.  · The quantitatively derived ejection fraction is 42%.  · There are segmental left ventricular wall motion abnormalities.  · The left ventricular global longitudinal strain is -11%.  · Mild right ventricular enlargement with mildly reduced right ventricular systolic function.  · Severe right atrial enlargement.  · Mild aortic regurgitation.  · Elevated central venous pressure (15 mmHg).  · The estimated PA systolic pressure is 71 mmHg.  · There is pulmonary hypertension.  - Cardiology consulted given new WMAs  - Continue diuresis with IV lasix  - Continue ACE/ARB and monitor clinical status closely.   - Continue asa 81mg, amlodipine, losartan, statin  - Monitor on telemetry.  - Patient is on CHF pathway.   - Monitor strict Is&Os and daily STANDING weights. Fluid restriction to 1.5L per day and cardiac diet with salt restriction.    Intake/Output Summary (Last 24 hours) at 1/31/2023 1452  Last data filed at 1/31/2023 0945  Gross per 24 hour   Intake --   Output 1200 ml   Net -1200 ml   - Supplemental O2 to keep Spo2 >90%  - Continue to stress to patient importance of self efficacy and  on diet for CHF.    Acute hypoxemic respiratory failure  Dyspnea on exertion   Patient with Hypoxic Respiratory failure which is Acute on chronic. She is not on home oxygen. Supplemental oxygen  was provided and noted-  .   Signs/symptoms of respiratory failure include- tachypnea and increased oxygen requirements. Contributing diagnoses includes - CHF Labs and images were reviewed. Patient Has not had a recent ABG.   - ABG pending  Will treat underlying causes and adjust management of respiratory failure as follows- see acute on chronic CHF    Confusion and disorientation  - Pt confused and disoriented, which daughter reports has been progressively worsening over the course of time  - Documented on previous visits (4/2022)  - Likely secondary to undiagnosed dementia  - Plan to place ambulatory referral to neurology at discharge  - Delirium precautions    Stage 3a chronic kidney disease  - Cr 1.1 on admission and downtrending  - monitor on serial bmp    Persistent atrial fibrillation  Patient with Persistent (7 days or more) atrial fibrillation which is controlled currently with none. Patient is currently in sinus rhythm.[id number] Score: 4. HASBLED. Anticoagulation indicated. Anticoagulation done with eliquis 2.5 mg BID .  - monitor on telemetry     Controlled type 2 diabetes mellitus with stage 3 chronic kidney disease, without long-term current use of insulin  Patient's FSGs are controlled on current medication regimen.  Last A1c reviewed-   Lab Results   Component Value Date    HGBA1C 5.8 (H) 01/30/2023     Most recent fingerstick glucose reviewed-   Recent Labs   Lab 01/31/23  0939   POCTGLUCOSE 95     Current correctional scale  Low  Maintain anti-hyperglycemic dose as follows-   Antihyperglycemics (From admission, onward)    Start     Stop Route Frequency Ordered    01/30/23 2251  insulin aspart U-100 pen 0-5 Units         -- SubQ Before meals & nightly PRN 01/30/23 2201      - Hold oral hypoglycemics while patient is in the hospital.  - Diabetic diet, glucose ACHS    History of DVT (deep vein thrombosis)  - continue eliquis 2.5 mg BID     Low tension glaucoma, moderate stage - Both Eyes  Continue  home latanoprost 0.005 % ophthalmic solution    Osteopenia  - chronic  - no acute issues  - takes CALCIUM CARBONATE ([id number] ORAL) at home     Other hyperlipidemia  Lab Results   Component Value Date    LDLCALC 99.2 12/01/2022   - continue home atorvastatin (LIPITOR) 20 MG tablet    Essential hypertension  - controlled   - continue home medications amLODIPine (NORVASC) 10 MG tablet and losartan (COZAAR) 25 MG tablet  - vitals q4h    VTE Risk Mitigation (From admission, onward)         Ordered     apixaban tablet 2.5 mg  2 times daily         01/30/23 2142     Reason for No Pharmacological VTE Prophylaxis  Once        Question:  Reasons:  Answer:  Already adequately anticoagulated on oral Anticoagulants    01/30/23 2142     IP VTE HIGH RISK PATIENT  Once         01/30/23 2142     Place sequential compression device  Until discontinued         01/30/23 2142                   YAMILE OchoaC  Department of Hospital Medicine   [facility]

## 2023-01-31 NOTE — ASSESSMENT & PLAN NOTE
Patient with known HFpEF, follows closely in [facility] clinic.  Presented with progressively worsening dyspnea despite increase in PO diuretics.  Trop flat and unremarkable.  BNP elevated to 894 on admission.  CXR similar to prior with mild central vascular congestion.  Patient with history of asymptomatic bradycardia and not able to tolerate BB.  Some consideration outpatient of amyloid deposition from echo in 2021 however negative amyloid labs in 2005.    Echo    Interpretation Summary  · The left ventricle is normal in size with concentric hypertrophy and mildly decreased systolic function.  · Severe left atrial enlargement.  · The estimated ejection fraction is 45%.  · Grade II left ventricular diastolic dysfunction.  · Moderate mitral regurgitation.  · Moderate tricuspid regurgitation.  · Mild pulmonic regurgitation.  · Cannot exclude infiltrative disease, preserved apical strain.  · The quantitatively derived ejection fraction is 42%.  · There are segmental left ventricular wall motion abnormalities.  · The left ventricular global longitudinal strain is -11%.  · Mild right ventricular enlargement with mildly reduced right ventricular systolic function.  · Severe right atrial enlargement.  · Mild aortic regurgitation.  · Elevated central venous pressure (15 mmHg).  · The estimated PA systolic pressure is 71 mmHg.  · There is pulmonary hypertension.    Prior echo on 4/2022 with EF 50% and Grade III LVDD.       - Patient appears euvolemic on exam today, recommend transition to PO Lasix 80mg daily  - strict I/Os and daily weights   - Maintain on telemetry and daily EKGs   - Maintain electrolytes: Mag >2 & K+ >4    - Ambulate as tolerated   - Continue home losartan and lipitor   - Recommend amyloid workup concerning for cardiac amyloidosis.  Recommend hematology eval to rule out AL.  Close follow up on discharge with Dr. Patel in clinic

## 2023-01-31 NOTE — ACP (ADVANCE CARE PLANNING)
Advance Care Planning     Date: 01/31/2023    Code Status  In light of the patients advanced and life limiting illness,I engaged the the patient and family in a conversation about the patient's preferences for care  at the very end of life. The patient wishes to have a natural, peaceful death.  Along those lines, the patient does not wish to have CPR or other invasive treatments performed when her heart and/or breathing stops. I communicated to the patient and family that a DNR order would be placed in her medical record to reflect this preference.  I spent a total of 16 minutes engaging the patient in this advance care planning discussion.     Zenaida David [facility]

## 2023-01-31 NOTE — ED NOTES
Pt resting comfortably.  No c/o or needs at this time.  Daughter remains at bedside.  Will continue to monitor.

## 2023-01-31 NOTE — PROGRESS NOTES
[facility]  Hospital Medicine  Progress Note    Patient Name: Inna Blankenship  MRN: [id number]  Patient Class: OP- Observation   Admission Date: 1/30/2023  Length of Stay: 0 days  Attending Physician: Nay Calhoun MD  Primary Care Provider: Stacy Rodriguez MD        Subjective:     Principal Problem:Acute on chronic diastolic congestive heart failure        HPI:  Inna Blankenship is a 89 year old female with chronic diastolic heart failure, hypertension, hyperlipidemia, afib on OAC, CKD and T2DM being admitted to hospital medicine for management of acute on chronic diastolic heart failure. Patient's daughter Cara at bedside also providing history. Reports progressive dyspnea on exertion over the past 1 week. Endorses associated non-productive nightly cough. Took increase home lasix dose to 40 mg over the past two days without significant improvement. States symptoms are consistent with previous CHF exacerbations. Denies fever, chills, nausea, vomiting, abdominal pain, chest pain, dysuria, hematuria, chest pain, extremity numbness. Denies tobacco, alcohol or drug use.       In ED: hypoxic to 93% on RA, tachypneic to 28 RR, HR 60 and SpO2 155/72. BNP elevated to 849 (baseline ~650) and troponin elevated to 0.126 (similar to previous elevations). CBC an CMP grossly unremarkable. Flu, COVID and RSV negative. CXR demonstrating cardiomegaly with central vascular congestion and mild perihilar edema stable from prior. EKG NSR with PACs and nonspecific t wave abnormalities. Received 80 mg IV lasix x1 in the ED.       Overview/Hospital Course:  Pt placed in observation for acute CHF exacerbation. Echo with EF 45%, grade II LV DD, valvular disease, new segmental wall motion abnormalities, CVP 15 mmHg, and PASP 71 mmHg. Cardiology consulted. Pt receiving IV diuresis BID until euvolemic. Plan to discharge home with home health when stable.       Interval History: Pt seen and examined by me this morning. INDERJIT since  admission. Pt hypoxic to 87% on 4L, which increased to 94% on 5L. Pt reports shortness of breath but denies additional complaints. Pt's daughter, Cara, reports pt has become significantly short-winded with even minimal activity at home over the past 3 weeks. Pt ambulates with a walker at baseline and requires multiple breaks due to SOB. Pt and family denies any recent changes in diet or medications, and the patient lives with her daughter.     Review of Systems   Constitutional:  Negative for activity change, chills and fever.   HENT:  Negative for trouble swallowing.    Eyes:  Negative for photophobia and visual disturbance.   Respiratory:  Positive for cough and shortness of breath. Negative for chest tightness.    Cardiovascular:  Negative for chest pain, palpitations and leg swelling.   Gastrointestinal:  Negative for abdominal pain, constipation, diarrhea, nausea and vomiting.   Genitourinary:  Negative for dysuria, frequency and hematuria.   Musculoskeletal:  Negative for back pain, gait problem and neck pain.   Skin:  Negative for rash and wound.   Neurological:  Negative for dizziness, syncope, speech difficulty, weakness and light-headedness.   Psychiatric/Behavioral:  Positive for confusion. Negative for agitation and dysphoric mood. The patient is not nervous/anxious.    Objective:     Vital Signs (Most Recent):  Temp: 98.1 °F (36.7 °C) (01/31/23 1428)  Pulse: (!) 56 (01/31/23 1428)  Resp: 20 (01/31/23 1428)  BP: (!) 145/61 (01/31/23 1428)  SpO2: (!) 93 % (01/31/23 1428)   Vital Signs (24h Range):  Temp:  [97.6 °F (36.4 °C)-98.8 °F (37.1 °C)] 98.1 °F (36.7 °C)  Pulse:  [55-79] 56  Resp:  [18-28] 20  SpO2:  [92 %-99 %] 93 %  BP: (121-163)/(56-79) 145/61     Weight: 61.2 kg (135 lb)  Body mass index is 23.17 kg/m².    Intake/Output Summary (Last 24 hours) at 1/31/2023 1500  Last data filed at 1/31/2023 0945  Gross per 24 hour   Intake --   Output 1200 ml   Net -1200 ml      Physical Exam  Vitals and  nursing note reviewed.   Constitutional:       General: She is not in acute distress.     Appearance: She is well-developed. She is not toxic-appearing.   HENT:      Head: Normocephalic and atraumatic.      Mouth/Throat:      Mouth: Mucous membranes are moist.   Eyes:      Conjunctiva/sclera: Conjunctivae normal.      Pupils: Pupils are equal, round, and reactive to light.   Neck:      Vascular: JVD present.   Cardiovascular:      Rate and Rhythm: Regular rhythm. Bradycardia present.      Heart sounds: Normal heart sounds. No murmur heard.    No gallop.   Pulmonary:      Effort: Pulmonary effort is normal. No respiratory distress.      Breath sounds: Rales (bibasilar) present. No wheezing.      Comments: Sating 87% on 4L, which increased to 94% on 5L  Abdominal:      General: Bowel sounds are normal. There is no distension.      Palpations: Abdomen is soft.      Tenderness: There is no abdominal tenderness.   Musculoskeletal:         General: No tenderness. Normal range of motion.      Cervical back: Normal range of motion and neck supple.      Right lower leg: No edema.      Left lower leg: No edema.   Skin:     General: Skin is warm and dry.      Capillary Refill: Capillary refill takes less than 2 seconds.      Findings: No rash.   Neurological:      Mental Status: She is alert.      Cranial Nerves: No cranial nerve deficit.      Sensory: No sensory deficit.      Coordination: Coordination normal.      Comments: Pt intermittently confused throughout interview, AAO to person and place but not time   Psychiatric:         Behavior: Behavior normal.         Thought Content: Thought content normal.         Judgment: Judgment normal.       Significant Labs: All pertinent labs within the past 24 hours have been reviewed.  CBC:   Recent Labs   Lab 01/30/23  1853   WBC 5.71   HGB 12.9   HCT 41.2        CMP:   Recent Labs   Lab 01/30/23  1853 01/31/23  0327    137   K 4.2 3.5    103   CO2 23 26   GLU  114* 96   BUN 17 17   CREATININE 1.1 0.9   CALCIUM 10.5 10.2   PROT 8.3  --    ALBUMIN 4.0  --    BILITOT 0.9  --    ALKPHOS 110  --    AST 41*  --    ALT 24  --    ANIONGAP 12 8       Significant Imaging: I have reviewed all pertinent imaging results/findings within the past 24 hours.    Echo  · The left ventricle is normal in size with concentric hypertrophy and   mildly decreased systolic function.  · Severe left atrial enlargement.  · The estimated ejection fraction is 45%.  · Grade II left ventricular diastolic dysfunction.  · Moderate mitral regurgitation.  · Moderate tricuspid regurgitation.  · Mild pulmonic regurgitation.  · Cannot exclude infiltrative disease, preserved apical strain.  · The quantitatively derived ejection fraction is 42%.  · There are segmental left ventricular wall motion abnormalities.  · The left ventricular global longitudinal strain is -11%.  · Mild right ventricular enlargement with mildly reduced right ventricular   systolic function.  · Severe right atrial enlargement.  · Mild aortic regurgitation.  · Elevated central venous pressure (15 mmHg).  · The estimated PA systolic pressure is 71 mmHg.  · There is pulmonary hypertension.            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 CHF is currently uncontrolled due to volume overload due to: JVD, Dyspnea not returned to baseline after 2 doses of IV diuretic, Rales/crackles on pulmonary exam and Pulmonary edema/pleural effusion on CXR. Pt is on CHF pathway.   - Patient decompensated on admit (1/30/2023). (+)Subjective SOB, (+)JVD, (+)orthnopnea.   - Last BNP reviewed- and noted below Recent Labs   Lab 01/30/23  1853   *    - Troponins x3 elevated but flat  - CXR with pulmonary congestions   - EKG without acute ST changes   - Last echo performed and demonstrates- Results for orders placed during the hospital encounter of 01/30/23  Echo  Interpretation Summary  · The left ventricle is normal in size with concentric hypertrophy and mildly decreased systolic function.  · Severe left atrial enlargement.  · The estimated ejection fraction is 45%.  · Grade II left ventricular diastolic dysfunction.  · Moderate mitral regurgitation.  · Moderate tricuspid regurgitation.  · Mild pulmonic regurgitation.  · Cannot exclude infiltrative disease, preserved apical strain.  · The quantitatively derived ejection fraction is 42%.  · There are segmental left ventricular wall motion abnormalities.  · The left ventricular global longitudinal strain is -11%.  · Mild right ventricular enlargement with mildly reduced right ventricular systolic function.  · Severe right atrial enlargement.  · Mild aortic regurgitation.  · Elevated central venous pressure (15 mmHg).  · The estimated PA systolic pressure is 71 mmHg.  · There is pulmonary hypertension.  - Cardiology consulted given new WMAs  - Continue diuresis with IV lasix  - Continue ACE/ARB and monitor clinical status closely.   - Continue asa 81mg, amlodipine, losartan, statin  - Monitor on telemetry.  - Patient is on CHF pathway.   - Monitor strict Is&Os and daily STANDING weights. Fluid restriction to 1.5L per day and cardiac diet with salt restriction.    Intake/Output Summary (Last 24 hours) at 1/31/2023 1452  Last data filed at 1/31/2023 0945  Gross per 24 hour   Intake --   Output 1200 ml   Net -1200 ml   - Supplemental O2 to keep Spo2 >90%  - Continue to stress to patient importance of self efficacy and  on diet for CHF.    Acute hypoxemic respiratory failure  Dyspnea on exertion   Patient with Hypoxic Respiratory failure which is Acute on chronic. She is not on home oxygen. Supplemental  oxygen was provided and noted-  .   Signs/symptoms of respiratory failure include- tachypnea and increased oxygen requirements. Contributing diagnoses includes - CHF Labs and images were reviewed. Patient Has not had a recent ABG.   - ABG pending  Will treat underlying causes and adjust management of respiratory failure as follows- see acute on chronic CHF    Confusion and disorientation  - Pt confused and disoriented, which daughter reports has been progressively worsening over the course of time  - Documented on previous visits (4/2022)  - Likely secondary to undiagnosed dementia  - Plan to place ambulatory referral to neurology at discharge  - Delirium precautions    Stage 3a chronic kidney disease  - Cr 1.1 on admission and downtrending  - monitor on serial bmp    Persistent atrial fibrillation  Patient with Persistent (7 days or more) atrial fibrillation which is controlled currently with none. Patient is currently in sinus rhythm.[id number] Score: 4. HASBLED. Anticoagulation indicated. Anticoagulation done with eliquis 2.5 mg BID .  - monitor on telemetry     Controlled type 2 diabetes mellitus with stage 3 chronic kidney disease, without long-term current use of insulin  Patient's FSGs are controlled on current medication regimen.  Last A1c reviewed-   Lab Results   Component Value Date    HGBA1C 5.8 (H) 01/30/2023     Most recent fingerstick glucose reviewed-   Recent Labs   Lab 01/31/23  0939   POCTGLUCOSE 95     Current correctional scale  Low  Maintain anti-hyperglycemic dose as follows-   Antihyperglycemics (From admission, onward)    Start     Stop Route Frequency Ordered    01/30/23 2251  insulin aspart U-100 pen 0-5 Units         -- SubQ Before meals & nightly PRN 01/30/23 2201      - Hold oral hypoglycemics while patient is in the hospital.  - Diabetic diet, glucose ACHS    History of DVT (deep vein thrombosis)  - continue eliquis 2.5 mg BID     Low tension glaucoma, moderate stage - Both  Eyes  Continue home latanoprost 0.005 % ophthalmic solution    Osteopenia  - chronic  - no acute issues  - takes CALCIUM CARBONATE ([id number]-YRT-460 ORAL) at home     Other hyperlipidemia  Lab Results   Component Value Date    LDLCALC 99.2 12/01/2022   - continue home atorvastatin (LIPITOR) 20 MG tablet    Essential hypertension  - controlled   - continue home medications amLODIPine (NORVASC) 10 MG tablet and losartan (COZAAR) 25 MG tablet  - vitals q4h      VTE Risk Mitigation (From admission, onward)         Ordered     apixaban tablet 2.5 mg  2 times daily         01/30/23 2142     Reason for No Pharmacological VTE Prophylaxis  Once        Question:  Reasons:  Answer:  Already adequately anticoagulated on oral Anticoagulants    01/30/23 2142     IP VTE HIGH RISK PATIENT  Once         01/30/23 2142     Place sequential compression device  Until discontinued         01/30/23 2142                Discharge Planning   BEN: 2/1/2023     Code Status: Full Code   Is the patient medically ready for discharge?: No    Reason for patient still in hospital (select all that apply): Patient unstable, Patient trending condition, Treatment and Consult recommendations                     Zenaida David PA-C  Department of Hospital Medicine   [facility]

## 2023-01-31 NOTE — ASSESSMENT & PLAN NOTE
Patient with known HFpEF, follows closely in [facility].  Presented with progressively worsening dyspnea despite increase in PO diuretics.  Trop flat and unremarkable.  BNP elevated to 894 on admission.  CXR similar to prior with mild central vascular congestion.  Patient with history of asymptomatic bradycardia and not able to tolerate BB.  Some consideration outpatient of amyloid deposition from echo in 2021 however negative amyloid labs in 2005.    Echo    Interpretation Summary  · The left ventricle is normal in size with concentric hypertrophy and mildly decreased systolic function.  · Severe left atrial enlargement.  · The estimated ejection fraction is 45%.  · Grade II left ventricular diastolic dysfunction.  · Moderate mitral regurgitation.  · Moderate tricuspid regurgitation.  · Mild pulmonic regurgitation.  · Cannot exclude infiltrative disease, preserved apical strain.  · The quantitatively derived ejection fraction is 42%.  · There are segmental left ventricular wall motion abnormalities.  · The left ventricular global longitudinal strain is -11%.  · Mild right ventricular enlargement with mildly reduced right ventricular systolic function.  · Severe right atrial enlargement.  · Mild aortic regurgitation.  · Elevated central venous pressure (15 mmHg).  · The estimated PA systolic pressure is 71 mmHg.  · There is pulmonary hypertension.    Prior echo on 4/2022 with EF 50% and Grade III LVDD.       - Recommend continued diuresis with IV Lasix 40 BID  - strict I/Os and daily weights   - Maintain on telemetry and daily EKGs   - Maintain electrolytes: Mag >2 & K+ >4    - Ambulate as tolerated   - Continue home losartan and lipitor

## 2023-01-31 NOTE — ASSESSMENT & PLAN NOTE
Patient with Persistent (7 days or more) atrial fibrillation which is controlled currently with none. Patient is currently in sinus rhythm.[id number] Score: 4. HASBLED. Anticoagulation indicated. Anticoagulation done with eliquis 2.5 mg BID .  - monitor on telemetry

## 2023-01-31 NOTE — SUBJECTIVE & OBJECTIVE
Past Medical History:   Diagnosis Date    Acute hypoxemic respiratory failure 1/30/2023    Arthritis     CHF (congestive heart failure) 12/26/2018    Chronic atrial fibrillation 8/29/2020    Chronic kidney disease, stage III (moderate) 9/11/2015    Colon adenoma     Diabetes mellitus, type II     Diet controlled    Glaucoma     Hyperlipemia     Hypertension     Osteopenia        Past Surgical History:   Procedure Laterality Date    APPENDECTOMY      CATARACT EXTRACTION W/  INTRAOCULAR LENS IMPLANT Right 03/15/2006        CATARACT EXTRACTION W/  INTRAOCULAR LENS IMPLANT Left 09/27/2006        COLONOSCOPY W/ POLYPECTOMY      EYE SURGERY      HYSTERECTOMY      TRANSESOPHAGEAL ECHOCARDIOGRAPHY N/A 11/9/2020    Procedure: ECHOCARDIOGRAM, TRANSESOPHAGEAL;  Surgeon: Marcelina Diagnostic Provider;  Location: [facility] EP LAB;  Service: Cardiology;  Laterality: N/A;    TREATMENT OF CARDIAC ARRHYTHMIA N/A 11/9/2020    Procedure: CARDIOVERSION;  Surgeon: Marvin Elmore MD;  Location: [facility] EP LAB;  Service: Cardiology;  Laterality: N/A;  AF, RAFFAELE, DCCV, MAC, GP, 3 PREP       Review of patient's allergies indicates:  No Known Allergies    No current facility-administered medications on file prior to encounter.     Current Outpatient Medications on File Prior to Encounter   Medication Sig    amLODIPine (NORVASC) 10 MG tablet TAKE 1 TABLET (10 MG TOTAL) BY MOUTH ONCE DAILY.    atorvastatin (LIPITOR) 20 MG tablet Take 1 tablet (20 mg total) by mouth once daily.    CALCIUM CARBONATE (GHWQ-MNA-751 ORAL) Take 1 tablet by mouth Daily. 1  Oral Every day    ELIQUIS 2.5 mg Tab TAKE 1 TABLET TWICE DAILY    EScitalopram oxalate (LEXAPRO) 10 MG tablet Take 1 tablet (10 mg total) by mouth once daily.    furosemide (LASIX) 20 MG tablet Take 1 tablet (20 mg total) by mouth once daily. Take an additional 20mg lasix in the afternoon as needed for worsening shortness of breath, swelling, or 3lbs weight gain in 1 day/5lb weight gain  in 3 days    latanoprost 0.005 % ophthalmic solution Place 1 drop into both eyes once daily.    losartan (COZAAR) 25 MG tablet Take 1 tablet (25 mg total) by mouth once daily.    multivit with minerals/lutein (MULTIVITAMIN 50 PLUS ORAL) Take 1 tablet by mouth once daily.      Family History       Problem Relation (Age of Onset)    Allergies Daughter    Asthma Daughter    Cancer Sister, Daughter    Heart attack Mother    Heart disease Mother    Hypertension Daughter    No Known Problems Father          Tobacco Use    Smoking status: Never    Smokeless tobacco: Never   Substance and Sexual Activity    Alcohol use: Yes     Comment: wine, rarely    Drug use: No    Sexual activity: Not Currently     Review of Systems   Constitutional:  Negative for activity change, chills and fever.   HENT:  Negative for trouble swallowing.    Eyes:  Negative for photophobia and visual disturbance.   Respiratory:  Positive for cough and shortness of breath. Negative for chest tightness.    Cardiovascular:  Negative for chest pain, palpitations and leg swelling.   Gastrointestinal:  Negative for abdominal pain, constipation, diarrhea, nausea and vomiting.   Genitourinary:  Negative for dysuria, frequency and hematuria.   Musculoskeletal:  Negative for back pain, gait problem and neck pain.   Skin:  Negative for rash and wound.   Neurological:  Negative for dizziness, syncope, speech difficulty, weakness and light-headedness.   Psychiatric/Behavioral:  Negative for agitation and confusion. The patient is not nervous/anxious.    Objective:     Vital Signs (Most Recent):  Temp: 97.7 °F (36.5 °C) (01/31/23 0012)  Pulse: 72 (01/30/23 2346)  Resp: 20 (01/30/23 2346)  BP: (!) 163/79 (01/30/23 2346)  SpO2: 99 % (01/30/23 2346)   Vital Signs (24h Range):  Temp:  [97.7 °F (36.5 °C)-98.8 °F (37.1 °C)] 97.7 °F (36.5 °C)  Pulse:  [60-79] 72  Resp:  [20-28] 20  SpO2:  [92 %-99 %] 99 %  BP: (121-163)/(56-79) 163/79     Weight: 61.2 kg (135 lb)  Body  mass index is 23.17 kg/m².    Physical Exam  Vitals and nursing note reviewed.   Constitutional:       General: She is not in acute distress.     Appearance: She is well-developed. She is not toxic-appearing.   HENT:      Head: Normocephalic and atraumatic.      Mouth/Throat:      Pharynx: No oropharyngeal exudate.   Eyes:      Conjunctiva/sclera: Conjunctivae normal.      Pupils: Pupils are equal, round, and reactive to light.   Neck:      Vascular: JVD present.   Cardiovascular:      Rate and Rhythm: Regular rhythm. Bradycardia present.      Heart sounds: Normal heart sounds. No murmur heard.    No gallop.   Pulmonary:      Effort: Pulmonary effort is normal. No respiratory distress.      Breath sounds: Rales (bilateral bibasilar) present. No wheezing.   Abdominal:      General: Bowel sounds are normal. There is no distension.      Palpations: Abdomen is soft.      Tenderness: There is no abdominal tenderness.   Musculoskeletal:         General: No tenderness. Normal range of motion.      Cervical back: Normal range of motion and neck supple.      Right lower leg: No edema.      Left lower leg: No edema.   Lymphadenopathy:      Cervical: No cervical adenopathy.   Skin:     General: Skin is warm and dry.      Capillary Refill: Capillary refill takes less than 2 seconds.      Findings: No rash.   Neurological:      Mental Status: She is alert and oriented to person, place, and time. Mental status is at baseline.      Cranial Nerves: No cranial nerve deficit.      Sensory: No sensory deficit.      Coordination: Coordination normal.   Psychiatric:         Behavior: Behavior normal.         Thought Content: Thought content normal.         Judgment: Judgment normal.         CRANIAL NERVES     CN III, IV, VI   Pupils are equal, round, and reactive to light.     Significant Labs: All pertinent labs within the past 24 hours have been reviewed.  CBC:   Recent Labs   Lab 01/30/23  1853   WBC 5.71   HGB 12.9   HCT 41.2   PLT  172     CMP:   Recent Labs   Lab 01/30/23  1853      K 4.2      CO2 23   *   BUN 17   CREATININE 1.1   CALCIUM 10.5   PROT 8.3   ALBUMIN 4.0   BILITOT 0.9   ALKPHOS 110   AST 41*   ALT 24   ANIONGAP 12     Cardiac Markers:   Recent Labs   Lab 01/30/23  1853   *     Troponin:   Recent Labs   Lab 01/30/23  1853 01/30/23  2221   TROPONINI 0.126* 0.122*       Significant Imaging: I have reviewed all pertinent imaging results/findings within the past 24 hours.  Imaging Results              X-Ray Chest AP Portable (Final result)  Result time 01/30/23 20:17:23      Final result by Ronny Ng DO (01/30/23 20:17:23)                   Impression:      Cardiomegaly with central vascular congestion and mild perihilar edema stable from prior.  No new focal consolidation.      Electronically signed by: Ronny Ng  Date:    01/30/2023  Time:    20:17               Narrative:    EXAMINATION:  XR CHEST AP PORTABLE    CLINICAL HISTORY:  COVID-19;    TECHNIQUE:  Single frontal view of the chest was performed.    COMPARISON:  07/31/2022.    FINDINGS:  The lungs are well expanded.  There are continued bilateral interstitial opacities with pulmonary vascular congestion.  There is no large focal consolidation.  The pleural spaces are clear.  The cardiac silhouette is enlarged, unchanged.  The visualized osseous structures demonstrate degenerative changes.

## 2023-01-31 NOTE — ED NOTES
Patient identifiers verified and correct for Inna Blankenship  LOC: The patient is sleeping, awakens to voice. , the patient is oriented x 1 and speaking appropriately.   APPEARANCE: Patient appears comfortable and in no acute distress, patient is clean and well groomed.  SKIN: The skin is warm and dry, color consistent with ethnicity, patient has normal skin turgor and moist mucus membranes, skin intact, no breakdown or bruising noted.   MUSCULOSKELETAL: Patient moving all extremities spontaneously, no swelling noted.  RESPIRATORY: Airway is open and patent, respirations are spontaneous, patient has a normal effort and rate, no accessory muscle.  CARDIAC: Pt placed on cardiac monitor. Patient has a normal rate and regular rhythm, no edema noted, capillary refill < 3 seconds.   GASTRO: Soft and non tender to palpation, no distention noted.  : Pt denies any pain or frequency with urination.  NEURO: Pt opens eyes to voice. , behavior appropriate to situation, follows commands, facial expression symmetrical, bilateral hand grasp equal and even, purposeful motor response noted, normal sensation in all extremities when touched with a finger.

## 2023-01-31 NOTE — ASSESSMENT & PLAN NOTE
Patient with known Afib on Eliquis.  Unable to tolerate BB 2/2 asymptomatic bradycardia.  Patient known and follows closely in [facility] [place] [place] [facility].  CHADSVASc of 6.  HAS-BLED of 1.  Recommend continuing home Eliquis 2.5mg BID  Would continue to refrain from BB in setting of history of asymptomatic bradycardia with recent EKG on 1/30 revealing of sinus rhythm with HR 65.  Place on tele and closely monitor

## 2023-01-31 NOTE — HPI
Ms. Blankenship is a 89yoF with PMHx of HFpEF, HTN, HLD, afib on Eliquis, CKD, DM2 who initially presented to 1 week of progressively worsening dyspnea on exertion associated with a nonproductive cough.  [place] [facility] [place]  Attempted to take additional doses of home Lasix (40mg instead of home 20mg) without relief.  Denies chest pain, palpitations, orthopnea, LE edema, lightheadedness, or dizziness.  Cardiology was consulted for acute on chronic combined CHF with new wall motion abnormalities noted on echo.

## 2023-01-31 NOTE — ASSESSMENT & PLAN NOTE
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 CHF is currently uncontrolled due to volume overload due to: JVD, Dyspnea not returned to baseline after 2 doses of IV diuretic, Rales/crackles on pulmonary exam and Pulmonary edema/pleural effusion on CXR. Pt is on CHF pathway.   - Patient decompensated on admit (1/30/2023). (+)Subjective SOB, (+)JVD, (+)orthnopnea.   - Last BNP reviewed- and noted below Recent Labs   Lab 01/30/23  1853   *   - Troponins x3 elevated but flat  - CXR with pulmonary congestions   - EKG without acute ST changes   - Last echo performed and demonstrates- Results for orders placed during the hospital encounter of 01/30/23  Echo  Interpretation Summary  · The left ventricle is normal in size with concentric hypertrophy and mildly decreased systolic function.  · Severe left atrial enlargement.  · The estimated ejection fraction is 45%.  · Grade II left ventricular diastolic dysfunction.  · Moderate mitral regurgitation.  · Moderate tricuspid regurgitation.  · Mild pulmonic regurgitation.  · Cannot exclude infiltrative disease, preserved apical strain.  · The quantitatively derived ejection fraction is 42%.  · There are segmental left ventricular wall motion abnormalities.  · The left ventricular global longitudinal strain is -11%.  · Mild right ventricular enlargement with mildly reduced right ventricular systolic function.  · Severe right atrial enlargement.  · Mild aortic regurgitation.  · Elevated central venous pressure (15 mmHg).  · The estimated PA systolic pressure is 71 mmHg.  · There is pulmonary hypertension.  - Cardiology consulted given new WMAs  - Continue diuresis with IV lasix  - Continue ACE/ARB and monitor clinical status closely.   - Continue  asa 81mg, amlodipine, losartan, statin  - Monitor on telemetry.  - Patient is on CHF pathway.   - Monitor strict Is&Os and daily STANDING weights. Fluid restriction to 1.5L per day and cardiac diet with salt restriction.    Intake/Output Summary (Last 24 hours) at 1/31/2023 1452  Last data filed at 1/31/2023 0960  Gross per 24 hour   Intake --   Output 1200 ml   Net -1200 ml   - Supplemental O2 to keep Spo2 >90%  - Continue to stress to patient importance of self efficacy and  on diet for CHF.

## 2023-01-31 NOTE — FIRST PROVIDER EVALUATION
Emergency Department TeleTriage Encounter Note      CHIEF COMPLAINT    Chief Complaint   Patient presents with    Shortness of Breath     Family doubled up on fluid pills today, pulse ox was low at home    Cough       VITAL SIGNS   Initial Vitals [01/30/23 1705]   BP Pulse Resp Temp SpO2   (!) 121/56 60 (!) 24 98.8 °F (37.1 °C) (!) 93 %      MAP       --            ALLERGIES    Review of patient's allergies indicates:  No Known Allergies    PROVIDER TRIAGE NOTE  Patient presents with shortness of breath and slight cough. Family doubled up on fluid pills today, but patient denies increased edema. No chest pain. No fever.       ORDERS  Labs Reviewed - No data to display    ED Orders (720h ago, onward)      Start Ordered     Status Ordering Provider    01/30/23 1707 01/30/23 1706  EKG 12-lead  Once         Completed by ANALY MORALES on 1/30/2023 at  5:13 PM SUSAN JAMESON              Virtual Visit Note: The provider triage portion of this emergency department evaluation and documentation was performed via [employer], a HIPAA-compliant telemedicine application, in concert with a tele-presenter in the room. A face to face patient evaluation with one of my colleagues will occur once the patient is placed in an emergency department room.      DISCLAIMER: This note was prepared with [employer] voice recognition transcription software. Garbled syntax, mangled pronouns, and other bizarre constructions may be attributed to that software system.

## 2023-01-31 NOTE — ASSESSMENT & PLAN NOTE
Pulmonary hypertension due to left heart disease  Right heart failure (secondary to left heart failure)  Patient is identified as having Diastolic (HFpEF) heart failure that is Acute on chronic. CHF is currently uncontrolled due to JVD, Dyspnea not returned to baseline after 1 doses of IV diuretic, Rales/crackles on pulmonary exam and Pulmonary edema/pleural effusion on CXR. Latest ECHO performed and demonstrates- Results for orders placed during the hospital encounter of 04/10/22    Echo  Interpretation Summary  · The left ventricle is normal in size with eccentric hypertrophy and low normal systolic function. The estimated ejection fraction is 50%.  · Moderate right ventricular enlargement with mildly to moderately reduced right ventricular systolic function.  · Grade III left ventricular diastolic dysfunction.  · Severe biatrial enlargement.  · There is mild aortic valve stenosis. Aortic valve area is 1.7 cm2; peak velocity is 1.4 m/s; mean gradient is 4 mmHg.  · Mild mitral regurgitation.  · Moderate tricuspid regurgitation.  · The estimated PA systolic pressure is 74 mmHg.  · Intermediate central venous pressure (8 mmHg).  . Continue ACE/ARB and monitor clinical status closely. Monitor on telemetry. Patient is on CHF pathway.  Monitor strict Is&Os and daily weights.  Place on fluid restriction of 1.5 L. Continue to stress to patient importance of self efficacy and  on diet for CHF. Last BNP reviewed- and noted below   Recent Labs   Lab 01/30/23  1853   *     Recent Labs   Lab 01/30/23  2221   TROPONINI 0.122*     - trend trop x3  - TTE penidng   - 20 mg IV lasix BID   - fluid restriction  - strict I/Os with daily weights   - TTE pending   - low sodium and diabetic diet

## 2023-01-31 NOTE — ASSESSMENT & PLAN NOTE
Patient with Persistent (7 days or more) atrial fibrillation which is controlled currently with none. Patient is currently in sinus rhythm.[id number] Score: 4. HASBLED. Anticoagulation indicated. Anticoagulation done with eliquis 2.5 mg BID .  - monitor on telemetry

## 2023-01-31 NOTE — HOSPITAL COURSE
Pt placed in observation for acute CHF exacerbation. Echo with EF 45%, grade II LV DD, valvular disease, new segmental wall motion abnormalities, apical strain, CVP 15 mmHg, and PASP 71 mmHg. Cardiology and Hematology consutled regarding amyloidosis workup, per recommendations no convincing evidence at this time. Patient will follow up with cardiology for amyloidosis concern. Patient receiving IV diuresis BID with sufficient urine outpatient and euvolemia achieved. Transition to oral diuresis today. Weaned to RA. 6MWT prior to discharge. Home oxygen ordered. HH at discharge. Patient medically ready for discharge. Plan of care discussed with patient and grandson, patient agreeable to plan, and all questions answered.

## 2023-01-31 NOTE — SUBJECTIVE & OBJECTIVE
Interval History: Pt seen and examined by me this morning. INDERJIT since admission. Pt hypoxic to 87% on 4L, which increased to 94% on 5L. Pt reports shortness of breath but denies additional complaints. Pt's daughter, Cara, reports pt has become significantly short-winded with even minimal activity at home over the past 3 weeks. Pt ambulates with a walker at baseline and requires multiple breaks due to SOB. Pt and family denies any recent changes in diet or medications, and the patient lives with her daughter.     Review of Systems   Constitutional:  Negative for activity change, chills and fever.   HENT:  Negative for trouble swallowing.    Eyes:  Negative for photophobia and visual disturbance.   Respiratory:  Positive for cough and shortness of breath. Negative for chest tightness.    Cardiovascular:  Negative for chest pain, palpitations and leg swelling.   Gastrointestinal:  Negative for abdominal pain, constipation, diarrhea, nausea and vomiting.   Genitourinary:  Negative for dysuria, frequency and hematuria.   Musculoskeletal:  Negative for back pain, gait problem and neck pain.   Skin:  Negative for rash and wound.   Neurological:  Negative for dizziness, syncope, speech difficulty, weakness and light-headedness.   Psychiatric/Behavioral:  Positive for confusion. Negative for agitation and dysphoric mood. The patient is not nervous/anxious.    Objective:     Vital Signs (Most Recent):  Temp: 98.1 °F (36.7 °C) (01/31/23 1428)  Pulse: (!) 56 (01/31/23 1428)  Resp: 20 (01/31/23 1428)  BP: (!) 145/61 (01/31/23 1428)  SpO2: (!) 93 % (01/31/23 1428)   Vital Signs (24h Range):  Temp:  [97.6 °F (36.4 °C)-98.8 °F (37.1 °C)] 98.1 °F (36.7 °C)  Pulse:  [55-79] 56  Resp:  [18-28] 20  SpO2:  [92 %-99 %] 93 %  BP: (121-163)/(56-79) 145/61     Weight: 61.2 kg (135 lb)  Body mass index is 23.17 kg/m².    Intake/Output Summary (Last 24 hours) at 1/31/2023 1506  Last data filed at 1/31/2023 0945  Gross per 24 hour   Intake --    Output 1200 ml   Net -1200 ml      Physical Exam  Vitals and nursing note reviewed.   Constitutional:       General: She is not in acute distress.     Appearance: She is well-developed. She is not toxic-appearing.   HENT:      Head: Normocephalic and atraumatic.      Mouth/Throat:      Mouth: Mucous membranes are moist.   Eyes:      Conjunctiva/sclera: Conjunctivae normal.      Pupils: Pupils are equal, round, and reactive to light.   Neck:      Vascular: JVD present.   Cardiovascular:      Rate and Rhythm: Regular rhythm. Bradycardia present.      Heart sounds: Normal heart sounds. No murmur heard.    No gallop.   Pulmonary:      Effort: Pulmonary effort is normal. No respiratory distress.      Breath sounds: Rales (bibasilar) present. No wheezing.      Comments: Sating 87% on 4L, which increased to 94% on 5L  Abdominal:      General: Bowel sounds are normal. There is no distension.      Palpations: Abdomen is soft.      Tenderness: There is no abdominal tenderness.   Musculoskeletal:         General: No tenderness. Normal range of motion.      Cervical back: Normal range of motion and neck supple.      Right lower leg: No edema.      Left lower leg: No edema.   Skin:     General: Skin is warm and dry.      Capillary Refill: Capillary refill takes less than 2 seconds.      Findings: No rash.   Neurological:      Mental Status: She is alert.      Cranial Nerves: No cranial nerve deficit.      Sensory: No sensory deficit.      Coordination: Coordination normal.      Comments: Pt intermittently confused throughout interview, AAO to person and place but not time   Psychiatric:         Behavior: Behavior normal.         Thought Content: Thought content normal.         Judgment: Judgment normal.       Significant Labs: All pertinent labs within the past 24 hours have been reviewed.  CBC:   Recent Labs   Lab 01/30/23 1853   WBC 5.71   HGB 12.9   HCT 41.2        CMP:   Recent Labs   Lab 01/30/23 1853  01/31/23  0327    137   K 4.2 3.5    103   CO2 23 26   * 96   BUN 17 17   CREATININE 1.1 0.9   CALCIUM 10.5 10.2   PROT 8.3  --    ALBUMIN 4.0  --    BILITOT 0.9  --    ALKPHOS 110  --    AST 41*  --    ALT 24  --    ANIONGAP 12 8       Significant Imaging: I have reviewed all pertinent imaging results/findings within the past 24 hours.    Echo  · The left ventricle is normal in size with concentric hypertrophy and   mildly decreased systolic function.  · Severe left atrial enlargement.  · The estimated ejection fraction is 45%.  · Grade II left ventricular diastolic dysfunction.  · Moderate mitral regurgitation.  · Moderate tricuspid regurgitation.  · Mild pulmonic regurgitation.  · Cannot exclude infiltrative disease, preserved apical strain.  · The quantitatively derived ejection fraction is 42%.  · There are segmental left ventricular wall motion abnormalities.  · The left ventricular global longitudinal strain is -11%.  · Mild right ventricular enlargement with mildly reduced right ventricular   systolic function.  · Severe right atrial enlargement.  · Mild aortic regurgitation.  · Elevated central venous pressure (15 mmHg).  · The estimated PA systolic pressure is 71 mmHg.  · There is pulmonary hypertension.

## 2023-01-31 NOTE — ASSESSMENT & PLAN NOTE
Patient's FSGs are controlled on current medication regimen.  Last A1c reviewed-   Lab Results   Component Value Date    HGBA1C 5.8 (H) 01/30/2023     Most recent fingerstick glucose reviewed-   Recent Labs   Lab 01/31/23  0939   POCTGLUCOSE 95     Current correctional scale  Low  Maintain anti-hyperglycemic dose as follows-   Antihyperglycemics (From admission, onward)    Start     Stop Route Frequency Ordered    01/30/23 2251  insulin aspart U-100 pen 0-5 Units         -- SubQ Before meals & nightly PRN 01/30/23 2201      - Hold oral hypoglycemics while patient is in the hospital.  - Diabetic diet, glucose ACHS

## 2023-01-31 NOTE — ASSESSMENT & PLAN NOTE
- Pt confused and disoriented, which daughter reports has been progressively worsening over the course of time  - Documented on previous visits (4/2022)  - Likely secondary to undiagnosed dementia  - Plan to place ambulatory referral to neurology at discharge  - Delirium precautions

## 2023-01-31 NOTE — PROGRESS NOTES
Pt seen in [id number], Pt with daughter who states she remembers Ms. Shannon as Pt was in our Clinic before and got her Certificate. Pt reluctant to sign up for Clinic again but daughter tries to recommend that she attend again. Pt wants to speak to Naresh her Grandson who brought her to the Clinic appointments before enrolling. Will place on Teams and call 48-72 hrs after D/C. Hospital Education for HF TCC done again.

## 2023-01-31 NOTE — ASSESSMENT & PLAN NOTE
Lab Results   Component Value Date    LDLCALC 99.2 12/01/2022   - continue home atorvastatin (LIPITOR) 20 MG tablet

## 2023-01-31 NOTE — ASSESSMENT & PLAN NOTE
- controlled   - continue home medications amLODIPine (NORVASC) 10 MG tablet and losartan (COZAAR) 25 MG tablet  - vitals q4h

## 2023-01-31 NOTE — ED PROVIDER NOTES
Encounter Date: 1/30/2023       History     Chief Complaint   Patient presents with    Shortness of Breath     Family doubled up on fluid pills today, pulse ox was low at home    Cough     HPI  Inna Blankenship is a 89-year-old female with a history of CHF, atrial fibrillation, type 2 diabetes, glaucoma, hyperlipidemia, hypertension and osteopenia presenting with shortness of breath, cough, wheeze, tachypnea and hypoxemia.  Patient presents with her daughter who has been giving her extra diuretic pills at home in an effort to improve her oxygenation, shortness of breath and dyspnea on exertion.  She has increased dyspnea on exertion with ambulation, activity or any movement.  She denies any active chest pain, abdominal pain, nausea, vomiting, diarrhea, back pain, pleuritic pain, leg swelling at this time.  Much of her edema is around her waist and chest.  Patient denies any falls or trauma.  Her pulse ox was low at home in the 88 percentile and she was brought in by her family member for evaluation.    Review of patient's allergies indicates:  No Known Allergies  Past Medical History:   Diagnosis Date    Acute hypoxemic respiratory failure 1/30/2023    Arthritis     CHF (congestive heart failure) 12/26/2018    Chronic atrial fibrillation 8/29/2020    Chronic kidney disease, stage III (moderate) 9/11/2015    Colon adenoma     Diabetes mellitus, type II     Diet controlled    Glaucoma     Hyperlipemia     Hypertension     Osteopenia      Past Surgical History:   Procedure Laterality Date    APPENDECTOMY      CATARACT EXTRACTION W/  INTRAOCULAR LENS IMPLANT Right 03/15/2006        CATARACT EXTRACTION W/  INTRAOCULAR LENS IMPLANT Left 09/27/2006        COLONOSCOPY W/ POLYPECTOMY      EYE SURGERY      HYSTERECTOMY      TRANSESOPHAGEAL ECHOCARDIOGRAPHY N/A 11/9/2020    Procedure: ECHOCARDIOGRAM, TRANSESOPHAGEAL;  Surgeon: Marcelina Diagnostic Provider;  Location: [facility] EP LAB;  Service: Cardiology;   Laterality: N/A;    TREATMENT OF CARDIAC ARRHYTHMIA N/A 11/9/2020    Procedure: CARDIOVERSION;  Surgeon: Marvin Elmore MD;  Location: [facility];  Service: Cardiology;  Laterality: N/A;  AF, RAFFAELE, DCCV, MAC, GP, 3 PREP     Family History   Problem Relation Age of Onset    Heart attack Mother     Heart disease Mother     No Known Problems Father     Cancer Sister     Hypertension Daughter     Asthma Daughter     Allergies Daughter     Cancer Daughter         ovarian    Amblyopia Neg Hx     Blindness Neg Hx     Cataracts Neg Hx     Diabetes Neg Hx     Glaucoma Neg Hx     Macular degeneration Neg Hx     Retinal detachment Neg Hx     Strabismus Neg Hx     Stroke Neg Hx     Thyroid disease Neg Hx      Social History     Tobacco Use    Smoking status: Never    Smokeless tobacco: Never   Substance Use Topics    Alcohol use: Yes     Comment: wine, rarely    Drug use: No     Review of Systems  All other systems reviewed and were negative; see HPI also for additional ROS.    Physical Exam     Initial Vitals [01/30/23 1705]   BP Pulse Resp Temp SpO2   (!) 121/56 60 (!) 24 98.8 °F (37.1 °C) (!) 93 %      MAP       --         Physical Exam    Nursing note and vitals reviewed.    Gen/Constitutional: Interactive.  Moderate emotional distress secondary to respiratory distress and hypoxia  Head: Normocephalic, Atraumatic  Neck: supple, no masses or LAD, positive JVD  Eyes: PERRLA, conjunctiva clear  Ears, Nose and Throat: No rhinorrhea or stridor.  Cardiac:  Regular rate, Reg Rhythm, No murmur  Pulmonary:  Bibasilar rales, crackles, tachypnea and hypoxia, 2 L nasal cannula  GI: Abdomen soft, non-tender, non-distended; no rebound or guarding  : No CVA tenderness.  Musculoskeletal: Extremities warm, well perfused, no erythema, trace edema   Skin: No rashes, cyanosis or jaundice.  Neuro: Alert and Oriented x 3; No focal motor or sensory deficits.    Psych: Normal affect        ED Course   Critical Care    Date/Time: 1/30/2023 8:55  PM  Performed by: Josh Quiñonez DO  Authorized by: Josh Quiñonez DO   Direct patient critical care time: 15 minutes  Additional history critical care time: 12 minutes  Ordering / reviewing critical care time: 25 minutes  Documentation critical care time: 8 minutes  Consulting other physicians critical care time: 5 minutes  Total critical care time (exclusive of procedural time) : 65 minutes  Critical care was necessary to treat or prevent imminent or life-threatening deterioration of the following conditions: cardiac failure and respiratory failure.  Critical care was time spent personally by me on the following activities: blood draw for specimens, development of treatment plan with patient or surrogate, discussions with consultants, examination of patient, evaluation of patient's response to treatment, obtaining history from patient or surrogate, ordering and performing treatments and interventions, ordering and review of laboratory studies, ordering and review of radiographic studies, pulse oximetry, re-evaluation of patient's condition and review of old charts.      Labs Reviewed   CBC W/ AUTO DIFFERENTIAL - Abnormal; Notable for the following components:       Result Value    MCHC 31.3 (*)     RDW 16.1 (*)     All other components within normal limits   COMPREHENSIVE METABOLIC PANEL - Abnormal; Notable for the following components:    Glucose 114 (*)     AST 41 (*)     eGFR 46.0 (*)     All other components within normal limits   TROPONIN I - Abnormal; Notable for the following components:    Troponin I 0.126 (*)     All other components within normal limits   B-TYPE NATRIURETIC PEPTIDE - Abnormal; Notable for the following components:     (*)     All other components within normal limits   HEMOGLOBIN A1C - Abnormal; Notable for the following components:    Hemoglobin A1C 5.8 (*)     All other components within normal limits   INFLUENZA A & B BY MOLECULAR   SARS-COV-2 RNA AMPLIFICATION, QUAL    TROPONIN I   TROPONIN I   SARS-COV-2 RDRP GENE     EKG Readings: (Independently Interpreted)   Initial Reading: No STEMI. Previous EKG: Compared with most recent EKG Rhythm: Normal Sinus Rhythm. Heart Rate: 65. Ectopy: PACs. ST Segments: Non-Specific ST Segment Depression.     Imaging Results              X-Ray Chest AP Portable (Final result)  Result time 01/30/23 20:17:23      Final result by Ronny Ng DO (01/30/23 20:17:23)                   Impression:      Cardiomegaly with central vascular congestion and mild perihilar edema stable from prior.  No new focal consolidation.      Electronically signed by: Ronny Ng  Date:    01/30/2023  Time:    20:17               Narrative:    EXAMINATION:  XR CHEST AP PORTABLE    CLINICAL HISTORY:  COVID-19;    TECHNIQUE:  Single frontal view of the chest was performed.    COMPARISON:  07/31/2022.    FINDINGS:  The lungs are well expanded.  There are continued bilateral interstitial opacities with pulmonary vascular congestion.  There is no large focal consolidation.  The pleural spaces are clear.  The cardiac silhouette is enlarged, unchanged.  The visualized osseous structures demonstrate degenerative changes.                                    X-Rays:   Independently Interpreted Readings:   Chest X-Ray: Cardiomegaly present.  Increased vascular markings consistent with CHF are present. Pulmonary edema, no pneumothorax or free air   Medications   amLODIPine tablet 10 mg (has no administration in time range)   atorvastatin tablet 20 mg (has no administration in time range)   apixaban tablet 2.5 mg (has no administration in time range)   EScitalopram oxalate tablet 10 mg (has no administration in time range)   latanoprost 0.005 % ophthalmic solution 1 drop (has no administration in time range)   losartan tablet 25 mg (has no administration in time range)   sodium chloride 0.9% flush 10 mL (has no administration in time range)   furosemide injection 20 mg (has no  administration in time range)   ondansetron disintegrating tablet 4 mg (has no administration in time range)   ondansetron injection 4 mg (has no administration in time range)   polyethylene glycol packet 17 g (has no administration in time range)   albuterol-ipratropium 2.5 mg-0.5 mg/3 mL nebulizer solution 3 mL (has no administration in time range)   melatonin tablet 6 mg (has no administration in time range)   acetaminophen tablet 650 mg (has no administration in time range)   aluminum-magnesium hydroxide-simethicone 200-200-20 mg/5 mL suspension 30 mL (has no administration in time range)   acetaminophen tablet 650 mg (has no administration in time range)   naloxone 0.4 mg/mL injection 0.02 mg (has no administration in time range)   glucose chewable tablet 16 g (has no administration in time range)   glucose chewable tablet 24 g (has no administration in time range)   glucagon (human recombinant) injection 1 mg (has no administration in time range)   senna tablet 8.6 mg (has no administration in time range)   insulin aspart U-100 pen 0-5 Units (has no administration in time range)   dextrose 10% bolus 125 mL 125 mL (has no administration in time range)   dextrose 10% bolus 250 mL 250 mL (has no administration in time range)   furosemide injection 80 mg (80 mg Intravenous Given 1/30/23 2112)     Medical Decision Making:   History:   I obtained history from: someone other than patient.       <> Summary of History: Brought in by patient's daughter who endorses shortness of breath, dyspnea on exertion, rales and cough  Old Medical Records: I decided to obtain old medical records.  Initial Assessment:   Inna Blankenship is a 89-year-old female with a history of CHF, atrial fibrillation, type 2 diabetes, glaucoma, hyperlipidemia, hypertension and osteopenia presenting with shortness of breath, cough, wheeze, tachypnea and hypoxemia.    Differential Diagnosis:   CHF exacerbation, volume overload, PE, pneumonia, pneumothorax,  ACS  Independently Interpreted Test(s):   I have ordered and independently interpreted X-rays - see prior notes.  I have ordered and independently interpreted EKG Reading(s) - see prior notes  Clinical Tests:   Lab Tests: Ordered and Reviewed       <> Summary of Lab: Troponin - elevated  BNP:  Elevated  Radiological Study: Ordered and Reviewed  Medical Tests: Ordered and Reviewed  Other:   I have discussed this case with another health care provider.       <> Summary of the Discussion: Hospital medicine                 Afebrile, tachypnea, hypoxemia without tachycardia or hypotension.  She is slightly hypertensive with a systolic of 150s.  Physical exam findings remarkable for positive JVD, rales, crackles at the bases, and trace edema.  Increased dyspnea on exertion and and tachypnea with any activity.  ECG obtained which shows sinus rhythm with PACs and nonspecific T-wave abnormality without ischemia or STEMI on my read.  Placed on cardiac and telemetry monitoring including pulse oximetry.  Chest x-ray obtained which shows pulmonary edema, CHF type picture.  IV Lasix 80 mg given to assist with diuresis especially with hypoxemia requiring oxygen support.  Continue cardiac and telemetry monitoring including pulse oximetry.  I considered ACS, PE, pneumonia pneumothorax however chest x-ray negative for pneumonia, pneumothorax or free air.  ECG without ischemia, patient without active chest pain, doubt ACS.  Given high-risk heart failure symptoms including aggressive diuresis with IV Lasix and hypoxemia requiring oxygen support, please see critical care note for critical care time.  Infectious workup negative for influenza and COVID-19.  Discussed case with hospital medicine given risk factors will admit to observation for further management and treatment.  Please see critical care note for critical care time.    Complexity:  Critical care       Clinical Impression:   Final diagnoses:  [R06.02] SOB (shortness of  breath)  [R06.09] Dyspnea on exertion (Primary)  [I50.9] Congestive heart failure, unspecified HF chronicity, unspecified heart failure type  [J81.0] Acute pulmonary edema        ED Disposition Condition    Observation Stable               Josh Quiñonez DO, FAAEM  Emergency Staff Physician   Dept of Emergency Medicine   [facility]  Spectralink: [id number]        Disclaimer: This note has been generated using voice-recognition software. There may be typographical errors that have been missed during proof-reading.       Josh Quiñonez DO  01/30/23 [id number]

## 2023-01-31 NOTE — PHARMACY MED REC
"Admission Medication History     The home medication history was taken by Luzmaria Brooks.    You may go to "Admission" then "Reconcile Home Medications" tabs to review and/or act upon these items.     The home medication list has been updated by the Pharmacy department.   Please read ALL comments highlighted in yellow.   Please address this information as you see fit.    Feel free to contact us if you have any questions or require assistance.      The medications listed below were removed from the home medication list. Please reorder if appropriate:  Patient reports no longer taking the following medication(s):  CALCIUM CARBONATE    Medications listed below were obtained from: Patient/family    Current Outpatient Medications on File Prior to Encounter   Medication Sig    amLODIPine (NORVASC) 10 MG tablet   TAKE 1 TABLET (10 MG TOTAL) BY MOUTH ONCE DAILY.    atorvastatin (LIPITOR) 20 MG tablet   Take 1 tablet (20 mg total) by mouth once daily.    ELIQUIS 2.5 mg Tab   TAKE 1 TABLET TWICE DAILY    furosemide (LASIX) 20 MG tablet           Take 1 tablet (20 mg total) by mouth once daily. Take an additional 20mg lasix in the afternoon as needed for worsening shortness of breath, swelling, or 3lbs weight gain in 1 day/5lb weight gain in 3 days    latanoprost 0.005 % ophthalmic solution   Place 1 drop into both eyes once daily.    losartan (COZAAR) 25 MG tablet   Take 1 tablet (25 mg total) by mouth once daily.    multivit with minerals/lutein (MULTIVITAMIN 50 PLUS ORAL)   Take 1 tablet by mouth once daily.     EScitalopram oxalate (LEXAPRO) 10 MG tablet Take 1 tablet (10 mg total) by mouth once daily.           Luzmaria Brooks  EXT [id number]                  .        "

## 2023-01-31 NOTE — ED NOTES
Pt care assumed.  Pt sleeping, awakens easily to voice.  Pt oriented to self only.  Sister at bedside.  Updated on plan of care, states understanding.  Side rails up, call button within reach.  Will continue to monitor.

## 2023-01-31 NOTE — ASSESSMENT & PLAN NOTE
Dyspnea on exertion   Patient with Hypoxic Respiratory failure which is Acute on chronic. She is not on home oxygen. Supplemental oxygen was provided and noted-  .   Signs/symptoms of respiratory failure include- tachypnea and increased oxygen requirements. Contributing diagnoses includes - CHF Labs and images were reviewed. Patient Has not had a recent ABG.   - ABG pending  Will treat underlying causes and adjust management of respiratory failure as follows- see acute on chronic CHF

## 2023-01-31 NOTE — ED NOTES
Pt resting comfortably.  Daughter remains at bedside.  Pt denies any c/o at this time.  Updated with status, states understanding.  Will continue to monitor.

## 2023-01-31 NOTE — ASSESSMENT & PLAN NOTE
Dyspnea on exertion   Patient with Hypoxic Respiratory failure which is Acute on chronic.  she is not on home oxygen. Supplemental oxygen was provided and noted-  .   Signs/symptoms of respiratory failure include- tachypnea. Contributing diagnoses includes - CHF Labs and images were reviewed. Patient Has not had a recent ABG. Will treat underlying causes and adjust management of respiratory failure as follows- see acute on chronic CHF

## 2023-01-31 NOTE — HPI
Inna Blankenship is a 89 year old female with chronic diastolic heart failure, hypertension, hyperlipidemia, afib on OAC, CKD and T2DM being admitted to hospital medicine for management of acute on chronic diastolic heart failure. Patient's daughter Cara at bedside also providing history. Reports progressive dyspnea on exertion over the past 1 week. Endorses associated non-productive nightly cough. Took increase home lasix dose to 40 mg over the past two days without significant improvement. States symptoms are consistent with previous CHF exacerbations. Denies fever, chills, nausea, vomiting, abdominal pain, chest pain, dysuria, hematuria, chest pain, extremity numbness. Denies tobacco, alcohol or drug use.       In ED: hypoxic to 93% on RA, tachypneic to 28 RR, HR 60 and SpO2 155/72. BNP elevated to 849 (baseline ~650) and troponin elevated to 0.126 (similar to previous elevations). CBC an CMP grossly unremarkable. Flu, COVID and RSV negative. CXR demonstrating cardiomegaly with central vascular congestion and mild perihilar edema stable from prior. EKG NSR with PACs and nonspecific t wave abnormalities. Received 80 mg IV lasix x1 in the ED.

## 2023-02-01 LAB
ALBUMIN SERPL ELPH-MCNC: 3.89 G/DL (ref 3.35–5.55)
ALPHA1 GLOB SERPL ELPH-MCNC: 0.32 G/DL (ref 0.17–0.41)
ALPHA2 GLOB SERPL ELPH-MCNC: 0.74 G/DL (ref 0.43–0.99)
ANION GAP SERPL CALC-SCNC: 11 MMOL/L (ref 8–16)
B-GLOBULIN SERPL ELPH-MCNC: 0.83 G/DL (ref 0.5–1.1)
BASOPHILS # BLD AUTO: 0.04 K/UL (ref 0–0.2)
BASOPHILS NFR BLD: 0.7 % (ref 0–1.9)
BUN SERPL-MCNC: 16 MG/DL (ref 10–30)
CALCIUM SERPL-MCNC: 9.7 MG/DL (ref 8.7–10.5)
CHLORIDE SERPL-SCNC: 103 MMOL/L (ref 95–110)
CO2 SERPL-SCNC: 26 MMOL/L (ref 23–29)
CREAT SERPL-MCNC: 0.9 MG/DL (ref 0.5–1.4)
DIFFERENTIAL METHOD: ABNORMAL
EOSINOPHIL # BLD AUTO: 0.1 K/UL (ref 0–0.5)
EOSINOPHIL NFR BLD: 2.4 % (ref 0–8)
ERYTHROCYTE [DISTWIDTH] IN BLOOD BY AUTOMATED COUNT: 15.4 % (ref 11.5–14.5)
EST. GFR  (NO RACE VARIABLE): 58.5 ML/MIN/1.73 M^2
GAMMA GLOB SERPL ELPH-MCNC: 1.72 G/DL (ref 0.67–1.58)
GLUCOSE SERPL-MCNC: 77 MG/DL (ref 70–110)
HCT VFR BLD AUTO: 41.4 % (ref 37–48.5)
HGB BLD-MCNC: 13.1 G/DL (ref 12–16)
IMM GRANULOCYTES # BLD AUTO: 0 K/UL (ref 0–0.04)
IMM GRANULOCYTES NFR BLD AUTO: 0 % (ref 0–0.5)
INTERPRETATION SERPL IFE-IMP: NORMAL
KAPPA LC SER QL IA: 7.35 MG/DL (ref 0.33–1.94)
KAPPA LC/LAMBDA SER IA: 2.16 (ref 0.26–1.65)
LAMBDA LC SER QL IA: 3.41 MG/DL (ref 0.57–2.63)
LYMPHOCYTES # BLD AUTO: 1.4 K/UL (ref 1–4.8)
LYMPHOCYTES NFR BLD: 23.8 % (ref 18–48)
MAGNESIUM SERPL-MCNC: 1.8 MG/DL (ref 1.6–2.6)
MCH RBC QN AUTO: 30 PG (ref 27–31)
MCHC RBC AUTO-ENTMCNC: 31.6 G/DL (ref 32–36)
MCV RBC AUTO: 95 FL (ref 82–98)
MONOCYTES # BLD AUTO: 0.5 K/UL (ref 0.3–1)
MONOCYTES NFR BLD: 8.9 % (ref 4–15)
NEUTROPHILS # BLD AUTO: 3.7 K/UL (ref 1.8–7.7)
NEUTROPHILS NFR BLD: 64.2 % (ref 38–73)
NRBC BLD-RTO: 0 /100 WBC
PATHOLOGIST INTERPRETATION IFE: NORMAL
PATHOLOGIST INTERPRETATION SPE: NORMAL
PHOSPHATE SERPL-MCNC: 3.6 MG/DL (ref 2.7–4.5)
PLATELET # BLD AUTO: 154 K/UL (ref 150–450)
PMV BLD AUTO: 11.5 FL (ref 9.2–12.9)
POCT GLUCOSE: 115 MG/DL (ref 70–110)
POCT GLUCOSE: 176 MG/DL (ref 70–110)
POCT GLUCOSE: 77 MG/DL (ref 70–110)
POCT GLUCOSE: 92 MG/DL (ref 70–110)
POTASSIUM SERPL-SCNC: 3.5 MMOL/L (ref 3.5–5.1)
PROT SERPL-MCNC: 7.5 G/DL (ref 6–8.4)
RBC # BLD AUTO: 4.36 M/UL (ref 4–5.4)
SODIUM SERPL-SCNC: 140 MMOL/L (ref 136–145)
WBC # BLD AUTO: 5.72 K/UL (ref 3.9–12.7)

## 2023-02-01 PROCEDURE — 85025 COMPLETE CBC W/AUTO DIFF WBC: CPT | Mod: HCNC

## 2023-02-01 PROCEDURE — 80048 BASIC METABOLIC PNL TOTAL CA: CPT | Mod: HCNC

## 2023-02-01 PROCEDURE — 63600175 PHARM REV CODE 636 W HCPCS: Mod: HCNC

## 2023-02-01 PROCEDURE — 84100 ASSAY OF PHOSPHORUS: CPT | Mod: HCNC

## 2023-02-01 PROCEDURE — 25000003 PHARM REV CODE 250: Mod: HCNC

## 2023-02-01 PROCEDURE — G0378 HOSPITAL OBSERVATION PER HR: HCPCS | Mod: HCNC

## 2023-02-01 PROCEDURE — 99233 PR SUBSEQUENT HOSPITAL CARE,LEVL III: ICD-10-PCS | Mod: HCNC,,,

## 2023-02-01 PROCEDURE — 94761 N-INVAS EAR/PLS OXIMETRY MLT: CPT | Mod: HCNC

## 2023-02-01 PROCEDURE — 36415 COLL VENOUS BLD VENIPUNCTURE: CPT | Mod: HCNC

## 2023-02-01 PROCEDURE — 96376 TX/PRO/DX INJ SAME DRUG ADON: CPT

## 2023-02-01 PROCEDURE — 99233 SBSQ HOSP IP/OBS HIGH 50: CPT | Mod: HCNC,,,

## 2023-02-01 PROCEDURE — 83735 ASSAY OF MAGNESIUM: CPT | Mod: HCNC

## 2023-02-01 RX ORDER — POTASSIUM CHLORIDE 750 MG/1
50 CAPSULE, EXTENDED RELEASE ORAL ONCE
Status: COMPLETED | OUTPATIENT
Start: 2023-02-01 | End: 2023-02-01

## 2023-02-01 RX ADMIN — APIXABAN 2.5 MG: 2.5 TABLET, FILM COATED ORAL at 09:02

## 2023-02-01 RX ADMIN — FUROSEMIDE 40 MG: 10 INJECTION, SOLUTION INTRAMUSCULAR; INTRAVENOUS at 09:02

## 2023-02-01 RX ADMIN — SENNOSIDES 8.6 MG: 8.6 TABLET, FILM COATED ORAL at 09:02

## 2023-02-01 RX ADMIN — POLYETHYLENE GLYCOL 3350 17 G: 17 POWDER, FOR SOLUTION ORAL at 09:02

## 2023-02-01 RX ADMIN — FUROSEMIDE 40 MG: 10 INJECTION, SOLUTION INTRAMUSCULAR; INTRAVENOUS at 06:02

## 2023-02-01 RX ADMIN — ESCITALOPRAM OXALATE 10 MG: 5 TABLET, FILM COATED ORAL at 09:02

## 2023-02-01 RX ADMIN — ATORVASTATIN CALCIUM 20 MG: 20 TABLET, FILM COATED ORAL at 09:02

## 2023-02-01 RX ADMIN — LATANOPROST 1 DROP: 50 SOLUTION OPHTHALMIC at 09:02

## 2023-02-01 RX ADMIN — POTASSIUM CHLORIDE 50 MEQ: 10 CAPSULE, COATED, EXTENDED RELEASE ORAL at 03:02

## 2023-02-01 NOTE — PROGRESS NOTES
[facility] [address] [place] [address] Medicine  Progress Note    Patient Name: Inna Blankenship  MRN: [id number]  Patient Class: OP- Observation   Admission Date: 1/30/2023  Length of Stay: 0 days  Attending Physician: Nay Calhoun MD  Primary Care Provider: Stacy Rodriguez MD        Subjective:     Principal Problem:Acute on chronic diastolic congestive heart failure        HPI:  Inna Blankenship is a 89 year old female with chronic diastolic heart failure, hypertension, hyperlipidemia, afib on OAC, CKD and T2DM being admitted to hospital medicine for management of acute on chronic diastolic heart failure. Patient's daughter Cara at bedside also providing history. Reports progressive dyspnea on exertion over the past 1 week. Endorses associated non-productive nightly cough. Took increase home lasix dose to 40 mg over the past two days without significant improvement. States symptoms are consistent with previous CHF exacerbations. Denies fever, chills, nausea, vomiting, abdominal pain, chest pain, dysuria, hematuria, chest pain, extremity numbness. Denies tobacco, alcohol or drug use.       In ED: hypoxic to 93% on RA, tachypneic to 28 RR, HR 60 and SpO2 155/72. BNP elevated to 849 (baseline ~650) and troponin elevated to 0.126 (similar to previous elevations). CBC an CMP grossly unremarkable. Flu, COVID and RSV negative. CXR demonstrating cardiomegaly with central vascular congestion and mild perihilar edema stable from prior. EKG NSR with PACs and nonspecific t wave abnormalities. Received 80 mg IV lasix x1 in the ED.       Overview/Hospital Course:  Pt placed in observation for acute CHF exacerbation. Echo with EF 45%, grade II LV DD, valvular disease, new segmental wall motion abnormalities, apical strain, CVP 15 mmHg, and PASP 71 mmHg. Cardiology consulted and recommending workup for amyloidosis with SPEP, UPEP, Free light chains, KAVON, and PYP. Pt receiving IV diuresis BID until euvolemic. Plan to discharge home with  home health when stable.       Interval History: Pt seen and examined by me this morning with grandson, Deion, at bedside. Sating 94% on 3L NC otherwise VSSAF. NAEON. Pt reports feeling well and is without physical complaints at this time. Pt with good urine output on IV lasix. Cardiology consulted and recommending amyloid workup, scheduled today.       Review of Systems   Constitutional:  Negative for activity change, chills and fever.   HENT:  Negative for trouble swallowing.    Eyes:  Negative for photophobia and visual disturbance.   Respiratory:  Positive for cough and shortness of breath. Negative for chest tightness.    Cardiovascular:  Negative for chest pain, palpitations and leg swelling.   Gastrointestinal:  Negative for abdominal pain, constipation, diarrhea, nausea and vomiting.   Genitourinary:  Negative for dysuria, frequency and hematuria.   Musculoskeletal:  Negative for back pain, gait problem and neck pain.   Skin:  Negative for rash and wound.   Neurological:  Negative for dizziness, syncope, speech difficulty, weakness and light-headedness.   Psychiatric/Behavioral:  Positive for confusion. Negative for agitation and dysphoric mood. The patient is not nervous/anxious.    Objective:     Vital Signs (Most Recent):  Temp: 98.1 °F (36.7 °C) (02/01/23 1112)  Pulse: 65 (02/01/23 1445)  Resp: 18 (02/01/23 1112)  BP: 130/64 (02/01/23 1112)  SpO2: (!) 94 % (02/01/23 1112)   Vital Signs (24h Range):  Temp:  [97.6 °F (36.4 °C)-98.7 °F (37.1 °C)] 98.1 °F (36.7 °C)  Pulse:  [55-82] 65  Resp:  [17-20] 18  SpO2:  [90 %-100 %] 94 %  BP: (116-172)/(57-83) 130/64     Weight: 55 kg (121 lb 4.1 oz)  Body mass index is 20.81 kg/m².    Intake/Output Summary (Last 24 hours) at 2/1/2023 1540  Last data filed at 2/1/2023 0900  Gross per 24 hour   Intake --   Output 1650 ml   Net -1650 ml      Physical Exam  Vitals and nursing note reviewed.   Constitutional:       General: She is not in acute distress.     Appearance:  She is well-developed. She is not toxic-appearing.   HENT:      Head: Normocephalic and atraumatic.      Mouth/Throat:      Mouth: Mucous membranes are moist.   Eyes:      Conjunctiva/sclera: Conjunctivae normal.      Pupils: Pupils are equal, round, and reactive to light.   Neck:      Vascular: JVD present.   Cardiovascular:      Rate and Rhythm: Normal rate and regular rhythm.      Heart sounds: Normal heart sounds. No murmur heard.    No gallop.   Pulmonary:      Effort: Pulmonary effort is normal. No respiratory distress.      Breath sounds: Rales (bibasilar) present. No wheezing.      Comments: Sating 96% on 3L  Abdominal:      General: Bowel sounds are normal. There is no distension.      Palpations: Abdomen is soft.      Tenderness: There is no abdominal tenderness.   Musculoskeletal:         General: No tenderness. Normal range of motion.      Cervical back: Normal range of motion and neck supple.      Right lower leg: No edema.      Left lower leg: No edema.   Skin:     General: Skin is warm and dry.      Capillary Refill: Capillary refill takes less than 2 seconds.      Findings: No rash.   Neurological:      Mental Status: She is alert.      Cranial Nerves: No cranial nerve deficit.      Sensory: No sensory deficit.      Coordination: Coordination normal.      Comments: Pt intermittently confused throughout interview, AAO to person and place but not time   Psychiatric:         Behavior: Behavior normal.         Thought Content: Thought content normal.         Judgment: Judgment normal.       Significant Labs: All pertinent labs within the past 24 hours have been reviewed.  BMP:   Recent Labs   Lab 02/01/23  0431   GLU 77      K 3.5      CO2 26   BUN 16   CREATININE 0.9   CALCIUM 9.7   MG 1.8     CBC:   Recent Labs   Lab 01/30/23  1853 02/01/23  0830   WBC 5.71 5.72   HGB 12.9 13.1   HCT 41.2 41.4    154       Significant Imaging: I have reviewed all pertinent imaging results/findings  within the past 24 hours.    PYP ATTR related Amyloidosis (Cupid Only)  · SPECT imaging shows diffuse myocardial uptake.  · Visual grade corresponds to the Perugini defined grading scale where 2   corresponds to cardiac activity is same as bone.  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  · Evaluation for AL amyloidosis by serum FLCs, serum, and urine   immunofixation is recommended in all patients undergoing   99mTc-PYP/DPD/HMDP scans for cardiac amyloidosis.  · Results should be interpreted in the context of prior evaluation and   referral to a hematologist or amyloidosis expert is recommended if either:   (a) Recommended echo/CMR is strongly suggestive of cardiac amyloidosis and   99mTc-PYP/DPD/HMDP is not suggestive or equivocal and/or (b) FLCs are   abnormal or equivocal.  · There is diffuse cardiac uptake, however poor quality image and uptake   same as bone, would call study suggestive.            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 CHF is currently uncontrolled due to volume overload due to: JVD, Dyspnea not returned to baseline after 2 doses of IV diuretic, Rales/crackles on pulmonary exam and Pulmonary edema/pleural effusion on CXR. Pt is on CHF pathway.   - Patient decompensated on admit (1/30/2023). (+)Subjective SOB, (+)JVD, (+)orthnopnea.   - Last BNP reviewed- and noted below   Recent Labs   Lab 01/30/23  1853   *   - Troponins x3 elevated but flat  - CXR with pulmonary congestions   - EKG without acute ST changes   - Last echo performed and demonstrates- Results for orders placed during the hospital encounter of  01/30/23  Echo  Interpretation Summary  · The left ventricle is normal in size with concentric hypertrophy and mildly decreased systolic function.  · Severe left atrial enlargement.  · The estimated ejection fraction is 45%.  · Grade II left ventricular diastolic dysfunction.  · Moderate mitral regurgitation.  · Moderate tricuspid regurgitation.  · Mild pulmonic regurgitation.  · Cannot exclude infiltrative disease, preserved apical strain.  · The quantitatively derived ejection fraction is 42%.  · There are segmental left ventricular wall motion abnormalities.  · The left ventricular global longitudinal strain is -11%.  · Mild right ventricular enlargement with mildly reduced right ventricular systolic function.  · Severe right atrial enlargement.  · Mild aortic regurgitation.  · Elevated central venous pressure (15 mmHg).  · The estimated PA systolic pressure is 71 mmHg.  · There is pulmonary hypertension.  - Cardiology consulted given new WMAs  - Continue diuresis with IV lasix  - Continue ACE/ARB and monitor clinical status closely.   - Continue asa 81mg, amlodipine, losartan, statin  - Monitor on telemetry.  - Patient is on CHF pathway.   - Monitor strict Is&Os and daily STANDING weights. Fluid restriction to 1.5L per day and cardiac diet with salt restriction.    Intake/Output Summary (Last 24 hours) at 2/1/2023 1549  Last data filed at 2/1/2023 0900  Gross per 24 hour   Intake --   Output 1650 ml   Net -1650 ml   - Supplemental O2 to keep Spo2 >90%  - Continue to stress to patient importance of self efficacy and  on diet for CHF.    Acute hypoxemic respiratory failure  Dyspnea on exertion   Patient with Hypoxic Respiratory failure which is Acute on chronic. She is not on home oxygen. Supplemental oxygen was provided and noted-  .   Signs/symptoms of respiratory failure include- tachypnea and increased oxygen requirements. Contributing diagnoses includes - CHF Labs and images were reviewed. Patient Has  recent ABG, which has been reviewed.   Will treat underlying causes and adjust management of respiratory failure as follows- see acute on chronic CHF    - Cardiology consulted and recommending amyloid workup    Confusion and disorientation  - Pt confused and disoriented, which daughter reports has been progressively worsening over the course of time  - Documented on previous visits (4/2022)  - Likely secondary to undiagnosed dementia  - Plan to place ambulatory referral to neurology at discharge  - Delirium precautions    Stage 3a chronic kidney disease  - Cr 1.1 on admission and downtrending  - monitor on serial bmp    Persistent atrial fibrillation  Patient with Persistent (7 days or more) atrial fibrillation which is controlled currently with none. Patient is currently in sinus rhythm.[id number] Score: 4. HASBLED. Anticoagulation indicated. Anticoagulation done with eliquis 2.5 mg BID .  - monitor on telemetry     Controlled type 2 diabetes mellitus with stage 3 chronic kidney disease, without long-term current use of insulin  Patient's FSGs are controlled on current medication regimen.  Last A1c reviewed-   Lab Results   Component Value Date    HGBA1C 5.8 (H) 01/30/2023     Most recent fingerstick glucose reviewed-   Recent Labs   Lab 01/31/23  1615 01/31/23  2224 02/01/23  0725 02/01/23  1108   POCTGLUCOSE 122* 105 77 92     Current correctional scale  Low  Maintain anti-hyperglycemic dose as follows-   Antihyperglycemics (From admission, onward)    Start     Stop Route Frequency Ordered    01/30/23 2251  insulin aspart U-100 pen 0-5 Units         -- SubQ Before meals & nightly PRN 01/30/23 2201      - Hold oral hypoglycemics while patient is in the hospital.  - Diabetic diet, glucose ACHS    History of DVT (deep vein thrombosis)  - continue eliquis 2.5 mg BID     Low tension glaucoma, moderate stage - Both Eyes  Continue home latanoprost 0.005 % ophthalmic solution    Osteopenia  - chronic  - no acute  issues  - takes CALCIUM CARBONATE ([id number] ORAL) at home     Other hyperlipidemia  Lab Results   Component Value Date    LDLCALC 99.2 12/01/2022   - continue home atorvastatin (LIPITOR) 20 MG tablet    Essential hypertension  - controlled   - continue home medications amLODIPine (NORVASC) 10 MG tablet and losartan (COZAAR) 25 MG tablet  - vitals q4h      VTE Risk Mitigation (From admission, onward)         Ordered     apixaban tablet 2.5 mg  2 times daily         01/30/23 2142     Reason for No Pharmacological VTE Prophylaxis  Once        Question:  Reasons:  Answer:  Already adequately anticoagulated on oral Anticoagulants    01/30/23 2142     IP VTE HIGH RISK PATIENT  Once         01/30/23 2142     Place sequential compression device  Until discontinued         01/30/23 2142                Discharge Planning   BEN: 2/3/2023     Code Status: DNR   Is the patient medically ready for discharge?: No    Reason for patient still in hospital (select all that apply): Patient unstable, Patient trending condition, Treatment and Consult recommendations  Discharge Plan A: Home with family                  Zenaida David PA-C  Department of Hospital Medicine   [facility]

## 2023-02-01 NOTE — PLAN OF CARE
Recommendations  1. Continue Diabetic Diet   2. RD to monitor and follow up    Goals: Meet % EEN, EPN by RD next follow up date  Nutrition Goal Status: new  Communication of RD Recs: other (comment) (POC)

## 2023-02-01 NOTE — PLAN OF CARE
Pt admitted and care plan initiated.Telemetry maintained,NSR.o2 in progress at 3l nc with sats of 100%. Continue to diurese with iv lasix and monitor strict I&o and daily weights.safety precautions implemented.bed in low position.rails up.call bell in reach.bed alarm in use for pt safety.continue plan of care.

## 2023-02-01 NOTE — PLAN OF CARE
[facility]  Initial Discharge Assessment       Primary Care Provider: Stacy Rodriguez MD    Admission Diagnosis: Shortness of breath [R06.02]  Acute on chronic diastolic heart failure [I50.33]  Acute pulmonary edema [J81.0]  SOB (shortness of breath) [R06.02]  Dyspnea on exertion [R06.09]  Acute on chronic diastolic congestive heart failure [I50.33]  Chest pain [R07.9]  HFrEF (heart failure with reduced ejection fraction) [I50.20]  Congestive heart failure, unspecified HF chronicity, unspecified heart failure type [I50.9]    Admission Date: 1/30/2023  Expected Discharge Date: 2/3/2023         Payor: HUMA[employer] MANAGED MEDICARE / Plan: HU[employer]NA TOTAL CARE ADVANTAGE / Product Type: Medicare Advantage /     Extended Emergency Contact Information  Primary Emergency Contact: Deion Saini  Mobile Phone: [phone]  Relation: Grandchild  Secondary Emergency Contact: Cara Saini   [facility]  Home Phone: [phone]  Work Phone: [phone]  Mobile Phone: [phone]  Relation: Daughter    Discharge Plan A: (P) Home with family  Discharge Plan B: (P) Home with family, [facility] - [facility] [address]  [address] [facility] [address]  Phone: [phone] Fax: [phone]    [facility] [place] - [address]  [address] [place] [address]  Phone: [phone] Fax: [phone]    [id number] - [place], [place] - [address]  [address]  [place] [address]  Phone: [phone] Fax: [phone]    [facility]  [address]  [place] [address]  Phone: [phone] Fax: [phone]             SW completed Discharge Planning Assessment with patient and family via bedside. Discharge planning booklet given to patient/family and whiteboard updated with BEN and phone #. All questions answered.    Patient reported that family  will provide transportation upon discharge.     Patient lives at home and her daughter is currently staying with her. Prior to hospitalization patient was receiving assistance with her ADL's from her daughter and sitter that comes in three times a week. Patient uses a walker. Patient is not on dialysis and does not go to a Coumadin clinic.      Patient lives in a [facility] with 0 steps to enter.       Daisy Crook LMSW  [facility]  Ext. [phone]

## 2023-02-01 NOTE — ASSESSMENT & PLAN NOTE
Dyspnea on exertion   Patient with Hypoxic Respiratory failure which is Acute on chronic. She is not on home oxygen. Supplemental oxygen was provided and noted-  .   Signs/symptoms of respiratory failure include- tachypnea and increased oxygen requirements. Contributing diagnoses includes - CHF Labs and images were reviewed. Patient Has recent ABG, which has been reviewed.   Will treat underlying causes and adjust management of respiratory failure as follows- see acute on chronic CHF    - Cardiology consulted and recommending amyloid workup

## 2023-02-01 NOTE — SUBJECTIVE & OBJECTIVE
Interval History: Pt seen and examined by me this morning with grandson, Deion, at bedside. Sating 94% on 3L NC otherwise VSSAF. NAEON. Pt reports feeling well and is without physical complaints at this time. Pt with good urine output on IV lasix. Cardiology consulted and recommending amyloid workup, scheduled today.       Review of Systems   Constitutional:  Negative for activity change, chills and fever.   HENT:  Negative for trouble swallowing.    Eyes:  Negative for photophobia and visual disturbance.   Respiratory:  Positive for cough and shortness of breath. Negative for chest tightness.    Cardiovascular:  Negative for chest pain, palpitations and leg swelling.   Gastrointestinal:  Negative for abdominal pain, constipation, diarrhea, nausea and vomiting.   Genitourinary:  Negative for dysuria, frequency and hematuria.   Musculoskeletal:  Negative for back pain, gait problem and neck pain.   Skin:  Negative for rash and wound.   Neurological:  Negative for dizziness, syncope, speech difficulty, weakness and light-headedness.   Psychiatric/Behavioral:  Positive for confusion. Negative for agitation and dysphoric mood. The patient is not nervous/anxious.    Objective:     Vital Signs (Most Recent):  Temp: 98.1 °F (36.7 °C) (02/01/23 1112)  Pulse: 65 (02/01/23 1445)  Resp: 18 (02/01/23 1112)  BP: 130/64 (02/01/23 1112)  SpO2: (!) 94 % (02/01/23 1112)   Vital Signs (24h Range):  Temp:  [97.6 °F (36.4 °C)-98.7 °F (37.1 °C)] 98.1 °F (36.7 °C)  Pulse:  [55-82] 65  Resp:  [17-20] 18  SpO2:  [90 %-100 %] 94 %  BP: (116-172)/(57-83) 130/64     Weight: 55 kg (121 lb 4.1 oz)  Body mass index is 20.81 kg/m².    Intake/Output Summary (Last 24 hours) at 2/1/2023 1540  Last data filed at 2/1/2023 0900  Gross per 24 hour   Intake --   Output 1650 ml   Net -1650 ml      Physical Exam  Vitals and nursing note reviewed.   Constitutional:       General: She is not in acute distress.     Appearance: She is well-developed. She is  not toxic-appearing.   HENT:      Head: Normocephalic and atraumatic.      Mouth/Throat:      Mouth: Mucous membranes are moist.   Eyes:      Conjunctiva/sclera: Conjunctivae normal.      Pupils: Pupils are equal, round, and reactive to light.   Neck:      Vascular: JVD present.   Cardiovascular:      Rate and Rhythm: Normal rate and regular rhythm.      Heart sounds: Normal heart sounds. No murmur heard.    No gallop.   Pulmonary:      Effort: Pulmonary effort is normal. No respiratory distress.      Breath sounds: Rales (bibasilar) present. No wheezing.      Comments: Sating 96% on 3L  Abdominal:      General: Bowel sounds are normal. There is no distension.      Palpations: Abdomen is soft.      Tenderness: There is no abdominal tenderness.   Musculoskeletal:         General: No tenderness. Normal range of motion.      Cervical back: Normal range of motion and neck supple.      Right lower leg: No edema.      Left lower leg: No edema.   Skin:     General: Skin is warm and dry.      Capillary Refill: Capillary refill takes less than 2 seconds.      Findings: No rash.   Neurological:      Mental Status: She is alert.      Cranial Nerves: No cranial nerve deficit.      Sensory: No sensory deficit.      Coordination: Coordination normal.      Comments: Pt intermittently confused throughout interview, AAO to person and place but not time   Psychiatric:         Behavior: Behavior normal.         Thought Content: Thought content normal.         Judgment: Judgment normal.       Significant Labs: All pertinent labs within the past 24 hours have been reviewed.  BMP:   Recent Labs   Lab 02/01/23  0431   GLU 77      K 3.5      CO2 26   BUN 16   CREATININE 0.9   CALCIUM 9.7   MG 1.8     CBC:   Recent Labs   Lab 01/30/23  1853 02/01/23  0830   WBC 5.71 5.72   HGB 12.9 13.1   HCT 41.2 41.4    154       Significant Imaging: I have reviewed all pertinent imaging results/findings within the past 24 hours.    PYP  ATTR related Amyloidosis (Cupid Only)  · SPECT imaging shows diffuse myocardial uptake.  · Visual grade corresponds to the Perugini defined grading scale where 2   corresponds to cardiac activity is same as bone.  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  · Evaluation for AL amyloidosis by serum FLCs, serum, and urine   immunofixation is recommended in all patients undergoing   99mTc-PYP/DPD/HMDP scans for cardiac amyloidosis.  · Results should be interpreted in the context of prior evaluation and   referral to a hematologist or amyloidosis expert is recommended if either:   (a) Recommended echo/CMR is strongly suggestive of cardiac amyloidosis and   99mTc-PYP/DPD/HMDP is not suggestive or equivocal and/or (b) FLCs are   abnormal or equivocal.  · There is diffuse cardiac uptake, however poor quality image and uptake   same as bone, would call study suggestive.

## 2023-02-01 NOTE — CONSULTS
"  [facility]  Adult Nutrition  Consult Note    SUMMARY     Recommendations  1. Continue Diabetic Diet   2. RD to monitor and follow up    Goals: Meet % EEN, EPN by RD next follow up date  Nutrition Goal Status: new  Communication of RD Recs: other (comment) (POC)    Assessment and Plan    Nutrition Problem  Excessive fluid intake    Related to (etiology):   Edema Build-up    Signs and Symptoms (as evidenced by):   Fluid output of 2.6 L    Interventions/Recommendations (treatment strategy):  Collaboration w/ other providers    Nutrition Diagnosis Status:   New      Reason for Assessment    Reason For Assessment: consult  Diagnosis: other (see comments) (Chronic Heart Failure)  Relevant Medical History: T2DM, CKD, and Atrial Fibrillation  Interdisciplinary Rounds: did not attend  General Information Comments: Consulted with pt and family. Conducted nutrition education to both with "Heart Failure Nutrition Therapy Handout". Discussed how to limit sodium, manage weight, and control fluid intake. Pt reports moderate appetite with 25-50% consumption of meals. Weight change of -11% over 3 months indicating severe weight loss due to fluid build-up but no other indicator of malnutrition. RD following   Nutrition Discharge Planning: Heart healthy diet, EDU 2/1    Nutrition Risk Screen    Nutrition Risk Screen: unintentional loss of 10 lbs or more in the past 2 months    Nutrition/Diet History    Patient Reported Diet/Restrictions/Preferences: general  Spiritual, Cultural Beliefs, Worship Practices, Values that Affect Care: no  Food Allergies: NKFA    Anthropometrics    Temp: 98.1 °F (36.7 °C)  Height Method: Stated  Height: 5' 4" (162.6 cm)  Height (inches): 64 in  Weight Method: Bed Scale  Weight: 55 kg (121 lb 4.1 oz)  Weight (lb): 121.25 lb  Ideal Body Weight (IBW), Female: 120 lb  % Ideal Body Weight, Female (lb): 101.04 %  BMI (Calculated): 20.8  BMI Grade: 18.5-24.9 - normal  Weight Loss: " unintentional       Lab/Procedures/Meds    Pertinent Labs Reviewed: reviewed  Pertinent Labs Comments: Glucose 114, A1C 5.8, GFR 46  Pertinent Medications Reviewed: reviewed  Pertinent Medications Comments: Amlopdipine, Atorvastatin, Furosemide Injection, Polyethylene Glycol, Senna      Estimated/Assessed Needs    Weight Used For Calorie Calculations: 55 kg (121 lb 4.1 oz)  Energy Calorie Requirements (kcal): 7240-0897 kcals (25-30 kcal/kg)     Protein Requirements: 44-55 grams protein (0.8-1.0 gram protein x kg)  Weight Used For Protein Calculations: 55 kg (121 lb 4.1 oz)  Fluid Requirements (mL): 1500 mL  Estimated Fluid Requirement Method: RDA Method  RDA Method (mL): 1375  CHO Requirement: 172 grams CHO      Nutrition Prescription Ordered    Current Diet Order: Diet Diabetic 2000 Calories (Low Na/Chol.) Fluid 1500 mL    Evaluation of Received Nutrient/Fluid Intake    I/O: -2.6L  % Intake of Estimated Energy Needs: 25 - 50 %  % Meal Intake: 0 - 25 %    Nutrition Risk    Level of Risk/Frequency of Follow-up: low - moderate       Monitor and Evaluation    Food and Nutrient Intake: energy intake, food and beverage intake  Food and Nutrient Adminstration: diet order  Knowledge/Beliefs/Attitudes: food and nutrition knowledge/skill, beliefs and attitudes  Physical Activity and Function: breastfeeding, nutrition-related ADLs and IADLs, factors affecting access to physical activity  Anthropometric Measurements: height/length, weight, weight change, body mass index  Biochemical Data, Medical Tests and Procedures: electrolyte and renal panel, gastrointestinal profile, glucose/endocrine profile, inflammatory profile, lipid profile  Nutrition-Focused Physical Findings: overall appearance       Nutrition Follow-Up    RD Follow-up?: Yes    Keven Coy  [facility]

## 2023-02-01 NOTE — ASSESSMENT & PLAN NOTE
Patient's FSGs are controlled on current medication regimen.  Last A1c reviewed-   Lab Results   Component Value Date    HGBA1C 5.8 (H) 01/30/2023     Most recent fingerstick glucose reviewed-   Recent Labs   Lab 01/31/23  1615 01/31/23  2224 02/01/23  0725 02/01/23  1108   POCTGLUCOSE 122* 105 77 92     Current correctional scale  Low  Maintain anti-hyperglycemic dose as follows-   Antihyperglycemics (From admission, onward)    Start     Stop Route Frequency Ordered    01/30/23 2251  insulin aspart U-100 pen 0-5 Units         -- SubQ Before meals & nightly PRN 01/30/23 2201      - Hold oral hypoglycemics while patient is in the hospital.  - Diabetic diet, glucose ACHS

## 2023-02-01 NOTE — ASSESSMENT & PLAN NOTE
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 CHF is currently uncontrolled due to volume overload due to: JVD, Dyspnea not returned to baseline after 2 doses of IV diuretic, Rales/crackles on pulmonary exam and Pulmonary edema/pleural effusion on CXR. Pt is on CHF pathway.   - Patient decompensated on admit (1/30/2023). (+)Subjective SOB, (+)JVD, (+)orthnopnea.   - Last BNP reviewed- and noted below   Recent Labs   Lab 01/30/23  1853   *   - Troponins x3 elevated but flat  - CXR with pulmonary congestions   - EKG without acute ST changes   - Last echo performed and demonstrates- Results for orders placed during the hospital encounter of 01/30/23  Echo  Interpretation Summary  · The left ventricle is normal in size with concentric hypertrophy and mildly decreased systolic function.  · Severe left atrial enlargement.  · The estimated ejection fraction is 45%.  · Grade II left ventricular diastolic dysfunction.  · Moderate mitral regurgitation.  · Moderate tricuspid regurgitation.  · Mild pulmonic regurgitation.  · Cannot exclude infiltrative disease, preserved apical strain.  · The quantitatively derived ejection fraction is 42%.  · There are segmental left ventricular wall motion abnormalities.  · The left ventricular global longitudinal strain is -11%.  · Mild right ventricular enlargement with mildly reduced right ventricular systolic function.  · Severe right atrial enlargement.  · Mild aortic regurgitation.  · Elevated central venous pressure (15 mmHg).  · The estimated PA systolic pressure is 71 mmHg.  · There is pulmonary hypertension.  - Cardiology consulted given new WMAs  - Continue diuresis with IV lasix  - Continue ACE/ARB and monitor clinical status closely.   - Continue  asa 81mg, amlodipine, losartan, statin  - Monitor on telemetry.  - Patient is on CHF pathway.   - Monitor strict Is&Os and daily STANDING weights. Fluid restriction to 1.5L per day and cardiac diet with salt restriction.    Intake/Output Summary (Last 24 hours) at 2/1/2023 1549  Last data filed at 2/1/2023 0900  Gross per 24 hour   Intake --   Output 1650 ml   Net -1650 ml   - Supplemental O2 to keep Spo2 >90%  - Continue to stress to patient importance of self efficacy and  on diet for CHF.

## 2023-02-01 NOTE — ED TRIAGE NOTES
Inna Blankenship, a 89 y.o. female presents to the ED w/ complaint of SOB. Family doubled up on fluid pills today, pulse ox was low at home. Currently 93% on 5L NC.     Adult Physical Assessment  LOC: Inna Blankenship, 89 y.o. female verified via two identifiers.  The patient is awake, alert, oriented and speaking appropriately at this time.  APPEARANCE: Patient resting comfortably and appears to be in no acute distress at this time. Patient is clean and well groomed, patient's clothing is properly fastened.  SKIN:The skin is warm and dry, color consistent with ethnicity, patient has normal skin turgor and moist mucus membranes, skin intact, no breakdown or brusing noted.  MUSCULOSKELETAL: Patient moving all extremities well, no obvious swelling or deformities noted.  RESPIRATORY: Airway is open and patent, respirations are spontaneous, patient has a normal effort and rate, no accessory muscle use noted. Patient reports SOB and cough. Pt family reports doubling up on fluid pills today. Currently 93% on 5L NC.   CARDIAC: Patient has a normal rate and rhythm, no periphreal edema noted in any extremity, capillary refill < 3 seconds in all extremities  ABDOMEN: Soft and non tender to palpation, no abdominal distention noted. Bowel sounds present in all four quadrants.  NEUROLOGIC: Eyes open spontaneously, behavior appropriate to situation, follows commands, facial expression symmetrical, bilateral hand grasp equal and even, purposeful motor response noted, normal sensation in all extremities when touched with a finger.      Triage note:  Chief Complaint   Patient presents with    Shortness of Breath     Family doubled up on fluid pills today, pulse ox was low at home    Cough     Review of patient's allergies indicates:  No Known Allergies  Past Medical History:   Diagnosis Date    Acute hypoxemic respiratory failure 1/30/2023    Arthritis     CHF (congestive heart failure) 12/26/2018    Chronic atrial fibrillation 8/29/2020     Chronic kidney disease, stage III (moderate) 9/11/2015    Colon adenoma     Diabetes mellitus, type II     Diet controlled    Glaucoma     Hyperlipemia     Hypertension     Osteopenia

## 2023-02-01 NOTE — NURSING TRANSFER
Nursing Transfer Note    Pt arrived from the er via stretcher accompanied by transporter and family.arrived on Telemetry,NSR.AAOX4.resp even and non labored.o2 in progress at 3l nc with sats of 100%.safety precautions implemented.bed in low position.rails up.call bell in reach.bed alarm activated for pt safety.will monitor.

## 2023-02-02 LAB
ANION GAP SERPL CALC-SCNC: 12 MMOL/L (ref 8–16)
BASOPHILS # BLD AUTO: 0.04 K/UL (ref 0–0.2)
BASOPHILS NFR BLD: 0.7 % (ref 0–1.9)
BUN SERPL-MCNC: 23 MG/DL (ref 10–30)
CALCIUM SERPL-MCNC: 10 MG/DL (ref 8.7–10.5)
CHLORIDE SERPL-SCNC: 103 MMOL/L (ref 95–110)
CO2 SERPL-SCNC: 26 MMOL/L (ref 23–29)
CREAT SERPL-MCNC: 0.9 MG/DL (ref 0.5–1.4)
DIFFERENTIAL METHOD: ABNORMAL
EOSINOPHIL # BLD AUTO: 0.2 K/UL (ref 0–0.5)
EOSINOPHIL NFR BLD: 2.9 % (ref 0–8)
ERYTHROCYTE [DISTWIDTH] IN BLOOD BY AUTOMATED COUNT: 15.4 % (ref 11.5–14.5)
EST. GFR  (NO RACE VARIABLE): 58.5 ML/MIN/1.73 M^2
GLUCOSE SERPL-MCNC: 81 MG/DL (ref 70–110)
HCT VFR BLD AUTO: 41 % (ref 37–48.5)
HGB BLD-MCNC: 12.9 G/DL (ref 12–16)
IMM GRANULOCYTES # BLD AUTO: 0.01 K/UL (ref 0–0.04)
IMM GRANULOCYTES NFR BLD AUTO: 0.2 % (ref 0–0.5)
LYMPHOCYTES # BLD AUTO: 1.4 K/UL (ref 1–4.8)
LYMPHOCYTES NFR BLD: 25.7 % (ref 18–48)
MAGNESIUM SERPL-MCNC: 1.8 MG/DL (ref 1.6–2.6)
MCH RBC QN AUTO: 30.2 PG (ref 27–31)
MCHC RBC AUTO-ENTMCNC: 31.5 G/DL (ref 32–36)
MCV RBC AUTO: 96 FL (ref 82–98)
MONOCYTES # BLD AUTO: 0.7 K/UL (ref 0.3–1)
MONOCYTES NFR BLD: 12.5 % (ref 4–15)
NEUTROPHILS # BLD AUTO: 3.2 K/UL (ref 1.8–7.7)
NEUTROPHILS NFR BLD: 58 % (ref 38–73)
NRBC BLD-RTO: 0 /100 WBC
PHOSPHATE SERPL-MCNC: 3.1 MG/DL (ref 2.7–4.5)
PLATELET # BLD AUTO: 155 K/UL (ref 150–450)
PMV BLD AUTO: 11.5 FL (ref 9.2–12.9)
POCT GLUCOSE: 118 MG/DL (ref 70–110)
POCT GLUCOSE: 120 MG/DL (ref 70–110)
POCT GLUCOSE: 131 MG/DL (ref 70–110)
POCT GLUCOSE: 87 MG/DL (ref 70–110)
POTASSIUM SERPL-SCNC: 4 MMOL/L (ref 3.5–5.1)
RBC # BLD AUTO: 4.27 M/UL (ref 4–5.4)
SODIUM SERPL-SCNC: 141 MMOL/L (ref 136–145)
WBC # BLD AUTO: 5.53 K/UL (ref 3.9–12.7)

## 2023-02-02 PROCEDURE — 83735 ASSAY OF MAGNESIUM: CPT | Mod: HCNC

## 2023-02-02 PROCEDURE — 25000003 PHARM REV CODE 250: Mod: HCNC

## 2023-02-02 PROCEDURE — 85025 COMPLETE CBC W/AUTO DIFF WBC: CPT | Mod: HCNC

## 2023-02-02 PROCEDURE — 36415 COLL VENOUS BLD VENIPUNCTURE: CPT | Mod: HCNC

## 2023-02-02 PROCEDURE — 99233 SBSQ HOSP IP/OBS HIGH 50: CPT | Mod: HCNC,,,

## 2023-02-02 PROCEDURE — 11000001 HC ACUTE MED/SURG PRIVATE ROOM: Mod: HCNC

## 2023-02-02 PROCEDURE — 99222 PR INITIAL HOSPITAL CARE,LEVL II: ICD-10-PCS | Mod: ,,, | Performed by: STUDENT IN AN ORGANIZED HEALTH CARE EDUCATION/TRAINING PROGRAM

## 2023-02-02 PROCEDURE — 99222 1ST HOSP IP/OBS MODERATE 55: CPT | Mod: ,,, | Performed by: STUDENT IN AN ORGANIZED HEALTH CARE EDUCATION/TRAINING PROGRAM

## 2023-02-02 PROCEDURE — 99233 PR SUBSEQUENT HOSPITAL CARE,LEVL III: ICD-10-PCS | Mod: HCNC,,,

## 2023-02-02 PROCEDURE — 63600175 PHARM REV CODE 636 W HCPCS: Mod: HCNC

## 2023-02-02 PROCEDURE — 80048 BASIC METABOLIC PNL TOTAL CA: CPT | Mod: HCNC

## 2023-02-02 PROCEDURE — 84100 ASSAY OF PHOSPHORUS: CPT | Mod: HCNC

## 2023-02-02 PROCEDURE — 99223 1ST HOSP IP/OBS HIGH 75: CPT | Mod: HCNC,GC,, | Performed by: INTERNAL MEDICINE

## 2023-02-02 PROCEDURE — 96376 TX/PRO/DX INJ SAME DRUG ADON: CPT

## 2023-02-02 PROCEDURE — 99223 PR INITIAL HOSPITAL CARE,LEVL III: ICD-10-PCS | Mod: HCNC,GC,, | Performed by: INTERNAL MEDICINE

## 2023-02-02 RX ORDER — FUROSEMIDE 10 MG/ML
80 INJECTION INTRAMUSCULAR; INTRAVENOUS ONCE
Status: COMPLETED | OUTPATIENT
Start: 2023-02-02 | End: 2023-02-02

## 2023-02-02 RX ORDER — FUROSEMIDE 10 MG/ML
40 INJECTION INTRAMUSCULAR; INTRAVENOUS ONCE
Status: COMPLETED | OUTPATIENT
Start: 2023-02-02 | End: 2023-02-02

## 2023-02-02 RX ADMIN — FUROSEMIDE 40 MG: 10 INJECTION, SOLUTION INTRAMUSCULAR; INTRAVENOUS at 09:02

## 2023-02-02 RX ADMIN — ESCITALOPRAM OXALATE 10 MG: 5 TABLET, FILM COATED ORAL at 09:02

## 2023-02-02 RX ADMIN — POLYETHYLENE GLYCOL 3350 17 G: 17 POWDER, FOR SOLUTION ORAL at 09:02

## 2023-02-02 RX ADMIN — FUROSEMIDE 80 MG: 10 INJECTION, SOLUTION INTRAMUSCULAR; INTRAVENOUS at 06:02

## 2023-02-02 RX ADMIN — FUROSEMIDE 40 MG: 10 INJECTION, SOLUTION INTRAMUSCULAR; INTRAVENOUS at 01:02

## 2023-02-02 RX ADMIN — ATORVASTATIN CALCIUM 20 MG: 20 TABLET, FILM COATED ORAL at 09:02

## 2023-02-02 RX ADMIN — APIXABAN 2.5 MG: 2.5 TABLET, FILM COATED ORAL at 09:02

## 2023-02-02 RX ADMIN — SENNOSIDES 8.6 MG: 8.6 TABLET, FILM COATED ORAL at 09:02

## 2023-02-02 RX ADMIN — LATANOPROST 1 DROP: 50 SOLUTION OPHTHALMIC at 09:02

## 2023-02-02 NOTE — ASSESSMENT & PLAN NOTE
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 CHF is currently uncontrolled due to volume overload due to: JVD, Dyspnea not returned to baseline after 2 doses of IV diuretic, Rales/crackles on pulmonary exam and Pulmonary edema/pleural effusion on CXR. Pt is on CHF pathway.   - Patient decompensated on admit (1/30/2023). (+)Subjective SOB, (+)JVD, (+)orthnopnea.   - Last BNP reviewed- and noted below   Recent Labs   Lab 01/30/23  1853   *   - Troponins x3 elevated but flat  - CXR with pulmonary congestions   - EKG without acute ST changes   - Last echo performed and demonstrates- Results for orders placed during the hospital encounter of 01/30/23  Echo  Interpretation Summary  · The left ventricle is normal in size with concentric hypertrophy and mildly decreased systolic function.  · Severe left atrial enlargement.  · The estimated ejection fraction is 45%.  · Grade II left ventricular diastolic dysfunction.  · Moderate mitral regurgitation.  · Moderate tricuspid regurgitation.  · Mild pulmonic regurgitation.  · Cannot exclude infiltrative disease, preserved apical strain.  · The quantitatively derived ejection fraction is 42%.  · There are segmental left ventricular wall motion abnormalities.  · The left ventricular global longitudinal strain is -11%.  · Mild right ventricular enlargement with mildly reduced right ventricular systolic function.  · Severe right atrial enlargement.  · Mild aortic regurgitation.  · Elevated central venous pressure (15 mmHg).  · The estimated PA systolic pressure is 71 mmHg.  · There is pulmonary hypertension.  - Cardiology consulted given new WMAs  - Continue diuresis with IV lasix  - Continue ACE/ARB and monitor clinical status closely.   - Continue  asa 81mg, amlodipine, losartan, statin  - Monitor on telemetry.  - Patient is on CHF pathway.   - Monitor strict Is&Os and daily STANDING weights. Fluid restriction to 1.5L per day and cardiac diet with salt restriction.    Intake/Output Summary (Last 24 hours) at 2/2/2023 1330  Last data filed at 2/2/2023 0501  Gross per 24 hour   Intake 100 ml   Output 800 ml   Net -700 ml   - Supplemental O2 to keep Spo2 >90%  - Continue to stress to patient importance of self efficacy and  on diet for CHF.

## 2023-02-02 NOTE — SUBJECTIVE & OBJECTIVE
Interval History:   NAEON, VSSAF, 2L NC with oxygen saturation 95% on my exam. Patient endorsing some SOB with exertion, but improved with oxygen. JVD 1cm above clavicle. Increased lasix to 80mg IV BID. Cardiology and hematology consulted regarding concern for Amyloidosis. CBC without leukocytosis. CMP without electrolyte abnormalities. Patient without further complaints.     Review of Systems   Constitutional:  Negative for activity change, chills and fever.   HENT:  Negative for trouble swallowing.    Eyes:  Negative for photophobia and visual disturbance.   Respiratory:  Positive for cough. Negative for chest tightness and shortness of breath.    Cardiovascular:  Negative for chest pain, palpitations and leg swelling.   Gastrointestinal:  Negative for abdominal pain, constipation, diarrhea, nausea and vomiting.   Genitourinary:  Negative for dysuria, frequency and hematuria.   Musculoskeletal:  Negative for back pain, gait problem and neck pain.   Skin:  Negative for rash and wound.   Neurological:  Negative for dizziness, syncope, speech difficulty, weakness and light-headedness.   Psychiatric/Behavioral:  Positive for confusion. Negative for agitation and dysphoric mood. The patient is not nervous/anxious.    Objective:     Vital Signs (Most Recent):  Temp: 97.7 °F (36.5 °C) (02/02/23 1151)  Pulse: (!) 57 (02/02/23 1151)  Resp: 18 (02/02/23 1151)  BP: 132/60 (02/02/23 1151)  SpO2: 95 % (02/02/23 1151)   Vital Signs (24h Range):  Temp:  [97.7 °F (36.5 °C)-98.7 °F (37.1 °C)] 97.7 °F (36.5 °C)  Pulse:  [57-66] 57  Resp:  [16-18] 18  SpO2:  [90 %-96 %] 95 %  BP: (111-145)/(55-65) 132/60     Weight: 55 kg (121 lb 4.1 oz)  Body mass index is 20.81 kg/m².    Intake/Output Summary (Last 24 hours) at 2/2/2023 1312  Last data filed at 2/2/2023 0501  Gross per 24 hour   Intake 100 ml   Output 1050 ml   Net -950 ml      Physical Exam  Vitals and nursing note reviewed.   Constitutional:       General: She is not in acute  distress.     Appearance: She is well-developed. She is not toxic-appearing.   HENT:      Head: Normocephalic and atraumatic.      Mouth/Throat:      Mouth: Mucous membranes are moist.   Eyes:      Conjunctiva/sclera: Conjunctivae normal.      Pupils: Pupils are equal, round, and reactive to light.   Neck:      Vascular: JVD present.   Cardiovascular:      Rate and Rhythm: Normal rate and regular rhythm.      Heart sounds: Normal heart sounds. No murmur heard.    No gallop.   Pulmonary:      Effort: Pulmonary effort is normal. No respiratory distress.      Breath sounds: Rales present. No wheezing.      Comments: Sating 96% on 3L  Abdominal:      General: Bowel sounds are normal. There is no distension.      Palpations: Abdomen is soft.      Tenderness: There is no abdominal tenderness.   Musculoskeletal:         General: No tenderness. Normal range of motion.      Cervical back: Normal range of motion and neck supple.      Right lower leg: No edema.      Left lower leg: No edema.   Skin:     General: Skin is warm and dry.      Capillary Refill: Capillary refill takes less than 2 seconds.      Findings: No rash.   Neurological:      Mental Status: She is alert.      Cranial Nerves: No cranial nerve deficit.      Sensory: No sensory deficit.      Coordination: Coordination normal.   Psychiatric:         Behavior: Behavior normal.         Thought Content: Thought content normal.         Judgment: Judgment normal.       Significant Labs: All pertinent labs within the past 24 hours have been reviewed.  CBC:   Recent Labs   Lab 02/01/23  0830 02/02/23  0716   WBC 5.72 5.53   HGB 13.1 12.9   HCT 41.4 41.0    155     CMP:   Recent Labs   Lab 02/01/23  0431 02/02/23  0716    141   K 3.5 4.0    103   CO2 26 26   GLU 77 81   BUN 16 23   CREATININE 0.9 0.9   CALCIUM 9.7 10.0   ANIONGAP 11 12       Significant Imaging: I have reviewed all pertinent imaging results/findings within the past 24 hours.

## 2023-02-02 NOTE — SUBJECTIVE & OBJECTIVE
Medications:  Continuous Infusions:  Scheduled Meds:   apixaban  2.5 mg Oral BID    atorvastatin  20 mg Oral Daily    EScitalopram oxalate  10 mg Oral Daily    furosemide (LASIX) injection  40 mg Intravenous BID    latanoprost  1 drop Both Eyes Daily    polyethylene glycol  17 g Oral Daily    senna  8.6 mg Oral BID     PRN Meds:acetaminophen, acetaminophen, albuterol-ipratropium, aluminum-magnesium hydroxide-simethicone, dextrose 10%, dextrose 10%, glucagon (human recombinant), glucose, glucose, insulin aspart U-100, melatonin, naloxone, ondansetron, ondansetron, sodium chloride 0.9%     Review of patient's allergies indicates:  No Known Allergies     Past Medical History:   Diagnosis Date    Acute hypoxemic respiratory failure 1/30/2023    Arthritis     CHF (congestive heart failure) 12/26/2018    Chronic atrial fibrillation 8/29/2020    Chronic kidney disease, stage III (moderate) 9/11/2015    Colon adenoma     Diabetes mellitus, type II     Diet controlled    Glaucoma     Hyperlipemia     Hypertension     Osteopenia      Past Surgical History:   Procedure Laterality Date    APPENDECTOMY      CATARACT EXTRACTION W/  INTRAOCULAR LENS IMPLANT Right 03/15/2006        CATARACT EXTRACTION W/  INTRAOCULAR LENS IMPLANT Left 09/27/2006        COLONOSCOPY W/ POLYPECTOMY      EYE SURGERY      HYSTERECTOMY      TRANSESOPHAGEAL ECHOCARDIOGRAPHY N/A 11/9/2020    Procedure: ECHOCARDIOGRAM, TRANSESOPHAGEAL;  Surgeon: Marcelina Diagnostic Provider;  Location: [facility] EP LAB;  Service: Cardiology;  Laterality: N/A;    TREATMENT OF CARDIAC ARRHYTHMIA N/A 11/9/2020    Procedure: CARDIOVERSION;  Surgeon: Marvin Elmore MD;  Location: [facility] EP LAB;  Service: Cardiology;  Laterality: N/A;  AF, RAFFAELE, DCCV, MAC, GP, 3 PREP     Family History       Problem Relation (Age of Onset)    Allergies Daughter    Asthma Daughter    Cancer Sister, Daughter    Heart attack Mother    Heart disease Mother    Hypertension Daughter     No Known Problems Father          Tobacco Use    Smoking status: Never    Smokeless tobacco: Never   Substance and Sexual Activity    Alcohol use: Yes     Comment: wine occas.    Drug use: No    Sexual activity: Not Currently       Review of Systems  Constitutional: Negative for fatigue. Negative for activity change, appetite change, chills, fever and unexpected weight change.   HENT: Negative for nosebleeds and sore throat.    Respiratory: Positive for cough,  shortness of breath and wheezing.    Cardiovascular: Negative for leg swelling. Negative for palpitations.   Gastrointestinal: Negative for constipation. Negative for abdominal pain, diarrhea, nausea and vomiting.   Musculoskeletal: Negative for arthralgias, joint swelling and myalgias.   Hematological: Negative for adenopathy.     Objective:     Vital Signs (Most Recent):  Temp: 98.4 °F (36.9 °C) (02/02/23 0751)  Pulse: (!) 57 (02/02/23 0751)  Resp: 18 (02/02/23 0751)  BP: 134/63 (02/02/23 0751)  SpO2: (!) 94 % (02/02/23 0751)   Vital Signs (24h Range):  Temp:  [98.2 °F (36.8 °C)-98.7 °F (37.1 °C)] 98.4 °F (36.9 °C)  Pulse:  [57-66] 57  Resp:  [16-18] 18  SpO2:  [90 %-96 %] 94 %  BP: (111-145)/(55-65) 134/63     Weight: 55 kg (121 lb 4.1 oz)  Body mass index is 20.81 kg/m².  Body surface area is 1.58 meters squared.      Intake/Output Summary (Last 24 hours) at 2/2/2023 1115  Last data filed at 2/2/2023 0501  Gross per 24 hour   Intake 100 ml   Output 1050 ml   Net -950 ml       Physical Exam  Alert awake oriented x3  Regular rate and rhythm  Lungs clear to auscultation bilaterally  Abdomen soft nontender  No lower extremity edema    Significant Labs:   All pertinent labs from the last 24 hours have been reviewed.    Diagnostic Results:  I have reviewed all pertinent imaging results/findings within the past 24 hours.

## 2023-02-02 NOTE — ASSESSMENT & PLAN NOTE
Patient with Persistent (7 days or more) atrial fibrillation which is controlled currently with none. Patient is currently in sinus rhythm.[id number] Score: 4. HASBLED. Anticoagulation indicated. Anticoagulation done with eliquis 2.5 mg BID .  - monitor on telemetry

## 2023-02-02 NOTE — ASSESSMENT & PLAN NOTE
Patient's FSGs are controlled on current medication regimen.  Last A1c reviewed-   Lab Results   Component Value Date    HGBA1C 5.8 (H) 01/30/2023     Most recent fingerstick glucose reviewed-   Recent Labs   Lab 02/01/23  1559 02/01/23  1930 02/02/23  0752 02/02/23  1152   POCTGLUCOSE 176* 115* 87 131*     Current correctional scale  Low  Maintain anti-hyperglycemic dose as follows-   Antihyperglycemics (From admission, onward)    Start     Stop Route Frequency Ordered    01/30/23 2251  insulin aspart U-100 pen 0-5 Units         -- SubQ Before meals & nightly PRN 01/30/23 2201      - Hold oral hypoglycemics while patient is in the hospital.  - Diabetic diet, glucose ACHS

## 2023-02-02 NOTE — HPI
Patient is a 89 year old female with history of diastolic heart failure, HTN, HLD, atrial fibrillation, T2DM, who presents to [facility] on 01/30/2023 heart failure exacerbation.     Hematology consulted for amyloidosis evaluation.     Transthoracic echocardiogram with EF 45%, grade 2 left ventricular diastolic dysfunction, new segmental wall motion abnormalities.   PYP scan suggestive of PYP amyloidosis.   CBC with normal WBC, hemoglobin, platelet.  CBC normal.  . Patient does not have evidence of monoclonal paraprotein on labs.

## 2023-02-02 NOTE — SUBJECTIVE & OBJECTIVE
Interval History: Patient reports feeling better this morning.  Denies SOB or cough.      Review of Systems   Constitutional: Negative.   Cardiovascular: Negative.    Respiratory: Negative.     Gastrointestinal: Negative.    Genitourinary: Negative.    Neurological: Negative.    Objective:     Vital Signs (Most Recent):  Temp: 97.7 °F (36.5 °C) (02/02/23 1151)  Pulse: (!) 57 (02/02/23 1151)  Resp: 18 (02/02/23 1151)  BP: 132/60 (02/02/23 1151)  SpO2: 95 % (02/02/23 1151)   Vital Signs (24h Range):  Temp:  [97.7 °F (36.5 °C)-98.7 °F (37.1 °C)] 97.7 °F (36.5 °C)  Pulse:  [57-66] 57  Resp:  [16-18] 18  SpO2:  [90 %-96 %] 95 %  BP: (111-145)/(55-65) 132/60     Weight: 55 kg (121 lb 4.1 oz)  Body mass index is 20.81 kg/m².     SpO2: 95 %         Intake/Output Summary (Last 24 hours) at 2/2/2023 1206  Last data filed at 2/2/2023 0501  Gross per 24 hour   Intake 100 ml   Output 1050 ml   Net -950 ml       Lines/Drains/Airways       Peripheral Intravenous Line  Duration                  Peripheral IV - Single Lumen 01/30/23 1900 20 G Anterior;Distal;Left Forearm 2 days                    Physical Exam  Vitals and nursing note reviewed.   Constitutional:       General: She is not in acute distress.  HENT:      Head: Normocephalic and atraumatic.      Mouth/Throat:      Mouth: Mucous membranes are moist.      Pharynx: Oropharynx is clear.   Eyes:      General: No scleral icterus.     Extraocular Movements: Extraocular movements intact.      Conjunctiva/sclera: Conjunctivae normal.   Neck:      Vascular: No JVD.   Cardiovascular:      Rate and Rhythm: Normal rate and regular rhythm.   Pulmonary:      Effort: Pulmonary effort is normal. No respiratory distress.      Breath sounds: No wheezing or rales.   Abdominal:      General: There is no distension.      Palpations: Abdomen is soft.      Tenderness: There is no abdominal tenderness. There is no guarding.   Musculoskeletal:         General: No tenderness. Normal range of  motion.      Cervical back: Normal range of motion and neck supple.      Right lower leg: No edema.      Left lower leg: No edema.   Skin:     General: Skin is warm and dry.   Neurological:      General: No focal deficit present.      Mental Status: She is alert and oriented to person, place, and time.       Significant Labs: CMP   Recent Labs   Lab 02/01/23  0431 02/02/23  0716    141   K 3.5 4.0    103   CO2 26 26   GLU 77 81   BUN 16 23   CREATININE 0.9 0.9   CALCIUM 9.7 10.0   ANIONGAP 11 12   , CBC   Recent Labs   Lab 02/01/23  0830 02/02/23  0716   WBC 5.72 5.53   HGB 13.1 12.9   HCT 41.4 41.0    155   , and All pertinent lab results from the last 24 hours have been reviewed.    Significant Imaging: Echocardiogram: 2D echo with color flow doppler: No results found for this or any previous visit. and Transthoracic echo (TTE) complete (Cupid Only):   Results for orders placed or performed during the hospital encounter of 01/30/23   Echo   Result Value Ref Range    BSA 1.66 m2    TDI SEPTAL 0.02 m/s    LV LATERAL E/E' RATIO 14.50 m/s    LV SEPTAL E/E' RATIO 29.00 m/s    LA WIDTH 4.98 cm    TDI LATERAL 0.04 m/s    LVIDd 4.63 3.5 - 6.0 cm    IVS 1.05 0.6 - 1.1 cm    Posterior Wall 1.02 0.6 - 1.1 cm    LVIDs 3.31 2.1 - 4.0 cm    FS 29 28 - 44 %    LA volume 140.69 cm3    Sinus 3.38 cm    STJ 2.76 cm    Ascending aorta 3.06 cm    LV mass 168.26 g    LA size 4.80 cm    RVDD 5.08 cm    TAPSE 1.21 cm    Left Ventricle Relative Wall Thickness 0.44 cm    AV mean gradient 3 mmHg    AV valve area 2.02 cm2    AV Velocity Ratio 0.47     AV index (prosthetic) 0.56     MV valve area p 1/2 method 3.05 cm2    E/A ratio 1.12     Mean e' 0.03 m/s    E wave deceleration time 248.88 msec    LVOT diameter 2.15 cm    LVOT area 3.6 cm2    LVOT peak perla 0.63 m/s    LVOT peak VTI 14.82 cm    Ao peak perla 1.34 m/s    Ao VTI 26.66 cm    LVOT stroke volume 53.78 cm3    AV peak gradient 7 mmHg    E/E' ratio 19.33 m/s    MV  Peak E David 0.58 m/s    TR Max David 3.73 m/s    MV stenosis pressure 1/2 time 72.18 ms    MV Peak A David 0.52 m/s    LV Systolic Volume 44.50 mL    LV Systolic Volume Index 26.8 mL/m2    LV Diastolic Volume 98.88 mL    LV Diastolic Volume Index 59.57 mL/m2    LA Volume Index 84.8 mL/m2    LV Mass Index 101 g/m2    RA Major Axis 6.15 cm    Left Atrium Minor Axis 6.85 cm    Left Atrium Major Axis 7.00 cm    Triscuspid Valve Regurgitation Peak Gradient 56 mmHg    LA Volume Index (Mod) 68.2 mL/m2    LA volume (mod) 113.23 cm3    RA Width 3.74 cm    Right Atrial Pressure (from IVC) 15 mmHg    QEF 42 %    EF 45 %    TV rest pulmonary artery pressure 71 mmHg    Narrative    · The left ventricle is normal in size with concentric hypertrophy and   mildly decreased systolic function.  · Severe left atrial enlargement.  · The estimated ejection fraction is 45%.  · Grade II left ventricular diastolic dysfunction.  · Moderate mitral regurgitation.  · Moderate tricuspid regurgitation.  · Mild pulmonic regurgitation.  · Cannot exclude infiltrative disease, preserved apical strain.  · The quantitatively derived ejection fraction is 42%.  · There are segmental left ventricular wall motion abnormalities.  · The left ventricular global longitudinal strain is -11%.  · Mild right ventricular enlargement with mildly reduced right ventricular   systolic function.  · Severe right atrial enlargement.  · Mild aortic regurgitation.  · Elevated central venous pressure (15 mmHg).  · The estimated PA systolic pressure is 71 mmHg.  · There is pulmonary hypertension.

## 2023-02-02 NOTE — CONSULTS
[facility]  Hematology/Oncology  Consult Note    Patient Name: Inna Blankenship  MRN: [id number]  Admission Date: 1/30/2023  Hospital Length of Stay: 0 days  Code Status: DNR   Attending Provider: Keri Ivory MD  Consulting Provider: Christel Siddiqui MD  Primary Care Physician: Stacy Rodriguez MD  Principal Problem:Acute on chronic diastolic congestive heart failure    Inpatient consult to Hematology/Oncology  Consult performed by: Christel Siddiqui MD  Consult ordered by: Zenaida David PA-C        Subjective:     HPI:  Patient is a 89 year old female with history of diastolic heart failure, HTN, HLD, atrial fibrillation, T2DM, who presents to [facility] on 01/30/2023 heart failure exacerbation.     Hematology consulted for amyloidosis evaluation.     Transthoracic echocardiogram with EF 45%, grade 2 left ventricular diastolic dysfunction, new segmental wall motion abnormalities.   PYP scan suggestive of PYP amyloidosis.   CBC with normal WBC, hemoglobin, platelet.  CBC normal.  . Patient does not have evidence of monoclonal paraprotein on labs.               Medications:  Continuous Infusions:  Scheduled Meds:   apixaban  2.5 mg Oral BID    atorvastatin  20 mg Oral Daily    EScitalopram oxalate  10 mg Oral Daily    furosemide (LASIX) injection  40 mg Intravenous BID    latanoprost  1 drop Both Eyes Daily    polyethylene glycol  17 g Oral Daily    senna  8.6 mg Oral BID     PRN Meds:acetaminophen, acetaminophen, albuterol-ipratropium, aluminum-magnesium hydroxide-simethicone, dextrose 10%, dextrose 10%, glucagon (human recombinant), glucose, glucose, insulin aspart U-100, melatonin, naloxone, ondansetron, ondansetron, sodium chloride 0.9%     Review of patient's allergies indicates:  No Known Allergies     Past Medical History:   Diagnosis Date    Acute hypoxemic respiratory failure 1/30/2023    Arthritis     CHF (congestive heart failure) 12/26/2018    Chronic atrial fibrillation 8/29/2020    Chronic kidney  disease, stage III (moderate) 9/11/2015    Colon adenoma     Diabetes mellitus, type II     Diet controlled    Glaucoma     Hyperlipemia     Hypertension     Osteopenia      Past Surgical History:   Procedure Laterality Date    APPENDECTOMY      CATARACT EXTRACTION W/  INTRAOCULAR LENS IMPLANT Right 03/15/2006        CATARACT EXTRACTION W/  INTRAOCULAR LENS IMPLANT Left 09/27/2006        COLONOSCOPY W/ POLYPECTOMY      EYE SURGERY      HYSTERECTOMY      TRANSESOPHAGEAL ECHOCARDIOGRAPHY N/A 11/9/2020    Procedure: ECHOCARDIOGRAM, TRANSESOPHAGEAL;  Surgeon: Marcelina Diagnostic Provider;  Location: [facility] EP LAB;  Service: Cardiology;  Laterality: N/A;    TREATMENT OF CARDIAC ARRHYTHMIA N/A 11/9/2020    Procedure: CARDIOVERSION;  Surgeon: Marvin Elmore MD;  Location: [facility] EP LAB;  Service: Cardiology;  Laterality: N/A;  AF, RAFFAELE, DCCV, MAC, GP, 3 PREP     Family History       Problem Relation (Age of Onset)    Allergies Daughter    Asthma Daughter    Cancer Sister, Daughter    Heart attack Mother    Heart disease Mother    Hypertension Daughter    No Known Problems Father          Tobacco Use    Smoking status: Never    Smokeless tobacco: Never   Substance and Sexual Activity    Alcohol use: Yes     Comment: wine occas.    Drug use: No    Sexual activity: Not Currently       Review of Systems  Constitutional: Negative for fatigue. Negative for activity change, appetite change, chills, fever and unexpected weight change.   HENT: Negative for nosebleeds and sore throat.    Respiratory: Positive for cough,  shortness of breath and wheezing.    Cardiovascular: Negative for leg swelling. Negative for palpitations.   Gastrointestinal: Negative for constipation. Negative for abdominal pain, diarrhea, nausea and vomiting.   Musculoskeletal: Negative for arthralgias, joint swelling and myalgias.   Hematological: Negative for adenopathy.     Objective:     Vital Signs (Most Recent):  Temp: 98.4 °F (36.9 °C)  (02/02/23 0751)  Pulse: (!) 57 (02/02/23 0751)  Resp: 18 (02/02/23 0751)  BP: 134/63 (02/02/23 0751)  SpO2: (!) 94 % (02/02/23 0751)   Vital Signs (24h Range):  Temp:  [98.2 °F (36.8 °C)-98.7 °F (37.1 °C)] 98.4 °F (36.9 °C)  Pulse:  [57-66] 57  Resp:  [16-18] 18  SpO2:  [90 %-96 %] 94 %  BP: (111-145)/(55-65) 134/63     Weight: 55 kg (121 lb 4.1 oz)  Body mass index is 20.81 kg/m².  Body surface area is 1.58 meters squared.      Intake/Output Summary (Last 24 hours) at 2/2/2023 1115  Last data filed at 2/2/2023 0501  Gross per 24 hour   Intake 100 ml   Output 1050 ml   Net -950 ml       Physical Exam  Alert awake oriented x3  Regular rate and rhythm  Lungs clear to auscultation bilaterally  Abdomen soft nontender  No lower extremity edema    Significant Labs:   All pertinent labs from the last 24 hours have been reviewed.    Diagnostic Results:  I have reviewed all pertinent imaging results/findings within the past 24 hours.    Assessment/Plan:     Patient is a 89 year old female with history of diastolic heart failure, HTN, HLD, atrial fibrillation, T2DM, who presents to [facility] on 01/30/2023 heart failure exacerbation.     Hematology consulted for amyloidosis evaluation.     Transthoracic echocardiogram with EF 45%, grade 2 left ventricular diastolic dysfunction, and left ventricular wall motion abnormalities.   PYP scan suggestive of ATTR amyloidosis.     Patient does not have abnormalities on hemogram. No evidence of monoclonal paraprotein on serolgies. No concern for plasma cell neoplasm/AL amyloidosis. No indication for bone marrow biopsy.     Would defer further investigations/treatment to cardiology for ATTR amyloidosis.       Thank you for your consult. I will sign off. Please contact us if you have any additional questions.    Christel Siddiqui MD  Hematology/Oncology Fellow PGY V  [facility]

## 2023-02-02 NOTE — PROGRESS NOTES
[facility] [address] [place] [address] Medicine  Progress Note    Patient Name: Inna Blankenship  MRN: [id number]  Patient Class: IP- Inpatient   Admission Date: 1/30/2023  Length of Stay: 0 days  Attending Physician: Keri Ivory MD  Primary Care Provider: Stacy Rodriguez MD        Subjective:     Principal Problem:Acute on chronic diastolic congestive heart failure        HPI:  Inna Blankenship is a 89 year old female with chronic diastolic heart failure, hypertension, hyperlipidemia, afib on OAC, CKD and T2DM being admitted to hospital medicine for management of acute on chronic diastolic heart failure. Patient's daughter Cara at bedside also providing history. Reports progressive dyspnea on exertion over the past 1 week. Endorses associated non-productive nightly cough. Took increase home lasix dose to 40 mg over the past two days without significant improvement. States symptoms are consistent with previous CHF exacerbations. Denies fever, chills, nausea, vomiting, abdominal pain, chest pain, dysuria, hematuria, chest pain, extremity numbness. Denies tobacco, alcohol or drug use.       In ED: hypoxic to 93% on RA, tachypneic to 28 RR, HR 60 and SpO2 155/72. BNP elevated to 849 (baseline ~650) and troponin elevated to 0.126 (similar to previous elevations). CBC an CMP grossly unremarkable. Flu, COVID and RSV negative. CXR demonstrating cardiomegaly with central vascular congestion and mild perihilar edema stable from prior. EKG NSR with PACs and nonspecific t wave abnormalities. Received 80 mg IV lasix x1 in the ED.       Overview/Hospital Course:  Pt placed in observation for acute CHF exacerbation. Echo with EF 45%, grade II LV DD, valvular disease, new segmental wall motion abnormalities, apical strain, CVP 15 mmHg, and PASP 71 mmHg. Cardiology consulted and recommending workup for amyloidosis with SPEP, UPEP, Free light chains, KAVON, and PYP. Pt receiving IV diuresis BID until euvolemic. Will wean oxygen as tolerated. Plan  to discharge home with home health when stable.       Interval History:   ABDIAS, VSSAF, 2L NC with oxygen saturation 95% on my exam. Patient endorsing some SOB with exertion, but improved with oxygen. JVD 1cm above clavicle. Increased lasix to 80mg IV BID. Cardiology and hematology consulted regarding concern for Amyloidosis. CBC without leukocytosis. CMP without electrolyte abnormalities. Patient without further complaints.     Review of Systems   Constitutional:  Negative for activity change, chills and fever.   HENT:  Negative for trouble swallowing.    Eyes:  Negative for photophobia and visual disturbance.   Respiratory:  Positive for cough. Negative for chest tightness and shortness of breath.    Cardiovascular:  Negative for chest pain, palpitations and leg swelling.   Gastrointestinal:  Negative for abdominal pain, constipation, diarrhea, nausea and vomiting.   Genitourinary:  Negative for dysuria, frequency and hematuria.   Musculoskeletal:  Negative for back pain, gait problem and neck pain.   Skin:  Negative for rash and wound.   Neurological:  Negative for dizziness, syncope, speech difficulty, weakness and light-headedness.   Psychiatric/Behavioral:  Positive for confusion. Negative for agitation and dysphoric mood. The patient is not nervous/anxious.    Objective:     Vital Signs (Most Recent):  Temp: 97.7 °F (36.5 °C) (02/02/23 1151)  Pulse: (!) 57 (02/02/23 1151)  Resp: 18 (02/02/23 1151)  BP: 132/60 (02/02/23 1151)  SpO2: 95 % (02/02/23 1151)   Vital Signs (24h Range):  Temp:  [97.7 °F (36.5 °C)-98.7 °F (37.1 °C)] 97.7 °F (36.5 °C)  Pulse:  [57-66] 57  Resp:  [16-18] 18  SpO2:  [90 %-96 %] 95 %  BP: (111-145)/(55-65) 132/60     Weight: 55 kg (121 lb 4.1 oz)  Body mass index is 20.81 kg/m².    Intake/Output Summary (Last 24 hours) at 2/2/2023 1312  Last data filed at 2/2/2023 0501  Gross per 24 hour   Intake 100 ml   Output 1050 ml   Net -950 ml      Physical Exam  Vitals and nursing note reviewed.    Constitutional:       General: She is not in acute distress.     Appearance: She is well-developed. She is not toxic-appearing.   HENT:      Head: Normocephalic and atraumatic.      Mouth/Throat:      Mouth: Mucous membranes are moist.   Eyes:      Conjunctiva/sclera: Conjunctivae normal.      Pupils: Pupils are equal, round, and reactive to light.   Neck:      Vascular: JVD present.   Cardiovascular:      Rate and Rhythm: Normal rate and regular rhythm.      Heart sounds: Normal heart sounds. No murmur heard.    No gallop.   Pulmonary:      Effort: Pulmonary effort is normal. No respiratory distress.      Breath sounds: Rales present. No wheezing.      Comments: Sating 96% on 3L  Abdominal:      General: Bowel sounds are normal. There is no distension.      Palpations: Abdomen is soft.      Tenderness: There is no abdominal tenderness.   Musculoskeletal:         General: No tenderness. Normal range of motion.      Cervical back: Normal range of motion and neck supple.      Right lower leg: No edema.      Left lower leg: No edema.   Skin:     General: Skin is warm and dry.      Capillary Refill: Capillary refill takes less than 2 seconds.      Findings: No rash.   Neurological:      Mental Status: She is alert.      Cranial Nerves: No cranial nerve deficit.      Sensory: No sensory deficit.      Coordination: Coordination normal.   Psychiatric:         Behavior: Behavior normal.         Thought Content: Thought content normal.         Judgment: Judgment normal.       Significant Labs: All pertinent labs within the past 24 hours have been reviewed.  CBC:   Recent Labs   Lab 02/01/23  0830 02/02/23  0716   WBC 5.72 5.53   HGB 13.1 12.9   HCT 41.4 41.0    155     CMP:   Recent Labs   Lab 02/01/23  0431 02/02/23  0716    141   K 3.5 4.0    103   CO2 26 26   GLU 77 81   BUN 16 23   CREATININE 0.9 0.9   CALCIUM 9.7 10.0   ANIONGAP 11 12       Significant Imaging: I have reviewed all pertinent imaging  results/findings within the past 24 hours.      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 CHF is currently uncontrolled due to volume overload due to: JVD, Dyspnea not returned to baseline after 2 doses of IV diuretic, Rales/crackles on pulmonary exam and Pulmonary edema/pleural effusion on CXR. Pt is on CHF pathway.   - Patient decompensated on admit (1/30/2023). (+)Subjective SOB, (+)JVD, (+)orthnopnea.   - Last BNP reviewed- and noted below   Recent Labs   Lab 01/30/23  1853   *   - Troponins x3 elevated but flat  - CXR with pulmonary congestions   - EKG without acute ST changes   - Last echo performed and demonstrates- Results for orders placed during the hospital encounter of 01/30/23  Echo  Interpretation Summary  · The left ventricle is normal in size with concentric hypertrophy and mildly decreased systolic function.  · Severe left atrial enlargement.  · The estimated ejection fraction is 45%.  · Grade II left ventricular diastolic dysfunction.  · Moderate mitral regurgitation.  · Moderate tricuspid regurgitation.  · Mild pulmonic regurgitation.  · Cannot exclude infiltrative disease, preserved apical strain.  · The quantitatively derived ejection fraction is 42%.  · There are segmental left ventricular wall motion abnormalities.  · The left ventricular global longitudinal strain is -11%.  · Mild right ventricular enlargement with mildly reduced right ventricular systolic function.  · Severe right atrial enlargement.  · Mild aortic regurgitation.  · Elevated central venous pressure (15 mmHg).  · The estimated PA systolic pressure is 71 mmHg.  · There is pulmonary hypertension.  - Cardiology  consulted given new WMAs  - Continue diuresis with IV lasix  - Continue ACE/ARB and monitor clinical status closely.   - Continue asa 81mg, amlodipine, losartan, statin  - Monitor on telemetry.  - Patient is on CHF pathway.   - Monitor strict Is&Os and daily STANDING weights. Fluid restriction to 1.5L per day and cardiac diet with salt restriction.    Intake/Output Summary (Last 24 hours) at 2/2/2023 1330  Last data filed at 2/2/2023 0501  Gross per 24 hour   Intake 100 ml   Output 800 ml   Net -700 ml   - Supplemental O2 to keep Spo2 >90%  - Continue to stress to patient importance of self efficacy and  on diet for CHF.    Acute hypoxemic respiratory failure  Dyspnea on exertion   Patient with Hypoxic Respiratory failure which is Acute on chronic. She is not on home oxygen. Supplemental oxygen was provided and noted-  .   Signs/symptoms of respiratory failure include- tachypnea and increased oxygen requirements. Contributing diagnoses includes - CHF Labs and images were reviewed. Patient Has recent ABG, which has been reviewed.   Will treat underlying causes and adjust management of respiratory failure as follows- see acute on chronic CHF    - Cardiology & Hematology consulted and recommending amyloid workup   -- Cards - Work up for amyloid has been complete, plan to refer to HF/amyloid clinic for further evaluation.    -- Hematology - Patient does not have abnormalities on hemogram. No evidence of monoclonal paraprotein on serolgies. No concern for plasma cell neoplasm/AL amyloidosis. No indication for bone marrow biopsy. Would defer further investigations/treatment to cardiology for ATTR amyloidosis.  - follow up with HF/amyloid clinic for further evaluation    Confusion and disorientation  - Pt confused and disoriented, which daughter reports has been progressively worsening over the course of time  - Documented on previous visits (4/2022)  - Likely secondary to undiagnosed dementia  - Plan to place  ambulatory referral to neurology at discharge  - Delirium precautions    Stage 3a chronic kidney disease  - Cr 1.1 on admission and downtrending  - monitor on serial bmp    Persistent atrial fibrillation  Patient with Persistent (7 days or more) atrial fibrillation which is controlled currently with none. Patient is currently in sinus rhythm.[id number] Score: 4. HASBLED. Anticoagulation indicated. Anticoagulation done with eliquis 2.5 mg BID .  - monitor on telemetry     Controlled type 2 diabetes mellitus with stage 3 chronic kidney disease, without long-term current use of insulin  Patient's FSGs are controlled on current medication regimen.  Last A1c reviewed-   Lab Results   Component Value Date    HGBA1C 5.8 (H) 01/30/2023     Most recent fingerstick glucose reviewed-   Recent Labs   Lab 02/01/23  1559 02/01/23  1930 02/02/23  0752 02/02/23  1152   POCTGLUCOSE 176* 115* 87 131*     Current correctional scale  Low  Maintain anti-hyperglycemic dose as follows-   Antihyperglycemics (From admission, onward)    Start     Stop Route Frequency Ordered    01/30/23 2251  insulin aspart U-100 pen 0-5 Units         -- SubQ Before meals & nightly PRN 01/30/23 2201      - Hold oral hypoglycemics while patient is in the hospital.  - Diabetic diet, glucose ACHS    History of DVT (deep vein thrombosis)  - continue eliquis 2.5 mg BID     Low tension glaucoma, moderate stage - Both Eyes  Continue home latanoprost 0.005 % ophthalmic solution    Osteopenia  - chronic  - no acute issues  - takes CALCIUM CARBONATE ([id number] ORAL) at home     Other hyperlipidemia  Lab Results   Component Value Date    LDLCALC 99.2 12/01/2022   - continue home atorvastatin (LIPITOR) 20 MG tablet    Essential hypertension  - controlled   - continue home medications amLODIPine (NORVASC) 10 MG tablet and losartan (COZAAR) 25 MG tablet  - vitals q4h      VTE Risk Mitigation (From admission, onward)         Ordered     apixaban tablet 2.5 mg  2 times  daily         01/30/23 2142     Reason for No Pharmacological VTE Prophylaxis  Once        Question:  Reasons:  Answer:  Already adequately anticoagulated on oral Anticoagulants    01/30/23 2142     IP VTE HIGH RISK PATIENT  Once         01/30/23 2142     Place sequential compression device  Until discontinued         01/30/23 2142                Discharge Planning   BEN: 2/3/2023     Code Status: DNR   Is the patient medically ready for discharge?: No    Reason for patient still in hospital (select all that apply): Patient trending condition and Treatment  Discharge Plan A: Home with family                  Randy Sabillon PA-C  Department of Hospital Medicine   [facility] - Observation 11H

## 2023-02-02 NOTE — PROGRESS NOTES
[facility]  Cardiology  Progress Note    Patient Name: Inna Blankenship  MRN: [id number]  Admission Date: 1/30/2023  Hospital Length of Stay: 0 days  Code Status: DNR   Attending Physician: Keri Ivory MD   Primary Care Physician: Stacy Rodriguez MD  Expected Discharge Date: 2/3/2023  Principal Problem:Acute on chronic diastolic congestive heart failure    Subjective:     Hospital Course:   No notes on file    Interval History: Patient reports feeling better this morning.  Denies SOB or cough.      Review of Systems   Constitutional: Negative.   Cardiovascular: Negative.    Respiratory: Negative.     Gastrointestinal: Negative.    Genitourinary: Negative.    Neurological: Negative.    Objective:     Vital Signs (Most Recent):  Temp: 97.7 °F (36.5 °C) (02/02/23 1151)  Pulse: (!) 57 (02/02/23 1151)  Resp: 18 (02/02/23 1151)  BP: 132/60 (02/02/23 1151)  SpO2: 95 % (02/02/23 1151)   Vital Signs (24h Range):  Temp:  [97.7 °F (36.5 °C)-98.7 °F (37.1 °C)] 97.7 °F (36.5 °C)  Pulse:  [57-66] 57  Resp:  [16-18] 18  SpO2:  [90 %-96 %] 95 %  BP: (111-145)/(55-65) 132/60     Weight: 55 kg (121 lb 4.1 oz)  Body mass index is 20.81 kg/m².     SpO2: 95 %         Intake/Output Summary (Last 24 hours) at 2/2/2023 1206  Last data filed at 2/2/2023 0501  Gross per 24 hour   Intake 100 ml   Output 1050 ml   Net -950 ml       Lines/Drains/Airways       Peripheral Intravenous Line  Duration                  Peripheral IV - Single Lumen 01/30/23 1900 20 G Anterior;Distal;Left Forearm 2 days                    Physical Exam  Vitals and nursing note reviewed.   Constitutional:       General: She is not in acute distress.  HENT:      Head: Normocephalic and atraumatic.      Mouth/Throat:      Mouth: Mucous membranes are moist.      Pharynx: Oropharynx is clear.   Eyes:      General: No scleral icterus.     Extraocular Movements: Extraocular movements intact.      Conjunctiva/sclera: Conjunctivae normal.   Neck:      Vascular: No  JVD.   Cardiovascular:      Rate and Rhythm: Normal rate and regular rhythm.   Pulmonary:      Effort: Pulmonary effort is normal. No respiratory distress.      Breath sounds: No wheezing or rales.   Abdominal:      General: There is no distension.      Palpations: Abdomen is soft.      Tenderness: There is no abdominal tenderness. There is no guarding.   Musculoskeletal:         General: No tenderness. Normal range of motion.      Cervical back: Normal range of motion and neck supple.      Right lower leg: No edema.      Left lower leg: No edema.   Skin:     General: Skin is warm and dry.   Neurological:      General: No focal deficit present.      Mental Status: She is alert and oriented to person, place, and time.       Significant Labs: CMP   Recent Labs   Lab 02/01/23  0431 02/02/23  0716    141   K 3.5 4.0    103   CO2 26 26   GLU 77 81   BUN 16 23   CREATININE 0.9 0.9   CALCIUM 9.7 10.0   ANIONGAP 11 12   , CBC   Recent Labs   Lab 02/01/23  0830 02/02/23  0716   WBC 5.72 5.53   HGB 13.1 12.9   HCT 41.4 41.0    155   , and All pertinent lab results from the last 24 hours have been reviewed.    Significant Imaging: Echocardiogram: 2D echo with color flow doppler: No results found for this or any previous visit. and Transthoracic echo (TTE) complete (Cupid Only):   Results for orders placed or performed during the hospital encounter of 01/30/23   Echo   Result Value Ref Range    BSA 1.66 m2    TDI SEPTAL 0.02 m/s    LV LATERAL E/E' RATIO 14.50 m/s    LV SEPTAL E/E' RATIO 29.00 m/s    LA WIDTH 4.98 cm    TDI LATERAL 0.04 m/s    LVIDd 4.63 3.5 - 6.0 cm    IVS 1.05 0.6 - 1.1 cm    Posterior Wall 1.02 0.6 - 1.1 cm    LVIDs 3.31 2.1 - 4.0 cm    FS 29 28 - 44 %    LA volume 140.69 cm3    Sinus 3.38 cm    STJ 2.76 cm    Ascending aorta 3.06 cm    LV mass 168.26 g    LA size 4.80 cm    RVDD 5.08 cm    TAPSE 1.21 cm    Left Ventricle Relative Wall Thickness 0.44 cm    AV mean gradient 3 mmHg    AV  valve area 2.02 cm2    AV Velocity Ratio 0.47     AV index (prosthetic) 0.56     MV valve area p 1/2 method 3.05 cm2    E/A ratio 1.12     Mean e' 0.03 m/s    E wave deceleration time 248.88 msec    LVOT diameter 2.15 cm    LVOT area 3.6 cm2    LVOT peak david 0.63 m/s    LVOT peak VTI 14.82 cm    Ao peak david 1.34 m/s    Ao VTI 26.66 cm    LVOT stroke volume 53.78 cm3    AV peak gradient 7 mmHg    E/E' ratio 19.33 m/s    MV Peak E David 0.58 m/s    TR Max David 3.73 m/s    MV stenosis pressure 1/2 time 72.18 ms    MV Peak A David 0.52 m/s    LV Systolic Volume 44.50 mL    LV Systolic Volume Index 26.8 mL/m2    LV Diastolic Volume 98.88 mL    LV Diastolic Volume Index 59.57 mL/m2    LA Volume Index 84.8 mL/m2    LV Mass Index 101 g/m2    RA Major Axis 6.15 cm    Left Atrium Minor Axis 6.85 cm    Left Atrium Major Axis 7.00 cm    Triscuspid Valve Regurgitation Peak Gradient 56 mmHg    LA Volume Index (Mod) 68.2 mL/m2    LA volume (mod) 113.23 cm3    RA Width 3.74 cm    Right Atrial Pressure (from IVC) 15 mmHg    QEF 42 %    EF 45 %    TV rest pulmonary artery pressure 71 mmHg    Narrative    · The left ventricle is normal in size with concentric hypertrophy and   mildly decreased systolic function.  · Severe left atrial enlargement.  · The estimated ejection fraction is 45%.  · Grade II left ventricular diastolic dysfunction.  · Moderate mitral regurgitation.  · Moderate tricuspid regurgitation.  · Mild pulmonic regurgitation.  · Cannot exclude infiltrative disease, preserved apical strain.  · The quantitatively derived ejection fraction is 42%.  · There are segmental left ventricular wall motion abnormalities.  · The left ventricular global longitudinal strain is -11%.  · Mild right ventricular enlargement with mildly reduced right ventricular   systolic function.  · Severe right atrial enlargement.  · Mild aortic regurgitation.  · Elevated central venous pressure (15 mmHg).  · The estimated PA systolic pressure is 71  mmHg.  · There is pulmonary hypertension.        Assessment and Plan:       * Acute on chronic diastolic congestive heart failure  Patient with known HFpEF, follows closely in [facility] clinic.  Presented with progressively worsening dyspnea despite increase in PO diuretics.  Trop flat and unremarkable.  BNP elevated to 894 on admission.  CXR similar to prior with mild central vascular congestion.  Patient with history of asymptomatic bradycardia and not able to tolerate BB.  Some consideration outpatient of amyloid deposition from echo in 2021 however negative amyloid labs in 2005.    Echo    Interpretation Summary  · The left ventricle is normal in size with concentric hypertrophy and mildly decreased systolic function.  · Severe left atrial enlargement.  · The estimated ejection fraction is 45%.  · Grade II left ventricular diastolic dysfunction.  · Moderate mitral regurgitation.  · Moderate tricuspid regurgitation.  · Mild pulmonic regurgitation.  · Cannot exclude infiltrative disease, preserved apical strain.  · The quantitatively derived ejection fraction is 42%.  · There are segmental left ventricular wall motion abnormalities.  · The left ventricular global longitudinal strain is -11%.  · Mild right ventricular enlargement with mildly reduced right ventricular systolic function.  · Severe right atrial enlargement.  · Mild aortic regurgitation.  · Elevated central venous pressure (15 mmHg).  · The estimated PA systolic pressure is 71 mmHg.  · There is pulmonary hypertension.    Prior echo on 4/2022 with EF 50% and Grade III LVDD.       - Patient appears euvolemic on exam today, recommend transition to PO Lasix 80mg daily  - strict I/Os and daily weights   - Maintain on telemetry and daily EKGs   - Maintain electrolytes: Mag >2 & K+ >4    - Ambulate as tolerated   - Continue home losartan and lipitor   - Recommend amyloid workup concerning for cardiac amyloidosis.  Recommend hematology eval to rule out  AL.  Close follow up on discharge with Dr. Patel in clinic          VTE Risk Mitigation (From admission, onward)         Ordered     apixaban tablet 2.5 mg  2 times daily         01/30/23 2142     Reason for No Pharmacological VTE Prophylaxis  Once        Question:  Reasons:  Answer:  Already adequately anticoagulated on oral Anticoagulants    01/30/23 2142     IP VTE HIGH RISK PATIENT  Once         01/30/23 2142     Place sequential compression device  Until discontinued         01/30/23 2142                Josef Lee MD  Cardiology  [facility] - Observation 11H

## 2023-02-02 NOTE — ASSESSMENT & PLAN NOTE
Dyspnea on exertion   Patient with Hypoxic Respiratory failure which is Acute on chronic. She is not on home oxygen. Supplemental oxygen was provided and noted-  .   Signs/symptoms of respiratory failure include- tachypnea and increased oxygen requirements. Contributing diagnoses includes - CHF Labs and images were reviewed. Patient Has recent ABG, which has been reviewed.   Will treat underlying causes and adjust management of respiratory failure as follows- see acute on chronic CHF    - Cardiology & Hematology consulted and recommending amyloid workup   -- Cards - Work up for amyloid has been complete, plan to refer to HF/amyloid clinic for further evaluation.    -- Hematology - Patient does not have abnormalities on hemogram. No evidence of monoclonal paraprotein on serolgies. No concern for plasma cell neoplasm/AL amyloidosis. No indication for bone marrow biopsy. Would defer further investigations/treatment to cardiology for ATTR amyloidosis.  - follow up with HF/amyloid clinic for further evaluation

## 2023-02-03 VITALS
DIASTOLIC BLOOD PRESSURE: 58 MMHG | HEIGHT: 64 IN | RESPIRATION RATE: 18 BRPM | TEMPERATURE: 98 F | OXYGEN SATURATION: 95 % | WEIGHT: 121.25 LBS | BODY MASS INDEX: 20.7 KG/M2 | HEART RATE: 68 BPM | SYSTOLIC BLOOD PRESSURE: 120 MMHG

## 2023-02-03 LAB
ANION GAP SERPL CALC-SCNC: 9 MMOL/L (ref 8–16)
BASOPHILS # BLD AUTO: 0.05 K/UL (ref 0–0.2)
BASOPHILS NFR BLD: 0.8 % (ref 0–1.9)
BUN SERPL-MCNC: 23 MG/DL (ref 10–30)
CALCIUM SERPL-MCNC: 10.1 MG/DL (ref 8.7–10.5)
CHLORIDE SERPL-SCNC: 101 MMOL/L (ref 95–110)
CO2 SERPL-SCNC: 30 MMOL/L (ref 23–29)
CREAT SERPL-MCNC: 0.9 MG/DL (ref 0.5–1.4)
DIFFERENTIAL METHOD: ABNORMAL
EOSINOPHIL # BLD AUTO: 0.1 K/UL (ref 0–0.5)
EOSINOPHIL NFR BLD: 2.1 % (ref 0–8)
ERYTHROCYTE [DISTWIDTH] IN BLOOD BY AUTOMATED COUNT: 15.3 % (ref 11.5–14.5)
EST. GFR  (NO RACE VARIABLE): 58.5 ML/MIN/1.73 M^2
GLUCOSE SERPL-MCNC: 91 MG/DL (ref 70–110)
HCT VFR BLD AUTO: 41.5 % (ref 37–48.5)
HGB BLD-MCNC: 13.5 G/DL (ref 12–16)
IMM GRANULOCYTES # BLD AUTO: 0.01 K/UL (ref 0–0.04)
IMM GRANULOCYTES NFR BLD AUTO: 0.2 % (ref 0–0.5)
LYMPHOCYTES # BLD AUTO: 1.4 K/UL (ref 1–4.8)
LYMPHOCYTES NFR BLD: 22.1 % (ref 18–48)
MAGNESIUM SERPL-MCNC: 1.8 MG/DL (ref 1.6–2.6)
MCH RBC QN AUTO: 30.6 PG (ref 27–31)
MCHC RBC AUTO-ENTMCNC: 32.5 G/DL (ref 32–36)
MCV RBC AUTO: 94 FL (ref 82–98)
MONOCYTES # BLD AUTO: 0.7 K/UL (ref 0.3–1)
MONOCYTES NFR BLD: 10.9 % (ref 4–15)
NEUTROPHILS # BLD AUTO: 3.9 K/UL (ref 1.8–7.7)
NEUTROPHILS NFR BLD: 63.9 % (ref 38–73)
NRBC BLD-RTO: 0 /100 WBC
PHOSPHATE SERPL-MCNC: 3.6 MG/DL (ref 2.7–4.5)
PLATELET # BLD AUTO: 160 K/UL (ref 150–450)
PMV BLD AUTO: 11 FL (ref 9.2–12.9)
POCT GLUCOSE: 114 MG/DL (ref 70–110)
POCT GLUCOSE: 156 MG/DL (ref 70–110)
POCT GLUCOSE: 94 MG/DL (ref 70–110)
POTASSIUM SERPL-SCNC: 3.7 MMOL/L (ref 3.5–5.1)
RBC # BLD AUTO: 4.41 M/UL (ref 4–5.4)
SODIUM SERPL-SCNC: 140 MMOL/L (ref 136–145)
WBC # BLD AUTO: 6.12 K/UL (ref 3.9–12.7)

## 2023-02-03 PROCEDURE — 1111F PR DISCHARGE MEDS RECONCILED W/ CURRENT OUTPATIENT MED LIST: ICD-10-PCS | Mod: HCNC,CPTII,,

## 2023-02-03 PROCEDURE — 36415 COLL VENOUS BLD VENIPUNCTURE: CPT | Mod: HCNC

## 2023-02-03 PROCEDURE — 83735 ASSAY OF MAGNESIUM: CPT | Mod: HCNC

## 2023-02-03 PROCEDURE — 80048 BASIC METABOLIC PNL TOTAL CA: CPT | Mod: HCNC

## 2023-02-03 PROCEDURE — 85025 COMPLETE CBC W/AUTO DIFF WBC: CPT | Mod: HCNC

## 2023-02-03 PROCEDURE — 84100 ASSAY OF PHOSPHORUS: CPT | Mod: HCNC

## 2023-02-03 PROCEDURE — 25000003 PHARM REV CODE 250: Mod: HCNC

## 2023-02-03 PROCEDURE — 99239 HOSP IP/OBS DSCHRG MGMT >30: CPT | Mod: HCNC,,,

## 2023-02-03 PROCEDURE — 1111F DSCHRG MED/CURRENT MED MERGE: CPT | Mod: HCNC,CPTII,,

## 2023-02-03 PROCEDURE — 99239 PR HOSPITAL DISCHARGE DAY,>30 MIN: ICD-10-PCS | Mod: HCNC,,,

## 2023-02-03 RX ORDER — FUROSEMIDE 80 MG/1
80 TABLET ORAL DAILY
Status: DISCONTINUED | OUTPATIENT
Start: 2023-02-03 | End: 2023-02-03 | Stop reason: HOSPADM

## 2023-02-03 RX ORDER — FUROSEMIDE 80 MG/1
80 TABLET ORAL DAILY
Qty: 30 TABLET | Refills: 11 | Status: SHIPPED | OUTPATIENT
Start: 2023-02-04 | End: 2023-03-13 | Stop reason: SDUPTHER

## 2023-02-03 RX ADMIN — FUROSEMIDE 80 MG: 80 TABLET ORAL at 09:02

## 2023-02-03 RX ADMIN — APIXABAN 2.5 MG: 2.5 TABLET, FILM COATED ORAL at 09:02

## 2023-02-03 RX ADMIN — ESCITALOPRAM OXALATE 10 MG: 5 TABLET, FILM COATED ORAL at 09:02

## 2023-02-03 RX ADMIN — ATORVASTATIN CALCIUM 20 MG: 20 TABLET, FILM COATED ORAL at 09:02

## 2023-02-03 RX ADMIN — POLYETHYLENE GLYCOL 3350 17 G: 17 POWDER, FOR SOLUTION ORAL at 09:02

## 2023-02-03 RX ADMIN — SENNOSIDES 8.6 MG: 8.6 TABLET, FILM COATED ORAL at 09:02

## 2023-02-03 RX ADMIN — LATANOPROST 1 DROP: 50 SOLUTION OPHTHALMIC at 09:02

## 2023-02-03 NOTE — PLAN OF CARE
PT had no complaints this shift. Family remained at bedside with pt. Pt had output of 1000 ml to the purwic.    Problem: Adult Inpatient Plan of Care  Goal: Plan of Care Review  Outcome: Ongoing, Progressing  Goal: Patient-Specific Goal (Individualized)  Outcome: Ongoing, Progressing  Goal: Absence of Hospital-Acquired Illness or Injury  Outcome: Ongoing, Progressing  Goal: Optimal Comfort and Wellbeing  Outcome: Ongoing, Progressing  Goal: Readiness for Transition of Care  Outcome: Ongoing, Progressing     Problem: Infection  Goal: Absence of Infection Signs and Symptoms  Outcome: Ongoing, Progressing     Problem: Diabetes Comorbidity  Goal: Blood Glucose Level Within Targeted Range  Outcome: Ongoing, Progressing     Problem: Fall Injury Risk  Goal: Absence of Fall and Fall-Related Injury  Outcome: Ongoing, Progressing     Problem: Adjustment to Illness (Heart Failure)  Goal: Optimal Coping  Outcome: Ongoing, Progressing     Problem: Cardiac Output Decreased (Heart Failure)  Goal: Optimal Cardiac Output  Outcome: Ongoing, Progressing     Problem: Dysrhythmia (Heart Failure)  Goal: Stable Heart Rate and Rhythm  Outcome: Ongoing, Progressing     Problem: Fluid Imbalance (Heart Failure)  Goal: Fluid Balance  Outcome: Ongoing, Progressing     Problem: Functional Ability Impaired (Heart Failure)  Goal: Optimal Functional Ability  Outcome: Ongoing, Progressing     Problem: Oral Intake Inadequate (Heart Failure)  Goal: Optimal Nutrition Intake  Outcome: Ongoing, Progressing     Problem: Respiratory Compromise (Heart Failure)  Goal: Effective Oxygenation and Ventilation  Outcome: Ongoing, Progressing     Problem: Sleep Disordered Breathing (Heart Failure)  Goal: Effective Breathing Pattern During Sleep  Outcome: Ongoing, Progressing     Problem: Skin Injury Risk Increased  Goal: Skin Health and Integrity  Outcome: Ongoing, Progressing

## 2023-02-03 NOTE — NURSING
Home Oxygen Evaluation    Date Performed: 2/3/2023    1) Patient's Home O2 Sat on room air, while at rest: 91 %        If O2 sats on room air at rest are 88% or below, patient qualifies. No additional testing needed. Document N/A in steps 2 and 3. If 89% or above, complete steps 2.      2) Patient's O2 Sat on room air while exercisin %        If O2 sats on room air while exercising remain 89% or above patient does not qualify, no further testing needed Document N/A in step 3. If O2 sats on room air while exercising are 88% or below, continue to step 3.      3) Patient's O2 Sat while exercising on O2: 96  at 2 LPM         (Must show improvement from #2 for patients to qualify)    If O2 sats improve on oxygen, patient qualifies for portable oxygen. If not, the patient does not qualify.

## 2023-02-03 NOTE — PLAN OF CARE
SSC met with patient/family at bedside. Patient experience rounding completed and reviewed the following.     Do you know your discharge plan? Yes        If yes, what is the plan? Home Health    If you are discharging home, do you have help at home? Yes    Do you think you will need help at home at discharge? Yes    Have you discussed your needs and preferences with your SW/CM? Yes     Assigned SW/CM notified of any patient/family needs or concerns.

## 2023-02-03 NOTE — DISCHARGE SUMMARY
[facility] [address] [place] [address] Medicine  Discharge Summary      Patient Name: Inna Blankenship  MRN: [id number]  RICCI: [id number]  Patient Class: IP- Inpatient  Admission Date: 1/30/2023  Hospital Length of Stay: 1 days  Discharge Date and Time:  02/03/2023 2:27 PM  Attending Physician: Keri Ivory MD   Discharging Provider: Randy Sabillon PA-C  Primary Care Provider: Stacy Rodriguez MD  Hospital Medicine Team: [facility]  Randy Sabillon PA-C  Primary Care Team: [facility]    HPI:   Inna Blankenship is a 89 year old female with chronic diastolic heart failure, hypertension, hyperlipidemia, afib on OAC, CKD and T2DM being admitted to hospital medicine for management of acute on chronic diastolic heart failure. Patient's daughter Cara at bedside also providing history. Reports progressive dyspnea on exertion over the past 1 week. Endorses associated non-productive nightly cough. Took increase home lasix dose to 40 mg over the past two days without significant improvement. States symptoms are consistent with previous CHF exacerbations. Denies fever, chills, nausea, vomiting, abdominal pain, chest pain, dysuria, hematuria, chest pain, extremity numbness. Denies tobacco, alcohol or drug use.       In ED: hypoxic to 93% on RA, tachypneic to 28 RR, HR 60 and SpO2 155/72. BNP elevated to 849 (baseline ~650) and troponin elevated to 0.126 (similar to previous elevations). CBC an CMP grossly unremarkable. Flu, COVID and RSV negative. CXR demonstrating cardiomegaly with central vascular congestion and mild perihilar edema stable from prior. EKG NSR with PACs and nonspecific t wave abnormalities. Received 80 mg IV lasix x1 in the ED.       * No surgery found *      Hospital Course:   Pt placed in observation for acute CHF exacerbation. Echo with EF 45%, grade II LV DD, valvular disease, new segmental wall motion abnormalities, apical strain, CVP 15 mmHg, and PASP 71 mmHg. Cardiology and Hematology consutled regarding  amyloidosis workup, per recommendations no convincing evidence at this time. Patient will follow up with cardiology for amyloidosis concern. Patient receiving IV diuresis BID with sufficient urine outpatient and euvolemia achieved. Transition to oral diuresis today. Weaned to RA. 6MWT prior to discharge. Home oxygen ordered. HH at discharge. Patient medically ready for discharge. Plan of care discussed with patient and grandson, patient agreeable to plan, and all questions answered.        Goals of Care Treatment Preferences:  Code Status: DNR      Consults:   Consults (From admission, onward)        Status Ordering Provider     Inpatient consult to Hematology/Oncology  Once        Provider:  (Not yet assigned)    Completed ANALY BROWN     Inpatient consult to Cardiology  Once        Provider:  (Not yet assigned)    Completed ANALY BROWN     Inpatient consult to Social Work/Case Management  Once        Provider:  (Not yet assigned)    Acknowledged MASOUD BRAUN     Inpatient consult to Registered Dietitian/Nutritionist  Once        Provider:  (Not yet assigned)    Completed MASOUD BRAUN          No new Assessment & Plan notes have been filed under this hospital service since the last note was generated.  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) [I50.814] 04/11/2022 Yes    Dyspnea on exertion [R06.09] 04/16/2021 Yes    Confusion and disorientation [R41.0] 02/10/2021 Yes    Stage 3a chronic kidney disease [N18.31] 02/09/2021 Yes    Persistent atrial fibrillation [I48.19] 11/09/2020 Yes     Chronic    Controlled type 2 diabetes mellitus with stage 3 chronic  "kidney disease, without long-term current use of insulin [E11.22, N18.30] 08/04/2020 Yes    History of DVT (deep vein thrombosis) [Z86.718] 07/17/2018 Not Applicable    Low tension glaucoma, 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    Why: A message has been sent to the Hematology/Oncology department, someone will contact you to schedule a hospital follow up visit. However, if you do not hear from them within 24 to 48 hours of discharge please call to schedule the appointment.  Contact information:  Hematology/Oncology   [phone]                     Patient Instructions:      OXYGEN FOR HOME USE     Order Specific Question Answer Comments   Liter Flow 1/10    Duration With activity    Qualifying Test Performed at: Activity    Oxygen saturation at rest 91    Oxygen saturation with activity 78    Oxygen saturation with activity on oxygen 96    Portable mode: continuous    Route nasal cannula    Device: home concentrator with portable tanks    Length of need (in months): 99 mos    Patient condition with qualifying saturation CHF    Height: 5' 4" (1.626 m)    Weight: 55 kg (121 lb 4.1 oz)    Alternative treatment measures have been tried or considered and deemed clinically ineffective. Yes      Ambulatory referral/consult to Cardiology   Standing Status: Future   Referral Priority: Urgent Referral Type: Consultation   Referral Reason: Specialty Services Required   Requested Specialty: Cardiology   Number of Visits Requested: 1     Ambulatory referral/consult to Hematology / Oncology   Standing Status: Future   Referral Priority: Urgent Referral Type: " Consultation   Referral Reason: Specialty Services Required   Requested Specialty: Hematology and Oncology   Number of Visits Requested: 1     Diet Cardiac     Notify your health care provider if you experience any of the following:  difficulty breathing or increased cough     Notify your health care provider if you experience any of the following:  persistent dizziness, light-headedness, or visual disturbances     Activity as tolerated       Significant Diagnostic Studies: Labs:   BMP:   Recent Labs   Lab 02/02/23  0716 02/03/23  0542   GLU 81 91    140   K 4.0 3.7    101   CO2 26 30*   BUN 23 23   CREATININE 0.9 0.9   CALCIUM 10.0 10.1   MG 1.8 1.8    and CBC   Recent Labs   Lab 02/02/23  0716 02/03/23  0542   WBC 5.53 6.12   HGB 12.9 13.5   HCT 41.0 41.5    160     Cardiac Graphics: Echocardiogram:   Transthoracic echo (TTE) complete (Cupid Only):   Results for orders placed or performed during the hospital encounter of 01/30/23   Echo   Result Value Ref Range    BSA 1.66 m2    TDI SEPTAL 0.02 m/s    LV LATERAL E/E' RATIO 14.50 m/s    LV SEPTAL E/E' RATIO 29.00 m/s    LA WIDTH 4.98 cm    TDI LATERAL 0.04 m/s    LVIDd 4.63 3.5 - 6.0 cm    IVS 1.05 0.6 - 1.1 cm    Posterior Wall 1.02 0.6 - 1.1 cm    LVIDs 3.31 2.1 - 4.0 cm    FS 29 28 - 44 %    LA volume 140.69 cm3    Sinus 3.38 cm    STJ 2.76 cm    Ascending aorta 3.06 cm    LV mass 168.26 g    LA size 4.80 cm    RVDD 5.08 cm    TAPSE 1.21 cm    Left Ventricle Relative Wall Thickness 0.44 cm    AV mean gradient 3 mmHg    AV valve area 2.02 cm2    AV Velocity Ratio 0.47     AV index (prosthetic) 0.56     MV valve area p 1/2 method 3.05 cm2    E/A ratio 1.12     Mean e' 0.03 m/s    E wave deceleration time 248.88 msec    LVOT diameter 2.15 cm    LVOT area 3.6 cm2    LVOT peak perla 0.63 m/s    LVOT peak VTI 14.82 cm    Ao peak perla 1.34 m/s    Ao VTI 26.66 cm    LVOT stroke volume 53.78 cm3    AV peak gradient 7 mmHg    E/E' ratio 19.33 m/s    MV  Peak E David 0.58 m/s    TR Max David 3.73 m/s    MV stenosis pressure 1/2 time 72.18 ms    MV Peak A David 0.52 m/s    LV Systolic Volume 44.50 mL    LV Systolic Volume Index 26.8 mL/m2    LV Diastolic Volume 98.88 mL    LV Diastolic Volume Index 59.57 mL/m2    LA Volume Index 84.8 mL/m2    LV Mass Index 101 g/m2    RA Major Axis 6.15 cm    Left Atrium Minor Axis 6.85 cm    Left Atrium Major Axis 7.00 cm    Triscuspid Valve Regurgitation Peak Gradient 56 mmHg    LA Volume Index (Mod) 68.2 mL/m2    LA volume (mod) 113.23 cm3    RA Width 3.74 cm    Right Atrial Pressure (from IVC) 15 mmHg    QEF 42 %    EF 45 %    TV rest pulmonary artery pressure 71 mmHg    Narrative    · The left ventricle is normal in size with concentric hypertrophy and   mildly decreased systolic function.  · Severe left atrial enlargement.  · The estimated ejection fraction is 45%.  · Grade II left ventricular diastolic dysfunction.  · Moderate mitral regurgitation.  · Moderate tricuspid regurgitation.  · Mild pulmonic regurgitation.  · Cannot exclude infiltrative disease, preserved apical strain.  · The quantitatively derived ejection fraction is 42%.  · There are segmental left ventricular wall motion abnormalities.  · The left ventricular global longitudinal strain is -11%.  · Mild right ventricular enlargement with mildly reduced right ventricular   systolic function.  · Severe right atrial enlargement.  · Mild aortic regurgitation.  · Elevated central venous pressure (15 mmHg).  · The estimated PA systolic pressure is 71 mmHg.  · There is pulmonary hypertension.        Pending Diagnostic Studies:     None         Medications:  Reconciled Home Medications:      Medication List      CHANGE how you take these medications    furosemide 80 MG tablet  Commonly known as: LASIX  Take 1 tablet (80 mg total) by mouth once daily.  Start taking on: February 4, 2023  What changed:   · medication strength  · how much to take  · additional instructions         CONTINUE taking these medications    amLODIPine 10 MG tablet  Commonly known as: NORVASC  TAKE 1 TABLET (10 MG TOTAL) BY MOUTH ONCE DAILY.     atorvastatin 20 MG tablet  Commonly known as: LIPITOR  Take 1 tablet (20 mg total) by mouth once daily.     ELIQUIS 2.5 mg Tab  Generic drug: apixaban  TAKE 1 TABLET TWICE DAILY     EScitalopram oxalate 10 MG tablet  Commonly known as: LEXAPRO  Take 1 tablet (10 mg total) by mouth once daily.     latanoprost 0.005 % ophthalmic solution  Place 1 drop into both eyes once daily.     losartan 25 MG tablet  Commonly known as: COZAAR  Take 1 tablet (25 mg total) by mouth once daily.     MULTIVITAMIN 50 PLUS ORAL  Take 1 tablet by mouth once daily.            Indwelling Lines/Drains at time of discharge:   Lines/Drains/Airways     None                 Time spent on the discharge of patient: 33 minutes         Randy Sabillon PA-C  Department of Hospital Medicine  [facility]

## 2023-02-03 NOTE — PLAN OF CARE
02/03/23 1601   Post-Acute Status   Post-Acute Authorization Home Health   Home Health Status Set-up Complete/Auth obtained     SW received notification that pt has been accepted with [facility].    Daisy Crook LMSW  [facility]  Ext. [phone]

## 2023-02-03 NOTE — PLAN OF CARE
02/03/23 1323   Post-Acute Status   Post-Acute Authorization Home Health   Home Health Status Referrals Sent     Pt is expected to discharge home with home health. SW sent referrals via [facility] and is waiting for an accepting agency.    LIZETH will continue to follow up.    Daisy Crook LMSW  [facility]  Ext. [phone]

## 2023-02-03 NOTE — PLAN OF CARE
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) [I50.814]  Yes    Dyspnea on exertion [R06.09]  Yes    Confusion and disorientation [R41.0]  Yes    Stage 3a chronic kidney disease [N18.31]  Yes    Persistent atrial fibrillation [I48.19]  Yes     Chronic    Controlled type 2 diabetes mellitus with stage 3 chronic kidney disease, without long-term current use of insulin [E11.22, N18.30]  Yes    History of DVT (deep vein thrombosis) [Z86.718]  Not Applicable    Low tension glaucoma, moderate stage - Both Eyes [H40.1292]  Yes    Other hyperlipidemia [E78.49]  Yes     Chronic    Osteopenia [M85.80]  Yes    Essential hypertension [I10]  Yes     Chronic      Resolved Hospital Problems   No resolved problems to display.       Follow Up Appointments:  Future Appointments   Date Time Provider Department Center   2/8/2023 10:00 AM Louis Sabillon MD [facility] CARDIO [facility]   6/1/2023 11:00 AM Stacy Rodriguez MD [facility] IM [facility] PCW       Allergies:Review of patient's allergies indicates:  No Known Allergies    Medications: Review discharge medications with patient and family and provide education.      I have seen and examined this patient within the last 30 days. My clinical findings that support the need for the home health skilled services and home bound status are the following:no   Weakness/numbness causing balance and gait disturbance due to Heart  Failure making it taxing to leave home.     Diet:   cardiac diet    Labs:  Per facility.    Referrals/ Consults  Physical Therapy to evaluate and treat. Evaluate for home safety and equipment needs; Establish/upgrade home exercise program. Perform / instruct on therapeutic exercises, gait training, transfer training, and Range of Motion.  Occupational Therapy to evaluate and treat. Evaluate home environment for safety and equipment needs. Perform/Instruct on transfers, ADL training, ROM, and therapeutic exercises.    Activities:   activity as tolerated    Nursing:   Agency to admit patient within 24 hours of hospital discharge unless specified on physician order or at patient request    SN to complete comprehensive assessment including routine vital signs. Instruct on disease process and s/s of complications to report to MD. Review/verify medication list sent home with the patient at time of discharge  and instruct patient/caregiver as needed. Frequency may be adjusted depending on start of care date.     Skilled nurse to perform up to 3 visits PRN for symptoms related to diagnosis    Notify MD if SBP > 160 or < 90; DBP > 90 or < 50; HR > 120 or < 50; Temp > 101; O2 < 88%.    Ok to schedule additional visits based on staff availability and patient request on consecutive days within the home health episode.    Miscellaneous   Home Oxygen:  Oxygen at 2 L/min nasal canula to be used:  As needed for SOB.    Home Health Aide:  Physical Therapy Three times weekly and Occupational Therapy Three times weekly    Wound Care Orders  no    I certify that this patient is confined to her home and needs physical therapy and occupational therapy.

## 2023-02-03 NOTE — NURSING
Reviewed discharge instructions with patient and family at bedside. Patient received prescription from bedside delivery. Patient also has O2 tank at bedside and was educated on this equipment. Removed tele monitor box as well as PIV x1, tolerated well pressure dressing applied. Arranged for staff to transport patient to private vehicle home. No questions or concerns voiced at this time.

## 2023-02-03 NOTE — PLAN OF CARE
[facility] scheduled the [facility] hospital follow up for February 10 @ 11:30am    A message was sent to the Hematology/Oncology department requesting an appointment on the patients behalf. I added this information to the AVS as a reminder for the patient.

## 2023-02-06 ENCOUNTER — TELEPHONE (OUTPATIENT)
Dept: INTERNAL MEDICINE | Facility: CLINIC | Age: 88
End: 2023-02-06
Payer: MEDICARE

## 2023-02-06 DIAGNOSIS — I50.9 CONGESTIVE HEART FAILURE, UNSPECIFIED HF CHRONICITY, UNSPECIFIED HEART FAILURE TYPE: Primary | ICD-10-CM

## 2023-02-06 NOTE — TELEPHONE ENCOUNTER
----- Message from Jane Moscoso sent at 2/6/2023  8:19 AM CST -----  Contact: Orlin [phone]  Pts grandmother  is calling for the pt and he is needing to know some info to get  for the pt please give return call

## 2023-02-06 NOTE — PLAN OF CARE
[facility]  Discharge Final Note    Primary Care Provider: Stacy Rodriguez MD    Expected Discharge Date: 2/3/2023    Patient discharged to home via personal transportation.     Patient's bedside nurse and patient notified of the above.      Final Discharge Note (most recent)       Final Note - 02/06/23 1417          Final Note    Assessment Type Final Discharge Note (P)      Anticipated Discharge Disposition Home-Health Care Svc (P)         Post-Acute Status    Post-Acute Authorization Home Health (P)      Home Health Status Set-up Complete/Auth obtained (P)                      Important Message from Medicare  Important Message from Medicare regarding Discharge Appeal Rights: Given to patient/caregiver, Explained to patient/caregiver, Signed/date by patient/caregiver     Date IMM was signed: 02/03/23  Time IMM was signed: 1102    Contact Info       Hematology/Oncology    Hematology/Oncology   [phone]       Next Steps: Follow up    Instructions: A message has been sent to the Hematology/Oncology department, someone will contact you to schedule a hospital follow up visit. However, if you do not hear from them within 24 to 48 hours of discharge please call to schedule the appointment.            Future Appointments   Date Time Provider Department Center   2/8/2023 10:00 AM Louis Sabillon MD [facility] CARDIO [facility]   6/1/2023 11:00 AM Stacy Rodriguez MD [facility] IM [facility] PCW        scheduled post-discharge follow-up appointment and information added to AVS.     Patient has been accepted with [facility].    Daisy Crook LMSW  [facility]  Ext. [phone]

## 2023-02-07 ENCOUNTER — PES CALL (OUTPATIENT)
Dept: ADMINISTRATIVE | Facility: CLINIC | Age: 88
End: 2023-02-07
Payer: MEDICARE

## 2023-02-07 DIAGNOSIS — Z00.00 ENCOUNTER FOR MEDICARE ANNUAL WELLNESS EXAM: ICD-10-CM

## 2023-02-07 PROCEDURE — G0180 PR HOME HEALTH MD CERTIFICATION: ICD-10-PCS | Mod: ,,, | Performed by: HOSPITALIST

## 2023-02-07 PROCEDURE — G0180 MD CERTIFICATION HHA PATIENT: HCPCS | Mod: ,,, | Performed by: HOSPITALIST

## 2023-02-08 ENCOUNTER — OFFICE VISIT (OUTPATIENT)
Dept: CARDIOLOGY | Facility: CLINIC | Age: 88
End: 2023-02-08
Payer: MEDICARE

## 2023-02-08 ENCOUNTER — PES CALL (OUTPATIENT)
Dept: ADMINISTRATIVE | Facility: CLINIC | Age: 88
End: 2023-02-08
Payer: MEDICARE

## 2023-02-08 VITALS
DIASTOLIC BLOOD PRESSURE: 62 MMHG | HEIGHT: 64 IN | HEART RATE: 76 BPM | OXYGEN SATURATION: 98 % | WEIGHT: 121.94 LBS | SYSTOLIC BLOOD PRESSURE: 134 MMHG | BODY MASS INDEX: 20.82 KG/M2

## 2023-02-08 DIAGNOSIS — I48.19 PERSISTENT ATRIAL FIBRILLATION: Chronic | ICD-10-CM

## 2023-02-08 DIAGNOSIS — N18.30 CONTROLLED TYPE 2 DIABETES MELLITUS WITH STAGE 3 CHRONIC KIDNEY DISEASE, WITHOUT LONG-TERM CURRENT USE OF INSULIN: ICD-10-CM

## 2023-02-08 DIAGNOSIS — E85.4 CARDIAC AMYLOIDOSIS: ICD-10-CM

## 2023-02-08 DIAGNOSIS — E11.22 CONTROLLED TYPE 2 DIABETES MELLITUS WITH STAGE 3 CHRONIC KIDNEY DISEASE, WITHOUT LONG-TERM CURRENT USE OF INSULIN: ICD-10-CM

## 2023-02-08 DIAGNOSIS — N18.31 STAGE 3A CHRONIC KIDNEY DISEASE: ICD-10-CM

## 2023-02-08 DIAGNOSIS — Z86.718 HISTORY OF DVT (DEEP VEIN THROMBOSIS): ICD-10-CM

## 2023-02-08 DIAGNOSIS — I70.0 CALCIFICATION OF AORTA: ICD-10-CM

## 2023-02-08 DIAGNOSIS — I50.32 CHRONIC DIASTOLIC CONGESTIVE HEART FAILURE: ICD-10-CM

## 2023-02-08 DIAGNOSIS — I10 ESSENTIAL HYPERTENSION: Primary | Chronic | ICD-10-CM

## 2023-02-08 DIAGNOSIS — I43 CARDIAC AMYLOIDOSIS: ICD-10-CM

## 2023-02-08 DIAGNOSIS — R06.09 DYSPNEA ON EXERTION: ICD-10-CM

## 2023-02-08 PROCEDURE — 1101F PR PT FALLS ASSESS DOC 0-1 FALLS W/OUT INJ PAST YR: ICD-10-PCS | Mod: HCNC,CPTII,GC,S$GLB | Performed by: INTERNAL MEDICINE

## 2023-02-08 PROCEDURE — 1101F PT FALLS ASSESS-DOCD LE1/YR: CPT | Mod: HCNC,CPTII,GC,S$GLB | Performed by: INTERNAL MEDICINE

## 2023-02-08 PROCEDURE — 3288F PR FALLS RISK ASSESSMENT DOCUMENTED: ICD-10-PCS | Mod: HCNC,CPTII,GC,S$GLB | Performed by: INTERNAL MEDICINE

## 2023-02-08 PROCEDURE — 1159F PR MEDICATION LIST DOCUMENTED IN MEDICAL RECORD: ICD-10-PCS | Mod: HCNC,CPTII,GC,S$GLB | Performed by: INTERNAL MEDICINE

## 2023-02-08 PROCEDURE — 99214 OFFICE O/P EST MOD 30 MIN: CPT | Mod: HCNC,GC,S$GLB, | Performed by: INTERNAL MEDICINE

## 2023-02-08 PROCEDURE — 99999 PR PBB SHADOW E&M-EST. PATIENT-LVL V: CPT | Mod: PBBFAC,HCNC,GC, | Performed by: INTERNAL MEDICINE

## 2023-02-08 PROCEDURE — 1159F MED LIST DOCD IN RCRD: CPT | Mod: HCNC,CPTII,GC,S$GLB | Performed by: INTERNAL MEDICINE

## 2023-02-08 PROCEDURE — 99999 PR PBB SHADOW E&M-EST. PATIENT-LVL V: ICD-10-PCS | Mod: PBBFAC,HCNC,GC, | Performed by: INTERNAL MEDICINE

## 2023-02-08 PROCEDURE — 1126F AMNT PAIN NOTED NONE PRSNT: CPT | Mod: HCNC,CPTII,GC,S$GLB | Performed by: INTERNAL MEDICINE

## 2023-02-08 PROCEDURE — 3288F FALL RISK ASSESSMENT DOCD: CPT | Mod: HCNC,CPTII,GC,S$GLB | Performed by: INTERNAL MEDICINE

## 2023-02-08 PROCEDURE — 99214 PR OFFICE/OUTPT VISIT, EST, LEVL IV, 30-39 MIN: ICD-10-PCS | Mod: HCNC,GC,S$GLB, | Performed by: INTERNAL MEDICINE

## 2023-02-08 PROCEDURE — 1160F PR REVIEW ALL MEDS BY PRESCRIBER/CLIN PHARMACIST DOCUMENTED: ICD-10-PCS | Mod: HCNC,CPTII,GC,S$GLB | Performed by: INTERNAL MEDICINE

## 2023-02-08 PROCEDURE — 1111F PR DISCHARGE MEDS RECONCILED W/ CURRENT OUTPATIENT MED LIST: ICD-10-PCS | Mod: HCNC,CPTII,GC,S$GLB | Performed by: INTERNAL MEDICINE

## 2023-02-08 PROCEDURE — 1111F DSCHRG MED/CURRENT MED MERGE: CPT | Mod: HCNC,CPTII,GC,S$GLB | Performed by: INTERNAL MEDICINE

## 2023-02-08 PROCEDURE — 1126F PR PAIN SEVERITY QUANTIFIED, NO PAIN PRESENT: ICD-10-PCS | Mod: HCNC,CPTII,GC,S$GLB | Performed by: INTERNAL MEDICINE

## 2023-02-08 PROCEDURE — 1160F RVW MEDS BY RX/DR IN RCRD: CPT | Mod: HCNC,CPTII,GC,S$GLB | Performed by: INTERNAL MEDICINE

## 2023-02-08 NOTE — PROGRESS NOTES
PCP - Stacy Rodriguez MD  Referring Physician:     Subjective:   Patient ID:  Inna Blankenship is a 89 y.o. y.o. female with a history of chronic diastolic heart failure, cardiac amyloidosis, hypertension, hyperlipidemia, afib on OAC, CKD and T2DM who presents to the clinic for hospital follow-up.  Patient was admitted to hospital on 01/30/2023 for 1 day secondary to diastolic heart failure whereby she was found to have cardiac amyloid via PYP scan.  Workup with immunofixation and serum and urine light chains revealed mildly elevated kappa light chains with a mildly elevated ratio.  She was evaluated by Hematology who recommends further workup regarding her cardiac amyloid as her labs were negative for AL amyloid further evaluation.  She was discharged on 80 of Lasix as she was requiring oxygen during hospitalization secondary to pulmonary edema.  She continues to require 2 L nasal cannula oxygen at home.  Per her son who was with her today, he removed her oxygen on the way over to the clinic in her O2 saturations dropped to 67.  Patient herself denies any dyspnea, or chest discomfort such as pain, pressure, or tightness.  She also denies any orthopnea, PND, lower extremity edema.  She appears to be euvolemic on exam today.    History:     Social History     Tobacco Use    Smoking status: Never    Smokeless tobacco: Never   Substance Use Topics    Alcohol use: Yes     Comment: wine occas.     Family History   Problem Relation Age of Onset    Heart attack Mother     Heart disease Mother     No Known Problems Father     Cancer Sister     Hypertension Daughter     Asthma Daughter     Allergies Daughter     Cancer Daughter         ovarian    Amblyopia Neg Hx     Blindness Neg Hx     Cataracts Neg Hx     Diabetes Neg Hx     Glaucoma Neg Hx     Macular degeneration Neg Hx     Retinal detachment Neg Hx     Strabismus Neg Hx     Stroke Neg Hx     Thyroid disease Neg Hx        Meds:   Review of patient's allergies  indicates:  No Known Allergies    Current Outpatient Medications:     amLODIPine (NORVASC) 10 MG tablet, TAKE 1 TABLET (10 MG TOTAL) BY MOUTH ONCE DAILY., Disp: 90 tablet, Rfl: 3    atorvastatin (LIPITOR) 20 MG tablet, Take 1 tablet (20 mg total) by mouth once daily., Disp: 90 tablet, Rfl: 1    ELIQUIS 2.5 mg Tab, TAKE 1 TABLET TWICE DAILY, Disp: 180 tablet, Rfl: 3    EScitalopram oxalate (LEXAPRO) 10 MG tablet, Take 1 tablet (10 mg total) by mouth once daily., Disp: 90 tablet, Rfl: 1    furosemide (LASIX) 80 MG tablet, Take 1 tablet (80 mg total) by mouth once daily., Disp: 30 tablet, Rfl: 11    latanoprost 0.005 % ophthalmic solution, Place 1 drop into both eyes once daily., Disp: 7.5 mL, Rfl: 3    losartan (COZAAR) 25 MG tablet, Take 1 tablet (25 mg total) by mouth once daily., Disp: 90 tablet, Rfl: 3    multivit with minerals/lutein (MULTIVITAMIN 50 PLUS ORAL), Take 1 tablet by mouth once daily. , Disp: , Rfl:     Review of Systems   Constitutional:  Negative for fever.   Eyes:  Negative for blurred vision and double vision.   Respiratory:  Negative for shortness of breath.    Cardiovascular:  Negative for chest pain, palpitations, orthopnea, claudication, leg swelling and PND.   Gastrointestinal:  Negative for abdominal pain, nausea and vomiting.   Neurological:  Negative for loss of consciousness and weakness.     Objective:   There were no vitals taken for this visit.  Physical Exam  Constitutional:       General: She is not in acute distress.     Appearance: She is not diaphoretic.   HENT:      Head: Normocephalic and atraumatic.      Right Ear: External ear normal.      Left Ear: External ear normal.   Neck:      Thyroid: No thyromegaly.      Trachea: No tracheal deviation.   Cardiovascular:      Rate and Rhythm: Normal rate.      Pulses:           Carotid pulses are 2+ on the right side and 2+ on the left side.       Radial pulses are 2+ on the right side and 2+ on the left side.        Femoral pulses are  2+ on the right side and 2+ on the left side.       Popliteal pulses are 2+ on the right side and 2+ on the left side.        Dorsalis pedis pulses are 2+ on the right side and 2+ on the left side.        Posterior tibial pulses are 2+ on the right side and 2+ on the left side.      Heart sounds: Normal heart sounds. No murmur heard.    No friction rub. No gallop.   Pulmonary:      Effort: No respiratory distress.      Breath sounds: No wheezing.   Abdominal:      General: There is no distension.      Tenderness: There is no abdominal tenderness. There is no rebound.   Musculoskeletal:         General: No tenderness or deformity. Normal range of motion.   Lymphadenopathy:      Cervical: No cervical adenopathy.   Skin:     Findings: No erythema or rash.   Neurological:      Mental Status: She is alert and oriented to person, place, and time.       Labs:     Lab Results   Component Value Date     02/03/2023    K 3.7 02/03/2023     02/03/2023    CO2 30 (H) 02/03/2023    BUN 23 02/03/2023    CREATININE 0.9 02/03/2023    ANIONGAP 9 02/03/2023     Lab Results   Component Value Date    HGBA1C 5.8 (H) 01/30/2023     Lab Results   Component Value Date     (H) 01/30/2023     (H) 07/31/2022     (H) 05/18/2022       Lab Results   Component Value Date    WBC 6.12 02/03/2023    HGB 13.5 02/03/2023    HCT 41.5 02/03/2023     02/03/2023    GRAN 3.9 02/03/2023    GRAN 63.9 02/03/2023     Lab Results   Component Value Date    CHOL 174 12/01/2022    HDL 67 12/01/2022    LDLCALC 99.2 12/01/2022    TRIG 39 12/01/2022       Lab Results   Component Value Date     02/03/2023    K 3.7 02/03/2023     02/03/2023    CO2 30 (H) 02/03/2023    BUN 23 02/03/2023    CREATININE 0.9 02/03/2023    ANIONGAP 9 02/03/2023     Lab Results   Component Value Date    HGBA1C 5.8 (H) 01/30/2023     Lab Results   Component Value Date     (H) 01/30/2023     (H) 07/31/2022     (H) 05/18/2022     Lab Results   Component Value Date    WBC 6.12 02/03/2023    HGB 13.5 02/03/2023    HCT 41.5 02/03/2023     02/03/2023    GRAN 3.9 02/03/2023    GRAN 63.9 02/03/2023     Lab Results   Component Value Date    CHOL 174 12/01/2022    HDL 67 12/01/2022    LDLCALC 99.2 12/01/2022    TRIG 39 12/01/2022                Assessment & Plan:     1. Essential hypertension    2. Persistent atrial fibrillation    3. Chronic diastolic congestive heart failure    4. Calcification of aorta    5. Dyspnea on exertion    6. Stage 3a chronic kidney disease    7. History of DVT (deep vein thrombosis)    8. Controlled type 2 diabetes mellitus with stage 3 chronic kidney disease, without long-term current use of insulin    9. 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  Had a long discussion with patient's family here with her today as well as her other son who was on the phone as well as the patient regarding possible treatment with tefamidis  Patient is functional and active around her house.  She lives with her children.  There is no age cutoff for tefamidis.  I also discussed with advanced heart failure, Dr. Patel who was in agreement with treating the patient should she agree to it.  There will be a factor of cost which will be discussed among the family members.  Family decided to take time to think about it and get back to me.  -will otherwise decrease her Lasix to 20 mg daily so that patient is not over diuresed.  She appears euvolemic if not dry today.  -will continue other current medication regimen  -follow-up with me in the clinic in 2-3 months    Signed:  Louis Sabillon M.D.  Cardiovascular Fellow  [facility]

## 2023-02-09 ENCOUNTER — PES CALL (OUTPATIENT)
Dept: ADMINISTRATIVE | Facility: CLINIC | Age: 88
End: 2023-02-09
Payer: MEDICARE

## 2023-02-09 ENCOUNTER — TELEPHONE (OUTPATIENT)
Dept: CARDIOLOGY | Facility: CLINIC | Age: 88
End: 2023-02-09
Payer: MEDICARE

## 2023-02-09 DIAGNOSIS — Z00.00 ENCOUNTER FOR MEDICARE ANNUAL WELLNESS EXAM: ICD-10-CM

## 2023-02-09 NOTE — TELEPHONE ENCOUNTER
Dr Sabillon updated per secure messaging and responded that pt should now take furosemide 20mg . Daughter updated and verbalized understanding.

## 2023-02-09 NOTE — TELEPHONE ENCOUNTER
----- Message from Allyson Alas RN sent at 2/8/2023  3:10 PM CST -----  Regarding: FW: medication    ----- Message -----  From: Renetta Sifuentes  Sent: 2/8/2023   3:00 PM CST  To: Allyson Alas RN  Subject: medication                                       Pls call pt's daughter Cara Saini [phone].  Pt saw Dr. Sabillon today and needs to know if she is to continue 80mg Lasix or go back to taking 20mg?    Thank you

## 2023-02-10 ENCOUNTER — DOCUMENTATION ONLY (OUTPATIENT)
Dept: CARDIOLOGY | Facility: CLINIC | Age: 88
End: 2023-02-10
Payer: MEDICARE

## 2023-02-10 NOTE — PROGRESS NOTES
"Pt is having her fluid status overseen by her Cardiologist Dr. KEVIN Sabillon. Pt was seen in ED at last Hospitalization and did not want to be placed in the HF TCC for a second time saying "I'll have to think about it".  Daughter at bedside tried to urge her Mother to enroll but to no avail. No response thus far re enrollment.    "

## 2023-02-15 ENCOUNTER — OUTPATIENT CASE MANAGEMENT (OUTPATIENT)
Dept: ADMINISTRATIVE | Facility: OTHER | Age: 88
End: 2023-02-15
Payer: MEDICARE

## 2023-02-15 ENCOUNTER — TELEPHONE (OUTPATIENT)
Dept: CARDIOLOGY | Facility: CLINIC | Age: 88
End: 2023-02-15
Payer: MEDICARE

## 2023-02-15 NOTE — TELEPHONE ENCOUNTER
----- Message from Allyson Alas RN sent at 2/15/2023 11:47 AM CST -----  Regarding: FW: Weight gain  [facility] home health called due to pt's wt gain. Wt went up fr #114 on 2/10 to #120 on 2/14 and @122 today. HH saw pt yesterday. She had no lower extremities swelling, denied SOB on oxygen, and was able to ambulate on her walker.  Pt was recently decreased (2/8 message) from furosemide 80 qd to 20 mg qd and has been on 20 mg ever since.     Please advise,    ----- Message -----  From: Florencio Urban  Sent: 2/15/2023  11:43 AM CST  To: Allyson Alas RN  Subject: Weight gain                                      Home health nurse called to report wgt gain on pt.  Transferred call

## 2023-02-15 NOTE — PROGRESS NOTES
Attempt #:  1  This LMSW attempted to reach patient/ grandson Orlin as requested by referral source, however no answer. SW  left a message requesting a return call.

## 2023-02-16 NOTE — PROGRESS NOTES
LIZETH received a return call on recorder from patient grandshirin. SW contacted grandson and provided an Advice Only as there's no involvement of care form nor POA and or MPOA on file. Grandson  reports patient has an MPOA. LIZETH encouraged grandson to provide a copy on next appointment. Best reports he and his brother handles grandmother and their mother affairs. Orlin requesting assistance with more in home support. Orlin reports mother which is his grandmother's only child is caring for patient. Grandshirin reports caregiver is eighty years old and think patient could use additional support. Grandson unsure if patient has a LTC policy but had a spouse that services in the . LIZETH provided son with information on Aid and Attendance program with the [facility] as grandfather served during war time. LIZETH also provided grandson with services that are offered through the local [place] on aging and information on Waiver Program through the [facility]. LIZETH provided grandson with all available resources within the community. Resource was provided via telephone and email [email or web address]

## 2023-02-17 ENCOUNTER — CARE AT HOME (OUTPATIENT)
Dept: HOME HEALTH SERVICES | Facility: CLINIC | Age: 88
End: 2023-02-17
Payer: MEDICARE

## 2023-02-17 DIAGNOSIS — E11.22 CONTROLLED TYPE 2 DIABETES MELLITUS WITH STAGE 3 CHRONIC KIDNEY DISEASE, WITHOUT LONG-TERM CURRENT USE OF INSULIN: ICD-10-CM

## 2023-02-17 DIAGNOSIS — N18.30 CONTROLLED TYPE 2 DIABETES MELLITUS WITH STAGE 3 CHRONIC KIDNEY DISEASE, WITHOUT LONG-TERM CURRENT USE OF INSULIN: ICD-10-CM

## 2023-02-17 DIAGNOSIS — E85.4 CARDIAC AMYLOIDOSIS: ICD-10-CM

## 2023-02-17 DIAGNOSIS — I43 CARDIAC AMYLOIDOSIS: ICD-10-CM

## 2023-02-17 DIAGNOSIS — I10 ESSENTIAL HYPERTENSION: Chronic | ICD-10-CM

## 2023-02-17 DIAGNOSIS — I50.33 ACUTE ON CHRONIC DIASTOLIC CONGESTIVE HEART FAILURE: ICD-10-CM

## 2023-02-17 DIAGNOSIS — J96.01 ACUTE HYPOXEMIC RESPIRATORY FAILURE: ICD-10-CM

## 2023-02-17 DIAGNOSIS — I48.19 PERSISTENT ATRIAL FIBRILLATION: Chronic | ICD-10-CM

## 2023-02-17 DIAGNOSIS — E78.49 OTHER HYPERLIPIDEMIA: Chronic | ICD-10-CM

## 2023-02-17 PROCEDURE — 99350 HOME/RES VST EST HIGH MDM 60: CPT | Mod: S$GLB,,, | Performed by: NURSE PRACTITIONER

## 2023-02-17 PROCEDURE — 1111F DSCHRG MED/CURRENT MED MERGE: CPT | Mod: CPTII,S$GLB,, | Performed by: NURSE PRACTITIONER

## 2023-02-17 PROCEDURE — 99350 PR HOME VISIT,ESTAB PATIENT,LEVEL IV: ICD-10-PCS | Mod: S$GLB,,, | Performed by: NURSE PRACTITIONER

## 2023-02-17 PROCEDURE — 1111F PR DISCHARGE MEDS RECONCILED W/ CURRENT OUTPATIENT MED LIST: ICD-10-PCS | Mod: CPTII,S$GLB,, | Performed by: NURSE PRACTITIONER

## 2023-03-01 ENCOUNTER — TELEPHONE (OUTPATIENT)
Dept: HEMATOLOGY/ONCOLOGY | Facility: CLINIC | Age: 88
End: 2023-03-01
Payer: MEDICARE

## 2023-03-02 ENCOUNTER — EXTERNAL HOME HEALTH (OUTPATIENT)
Dept: HOME HEALTH SERVICES | Facility: HOSPITAL | Age: 88
End: 2023-03-02
Payer: MEDICARE

## 2023-03-13 ENCOUNTER — TELEPHONE (OUTPATIENT)
Dept: INTERNAL MEDICINE | Facility: CLINIC | Age: 88
End: 2023-03-13
Payer: MEDICARE

## 2023-03-13 RX ORDER — FUROSEMIDE 80 MG/1
80 TABLET ORAL DAILY
Qty: 30 TABLET | Refills: 11 | Status: CANCELLED | OUTPATIENT
Start: 2023-03-13 | End: 2024-03-12

## 2023-03-13 RX ORDER — FUROSEMIDE 80 MG/1
80 TABLET ORAL DAILY
Qty: 90 TABLET | Refills: 1 | Status: SHIPPED | OUTPATIENT
Start: 2023-03-13 | End: 2023-04-24 | Stop reason: SDUPTHER

## 2023-03-13 NOTE — TELEPHONE ENCOUNTER
----- Message from Chacha Arthur sent at 3/13/2023 12:46 PM CDT -----  Contact: [employer]  Requesting an RX refill or new RX.  Is this a refill or new RX: Refill  RX name and strength (copy/paste from chart):  furosemide (LASIX) 20 MG tablet  Is this a 30 day or 90 day RX:   Pharmacy name and phone # (copy/paste from chart):  [facility] - [place] [id number] [facility]   Phone:  [phone]  Fax:  [phone]        The doctors have asked that we provide their patients with the following 2 reminders -- prescription refills can take up to 72 hours, and a friendly reminder that in the future you can use your [facility] account to request refills:

## 2023-03-13 NOTE — TELEPHONE ENCOUNTER
No new care gaps identified.  [facility] Embedded Care Gaps. Reference number: [id number]. 3/13/2023   1:36:22 PM CDT

## 2023-03-13 NOTE — TELEPHONE ENCOUNTER
----- Message from Chacha Arthur sent at 3/13/2023 12:46 PM CDT -----  Contact: [employer]  Requesting an RX refill or new RX.  Is this a refill or new RX: Refill  RX name and strength (copy/paste from chart):  furosemide (LASIX) 20 MG tablet  Is this a 30 day or 90 day RX:   Pharmacy name and phone # (copy/paste from chart):  [facility] - [place] [id number] [facility]   Phone:  [phone]  Fax:  [phone]        The doctors have asked that we provide their patients with the following 2 reminders -- prescription refills can take up to 72 hours, and a friendly reminder that in the future you can use your [facility] account to request refills:

## 2023-03-27 ENCOUNTER — DOCUMENT SCAN (OUTPATIENT)
Dept: HOME HEALTH SERVICES | Facility: HOSPITAL | Age: 88
End: 2023-03-27
Payer: MEDICARE

## 2023-04-05 VITALS
SYSTOLIC BLOOD PRESSURE: 121 MMHG | TEMPERATURE: 98 F | OXYGEN SATURATION: 98 % | HEART RATE: 70 BPM | DIASTOLIC BLOOD PRESSURE: 73 MMHG | RESPIRATION RATE: 18 BRPM

## 2023-04-05 NOTE — PROGRESS NOTES
"  [facility]  Medical Home Visit    Visit Date: 2/17/2023  Encounter Provider: Maria G Álvarez NP  PCP:  Stacy Rodriguez MD    Subjective:      Patient ID: Inna Blankenship is a 89 y.o. female.    Consult Requested By:  Dr. Keri Ivory  Reason for Consult: Medical Visit by Home Care Provider    The patient is being seen at home due to a physical debility that presents a taxing effort to leave the home, to mitigate high risk of hospital readmission or due to the limited availability of reliable or safe options for transportation to the point of access to the provider. The visit meets the criteria for medical necessity as defined by CMS as "health-care services needed to prevent, diagnose, or treat an illness, injury, condition, disease, or its symptoms and that meet accepted standards of medicine." Prior to treatment on this visit the chart was reviewed and patient consent was obtained.    Chief Complaint: Follow-up for chronic medical conditions and medication review    HPI: Inna Blankenship is a 89 y.o. y.o. female with a history of chronic diastolic heart failure, cardiac amyloidosis, hypertension, hyperlipidemia, afib on OAC, CKD and T2DM.  Patient was admitted to hospital on 01/30/2023 for 1 day secondary to diastolic heart failure whereby she was found to have cardiac amyloid via PYP scan.  Workup with immunofixation and serum and urine light chains revealed mildly elevated kappa light chains with a mildly elevated ratio.  She was evaluated by Hematology who recommends further workup regarding her cardiac amyloid as her labs were negative for AL amyloid further evaluation.  She was discharged on 80 of Lasix as she was requiring oxygen during hospitalization secondary to pulmonary edema.  She continues to require 2 L nasal cannula oxygen at home.      Today:  Ms. Inna Blankenship is a 89 y.o. female Inna presents at baseline state of health as reported by patient and caregiver. VSS. She is sitting at kitchen table, no " signs of distress.  She appears euvolemic on exam. Denies chest pain, SOB, fevers, cough, congestion, orthopnea, peripheral edema, change in bladder or bowel habits.  She followed up with cardiology since hospital discharge.  Patient/family are aware of upcoming appts and plan on attending. Denies any acute issues, concerns or complaints to address on today's visit. Reports taking all medications as prescribed. No other needs identified at this time. Risks of environmental exposure to coronavirus discussed including: social distancing, hand hygiene, and limiting departures from the home for necessities only.  Reports understanding and willingness to comply.      Attestation: Screening criteria to assess the level of the patient's risk for infection with COVID-19 as recommended by the CDC at the time of the above documented home visit concluded appropriateness to proceed. Universal precautions were maintained at all times, including provider use of >60% alcohol gel hand  immediately prior to entry and upon departing the patient's home as well as cleaning of equipment used in home visit with antibacterial/germicidal disposable wipes.     Review of Systems   Constitutional:  Negative for chills and fever.   HENT:  Negative for congestion, postnasal drip and rhinorrhea.    Eyes:  Negative for visual disturbance.   Respiratory:  Negative for chest tightness and shortness of breath.    Cardiovascular:  Negative for chest pain, palpitations and leg swelling.   Gastrointestinal:  Negative for abdominal pain, blood in stool, constipation, nausea and vomiting.   Genitourinary:  Negative for difficulty urinating.   Musculoskeletal:  Negative for arthralgias and myalgias.   Skin:  Negative for color change.   Neurological:  Negative for dizziness and light-headedness.   Hematological:  Does not bruise/bleed easily.   Psychiatric/Behavioral:  Negative for agitation.      Assessments:  Environmental: single story home,  no steps to enter, adequate lighting and temperature control  Functional Status: assistance with ADL's/IADL's, ambulates with assistance of a cane/walker, continent of bowel and bladder  Safety: Fall Precautions, COVID Precautions/Social Distancing/Mask Use  Nutritional: Adequate        Objective:     Vitals:    02/17/23 0800   BP: 121/73   Pulse: 70   Resp: 18   Temp: 98 °F (36.7 °C)   SpO2: 98%   PainSc: 0-No pain     There is no height or weight on file to calculate BMI.    Physical Exam  HENT:      Head: Normocephalic and atraumatic.      Nose: No congestion or rhinorrhea.      Mouth/Throat:      Mouth: Mucous membranes are moist.   Cardiovascular:      Rate and Rhythm: Normal rate.   Pulmonary:      Effort: No respiratory distress.      Comments: Oxygen nasal cannula intact  Abdominal:      General: Bowel sounds are normal.      Palpations: Abdomen is soft.   Musculoskeletal:      Cervical back: Normal range of motion and neck supple.      Right lower leg: No edema.      Left lower leg: No edema.   Skin:     General: Skin is warm and dry.   Neurological:      Mental Status: She is alert. Mental status is at baseline.     Assessment:     1. Acute on chronic diastolic congestive heart failure    2. Essential hypertension    3. Other hyperlipidemia    4. Persistent atrial fibrillation    5. Controlled type 2 diabetes mellitus with stage 3 chronic kidney disease, without long-term current use of insulin    6. Acute hypoxemic respiratory failure    7. Cardiac amyloidosis      Plan:          1. Acute on chronic diastolic congestive heart failure  Assessment & Plan:  Appears euvolemic  Denies chest pain, SOB  Continue medication as prescribed  Keep scheduled follow up appts  Activity and Diet restrictions:   Recommend 2-3 gram sodium restriction and 1500cc- 2000cc fluid restriction.  Encourage physical activity with graded exercise program.  Requested patient to weigh themselves daily, and to notify us if their weight  increases by more than 3 lbs in 1 day or 5 lbs in 3 days.  Elevated lower extremities while sitting/lying, compression stockings       Orders:  -     Ambulatory referral/consult to [facility]    2. Essential hypertension  Assessment & Plan:  Controlled  Continue meds as prescribed  Low Na diet       3. Other hyperlipidemia  Assessment & Plan:  Denies chest pain  Continue statin       4. Persistent atrial fibrillation  Assessment & Plan:  Rate controlled  Continue meds as prescribed  Continue to follow with cards      5. Controlled type 2 diabetes mellitus with stage 3 chronic kidney disease, without long-term current use of insulin  Assessment & Plan:  Controlled   A1c 5.8        6. Acute hypoxemic respiratory failure  Assessment & Plan:  Respiratory symptoms stable  Continue oxygen  Continue chf management       7. Cardiac amyloidosis  Assessment & Plan:  No acute issues  continue to follow with cardiology  Referral previously placed to hem/onc  Instructed to keep all scheduled follow up                Encounter for Medical Follow-Up and Medication Review   - [facility] Nurse Practitioner to schedule home visit with patient in one month or PRN.    Were controlled substances prescribed? No    Instructions:  - Continue all medications, treatments and therapies as ordered.   - Follow all instructions, recommendations as discussed.  - Maintain Safety Precautions at all times.  - Attend all medical appointments as scheduled.  - For worsening symptoms: call Primary Care Physician or Nurse Practitioner.  - For emergencies, call 911 or immediately report to the nearest emergency room.  - Limit Risks of environmental exposure to coronavirus/COVID-19 as discussed including: social distancing, hand hygiene, and limiting departures from the home for necessities only.          Follow Up Appointments:   Future Appointments   Date Time Provider Department Center   6/1/2023 11:00 AM Stacy  HUMZA Rodriguez MD [facility] IM [facility] PCW   6/13/2023  9:30 AM Josef Saleh MD [facility] CARDIO [facility]       Patient consent was obtained prior to treatment on this visit.    Signature:       Maria G Álvarez, MSN, APRN, FNP-C  [facility]    Total face-to-face time was 60 min, >50% of this was spent on counseling and coordination of care. The following issues were discussed: primary and secondary diagnoses, co-morbidities, prescribed medications, treatment modalities, importance of compliance with medical advice and directives for follow-up care

## 2023-04-05 NOTE — ASSESSMENT & PLAN NOTE
No acute issues  continue to follow with cardiology  Referral previously placed to hem/onc  Instructed to keep all scheduled follow up

## 2023-04-05 NOTE — ASSESSMENT & PLAN NOTE
1. Appears euvolemic  2. Denies chest pain, SOB  3. Continue medication as prescribed  4. Keep scheduled follow up appts  5. Activity and Diet restrictions:   ? Recommend 2-3 gram sodium restriction and 1500cc- 2000cc fluid restriction.  ? Encourage physical activity with graded exercise program.  ? Requested patient to weigh themselves daily, and to notify us if their weight increases by more than 3 lbs in 1 day or 5 lbs in 3 days.  ? Elevated lower extremities while sitting/lying, compression stockings

## 2023-04-08 PROCEDURE — G0179 MD RECERTIFICATION HHA PT: HCPCS | Mod: ,,, | Performed by: INTERNAL MEDICINE

## 2023-04-08 PROCEDURE — G0179 PR HOME HEALTH MD RECERTIFICATION: ICD-10-PCS | Mod: ,,, | Performed by: INTERNAL MEDICINE

## 2023-04-24 RX ORDER — FUROSEMIDE 80 MG/1
80 TABLET ORAL DAILY
Qty: 90 TABLET | Refills: 0 | Status: SHIPPED | OUTPATIENT
Start: 2023-04-24 | End: 2023-05-19 | Stop reason: SDUPTHER

## 2023-04-24 NOTE — TELEPHONE ENCOUNTER
----- Message from Carolyn David sent at 4/24/2023 11:37 AM CDT -----  Contact: [facility] [phone]  Requesting an RX refill or new RX.  Is this a refill or new RX:   RX name and strength : furosemide (LASIX) 20 MG tablet   Is this a 30 day or 90 day RX:   Pharmacy name and phone # (copy/paste from chart):  [id number] [place] [facility] [address]   Phone:  [phone]  Fax:  [phone]        The doctors have asked that we provide their patients with the following 2 reminders -- prescription refills can take up to 72 hours, and a friendly reminder that in the future you can use your [facility] account to request refills: Estefania with [facility] calling to if the furosemide can be changed back to 20 MG because patient saw the cardiologist and was put  back on the 20 MG

## 2023-04-24 NOTE — TELEPHONE ENCOUNTER
No new care gaps identified.  [facility] Embedded Care Gaps. Reference number: [id number]. 4/24/2023   2:46:12 PM CDT

## 2023-04-30 ENCOUNTER — EXTERNAL HOME HEALTH (OUTPATIENT)
Dept: HOME HEALTH SERVICES | Facility: HOSPITAL | Age: 88
End: 2023-04-30
Payer: MEDICARE

## 2023-05-08 PROBLEM — J96.01 ACUTE HYPOXEMIC RESPIRATORY FAILURE: Status: RESOLVED | Noted: 2023-01-30 | Resolved: 2023-05-08

## 2023-05-16 NOTE — PROGRESS NOTES
"Subjective:    Patient ID:  Inna Blankenship is a 89 y.o. y.o. female who presents for follow-up 1 year for DVT and venous ultrasound. Previously seen by 7/2017. PMHx of HtN, HLD, Glaucoma, DM type II, CKD. Here for follow up for nonocclusive thrombus within the left common femoral vein and left superficial femoral vein. She has been on Apixaban 5mg BID x1 year stopped 6/4/2018.      US Today:  LEFT:  Normal compressibility, augmentation, and flow were visualized in the Left Common Femoral, Femoral, Profunda Femoral, Anterior Tibial, Posterior Tibial, External Iliac, and Saphenofemoral Junction Veins.  The Left Peroneal vein was not visualized.    The Popliteal Vein is partially compressible.    Impression:  LEFT:  Age Indeterminate DVT of the Popliteal Vein.        No smx.        HPI    Review of Systems   Constitutional: Negative for malaise/fatigue and weight loss.   Eyes: Negative for blurred vision and double vision.   Respiratory: Negative for shortness of breath.    Cardiovascular: Negative for chest pain, palpitations, orthopnea, claudication, leg swelling and PND.   Gastrointestinal: Negative for abdominal pain, constipation, diarrhea, melena, nausea and vomiting.   Genitourinary: Negative for hematuria.   Musculoskeletal: Negative for myalgias.   Neurological: Negative for dizziness, weakness and headaches.   Endo/Heme/Allergies: Does not bruise/bleed easily.   Psychiatric/Behavioral: Negative for memory loss.        Objective:     BP (!) 184/84 (BP Location: Right arm, Patient Position: Sitting, BP Method: Large (Automatic))   Pulse (!) 56   Ht 5' 2" (1.575 m)   Wt 63 kg (138 lb 14.2 oz)   BMI 25.40 kg/m²     Physical Exam   Constitutional: She is oriented to person, place, and time. No distress.   HENT:   Head: Normocephalic.   Eyes: Pupils are equal, round, and reactive to light. No scleral icterus.   Neck: No hepatojugular reflux and no JVD present. Carotid bruit is not present. No edema present. No " thyromegaly present.   Cardiovascular: Normal rate, regular rhythm, S1 normal, S2 normal and intact distal pulses.  Exam reveals no gallop, no S3 and no friction rub.    No murmur heard.  Pulses:       Radial pulses are 2+ on the right side, and 2+ on the left side.        Femoral pulses are 2+ on the right side, and 2+ on the left side.       Dorsalis pedis pulses are 2+ on the right side, and 2+ on the left side.        Posterior tibial pulses are 2+ on the right side, and 2+ on the left side.   Pulmonary/Chest: No respiratory distress. She has no wheezes. She exhibits no tenderness.   Abdominal: Normal appearance and bowel sounds are normal. She exhibits no distension, no abdominal bruit and no mass. There is no splenomegaly or hepatomegaly.   Musculoskeletal: She exhibits no edema.        Right shoulder: She exhibits no swelling.   Neurological: She is alert and oriented to person, place, and time. She has normal sensation. She is not disoriented. Gait normal.   Skin: Skin is warm, dry and intact. She is not diaphoretic.   Psychiatric: Affect normal.       Labs:     Lab Results   Component Value Date     02/06/2018    K 4.2 02/06/2018     02/06/2018    CO2 28 02/06/2018    BUN 21 02/06/2018    CREATININE 0.8 02/06/2018    ANIONGAP 7 (L) 02/06/2018     Lab Results   Component Value Date    HGBA1C 6.0 (H) 02/06/2018     No results found for: BNP, BNPTRIAGEBLO    Lab Results   Component Value Date    WBC 5.03 10/02/2017    HGB 11.9 (L) 10/02/2017    HCT 35.9 (L) 10/02/2017     10/02/2017    GRAN 2.7 10/02/2017    GRAN 53.5 10/02/2017     Lab Results   Component Value Date    CHOL 166 02/06/2018    HDL 59 02/06/2018    LDLCALC 98.0 02/06/2018    TRIG 45 02/06/2018       Meds:     Current Outpatient Prescriptions:     acetaminophen (TYLENOL) 160 MG Chew, Take 160 mg by mouth every 4 (four) hours as needed., Disp: , Rfl:     amLODIPine (NORVASC) 10 MG tablet, Take 1 tablet (10 mg total) by mouth  once daily., Disp: 90 tablet, Rfl: 1    atorvastatin (LIPITOR) 20 MG tablet, Take 1 tablet (20 mg total) by mouth once daily., Disp: 90 tablet, Rfl: 1    brimonidine-timolol (COMBIGAN) 0.2-0.5 % Drop, Place 1 drop into both eyes 2 (two) times daily. Disp 10 mls for 1 month, Disp: 30 mL, Rfl: 3    CALCIUM CARBONATE (REZV-NDW-906 ORAL), Take 1 tablet by mouth Daily. 1  Oral Every day, Disp: , Rfl:     ELIQUIS 5 mg Tab, TAKE 1 TABLET TWICE DAILY, Disp: 180 tablet, Rfl: 0    latanoprost 0.005 % ophthalmic solution, INSTILL 1 DROP INTO BOTH EYES IN THE EVENING, Disp: 3 Bottle, Rfl: 3    lisinopril (PRINIVIL,ZESTRIL) 40 MG tablet, Take 1 tablet (40 mg total) by mouth once daily., Disp: 90 tablet, Rfl: 1          Assessment & Plan:     History of DVT (deep vein thrombosis)  Previously noted CFV / SFV DVT, today u/s consistent with Popliteal DVT on the LLE.   - She has been on Apixaban 5mg BID since 06/2017. Tolerating well with decreased swelling.   - Continue Apixaban 5mg BID likely indefinitely as she is tolerating well  - Follow up with PCP  - Follow with Dr. Sabillon PRN   - if any issues with bleeding she can be discontinued as she has distal DVT   - Continue ambulating as tolerated           Signed:  Keven Carter M.D.  Page # [phone]  Cardiovascular Fellow PGY-IV  [facility]       I have personally taken the history and examined this patient. I have discussed and agree with the resident's findings and plan as documented in the resident's note.  Benny Sabillon   Otezla Counseling: The side effects of Otezla were discussed with the patient, including but not limited to worsening or new depression, weight loss, diarrhea, nausea, upper respiratory tract infection, and headache. Patient instructed to call the office should any adverse effect occur.  The patient verbalized understanding of the proper use and possible adverse effects of Otezla.  All the patient's questions and concerns were addressed.

## 2023-05-18 ENCOUNTER — TELEPHONE (OUTPATIENT)
Dept: INTERNAL MEDICINE | Facility: CLINIC | Age: 88
End: 2023-05-18
Payer: MEDICARE

## 2023-05-18 NOTE — TELEPHONE ENCOUNTER
----- Message from Cecelia Ramsey sent at 5/18/2023  2:10 PM CDT -----  Regarding: Rx  Contact: Daughter - Cara  Pt's daughter stated the last Rx they got for furosemide was 90mg, but she normally takes 20mg, so she's been breaking them in half. She'd like clarification on the furosemide (LASIX) 80 MG tablet.

## 2023-05-19 ENCOUNTER — TELEPHONE (OUTPATIENT)
Dept: CARDIOLOGY | Facility: CLINIC | Age: 88
End: 2023-05-19
Payer: MEDICARE

## 2023-05-19 DIAGNOSIS — I10 ESSENTIAL HYPERTENSION: ICD-10-CM

## 2023-05-19 DIAGNOSIS — I50.32 CHRONIC DIASTOLIC CONGESTIVE HEART FAILURE: Primary | ICD-10-CM

## 2023-05-19 DIAGNOSIS — I48.19 PERSISTENT ATRIAL FIBRILLATION: ICD-10-CM

## 2023-05-19 RX ORDER — FUROSEMIDE 20 MG/1
20 TABLET ORAL DAILY
Qty: 90 TABLET | Refills: 3 | Status: ON HOLD | OUTPATIENT
Start: 2023-05-19 | End: 2023-08-02 | Stop reason: SDUPTHER

## 2023-05-19 NOTE — TELEPHONE ENCOUNTER
Updated dr Saleh on previous message. Per dr Saleh, pt should just take a 20mg pill per day and call us if issue w. swelling SOB wt gain. Prescription sent to [facility]. Daughter Cara was updated and verbalized understanding.

## 2023-05-19 NOTE — TELEPHONE ENCOUNTER
Received message fr dr Rodriguez to clarify furosemide dose. Post feb hospital d/c, furosemide ordered for 80mg qd. At following appt , dr Sabillon told pt to lower it to 20 mg per day.Spoke w. daughter Cara. She did not have a prescription for 20 mg and only had 80 mg pills. She has been giving her mother 1/4 to 1/2 pill (20 to 40mg ) depending on pt's wt (checks wt qd). She says that pt has no swelling, wt stable, no SOB but on oxygen 2lpm.  When asked she did say that her mother did not have much energy for activity. I rescheduled pt's f/u appt for 5/29 (unable to come next week due to transportation issues) and told her to continue doing what she was doing and that I will update her on furosemide dose after talking to dr Sabillon on Monday. She verbalized understanding and agreed to date/time of appointment(s).

## 2023-05-19 NOTE — PROGRESS NOTES
Notified by clinic nurse that she has been cutting her pills into 1/4 tablets because she was told to take 20 mg but only has 80 mg tablets. Rx for 20 mg tablets sent.

## 2023-05-19 NOTE — TELEPHONE ENCOUNTER
It looks like it was increased when she was in the hospital in Feb. She should clarify with her cardiologist

## 2023-05-29 ENCOUNTER — HOSPITAL ENCOUNTER (OUTPATIENT)
Dept: CARDIOLOGY | Facility: CLINIC | Age: 88
Discharge: HOME | End: 2023-05-29
Payer: MEDICARE

## 2023-05-29 ENCOUNTER — OFFICE VISIT (OUTPATIENT)
Dept: CARDIOLOGY | Facility: CLINIC | Age: 88
End: 2023-05-29
Payer: MEDICARE

## 2023-05-29 VITALS
HEART RATE: 81 BPM | BODY MASS INDEX: 22.92 KG/M2 | DIASTOLIC BLOOD PRESSURE: 74 MMHG | SYSTOLIC BLOOD PRESSURE: 171 MMHG | WEIGHT: 134.25 LBS | HEIGHT: 64 IN

## 2023-05-29 DIAGNOSIS — N18.31 STAGE 3A CHRONIC KIDNEY DISEASE: ICD-10-CM

## 2023-05-29 DIAGNOSIS — I50.32 CHRONIC DIASTOLIC CONGESTIVE HEART FAILURE: ICD-10-CM

## 2023-05-29 DIAGNOSIS — I48.19 PERSISTENT ATRIAL FIBRILLATION: Chronic | ICD-10-CM

## 2023-05-29 DIAGNOSIS — I10 ESSENTIAL HYPERTENSION: ICD-10-CM

## 2023-05-29 DIAGNOSIS — I50.33 ACUTE ON CHRONIC DIASTOLIC CONGESTIVE HEART FAILURE: ICD-10-CM

## 2023-05-29 DIAGNOSIS — I43 CARDIAC AMYLOIDOSIS: ICD-10-CM

## 2023-05-29 DIAGNOSIS — I48.19 PERSISTENT ATRIAL FIBRILLATION: ICD-10-CM

## 2023-05-29 DIAGNOSIS — I50.9 CONGESTIVE HEART FAILURE, UNSPECIFIED HF CHRONICITY, UNSPECIFIED HEART FAILURE TYPE: ICD-10-CM

## 2023-05-29 DIAGNOSIS — I10 ESSENTIAL HYPERTENSION: Primary | Chronic | ICD-10-CM

## 2023-05-29 DIAGNOSIS — E78.49 OTHER HYPERLIPIDEMIA: Chronic | ICD-10-CM

## 2023-05-29 DIAGNOSIS — E85.4 CARDIAC AMYLOIDOSIS: ICD-10-CM

## 2023-05-29 PROCEDURE — 99999 PR PBB SHADOW E&M-EST. PATIENT-LVL IV: CPT | Mod: PBBFAC,HCNC,, | Performed by: HOSPITALIST

## 2023-05-29 PROCEDURE — 99214 PR OFFICE/OUTPT VISIT, EST, LEVL IV, 30-39 MIN: ICD-10-PCS | Mod: HCNC,25,S$GLB, | Performed by: HOSPITALIST

## 2023-05-29 PROCEDURE — 99214 OFFICE O/P EST MOD 30 MIN: CPT | Mod: HCNC,25,S$GLB, | Performed by: HOSPITALIST

## 2023-05-29 PROCEDURE — 93010 ELECTROCARDIOGRAM REPORT: CPT | Mod: HCNC,S$GLB,, | Performed by: INTERNAL MEDICINE

## 2023-05-29 PROCEDURE — 1159F MED LIST DOCD IN RCRD: CPT | Mod: HCNC,CPTII,S$GLB, | Performed by: HOSPITALIST

## 2023-05-29 PROCEDURE — 93005 EKG 12-LEAD: ICD-10-PCS | Mod: HCNC,S$GLB,, | Performed by: HOSPITALIST

## 2023-05-29 PROCEDURE — 1101F PT FALLS ASSESS-DOCD LE1/YR: CPT | Mod: HCNC,CPTII,S$GLB, | Performed by: HOSPITALIST

## 2023-05-29 PROCEDURE — 1126F AMNT PAIN NOTED NONE PRSNT: CPT | Mod: HCNC,CPTII,S$GLB, | Performed by: HOSPITALIST

## 2023-05-29 PROCEDURE — 3288F FALL RISK ASSESSMENT DOCD: CPT | Mod: HCNC,CPTII,S$GLB, | Performed by: HOSPITALIST

## 2023-05-29 PROCEDURE — 93005 ELECTROCARDIOGRAM TRACING: CPT | Mod: HCNC,S$GLB,, | Performed by: HOSPITALIST

## 2023-05-29 PROCEDURE — 1159F PR MEDICATION LIST DOCUMENTED IN MEDICAL RECORD: ICD-10-PCS | Mod: HCNC,CPTII,S$GLB, | Performed by: HOSPITALIST

## 2023-05-29 PROCEDURE — 1101F PR PT FALLS ASSESS DOC 0-1 FALLS W/OUT INJ PAST YR: ICD-10-PCS | Mod: HCNC,CPTII,S$GLB, | Performed by: HOSPITALIST

## 2023-05-29 PROCEDURE — 3288F PR FALLS RISK ASSESSMENT DOCUMENTED: ICD-10-PCS | Mod: HCNC,CPTII,S$GLB, | Performed by: HOSPITALIST

## 2023-05-29 PROCEDURE — 93010 EKG 12-LEAD: ICD-10-PCS | Mod: HCNC,S$GLB,, | Performed by: INTERNAL MEDICINE

## 2023-05-29 PROCEDURE — 99999 PR PBB SHADOW E&M-EST. PATIENT-LVL IV: ICD-10-PCS | Mod: PBBFAC,HCNC,, | Performed by: HOSPITALIST

## 2023-05-29 PROCEDURE — 1126F PR PAIN SEVERITY QUANTIFIED, NO PAIN PRESENT: ICD-10-PCS | Mod: HCNC,CPTII,S$GLB, | Performed by: HOSPITALIST

## 2023-05-29 NOTE — PROGRESS NOTES
Cardiology Clinic Note  Reason for Visit: Hypertension         HPI:    89 y.o. y.o. female with a history of chronic diastolic heart failure, cardiac amyloidosis, hypertension, hyperlipidemia, afib on OAC, CKD and T2DM  presents to the clinic for  follow-up.  Patient currently denies having any orthopnea PND bilateral lower extremity edema.  Her blood pressures are elevated in the clinic today but states that she did not have a blood pressure medication this morning.  Patient daughter states that she checks her blood pressures every day and they range between 130s to 140s.  No more admissions to the hospital for fluid overload.  She is dependent on 2 L home oxygen but she is off oxygen today in the clinic.    ROS:      Review of Systems   Constitutional: Negative.    HENT: Negative.     Eyes: Negative.    Respiratory: Negative.     Cardiovascular: Negative.    Gastrointestinal: Negative.    Genitourinary: Negative.    Musculoskeletal: Negative.    Skin: Negative.    Neurological: Negative.      PMH:     Past Medical History:   Diagnosis Date    Acute hypoxemic respiratory failure 1/30/2023    Arthritis     CHF (congestive heart failure) 12/26/2018    Chronic atrial fibrillation 8/29/2020    Chronic kidney disease, stage III (moderate) 9/11/2015    Colon adenoma     Diabetes mellitus, type II     Diet controlled    Glaucoma     Hyperlipemia     Hypertension     Osteopenia        PSH:     Past Surgical History:   Procedure Laterality Date    APPENDECTOMY      CATARACT EXTRACTION W/  INTRAOCULAR LENS IMPLANT Right 03/15/2006        CATARACT EXTRACTION W/  INTRAOCULAR LENS IMPLANT Left 09/27/2006        COLONOSCOPY W/ POLYPECTOMY      EYE SURGERY      HYSTERECTOMY      TRANSESOPHAGEAL ECHOCARDIOGRAPHY N/A 11/9/2020    Procedure: ECHOCARDIOGRAM, TRANSESOPHAGEAL;  Surgeon: Marcelina Diagnostic Provider;  Location: [facility];  Service: Cardiology;  Laterality: N/A;    TREATMENT OF CARDIAC ARRHYTHMIA  N/A 11/9/2020    Procedure: CARDIOVERSION;  Surgeon: Marvin Elmore MD;  Location: [facility];  Service: Cardiology;  Laterality: N/A;  AF, RAFFAELE, DCCV, MAC, GP, 3 PREP     Allergies:   Review of patient's allergies indicates:  No Known Allergies  Medications:     Current Outpatient Medications on File Prior to Visit   Medication Sig Dispense Refill    amLODIPine (NORVASC) 10 MG tablet TAKE 1 TABLET (10 MG TOTAL) BY MOUTH ONCE DAILY. 90 tablet 3    atorvastatin (LIPITOR) 20 MG tablet TAKE 1 TABLET ONE TIME DAILY 90 tablet 1    ELIQUIS 2.5 mg Tab TAKE 1 TABLET TWICE DAILY 180 tablet 3    EScitalopram oxalate (LEXAPRO) 10 MG tablet TAKE 1 TABLET ONE TIME DAILY 90 tablet 1    furosemide (LASIX) 20 MG tablet Take 1 tablet (20 mg total) by mouth once daily. 90 tablet 3    latanoprost 0.005 % ophthalmic solution Place 1 drop into both eyes once daily. 7.5 mL 3    losartan (COZAAR) 25 MG tablet Take 1 tablet (25 mg total) by mouth once daily. 90 tablet 3    multivit with minerals/lutein (MULTIVITAMIN 50 PLUS ORAL) Take 1 tablet by mouth once daily.        No current facility-administered medications on file prior to visit.     Social History:     Social History     Tobacco Use    Smoking status: Never    Smokeless tobacco: Never   Substance Use Topics    Alcohol use: Yes     Comment: wine occas.     Family History:     Family History   Problem Relation Age of Onset    Heart attack Mother     Heart disease Mother     No Known Problems Father     Cancer Sister     Hypertension Daughter     Asthma Daughter     Allergies Daughter     Cancer Daughter         ovarian    Amblyopia Neg Hx     Blindness Neg Hx     Cataracts Neg Hx     Diabetes Neg Hx     Glaucoma Neg Hx     Macular degeneration Neg Hx     Retinal detachment Neg Hx     Strabismus Neg Hx     Stroke Neg Hx     Thyroid disease Neg Hx      Physical Exam:   BP (!) 171/74 (BP Location: Right arm, Patient Position: Sitting) Comment: patient didn't take blood pressure meds  " Pulse 81   Ht 5' 4" (1.626 m)   Wt 60.9 kg (134 lb 4.2 oz)   BMI 23.05 kg/m²      Physical Exam  Constitutional:       Appearance: Normal appearance.   HENT:      Head: Normocephalic.      Nose: Nose normal.   Eyes:      Pupils: Pupils are equal, round, and reactive to light.   Cardiovascular:      Rate and Rhythm: Normal rate and regular rhythm.   Pulmonary:      Effort: Pulmonary effort is normal.      Breath sounds: Normal breath sounds.   Abdominal:      General: Abdomen is flat. Bowel sounds are normal.      Palpations: Abdomen is soft.   Musculoskeletal:      Cervical back: Normal range of motion.   Skin:     General: Skin is warm.      Capillary Refill: Capillary refill takes less than 2 seconds.   Neurological:      General: No focal deficit present.      Mental Status: She is alert and oriented to person, place, and time.       Notable Labs:     Lab Results   Component Value Date     02/03/2023    K 3.7 02/03/2023     02/03/2023    CO2 30 (H) 02/03/2023    BUN 23 02/03/2023    CREATININE 0.9 02/03/2023    ANIONGAP 9 02/03/2023     Lab Results   Component Value Date    HGBA1C 5.8 (H) 01/30/2023     Lab Results   Component Value Date     (H) 01/30/2023     (H) 07/31/2022     (H) 05/18/2022    Lab Results   Component Value Date    WBC 6.12 02/03/2023    HGB 13.5 02/03/2023    HCT 41.5 02/03/2023     02/03/2023    GRAN 3.9 02/03/2023    GRAN 63.9 02/03/2023     Lab Results   Component Value Date    CHOL 174 12/01/2022    HDL 67 12/01/2022    LDLCALC 99.2 12/01/2022    TRIG 39 12/01/2022          Imaging:     EF   Date Value Ref Range Status   01/31/2023 45 % Final   04/11/2022 50 % Final   04/17/2021 55 % Final       EKG: normal sinus rhythm, no blocks or conduction defects, no ischemic changes  Assessment:     1. Essential hypertension    2. Other hyperlipidemia    3. Persistent atrial fibrillation    4. Chronic diastolic congestive heart failure    5. Cardiac " amyloidosis    6. Stage 3a chronic kidney disease        Plan:     Inna was seen today for hypertension.    Diagnoses and all orders for this visit:    Cardiac amyloidosis  Discuss with family about starting Tafamadis,  however they declined the medication due to its side effects and copay.    Essential hypertension  Elevated in the clinic but states that she did not take her blood pressure medications.  Currently on amlodipine 10 and losartan 25 mg daily.  Patient daughter states that she checks her blood pressures every day at home and the normal range between 130s to 140s.  Continue with the current blood pressure medication    Other hyperlipidemia  Continue statins    Persistent atrial fibrillation  Rate controlled.  Continue with anticoagulation    Chronic diastolic congestive heart failure  Patient appears euvolemic.  Continue with Lasix    Stage 3a chronic kidney disease        The patient Inna Blankenship was discussed with  attending provider Dr. Saleh .  Return to clinic in 12 months    Jeremy Akbar MD  Cardiology Fellow

## 2023-05-31 ENCOUNTER — TELEPHONE (OUTPATIENT)
Dept: INTERNAL MEDICINE | Facility: CLINIC | Age: 88
End: 2023-05-31
Payer: MEDICARE

## 2023-05-31 NOTE — TELEPHONE ENCOUNTER
Mr. Albarran has canceled the appointment for his mother as he will not be able to make it to bring her. He wants to know if the appt is necessary or can she wait to be seen at a later time?

## 2023-05-31 NOTE — TELEPHONE ENCOUNTER
----- Message from Melanie Jensen sent at 5/31/2023  3:21 PM CDT -----  Contact: Best/Deion [phone]  Requesting to speak with you  regarding the appt tomorrow. Needs to know if it is necessary.    Please call and advise.    Thank You

## 2023-06-01 NOTE — TELEPHONE ENCOUNTER
The patient returns with routine postoperative cervical x-rays.  She is status post ACDF C5-7 performed uneventfully on March 20th 2023 for cervical stenosis with myelopathy.  She reports resolution of her severe left neck and left arm pain.  She endorses posterior cervical muscular tightness.  She has a history of osteoporosis and long-term smoking.  She reports that she is reduced her smoking to 3/4 of pack daily.  She continues wear the cervical orthosis daily.  She is also using a bone stimulator.  She reports chronic abdominal pain and right hip pain.  She is scheduled to see gastroenterology tomorrow.    Cervical x-rays obtained today were reviewed.  Implants remain stable.  Osseous bridging identified at the intervertebral spaces C5-6 C6-7    Exam:    Awake, alert, oriented to person place and time.    Cranial nerves 2-12 grossly intact.    Strength is graded 5/5 in all muscle groups.    Sensation is grossly intact throughout.    Gait and stance are normal  Right anterior cervical incision  Cervical collar    Analysis:  I advised that she can discontinue wearing the collar while at rest.  She should continue wear the collar when ambulating, particularly [place]  I recommend she continue using cervical bone stimulator.  I advised her to resumed Zanaflex 4 mg every 8 hours for muscle spasm. I recommend she continue to reduce her smoking.  I reiterated the risks of hardware complications and nonunion with her history of smoking and osteoporosis.  I recommended she begin outpatient physical therapy.  We will continue to closely monitor the implants and fusion status with repeat cervical x-rays in 2 months.    Nichelle was seen today for follow-up.    Diagnoses and all orders for this visit:    Status post cervical spinal fusion  -     HYDROcodone-acetaminophen (NORCO) 5-325 mg per tablet; Take 1 tablet by mouth every 6 (six) hours as needed for Pain.  -     Ambulatory referral/consult to  LVM no answer   Physical/Occupational Therapy; Future  -     X-Ray Cervical Spine AP And Lateral; Future

## 2023-06-02 ENCOUNTER — TELEPHONE (OUTPATIENT)
Dept: INTERNAL MEDICINE | Facility: CLINIC | Age: 88
End: 2023-06-02
Payer: MEDICARE

## 2023-06-02 NOTE — TELEPHONE ENCOUNTER
----- Message from Carmela David sent at 6/2/2023 11:32 AM CDT -----  Contact: pt's daughter Cara [phone]  Pt's appt had to be canceled on yesterday due to transportation. Pt's appt has been rescheduled to 09/20. Pt has also been added to the waiting list. Cara requested that a message be sent in regards to a sooner appt for pt, if possible. Please call to schedule.              Thank concetta ayala

## 2023-06-12 ENCOUNTER — TELEPHONE (OUTPATIENT)
Dept: INTERNAL MEDICINE | Facility: CLINIC | Age: 88
End: 2023-06-12
Payer: MEDICARE

## 2023-06-12 NOTE — TELEPHONE ENCOUNTER
----- Message from Venecia Weaver sent at 6/12/2023  4:10 PM CDT -----  Contact: daughter Cara   Jorge Luis Marroquin would like a call back to get an order for a portable oxygen tank

## 2023-06-13 DIAGNOSIS — I50.9 CONGESTIVE HEART FAILURE, UNSPECIFIED HF CHRONICITY, UNSPECIFIED HEART FAILURE TYPE: Primary | ICD-10-CM

## 2023-06-21 ENCOUNTER — TELEPHONE (OUTPATIENT)
Dept: GYNECOLOGIC ONCOLOGY | Facility: CLINIC | Age: 88
End: 2023-06-21
Payer: MEDICARE

## 2023-06-21 NOTE — TELEPHONE ENCOUNTER
----- Message from Elpidio Green sent at 6/21/2023  8:14 AM CDT -----  Regarding: CAll BAck  Name of Who is Calling: Orlin Son              What is the request in detail: Orlin requesting as call back states the pt fell and broke her hip and needs a 2nd opinion. Please assist              Can the clinic reply by [facility]: No              [facility] Orlin  [phone]

## 2023-06-27 ENCOUNTER — LAB VISIT (OUTPATIENT)
Dept: LAB | Facility: HOSPITAL | Age: 88
End: 2023-06-27
Attending: INTERNAL MEDICINE
Payer: MEDICARE

## 2023-06-27 ENCOUNTER — OFFICE VISIT (OUTPATIENT)
Dept: INTERNAL MEDICINE | Facility: CLINIC | Age: 88
End: 2023-06-27
Payer: MEDICARE

## 2023-06-27 DIAGNOSIS — E11.9 DIABETES MELLITUS WITHOUT COMPLICATION: ICD-10-CM

## 2023-06-27 DIAGNOSIS — I10 HYPERTENSION, UNSPECIFIED TYPE: ICD-10-CM

## 2023-06-27 DIAGNOSIS — Z00.00 PREVENTATIVE HEALTH CARE: ICD-10-CM

## 2023-06-27 DIAGNOSIS — I10 HYPERTENSION, UNSPECIFIED TYPE: Primary | ICD-10-CM

## 2023-06-27 LAB
ALBUMIN SERPL BCP-MCNC: 3.8 G/DL (ref 3.5–5.2)
ALP SERPL-CCNC: 93 U/L (ref 55–135)
ALT SERPL W/O P-5'-P-CCNC: 12 U/L (ref 10–44)
ANION GAP SERPL CALC-SCNC: 8 MMOL/L (ref 8–16)
AST SERPL-CCNC: 23 U/L (ref 10–40)
BILIRUB SERPL-MCNC: 0.8 MG/DL (ref 0.1–1)
BUN SERPL-MCNC: 14 MG/DL (ref 10–30)
CALCIUM SERPL-MCNC: 10.3 MG/DL (ref 8.7–10.5)
CHLORIDE SERPL-SCNC: 106 MMOL/L (ref 95–110)
CO2 SERPL-SCNC: 28 MMOL/L (ref 23–29)
CREAT SERPL-MCNC: 1 MG/DL (ref 0.5–1.4)
EST. GFR  (NO RACE VARIABLE): 51.2 ML/MIN/1.73 M^2
ESTIMATED AVG GLUCOSE: 105 MG/DL (ref 68–131)
GLUCOSE SERPL-MCNC: 107 MG/DL (ref 70–110)
HBA1C MFR BLD: 5.3 % (ref 4–5.6)
POTASSIUM SERPL-SCNC: 3.9 MMOL/L (ref 3.5–5.1)
PROT SERPL-MCNC: 7.5 G/DL (ref 6–8.4)
SODIUM SERPL-SCNC: 142 MMOL/L (ref 136–145)

## 2023-06-27 PROCEDURE — 80053 COMPREHEN METABOLIC PANEL: CPT | Mod: HCNC | Performed by: INTERNAL MEDICINE

## 2023-06-27 PROCEDURE — 99397 PER PM REEVAL EST PAT 65+ YR: CPT | Mod: HCNC,S$GLB,, | Performed by: INTERNAL MEDICINE

## 2023-06-27 PROCEDURE — 1126F PR PAIN SEVERITY QUANTIFIED, NO PAIN PRESENT: ICD-10-PCS | Mod: HCNC,CPTII,S$GLB, | Performed by: INTERNAL MEDICINE

## 2023-06-27 PROCEDURE — 1126F AMNT PAIN NOTED NONE PRSNT: CPT | Mod: HCNC,CPTII,S$GLB, | Performed by: INTERNAL MEDICINE

## 2023-06-27 PROCEDURE — 99999 PR PBB SHADOW E&M-EST. PATIENT-LVL III: ICD-10-PCS | Mod: PBBFAC,HCNC,, | Performed by: INTERNAL MEDICINE

## 2023-06-27 PROCEDURE — 99999 PR PBB SHADOW E&M-EST. PATIENT-LVL III: CPT | Mod: PBBFAC,HCNC,, | Performed by: INTERNAL MEDICINE

## 2023-06-27 PROCEDURE — 1101F PR PT FALLS ASSESS DOC 0-1 FALLS W/OUT INJ PAST YR: ICD-10-PCS | Mod: HCNC,CPTII,S$GLB, | Performed by: INTERNAL MEDICINE

## 2023-06-27 PROCEDURE — 99397 PR PREVENTIVE VISIT,EST,65 & OVER: ICD-10-PCS | Mod: HCNC,S$GLB,, | Performed by: INTERNAL MEDICINE

## 2023-06-27 PROCEDURE — 1101F PT FALLS ASSESS-DOCD LE1/YR: CPT | Mod: HCNC,CPTII,S$GLB, | Performed by: INTERNAL MEDICINE

## 2023-06-27 PROCEDURE — 83036 HEMOGLOBIN GLYCOSYLATED A1C: CPT | Mod: HCNC | Performed by: INTERNAL MEDICINE

## 2023-06-27 PROCEDURE — 36415 COLL VENOUS BLD VENIPUNCTURE: CPT | Mod: HCNC | Performed by: INTERNAL MEDICINE

## 2023-06-27 PROCEDURE — 1159F MED LIST DOCD IN RCRD: CPT | Mod: HCNC,CPTII,S$GLB, | Performed by: INTERNAL MEDICINE

## 2023-06-27 PROCEDURE — 3288F PR FALLS RISK ASSESSMENT DOCUMENTED: ICD-10-PCS | Mod: HCNC,CPTII,S$GLB, | Performed by: INTERNAL MEDICINE

## 2023-06-27 PROCEDURE — 3288F FALL RISK ASSESSMENT DOCD: CPT | Mod: HCNC,CPTII,S$GLB, | Performed by: INTERNAL MEDICINE

## 2023-06-27 PROCEDURE — 1159F PR MEDICATION LIST DOCUMENTED IN MEDICAL RECORD: ICD-10-PCS | Mod: HCNC,CPTII,S$GLB, | Performed by: INTERNAL MEDICINE

## 2023-07-02 VITALS
HEIGHT: 64 IN | HEART RATE: 75 BPM | TEMPERATURE: 99 F | OXYGEN SATURATION: 95 % | SYSTOLIC BLOOD PRESSURE: 136 MMHG | DIASTOLIC BLOOD PRESSURE: 68 MMHG | BODY MASS INDEX: 23.71 KG/M2 | WEIGHT: 138.88 LBS

## 2023-07-02 NOTE — PROGRESS NOTES
Subjective:       Patient ID: Inna Blankenship is a 89 y.o. female.    Chief Complaint: Annual Exam    HPI  She is here for annual exam        PAST MEDICAL HISTORY: Hypertension, hyperlipidemia, diabetes  (diet   Controlled), congestive heart failure, pulmonary hypertension, aortic regurgitation,  atrial fibrillation, DVT, glaucoma , colon adenoma. She had a colonoscopy February 2011     PAST SURGICAL HISTORY: Hysterectomy.     MEDICATIONS:  Xalatan drops, Lipitor 20 mg daily, Eliquis 2.5 mg twice a day ,  Lasix 20 mg daily, Norvasc 10 mg daily, Cozaar 25 mg daily    ALLERGIES: No known drug allergies.     Review of Systems   Constitutional:  Negative for chills, fatigue, fever and unexpected weight change.   Respiratory:  Negative for chest tightness and shortness of breath.    Cardiovascular:  Negative for chest pain and palpitations.   Gastrointestinal:  Negative for abdominal pain and blood in stool.   Neurological:  Negative for dizziness, syncope, numbness and headaches.     Objective:      Physical Exam  HENT:      Right Ear: External ear normal.      Left Ear: External ear normal.      Nose: Nose normal.      Mouth/Throat:      Mouth: Mucous membranes are moist.      Pharynx: Oropharynx is clear.   Eyes:      Pupils: Pupils are equal, round, and reactive to light.   Cardiovascular:      Rate and Rhythm: Normal rate and regular rhythm.      Heart sounds: Murmur heard.   Pulmonary:      Breath sounds: Normal breath sounds.   Abdominal:      General: There is no distension.      Palpations: There is no hepatomegaly or splenomegaly.      Tenderness: There is no abdominal tenderness.   Musculoskeletal:      Cervical back: Normal range of motion.   Lymphadenopathy:      Cervical: No cervical adenopathy.      Upper Body:      Right upper body: No axillary adenopathy.      Left upper body: No axillary adenopathy.   Neurological:      Cranial Nerves: No cranial nerve deficit.      Sensory: No sensory deficit.      Motor:  Motor function is intact.      Deep Tendon Reflexes: Reflexes are normal and symmetric.       Assessment/Plan       Assessment and plan:  Annual exam.  Check CMP and A1c.  She denies she has had hip fracture.  Apparently that note was in error.  Discussed mammogram, breast exam, bone density, colonoscopy, Pap smear, pelvic exam.  She declined all

## 2023-07-21 ENCOUNTER — OFFICE VISIT (OUTPATIENT)
Dept: HOME HEALTH SERVICES | Facility: CLINIC | Age: 88
End: 2023-07-21
Payer: MEDICARE

## 2023-07-21 VITALS
DIASTOLIC BLOOD PRESSURE: 81 MMHG | WEIGHT: 138 LBS | BODY MASS INDEX: 23.56 KG/M2 | HEIGHT: 64 IN | SYSTOLIC BLOOD PRESSURE: 140 MMHG | HEART RATE: 78 BPM

## 2023-07-21 DIAGNOSIS — I48.19 PERSISTENT ATRIAL FIBRILLATION: Chronic | ICD-10-CM

## 2023-07-21 DIAGNOSIS — R26.9 ABNORMALITY OF GAIT: ICD-10-CM

## 2023-07-21 DIAGNOSIS — I27.22 PULMONARY HYPERTENSION DUE TO LEFT HEART DISEASE: ICD-10-CM

## 2023-07-21 DIAGNOSIS — Z99.81 DEPENDENCE ON SUPPLEMENTAL OXYGEN: ICD-10-CM

## 2023-07-21 DIAGNOSIS — Z00.00 ENCOUNTER FOR PREVENTIVE HEALTH EXAMINATION: ICD-10-CM

## 2023-07-21 DIAGNOSIS — E85.4 CARDIAC AMYLOIDOSIS: ICD-10-CM

## 2023-07-21 DIAGNOSIS — I43 CARDIAC AMYLOIDOSIS: ICD-10-CM

## 2023-07-21 DIAGNOSIS — H40.1232 LOW-TENSION GLAUCOMA OF BOTH EYES, MODERATE STAGE: ICD-10-CM

## 2023-07-21 DIAGNOSIS — I10 ESSENTIAL HYPERTENSION: Chronic | ICD-10-CM

## 2023-07-21 DIAGNOSIS — N18.30 CONTROLLED TYPE 2 DIABETES MELLITUS WITH STAGE 3 CHRONIC KIDNEY DISEASE, WITHOUT LONG-TERM CURRENT USE OF INSULIN: ICD-10-CM

## 2023-07-21 DIAGNOSIS — E11.22 CONTROLLED TYPE 2 DIABETES MELLITUS WITH STAGE 3 CHRONIC KIDNEY DISEASE, WITHOUT LONG-TERM CURRENT USE OF INSULIN: ICD-10-CM

## 2023-07-21 DIAGNOSIS — J96.01 ACUTE HYPOXEMIC RESPIRATORY FAILURE: ICD-10-CM

## 2023-07-21 DIAGNOSIS — I50.32 CHRONIC DIASTOLIC CONGESTIVE HEART FAILURE: ICD-10-CM

## 2023-07-21 DIAGNOSIS — Z00.00 ENCOUNTER FOR MEDICARE ANNUAL WELLNESS EXAM: Primary | ICD-10-CM

## 2023-07-21 DIAGNOSIS — I70.0 CALCIFICATION OF AORTA: ICD-10-CM

## 2023-07-21 PROCEDURE — 1160F PR REVIEW ALL MEDS BY PRESCRIBER/CLIN PHARMACIST DOCUMENTED: ICD-10-PCS | Mod: CPTII,S$GLB,, | Performed by: NURSE PRACTITIONER

## 2023-07-21 PROCEDURE — G0439 PPPS, SUBSEQ VISIT: HCPCS | Mod: S$GLB,,, | Performed by: NURSE PRACTITIONER

## 2023-07-21 PROCEDURE — 1100F PTFALLS ASSESS-DOCD GE2>/YR: CPT | Mod: CPTII,S$GLB,, | Performed by: NURSE PRACTITIONER

## 2023-07-21 PROCEDURE — 1159F PR MEDICATION LIST DOCUMENTED IN MEDICAL RECORD: ICD-10-PCS | Mod: CPTII,S$GLB,, | Performed by: NURSE PRACTITIONER

## 2023-07-21 PROCEDURE — G0439 PR MEDICARE ANNUAL WELLNESS SUBSEQUENT VISIT: ICD-10-PCS | Mod: S$GLB,,, | Performed by: NURSE PRACTITIONER

## 2023-07-21 PROCEDURE — 3288F FALL RISK ASSESSMENT DOCD: CPT | Mod: CPTII,S$GLB,, | Performed by: NURSE PRACTITIONER

## 2023-07-21 PROCEDURE — 1160F RVW MEDS BY RX/DR IN RCRD: CPT | Mod: CPTII,S$GLB,, | Performed by: NURSE PRACTITIONER

## 2023-07-21 PROCEDURE — 3288F PR FALLS RISK ASSESSMENT DOCUMENTED: ICD-10-PCS | Mod: CPTII,S$GLB,, | Performed by: NURSE PRACTITIONER

## 2023-07-21 PROCEDURE — 1159F MED LIST DOCD IN RCRD: CPT | Mod: CPTII,S$GLB,, | Performed by: NURSE PRACTITIONER

## 2023-07-21 PROCEDURE — 1100F PR PT FALLS ASSESS DOC 2+ FALLS/FALL W/INJURY/YR: ICD-10-PCS | Mod: CPTII,S$GLB,, | Performed by: NURSE PRACTITIONER

## 2023-07-23 PROBLEM — R26.9 ABNORMALITY OF GAIT: Status: ACTIVE | Noted: 2023-07-23

## 2023-07-24 NOTE — PROGRESS NOTES
"  Inna Blankenship presented for a  Medicare AWV and comprehensive Health Risk Assessment today. The following components were reviewed and updated:    Medical history  Family History  Social history  Allergies and Current Medications  Health Risk Assessment  Health Maintenance  Care Team         ** See Completed Assessments for Annual Wellness Visit within the encounter summary.**         The following assessments were completed:  Living Situation  CAGE  Depression Screening  Timed Get Up and Go  Whisper Test  Cognitive Function Screening  Nutrition Screening  ADL Screening  PAQ Screening        Vitals:    07/21/23 0900   BP: (!) 140/81   Pulse: 78   Weight: 62.6 kg (138 lb)   Height: 5' 4" (1.626 m)     Body mass index is 23.69 kg/m².  Physical Exam  Constitutional:       Appearance: Normal appearance.   HENT:      Head: Normocephalic and atraumatic.      Nose: Nose normal.      Mouth/Throat:      Mouth: Mucous membranes are moist.   Eyes:      Extraocular Movements: Extraocular movements intact.   Cardiovascular:      Rate and Rhythm: Normal rate and regular rhythm.      Heart sounds: Murmur heard.   Pulmonary:      Effort: Pulmonary effort is normal. No respiratory distress.      Breath sounds: Normal breath sounds.      Comments: Pt' wearing home oxygen per NC  Abdominal:      General: Bowel sounds are normal. There is no distension.      Palpations: Abdomen is soft.   Musculoskeletal:         General: No swelling. Normal range of motion.      Cervical back: Normal range of motion.   Skin:     General: Skin is warm and dry.      Findings: No bruising.   Neurological:      General: No focal deficit present.      Mental Status: She is alert and oriented to person, place, and time.      Gait: Gait abnormal (walker present).      Comments: Forgetful at times   Psychiatric:         Mood and Affect: Mood normal.         Behavior: Behavior normal.             Diagnoses and health risks identified today and associated " recommendations/orders:    1. Encounter for Medicare annual wellness exam  - Ambulatory Referral/Consult to Enhanced Annual Wellness Visit (eAWV)    2. Encounter for preventive health examination  Assessments completed. Preventive measures and health maintenance reviewed with patient and patients caregivers.  Review for opioid screening: Patient does not have any prescriptions for opioids.  Review for substance use disorder: Patient does not use any substances.    3. Chronic diastolic congestive heart failure  Stable, patient on Lasix. Followed by PCP and Cardiology.    4. Persistent atrial fibrillation  Stable, patient on Eliquis. Followed by PCP and Cardiology.    5. Controlled type 2 diabetes mellitus with stage 3a chronic kidney disease, without long-term current use of insulin  Stable, followed by PCP.    6. Pulmonary hypertension due to left heart disease  Stable, patient on Lasix. Followed by PCP and Cardiology.    7. Cardiac amyloidosis  Stable, followed by PCP and Cardiology.    8. Acute hypoxemic respiratory failure  Stable, patient on Home Oxygen at 2L per NC. Followed by PCP.    9. Calcification of aorta  Stable, followed by PCP and Cardiology.    10. Low-tension glaucoma of both eyes, moderate stage  Stable, patent on Latanoprost eye drops. Followed by Optometry.    11. Essential hypertension  Stable, patient on Amlodipine and Losartan. Followed by PCP and Cardiology.    12. Dependence on supplemental oxygen  Stable, followed by PCP.    13. Abnormality of gait  Stable, patient has walker for mobility. Followed by PCP.      Provided Inna with a 5-10 year written screening schedule and personal prevention plan. Recommendations were developed using the USPSTF age appropriate recommendations. Education, counseling, and referrals were provided as needed. After Visit Summary printed and given to patient which includes a list of additional screenings\tests needed.    Follow up in about 1 year (around  7/21/2024) for your next annual wellness visit.    Morelia Puente, NP  I offered to discuss advanced care planning, including how to pick a person who would make decisions for you if you were unable to make them for yourself, called a health care power of , and what kind of decisions you might make such as use of life sustaining treatments such as ventilators and tube feeding when faced with a life limiting illness recorded on a living will that they will need to know. (How you want to be cared for as you near the end of your natural life)     X Patient is interested in learning more about how to make advanced directives.  I provided them paperwork and offered to discuss this with them.

## 2023-07-24 NOTE — PATIENT INSTRUCTIONS
Counseling and Referral of Other Preventative  (Italic type indicates deductible and co-insurance are waived)    Patient Name: Inna Blankenship  Today's Date: 7/23/2023    Health Maintenance       Date Due Completion Date    Shingles Vaccine (1 of 2) Never done ---    Diabetes Urine Screening 10/24/2019 10/24/2018    COVID-19 Vaccine (4 - Pfizer series) 02/23/2022 12/29/2021    Influenza Vaccine (1) 09/01/2023 12/1/2022    Override on 9/30/2016: Done    Eye Exam 10/07/2023 10/7/2022    Lipid Panel 12/01/2023 12/1/2022    Hemoglobin A1c 12/27/2023 6/27/2023    Override on 10/24/2018: Done    TETANUS VACCINE 09/30/2026 9/30/2016 (ClinicallyNA)    Override on 9/30/2016: Not Clinically Appropriate        No orders of the defined types were placed in this encounter.    The following information is provided to all patients.  This information is to help you find resources for any of the problems found today that may be affecting your health:                Living healthy guide: www.[facility].[place].gov      Understanding Diabetes: www.diabetes.org      Eating healthy: www.cdc.gov/healthyweight      CDC home safety checklist: www.cdc.gov/steadi/patient.html      Agency on Aging: www.goea.[place].[facility]      Alcoholics anonymous (AA): www.aa.org      Physical Activity: www.enrrique.nih.gov/ng2jyfy      Tobacco use: www.quitwithusla.org

## 2023-07-31 ENCOUNTER — TELEPHONE (OUTPATIENT)
Dept: INTERNAL MEDICINE | Facility: CLINIC | Age: 88
End: 2023-07-31
Payer: MEDICARE

## 2023-07-31 ENCOUNTER — HOSPITAL ENCOUNTER (OUTPATIENT)
Facility: OTHER | Age: 88
Discharge: HOME HEALTH | End: 2023-08-02
Attending: EMERGENCY MEDICINE | Admitting: HOSPITALIST
Payer: MEDICARE

## 2023-07-31 ENCOUNTER — HOSPITAL ENCOUNTER (OUTPATIENT)
Dept: CARDIOLOGY | Facility: OTHER | Age: 88
Discharge: HOME | End: 2023-07-31
Attending: HOSPITALIST | Admitting: HOSPITALIST
Payer: MEDICARE

## 2023-07-31 VITALS
WEIGHT: 138 LBS | DIASTOLIC BLOOD PRESSURE: 65 MMHG | HEART RATE: 65 BPM | HEIGHT: 64 IN | BODY MASS INDEX: 23.56 KG/M2 | SYSTOLIC BLOOD PRESSURE: 151 MMHG

## 2023-07-31 DIAGNOSIS — I50.9 HEART FAILURE: ICD-10-CM

## 2023-07-31 DIAGNOSIS — R79.89 ELEVATED TROPONIN: ICD-10-CM

## 2023-07-31 DIAGNOSIS — R06.02 SOB (SHORTNESS OF BREATH): ICD-10-CM

## 2023-07-31 DIAGNOSIS — I50.9 ACUTE ON CHRONIC CONGESTIVE HEART FAILURE, UNSPECIFIED HEART FAILURE TYPE: Primary | ICD-10-CM

## 2023-07-31 PROBLEM — J96.11 CHRONIC RESPIRATORY FAILURE WITH HYPOXIA: Status: ACTIVE | Noted: 2023-07-31

## 2023-07-31 PROBLEM — Z71.89 ADVANCE CARE PLANNING: Status: ACTIVE | Noted: 2023-07-31

## 2023-07-31 LAB
ALBUMIN SERPL BCP-MCNC: 3.8 G/DL (ref 3.5–5.2)
ALP SERPL-CCNC: 96 U/L (ref 55–135)
ALT SERPL W/O P-5'-P-CCNC: 23 U/L (ref 10–44)
ANION GAP SERPL CALC-SCNC: 11 MMOL/L (ref 8–16)
AST SERPL-CCNC: 31 U/L (ref 10–40)
AV INDEX (PROSTH): 0.6
AV MEAN GRADIENT: 4 MMHG
AV PEAK GRADIENT: 8 MMHG
AV VALVE AREA BY VELOCITY RATIO: 1.99 CM²
AV VALVE AREA: 1.95 CM²
AV VELOCITY RATIO: 0.61
BASOPHILS # BLD AUTO: 0.04 K/UL (ref 0–0.2)
BASOPHILS NFR BLD: 0.8 % (ref 0–1.9)
BILIRUB SERPL-MCNC: 1 MG/DL (ref 0.1–1)
BNP SERPL-MCNC: 1171 PG/ML (ref 0–99)
BSA FOR ECHO PROCEDURE: 1.68 M2
BUN SERPL-MCNC: 20 MG/DL (ref 10–30)
CALCIUM SERPL-MCNC: 10.4 MG/DL (ref 8.7–10.5)
CHLORIDE SERPL-SCNC: 110 MMOL/L (ref 95–110)
CO2 SERPL-SCNC: 21 MMOL/L (ref 23–29)
CREAT SERPL-MCNC: 1.2 MG/DL (ref 0.5–1.4)
CV ECHO LV RWT: 0.75 CM
DIFFERENTIAL METHOD: ABNORMAL
DOP CALC AO PEAK VEL: 1.38 M/S
DOP CALC AO VTI: 35.3 CM
DOP CALC LVOT AREA: 3.3 CM2
DOP CALC LVOT DIAMETER: 2.04 CM
DOP CALC LVOT PEAK VEL: 0.84 M/S
DOP CALC LVOT STROKE VOLUME: 68.93 CM3
DOP CALCLVOT PEAK VEL VTI: 21.1 CM
E WAVE DECELERATION TIME: 171.61 MSEC
E/A RATIO: 0.95
ECHO LV POSTERIOR WALL: 1.33 CM (ref 0.6–1.1)
EOSINOPHIL # BLD AUTO: 0.1 K/UL (ref 0–0.5)
EOSINOPHIL NFR BLD: 1.4 % (ref 0–8)
ERYTHROCYTE [DISTWIDTH] IN BLOOD BY AUTOMATED COUNT: 15.9 % (ref 11.5–14.5)
EST. GFR  (NO RACE VARIABLE): 41 ML/MIN/1.73 M^2
FRACTIONAL SHORTENING: 18 % (ref 28–44)
GLUCOSE SERPL-MCNC: 95 MG/DL (ref 70–110)
HCT VFR BLD AUTO: 38.9 % (ref 37–48.5)
HGB BLD-MCNC: 12.3 G/DL (ref 12–16)
IMM GRANULOCYTES # BLD AUTO: 0.01 K/UL (ref 0–0.04)
IMM GRANULOCYTES NFR BLD AUTO: 0.2 % (ref 0–0.5)
INTERVENTRICULAR SEPTUM: 1.27 CM (ref 0.6–1.1)
IVC DIAMETER: 2.76 CM
IVRT: 87.54 MSEC
LA MAJOR: 6.79 CM
LA MINOR: 6.25 CM
LA WIDTH: 5.1 CM
LEFT ATRIUM SIZE: 5.18 CM
LEFT ATRIUM VOLUME INDEX: 87.5 ML/M2
LEFT ATRIUM VOLUME: 146.16 CM3
LEFT INTERNAL DIMENSION IN SYSTOLE: 2.9 CM (ref 2.1–4)
LEFT VENTRICLE DIASTOLIC VOLUME INDEX: 31.22 ML/M2
LEFT VENTRICLE DIASTOLIC VOLUME: 52.13 ML
LEFT VENTRICLE MASS INDEX: 94 G/M2
LEFT VENTRICLE SYSTOLIC VOLUME INDEX: 19.2 ML/M2
LEFT VENTRICLE SYSTOLIC VOLUME: 32.1 ML
LEFT VENTRICULAR INTERNAL DIMENSION IN DIASTOLE: 3.54 CM (ref 3.5–6)
LEFT VENTRICULAR MASS: 156.28 G
LVOT MG: 1.52 MMHG
LVOT MV: 0.58 CM/S
LYMPHOCYTES # BLD AUTO: 0.9 K/UL (ref 1–4.8)
LYMPHOCYTES NFR BLD: 18.5 % (ref 18–48)
MCH RBC QN AUTO: 30.4 PG (ref 27–31)
MCHC RBC AUTO-ENTMCNC: 31.6 G/DL (ref 32–36)
MCV RBC AUTO: 96 FL (ref 82–98)
MONOCYTES # BLD AUTO: 0.4 K/UL (ref 0.3–1)
MONOCYTES NFR BLD: 8.3 % (ref 4–15)
MV PEAK A VEL: 0.42 M/S
MV PEAK E VEL: 0.4 M/S
MV STENOSIS PRESSURE HALF TIME: 49.77 MS
MV VALVE AREA P 1/2 METHOD: 4.42 CM2
NEUTROPHILS # BLD AUTO: 3.6 K/UL (ref 1.8–7.7)
NEUTROPHILS NFR BLD: 70.8 % (ref 38–73)
NRBC BLD-RTO: 0 /100 WBC
PISA MRMAX VEL: 3.99 M/S
PISA TR MAX VEL: 3.57 M/S
PLATELET # BLD AUTO: 166 K/UL (ref 150–450)
PMV BLD AUTO: 11.8 FL (ref 9.2–12.9)
POCT GLUCOSE: 116 MG/DL (ref 70–110)
POTASSIUM SERPL-SCNC: 4.3 MMOL/L (ref 3.5–5.1)
PROT SERPL-MCNC: 7.4 G/DL (ref 6–8.4)
PV PEAK GRADIENT: 2 MMHG
PV PEAK S VEL: 0.3 M/S
PV PEAK VELOCITY: 0.75 M/S
RA MAJOR: 6.71 CM
RA WIDTH: 6.5 CM
RBC # BLD AUTO: 4.05 M/UL (ref 4–5.4)
SODIUM SERPL-SCNC: 142 MMOL/L (ref 136–145)
STJ: 2.52 CM
TR MAX PG: 51 MMHG
TROPONIN I SERPL DL<=0.01 NG/ML-MCNC: 0.14 NG/ML (ref 0–0.03)
WBC # BLD AUTO: 5.07 K/UL (ref 3.9–12.7)
Z-SCORE OF LEFT VENTRICULAR DIMENSION IN END DIASTOLE: -2.77
Z-SCORE OF LEFT VENTRICULAR DIMENSION IN END SYSTOLE: 0.02

## 2023-07-31 PROCEDURE — 99497 ADVNCD CARE PLAN 30 MIN: CPT | Mod: HCNC,25,, | Performed by: STUDENT IN AN ORGANIZED HEALTH CARE EDUCATION/TRAINING PROGRAM

## 2023-07-31 PROCEDURE — 93010 ELECTROCARDIOGRAM REPORT: CPT | Mod: HCNC,,, | Performed by: INTERNAL MEDICINE

## 2023-07-31 PROCEDURE — 63600175 PHARM REV CODE 636 W HCPCS: Mod: HCNC | Performed by: HOSPITALIST

## 2023-07-31 PROCEDURE — 80053 COMPREHEN METABOLIC PANEL: CPT | Mod: HCNC | Performed by: EMERGENCY MEDICINE

## 2023-07-31 PROCEDURE — 99285 EMERGENCY DEPT VISIT HI MDM: CPT | Mod: 25,HCNC

## 2023-07-31 PROCEDURE — 99214 PR OFFICE/OUTPT VISIT, EST, LEVL IV, 30-39 MIN: ICD-10-PCS | Mod: 25,HCNC,, | Performed by: INTERNAL MEDICINE

## 2023-07-31 PROCEDURE — 93306 TTE W/DOPPLER COMPLETE: CPT | Mod: HCNC

## 2023-07-31 PROCEDURE — 93306 TTE W/DOPPLER COMPLETE: CPT | Mod: 26,HCNC,, | Performed by: INTERNAL MEDICINE

## 2023-07-31 PROCEDURE — 93010 EKG 12-LEAD: ICD-10-PCS | Mod: HCNC,,, | Performed by: INTERNAL MEDICINE

## 2023-07-31 PROCEDURE — 94761 N-INVAS EAR/PLS OXIMETRY MLT: CPT | Mod: HCNC

## 2023-07-31 PROCEDURE — 99223 PR INITIAL HOSPITAL CARE,LEVL III: ICD-10-PCS | Mod: AI,HCNC,, | Performed by: HOSPITALIST

## 2023-07-31 PROCEDURE — 96376 TX/PRO/DX INJ SAME DRUG ADON: CPT | Mod: 59

## 2023-07-31 PROCEDURE — 99417 PR PROLONGED SVC, OUTPT, W/WO DIRECT PT CONTACT,  EA ADDTL 15 MIN: ICD-10-PCS | Mod: HCNC,,, | Performed by: STUDENT IN AN ORGANIZED HEALTH CARE EDUCATION/TRAINING PROGRAM

## 2023-07-31 PROCEDURE — 27000221 HC OXYGEN, UP TO 24 HOURS: Mod: HCNC

## 2023-07-31 PROCEDURE — 83880 ASSAY OF NATRIURETIC PEPTIDE: CPT | Mod: HCNC | Performed by: EMERGENCY MEDICINE

## 2023-07-31 PROCEDURE — 99223 1ST HOSP IP/OBS HIGH 75: CPT | Mod: AI,HCNC,, | Performed by: HOSPITALIST

## 2023-07-31 PROCEDURE — G0378 HOSPITAL OBSERVATION PER HR: HCPCS | Mod: HCNC

## 2023-07-31 PROCEDURE — 99215 PR OFFICE/OUTPT VISIT, EST, LEVL V, 40-54 MIN: ICD-10-PCS | Mod: HCNC,25,, | Performed by: STUDENT IN AN ORGANIZED HEALTH CARE EDUCATION/TRAINING PROGRAM

## 2023-07-31 PROCEDURE — 84484 ASSAY OF TROPONIN QUANT: CPT | Mod: HCNC | Performed by: EMERGENCY MEDICINE

## 2023-07-31 PROCEDURE — 93306 ECHO (CUPID ONLY): ICD-10-PCS | Mod: 26,HCNC,, | Performed by: INTERNAL MEDICINE

## 2023-07-31 PROCEDURE — 99214 OFFICE O/P EST MOD 30 MIN: CPT | Mod: 25,HCNC,, | Performed by: INTERNAL MEDICINE

## 2023-07-31 PROCEDURE — 99497 PR ADVNCD CARE PLAN 30 MIN: ICD-10-PCS | Mod: HCNC,25,, | Performed by: STUDENT IN AN ORGANIZED HEALTH CARE EDUCATION/TRAINING PROGRAM

## 2023-07-31 PROCEDURE — 99900035 HC TECH TIME PER 15 MIN (STAT): Mod: HCNC

## 2023-07-31 PROCEDURE — 93005 ELECTROCARDIOGRAM TRACING: CPT | Mod: HCNC

## 2023-07-31 PROCEDURE — 85025 COMPLETE CBC W/AUTO DIFF WBC: CPT | Mod: HCNC | Performed by: EMERGENCY MEDICINE

## 2023-07-31 PROCEDURE — 99215 OFFICE O/P EST HI 40 MIN: CPT | Mod: HCNC,25,, | Performed by: STUDENT IN AN ORGANIZED HEALTH CARE EDUCATION/TRAINING PROGRAM

## 2023-07-31 PROCEDURE — 96374 THER/PROPH/DIAG INJ IV PUSH: CPT | Mod: HCNC

## 2023-07-31 PROCEDURE — 99417 PROLNG OP E/M EACH 15 MIN: CPT | Mod: HCNC,,, | Performed by: STUDENT IN AN ORGANIZED HEALTH CARE EDUCATION/TRAINING PROGRAM

## 2023-07-31 PROCEDURE — 25000003 PHARM REV CODE 250: Mod: HCNC | Performed by: HOSPITALIST

## 2023-07-31 RX ORDER — GLUCAGON 1 MG
1 KIT INJECTION
Status: DISCONTINUED | OUTPATIENT
Start: 2023-07-31 | End: 2023-08-02 | Stop reason: HOSPADM

## 2023-07-31 RX ORDER — ATORVASTATIN CALCIUM 20 MG/1
20 TABLET, FILM COATED ORAL DAILY
Status: DISCONTINUED | OUTPATIENT
Start: 2023-08-01 | End: 2023-08-02 | Stop reason: HOSPADM

## 2023-07-31 RX ORDER — LATANOPROST 50 UG/ML
1 SOLUTION/ DROPS OPHTHALMIC DAILY
Status: DISCONTINUED | OUTPATIENT
Start: 2023-07-31 | End: 2023-08-02 | Stop reason: HOSPADM

## 2023-07-31 RX ORDER — FUROSEMIDE 10 MG/ML
40 INJECTION INTRAMUSCULAR; INTRAVENOUS
Status: COMPLETED | OUTPATIENT
Start: 2023-07-31 | End: 2023-07-31

## 2023-07-31 RX ORDER — IBUPROFEN 200 MG
24 TABLET ORAL
Status: DISCONTINUED | OUTPATIENT
Start: 2023-07-31 | End: 2023-08-02 | Stop reason: HOSPADM

## 2023-07-31 RX ORDER — INSULIN ASPART 100 [IU]/ML
0-5 INJECTION, SOLUTION INTRAVENOUS; SUBCUTANEOUS
Status: DISCONTINUED | OUTPATIENT
Start: 2023-07-31 | End: 2023-08-01

## 2023-07-31 RX ORDER — LOSARTAN POTASSIUM 25 MG/1
25 TABLET ORAL DAILY
Status: DISCONTINUED | OUTPATIENT
Start: 2023-07-31 | End: 2023-08-02 | Stop reason: HOSPADM

## 2023-07-31 RX ORDER — ESCITALOPRAM OXALATE 10 MG/1
10 TABLET ORAL DAILY
Status: DISCONTINUED | OUTPATIENT
Start: 2023-08-01 | End: 2023-08-02 | Stop reason: HOSPADM

## 2023-07-31 RX ORDER — FUROSEMIDE 10 MG/ML
40 INJECTION INTRAMUSCULAR; INTRAVENOUS
Status: DISCONTINUED | OUTPATIENT
Start: 2023-07-31 | End: 2023-08-02 | Stop reason: HOSPADM

## 2023-07-31 RX ORDER — IBUPROFEN 200 MG
16 TABLET ORAL
Status: DISCONTINUED | OUTPATIENT
Start: 2023-07-31 | End: 2023-08-02 | Stop reason: HOSPADM

## 2023-07-31 RX ADMIN — FUROSEMIDE 40 MG: 10 INJECTION, SOLUTION INTRAMUSCULAR; INTRAVENOUS at 06:07

## 2023-07-31 RX ADMIN — APIXABAN 2.5 MG: 2.5 TABLET, FILM COATED ORAL at 08:07

## 2023-07-31 RX ADMIN — FUROSEMIDE 40 MG: 10 INJECTION, SOLUTION INTRAMUSCULAR; INTRAVENOUS at 01:07

## 2023-07-31 NOTE — ASSESSMENT & PLAN NOTE
Based on prior hemoglobin A1c her diabetes appears to be well controlled.  Will monitor capillary blood glucose levels and give subcutaneous insulin if needed.  Serum creatinine mildly elevated likely secondary decompensated heart failure.  Monitor kidney function.

## 2023-07-31 NOTE — PLAN OF CARE
Problem: Adult Inpatient Plan of Care  Goal: Plan of Care Review  Outcome: Ongoing, Progressing  Goal: Patient-Specific Goal (Individualized)  Outcome: Ongoing, Progressing  Goal: Absence of Hospital-Acquired Illness or Injury  Outcome: Ongoing, Progressing  Goal: Optimal Comfort and Wellbeing  Outcome: Ongoing, Progressing  Goal: Readiness for Transition of Care  Outcome: Ongoing, Progressing     Problem: Coping Ineffective  Goal: Effective Coping  Outcome: Ongoing, Progressing     Problem: Diabetes Comorbidity  Goal: Blood Glucose Level Within Targeted Range  Outcome: Ongoing, Progressing     Problem: Fall Injury Risk  Goal: Absence of Fall and Fall-Related Injury  Outcome: Ongoing, Progressing     Problem: Skin Injury Risk Increased  Goal: Skin Health and Integrity  Outcome: Ongoing, Progressing     Pt alert. Oriented to person and place. Afebrile. Oxygen @ 3L nasal cannula. Diuresis with IV Lasix. Cardiology consulted. Blood glucose WNL. Telemetry monitoring continued. ECHO done. Palliative medicine following. External catheter applied. Safety maintained.

## 2023-07-31 NOTE — ASSESSMENT & PLAN NOTE
- noted primary team plan for updated echo and cardiology consultation  - management per primary team/cardiology  - noted echo 1/2023:   Severe left atrial enlargement.   The estimated ejection fraction is 45%.   Grade II left ventricular diastolic dysfunction.

## 2023-07-31 NOTE — CONSULTS
Cardiology Consult  7/31/2023  4:31 PM    Attending Cardiologist: Sb Pacheco M.D.  Primary Care Provider: Stacy Rodriguez MD  Chief Complaint/Reason For Consultation:  CHF      Problem list  Patient Active Problem List   Diagnosis    Essential hypertension    Other hyperlipidemia    Osteopenia    Low tension glaucoma, moderate stage - Both Eyes    MGD (meibomian gland disease) - Both Eyes    History of DVT (deep vein thrombosis)    Chronic diastolic congestive heart failure    Controlled type 2 diabetes mellitus with stage 3 chronic kidney disease, without long-term current use of insulin    Calcification of aorta    Acute on chronic diastolic congestive heart failure    Persistent atrial fibrillation    Stage 3a chronic kidney disease    Confusion and disorientation    Dyspnea on exertion    Elevated troponin    Pulmonary hypertension due to left heart disease    Right heart failure (secondary to left heart failure)    Positive blood culture    Acute hypoxemic respiratory failure    Cardiac amyloidosis    Abnormality of gait    Chronic respiratory failure with hypoxia    Advance care planning       CC:  SOB    HPI:  Inna Blankenship is a 89 y.o.year-old female with PMH of HTN, DM, CKD 3, chronic diastolic HF, cardiac amyloid (followed at [facility], Dr Roach; refused Tafamidis due to cost), persistent AF, DVT, chronic hypoxic respiratory failure on 2 liters/minute of supplemental oxygen at baseline who presents emergency department with shortness of breath and bilateral lower extremity swelling progressively worsening over last 3 or 4 days.  She reports generalized fatigue.  She denies any chest pain.  Patient has been eating salty foods.  Patient reports feeling better since receiving IV Lasix in the emergency room.    Medications  Current Facility-Administered Medications   Medication Dose Route Frequency Provider Last Rate Last Admin    apixaban tablet 2.5 mg  2.5 mg Oral BID Po Green MD        [START  ON 8/1/2023] atorvastatin tablet 20 mg  20 mg Oral Daily Po Green MD        dextrose 10% bolus 125 mL 125 mL  12.5 g Intravenous PRN Po Green MD        dextrose 10% bolus 250 mL 250 mL  25 g Intravenous PRN Po Green MD        [START ON 8/1/2023] EScitalopram oxalate tablet 10 mg  10 mg Oral Daily Po Green MD        furosemide injection 40 mg  40 mg Intravenous Q12H Po Green MD        glucagon (human recombinant) injection 1 mg  1 mg Intramuscular PRN Po Green MD        glucose chewable tablet 16 g  16 g Oral PRN Po Green MD        glucose chewable tablet 24 g  24 g Oral PRN Po Green MD        insulin aspart U-100 pen 0-5 Units  0-5 Units Subcutaneous QID (AC + HS) PRN Po Green MD        latanoprost 0.005 % ophthalmic solution 1 drop  1 drop Both Eyes Daily Po Green MD        losartan tablet 25 mg  25 mg Oral Daily Po Green MD          Prior to Admission medications    Medication Sig Start Date End Date Taking? Authorizing Provider   amLODIPine (NORVASC) 10 MG tablet TAKE 1 TABLET (10 MG TOTAL) BY MOUTH ONCE DAILY. 12/30/22 12/25/23  Amanda Trejo MD   atorvastatin (LIPITOR) 20 MG tablet TAKE 1 TABLET ONE TIME DAILY 2/22/23   Stacy Rodriguez MD   ELIQUIS 2.5 mg Tab TAKE 1 TABLET TWICE DAILY 1/30/23   Marvin Elmore MD   EScitalopram oxalate (LEXAPRO) 10 MG tablet TAKE 1 TABLET ONE TIME DAILY 2/22/23   Stacy Rodriguez MD   furosemide (LASIX) 20 MG tablet Take 1 tablet (20 mg total) by mouth once daily. 5/19/23 5/18/24  Josef Saleh MD   latanoprost 0.005 % ophthalmic solution Place 1 drop into both eyes once daily. 6/3/22   Lupis Lundberg MD   losartan (COZAAR) 25 MG tablet Take 1 tablet (25 mg total) by mouth once daily. 12/19/22   Stacy Rodriguez MD   multivit with minerals/lutein (MULTIVITAMIN 50 PLUS ORAL) Take 1 tablet by mouth once daily.     Historical Provider         History  Past  Medical History:   Diagnosis Date    Acute hypoxemic respiratory failure 1/30/2023    Arthritis     CHF (congestive heart failure) 12/26/2018    Chronic atrial fibrillation 8/29/2020    Chronic kidney disease, stage III (moderate) 9/11/2015    Colon adenoma     Diabetes mellitus, type II     Diet controlled    Glaucoma     Hyperlipemia     Hypertension     Osteopenia      Past Surgical History:   Procedure Laterality Date    APPENDECTOMY      CATARACT EXTRACTION W/  INTRAOCULAR LENS IMPLANT Right 03/15/2006        CATARACT EXTRACTION W/  INTRAOCULAR LENS IMPLANT Left 09/27/2006        COLONOSCOPY W/ POLYPECTOMY      EYE SURGERY      HYSTERECTOMY      TRANSESOPHAGEAL ECHOCARDIOGRAPHY N/A 11/9/2020    Procedure: ECHOCARDIOGRAM, TRANSESOPHAGEAL;  Surgeon: Marcelina Diagnostic Provider;  Location: [facility] EP LAB;  Service: Cardiology;  Laterality: N/A;    TREATMENT OF CARDIAC ARRHYTHMIA N/A 11/9/2020    Procedure: CARDIOVERSION;  Surgeon: Marvin Elmore MD;  Location: [facility] EP LAB;  Service: Cardiology;  Laterality: N/A;  AF, RAFFAELE, DCCV, MAC, GP, 3 PREP     Social History     Socioeconomic History    Marital status:    Tobacco Use    Smoking status: Never    Smokeless tobacco: Never   Substance and Sexual Activity    Alcohol use: Yes     Comment: wine occas.    Drug use: No    Sexual activity: Not Currently     Social Determinants of Health     Financial Resource Strain: Low Risk  (7/21/2023)    Overall Financial Resource Strain (CARDIA)     Difficulty of Paying Living Expenses: Not hard at all   Food Insecurity: No Food Insecurity (7/21/2023)    Hunger Vital Sign     Worried About Running Out of Food in the Last Year: Never true     Ran Out of Food in the Last Year: Never true   Transportation Needs: No Transportation Needs (7/21/2023)    PRAPARE - Transportation     Lack of Transportation (Medical): No     Lack of Transportation (Non-Medical): No   Physical Activity: Inactive (7/21/2023)     Exercise Vital Sign     Days of Exercise per Week: 0 days     Minutes of Exercise per Session: 0 min   Stress: Stress Concern Present (7/21/2023)    [place] [employer] - Occupational Stress Questionnaire     Feeling of Stress : To some extent   Social Connections: Socially Isolated (7/21/2023)    Social Connection and Isolation Panel [NHANES]     Frequency of Communication with Friends and Family: More than three times a week     Frequency of Social Gatherings with Friends and Family: Twice a week     Attends Worship Services: Never     Active Member of Clubs or Organizations: No     Attends Club or Organization Meetings: Never     Marital Status:    Housing Stability: Low Risk  (7/21/2023)    Housing Stability Vital Sign     Unable to Pay for Housing in the Last Year: No     Number of Places Lived in the Last Year: 1     Unstable Housing in the Last Year: No         Allergies  Review of patient's allergies indicates:  No Known Allergies      Review of Systems   Review of Systems   Unable to perform ROS: Dementia         Physical Exam  Wt Readings from Last 1 Encounters:   07/31/23 62.6 kg (138 lb)     BP Readings from Last 3 Encounters:   07/31/23 (!) 150/71   07/31/23 (!) 151/65   07/21/23 (!) 140/81     Pulse Readings from Last 1 Encounters:   07/31/23 (!) 51     Body mass index is 23.69 kg/m².    Physical Exam  Constitutional:       General: She is not in acute distress.  Neck:      Vascular: JVD present.   Cardiovascular:      Rate and Rhythm: Normal rate. Rhythm irregularly irregular.      Pulses:           Carotid pulses are 2+ on the right side and 2+ on the left side.       Radial pulses are 2+ on the right side and 2+ on the left side.      Heart sounds: Normal heart sounds.   Pulmonary:      Breath sounds: Examination of the right-lower field reveals rales. Examination of the left-lower field reveals rales. Rales present.   Musculoskeletal:      Right lower leg: No edema.     "  Left lower leg: No edema.   Neurological:      Mental Status: She is alert.           Laboratory:  Trended Lab Data:  Recent Labs   Lab 07/31/23  1054   WBC 5.07   HGB 12.3   HCT 38.9          Recent Labs   Lab 07/31/23  1054      K 4.3      CO2 21*   BUN 20   GLU 95   CALCIUM 10.4       Recent Labs   Lab 07/31/23  1054   PROT 7.4   ALBUMIN 3.8   BILITOT 1.0   AST 31   ALT 23   ALKPHOS 96       No results for input(s): "PROTIME", "PTT", "INR" in the last 168 hours.    Cardiac:   Recent Labs   Lab 07/31/23  1054   TROPONINI 0.143*   BNP 1,171*       FLP:   Lab Results   Component Value Date    CHOL 174 12/01/2022    HDL 67 12/01/2022    LDLCALC 99.2 12/01/2022    TRIG 39 12/01/2022    CHOLHDL 38.5 12/01/2022     DM:   Lab Results   Component Value Date    HGBA1C 5.3 06/27/2023    HGBA1C 5.8 (H) 01/30/2023    HGBA1C 5.6 12/01/2022    LDLCALC 99.2 12/01/2022    CREATININE 1.2 07/31/2023     Thyroid:   Lab Results   Component Value Date    TSH 5.144 (H) 04/10/2022    FREET4 0.97 04/10/2022     Anemia:   Lab Results   Component Value Date    IRON 96 04/18/2022    TIBC 287 04/18/2022    FERRITIN 126 04/18/2022     Urinalysis:   Lab Results   Component Value Date    LABURIN  01/29/2016     Multiple organisms isolated. None in predominance.  Repeat if    LABURIN clinically necessary. 01/29/2016    COLORU Yellow 04/10/2022    SPECGRAV 1.010 04/10/2022    NITRITE Negative 04/10/2022    KETONESU Negative 04/10/2022    UROBILINOGEN Negative 02/08/2021     @    Other Results:  EKG (my interpretation):    TELEMETRY:  n/a    Echo:   Results for orders placed or performed during the hospital encounter of 07/31/23   Echo   Result Value Ref Range    BSA 1.68 m2    LVOT stroke volume 68.93 cm3    LVIDd 3.54 3.5 - 6.0 cm    LV Systolic Volume 32.10 mL    LV Systolic Volume Index 19.2 mL/m2    LVIDs 2.90 2.1 - 4.0 cm    LV Diastolic Volume 52.13 mL    LV Diastolic Volume Index 31.22 mL/m2    IVS 1.27 (A) 0.6 - 1.1 " cm    LVOT diameter 2.04 cm    LVOT area 3.3 cm2    FS 18 (A) 28 - 44 %    Left Ventricle Relative Wall Thickness 0.75 cm    Posterior Wall 1.33 (A) 0.6 - 1.1 cm    LV mass 156.28 g    LV Mass Index 94 g/m2    MV Peak E David 0.40 m/s    MV Peak A David 0.42 m/s    TR Max David 3.57 m/s    E/A ratio 0.95     IVRT 87.54 msec    E wave deceleration time 171.61 msec    PV Peak S David 0.30 m/s    LVOT peak david 0.84 m/s    Left Ventricular Outflow Tract Mean Velocity 0.58 cm/s    Left Ventricular Outflow Tract Mean Gradient 1.52 mmHg    LA size 5.18 cm    Left Atrium Major Axis 6.79 cm    Left Atrium Minor Axis 6.25 cm    RA Major Axis 6.71 cm    AV mean gradient 4 mmHg    AV peak gradient 8 mmHg    Ao peak david 1.38 m/s    Ao VTI 35.30 cm    LVOT peak VTI 21.10 cm    AV valve area 1.95 cm²    AV Velocity Ratio 0.61     AV index (prosthetic) 0.60     ANGIE by Velocity Ratio 1.99 cm²    Mr max david 3.99 m/s    MV stenosis pressure 1/2 time 49.77 ms    MV valve area p 1/2 method 4.42 cm2    Triscuspid Valve Regurgitation Peak Gradient 51 mmHg    PV PEAK VELOCITY 0.75 m/s    PV peak gradient 2 mmHg    STJ 2.52 cm    ZLVIDS 0.02     ZLVIDD -2.77     IVC diameter 2.76 cm    LA Volume Index 87.5 mL/m2    LA volume 146.16 cm3    LA WIDTH 5.1 cm    RA Width 6.5 cm       Radiology:  X-Ray Chest AP Portable    Result Date: 7/31/2023  EXAMINATION: XR CHEST AP PORTABLE CLINICAL HISTORY: Chest Pain; TECHNIQUE: Single frontal view of the chest was performed. COMPARISON: 01/30/2023 FINDINGS: Lungs are well expanded.  Small right pleural effusion with subsegmental right basilar atelectasis.  No lobar consolidation. Cardiac silhouette is enlarged, stable.  Calcified plaque lines arch. Degenerative change both shoulders.     Stable enlargement of the cardiac silhouette.  Small right pleural effusion with subsegmental atelectasis. Electronically signed by: Bertha Chung Date:    07/31/2023 Time:    11:08        Current Medications:      Infusions:       Scheduled:   apixaban  2.5 mg Oral BID    [START ON 8/1/2023] atorvastatin  20 mg Oral Daily    [START ON 8/1/2023] EScitalopram oxalate  10 mg Oral Daily    furosemide (LASIX) injection  40 mg Intravenous Q12H    latanoprost  1 drop Both Eyes Daily    losartan  25 mg Oral Daily        PRN:  dextrose 10%, dextrose 10%, glucagon (human recombinant), glucose, glucose, insulin aspart U-100      Assessment and Plan:  Acute on chronic diastolic congestive heart failure  Cardiac amyloid  -responding well with IV diuretics.  Agree with furosemide IV twice daily.  -f/u echo  -refused Tafamidis for cardiac amyloid    Persistent AF  -rate controlled  -continue apixaban    HTN  -continue home meds and monitor    DM  -as per [facility] team    DNR    Thank you for allowing me to participate in the care of Inan Blankenship.      Sb Pacheco MD, F.A.C.C, F.S.C.A.I.    Disclaimer: This document was created using voice recognition software (M*Modal Fluency Direct). Although it may be edited, this document may contain errors related to incorrect recognition of the spoken word. Please call the physician if clarification is needed.

## 2023-07-31 NOTE — PT/OT/SLP PROGRESS
Physical Therapy      Patient Name:  Inna Blankenship   MRN:  [id number]    Patient not seen today secondary to Other (Comment) (t/f to floor). Will follow-up tomorrow, 8/1.

## 2023-07-31 NOTE — ED TRIAGE NOTES
Pt presents to ED c/o SOB and BL LE edema onset Thursday. 2+ pitting edema to BL LE. Pt now 96% 2LC, in NAD at this time. Hx of CHF, on 2L NC at baseline. Pt denies any chest pain, fevers. Reports that she is compliant with medications.

## 2023-07-31 NOTE — ASSESSMENT & PLAN NOTE
7/31/2023:  - Chart reviewed in depth.   - patient seen at bedside along with palliative RN Rosaline  - patient was initially sleeping upon entering the room, but was easily arousable  - introduced self and role of palliative care   - patient was tired, stating she is getting better rest now than she did last night  - she lives at home and uses a walker to ambulate   - daughter Cara lives with her too and has grandchildren that also help out   - she had limited insight into why she was in the hospital and what is going on medically, but did admit to feeling more tired and short of breath  - we went over her current medical on dominic and the planned workup/management to improve her symptoms. Seems she   - she requested to rest and asked us to call her daughter Dwain. She stated Cara would be her medical decision maker and has a strong understanding of her medical on going  - allowed her to rest  - after the bedside visit, we called and talked with her daughter Cara  - Cara stated that she lives with the patient but just had hip replacement surgery herself, therefore she has a lot going on too.   - Caras children and grandchildren try to help out as possible and they also have hired help too  - Cara stated that things have progressively gotten more difficult as the patient won't drink as much water and primarily stays in the bed/sofa.   - she feels appetite otherwise has been strong  - patient uses a walker to get around and needs help with getting dressed/showering  - Cara feels they may benefit from more help/support at home as possible  - Asked her what the team have shared with her about her mothers medical on going currently. Cara stated she talked with primary team about code status and made them aware of the clear wishes her mother has had for DNR/DNI. Made her aware those wishes are very reasonable  - we went over the primary teams current medical management plan which includes diuresis and  cardiology consultation with plan for repeat echo  - Cara is hopeful for improvement of her mothers condition, which I made her aware is her mothers goal too  - made her aware of the importance of keeping the overall big picture in mind that he mother is 89 and has CHF when it comes to making medical decision as it often may not take much for things to worsen  - Discussed option of home palliative care on discharge for more support at home and provided more in depth information regarding what it entails, which Cara stated she would certainly appreciate. Made her aware that home palliative would allow for a smoother next step transition down the line if things continue to get more difficult and if her mother starts having more undesired admissions. Cara was appreciative of the information.   [] continue current management, goals are for improvement of condition but with limits to invasive interventions  [] will attempt to complete [place] perhaps tomorrow if patient is more up for it  [] recommend home palliative care on discharge, preferably with an agency that also provides home hospice for a smoother transition down the line

## 2023-07-31 NOTE — ASSESSMENT & PLAN NOTE
History of chronic hypoxic respiratory failure secondary to heart failure.  Patient maintaining reasonable oxygen saturation on her home dose of in oxygen.  Continue supplemental oxygen.

## 2023-07-31 NOTE — TELEPHONE ENCOUNTER
----- Message from Abbe Mota sent at 7/31/2023 10:00 AM CDT -----  Contact: Deion Saini/[phone]  [email or web address]     Patient is calling for Medical Advice regarding: pt son states that they are heading to emergency room because she is having issues with her oxygen tank and wants to try and get some home health for her.     How long has patient had these symptoms:     Pharmacy name and phone#:    Would like response via [employer]:  no     Comments:

## 2023-07-31 NOTE — ASSESSMENT & PLAN NOTE
Patient with history of persistent atrial fibrillation however appears to be in sinus bradycardia at this time.  Continue oral anticoagulation with apixaban.

## 2023-07-31 NOTE — H&P
[facility] Medicine  History & Physical    Patient Name: Inna Blankenship  MRN: [id number]  Patient Class: OP- Observation  Admission Date: 7/31/2023  Attending Physician: Po Green MD   Primary Care Provider: Stacy Rodriguez MD         Patient information was obtained from patient, past medical records and ER records.     Subjective:     Principal Problem:Acute on chronic diastolic congestive heart failure    Chief Complaint:   Chief Complaint   Patient presents with    Shortness of Breath    Leg Swelling     Pt with hx of CHF c/o increased SOB and LLE edema since Thursday last week. LLE 2+ pitting edema, normal color. Pt on 2L NC at baseline. Speaking in clear full sentences, no acute distress at triage.         HPI: Patient is 89-year-old woman with history of hypertension, diabetes mellitus type 2, chronic kidney disease stage 3, chronic diastolic heart failure, cardiac amyloidosis, persistent atrial fibrillation rate controlled and prior history of deep venous thrombosis on oral anticoagulation with apixaban, chronic hypoxic respiratory failure on 2 liters/minute of supplemental oxygen at baseline who presents emergency department with shortness of breath and bilateral lower extremity swelling progressively worsening over last 3 or 4 days.  She reports generalized fatigue.  She denies any chest pain.  She denies fevers, chills, cough, or sputum production.  Patient mildly confused however family reports that her mental status is at her baseline.  Patient normally lives with her daughter who helps care for her at home however daughter herself has undergone a hip replacement surgery and therefore had been relying on sitters to help care for the patient more recently.  Daughter on the phone reports that her morning weight is usually about 130 lb.  Patient's grandson at the bedside also reports recent intake of salty foods.    She denies any alcohol or illicit drug use.  She reports a  lifelong nontobacco smoker.  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      Past Medical History:   Diagnosis Date    Acute hypoxemic respiratory failure 1/30/2023    Arthritis     CHF (congestive heart failure) 12/26/2018    Chronic atrial fibrillation 8/29/2020    Chronic kidney disease, stage III (moderate) 9/11/2015    Colon adenoma     Diabetes mellitus, type II     Diet controlled    Glaucoma     Hyperlipemia     Hypertension     Osteopenia        Past Surgical History:   Procedure Laterality Date    APPENDECTOMY      CATARACT EXTRACTION W/  INTRAOCULAR LENS IMPLANT Right 03/15/2006        CATARACT EXTRACTION W/  INTRAOCULAR LENS IMPLANT Left 09/27/2006        COLONOSCOPY W/ POLYPECTOMY      EYE SURGERY      HYSTERECTOMY      TRANSESOPHAGEAL ECHOCARDIOGRAPHY N/A 11/9/2020    Procedure: ECHOCARDIOGRAM, TRANSESOPHAGEAL;  Surgeon: Marcelina Diagnostic Provider;  Location: [facility] EP LAB;  Service: Cardiology;  Laterality: N/A;    TREATMENT OF CARDIAC ARRHYTHMIA N/A 11/9/2020    Procedure: CARDIOVERSION;  Surgeon: Marvin Elmore MD;  Location: [facility] EP LAB;  Service: Cardiology;  Laterality: N/A;  AF, RAFFAELE, DCCV, MAC, GP, 3 PREP       Review of patient's allergies indicates:  No Known Allergies    No current facility-administered medications on file prior to encounter.     Current Outpatient Medications on File Prior to Encounter   Medication Sig    amLODIPine (NORVASC) 10 MG tablet TAKE 1 TABLET (10 MG TOTAL) BY MOUTH ONCE DAILY.    atorvastatin (LIPITOR) 20 MG tablet TAKE 1 TABLET ONE TIME DAILY    ELIQUIS 2.5 mg Tab TAKE 1 TABLET TWICE DAILY    EScitalopram oxalate (LEXAPRO) 10 MG tablet TAKE 1 TABLET ONE TIME DAILY    furosemide (LASIX) 20 MG tablet Take 1 tablet (20 mg total) by mouth once daily.    latanoprost 0.005 % ophthalmic solution Place 1 drop into both eyes once daily.    losartan (COZAAR)  25 MG tablet Take 1 tablet (25 mg total) by mouth once daily.    multivit with minerals/lutein (MULTIVITAMIN 50 PLUS ORAL) Take 1 tablet by mouth once daily.      Family History       Problem Relation (Age of Onset)    Allergies Daughter    Asthma Daughter    Cancer Sister, Daughter    Heart attack Mother    Heart disease Mother    Hypertension Daughter    No Known Problems Father          Tobacco Use    Smoking status: Never    Smokeless tobacco: Never   Substance and Sexual Activity    Alcohol use: Yes     Comment: wine occas.    Drug use: No    Sexual activity: Not Currently     Review of Systems   Constitutional:  Positive for fatigue. Negative for chills and fever.   HENT:  Negative for congestion, hearing loss, sinus pressure and trouble swallowing.    Eyes:  Negative for photophobia, pain, redness and visual disturbance.   Respiratory:  Positive for shortness of breath. Negative for cough, chest tightness and wheezing.    Cardiovascular:  Positive for leg swelling. Negative for chest pain and palpitations.   Gastrointestinal:  Negative for abdominal distention, abdominal pain, blood in stool, constipation, diarrhea, nausea and vomiting.   Endocrine: Negative for cold intolerance, heat intolerance, polydipsia, polyphagia and polyuria.   Genitourinary:  Negative for dysuria and frequency.   Musculoskeletal:  Negative for arthralgias, back pain, gait problem, joint swelling, myalgias and neck pain.   Allergic/Immunologic: Negative for environmental allergies, food allergies and immunocompromised state.   Neurological:  Negative for dizziness, seizures, syncope, weakness, light-headedness and headaches.   Hematological:  Negative for adenopathy.   Psychiatric/Behavioral:  Negative for agitation, behavioral problems and confusion.      Objective:     Vital Signs (Most Recent):  Temp: 98 °F (36.7 °C) (07/31/23 1016)  Pulse: 65 (07/31/23 1032)  Resp: 18 (07/31/23 1032)  BP: (!) 151/65 (07/31/23 1033)  SpO2: 96  % (23 1032) Vital Signs (24h Range):  Temp:  [98 °F (36.7 °C)] 98 °F (36.7 °C)  Pulse:  [] 65  Resp:  [18-19] 18  SpO2:  [87 %-96 %] 96 %  BP: (150-151)/(65) 151/65        Body mass index is 23.69 kg/m².     Physical Exam  Constitutional:       General: She is not in acute distress.     Appearance: She is ill-appearing. She is not diaphoretic.   HENT:      Head: Normocephalic and atraumatic.      Nose: Nose normal.      Mouth/Throat:      Pharynx: No oropharyngeal exudate.   Eyes:      General: No scleral icterus.        Right eye: No discharge.         Left eye: No discharge.      Conjunctiva/sclera: Conjunctivae normal.      Pupils: Pupils are equal, round, and reactive to light.   Neck:      Thyroid: No thyromegaly.      Vascular: No JVD.      Trachea: No tracheal deviation.   Cardiovascular:      Rate and Rhythm: Bradycardia present. Rhythm irregular.      Chest Wall: PMI is displaced.      Heart sounds: Murmur heard.      No friction rub. No gallop.   Pulmonary:      Effort: Pulmonary effort is normal. No respiratory distress.      Breath sounds: No stridor. Rales present. No wheezing.   Chest:      Chest wall: No tenderness.   Abdominal:      General: Bowel sounds are normal. There is no distension.      Palpations: Abdomen is soft. There is no mass.      Tenderness: There is no abdominal tenderness. There is no guarding or rebound.   Musculoskeletal:         General: Swelling present. No tenderness. Normal range of motion.      Cervical back: Normal range of motion and neck supple.      Right lower le+ Pitting Edema present.      Left lower le+ Pitting Edema present.   Lymphadenopathy:      Cervical: No cervical adenopathy.   Skin:     General: Skin is warm and dry.      Coloration: Skin is not pale.      Findings: No erythema or rash.   Neurological:      Mental Status: She is alert.      Cranial Nerves: No cranial nerve deficit.      Motor: No abnormal muscle tone.      Coordination:  "Coordination normal.      Deep Tendon Reflexes: Reflexes are normal and symmetric. Reflexes normal.      Comments: Alert and oriented to self, family, place, year, and situation.  When asked who is the current [place] president she states "Emeka Garcia."  Family at bedside reports mental status at baseline.   Psychiatric:         Behavior: Behavior normal.         Thought Content: Thought content normal.         Judgment: Judgment normal.              CRANIAL NERVES     CN III, IV, VI   Pupils are equal, round, and reactive to light.       Significant Labs: All pertinent labs within the past 24 hours have been reviewed.    Significant Imaging: I have reviewed all pertinent imaging results/findings within the past 24 hours.    Assessment/Plan:     * Acute on chronic diastolic congestive heart failure  Patient history of diastolic heart failure with cardiac amyloidosis on any specific treatment for amyloidosis who presents with evidence decompensated heart failure with pitting bilateral lower extremity edema and pulmonary edema.  Will start intravenous furosemide and titrate to achieve negative fluid balance.  Continue ARB therapy.     Patient with elevated troponin however chronic in nature appears to be a prior baseline.  She denies any chest pain.  Electrocardiogram does not show any acute ischemic changes.  Family reports some dietary indiscretion with increased sodium intake and also decrease caregiver support as her daughter has had on health challenges recently.    Check echocardiogram.  Consult Cardiology for evaluation further treatment recommendations regardingmanagement of amyloidosis with advanced heart failure.    Chronic respiratory failure with hypoxia  History of chronic hypoxic respiratory failure secondary to heart failure.  Patient maintaining reasonable oxygen saturation on her home dose of in oxygen.  Continue supplemental oxygen.    Essential hypertension  Blood pressure mildly elevated.  Restart " losartan now and continue with intravenous diuretic.  Hold amlodipine for now. Monitor blood pressure and will adjust blood pressure medication as needed to achieve reasonable blood pressure control.    Persistent atrial fibrillation  Patient with history of persistent atrial fibrillation however appears to be in sinus bradycardia at this time.  Continue oral anticoagulation with apixaban.    Controlled type 2 diabetes mellitus with stage 3 chronic kidney disease, without long-term current use of insulin  Based on prior hemoglobin A1c her diabetes appears to be well controlled.  Will monitor capillary blood glucose levels and give subcutaneous insulin if needed.  Serum creatinine mildly elevated likely secondary decompensated heart failure.  Monitor kidney function.    Advance care planning  Advance Care Planning     Date: 07/31/2023    Code Status  In light of the patients advanced and life limiting illness,I engaged the the patient and family in a voluntary conversation about the patient's preferences for care  at the very end of life. The patient wishes to have a natural, peaceful death.  Along those lines, the patient does not wish to have CPR or other invasive treatments performed when her heart and/or breathing stops. I communicated to the patient and family that a DNR order would be placed in her medical record to reflect this preference.  I spent a total of 10 minutes engaging the patient in this advance care planning discussion.         VTE Risk Mitigation (From admission, onward)         Ordered     apixaban tablet 2.5 mg  2 times daily         07/31/23 1343     IP VTE HIGH RISK PATIENT  Once         07/31/23 1343     Place sequential compression device  Until discontinued         07/31/23 1343     Reason for No Pharmacological VTE Prophylaxis  Once        Question:  Reasons:  Answer:  Already adequately anticoagulated on oral Anticoagulants    07/31/23 1343                Po Green MD  Department  of [facility] Medicine  [facility] - Emergency Dept

## 2023-07-31 NOTE — ASSESSMENT & PLAN NOTE
Blood pressure mildly elevated.  Restart losartan now and continue with intravenous diuretic.  Hold amlodipine for now. Monitor blood pressure and will adjust blood pressure medication as needed to achieve reasonable blood pressure control.

## 2023-07-31 NOTE — ASSESSMENT & PLAN NOTE
Patient history of diastolic heart failure with cardiac amyloidosis on any specific treatment for amyloidosis who presents with evidence decompensated heart failure with pitting bilateral lower extremity edema and pulmonary edema.  Will start intravenous furosemide and titrate to achieve negative fluid balance.  Continue ARB therapy.     Patient with elevated troponin however chronic in nature appears to be a prior baseline.  She denies any chest pain.  Electrocardiogram does not show any acute ischemic changes.  Family reports some dietary indiscretion with increased sodium intake and also decrease caregiver support as her daughter has had on health challenges recently.    Check echocardiogram.  Consult Cardiology for evaluation further treatment recommendations regardingmanagement of amyloidosis with advanced heart failure.

## 2023-07-31 NOTE — HPI
"Per H&P: "Patient is 89-year-old woman with history of hypertension, diabetes mellitus type 2, chronic kidney disease stage 3, chronic diastolic heart failure, cardiac amyloidosis, persistent atrial fibrillation rate controlled and prior history of deep venous thrombosis on oral anticoagulation with apixaban, chronic hypoxic respiratory failure on 2 liters/minute of supplemental oxygen at baseline who presents emergency department with shortness of breath and bilateral lower extremity swelling progressively worsening over last 3 or 4 days.  She reports generalized fatigue.  She denies any chest pain.  She denies fevers, chills, cough, or sputum production.  Patient mildly confused however family reports that her mental status is at her baseline.  Patient normally lives with her daughter who helps care for her at home however daughter herself has undergone a hip replacement surgery and therefore had been relying on sitters to help care for the patient more recently.  Daughter on the phone reports that her morning weight is usually about 130 lb.  Patient's grandson at the bedside also reports recent intake of salty foods.     She denies any alcohol or illicit drug use.  She reports a lifelong nontobacco smoker.  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   "

## 2023-07-31 NOTE — ASSESSMENT & PLAN NOTE
Advance Care Planning     Date: 07/31/2023    Code Status  In light of the patients advanced and life limiting illness,I engaged the the patient and family in a voluntary conversation about the patient's preferences for care  at the very end of life. The patient wishes to have a natural, peaceful death.  Along those lines, the patient does not wish to have CPR or other invasive treatments performed when her heart and/or breathing stops. I communicated to the patient and family that a DNR order would be placed in her medical record to reflect this preference.  I spent a total of 10 minutes engaging the patient in this advance care planning discussion.

## 2023-07-31 NOTE — SUBJECTIVE & OBJECTIVE
Past Medical History:   Diagnosis Date    Acute hypoxemic respiratory failure 1/30/2023    Arthritis     CHF (congestive heart failure) 12/26/2018    Chronic atrial fibrillation 8/29/2020    Chronic kidney disease, stage III (moderate) 9/11/2015    Colon adenoma     Diabetes mellitus, type II     Diet controlled    Glaucoma     Hyperlipemia     Hypertension     Osteopenia        Past Surgical History:   Procedure Laterality Date    APPENDECTOMY      CATARACT EXTRACTION W/  INTRAOCULAR LENS IMPLANT Right 03/15/2006        CATARACT EXTRACTION W/  INTRAOCULAR LENS IMPLANT Left 09/27/2006        COLONOSCOPY W/ POLYPECTOMY      EYE SURGERY      HYSTERECTOMY      TRANSESOPHAGEAL ECHOCARDIOGRAPHY N/A 11/9/2020    Procedure: ECHOCARDIOGRAM, TRANSESOPHAGEAL;  Surgeon: Marcelina Diagnostic Provider;  Location: [facility] EP LAB;  Service: Cardiology;  Laterality: N/A;    TREATMENT OF CARDIAC ARRHYTHMIA N/A 11/9/2020    Procedure: CARDIOVERSION;  Surgeon: Marvin Elmore MD;  Location: [facility] EP LAB;  Service: Cardiology;  Laterality: N/A;  AF, RAFFAELE, DCCV, MAC, GP, 3 PREP       Review of patient's allergies indicates:  No Known Allergies    No current facility-administered medications on file prior to encounter.     Current Outpatient Medications on File Prior to Encounter   Medication Sig    amLODIPine (NORVASC) 10 MG tablet TAKE 1 TABLET (10 MG TOTAL) BY MOUTH ONCE DAILY.    atorvastatin (LIPITOR) 20 MG tablet TAKE 1 TABLET ONE TIME DAILY    ELIQUIS 2.5 mg Tab TAKE 1 TABLET TWICE DAILY    EScitalopram oxalate (LEXAPRO) 10 MG tablet TAKE 1 TABLET ONE TIME DAILY    furosemide (LASIX) 20 MG tablet Take 1 tablet (20 mg total) by mouth once daily.    latanoprost 0.005 % ophthalmic solution Place 1 drop into both eyes once daily.    losartan (COZAAR) 25 MG tablet Take 1 tablet (25 mg total) by mouth once daily.    multivit with minerals/lutein (MULTIVITAMIN 50 PLUS ORAL) Take 1 tablet by mouth once daily.      Family  History       Problem Relation (Age of Onset)    Allergies Daughter    Asthma Daughter    Cancer Sister, Daughter    Heart attack Mother    Heart disease Mother    Hypertension Daughter    No Known Problems Father          Tobacco Use    Smoking status: Never    Smokeless tobacco: Never   Substance and Sexual Activity    Alcohol use: Yes     Comment: wine occas.    Drug use: No    Sexual activity: Not Currently     Review of Systems   Constitutional:  Positive for fatigue. Negative for chills and fever.   HENT:  Negative for congestion, hearing loss, sinus pressure and trouble swallowing.    Eyes:  Negative for photophobia, pain, redness and visual disturbance.   Respiratory:  Positive for shortness of breath. Negative for cough, chest tightness and wheezing.    Cardiovascular:  Positive for leg swelling. Negative for chest pain and palpitations.   Gastrointestinal:  Negative for abdominal distention, abdominal pain, blood in stool, constipation, diarrhea, nausea and vomiting.   Endocrine: Negative for cold intolerance, heat intolerance, polydipsia, polyphagia and polyuria.   Genitourinary:  Negative for dysuria and frequency.   Musculoskeletal:  Negative for arthralgias, back pain, gait problem, joint swelling, myalgias and neck pain.   Allergic/Immunologic: Negative for environmental allergies, food allergies and immunocompromised state.   Neurological:  Negative for dizziness, seizures, syncope, weakness, light-headedness and headaches.   Hematological:  Negative for adenopathy.   Psychiatric/Behavioral:  Negative for agitation, behavioral problems and confusion.      Objective:     Vital Signs (Most Recent):  Temp: 98 °F (36.7 °C) (07/31/23 1016)  Pulse: 65 (07/31/23 1032)  Resp: 18 (07/31/23 1032)  BP: (!) 151/65 (07/31/23 1033)  SpO2: 96 % (07/31/23 1032) Vital Signs (24h Range):  Temp:  [98 °F (36.7 °C)] 98 °F (36.7 °C)  Pulse:  [] 65  Resp:  [18-19] 18  SpO2:  [87 %-96 %] 96 %  BP: (150-151)/(65)  151/65        Body mass index is 23.69 kg/m².     Physical Exam  Constitutional:       General: She is not in acute distress.     Appearance: She is ill-appearing. She is not diaphoretic.   HENT:      Head: Normocephalic and atraumatic.      Nose: Nose normal.      Mouth/Throat:      Pharynx: No oropharyngeal exudate.   Eyes:      General: No scleral icterus.        Right eye: No discharge.         Left eye: No discharge.      Conjunctiva/sclera: Conjunctivae normal.      Pupils: Pupils are equal, round, and reactive to light.   Neck:      Thyroid: No thyromegaly.      Vascular: No JVD.      Trachea: No tracheal deviation.   Cardiovascular:      Rate and Rhythm: Bradycardia present. Rhythm irregular.      Chest Wall: PMI is displaced.      Heart sounds: Murmur heard.      No friction rub. No gallop.   Pulmonary:      Effort: Pulmonary effort is normal. No respiratory distress.      Breath sounds: No stridor. Rales present. No wheezing.   Chest:      Chest wall: No tenderness.   Abdominal:      General: Bowel sounds are normal. There is no distension.      Palpations: Abdomen is soft. There is no mass.      Tenderness: There is no abdominal tenderness. There is no guarding or rebound.   Musculoskeletal:         General: Swelling present. No tenderness. Normal range of motion.      Cervical back: Normal range of motion and neck supple.      Right lower le+ Pitting Edema present.      Left lower le+ Pitting Edema present.   Lymphadenopathy:      Cervical: No cervical adenopathy.   Skin:     General: Skin is warm and dry.      Coloration: Skin is not pale.      Findings: No erythema or rash.   Neurological:      Mental Status: She is alert.      Cranial Nerves: No cranial nerve deficit.      Motor: No abnormal muscle tone.      Coordination: Coordination normal.      Deep Tendon Reflexes: Reflexes are normal and symmetric. Reflexes normal.      Comments: Alert and oriented to self, family, place, year, and  "situation.  When asked who is the current [place] president she states "Emeka Garcia."  Family at bedside reports mental status at baseline.   Psychiatric:         Behavior: Behavior normal.         Thought Content: Thought content normal.         Judgment: Judgment normal.              CRANIAL NERVES     CN III, IV, VI   Pupils are equal, round, and reactive to light.       Significant Labs: All pertinent labs within the past 24 hours have been reviewed.    Significant Imaging: I have reviewed all pertinent imaging results/findings within the past 24 hours.  "

## 2023-07-31 NOTE — CONSULTS
[facility], operated by [facility] - Emergency Dept  Palliative Medicine  Consult Note    Patient Name: Inna Blankenship  MRN: [id number]  Admission Date: 7/31/2023  Hospital Length of Stay: 0 days  Code Status: DNR   Attending Provider: Po Green MD  Consulting Provider: Singh Bronson MD  Primary Care Physician: Stacy Rodriguez MD  Principal Problem:Acute on chronic diastolic congestive heart failure    Patient information was obtained from patient, relative(s), caregiver / friend, past medical records and primary team.      Inpatient consult to Palliative Care  Consult performed by: Singh Bronson MD  Consult ordered by: Po Green MD  Reason for consult: advanced care planning/ goals of care        Assessment/Plan:     Pulmonary  Chronic respiratory failure with hypoxia  - on NC  - management per primary team, noted plan for diuresis    Cardiac/Vascular  * Acute on chronic diastolic congestive heart failure  - noted primary team plan for updated echo and cardiology consultation  - management per primary team/cardiology  - noted echo 1/2023:   Severe left atrial enlargement.   The estimated ejection fraction is 45%.   Grade II left ventricular diastolic dysfunction.      Persistent atrial fibrillation  - noted    Endocrine  Controlled type 2 diabetes mellitus with stage 3 chronic kidney disease, without long-term current use of insulin  - noted    Palliative Care  Advance care planning  7/31/2023:  - Chart reviewed in depth.   - patient seen at bedside along with palliative RN Rosaline  - patient was initially sleeping upon entering the room, but was easily arousable  - introduced self and role of palliative care   - patient was tired, stating she is getting better rest now than she did last night  - she lives at home and uses a walker to ambulate   - daughter Cara lives with her too and has grandchildren that also help out   - she had limited insight into why she was in the hospital and what is going on medically, but did admit  to feeling more tired and short of breath  - we went over her current medical on dominic and the planned workup/management to improve her symptoms. Seems she   - she requested to rest and asked us to call her daughter Dwain. She stated Cara would be her medical decision maker and has a strong understanding of her medical on going  - allowed her to rest  - after the bedside visit, we called and talked with her daughter Cara  - Cara stated that she lives with the patient but just had hip replacement surgery herself, therefore she has a lot going on too.   - Caras children and grandchildren try to help out as possible and they also have hired help too  - Cara stated that things have progressively gotten more difficult as the patient won't drink as much water and primarily stays in the bed/sofa.   - she feels appetite otherwise has been strong  - patient uses a walker to get around and needs help with getting dressed/showering  - Cara feels they may benefit from more help/support at home as possible  - Asked her what the team have shared with her about her mothers medical on going currently. Cara stated she talked with primary team about code status and made them aware of the clear wishes her mother has had for DNR/DNI. Made her aware those wishes are very reasonable  - we went over the primary teams current medical management plan which includes diuresis and cardiology consultation with plan for repeat echo  - Cara is hopeful for improvement of her mothers condition, which I made her aware is her mothers goal too  - made her aware of the importance of keeping the overall big picture in mind that he mother is 89 and has CHF when it comes to making medical decision as it often may not take much for things to worsen  - Discussed option of home palliative care on discharge for more support at home and provided more in depth information regarding what it entails, which Cara stated she would certainly  "appreciate. Made her aware that home palliative would allow for a smoother next step transition down the line if things continue to get more difficult and if her mother starts having more undesired admissions. Cara was appreciative of the information.   [] continue current management, goals are for improvement of condition but with limits to invasive interventions  [] will attempt to complete [place] perhaps tomorrow if patient is more up for it  [] recommend home palliative care on discharge, preferably with an agency that also provides home hospice for a smoother transition down the line        Thank you for your consult. I will follow-up with patient. Please contact us if you have any additional questions.    Subjective:     HPI:   Per H&P: "Patient is 89-year-old woman with history of hypertension, diabetes mellitus type 2, chronic kidney disease stage 3, chronic diastolic heart failure, cardiac amyloidosis, persistent atrial fibrillation rate controlled and prior history of deep venous thrombosis on oral anticoagulation with apixaban, chronic hypoxic respiratory failure on 2 liters/minute of supplemental oxygen at baseline who presents emergency department with shortness of breath and bilateral lower extremity swelling progressively worsening over last 3 or 4 days.  She reports generalized fatigue.  She denies any chest pain.  She denies fevers, chills, cough, or sputum production.  Patient mildly confused however family reports that her mental status is at her baseline.  Patient normally lives with her daughter who helps care for her at home however daughter herself has undergone a hip replacement surgery and therefore had been relying on sitters to help care for the patient more recently.  Daughter on the phone reports that her morning weight is usually about 130 lb.  Patient's grandson at the bedside also reports recent intake of salty foods.     She denies any alcohol or illicit drug use.  She reports a " "lifelong nontobacco smoker.  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       Hospital Course:  No notes on file      Past Medical History:   Diagnosis Date    Acute hypoxemic respiratory failure 1/30/2023    Arthritis     CHF (congestive heart failure) 12/26/2018    Chronic atrial fibrillation 8/29/2020    Chronic kidney disease, stage III (moderate) 9/11/2015    Colon adenoma     Diabetes mellitus, type II     Diet controlled    Glaucoma     Hyperlipemia     Hypertension     Osteopenia        Past Surgical History:   Procedure Laterality Date    APPENDECTOMY      CATARACT EXTRACTION W/  INTRAOCULAR LENS IMPLANT Right 03/15/2006        CATARACT EXTRACTION W/  INTRAOCULAR LENS IMPLANT Left 09/27/2006        COLONOSCOPY W/ POLYPECTOMY      EYE SURGERY      HYSTERECTOMY      TRANSESOPHAGEAL ECHOCARDIOGRAPHY N/A 11/9/2020    Procedure: ECHOCARDIOGRAM, TRANSESOPHAGEAL;  Surgeon: Marcelina Diagnostic Provider;  Location: [facility] EP LAB;  Service: Cardiology;  Laterality: N/A;    TREATMENT OF CARDIAC ARRHYTHMIA N/A 11/9/2020    Procedure: CARDIOVERSION;  Surgeon: Marvin Elmore MD;  Location: [facility] EP LAB;  Service: Cardiology;  Laterality: N/A;  AF, RAFFAELE, DCCV, MAC, GP, 3 PREP       Review of patient's allergies indicates:  No Known Allergies    Medications:  Continuous Infusions:  Scheduled Meds:   apixaban  2.5 mg Oral BID    [START ON 8/1/2023] atorvastatin  20 mg Oral Daily    [START ON 8/1/2023] EScitalopram oxalate  10 mg Oral Daily    furosemide (LASIX) injection  40 mg Intravenous Q12H    latanoprost  1 drop Both Eyes Daily    losartan  25 mg Oral Daily     PRN Meds:dextrose 10%, dextrose 10%, glucagon (human recombinant), glucose, glucose, insulin aspart U-100    Family History       Problem Relation (Age of Onset)    Allergies Daughter    Asthma " Daughter    Cancer Sister, Daughter    Heart attack Mother    Heart disease Mother    Hypertension Daughter    No Known Problems Father          Tobacco Use    Smoking status: Never    Smokeless tobacco: Never   Substance and Sexual Activity    Alcohol use: Yes     Comment: wine occas.    Drug use: No    Sexual activity: Not Currently         Objective:     Vital Signs (Most Recent):  Temp: 98 °F (36.7 °C) (07/31/23 1016)  Pulse: (!) 49 (07/31/23 1432)  Resp: 19 (07/31/23 1432)  BP: (!) 148/69 (07/31/23 1432)  SpO2: 95 % (07/31/23 1432) Vital Signs (24h Range):  Temp:  [98 °F (36.7 °C)] 98 °F (36.7 °C)  Pulse:  [] 49  Resp:  [18-19] 19  SpO2:  [87 %-96 %] 95 %  BP: (137-154)/(65-71) 148/69        Body mass index is 23.69 kg/m².       Physical Exam  Vitals reviewed.   Constitutional:       General: She is not in acute distress.     Comments: Tired. Initially sleeping when entered room, but was easily arousable. Elderly appearing.    HENT:      Head: Normocephalic and atraumatic.   Eyes:      Extraocular Movements: Extraocular movements intact.   Cardiovascular:      Rate and Rhythm: Bradycardia present.   Pulmonary:      Comments: NC in place. Increased work of breathing at times during conversation  Skin:     General: Skin is warm.   Neurological:      Comments: Conversant, but tired. Has some insight            Review of Symptoms        Living Arrangements:  Lives with family    Psychosocial/Cultural:   See Palliative Psychosocial Note: Yes  - lives with daughter Cara (only child)  - has grandchildren who help out  **Primary  to Follow**  Palliative Care  Consult: No        Advance Care Planning  Advance Directives:   Do Not Resuscitate Status: Yes    Medical Power of : Yes        Oral Declaration: Yes   Witnesses:  Rosaline   Agent's Name:  Cara (daughter)  Goals of Care: The patient and family endorses that what is most important right now is to focus on improvement in  condition but with limits to invasive therapies    Accordingly, we have decided that the best plan to meet the patient's goals includes continuing with treatment         Significant Labs: reviewed  CBC:   Recent Labs   Lab 07/31/23  1054   WBC 5.07   HGB 12.3   HCT 38.9   MCV 96        BMP:  Recent Labs   Lab 07/31/23  1054   GLU 95      K 4.3      CO2 21*   BUN 20   CREATININE 1.2   CALCIUM 10.4     LFT:  Lab Results   Component Value Date    AST 31 07/31/2023    ALKPHOS 96 07/31/2023    BILITOT 1.0 07/31/2023     Albumin:   Albumin   Date Value Ref Range Status   07/31/2023 3.8 3.5 - 5.2 g/dL Final     Protein:   Total Protein   Date Value Ref Range Status   07/31/2023 7.4 6.0 - 8.4 g/dL Final     Lactic acid:   Lab Results   Component Value Date    LACTATE 1.9 04/10/2022    LACTATE 2.0 02/08/2021       Significant Imaging: reviewed      > 50% of 70 min visit spent in chart review, face to face discussion of symptom assessment, coordination of care with other specialists, and d/c planning  16 minutes ACP time spent: goals of care, emotional support, formulating and communication prognosis and goals of care, exploring burden/ benefit of various approaches of treatment.       Singh Bronson MD  Palliative Medicine  [facility] - Emergency Dept

## 2023-07-31 NOTE — HPI
Patient is 89-year-old woman with history of hypertension, diabetes mellitus type 2, chronic kidney disease stage 3, chronic diastolic heart failure, cardiac amyloidosis, persistent atrial fibrillation rate controlled and prior history of deep venous thrombosis on oral anticoagulation with apixaban, chronic hypoxic respiratory failure on 2 liters/minute of supplemental oxygen at baseline who presents emergency department with shortness of breath and bilateral lower extremity swelling progressively worsening over last 3 or 4 days.  She reports generalized fatigue.  She denies any chest pain.  She denies fevers, chills, cough, or sputum production.  Patient mildly confused however family reports that her mental status is at her baseline.  Patient normally lives with her daughter who helps care for her at home however daughter herself has undergone a hip replacement surgery and therefore had been relying on sitters to help care for the patient more recently.  Daughter on the phone reports that her morning weight is usually about 130 lb.  Patient's grandson at the bedside also reports recent intake of salty foods.    She denies any alcohol or illicit drug use.  She reports a lifelong nontobacco smoker.  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

## 2023-07-31 NOTE — SUBJECTIVE & OBJECTIVE
Past Medical History:   Diagnosis Date    Acute hypoxemic respiratory failure 1/30/2023    Arthritis     CHF (congestive heart failure) 12/26/2018    Chronic atrial fibrillation 8/29/2020    Chronic kidney disease, stage III (moderate) 9/11/2015    Colon adenoma     Diabetes mellitus, type II     Diet controlled    Glaucoma     Hyperlipemia     Hypertension     Osteopenia        Past Surgical History:   Procedure Laterality Date    APPENDECTOMY      CATARACT EXTRACTION W/  INTRAOCULAR LENS IMPLANT Right 03/15/2006        CATARACT EXTRACTION W/  INTRAOCULAR LENS IMPLANT Left 09/27/2006        COLONOSCOPY W/ POLYPECTOMY      EYE SURGERY      HYSTERECTOMY      TRANSESOPHAGEAL ECHOCARDIOGRAPHY N/A 11/9/2020    Procedure: ECHOCARDIOGRAM, TRANSESOPHAGEAL;  Surgeon: Marcelina Diagnostic Provider;  Location: [facility] EP LAB;  Service: Cardiology;  Laterality: N/A;    TREATMENT OF CARDIAC ARRHYTHMIA N/A 11/9/2020    Procedure: CARDIOVERSION;  Surgeon: Marvin Elmore MD;  Location: [facility] EP LAB;  Service: Cardiology;  Laterality: N/A;  AF, RAFFAELE, DCCV, MAC, GP, 3 PREP       Review of patient's allergies indicates:  No Known Allergies    Medications:  Continuous Infusions:  Scheduled Meds:   apixaban  2.5 mg Oral BID    [START ON 8/1/2023] atorvastatin  20 mg Oral Daily    [START ON 8/1/2023] EScitalopram oxalate  10 mg Oral Daily    furosemide (LASIX) injection  40 mg Intravenous Q12H    latanoprost  1 drop Both Eyes Daily    losartan  25 mg Oral Daily     PRN Meds:dextrose 10%, dextrose 10%, glucagon (human recombinant), glucose, glucose, insulin aspart U-100    Family History       Problem Relation (Age of Onset)    Allergies Daughter    Asthma Daughter    Cancer Sister, Daughter    Heart attack Mother    Heart disease Mother    Hypertension Daughter    No Known Problems Father          Tobacco Use    Smoking status: Never    Smokeless tobacco: Never   Substance and Sexual Activity    Alcohol use: Yes      Comment: wine occas.    Drug use: No    Sexual activity: Not Currently         Objective:     Vital Signs (Most Recent):  Temp: 98 °F (36.7 °C) (07/31/23 1016)  Pulse: (!) 49 (07/31/23 1432)  Resp: 19 (07/31/23 1432)  BP: (!) 148/69 (07/31/23 1432)  SpO2: 95 % (07/31/23 1432) Vital Signs (24h Range):  Temp:  [98 °F (36.7 °C)] 98 °F (36.7 °C)  Pulse:  [] 49  Resp:  [18-19] 19  SpO2:  [87 %-96 %] 95 %  BP: (137-154)/(65-71) 148/69        Body mass index is 23.69 kg/m².       Physical Exam  Vitals reviewed.   Constitutional:       General: She is not in acute distress.     Comments: Tired. Initially sleeping when entered room, but was easily arousable. Elderly appearing.    HENT:      Head: Normocephalic and atraumatic.   Eyes:      Extraocular Movements: Extraocular movements intact.   Cardiovascular:      Rate and Rhythm: Bradycardia present.   Pulmonary:      Comments: NC in place. Increased work of breathing at times during conversation  Skin:     General: Skin is warm.   Neurological:      Comments: Conversant, but tired. Has some insight            Review of Symptoms        Living Arrangements:  Lives with family    Psychosocial/Cultural:   See Palliative Psychosocial Note: Yes  - lives with daughter Cara (only child)  - has grandchildren who help out  **Primary  to Follow**  Palliative Care  Consult: No        Advance Care Planning   Advance Directives:   Do Not Resuscitate Status: Yes    Medical Power of : Yes        Oral Declaration: Yes   Witnesses:  Rosaline   Agent's Name:  Cara (daughter)  Goals of Care: The patient and family endorses that what is most important right now is to focus on improvement in condition but with limits to invasive therapies    Accordingly, we have decided that the best plan to meet the patient's goals includes continuing with treatment         Significant Labs: reviewed  CBC:   Recent Labs   Lab 07/31/23  1054   WBC 5.07   HGB 12.3   HCT  38.9   MCV 96        BMP:  Recent Labs   Lab 07/31/23  1054   GLU 95      K 4.3      CO2 21*   BUN 20   CREATININE 1.2   CALCIUM 10.4     LFT:  Lab Results   Component Value Date    AST 31 07/31/2023    ALKPHOS 96 07/31/2023    BILITOT 1.0 07/31/2023     Albumin:   Albumin   Date Value Ref Range Status   07/31/2023 3.8 3.5 - 5.2 g/dL Final     Protein:   Total Protein   Date Value Ref Range Status   07/31/2023 7.4 6.0 - 8.4 g/dL Final     Lactic acid:   Lab Results   Component Value Date    LACTATE 1.9 04/10/2022    LACTATE 2.0 02/08/2021       Significant Imaging: reviewed

## 2023-08-01 PROBLEM — J96.21 ACUTE ON CHRONIC RESPIRATORY FAILURE WITH HYPOXIA: Status: ACTIVE | Noted: 2023-07-31

## 2023-08-01 LAB
ANION GAP SERPL CALC-SCNC: 11 MMOL/L (ref 8–16)
BUN SERPL-MCNC: 18 MG/DL (ref 10–30)
CALCIUM SERPL-MCNC: 9.9 MG/DL (ref 8.7–10.5)
CHLORIDE SERPL-SCNC: 110 MMOL/L (ref 95–110)
CO2 SERPL-SCNC: 22 MMOL/L (ref 23–29)
CREAT SERPL-MCNC: 1 MG/DL (ref 0.5–1.4)
EST. GFR  (NO RACE VARIABLE): 51 ML/MIN/1.73 M^2
ESTIMATED AVG GLUCOSE: 111 MG/DL (ref 68–131)
GLUCOSE SERPL-MCNC: 87 MG/DL (ref 70–110)
HBA1C MFR BLD: 5.5 % (ref 4–5.6)
MAGNESIUM SERPL-MCNC: 1.8 MG/DL (ref 1.6–2.6)
POCT GLUCOSE: 102 MG/DL (ref 70–110)
POCT GLUCOSE: 183 MG/DL (ref 70–110)
POCT GLUCOSE: 96 MG/DL (ref 70–110)
POTASSIUM SERPL-SCNC: 3.9 MMOL/L (ref 3.5–5.1)
SODIUM SERPL-SCNC: 143 MMOL/L (ref 136–145)
TROPONIN I SERPL DL<=0.01 NG/ML-MCNC: 0.14 NG/ML (ref 0–0.03)

## 2023-08-01 PROCEDURE — 99497 ADVNCD CARE PLAN 30 MIN: CPT | Mod: HCNC,,, | Performed by: STUDENT IN AN ORGANIZED HEALTH CARE EDUCATION/TRAINING PROGRAM

## 2023-08-01 PROCEDURE — 83036 HEMOGLOBIN GLYCOSYLATED A1C: CPT | Mod: HCNC | Performed by: HOSPITALIST

## 2023-08-01 PROCEDURE — 99214 OFFICE O/P EST MOD 30 MIN: CPT | Mod: HCNC,,, | Performed by: INTERNAL MEDICINE

## 2023-08-01 PROCEDURE — 25000003 PHARM REV CODE 250: Mod: HCNC | Performed by: HOSPITALIST

## 2023-08-01 PROCEDURE — 80048 BASIC METABOLIC PNL TOTAL CA: CPT | Mod: HCNC | Performed by: INTERNAL MEDICINE

## 2023-08-01 PROCEDURE — 99214 PR OFFICE/OUTPT VISIT, EST, LEVL IV, 30-39 MIN: ICD-10-PCS | Mod: HCNC,,, | Performed by: STUDENT IN AN ORGANIZED HEALTH CARE EDUCATION/TRAINING PROGRAM

## 2023-08-01 PROCEDURE — 99233 SBSQ HOSP IP/OBS HIGH 50: CPT | Mod: HCNC,,, | Performed by: INTERNAL MEDICINE

## 2023-08-01 PROCEDURE — 83735 ASSAY OF MAGNESIUM: CPT | Mod: HCNC | Performed by: INTERNAL MEDICINE

## 2023-08-01 PROCEDURE — 36415 COLL VENOUS BLD VENIPUNCTURE: CPT | Mod: HCNC | Performed by: INTERNAL MEDICINE

## 2023-08-01 PROCEDURE — 97535 SELF CARE MNGMENT TRAINING: CPT | Mod: HCNC

## 2023-08-01 PROCEDURE — 97530 THERAPEUTIC ACTIVITIES: CPT | Mod: HCNC

## 2023-08-01 PROCEDURE — 99214 PR OFFICE/OUTPT VISIT, EST, LEVL IV, 30-39 MIN: ICD-10-PCS | Mod: HCNC,,, | Performed by: INTERNAL MEDICINE

## 2023-08-01 PROCEDURE — 94761 N-INVAS EAR/PLS OXIMETRY MLT: CPT | Mod: HCNC

## 2023-08-01 PROCEDURE — 97162 PT EVAL MOD COMPLEX 30 MIN: CPT | Mod: HCNC

## 2023-08-01 PROCEDURE — 84484 ASSAY OF TROPONIN QUANT: CPT | Mod: HCNC | Performed by: INTERNAL MEDICINE

## 2023-08-01 PROCEDURE — 96376 TX/PRO/DX INJ SAME DRUG ADON: CPT

## 2023-08-01 PROCEDURE — 63600175 PHARM REV CODE 636 W HCPCS: Mod: HCNC | Performed by: HOSPITALIST

## 2023-08-01 PROCEDURE — 99900035 HC TECH TIME PER 15 MIN (STAT): Mod: HCNC

## 2023-08-01 PROCEDURE — 36415 COLL VENOUS BLD VENIPUNCTURE: CPT | Mod: HCNC | Performed by: HOSPITALIST

## 2023-08-01 PROCEDURE — 99214 OFFICE O/P EST MOD 30 MIN: CPT | Mod: HCNC,,, | Performed by: STUDENT IN AN ORGANIZED HEALTH CARE EDUCATION/TRAINING PROGRAM

## 2023-08-01 PROCEDURE — 27000221 HC OXYGEN, UP TO 24 HOURS: Mod: HCNC

## 2023-08-01 PROCEDURE — 97166 OT EVAL MOD COMPLEX 45 MIN: CPT | Mod: HCNC

## 2023-08-01 PROCEDURE — 99233 PR SUBSEQUENT HOSPITAL CARE,LEVL III: ICD-10-PCS | Mod: HCNC,,, | Performed by: INTERNAL MEDICINE

## 2023-08-01 PROCEDURE — 99497 PR ADVNCD CARE PLAN 30 MIN: ICD-10-PCS | Mod: HCNC,,, | Performed by: STUDENT IN AN ORGANIZED HEALTH CARE EDUCATION/TRAINING PROGRAM

## 2023-08-01 PROCEDURE — G0378 HOSPITAL OBSERVATION PER HR: HCPCS | Mod: HCNC

## 2023-08-01 RX ADMIN — ESCITALOPRAM OXALATE 10 MG: 10 TABLET ORAL at 09:08

## 2023-08-01 RX ADMIN — APIXABAN 2.5 MG: 2.5 TABLET, FILM COATED ORAL at 09:08

## 2023-08-01 RX ADMIN — LATANOPROST 1 DROP: 50 SOLUTION OPHTHALMIC at 09:08

## 2023-08-01 RX ADMIN — LOSARTAN POTASSIUM 25 MG: 25 TABLET, FILM COATED ORAL at 09:08

## 2023-08-01 RX ADMIN — FUROSEMIDE 40 MG: 10 INJECTION, SOLUTION INTRAMUSCULAR; INTRAVENOUS at 05:08

## 2023-08-01 RX ADMIN — ATORVASTATIN CALCIUM 20 MG: 20 TABLET, FILM COATED ORAL at 09:08

## 2023-08-01 NOTE — SUBJECTIVE & OBJECTIVE
Interval History: Patient is awake and alert this morning.  Family at bedside.  Breathing improving with diuresis.  No chest pain.      Review of Systems   Constitutional:  Positive for fatigue. Negative for chills and fever.   HENT:  Negative for congestion, hearing loss, sinus pressure and trouble swallowing.    Eyes:  Negative for photophobia, pain, redness and visual disturbance.   Respiratory:  Positive for shortness of breath (improving). Negative for cough, chest tightness and wheezing.    Cardiovascular:  Positive for leg swelling. Negative for chest pain and palpitations.   Gastrointestinal:  Negative for abdominal distention, abdominal pain, blood in stool, constipation, diarrhea, nausea and vomiting.   Endocrine: Negative for cold intolerance, heat intolerance, polydipsia, polyphagia and polyuria.   Genitourinary:  Negative for dysuria and frequency.   Musculoskeletal:  Negative for arthralgias, back pain, gait problem, joint swelling, myalgias and neck pain.   Allergic/Immunologic: Negative for environmental allergies, food allergies and immunocompromised state.   Neurological:  Negative for dizziness, seizures, syncope, weakness, light-headedness and headaches.   Hematological:  Negative for adenopathy.   Psychiatric/Behavioral:  Negative for agitation, behavioral problems and confusion.      Objective:     Vital Signs (Most Recent):  Temp: 97.5 °F (36.4 °C) (08/01/23 0759)  Pulse: 70 (08/01/23 0958)  Resp: 16 (08/01/23 0759)  BP: (!) 171/81 (08/01/23 0759)  SpO2: (!) 94 % (08/01/23 0828) Vital Signs (24h Range):  Temp:  [62.6 °F (17 °C)-98.3 °F (36.8 °C)] 97.5 °F (36.4 °C)  Pulse:  [46-70] 70  Resp:  [1-29] 16  SpO2:  [94 %-99 %] 94 %  BP: (137-171)/(65-88) 171/81     Weight: 65.9 kg (145 lb 3.2 oz)  Body mass index is 24.92 kg/m².    Intake/Output Summary (Last 24 hours) at 8/1/2023 1045  Last data filed at 8/1/2023 0320  Gross per 24 hour   Intake 60 ml   Output 700 ml   Net -640 ml         Physical  Exam  Constitutional:       General: She is not in acute distress.     Appearance: She is ill-appearing (mild). She is not diaphoretic.   HENT:      Head: Normocephalic and atraumatic.      Right Ear: External ear normal.      Left Ear: External ear normal.      Nose: Nose normal.      Mouth/Throat:      Mouth: Mucous membranes are moist.      Pharynx: Oropharynx is clear. No oropharyngeal exudate.   Eyes:      General: No scleral icterus.     Conjunctiva/sclera: Conjunctivae normal.      Pupils: Pupils are equal, round, and reactive to light.   Neck:      Thyroid: No thyromegaly.      Vascular: No JVD.      Trachea: No tracheal deviation.      Comments: +JVD  Cardiovascular:      Rate and Rhythm: Bradycardia present. Rhythm irregular.      Chest Wall: PMI is displaced.      Pulses: Normal pulses.      Heart sounds: Murmur heard.      No friction rub. No gallop.   Pulmonary:      Effort: Pulmonary effort is normal. No respiratory distress.      Breath sounds: No stridor. Rales (bibasilar) present. No wheezing.   Abdominal:      General: Bowel sounds are normal. There is no distension.      Palpations: Abdomen is soft. There is no mass.      Tenderness: There is no abdominal tenderness. There is no guarding or rebound.   Musculoskeletal:         General: Normal range of motion.      Cervical back: Normal range of motion and neck supple.      Right lower le+ Pitting Edema present.      Left lower le+ Pitting Edema present.   Lymphadenopathy:      Cervical: No cervical adenopathy.   Skin:     General: Skin is warm and dry.      Coloration: Skin is not pale.      Findings: No erythema or rash.   Neurological:      General: No focal deficit present.      Mental Status: She is alert. Mental status is at baseline.      Cranial Nerves: No cranial nerve deficit.      Motor: No abnormal muscle tone.      Coordination: Coordination normal.      Deep Tendon Reflexes: Reflexes are normal and symmetric. Reflexes normal.    Psychiatric:         Mood and Affect: Mood normal.         Behavior: Behavior normal.             Significant Labs: All pertinent labs within the past 24 hours have been reviewed.    Significant Imaging: I have reviewed all pertinent imaging results/findings within the past 24 hours.

## 2023-08-01 NOTE — PROGRESS NOTES
[facility] Medicine  Progress Note    Patient Name: Inna Blankenship  MRN: [id number]  Patient Class: OP- Observation   Admission Date: 7/31/2023  Length of Stay: 0 days  Attending Physician: Paul Ford MD  Primary Care Provider: Stacy Rodriguez MD        Subjective:     Principal Problem:Acute on chronic combined systolic and diastolic congestive heart failure    HPI:  Patient is 89-year-old woman with history of hypertension, diabetes mellitus type 2, chronic kidney disease stage 3, chronic diastolic heart failure, cardiac amyloidosis, persistent atrial fibrillation rate controlled and prior history of deep venous thrombosis on oral anticoagulation with apixaban, chronic hypoxic respiratory failure on 2 liters/minute of supplemental oxygen at baseline who presents emergency department with shortness of breath and bilateral lower extremity swelling progressively worsening over last 3 or 4 days.  She reports generalized fatigue.  She denies any chest pain.  She denies fevers, chills, cough, or sputum production.  Patient mildly confused however family reports that her mental status is at her baseline.  Patient normally lives with her daughter who helps care for her at home however daughter herself has undergone a hip replacement surgery and therefore had been relying on sitters to help care for the patient more recently.  Daughter on the phone reports that her morning weight is usually about 130 lb.  Patient's grandson at the bedside also reports recent intake of salty foods.    She denies any alcohol or illicit drug use.  She reports a lifelong nontobacco smoker.  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      Overview/Hospital Course:  No notes on file    Interval History: Patient is awake and alert this morning.  Family at bedside.  Breathing improving with diuresis.  No chest pain.      Review of Systems    Constitutional:  Positive for fatigue. Negative for chills and fever.   HENT:  Negative for congestion, hearing loss, sinus pressure and trouble swallowing.    Eyes:  Negative for photophobia, pain, redness and visual disturbance.   Respiratory:  Positive for shortness of breath (improving). Negative for cough, chest tightness and wheezing.    Cardiovascular:  Positive for leg swelling. Negative for chest pain and palpitations.   Gastrointestinal:  Negative for abdominal distention, abdominal pain, blood in stool, constipation, diarrhea, nausea and vomiting.   Endocrine: Negative for cold intolerance, heat intolerance, polydipsia, polyphagia and polyuria.   Genitourinary:  Negative for dysuria and frequency.   Musculoskeletal:  Negative for arthralgias, back pain, gait problem, joint swelling, myalgias and neck pain.   Allergic/Immunologic: Negative for environmental allergies, food allergies and immunocompromised state.   Neurological:  Negative for dizziness, seizures, syncope, weakness, light-headedness and headaches.   Hematological:  Negative for adenopathy.   Psychiatric/Behavioral:  Negative for agitation, behavioral problems and confusion.      Objective:     Vital Signs (Most Recent):  Temp: 97.5 °F (36.4 °C) (08/01/23 0759)  Pulse: 70 (08/01/23 0958)  Resp: 16 (08/01/23 0759)  BP: (!) 171/81 (08/01/23 0759)  SpO2: (!) 94 % (08/01/23 0828) Vital Signs (24h Range):  Temp:  [62.6 °F (17 °C)-98.3 °F (36.8 °C)] 97.5 °F (36.4 °C)  Pulse:  [46-70] 70  Resp:  [1-29] 16  SpO2:  [94 %-99 %] 94 %  BP: (137-171)/(65-88) 171/81     Weight: 65.9 kg (145 lb 3.2 oz)  Body mass index is 24.92 kg/m².    Intake/Output Summary (Last 24 hours) at 8/1/2023 1045  Last data filed at 8/1/2023 0320  Gross per 24 hour   Intake 60 ml   Output 700 ml   Net -640 ml         Physical Exam  Constitutional:       General: She is not in acute distress.     Appearance: She is ill-appearing (mild). She is not diaphoretic.   HENT:      Head:  Normocephalic and atraumatic.      Right Ear: External ear normal.      Left Ear: External ear normal.      Nose: Nose normal.      Mouth/Throat:      Mouth: Mucous membranes are moist.      Pharynx: Oropharynx is clear. No oropharyngeal exudate.   Eyes:      General: No scleral icterus.     Conjunctiva/sclera: Conjunctivae normal.      Pupils: Pupils are equal, round, and reactive to light.   Neck:      Thyroid: No thyromegaly.      Vascular: No JVD.      Trachea: No tracheal deviation.      Comments: +JVD  Cardiovascular:      Rate and Rhythm: Bradycardia present. Rhythm irregular.      Chest Wall: PMI is displaced.      Pulses: Normal pulses.      Heart sounds: Murmur heard.      No friction rub. No gallop.   Pulmonary:      Effort: Pulmonary effort is normal. No respiratory distress.      Breath sounds: No stridor. Rales (bibasilar) present. No wheezing.   Abdominal:      General: Bowel sounds are normal. There is no distension.      Palpations: Abdomen is soft. There is no mass.      Tenderness: There is no abdominal tenderness. There is no guarding or rebound.   Musculoskeletal:         General: Normal range of motion.      Cervical back: Normal range of motion and neck supple.      Right lower le+ Pitting Edema present.      Left lower le+ Pitting Edema present.   Lymphadenopathy:      Cervical: No cervical adenopathy.   Skin:     General: Skin is warm and dry.      Coloration: Skin is not pale.      Findings: No erythema or rash.   Neurological:      General: No focal deficit present.      Mental Status: She is alert. Mental status is at baseline.      Cranial Nerves: No cranial nerve deficit.      Motor: No abnormal muscle tone.      Coordination: Coordination normal.      Deep Tendon Reflexes: Reflexes are normal and symmetric. Reflexes normal.   Psychiatric:         Mood and Affect: Mood normal.         Behavior: Behavior normal.             Significant Labs: All pertinent labs within the past 24  hours have been reviewed.    Significant Imaging: I have reviewed all pertinent imaging results/findings within the past 24 hours.      Assessment/Plan:      * Acute on chronic combined systolic and diastolic congestive heart failure  History of Cardiac Amyloidosis.  TTE on admission with The left ventricle is mildly dilated. Moderately increased wall thickness. Moderate global hypokinesis present. There is moderately reduced systolic function with a visually estimated ejection fraction of 40 - 45%.  TTE in 1/2023 with EF of 45% and Grade II DD.  Home Meds:  Amlodipine 10mg, Lasix 20mg, Losartan 25mg  -See above      Acute on chronic respiratory failure with hypoxia  Chronic hypoxic respiratory failure secondary to heart failure on 2L O2NC at home.  Presented with shortness of breath and bilateral lower extremity swelling progressively worsening over last 3 or 4 days.  Dry weight is around 130lbs and presented with weight of 145lbs, crackles on lung exam and 2+ LE edema bilateral.  Pox in ED was 87% on RA and placed initially on 6L.  CXR on admission with stable enlargement of the cardiac silhouette and a small right pleural effusion with subsegmental atelectasis.  Labs on admission with BNP of 1171 and Trop of 0.143.  Patient was started on IV Lasix of 40mg q12 with clinical improvement.  Pox now stable on 2L O2NC.  Urine output of 700ml recorded so far.  She is feeling better.    Plan:  Continue with diuresis with IV Lasix 40mg bid  Low Na diet and Fluid restriction to 1.5L per day  Monitor Urine output  Replace electrolytes as needed  PT/OT consult    Essential hypertension  SBP stable this morning.  Home Meds:  Amlodipine 10mg, Lasix 20mg, Losartan 25mg  Hospital Meds:  Losartan 25mg, Lasix 40mg IV bid  -Monitor and adjust as needed    Controlled type 2 diabetes mellitus with stage 3 chronic kidney disease, without long-term current use of insulin  On No Meds and A1C 5.5 on admission.  Glucose has been  stable  -D/c POCT glucose qAC and HS  -Continue Diabetic Diet    Persistent atrial fibrillation  Patient with history of persistent atrial fibrillation with bradycardia.  Not on rate controlled medications.  -Continue oral anticoagulation with apixaban.    Advance care planning  Followed by Palliative Care  Patient is DNR       Other hyperlipidemia  -Continue Statin        VTE Risk Mitigation (From admission, onward)         Ordered     apixaban tablet 2.5 mg  2 times daily         07/31/23 1343     IP VTE HIGH RISK PATIENT  Once         07/31/23 1343     Place sequential compression device  Until discontinued         07/31/23 1343     Reason for No Pharmacological VTE Prophylaxis  Once        Question:  Reasons:  Answer:  Already adequately anticoagulated on oral Anticoagulants    07/31/23 1343                Discharge Planning   BEN:      Code Status: DNR   Is the patient medically ready for discharge?:     Reason for patient still in hospital (select all that apply): Patient trending condition, Treatment and Consult recommendations  Discharge Plan A: (P) Home Health, Home                  Paul Ford MD  Department of Hospital Medicine   [facility] Surg ([place])

## 2023-08-01 NOTE — PLAN OF CARE
Problem: Occupational Therapy  Goal: Occupational Therapy Goal  Description: Goals to be met by: 8/15/23     Patient will increase functional independence with ADLs by performing:    UB Dressing at Set up.  Grooming standing at the sink at SBA.  Toileting at Min A.  BSC/Toilet transfers at SBA with RW.  Sponge bathing at Min A.  Donning underwear at Min A.    Outcome: Ongoing, Progressing  Recommend discharge to home with 24/7 assist/supervision and Home Health OT and PT.

## 2023-08-01 NOTE — PROGRESS NOTES
[facility] [place] [place] Surg ([place])  Palliative Medicine  Progress Note    Patient Name: Inna Blankenship  MRN: [id number]  Admission Date: 7/31/2023  Hospital Length of Stay: 0 days  Code Status: DNR   Attending Provider: Paul Ford MD  Consulting Provider: Singh Bronson MD  Primary Care Physician: Stacy Rodriguez MD  Principal Problem:Acute on chronic combined systolic and diastolic congestive heart failure    Patient information was obtained from patient, relative(s), caregiver / friend, past medical records and primary team.      Assessment/Plan:     Pulmonary  Acute on chronic respiratory failure with hypoxia  - on NC 2L O2 at baseline  - management per primary team, improved respiratory status with diuresis    Cardiac/Vascular  * Acute on chronic combined systolic and diastolic congestive heart failure  - cardiac amyloid noted  - noted echo 7/31/2023:   - Left Ventricle: The left ventricle is mildly dilated. Moderately increased wall thickness. Moderate global hypokinesis present. There is moderately reduced systolic function with a visually estimated ejection fraction of 40 - 45%.  - management per primary team/cardiology for diuresis     Persistent atrial fibrillation  - noted    Endocrine  Controlled type 2 diabetes mellitus with stage 3 chronic kidney disease, without long-term current use of insulin  - noted    Palliative Care  Advance care planning  8/1/2023:  - chart reviewed in depth  - patient seen at bedside  - patient was sitting in bedside chair along with caregiver Sheryl present at bedside  - patient remembered me from yesterday. Reintroduced self and role   - she admitted to feeling much better today than yesterday. Made her aware she looked it too!   - her breathing is better and she doesn't feel as tired  - she is in good spirits and eager to return home  - asked her what her understanding of her medical on going during this admission is, to which she seemed to look at her caregiver for an answer.  Patient seems to have limited true understanding into her medical on dominic. Went over her heart failure diagnosis and the fluid build up along with the symptoms it causes.   - she stated she understood  - caregiver Sheryl has strong insight into patients medical on going and patient seemed to defer to her often while we were talking  - Sheryl stated family has been on board with wanting more support at home for her  - Discussed patients thoughts on having to go back/forth to hospital incase that is needed as her condition progresses. Patient made clear her preference to be at home but being open to doing what is needed for management at this time. She has not been hospitalized since the very start of the year and had been doing fairly well at home they noted.   - made her aware of option for home palliative care to begin and of transition to home hospice down the line if things become more difficult and if the hospitalization frequency becomes more difficult. Made them aware of what each entails and offers for support.   - Sheryl and patient recalled Ms. Blankenship  being on hospice when he passed away and Sheryl actually used to be his caregiver at that point which is how they met  - Patient admitted they do need more help at home  - provided emotional support and listening ear  - Made her aware I will give her daughter a call to talk further regarding this. They were appreciative.   - after the bedside visit, gave Cara (daughter/next of kin medical decision maker) a call  - shared with Cara my conversation with her mother and with Sheryl  - let her know she is in good spirits and feeling better than when she first came in. Cara was glad to hear this  - asked if we could have an open and honest conversation to which Cara agreed  - made her aware of options for home palliative care vs home hospice to be aware of. Explained what they both entail and the support level they provide.   - Discussed how  this is information to keep in mind if her mother starts needing to be admitted more often given her age and HF  - Cara was grateful for the information. Upon discussion Cara feels an initial plan for discharge home with home health and home palliative care with an agency that also does home hospice would allow for an easier transition down the line as her condition progresses/management becomes more difficult/hospital admissions increase in frequency. Made her aware that is very reasonable.   - Discussed the need for LaPOST given DNR/DNI wishes. Cara preference is to complete such LaPOST paperwork in person with the home palliative care team when they come to the house for initial visit after discharge. Cara had hip replacement surgery and is unable to come in person currently.   - Provided Cara with emotional support and a listening ear given the difficult of her own condition currently and her worry for her mother.   [] continue current management, goals are for medical optimization but with limits to invasive interventions  [] recommend home palliative care on discharge,with an agency that also provides home hospice for a smoother transition down the line. Family aware of both options.   [] Cara (daughter) is patients HCPOA  [] Cara prefers to complete LaPOST in person with home palliative care team when they come for initial visit rather than doing it electronically remotely currently. She is s/p hip replacement and unable to come in physically at this time.         I will follow-up with patient. Please contact us if you have any additional questions.    Subjective:     Chief Complaint:   Chief Complaint   Patient presents with    Shortness of Breath    Leg Swelling     Pt with hx of CHF c/o increased SOB and LLE edema since Thursday last week. LLE 2+ pitting edema, normal color. Pt on 2L NC at baseline. Speaking in clear full sentences, no acute distress at triage.        HPI:   Per H&P:  ""Patient is 89-year-old woman with history of hypertension, diabetes mellitus type 2, chronic kidney disease stage 3, chronic diastolic heart failure, cardiac amyloidosis, persistent atrial fibrillation rate controlled and prior history of deep venous thrombosis on oral anticoagulation with apixaban, chronic hypoxic respiratory failure on 2 liters/minute of supplemental oxygen at baseline who presents emergency department with shortness of breath and bilateral lower extremity swelling progressively worsening over last 3 or 4 days.  She reports generalized fatigue.  She denies any chest pain.  She denies fevers, chills, cough, or sputum production.  Patient mildly confused however family reports that her mental status is at her baseline.  Patient normally lives with her daughter who helps care for her at home however daughter herself has undergone a hip replacement surgery and therefore had been relying on sitters to help care for the patient more recently.  Daughter on the phone reports that her morning weight is usually about 130 lb.  Patient's grandson at the bedside also reports recent intake of salty foods.     She denies any alcohol or illicit drug use.  She reports a lifelong nontobacco smoker.  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       Hospital Course:  No notes on file        Medications:  Continuous Infusions:  Scheduled Meds:   apixaban  2.5 mg Oral BID    atorvastatin  20 mg Oral Daily    EScitalopram oxalate  10 mg Oral Daily    furosemide (LASIX) injection  40 mg Intravenous Q12H    latanoprost  1 drop Both Eyes Daily    losartan  25 mg Oral Daily     PRN Meds:dextrose 10%, dextrose 10%, glucagon (human recombinant), glucose, glucose    Objective:     Vital Signs (Most Recent):  Temp: 97.8 °F (36.6 °C) (08/01/23 1137)  Pulse: 62 (08/01/23 1205)  Resp: 18 (08/01/23 " 1137)  BP: (!) 120/57 (08/01/23 1137)  SpO2: 98 % (08/01/23 1137) Vital Signs (24h Range):  Temp:  [62.6 °F (17 °C)-98.3 °F (36.8 °C)] 97.8 °F (36.6 °C)  Pulse:  [46-70] 62  Resp:  [1-29] 18  SpO2:  [94 %-99 %] 98 %  BP: (120-171)/(57-88) 120/57     Weight: 65.9 kg (145 lb 3.2 oz)  Body mass index is 24.92 kg/m².       Physical Exam  Vitals and nursing note reviewed.   Constitutional:       General: She is not in acute distress.  HENT:      Head: Atraumatic.   Eyes:      Extraocular Movements: Extraocular movements intact.   Pulmonary:      Comments: On NC. Breathing comfortably  Skin:     General: Skin is warm.   Neurological:      Mental Status: She is alert.      Comments: Awake, alert, conversant   Psychiatric:         Mood and Affect: Mood normal.            Review of Symptoms        Living Arrangements:  Lives with family    Psychosocial/Cultural:   See Palliative Psychosocial Note: Yes  - lives with daughter Cara (only child)  - has grandchildren who help out  **Primary  to Follow**  Palliative Care  Consult: No        Advance Care Planning  Advance Directives:   LaPOST: No (Cara prefers to complete in person with home palliative care when they visit)    Do Not Resuscitate Status: Yes    Medical Power of : Yes        Oral Declaration: Yes   Witnesses:  Rosaline   Agent's Name:  Cara (daughter)    Decision Making:  Family answered questions  Goals of Care: The family endorses that what is most important right now is to focus on improvement in condition but with limits to invasive therapies    Accordingly, we have decided that the best plan to meet the patient's goals includes continuing with treatment and enroll in home palliative care.            Significant Labs: reviewed  CBC:   Recent Labs   Lab 07/31/23  1054   WBC 5.07   HGB 12.3   HCT 38.9   MCV 96        BMP:  Recent Labs   Lab 08/01/23  0429   GLU 87      K 3.9      CO2 22*   BUN 18   CREATININE  1.0   CALCIUM 9.9   MG 1.8     LFT:  Lab Results   Component Value Date    AST 31 07/31/2023    ALKPHOS 96 07/31/2023    BILITOT 1.0 07/31/2023     Albumin:   Albumin   Date Value Ref Range Status   07/31/2023 3.8 3.5 - 5.2 g/dL Final     Protein:   Total Protein   Date Value Ref Range Status   07/31/2023 7.4 6.0 - 8.4 g/dL Final     Lactic acid:   Lab Results   Component Value Date    LACTATE 1.9 04/10/2022    LACTATE 2.0 02/08/2021       Significant Imaging: reviewed    > 50% of 35 min visit spent in chart review, face to face discussion of symptom assessment, coordination of care with other specialists, and d/c planning  16 minutes ACP time spent: goals of care, emotional support, formulating and communication prognosis and goals of care, exploring burden/ benefit of various approaches of treatment.       Singh Bronson MD  Palliative Medicine  [facility] Surg ([place])

## 2023-08-01 NOTE — SUBJECTIVE & OBJECTIVE
Interval History: Patient reports breathing better today with decrease lower extremity swelling.    Review of Systems   Constitutional:  Negative for chills and fever.   Respiratory:  Positive for shortness of breath.    Cardiovascular:  Positive for leg swelling. Negative for chest pain.   Gastrointestinal:  Negative for abdominal pain, nausea and vomiting.   Genitourinary:  Negative for dysuria and frequency.     Objective:     Vital Signs (Most Recent):  Temp: 97.8 °F (36.6 °C) (23 1137)  Pulse: 62 (23 1205)  Resp: 18 (23 1137)  BP: (!) 120/57 (23 1137)  SpO2: 98 % (23 113) Vital Signs (24h Range):  Temp:  [62.6 °F (17 °C)-98.3 °F (36.8 °C)] 97.8 °F (36.6 °C)  Pulse:  [46-70] 62  Resp:  [1-29] 18  SpO2:  [94 %-99 %] 98 %  BP: (120-171)/(57-88) 120/57     Weight: 65.9 kg (145 lb 3.2 oz)  Body mass index is 24.92 kg/m².    Intake/Output Summary (Last 24 hours) at 2023 1409  Last data filed at 2023 0320  Gross per 24 hour   Intake 60 ml   Output 700 ml   Net -640 ml         Physical Exam  Constitutional:       General: She is not in acute distress.  HENT:      Head: Atraumatic.   Eyes:      Conjunctiva/sclera: Conjunctivae normal.   Cardiovascular:      Rate and Rhythm: Normal rate and regular rhythm.      Heart sounds: Murmur heard.   Pulmonary:      Effort: Pulmonary effort is normal.      Breath sounds: Rales present. No wheezing.   Abdominal:      General: Bowel sounds are normal. There is no distension.      Palpations: Abdomen is soft.      Tenderness: There is no abdominal tenderness.   Musculoskeletal:         General: No deformity. Normal range of motion.      Cervical back: Neck supple.      Right lower le+ Edema present.      Left lower le+ Edema present.   Neurological:      Mental Status: She is alert and oriented to person, place, and time.             Significant Labs: All pertinent labs within the past 24 hours have been reviewed.    Significant Imaging:  I have reviewed all pertinent imaging results/findings within the past 24 hours.

## 2023-08-01 NOTE — ASSESSMENT & PLAN NOTE
On No Meds and A1C 5.5 on admission.  Glucose has been stable  -D/c POCT glucose qAC and HS  -Continue Diabetic Diet

## 2023-08-01 NOTE — ASSESSMENT & PLAN NOTE
SBP stable this morning.  Home Meds:  Amlodipine 10mg, Lasix 20mg, Losartan 25mg  Hospital Meds:  Losartan 25mg, Lasix 40mg IV bid  -Monitor and adjust as needed

## 2023-08-01 NOTE — SUBJECTIVE & OBJECTIVE
Medications:  Continuous Infusions:  Scheduled Meds:   apixaban  2.5 mg Oral BID    atorvastatin  20 mg Oral Daily    EScitalopram oxalate  10 mg Oral Daily    furosemide (LASIX) injection  40 mg Intravenous Q12H    latanoprost  1 drop Both Eyes Daily    losartan  25 mg Oral Daily     PRN Meds:dextrose 10%, dextrose 10%, glucagon (human recombinant), glucose, glucose    Objective:     Vital Signs (Most Recent):  Temp: 97.8 °F (36.6 °C) (08/01/23 1137)  Pulse: 62 (08/01/23 1205)  Resp: 18 (08/01/23 1137)  BP: (!) 120/57 (08/01/23 1137)  SpO2: 98 % (08/01/23 1137) Vital Signs (24h Range):  Temp:  [62.6 °F (17 °C)-98.3 °F (36.8 °C)] 97.8 °F (36.6 °C)  Pulse:  [46-70] 62  Resp:  [1-29] 18  SpO2:  [94 %-99 %] 98 %  BP: (120-171)/(57-88) 120/57     Weight: 65.9 kg (145 lb 3.2 oz)  Body mass index is 24.92 kg/m².       Physical Exam  Vitals and nursing note reviewed.   Constitutional:       General: She is not in acute distress.  HENT:      Head: Atraumatic.   Eyes:      Extraocular Movements: Extraocular movements intact.   Pulmonary:      Comments: On NC. Breathing comfortably  Skin:     General: Skin is warm.   Neurological:      Mental Status: She is alert.      Comments: Awake, alert, conversant   Psychiatric:         Mood and Affect: Mood normal.            Review of Symptoms        Living Arrangements:  Lives with family    Psychosocial/Cultural:   See Palliative Psychosocial Note: Yes  - lives with moise Marroquin (only child)  - has grandchildren who help out  **Primary  to Follow**  Palliative Care  Consult: No        Advance Care Planning   Advance Directives:   [place]POST: No (Cara prefers to complete in person with home palliative care when they visit)    Do Not Resuscitate Status: Yes    Medical Power of : Yes        Oral Declaration: Yes   Witnesses:  Rosaline   Agent's Name:  Cara (daughter)    Decision Making:  Family answered questions  Goals of Care: The family endorses  that what is most important right now is to focus on improvement in condition but with limits to invasive therapies    Accordingly, we have decided that the best plan to meet the patient's goals includes continuing with treatment and enroll in home palliative care.            Significant Labs: reviewed  CBC:   Recent Labs   Lab 07/31/23  1054   WBC 5.07   HGB 12.3   HCT 38.9   MCV 96        BMP:  Recent Labs   Lab 08/01/23  0429   GLU 87      K 3.9      CO2 22*   BUN 18   CREATININE 1.0   CALCIUM 9.9   MG 1.8     LFT:  Lab Results   Component Value Date    AST 31 07/31/2023    ALKPHOS 96 07/31/2023    BILITOT 1.0 07/31/2023     Albumin:   Albumin   Date Value Ref Range Status   07/31/2023 3.8 3.5 - 5.2 g/dL Final     Protein:   Total Protein   Date Value Ref Range Status   07/31/2023 7.4 6.0 - 8.4 g/dL Final     Lactic acid:   Lab Results   Component Value Date    LACTATE 1.9 04/10/2022    LACTATE 2.0 02/08/2021       Significant Imaging: reviewed

## 2023-08-01 NOTE — CONSULTS
Food & Nutrition  Education    Diet Education: fluid and sodium restriction diet education  Time Spent: 15 min  Learners: pt and family      Nutrition Education provided with handouts:   Heart Failure Nutrition Therapy (provided to family)    Comments:  Patient in chair, family visiting in room. Pt reports good appetite pta - currently receiving a low sodium diet with 1500 mL fluid restriction. Discussed diet and fluid restrictions with family in room 2/2 pt is hard of hearing. Family reports salty foods such as sandwich and foods eaten out at restaurants but states family is pretty good about monitoring pt's salt intake. Pt has meals prepared for her by family members. Encouraged family and pt to limit/avoid foods high in sodium content. Provided examples of how to season food without the use of salt. Handout provided and pt voiced understanding.     All questions and concerns answered. Dietitian's contact information provided.       Follow-Up: yes    Please Re-consult as needed    Thanks!  Airam Tello, MILTONN, LDN

## 2023-08-01 NOTE — PLAN OF CARE
Problem: Physical Therapy  Goal: Physical Therapy Goal  Description: Goals to be met by: 2023    Patient will increase functional independence with mobility by performin. Sit<>stand with supervision with LRAD.  2. Gait x 100 feet with LRAD with SBA.  3. Ascend/descend 1 step(s) with least restrictive assistive device and CGA.   4. Supine <> sit with supervision      Outcome: Ongoing, Progressing     Patient evaluated by PT and goals established. PT will continue to follow and progress as tolerated. Please see progress note for detailed plan of care and recommendations.

## 2023-08-01 NOTE — CARE UPDATE
08/01/23 0828   PRE-TX-O2   Device (Oxygen Therapy) nasal cannula;nasal cannula with humidification   $ Is the patient on Low Flow Oxygen? Yes   Flow (L/min) 3   SpO2 (!) 94 %   Pulse Oximetry Type Intermittent   $ Pulse Oximetry - Multiple Charge Pulse Oximetry - Multiple

## 2023-08-01 NOTE — ASSESSMENT & PLAN NOTE
History of Cardiac Amyloidosis.  TTE on admission with The left ventricle is mildly dilated. Moderately increased wall thickness. Moderate global hypokinesis present. There is moderately reduced systolic function with a visually estimated ejection fraction of 40 - 45%.  TTE in 1/2023 with EF of 45% and Grade II DD.  Home Meds:  Amlodipine 10mg, Lasix 20mg, Losartan 25mg  -See above

## 2023-08-01 NOTE — ASSESSMENT & PLAN NOTE
- on NC 2L O2 at baseline  - management per primary team, improved respiratory status with diuresis

## 2023-08-01 NOTE — HOSPITAL COURSE
Patient is 89-year-old woman with history of hypertension, diabetes mellitus type 2, chronic kidney disease stage 3, chronic diastolic heart failure, cardiac amyloidosis, persistent atrial fibrillation rate controlled and prior history of deep venous thrombosis on oral anticoagulation with apixaban, chronic hypoxic respiratory failure on 2 liters/minute of supplemental oxygen admitted with decompensated heart failure.  Patient started on intravenous diuretic therapy.

## 2023-08-01 NOTE — PT/OT/SLP EVAL
Physical Therapy Evaluation    Patient Name:  Inna Blankenship   MRN:  [id number]    Recommendations:     Discharge Recommendations: home health PT   Discharge Equipment Recommendations: none   Barriers to discharge: None    Assessment:     Inna Blankenship is a 89 y.o. female admitted with a medical diagnosis of Acute on chronic combined systolic and diastolic congestive heart failure.  She presents with the following impairments/functional limitations: weakness, impaired endurance, impaired self care skills, impaired functional mobility, impaired balance, gait instability, impaired cognition, decreased lower extremity function, decreased safety awareness, impaired cardiopulmonary response to activity.    Patient evaluated by PT and goals established. Pt able to perform bed mobility, transfers, and ambulate in room for 2 bouts of 5 feet with RW and assistance. Pt denied SOB but noted CURRY after gait bouts. Recommend d/c to home with HH to continue with skilled therapy services to address impairments listed above.    Rehab Prognosis: Good; patient would benefit from acute skilled PT services to address these deficits and reach maximum level of function.    Recent Surgery: * No surgery found *      Plan:     During this hospitalization, patient to be seen 5 x/week to address the identified rehab impairments via gait training, therapeutic activities, therapeutic exercises, neuromuscular re-education and progress toward the following goals:    Plan of Care Expires:  08/22/23    Subjective     Chief Complaint: None stated  Patient/Family Comments/goals: Pt consented to treatment.  Pain/Comfort:  Pain Rating 1: 0/10    Patients cultural, spiritual, Cheondoism conflicts given the current situation: no    Living Environment:  Pt lives with daughter in a single story home with 1 steps to enter  Pt has a walk in shower and BSC over toilet  Upon discharge, patient will have assistance from daughter and 24 hour caregivers. Daughter currently  s/p hip surgery so unable to care for her as she normally does. Caregivers usually there 3x/wk but are there now 24/7 while daughter is recovering.  Prior level of function:  Ambulation: Mod I with RW and WC.  ADL's: Dependent on caregivers  IADLs: Dependent on caregivers    Prior to admission, patients level of function was dependent.  Equipment used at home: walker, rolling, wheelchair, bedside commode, shower chair.  DME owned (not currently used): none.  Upon discharge, patient will have assistance from daughter and 24 hour caregivers.    Objective:     Communicated with nurse prior to session.  Patient found HOB elevated with peripheral IV, oxygen, PureWick and saturated with urine upon PT entry to room. Perihygiene performed and nursing staff notified.    General Precautions: Standard, fall  Orthopedic Precautions:N/A   Braces: N/A  Respiratory Status: Nasal cannula, flow 3 L/min    Exams:  Cognition:   Patient is oriented to oriented to person and situation. Unable to recall place or date.   Pt follows approximately 100% of commands.    Mood: Pleasant and cooperative but confused at times  Safety Awareness: Fair  Musculoskeletal:  BMI: 24.92   Posture: Kyphotic  LE ROM/Strength:   R ROM: WFL  L ROM: WFL  R Strength:   Knee extension: 4/5  Dorsiflexion: 4/5   L Strength:   Knee extension: 4/5  Dorsiflexion: 4/5   Neuromuscular:  Sensation: Intact to light touch bilateral LEs.  Coordination/Tone/Reflexes: No impairments identified with functional mobility. No formal testing performed.  Balance:   Static sitting: Able to sit EOB without UE support  Static standing: Able to stand with UE support from walker.  Visual-vestibular: No impairments identified with functional mobility. No formal testing performed.  Integument: Visible skin intact  Cardiopulmonary:  Vital signs:   Attempted monitoring SpO2 but waveform on PulseOx inconsistent throughout but NAD noted.  Monitored pulse manually once sitting EOB, HR: 58  BPM    Patient donned non slip socks and gait belt for OOB mobility.    Functional Mobility:  Bed Mobility:     Supine to Sit: stand by assistance and use of hospital bed rails from flat bed.  Transfers:     Sit <> Stand:  minimum assistance with rolling walker. Performed twice. Cued patient for UE placement, full LE extension, and upright posture both times. Stayed standing for 3 minutes to perform perihygiene.   Gait: Ambulated for 2 bouts of 5 feet with RW and CGA. Denied SOB but noted CURRY. Cued for nasal breathing, pt tends to mouth breath. Gait deviations noted: decreased juanita, increased time in double stance, decreased velocity of limb motion, and decreased step length.        AM-PAC 6 CLICK MOBILITY  Total Score:17       Treatment & Education:  PT educated patient:   PT plan of care/role of PT  Safety with OOB mobility  Use of RW  Discharge disposition    Pt verbalized understanding     Therapeutic Activity: See functional mobility above.    Patient left up in chair with all lines intact, call button in reach, nurse notified, and caregiver present.    GOALS:   Multidisciplinary Problems       Physical Therapy Goals          Problem: Physical Therapy    Goal Priority Disciplines Outcome Goal Variances Interventions   Physical Therapy Goal     PT, PT/OT Ongoing, Progressing     Description: Goals to be met by: 2023    Patient will increase functional independence with mobility by performin. Sit<>stand with supervision with LRAD.  2. Gait x 100 feet with LRAD with SBA.  3. Ascend/descend 1 step(s) with least restrictive assistive device and CGA.   4. Supine <> sit with supervision                           History:     Past Medical History:   Diagnosis Date    Acute hypoxemic respiratory failure 2023    Arthritis     CHF (congestive heart failure) 2018    Chronic atrial fibrillation 2020    Chronic kidney disease, stage III (moderate) 2015    Colon adenoma     Diabetes  mellitus, type II     Diet controlled    Glaucoma     Hyperlipemia     Hypertension     Osteopenia        Past Surgical History:   Procedure Laterality Date    APPENDECTOMY      CATARACT EXTRACTION W/  INTRAOCULAR LENS IMPLANT Right 03/15/2006        CATARACT EXTRACTION W/  INTRAOCULAR LENS IMPLANT Left 09/27/2006        COLONOSCOPY W/ POLYPECTOMY      EYE SURGERY      HYSTERECTOMY      TRANSESOPHAGEAL ECHOCARDIOGRAPHY N/A 11/9/2020    Procedure: ECHOCARDIOGRAM, TRANSESOPHAGEAL;  Surgeon: Marcelina Diagnostic Provider;  Location: [facility] EP LAB;  Service: Cardiology;  Laterality: N/A;    TREATMENT OF CARDIAC ARRHYTHMIA N/A 11/9/2020    Procedure: CARDIOVERSION;  Surgeon: Marvin Elmore MD;  Location: [facility] EP LAB;  Service: Cardiology;  Laterality: N/A;  AF, RAFFAELE, DCCV, MAC, GP, 3 PREP       Time Tracking:     PT Received On: 08/01/23  PT Start Time: 0944     PT Stop Time: 1022  PT Total Time (min): 38 min     Billable Minutes: Evaluation 15 and Therapeutic Activity 23 08/01/2023

## 2023-08-01 NOTE — ASSESSMENT & PLAN NOTE
Chronic hypoxic respiratory failure secondary to heart failure on 2L O2NC at home.  Presented with shortness of breath and bilateral lower extremity swelling progressively worsening over last 3 or 4 days.  Dry weight is around 130lbs and presented with weight of 145lbs, crackles on lung exam and 2+ LE edema bilateral.  Pox in ED was 87% on RA and placed initially on 6L.  CXR on admission with stable enlargement of the cardiac silhouette and a small right pleural effusion with subsegmental atelectasis.  Labs on admission with BNP of 1171 and Trop of 0.143.  Patient was started on IV Lasix of 40mg q12 with clinical improvement.  Pox now stable on 2L O2NC.  Urine output of 700ml recorded so far.  She is feeling better.    Plan:  Continue with diuresis with IV Lasix 40mg bid  Low Na diet and Fluid restriction to 1.5L per day  Monitor Urine output  Replace electrolytes as needed  PT/OT consult

## 2023-08-01 NOTE — PLAN OF CARE
MOON Message     Medicare Outpatient and Observation Notification regarding financial responsibility Given to patient/caregiver; Explained to patient/caregiver; Signed/date by patient/caregiver   Date KHAN was signed 8/1/2023   Time KHAN was signed 0905

## 2023-08-01 NOTE — ASSESSMENT & PLAN NOTE
- cardiac amyloid noted  - noted echo 7/31/2023:   - Left Ventricle: The left ventricle is mildly dilated. Moderately increased wall thickness. Moderate global hypokinesis present. There is moderately reduced systolic function with a visually estimated ejection fraction of 40 - 45%.  - management per primary team/cardiology for diuresis

## 2023-08-01 NOTE — PROGRESS NOTES
"    Cardiology Progress Note    8/1/2023  1:06 PM    Subjective/Interim:      Her shortness breath has improved.  She is sitting in the chair eating lunch.  Family at the bedside.     Objective:   24-hour Vitals:  Temp:  [62.6 °F (17 °C)-98.3 °F (36.8 °C)] 97.8 °F (36.6 °C)  Pulse:  [46-70] 62  Resp:  [1-29] 18  SpO2:  [94 %-99 %] 98 %  BP: (120-171)/(57-88) 120/57    Physical Examination:  Vitals: BP (!) 120/57 (BP Location: Right arm, Patient Position: Sitting)   Pulse 62   Temp 97.8 °F (36.6 °C) (Oral)   Resp 18   Ht 5' 4" (1.626 m)   Wt 65.9 kg (145 lb 3.2 oz)   SpO2 98%   BMI 24.92 kg/m²     Physical Exam  Constitutional:       General: She is not in acute distress.  Neck:      Vascular: JVD present.   Cardiovascular:      Rate and Rhythm: Normal rate. Rhythm irregularly irregular.      Pulses:           Carotid pulses are 2+ on the right side and 2+ on the left side.       Radial pulses are 2+ on the right side and 2+ on the left side.      Heart sounds: Normal heart sounds.   Pulmonary:      Effort: Pulmonary effort is normal.      Breath sounds: Normal breath sounds. No rales.   Musculoskeletal:      Right lower leg: No edema.      Left lower leg: No edema.   Neurological:      Mental Status: She is alert.           Intake/Output Summary (Last 24 hours) at 8/1/2023 1306  Last data filed at 8/1/2023 0320  Gross per 24 hour   Intake 60 ml   Output 700 ml   Net -640 ml       Laboratory:  Trended Lab Data:  Recent Labs   Lab 07/31/23  1054   WBC 5.07   HGB 12.3   HCT 38.9          Recent Labs   Lab 07/31/23  1054 08/01/23  0429    143   K 4.3 3.9    110   CO2 21* 22*   BUN 20 18   GLU 95 87   CALCIUM 10.4 9.9   MG  --  1.8       Recent Labs   Lab 07/31/23  1054   PROT 7.4   ALBUMIN 3.8   BILITOT 1.0   AST 31   ALT 23   ALKPHOS 96       No results for input(s): "PROTIME", "PTT", "INR" in the last 168 hours.    Cardiac:   Recent Labs   Lab 07/31/23  1054 08/01/23  0429   TROPONINI 0.143* " 0.135*   BNP 1,171*  --        FLP:   Lab Results   Component Value Date    CHOL 174 12/01/2022    HDL 67 12/01/2022    LDLCALC 99.2 12/01/2022    TRIG 39 12/01/2022    CHOLHDL 38.5 12/01/2022     DM:   Lab Results   Component Value Date    HGBA1C 5.5 08/01/2023    HGBA1C 5.3 06/27/2023    HGBA1C 5.8 (H) 01/30/2023    LDLCALC 99.2 12/01/2022    CREATININE 1.0 08/01/2023     Thyroid:   Lab Results   Component Value Date    TSH 5.144 (H) 04/10/2022    FREET4 0.97 04/10/2022     Anemia:   Lab Results   Component Value Date    IRON 96 04/18/2022    TIBC 287 04/18/2022    FERRITIN 126 04/18/2022     Urinalysis:   Lab Results   Component Value Date    LABURIN  01/29/2016     Multiple organisms isolated. None in predominance.  Repeat if    LABURIN clinically necessary. 01/29/2016    COLORU Yellow 04/10/2022    SPECGRAV 1.010 04/10/2022    NITRITE Negative 04/10/2022    KETONESU Negative 04/10/2022    UROBILINOGEN Negative 02/08/2021     @      Other Results:  EKG (my interpretation):    TELEMETRY:      Echo:   Results for orders placed or performed during the hospital encounter of 07/31/23   Echo   Result Value Ref Range    BSA 1.68 m2    LVOT stroke volume 68.93 cm3    LVIDd 3.54 3.5 - 6.0 cm    LV Systolic Volume 32.10 mL    LV Systolic Volume Index 19.2 mL/m2    LVIDs 2.90 2.1 - 4.0 cm    LV Diastolic Volume 52.13 mL    LV Diastolic Volume Index 31.22 mL/m2    IVS 1.27 (A) 0.6 - 1.1 cm    LVOT diameter 2.04 cm    LVOT area 3.3 cm2    FS 18 (A) 28 - 44 %    Left Ventricle Relative Wall Thickness 0.75 cm    Posterior Wall 1.33 (A) 0.6 - 1.1 cm    LV mass 156.28 g    LV Mass Index 94 g/m2    MV Peak E David 0.40 m/s    MV Peak A David 0.42 m/s    TR Max David 3.57 m/s    E/A ratio 0.95     IVRT 87.54 msec    E wave deceleration time 171.61 msec    PV Peak S David 0.30 m/s    LVOT peak david 0.84 m/s    Left Ventricular Outflow Tract Mean Velocity 0.58 cm/s    Left Ventricular Outflow Tract Mean Gradient 1.52 mmHg    LA size 5.18 cm     Left Atrium Major Axis 6.79 cm    Left Atrium Minor Axis 6.25 cm    RA Major Axis 6.71 cm    AV mean gradient 4 mmHg    AV peak gradient 8 mmHg    Ao peak perla 1.38 m/s    Ao VTI 35.30 cm    LVOT peak VTI 21.10 cm    AV valve area 1.95 cm²    AV Velocity Ratio 0.61     AV index (prosthetic) 0.60     ANGIE by Velocity Ratio 1.99 cm²    Mr max perla 3.99 m/s    MV stenosis pressure 1/2 time 49.77 ms    MV valve area p 1/2 method 4.42 cm2    Triscuspid Valve Regurgitation Peak Gradient 51 mmHg    PV PEAK VELOCITY 0.75 m/s    PV peak gradient 2 mmHg    STJ 2.52 cm    ZLVIDS 0.02     ZLVIDD -2.77     IVC diameter 2.76 cm    LA Volume Index 87.5 mL/m2    LA volume 146.16 cm3    LA WIDTH 5.1 cm    RA Width 6.5 cm    Narrative      Left Ventricle: The left ventricle is mildly dilated. Moderately   increased wall thickness. Moderate global hypokinesis present. There is   moderately reduced systolic function with a visually estimated ejection   fraction of 40 - 45%.    Left Atrium: Left atrium is moderately dilated.    Right Ventricle: Normal right ventricular cavity size. Systolic   function is normal.    Right Atrium: Right atrium is moderately dilated.    Aortic Valve: There is mild aortic regurgitation with a centrally   directed jet.    Mitral Valve: There is no stenosis. There is mild regurgitation with a   centrally directed jet.    Tricuspid Valve: There is mild transvalvular regurgitation with a   centrally directed jet.    Pulmonic Valve: There is mild regurgitation with a centrally directed   jet.    Pericardium: There is no pericardial effusion.         Radiology:  Echo    Result Date: 7/31/2023    Left Ventricle: The left ventricle is mildly dilated. Moderately increased wall thickness. Moderate global hypokinesis present. There is moderately reduced systolic function with a visually estimated ejection fraction of 40 - 45%.   Left Atrium: Left atrium is moderately dilated.   Right Ventricle: Normal right  ventricular cavity size. Systolic function is normal.   Right Atrium: Right atrium is moderately dilated.   Aortic Valve: There is mild aortic regurgitation with a centrally directed jet.   Mitral Valve: There is no stenosis. There is mild regurgitation with a centrally directed jet.   Tricuspid Valve: There is mild transvalvular regurgitation with a centrally directed jet.   Pulmonic Valve: There is mild regurgitation with a centrally directed jet.   Pericardium: There is no pericardial effusion.     X-Ray Chest AP Portable    Result Date: 7/31/2023  EXAMINATION: XR CHEST AP PORTABLE CLINICAL HISTORY: Chest Pain; TECHNIQUE: Single frontal view of the chest was performed. COMPARISON: 01/30/2023 FINDINGS: Lungs are well expanded.  Small right pleural effusion with subsegmental right basilar atelectasis.  No lobar consolidation. Cardiac silhouette is enlarged, stable.  Calcified plaque lines arch. Degenerative change both shoulders.     Stable enlargement of the cardiac silhouette.  Small right pleural effusion with subsegmental atelectasis. Electronically signed by: Bertha Chung Date:    07/31/2023 Time:    11:08        Current Medications:     Infusions:       Scheduled:   apixaban  2.5 mg Oral BID    atorvastatin  20 mg Oral Daily    EScitalopram oxalate  10 mg Oral Daily    furosemide (LASIX) injection  40 mg Intravenous Q12H    latanoprost  1 drop Both Eyes Daily    losartan  25 mg Oral Daily        PRN:  dextrose 10%, dextrose 10%, glucagon (human recombinant), glucose, glucose     Assessment and Plan:     Inna Blankenship is a 89 y.o.female with  Acute on chronic diastolic congestive heart failure  Cardiac amyloid  -responding well with IV diuretics.  continue furosemide IV twice daily.  -f/u echo  -refused Tafamidis for cardiac amyloid     Persistent AF  -rate controlled  -continue apixaban     HTN  -continue home meds and monitor     DM  -as per  team     DNR         Sb Pacheco MD        Disclaimer: This  document was created using voice recognition software ([employer]). Although it may be edited, this document may contain errors related to incorrect recognition of the spoken word. Please call the physician if clarification is needed.

## 2023-08-01 NOTE — ASSESSMENT & PLAN NOTE
Patient with history of persistent atrial fibrillation with bradycardia.  Not on rate controlled medications.  -Continue oral anticoagulation with apixaban.

## 2023-08-01 NOTE — PT/OT/SLP EVAL
Occupational Therapy   Evaluation    Name: Inna Blankenship  MRN: [id number]  Admitting Diagnosis: Acute on chronic combined systolic and diastolic congestive heart failure  Recent Surgery: * No surgery found *      Recommendations:     Discharge Recommendations: home health OT, home health PT (24/7 assist/supervision)  Discharge Equipment Recommendations:  none  Barriers to discharge:  None    Assessment:     Inna Blankenship is a 89 y.o. female with a medical diagnosis of Acute on chronic combined systolic and diastolic congestive heart failure.  She presents alert and oriented to person, place and situation.  Pt reporting that her daughter normally assists her at home as needed but her daughter just broke her hip. Performance deficits affecting function: weakness, impaired balance, impaired endurance, gait instability, impaired self care skills, impaired functional mobility, decreased coordination, decreased safety awareness, impaired fine motor, decreased lower extremity function, decreased upper extremity function, decreased ROM, impaired cardiopulmonary response to activity.      Rehab Prognosis:  Good tor return to PLOF ; patient would benefit from acute skilled OT services to address these deficits and reach maximum level of function.       Plan:     Patient to be seen 5 x/week to address the above listed problems via self-care/home management, therapeutic activities, therapeutic exercises  Plan of Care Expires: 08/31/23  Plan of Care Reviewed with: patient    Subjective     Chief Complaint: None  Patient/Family Comments/goals: Pt reporting her daughter, whom she lives with, had hip surgery     Occupational Profile:  Living Environment: Lives with daughter in [facility] with one step to enter, walk-in shower, BSC over toilet  Previous level of function: Ambulated at Mod I level with RW, grossly Min A  for self care tasks, daughter normally assists her but daughter is recovering from hip surgery; Daughter performs IADL.  Equipment  Used at Home: walker, rolling, wheelchair, bedside commode, shower chair  Assistance upon Discharge: Caregivers will be able to assist pt 24/7 while her daughter recovers from hip surgery.    Pain/Comfort:  Pain Rating 1: 0/10    Patients cultural, spiritual, Nondenominational conflicts given the current situation: no    Objective:     Communicated with: nurse prior to session.  Patient found up in chair with peripheral IV, oxygen, PureWick, telemetry upon OT entry to room.    General Precautions: Standard, fall, diabetic (EF 40%)  Orthopedic Precautions: N/A  Braces: N/A  Respiratory Status: Room air    Occupational Performance:    Bed Mobility:    Sit to supine: Mod A     Functional Mobility/Transfers:  Sit to stand: CGA with RW, cues to place hands on arm rests of chair  Chair to BSC transfer: Min A with RW  BSC to Bed transfer: Min  with RW  Functional Mobility: No LOB, assist to navigate RW    Activities of Daily Living:  Toileting: Mod A using BSC; Pt using female external catheter, encouraged pt to call for assist to get on BSC when needing to urinate for increased OOB activity  LB Dressing: Max A   UB Dressing: Min A    Cognitive/Visual Perceptual:  Cognitive/Psychosocial Skills:     -       Oriented to: Person, Place, and Situation   -       Follows Commands/attention:  Grossly intact, [place], Follows one-step commands with cues 50% of the time  -       Communication: clear/fluent  -       Memory: Fair  -       Safety awareness/insight to disability: Fair   -       Mood/Affect/Coping skills/emotional control: Cooperative and Pleasant    Physical Exam:  Sitting Balance : SBA  Standing Balance: CGA with RW for static standing and Min A with RW for dynamic standing with minimal challenge  UE Function: AROM is WFL for self care tasks; grossly 3+/5<>4-/5 strength, Coordination is Fair  Sensation: Light touch intact  Edema: None noted    AMPAC 6 Click ADL:  AMPAC Total Score: 14    Treatment & Education:  Role of OT, POC,  safety with ADL and ADL mobility, transfer training, skin integrity with use of external female catheter (hygiene when catheter is changed), using BSC with assist, hand placement during sit <> stand using RW, use of call button    Patient left HOB elevated with all lines intact and nurse notified.    GOALS:   Multidisciplinary Problems       Occupational Therapy Goals          Problem: Occupational Therapy    Goal Priority Disciplines Outcome Interventions   Occupational Therapy Goal     OT, PT/OT Ongoing, Progressing    Description: Goals to be met by: 8/15/23     Patient will increase functional independence with ADLs by performing:    UB Dressing at Set up.  Grooming standing at the sink at SBA.  Toileting at Min A.  BSC/Toilet transfers at SBA with RW.  Sponge bathing at Min A.  Donning underwear at Min A.                         History:     Past Medical History:   Diagnosis Date    Acute hypoxemic respiratory failure 1/30/2023    Arthritis     CHF (congestive heart failure) 12/26/2018    Chronic atrial fibrillation 8/29/2020    Chronic kidney disease, stage III (moderate) 9/11/2015    Colon adenoma     Diabetes mellitus, type II     Diet controlled    Glaucoma     Hyperlipemia     Hypertension     Osteopenia          Past Surgical History:   Procedure Laterality Date    APPENDECTOMY      CATARACT EXTRACTION W/  INTRAOCULAR LENS IMPLANT Right 03/15/2006        CATARACT EXTRACTION W/  INTRAOCULAR LENS IMPLANT Left 09/27/2006        COLONOSCOPY W/ POLYPECTOMY      EYE SURGERY      HYSTERECTOMY      TRANSESOPHAGEAL ECHOCARDIOGRAPHY N/A 11/9/2020    Procedure: ECHOCARDIOGRAM, TRANSESOPHAGEAL;  Surgeon: Marcelina Diagnostic Provider;  Location: [facility] EP LAB;  Service: Cardiology;  Laterality: N/A;    TREATMENT OF CARDIAC ARRHYTHMIA N/A 11/9/2020    Procedure: CARDIOVERSION;  Surgeon: Marvin Elmore MD;  Location: [facility] EP LAB;  Service: Cardiology;  Laterality: N/A;  AF, RAFFAELE, DCCV, MAC, GP, 3 PREP        Time Tracking:     OT Date of Treatment: 08/01/23  OT Start Time: 1450  OT Stop Time: 1529  OT Total Time (min): 39 min    Billable Minutes:Evaluation 15  Self Care/Home Management 24    8/1/2023

## 2023-08-01 NOTE — ASSESSMENT & PLAN NOTE
8/1/2023:  - chart reviewed in depth  - patient seen at bedside  - patient was sitting in bedside chair along with caregiver Sheryl present at bedside  - patient remembered me from yesterday. Reintroduced self and role   - she admitted to feeling much better today than yesterday. Made her aware she looked it too!   - her breathing is better and she doesn't feel as tired  - she is in good spirits and eager to return home  - asked her what her understanding of her medical on going during this admission is, to which she seemed to look at her caregiver for an answer. Patient seems to have limited true understanding into her medical on dominic. Went over her heart failure diagnosis and the fluid build up along with the symptoms it causes.   - she stated she understood  - caregiver Sheryl has strong insight into patients medical on going and patient seemed to defer to her often while we were talking  - Sheryl stated family has been on board with wanting more support at home for her  - Discussed patients thoughts on having to go back/forth to hospital incase that is needed as her condition progresses. Patient made clear her preference to be at home but being open to doing what is needed for management at this time. She has not been hospitalized since the very start of the year and had been doing fairly well at home they noted.   - made her aware of option for home palliative care to begin and of transition to home hospice down the line if things become more difficult and if the hospitalization frequency becomes more difficult. Made them aware of what each entails and offers for support.   - Sheryl and patient recalled Ms. Blankenship  being on hospice when he passed away and Sheryl actually used to be his caregiver at that point which is how they met  - Patient admitted they do need more help at home  - provided emotional support and listening ear  - Made her aware I will give her daughter a call to talk further  regarding this. They were appreciative.   - after the bedside visit, gave Cara (daughter/next of kin medical decision maker) a call  - shared with Cara my conversation with her mother and with Sheryl  - let her know she is in good spirits and feeling better than when she first came in. Cara was glad to hear this  - asked if we could have an open and honest conversation to which Cara agreed  - made her aware of options for home palliative care vs home hospice to be aware of. Explained what they both entail and the support level they provide.   - Discussed how this is information to keep in mind if her mother starts needing to be admitted more often given her age and HF  - Cara was grateful for the information. Upon discussion Cara feels an initial plan for discharge home with home health and home palliative care with an agency that also does home hospice would allow for an easier transition down the line as her condition progresses/management becomes more difficult/hospital admissions increase in frequency. Made her aware that is very reasonable.   - Discussed the need for LaPOST given DNR/DNI wishes. Cara preference is to complete such LaPOST paperwork in person with the home palliative care team when they come to the house for initial visit after discharge. Cara had hip replacement surgery and is unable to come in person currently.   - Provided Cara with emotional support and a listening ear given the difficult of her own condition currently and her worry for her mother.   [] continue current management, goals are for medical optimization but with limits to invasive interventions  [] recommend home palliative care on discharge,with an agency that also provides home hospice for a smoother transition down the line. Family aware of both options.   [] Cara (daughter) is patients HCPOA  [] Cara prefers to complete LaPOST in person with home palliative care team when they come for initial visit  rather than doing it electronically remotely currently. She is s/p hip replacement and unable to come in physically at this time.    (3) slightly limited

## 2023-08-02 VITALS
DIASTOLIC BLOOD PRESSURE: 65 MMHG | RESPIRATION RATE: 16 BRPM | SYSTOLIC BLOOD PRESSURE: 139 MMHG | BODY MASS INDEX: 24.79 KG/M2 | WEIGHT: 145.19 LBS | HEIGHT: 64 IN | TEMPERATURE: 98 F | HEART RATE: 74 BPM | OXYGEN SATURATION: 94 %

## 2023-08-02 LAB
ANION GAP SERPL CALC-SCNC: 8 MMOL/L (ref 8–16)
BUN SERPL-MCNC: 20 MG/DL (ref 10–30)
CALCIUM SERPL-MCNC: 9.5 MG/DL (ref 8.7–10.5)
CHLORIDE SERPL-SCNC: 108 MMOL/L (ref 95–110)
CO2 SERPL-SCNC: 26 MMOL/L (ref 23–29)
CREAT SERPL-MCNC: 1 MG/DL (ref 0.5–1.4)
EST. GFR  (NO RACE VARIABLE): 51 ML/MIN/1.73 M^2
GLUCOSE SERPL-MCNC: 84 MG/DL (ref 70–110)
MAGNESIUM SERPL-MCNC: 1.8 MG/DL (ref 1.6–2.6)
POTASSIUM SERPL-SCNC: 3.8 MMOL/L (ref 3.5–5.1)
SODIUM SERPL-SCNC: 142 MMOL/L (ref 136–145)

## 2023-08-02 PROCEDURE — 99239 PR HOSPITAL DISCHARGE DAY,>30 MIN: ICD-10-PCS | Mod: HCNC,,, | Performed by: INTERNAL MEDICINE

## 2023-08-02 PROCEDURE — G0378 HOSPITAL OBSERVATION PER HR: HCPCS | Mod: HCNC

## 2023-08-02 PROCEDURE — 97110 THERAPEUTIC EXERCISES: CPT | Mod: HCNC,CQ

## 2023-08-02 PROCEDURE — 99214 PR OFFICE/OUTPT VISIT, EST, LEVL IV, 30-39 MIN: ICD-10-PCS | Mod: HCNC,,, | Performed by: INTERNAL MEDICINE

## 2023-08-02 PROCEDURE — 99239 HOSP IP/OBS DSCHRG MGMT >30: CPT | Mod: HCNC,,, | Performed by: INTERNAL MEDICINE

## 2023-08-02 PROCEDURE — 83735 ASSAY OF MAGNESIUM: CPT | Mod: HCNC | Performed by: INTERNAL MEDICINE

## 2023-08-02 PROCEDURE — 63600175 PHARM REV CODE 636 W HCPCS: Mod: HCNC | Performed by: HOSPITALIST

## 2023-08-02 PROCEDURE — 80048 BASIC METABOLIC PNL TOTAL CA: CPT | Mod: HCNC | Performed by: INTERNAL MEDICINE

## 2023-08-02 PROCEDURE — 25000003 PHARM REV CODE 250: Mod: HCNC | Performed by: HOSPITALIST

## 2023-08-02 PROCEDURE — 36415 COLL VENOUS BLD VENIPUNCTURE: CPT | Mod: HCNC | Performed by: INTERNAL MEDICINE

## 2023-08-02 PROCEDURE — 97116 GAIT TRAINING THERAPY: CPT | Mod: HCNC,CQ

## 2023-08-02 PROCEDURE — 96376 TX/PRO/DX INJ SAME DRUG ADON: CPT

## 2023-08-02 PROCEDURE — 99214 OFFICE O/P EST MOD 30 MIN: CPT | Mod: HCNC,,, | Performed by: INTERNAL MEDICINE

## 2023-08-02 RX ORDER — FUROSEMIDE 20 MG/1
40 TABLET ORAL DAILY
Qty: 90 TABLET | Refills: 3 | Status: SHIPPED | OUTPATIENT
Start: 2023-08-02 | End: 2024-08-01

## 2023-08-02 RX ADMIN — APIXABAN 2.5 MG: 2.5 TABLET, FILM COATED ORAL at 09:08

## 2023-08-02 RX ADMIN — FUROSEMIDE 40 MG: 10 INJECTION, SOLUTION INTRAMUSCULAR; INTRAVENOUS at 05:08

## 2023-08-02 RX ADMIN — LATANOPROST 1 DROP: 50 SOLUTION OPHTHALMIC at 09:08

## 2023-08-02 RX ADMIN — ESCITALOPRAM OXALATE 10 MG: 10 TABLET ORAL at 09:08

## 2023-08-02 RX ADMIN — LOSARTAN POTASSIUM 25 MG: 25 TABLET, FILM COATED ORAL at 09:08

## 2023-08-02 RX ADMIN — ATORVASTATIN CALCIUM 20 MG: 20 TABLET, FILM COATED ORAL at 09:08

## 2023-08-02 NOTE — DISCHARGE SUMMARY
[facility] Medicine  Discharge Summary      Patient Name: Inna Blankenship  MRN: [id number]  RICCI: [id number]  Patient Class: OP- Observation  Admission Date: 7/31/2023  Hospital Length of Stay: 0 days  Discharge Date and Time:  08/02/2023 11:10 AM  Attending Physician: Paul Ford MD   Discharging Provider: Paul Ford MD  Primary Care Provider: Stacy Rodriguez MD    Primary Care Team: Networked reference to record PCT     HPI:   Patient is 89-year-old woman with history of hypertension, diabetes mellitus type 2, chronic kidney disease stage 3, chronic diastolic heart failure, cardiac amyloidosis, persistent atrial fibrillation rate controlled and prior history of deep venous thrombosis on oral anticoagulation with apixaban, chronic hypoxic respiratory failure on 2 liters/minute of supplemental oxygen at baseline who presents emergency department with shortness of breath and bilateral lower extremity swelling progressively worsening over last 3 or 4 days.  She reports generalized fatigue.  She denies any chest pain.  She denies fevers, chills, cough, or sputum production.  Patient mildly confused however family reports that her mental status is at her baseline.  Patient normally lives with her daughter who helps care for her at home however daughter herself has undergone a hip replacement surgery and therefore had been relying on sitters to help care for the patient more recently.  Daughter on the phone reports that her morning weight is usually about 130 lb.  Patient's grandson at the bedside also reports recent intake of salty foods.    She denies any alcohol or illicit drug use.  She reports a lifelong nontobacco smoker.  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      * No surgery found *      Hospital Course:   Patient is 89-year-old woman with history of hypertension, diabetes mellitus type 2, chronic kidney  disease stage 3, chronic diastolic heart failure, cardiac amyloidosis, persistent atrial fibrillation rate controlled and prior history of deep venous thrombosis on oral anticoagulation with apixaban, chronic hypoxic respiratory failure on 2 liters/minute of supplemental oxygen admitted with decompensated heart failure.  Patient started on intravenous diuretic therapy.       Goals of Care Treatment Preferences:  Code Status: DNR    Health care agent: Cara (daughter)  Health care agent number: No value filed.          What is most important right now is to focus on improvement in condition but with limits to invasive therapies.  Accordingly, we have decided that the best plan to meet the patient's goals includes continuing with treatment.      Consults:   Consults (From admission, onward)        Status Ordering Provider     Inpatient consult to Palliative Care  Once        Provider:  Bertha oJrdan DNP    Completed [place] BURGOS     Inpatient consult to Cardiology  Once        Provider:  Sb Pacheco MD    Completed [place] BURGOS     Inpatient consult to Social Work/Case Management  Once        Provider:  (Not yet assigned)    Completed CHARLOTTE BURGOS.     Inpatient consult to Registered Dietitian/Nutritionist  Once        Provider:  (Not yet assigned)    Completed CHARLOTTE BURGOS.          Pulmonary  Acute on chronic respiratory failure with hypoxia  Chronic hypoxic respiratory failure secondary to heart failure on 2L O2NC at home.  Presented with shortness of breath and bilateral lower extremity swelling progressively worsening over last 3 or 4 days.  Dry weight is around 130lbs and presented with weight of 145lbs, crackles on lung exam and 2+ LE edema bilateral.  Pox in ED was 87% on RA and placed initially on 6L.  CXR on admission with stable enlargement of the cardiac silhouette and a small right pleural effusion with subsegmental atelectasis.  Labs on admission with BNP of 1171 and Trop  of 0.143.  Patient was started on IV Lasix of 40mg q12 with clinical improvement.  Pox now stable on 2L O2NC.  Lungs clear and LE edema much improved today.  Will discharge home.    Plan:  Increase home Lasix to 40mg daily  Low Na diet and Fluid restriction to 1.5L per day  Replace electrolytes as needed  PT/OT consulted and recommends home health on discharge  Follow up with PCP and Cardiology on discharge    Cardiac/Vascular  * Acute on chronic combined systolic and diastolic congestive heart failure  History of Cardiac Amyloidosis.  TTE on admission with The left ventricle is mildly dilated. Moderately increased wall thickness. Moderate global hypokinesis present. There is moderately reduced systolic function with a visually estimated ejection fraction of 40 - 45%.  TTE in 1/2023 with EF of 45% and Grade II DD.  Home Meds:  Amlodipine 10mg, Lasix 20mg, Losartan 25mg  -See above    Other hyperlipidemia  -Continue Statin      Persistent atrial fibrillation  Patient with history of persistent atrial fibrillation with bradycardia.  Not on rate controlled medications.  -Continue oral anticoagulation with Apixaban.    Essential hypertension  SBP stable this morning.  Home Meds:  Amlodipine 10mg, Lasix 20mg, Losartan 25mg  Hospital Meds:  Losartan 25mg, Lasix 40mg IV bid  -Resume home Losartan and Amlodipine on discharge  -Increase home Lasix as above    Endocrine  Controlled type 2 diabetes mellitus with stage 3 chronic kidney disease, without long-term current use of insulin  On No Meds and A1C 5.5 on admission.  Glucose has been stable  -Continue Diabetic Diet    Other  Advance care planning  Followed by Palliative Care  Patient is DNR         Final Active Diagnoses:    Diagnosis Date Noted POA    PRINCIPAL PROBLEM:  Acute on chronic combined systolic and diastolic congestive heart failure [I50.43] 08/28/2020 Yes    Acute on chronic respiratory failure with hypoxia [J96.21] 07/31/2023 Yes    Essential hypertension  "[I10] 07/27/2012 Yes    Controlled type 2 diabetes mellitus with stage 3 chronic kidney disease, without long-term current use of insulin [E11.22, N18.30] 08/04/2020 Yes    Persistent atrial fibrillation [I48.19] 11/09/2020 Yes    Advance care planning [Z71.89] 07/31/2023 Not Applicable    Other hyperlipidemia [E78.49] 07/29/2012 Yes      Problems Resolved During this Admission:       Discharged Condition: fair    Disposition: Home-Health Care [facility]    Follow Up:   Follow-up Information     Stacy Rodriguez MD. Schedule an appointment as soon as possible for a visit in 1 week(s).    Specialty: Internal Medicine  [facility] information:  [facility] [address] [phone]                       Patient Instructions:      Diet Cardiac   Order Comments: 2g Na and 1500ml fluids per day     Notify your health care provider if you experience any of the following:  temperature >100.4     Notify your health care provider if you experience any of the following:  difficulty breathing or increased cough     Activity as tolerated       Significant Diagnostic Studies: Labs:   BMP:   Recent Labs   Lab 08/01/23  0429 08/02/23  0627   GLU 87 84    142   K 3.9 3.8    108   CO2 22* 26   BUN 18 20   CREATININE 1.0 1.0   CALCIUM 9.9 9.5   MG 1.8 1.8    and CBC No results for input(s): "WBC", "HGB", "HCT", "PLT" in the last 48 hours.    Pending Diagnostic Studies:     None         Medications:  Reconciled Home Medications:      Medication List      CHANGE how you take these medications    furosemide 20 MG tablet  Commonly known as: LASIX  Take 2 tablets (40 mg total) by mouth once daily.  What changed: how much to take        CONTINUE taking these medications    amLODIPine 10 MG tablet  Commonly known as: NORVASC  TAKE 1 TABLET (10 MG TOTAL) BY MOUTH ONCE DAILY.     atorvastatin 20 MG tablet  Commonly known as: LIPITOR  TAKE 1 TABLET ONE TIME DAILY     ELIQUIS 2.5 mg Tab  Generic drug: apixaban  TAKE 1 " TABLET TWICE DAILY     EScitalopram oxalate 10 MG tablet  Commonly known as: LEXAPRO  TAKE 1 TABLET ONE TIME DAILY     latanoprost 0.005 % ophthalmic solution  Place 1 drop into both eyes once daily.     losartan 25 MG tablet  Commonly known as: COZAAR  Take 1 tablet (25 mg total) by mouth once daily.     MULTIVITAMIN 50 PLUS ORAL  Take 1 tablet by mouth once daily.            Indwelling Lines/Drains at time of discharge:   Lines/Drains/Airways     None                 Time spent on the discharge of patient: 35 minutes         Paul Ford MD  Department of Hospital Medicine  [facility]

## 2023-08-02 NOTE — ASSESSMENT & PLAN NOTE
Chronic hypoxic respiratory failure secondary to heart failure on 2L O2NC at home.  Presented with shortness of breath and bilateral lower extremity swelling progressively worsening over last 3 or 4 days.  Dry weight is around 130lbs and presented with weight of 145lbs, crackles on lung exam and 2+ LE edema bilateral.  Pox in ED was 87% on RA and placed initially on 6L.  CXR on admission with stable enlargement of the cardiac silhouette and a small right pleural effusion with subsegmental atelectasis.  Labs on admission with BNP of 1171 and Trop of 0.143.  Patient was started on IV Lasix of 40mg q12 with clinical improvement.  Pox now stable on 2L O2NC.  Lungs clear and LE edema much improved today.  Will discharge home.    Plan:  Increase home Lasix to 40mg daily  Low Na diet and Fluid restriction to 1.5L per day  Replace electrolytes as needed  PT/OT consulted and recommends home health on discharge  Follow up with PCP and Cardiology on discharge

## 2023-08-02 NOTE — PT/OT/SLP PROGRESS
Physical Therapy Treatment    Patient Name:  Inna Blankenship   MRN:  [id number]    Recommendations:     Discharge Recommendations: home health PT with 24/7 supv  Discharge Equipment Recommendations: none  Barriers to discharge: None    Assessment:     Inna Blankenship is a 89 y.o. female admitted with a medical diagnosis of Acute on chronic combined systolic and diastolic congestive heart failure.  She presents with the following impairments/functional limitations: weakness, gait instability, impaired self care skills, impaired balance, impaired endurance, impaired functional mobility, impaired fine motor, impaired cardiopulmonary response to activity, decreased coordination, decreased safety awareness, decreased ROM, decreased upper extremity function, decreased lower extremity function.    Supine>sit with CGA  Sit>stand with RW and CGA, from bed, chair  Bed>Chair with RW and CGA, step t/f  Amb 25' in room with RW and CGA, decreased gait speed, juanita and B step length, forward flexed posture, cueing to stay close to RW  Pt progressing functional mobility with increased ambulation distance and t/f to chair, continue to progress toward goals  Rec HHPT with 24/7 supv    Rehab Prognosis: Good; patient would benefit from acute skilled PT services to address these deficits and reach maximum level of function.    Recent Surgery: * No surgery found *      Plan:     During this hospitalization, patient to be seen 5 x/week to address the identified rehab impairments via gait training, therapeutic activities, therapeutic exercises, neuromuscular re-education and progress toward the following goals:    Plan of Care Expires:  08/22/23    Subjective     Chief Complaint: none stated  Patient/Family Comments/goals: pt agreeable to therapy  Pain/Comfort:  Pain Rating 1: 0/10  Pain Rating Post-Intervention 1: 0/10      Objective:     Communicated with nurse Yan prior to session.  Patient found HOB elevated with peripheral IV, oxygen, bed  alarm, PureWick, telemetry upon PT entry to room.     General Precautions: Standard, fall  Orthopedic Precautions: N/A  Braces: N/A  Respiratory Status: Nasal cannula, flow 1 L/min     Functional Mobility:  Bed Mobility:     Supine to Sit: contact guard assistance  Transfers:     Sit to Stand:  contact guard assistance with rolling walker  Bed to Chair: contact guard assistance with  rolling walker  using  Step Transfer  Gait: 25' in room with RW and CGA, decreased gait speed, juanita and B step length, forward flexed posture, cueing to stay close to RW      AM-PAC 6 CLICK MOBILITY  Turning over in bed (including adjusting bedclothes, sheets and blankets)?: 3  Sitting down on and standing up from a chair with arms (e.g., wheelchair, bedside commode, etc.): 3  Moving from lying on back to sitting on the side of the bed?: 3  Moving to and from a bed to a chair (including a wheelchair)?: 3  Need to walk in hospital room?: 3  Climbing 3-5 steps with a railing?: 2  Basic Mobility Total Score: 17       Treatment & Education:  Supine therex: AP, heel slides, hip abd/add, SLR x 10  Seated therex: heel raises, LAQ, hip flexion marches x 10  Gait training as noted    Patient left up in chair with all lines intact, call button in reach, and private caregiver present..    GOALS:   Multidisciplinary Problems       Physical Therapy Goals          Problem: Physical Therapy    Goal Priority Disciplines Outcome Goal Variances Interventions   Physical Therapy Goal     PT, PT/OT Ongoing, Progressing     Description: Goals to be met by: 2023    Patient will increase functional independence with mobility by performin. Sit<>stand with supervision with LRAD.  2. Gait x 100 feet with LRAD with SBA.  3. Ascend/descend 1 step(s) with least restrictive assistive device and CGA.   4. Supine <> sit with supervision                           Time Tracking:     PT Received On: 23  PT Start Time: 827     PT Stop Time: 900  PT  Total Time (min): 33 min     Billable Minutes: Gait Training 18 and Therapeutic Exercise 15    Treatment Type: Treatment  PT/PTA: PTA     Number of PTA visits since last PT visit: 1 08/02/2023

## 2023-08-02 NOTE — NURSING
Patient being discharged home, instructions reviewed with family and patient. IV and telemetry were removed. Awaiting transport.

## 2023-08-02 NOTE — PLAN OF CARE
[facility] ([place])      HOME HEALTH ORDERS  FACE TO FACE ENCOUNTER    Patient Name: Inna Blankenship  YOB: 1926    PCP: Stacy Rodriguez MD   PCP Address: [address] / [place] [address]  PCP Phone Number: [phone]  PCP Fax: [phone]    Encounter Date: 7/31/23    Admit to Home Health    Diagnoses:  Active Hospital Problems    Diagnosis  POA    *Acute on chronic combined systolic and diastolic congestive heart failure [I50.43]  Yes     Priority: 2     Acute on chronic respiratory failure with hypoxia [J96.21]  Yes     Priority: 1 - High    Essential hypertension [I10]  Yes     Priority: 3     Controlled type 2 diabetes mellitus with stage 3 chronic kidney disease, without long-term current use of insulin [E11.22, N18.30]  Yes     Priority: 4     Persistent atrial fibrillation [I48.19]  Yes     Priority: 5     Advance care planning [Z71.89]  Not Applicable    Other hyperlipidemia [E78.49]  Yes      Resolved Hospital Problems   No resolved problems to display.       Follow Up Appointments:  Future Appointments   Date Time Provider Department Center   9/20/2023  2:00 PM Stacy Rodriguez MD [facility]   12/19/2023  9:30 AM Stacy Rodriguez MD [facility]       Allergies:Review of patient's allergies indicates:  No Known Allergies    Medications: Review discharge medications with patient and family and provide education.    Current Facility-Administered Medications   Medication Dose Route Frequency Provider Last Rate Last Admin    apixaban tablet 2.5 mg  2.5 mg Oral BID Po Green MD   2.5 mg at 08/02/23 0942    atorvastatin tablet 20 mg  20 mg Oral Daily Po Green MD   20 mg at 08/02/23 0942    dextrose 10% bolus 125 mL 125 mL  12.5 g Intravenous PRN Po Green MD        dextrose 10% bolus 250 mL 250 mL  25 g Intravenous PRN Po Green MD        EScitalopram oxalate tablet 10 mg  10 mg Oral Daily Po Green MD   10 mg at 08/02/23  0942    furosemide injection 40 mg  40 mg Intravenous Q12H Po Green MD   40 mg at 08/02/23 0524    glucagon (human recombinant) injection 1 mg  1 mg Intramuscular PRN Po Green MD        glucose chewable tablet 16 g  16 g Oral PRN Po Green MD        glucose chewable tablet 24 g  24 g Oral PRN Po Green MD        latanoprost 0.005 % ophthalmic solution 1 drop  1 drop Both Eyes Daily Po Green MD   1 drop at 08/02/23 0942    losartan tablet 25 mg  25 mg Oral Daily Po Green MD   25 mg at 08/02/23 0942     Current Discharge Medication List        CONTINUE these medications which have CHANGED    Details   furosemide (LASIX) 20 MG tablet Take 2 tablets (40 mg total) by mouth once daily.  Qty: 90 tablet, Refills: 3           CONTINUE these medications which have NOT CHANGED    Details   amLODIPine (NORVASC) 10 MG tablet TAKE 1 TABLET (10 MG TOTAL) BY MOUTH ONCE DAILY.  Qty: 90 tablet, Refills: 3      atorvastatin (LIPITOR) 20 MG tablet TAKE 1 TABLET ONE TIME DAILY  Qty: 90 tablet, Refills: 1      ELIQUIS 2.5 mg Tab TAKE 1 TABLET TWICE DAILY  Qty: 180 tablet, Refills: 3      EScitalopram oxalate (LEXAPRO) 10 MG tablet TAKE 1 TABLET ONE TIME DAILY  Qty: 90 tablet, Refills: 1      latanoprost 0.005 % ophthalmic solution Place 1 drop into both eyes once daily.  Qty: 7.5 mL, Refills: 3    Associated Diagnoses: Low-tension glaucoma of both eyes, moderate stage      losartan (COZAAR) 25 MG tablet Take 1 tablet (25 mg total) by mouth once daily.  Qty: 90 tablet, Refills: 3      multivit with minerals/lutein (MULTIVITAMIN 50 PLUS ORAL) Take 1 tablet by mouth once daily.                I have seen and examined this patient within the last 30 days. My clinical findings that support the need for the home health skilled services and home bound status are the following:no   Weakness/numbness causing balance and gait disturbance due to Heart Failure and Weakness/Debility making it taxing  to leave home.     Diet:   cardiac diet, fluid restriction, and 2 gram sodium diet    Labs:  As needed    Referrals/ Consults  Physical Therapy to evaluate and treat. Evaluate for home safety and equipment needs; Establish/upgrade home exercise program. Perform / instruct on therapeutic exercises, gait training, transfer training, and Range of Motion.  Occupational Therapy to evaluate and treat. Evaluate home environment for safety and equipment needs. Perform/Instruct on transfers, ADL training, ROM, and therapeutic exercises.    Activities:   activity as tolerated    Nursing:   Agency to admit patient within 24 hours of hospital discharge unless specified on physician order or at patient request    SN to complete comprehensive assessment including routine vital signs. Instruct on disease process and s/s of complications to report to MD. Review/verify medication list sent home with the patient at time of discharge  and instruct patient/caregiver as needed. Frequency may be adjusted depending on start of care date.     Skilled nurse to perform up to 3 visits PRN for symptoms related to diagnosis    Notify MD if SBP > 160 or < 90; DBP > 90 or < 50; HR > 120 or < 50; Temp > 101; O2 < 88%; Other:       Ok to schedule additional visits based on staff availability and patient request on consecutive days within the home health episode.    When multiple disciplines ordered:    Start of Care occurs on Sunday - Wednesday schedule remaining discipline evaluations as ordered on separate consecutive days following the start of care.    Thursday SOC -schedule subsequent evaluations Friday and Monday the following week.     Friday - Saturday SOC - schedule subsequent discipline evaluations on consecutive days starting Monday of the following week.    For all post-discharge communication and subsequent orders please contact patient's primary care physician.     Miscellaneous   Routine Skin for Bedridden Patients: Instruct  patient/caregiver to apply moisture barrier cream to all skin folds and wet areas in perineal area daily and after baths and all bowel movements.  Heart Failure:      SN to instruct on the following:    Instruct on the definition of CHF.   Instruct on the signs/sympoms of CHF to be reported.   Instruct on and monitor daily weights.   Instruct on factors that cause exacerbation.   Instruct on action, dose, schedule, and side effects of medications.   Instruct on diet as prescribed.   Instruct on activity allowed.   Instruct on life-style modifications for life long management of CHF   SN to assess compliance with daily weights, diet, medications, fluid retention,    safety precautions, activities permitted and life-style modifications.   Additional 1-2 SN visits per week as needed for signs and symptoms     of CHF exacerbation.      For Weight Gain > 2-3 lbs in 1 day or 4-6 lbs over 1 week notify PCP.    Home Health Aide:  Physical Therapy Three times weekly and Occupational Therapy Three times weekly    Wound Care Orders  no    I certify that this patient is confined to her home and needs physical therapy and occupational therapy.

## 2023-08-02 NOTE — ASSESSMENT & PLAN NOTE
SBP stable this morning.  Home Meds:  Amlodipine 10mg, Lasix 20mg, Losartan 25mg  Hospital Meds:  Losartan 25mg, Lasix 40mg IV bid  -Resume home Losartan and Amlodipine on discharge  -Increase home Lasix as above

## 2023-08-02 NOTE — PROGRESS NOTES
"    Cardiology Progress Note    8/2/2023  1:06 PM    Subjective/Interim:      Her shortness breath has improved.  Ready to go home. Family at the bedside.     Objective:   24-hour Vitals:  Temp:  [97.6 °F (36.4 °C)-98.3 °F (36.8 °C)] 97.9 °F (36.6 °C)  Pulse:  [46-72] 56  Resp:  [16-18] 16  SpO2:  [92 %-100 %] 92 %  BP: (120-152)/(57-74) 152/68    Physical Examination:  Vitals: BP (!) 152/68 (BP Location: Left arm, Patient Position: Lying)   Pulse (!) 56   Temp 97.9 °F (36.6 °C) (Oral)   Resp 16   Ht 5' 4" (1.626 m)   Wt 65.9 kg (145 lb 3.2 oz)   SpO2 (!) 92%   BMI 24.92 kg/m²     Physical Exam  Constitutional:       General: She is not in acute distress.  Neck:      Vascular: JVD present.   Cardiovascular:      Rate and Rhythm: Normal rate. Rhythm irregularly irregular.      Pulses:           Carotid pulses are 2+ on the right side and 2+ on the left side.       Radial pulses are 2+ on the right side and 2+ on the left side.      Heart sounds: Normal heart sounds.   Pulmonary:      Effort: Pulmonary effort is normal.      Breath sounds: Normal breath sounds. No rales.   Musculoskeletal:      Right lower leg: No edema.      Left lower leg: No edema.   Neurological:      Mental Status: She is alert.           Intake/Output Summary (Last 24 hours) at 8/2/2023 1101  Last data filed at 8/2/2023 0646  Gross per 24 hour   Intake 50 ml   Output 400 ml   Net -350 ml         Laboratory:  Trended Lab Data:  Recent Labs   Lab 07/31/23  1054   WBC 5.07   HGB 12.3   HCT 38.9            Recent Labs   Lab 07/31/23  1054 08/01/23  0429 08/02/23  0627    143 142   K 4.3 3.9 3.8    110 108   CO2 21* 22* 26   BUN 20 18 20   GLU 95 87 84   CALCIUM 10.4 9.9 9.5   MG  --  1.8 1.8         Recent Labs   Lab 07/31/23  1054   PROT 7.4   ALBUMIN 3.8   BILITOT 1.0   AST 31   ALT 23   ALKPHOS 96         No results for input(s): "PROTIME", "PTT", "INR" in the last 168 hours.    Cardiac:   Recent Labs   Lab " 07/31/23  1054 08/01/23  0429   TROPONINI 0.143* 0.135*   BNP 1,171*  --          FLP:   Lab Results   Component Value Date    CHOL 174 12/01/2022    HDL 67 12/01/2022    LDLCALC 99.2 12/01/2022    TRIG 39 12/01/2022    CHOLHDL 38.5 12/01/2022     DM:   Lab Results   Component Value Date    HGBA1C 5.5 08/01/2023    HGBA1C 5.3 06/27/2023    HGBA1C 5.8 (H) 01/30/2023    LDLCALC 99.2 12/01/2022    CREATININE 1.0 08/02/2023     Thyroid:   Lab Results   Component Value Date    TSH 5.144 (H) 04/10/2022    FREET4 0.97 04/10/2022     Anemia:   Lab Results   Component Value Date    IRON 96 04/18/2022    TIBC 287 04/18/2022    FERRITIN 126 04/18/2022     Urinalysis:   Lab Results   Component Value Date    LABURIN  01/29/2016     Multiple organisms isolated. None in predominance.  Repeat if    LABURIN clinically necessary. 01/29/2016    COLORU Yellow 04/10/2022    SPECGRAV 1.010 04/10/2022    NITRITE Negative 04/10/2022    KETONESU Negative 04/10/2022    UROBILINOGEN Negative 02/08/2021     @      Other Results:  EKG (my interpretation):    TELEMETRY:      Echo:   Results for orders placed or performed during the hospital encounter of 07/31/23   Echo   Result Value Ref Range    BSA 1.68 m2    LVOT stroke volume 68.93 cm3    LVIDd 3.54 3.5 - 6.0 cm    LV Systolic Volume 32.10 mL    LV Systolic Volume Index 19.2 mL/m2    LVIDs 2.90 2.1 - 4.0 cm    LV Diastolic Volume 52.13 mL    LV Diastolic Volume Index 31.22 mL/m2    IVS 1.27 (A) 0.6 - 1.1 cm    LVOT diameter 2.04 cm    LVOT area 3.3 cm2    FS 18 (A) 28 - 44 %    Left Ventricle Relative Wall Thickness 0.75 cm    Posterior Wall 1.33 (A) 0.6 - 1.1 cm    LV mass 156.28 g    LV Mass Index 94 g/m2    MV Peak E David 0.40 m/s    MV Peak A David 0.42 m/s    TR Max David 3.57 m/s    E/A ratio 0.95     IVRT 87.54 msec    E wave deceleration time 171.61 msec    PV Peak S David 0.30 m/s    LVOT peak david 0.84 m/s    Left Ventricular Outflow Tract Mean Velocity 0.58 cm/s    Left Ventricular  Outflow Tract Mean Gradient 1.52 mmHg    LA size 5.18 cm    Left Atrium Major Axis 6.79 cm    Left Atrium Minor Axis 6.25 cm    RA Major Axis 6.71 cm    AV mean gradient 4 mmHg    AV peak gradient 8 mmHg    Ao peak perla 1.38 m/s    Ao VTI 35.30 cm    LVOT peak VTI 21.10 cm    AV valve area 1.95 cm²    AV Velocity Ratio 0.61     AV index (prosthetic) 0.60     ANGIE by Velocity Ratio 1.99 cm²    Mr max perla 3.99 m/s    MV stenosis pressure 1/2 time 49.77 ms    MV valve area p 1/2 method 4.42 cm2    Triscuspid Valve Regurgitation Peak Gradient 51 mmHg    PV PEAK VELOCITY 0.75 m/s    PV peak gradient 2 mmHg    STJ 2.52 cm    ZLVIDS 0.02     ZLVIDD -2.77     IVC diameter 2.76 cm    LA Volume Index 87.5 mL/m2    LA volume 146.16 cm3    LA WIDTH 5.1 cm    RA Width 6.5 cm    Narrative      Left Ventricle: The left ventricle is mildly dilated. Moderately   increased wall thickness. Moderate global hypokinesis present. There is   moderately reduced systolic function with a visually estimated ejection   fraction of 40 - 45%.    Left Atrium: Left atrium is moderately dilated.    Right Ventricle: Normal right ventricular cavity size. Systolic   function is normal.    Right Atrium: Right atrium is moderately dilated.    Aortic Valve: There is mild aortic regurgitation with a centrally   directed jet.    Mitral Valve: There is no stenosis. There is mild regurgitation with a   centrally directed jet.    Tricuspid Valve: There is mild transvalvular regurgitation with a   centrally directed jet.    Pulmonic Valve: There is mild regurgitation with a centrally directed   jet.    Pericardium: There is no pericardial effusion.         Radiology:  Echo    Result Date: 7/31/2023    Left Ventricle: The left ventricle is mildly dilated. Moderately increased wall thickness. Moderate global hypokinesis present. There is moderately reduced systolic function with a visually estimated ejection fraction of 40 - 45%.   Left Atrium: Left atrium is  moderately dilated.   Right Ventricle: Normal right ventricular cavity size. Systolic function is normal.   Right Atrium: Right atrium is moderately dilated.   Aortic Valve: There is mild aortic regurgitation with a centrally directed jet.   Mitral Valve: There is no stenosis. There is mild regurgitation with a centrally directed jet.   Tricuspid Valve: There is mild transvalvular regurgitation with a centrally directed jet.   Pulmonic Valve: There is mild regurgitation with a centrally directed jet.   Pericardium: There is no pericardial effusion.     X-Ray Chest AP Portable    Result Date: 7/31/2023  EXAMINATION: XR CHEST AP PORTABLE CLINICAL HISTORY: Chest Pain; TECHNIQUE: Single frontal view of the chest was performed. COMPARISON: 01/30/2023 FINDINGS: Lungs are well expanded.  Small right pleural effusion with subsegmental right basilar atelectasis.  No lobar consolidation. Cardiac silhouette is enlarged, stable.  Calcified plaque lines arch. Degenerative change both shoulders.     Stable enlargement of the cardiac silhouette.  Small right pleural effusion with subsegmental atelectasis. Electronically signed by: Bertha Chung Date:    07/31/2023 Time:    11:08        Current Medications:     Infusions:       Scheduled:   apixaban  2.5 mg Oral BID    atorvastatin  20 mg Oral Daily    EScitalopram oxalate  10 mg Oral Daily    furosemide (LASIX) injection  40 mg Intravenous Q12H    latanoprost  1 drop Both Eyes Daily    losartan  25 mg Oral Daily        PRN:  dextrose 10%, dextrose 10%, glucagon (human recombinant), glucose, glucose     Assessment and Plan:     Inna Blankenship is a 89 y.o.female with  Acute on chronic diastolic congestive heart failure  Cardiac amyloid  -responding well with IV diuretics.  continue furosemide IV twice daily.  -echo noted  -refused Tafamidis for cardiac amyloid  -ok to discharge and f/u with her cardiologist at [facility].     Persistent AF  -rate controlled  -continue apixaban      HTN  -continue home meds and monitor     DM  -as per HM team     DNR         Sb Pacheco MD        Disclaimer: This document was created using voice recognition software (M*Modal Fluency Direct). Although it may be edited, this document may contain errors related to incorrect recognition of the spoken word. Please call the physician if clarification is needed.

## 2023-08-02 NOTE — PLAN OF CARE
CM met with pt and her caregiver for final discharge planning assessment. Pt is ready for discharge today, caregiver to provide transportation home.    Home health arranged with [facility]  per pt choice, form explained and signed by pt's son, Orlin.    Meds sent to pt's outside retail pharmacy.    Follow-up apt scheduled with her PCP, Dr Stacy Rodriguez, information is in AVS.    Pt is ready for discharge from  perspective.   08/02/23 1151   Final Note   Assessment Type Final Discharge Note   Anticipated Discharge Disposition Home-Health   What phone number can be called within the next 1-3 days to see how you are doing after discharge? [phone]   Hospital Resources/Appts/Education Provided Provided patient/caregiver with written discharge plan information;Appointments scheduled by Navigator/Coordinator;Provided education on problems/symptoms using teachback;Appointments scheduled and added to AVS   Post-Acute Status   Post-Acute Authorization Home Health   Home Health Status Set-up Complete/Auth obtained   Coverage [facility] home health   Patient choice form signed by patient/caregiver List with quality metrics by geographic area provided;List from CMS Compare;List from System Post-Acute Care   Discharge Delays None known at this time     [facility] ([place])  Discharge Final Note    Primary Care Provider: Stacy Rodriguez MD    Expected Discharge Date: 8/2/2023    Final Discharge Note (most recent)       Final Note - 08/02/23 1151          Final Note    Assessment Type Final Discharge Note (P)      Anticipated Discharge Disposition Home-Health Care Svc (P)      What phone number can be called within the next 1-3 days to see how you are doing after discharge? [phone] (P)      Hospital Resources/Appts/Education Provided Provided patient/caregiver with written discharge plan information;Appointments scheduled by Navigator/Coordinator;Provided education on problems/symptoms using teachback;Appointments  scheduled and added to AVS (P)         Post-Acute Status    Post-Acute Authorization Home Health (P)      Home Health Status Set-up Complete/Auth obtained (P)      Coverage [facility] home [facility] (P)      Patient choice form signed by patient/caregiver List with quality metrics by geographic area provided;List from CMS Compare;List from System Post-Acute Care (P)      Discharge Delays None known at this time (P)                      Important Message from Medicare             Contact Info       tSacy Rodriguez MD   Specialty: Internal Medicine   Relationship: PCP - General    [address]  [place] [address]   Phone: [phone]       Next Steps: Schedule an appointment as soon as possible for a visit in 1 week(s)    [facility] [place]   Specialty: Home Health Services    [address]  [address]  [facility] [address]   Phone: [phone]       Next Steps: Follow up

## 2023-08-02 NOTE — PLAN OF CARE
I certify I provided patient choice and a list to the patient/family of [facility].  Patient/Family signed Patient's Choice Disclosure Form choosing the following  1.[facility]

## 2023-08-02 NOTE — DISCHARGE INSTRUCTIONS
For your Congestive Heart Failure and High Blood Pressure, it is recommended to continue a low salt diet (2g Na daily) and fluid restriction of 1500ml per day.  Please continue the following medications on discharge:  Lasix (furosemide) 40mg once a day (increased from 20mg)   Amlodipine 10mg once a day   Losartan 25mg once a day    Please resume your other home medications on discharge and follow up with your Primary Care Provider and Cardiologist soon.  I referral for home Physical Therapy was placed for you.

## 2023-08-08 ENCOUNTER — TELEPHONE (OUTPATIENT)
Dept: INTERNAL MEDICINE | Facility: CLINIC | Age: 88
End: 2023-08-08
Payer: MEDICARE

## 2023-08-08 NOTE — TELEPHONE ENCOUNTER
At her age sleeping pills are not recommended because they can have bad side effects. It is important for her to stay awake during the day to get her sleep cycle normalized

## 2023-08-08 NOTE — TELEPHONE ENCOUNTER
----- Message from Gabriela Mota sent at 8/8/2023  1:38 PM CDT -----  Contact: kelle [phone]  Kelle baird requesting a call to discuss pt.    Please call and advise

## 2023-08-08 NOTE — TELEPHONE ENCOUNTER
Pts son stated that pt isn't sleeping much at night but is sleeping most of the day and when waking up in the night is wide awake.     Pts son would like to know what could they give her to sleep melatonin isn't helping.

## 2023-08-09 PROCEDURE — G0180 MD CERTIFICATION HHA PATIENT: HCPCS | Mod: ,,, | Performed by: INTERNAL MEDICINE

## 2023-08-09 PROCEDURE — G0180 PR HOME HEALTH MD CERTIFICATION: ICD-10-PCS | Mod: ,,, | Performed by: INTERNAL MEDICINE

## 2023-08-14 ENCOUNTER — TELEPHONE (OUTPATIENT)
Dept: INTERNAL MEDICINE | Facility: CLINIC | Age: 88
End: 2023-08-14
Payer: MEDICARE

## 2023-08-14 NOTE — TELEPHONE ENCOUNTER
----- Message from Gladis Valverde sent at 8/14/2023 11:09 AM CDT -----  Who Called: Pt    What is the request in detail: Requesting call back to discuss recent call. Pt unsure why. Please advise    Can the clinic reply by [facility]? No    Best Call Back Number: [phone]      Additional Information:

## 2023-08-14 NOTE — TELEPHONE ENCOUNTER
L/breast has edema - does note a faint finger print.  Currently on 3L/nc - 83% increased to 84 % .  Pt has fluid pockets around the lower back. Sitter and daughter are with her.    NOTIFIED pt's daughter Cara, pt should go back to the ED by Ambulance.

## 2023-08-15 ENCOUNTER — CARE AT HOME (OUTPATIENT)
Dept: INTERNAL MEDICINE | Facility: CLINIC | Age: 88
End: 2023-08-15
Payer: MEDICARE

## 2023-08-15 ENCOUNTER — TELEPHONE (OUTPATIENT)
Dept: INTERNAL MEDICINE | Facility: CLINIC | Age: 88
End: 2023-08-15
Payer: MEDICARE

## 2023-08-15 DIAGNOSIS — I10 HYPERTENSION, UNSPECIFIED TYPE: Primary | ICD-10-CM

## 2023-08-15 DIAGNOSIS — I50.9 CONGESTIVE HEART FAILURE, UNSPECIFIED HF CHRONICITY, UNSPECIFIED HEART FAILURE TYPE: Primary | ICD-10-CM

## 2023-08-15 PROCEDURE — 99024 POSTOP FOLLOW-UP VISIT: CPT | Mod: S$GLB,,, | Performed by: INTERNAL MEDICINE

## 2023-08-15 PROCEDURE — 99024 PR POST-OP FOLLOW-UP VISIT: ICD-10-PCS | Mod: S$GLB,,, | Performed by: INTERNAL MEDICINE

## 2023-08-17 ENCOUNTER — PES CALL (OUTPATIENT)
Dept: ADMINISTRATIVE | Facility: CLINIC | Age: 88
End: 2023-08-17
Payer: MEDICARE

## 2023-08-23 ENCOUNTER — CARE AT HOME (OUTPATIENT)
Dept: HOME HEALTH SERVICES | Facility: CLINIC | Age: 88
End: 2023-08-23
Payer: MEDICARE

## 2023-08-23 DIAGNOSIS — Z71.89 ADVANCE CARE PLANNING: ICD-10-CM

## 2023-08-23 DIAGNOSIS — I50.9 CONGESTIVE HEART FAILURE, UNSPECIFIED HF CHRONICITY, UNSPECIFIED HEART FAILURE TYPE: ICD-10-CM

## 2023-08-23 DIAGNOSIS — R41.0 CONFUSION AND DISORIENTATION: ICD-10-CM

## 2023-08-23 DIAGNOSIS — J96.21 ACUTE ON CHRONIC RESPIRATORY FAILURE WITH HYPOXIA: ICD-10-CM

## 2023-08-23 DIAGNOSIS — I10 ESSENTIAL HYPERTENSION: ICD-10-CM

## 2023-08-23 DIAGNOSIS — E78.49 OTHER HYPERLIPIDEMIA: ICD-10-CM

## 2023-08-23 DIAGNOSIS — I50.32 CHRONIC DIASTOLIC CONGESTIVE HEART FAILURE: ICD-10-CM

## 2023-08-23 PROCEDURE — 99350 HOME/RES VST EST HIGH MDM 60: CPT | Mod: S$GLB,,, | Performed by: NURSE PRACTITIONER

## 2023-08-23 PROCEDURE — 99497 ADVNCD CARE PLAN 30 MIN: CPT | Mod: S$GLB,,, | Performed by: NURSE PRACTITIONER

## 2023-08-23 PROCEDURE — 99350 PR HOME VISIT,ESTAB PATIENT,LEVEL IV: ICD-10-PCS | Mod: S$GLB,,, | Performed by: NURSE PRACTITIONER

## 2023-08-23 PROCEDURE — 99497 PR ADVNCD CARE PLAN 30 MIN: ICD-10-PCS | Mod: S$GLB,,, | Performed by: NURSE PRACTITIONER

## 2023-09-04 VITALS
HEART RATE: 70 BPM | RESPIRATION RATE: 18 BRPM | DIASTOLIC BLOOD PRESSURE: 70 MMHG | SYSTOLIC BLOOD PRESSURE: 128 MMHG | OXYGEN SATURATION: 98 % | TEMPERATURE: 98 F

## 2023-09-04 PROBLEM — J96.01 ACUTE HYPOXEMIC RESPIRATORY FAILURE: Status: RESOLVED | Noted: 2023-01-30 | Resolved: 2023-09-04

## 2023-09-04 NOTE — ASSESSMENT & PLAN NOTE
Discussed the philosophy of palliative care  Reviewed lapost and advance care planning forms with patient and family.

## 2023-09-04 NOTE — ASSESSMENT & PLAN NOTE
Mild peripheral edema noted, breathing at baseline  1. Denies chest pain, SOB  2. Continue medication as prescribed  3. Keep scheduled follow up appts  4. Activity and Diet restrictions:   ? Recommend 2-3 gram sodium restriction and 1500cc- 2000cc fluid restriction.  ? Encourage physical activity with graded exercise program.  ? Requested patient to weigh themselves daily, and to notify us if their weight increases by more than 3 lbs in 1 day or 5 lbs in 3 days.  ? Elevated lower extremities while sitting/lying, compression stockings

## 2023-09-04 NOTE — PROGRESS NOTES
"  [facility]  Medical Home Visit    Visit Date: 8/23/23  Encounter Provider: Maria G Álvarez NP  PCP:  Stacy Rodriguez MD    Subjective:      Patient ID: Inna Blankenship is a 89 y.o. female.    Consult Requested By:  Dr. Stacy Rodriguez  Reason for Consult: Medical Visit by Home Care Provider    The patient is being seen at home due to a physical debility that presents a taxing effort to leave the home, to mitigate high risk of hospital readmission or due to the limited availability of reliable or safe options for transportation to the point of access to the provider. The visit meets the criteria for medical necessity as defined by CMS as "health-care services needed to prevent, diagnose, or treat an illness, injury, condition, disease, or its symptoms and that meet accepted standards of medicine." Prior to treatment on this visit the chart was reviewed and patient consent was obtained.    Chief Complaint: Follow-up for chronic medical conditions and medication review    HPI: Inna Blankenship is a 89 y.o. y.o. female with a history of chronic diastolic heart failure, cardiac amyloidosis, hypertension, hyperlipidemia, afib on OAC, CKD and T2DM.  She has had 2 hospital visits for acute CHF this year.  Currently no acute issues noted.       Today:  Ms. Inna Blankenship is a 89 y.o. female Inna presents at baseline state of health as reported by patient and caregiver. VSS. She is sitting at kitchen table, no signs of distress.  Family present during visit. Denies chest pain, worsening SOB, fevers, cough, congestion, orthopnea, change in bladder or bowel habits.   Patient/family are aware of upcoming appts and plan on attending. Denies any acute issues, concerns or complaints to address on today's visit. Reports taking all medications as prescribed. No other needs identified at this time.     Review of Systems   Constitutional:  Negative for chills and fever.   HENT:  Negative for congestion, postnasal drip and rhinorrhea.  "   Eyes:  Negative for visual disturbance.   Respiratory:  Negative for chest tightness and shortness of breath.    Cardiovascular:  Negative for chest pain, palpitations and leg swelling.   Gastrointestinal:  Negative for abdominal pain, blood in stool, constipation, nausea and vomiting.   Genitourinary:  Negative for difficulty urinating.   Musculoskeletal:  Negative for arthralgias and myalgias.   Skin:  Negative for color change.   Neurological:  Negative for dizziness and light-headedness.   Hematological:  Does not bruise/bleed easily.   Psychiatric/Behavioral:  Negative for agitation.      Assessments:  Environmental: single story home, no steps to enter, adequate lighting and temperature control  Functional Status: assistance with ADL's/IADL's, ambulates with assistance of a cane/walker, continent of bowel and bladder  Safety: Fall Precautions, COVID Precautions/Social Distancing/Mask Use  Nutritional: Adequate     Palliative Care/Goals of care: Discussed the philosophy of palliative care. Reviewed lapost and advance care planning forms with patient and family. Her goals of care include remaining out of the hospital, increased strength and breathing to remain stable. Discussed ACP for 20 minutes.           Objective:     Vitals:    08/23/23 1600   BP: 128/70   Pulse: 70   Resp: 18   Temp: 98.1 °F (36.7 °C)   SpO2: 98%     There is no height or weight on file to calculate BMI.    Physical Exam  HENT:      Head: Normocephalic and atraumatic.      Nose: No congestion or rhinorrhea.      Mouth/Throat:      Mouth: Mucous membranes are moist.   Cardiovascular:      Rate and Rhythm: Normal rate.   Pulmonary:      Effort: No respiratory distress.      Breath sounds: Normal breath sounds.      Comments: Oxygen nasal cannula intact  Abdominal:      General: Bowel sounds are normal.      Palpations: Abdomen is soft.   Musculoskeletal:      Cervical back: Normal range of motion and neck supple.      Right lower leg:  Edema (+1) present.      Left lower leg: Edema (+1) present.   Skin:     General: Skin is warm and dry.   Neurological:      Mental Status: She is alert. Mental status is at baseline.     Assessment:     1. Congestive heart failure, unspecified HF chronicity, unspecified heart failure type    2. Confusion and disorientation    3. Acute on chronic respiratory failure with hypoxia    4. Essential hypertension    5. Other hyperlipidemia    6. Chronic diastolic congestive heart failure    7. Advance care planning      Plan:          1. Congestive heart failure, unspecified HF chronicity, unspecified heart failure type  -     Ambulatory referral/consult to [facility]    2. Confusion and disorientation  Assessment & Plan:  No acute issues  Mental status at baseline  Continue caregiver support       3. Acute on chronic respiratory failure with hypoxia  Assessment & Plan:  Respiratory status at baseline  No acute issues  Continue meds as prescribed  Caregiver support       4. Essential hypertension  Assessment & Plan:  Normotensive  Continue meds as prescribed       5. Other hyperlipidemia  Assessment & Plan:  Denies chest pain  Continue statin       6. Chronic diastolic congestive heart failure  Assessment & Plan:  Mild peripheral edema noted, breathing at baseline  Denies chest pain, SOB  Continue medication as prescribed  Keep scheduled follow up appts  Activity and Diet restrictions:   Recommend 2-3 gram sodium restriction and 1500cc- 2000cc fluid restriction.  Encourage physical activity with graded exercise program.  Requested patient to weigh themselves daily, and to notify us if their weight increases by more than 3 lbs in 1 day or 5 lbs in 3 days.  Elevated lower extremities while sitting/lying, compression stockings         7. Advance care planning  Assessment & Plan:  Discussed the philosophy of palliative care  Reviewed lapost and advance care planning forms with patient and  family.           Were controlled substances prescribed? No    Instructions:  - Continue all medications, treatments and therapies as ordered.   - Follow all instructions, recommendations as discussed.  - Maintain Safety Precautions at all times.  - Attend all medical appointments as scheduled.  - For worsening symptoms: call Primary Care Physician or Nurse Practitioner.  - For emergencies, call 911 or immediately report to the nearest emergency room.  - Limit Risks of environmental exposure to coronavirus/COVID-19 as discussed including: social distancing, hand hygiene, and limiting departures from the home for necessities only.          Follow Up Appointments:   Future Appointments   Date Time Provider Department Center   9/20/2023  2:00 PM Stacy Rodriguez MD [facility]   12/19/2023  9:30 AM Stacy Rodriguez MD [facility]       Patient consent was obtained prior to treatment on this visit.    Signature:       Maria G Álvarez, MSN, APRN, FNP-C  [facility]    Total face-to-face time was 60 min, >50% of this was spent on counseling and coordination of care. The following issues were discussed: primary and secondary diagnoses, co-morbidities, prescribed medications, treatment modalities, importance of compliance with medical advice and directives for follow-up care

## 2023-09-07 ENCOUNTER — NURSE TRIAGE (OUTPATIENT)
Dept: ADMINISTRATIVE | Facility: CLINIC | Age: 88
End: 2023-09-07
Payer: MEDICARE

## 2023-09-07 ENCOUNTER — TELEPHONE (OUTPATIENT)
Dept: INTERNAL MEDICINE | Facility: CLINIC | Age: 88
End: 2023-09-07
Payer: MEDICARE

## 2023-09-07 NOTE — TELEPHONE ENCOUNTER
Call placed to daughter Cara to verify that no further assistance needed.   States pt's O2 has gone up and is currently resting comfortably. No further questions/concerns at this time.   NOC phone #s provided.  Reason for Disposition   [1] Follow-up call to recent contact AND [2] information only call, no triage required    Protocols used: Information Only Call - No Triage-A-

## 2023-09-08 NOTE — TELEPHONE ENCOUNTER
----- Message from Ramona Jacome MA sent at 9/7/2023  4:37 PM CDT -----  Contact: [facility] [phone]    ----- Message -----  From: Milo Gomez  Sent: 9/7/2023   2:32 PM CDT  To: Jennifer CASTILLO Staff    1MEDICALADVICE     Patient is calling for Medical Advice regarding:oxygen level 84-87 40 min to take to get to 91    How long has patient had these symptoms:    Pharmacy name and phone#:    Would like response via [employer]:  call back     Comments:    [facility] [phone]      
Called Brunilda from  nurse . Patient was walking and 02 sats dropped down to 84 and took awhile to get back up to 91.. Asymptomatic at this time. Family stated that it had happened in the past. Nurse asked patient to go to the ER for an evaluation and patient declined. Called to speak to daughter Cara . Patient was resting at this time. 02 sat at rest at this time was 81. Daughter pushed up patients oxygen to 2.5 and was going to call the clinic back with updated 02 sat because patient is refusing to go to the ER.  
Pt's daughter Cara called back and said o2 sat is down to 81% she is still seated and currently on o2@ 2L/nc.  Called for RN to triage but couldn't reach them.  Hosp Op was able to connect w/ - On call staff.  Gave him the info below along w/ unclear status of DNR   7/31/2023 1338 8/2/2023 1536 DNR [id number]  Po Green MD ED       Per [facility] on 08/23/23  Congestive heart failure, unspecified HF chronicity, unspecified heart failure type; Confusion and disorientation; Acute on chronic respiratory failure with hypoxia; Essential hypertension; Other hyperlipidemia; Chronic diastolic congestive heart failure; Advance care planning     Stated d/t pt's hx would not tell family to increase O2- asked on- call MD to review and advise then transferred the call.  
She was comfortable despite low sats.  Gave her all risk and benefit about oxygen and increasing to 3LPM  But she was in no distress, no delirium or confusion.  So we opted to just let her rest.  She refused hospital care.  Daughter was ok with this  Gave her my name and contact if any further questions.  Keep routine f/u and let us know if condition worsens.
Yes

## 2023-09-11 ENCOUNTER — NURSE TRIAGE (OUTPATIENT)
Dept: ADMINISTRATIVE | Facility: CLINIC | Age: 88
End: 2023-09-11
Payer: MEDICARE

## 2023-09-12 NOTE — TELEPHONE ENCOUNTER
Pt's call placed on [employer] for a call back from the triage nursing staff.  posted the pt was experiencing low oxygen and a low heart rate. Contacted pt's daughter. She reports that 911/ EMS is already at the home.     Reason for Disposition   Patient already left for the hospital/clinic.    Protocols used: No Contact or Duplicate Contact Call-A-AH

## 2023-09-14 ENCOUNTER — TELEPHONE (OUTPATIENT)
Dept: INTERNAL MEDICINE | Facility: CLINIC | Age: 88
End: 2023-09-14
Payer: MEDICARE

## 2023-09-14 NOTE — TELEPHONE ENCOUNTER
Called and spoke to shirin Albarran at length about patients care. Best inquired about the DNR. Reached out to the NP for some clarification about last DNR status. Reminded him of appt for next week with PCP. Best verbalized understanding with read back.

## 2023-09-14 NOTE — TELEPHONE ENCOUNTER
----- Message from Aislinn Garcia sent at 2023 10:33 AM CDT -----  Contact: Gail/Deion/[phone]  Caller is requesting an earlier appointment then we can schedule.  Caller is requesting a message be sent to the provider.  If this is for urgent care symptoms, did you offer other providers at this location, providers at other locations, or [facility]? (yes, no, n/a):  n/a  If this is for the patients physical, did you offer to schedule next available and put on wait list, or to see NP or PA for their physical?  (yes, no, n/a):  n/a  When is the next available appointment with their provider:  pt has an appt scheduled for   Reason for the appointment:  follow up   Patient preference of timeframe to be scheduled:  tomorrow 9/15   Would the patient like a call back, or a response through their [facility] portal?:   call back   Comments:  pt's nephew(Deion) said that he lives out of town but will be in town tomorrow for a  and would like to know if pt can be seen tomorrow instead of next week , if not he will just come back next week to bring pt to her appt. Please advise

## 2023-09-14 NOTE — TELEPHONE ENCOUNTER
Pt O2 with movement 74-75 and resting for 3-4 mins 81-82.     Pt was on 3 liters of O2 and OT moved it to 4 liter .    Please call family in regards to O2 level

## 2023-09-15 ENCOUNTER — EXTERNAL HOME HEALTH (OUTPATIENT)
Dept: HOME HEALTH SERVICES | Facility: HOSPITAL | Age: 88
End: 2023-09-15
Payer: MEDICARE

## 2023-09-20 ENCOUNTER — LAB VISIT (OUTPATIENT)
Dept: LAB | Facility: HOSPITAL | Age: 88
End: 2023-09-20
Attending: INTERNAL MEDICINE
Payer: MEDICARE

## 2023-09-20 ENCOUNTER — IMMUNIZATION (OUTPATIENT)
Dept: INTERNAL MEDICINE | Facility: CLINIC | Age: 88
End: 2023-09-20
Payer: MEDICARE

## 2023-09-20 ENCOUNTER — OFFICE VISIT (OUTPATIENT)
Dept: INTERNAL MEDICINE | Facility: CLINIC | Age: 88
End: 2023-09-20
Payer: MEDICARE

## 2023-09-20 DIAGNOSIS — I50.9 CONGESTIVE HEART FAILURE, UNSPECIFIED HF CHRONICITY, UNSPECIFIED HEART FAILURE TYPE: ICD-10-CM

## 2023-09-20 DIAGNOSIS — F03.90 DEMENTIA, UNSPECIFIED DEMENTIA SEVERITY, UNSPECIFIED DEMENTIA TYPE, UNSPECIFIED WHETHER BEHAVIORAL, PSYCHOTIC, OR MOOD DISTURBANCE OR ANXIETY: Primary | ICD-10-CM

## 2023-09-20 DIAGNOSIS — I10 HYPERTENSION, UNSPECIFIED TYPE: ICD-10-CM

## 2023-09-20 DIAGNOSIS — F03.90 DEMENTIA, UNSPECIFIED DEMENTIA SEVERITY, UNSPECIFIED DEMENTIA TYPE, UNSPECIFIED WHETHER BEHAVIORAL, PSYCHOTIC, OR MOOD DISTURBANCE OR ANXIETY: ICD-10-CM

## 2023-09-20 LAB
FOLATE SERPL-MCNC: 17.2 NG/ML (ref 4–24)
T4 FREE SERPL-MCNC: 0.88 NG/DL (ref 0.71–1.51)
TSH SERPL DL<=0.005 MIU/L-ACNC: 7.17 UIU/ML (ref 0.4–4)
VIT B12 SERPL-MCNC: 933 PG/ML (ref 210–950)

## 2023-09-20 PROCEDURE — 99999 PR PBB SHADOW E&M-EST. PATIENT-LVL V: ICD-10-PCS | Mod: PBBFAC,HCNC,, | Performed by: INTERNAL MEDICINE

## 2023-09-20 PROCEDURE — 1159F PR MEDICATION LIST DOCUMENTED IN MEDICAL RECORD: ICD-10-PCS | Mod: HCNC,CPTII,S$GLB, | Performed by: INTERNAL MEDICINE

## 2023-09-20 PROCEDURE — 99214 PR OFFICE/OUTPT VISIT, EST, LEVL IV, 30-39 MIN: ICD-10-PCS | Mod: HCNC,S$GLB,, | Performed by: INTERNAL MEDICINE

## 2023-09-20 PROCEDURE — G0008 FLU VACCINE - QUADRIVALENT - ADJUVANTED: ICD-10-PCS | Mod: HCNC,S$GLB,, | Performed by: INTERNAL MEDICINE

## 2023-09-20 PROCEDURE — 1126F AMNT PAIN NOTED NONE PRSNT: CPT | Mod: HCNC,CPTII,S$GLB, | Performed by: INTERNAL MEDICINE

## 2023-09-20 PROCEDURE — 90694 FLU VACCINE - QUADRIVALENT - ADJUVANTED: ICD-10-PCS | Mod: HCNC,S$GLB,, | Performed by: INTERNAL MEDICINE

## 2023-09-20 PROCEDURE — 84443 ASSAY THYROID STIM HORMONE: CPT | Mod: HCNC | Performed by: INTERNAL MEDICINE

## 2023-09-20 PROCEDURE — 99999 PR PBB SHADOW E&M-EST. PATIENT-LVL V: CPT | Mod: PBBFAC,HCNC,, | Performed by: INTERNAL MEDICINE

## 2023-09-20 PROCEDURE — 90694 VACC AIIV4 NO PRSRV 0.5ML IM: CPT | Mod: HCNC,S$GLB,, | Performed by: INTERNAL MEDICINE

## 2023-09-20 PROCEDURE — 1159F MED LIST DOCD IN RCRD: CPT | Mod: HCNC,CPTII,S$GLB, | Performed by: INTERNAL MEDICINE

## 2023-09-20 PROCEDURE — 1101F PT FALLS ASSESS-DOCD LE1/YR: CPT | Mod: HCNC,CPTII,S$GLB, | Performed by: INTERNAL MEDICINE

## 2023-09-20 PROCEDURE — 99214 OFFICE O/P EST MOD 30 MIN: CPT | Mod: HCNC,S$GLB,, | Performed by: INTERNAL MEDICINE

## 2023-09-20 PROCEDURE — 82607 VITAMIN B-12: CPT | Mod: HCNC | Performed by: INTERNAL MEDICINE

## 2023-09-20 PROCEDURE — 3288F PR FALLS RISK ASSESSMENT DOCUMENTED: ICD-10-PCS | Mod: HCNC,CPTII,S$GLB, | Performed by: INTERNAL MEDICINE

## 2023-09-20 PROCEDURE — 82746 ASSAY OF FOLIC ACID SERUM: CPT | Mod: HCNC | Performed by: INTERNAL MEDICINE

## 2023-09-20 PROCEDURE — 1126F PR PAIN SEVERITY QUANTIFIED, NO PAIN PRESENT: ICD-10-PCS | Mod: HCNC,CPTII,S$GLB, | Performed by: INTERNAL MEDICINE

## 2023-09-20 PROCEDURE — G0008 ADMIN INFLUENZA VIRUS VAC: HCPCS | Mod: HCNC,S$GLB,, | Performed by: INTERNAL MEDICINE

## 2023-09-20 PROCEDURE — 1101F PR PT FALLS ASSESS DOC 0-1 FALLS W/OUT INJ PAST YR: ICD-10-PCS | Mod: HCNC,CPTII,S$GLB, | Performed by: INTERNAL MEDICINE

## 2023-09-20 PROCEDURE — 84439 ASSAY OF FREE THYROXINE: CPT | Mod: HCNC | Performed by: INTERNAL MEDICINE

## 2023-09-20 PROCEDURE — 36415 COLL VENOUS BLD VENIPUNCTURE: CPT | Mod: HCNC | Performed by: INTERNAL MEDICINE

## 2023-09-20 PROCEDURE — 3288F FALL RISK ASSESSMENT DOCD: CPT | Mod: HCNC,CPTII,S$GLB, | Performed by: INTERNAL MEDICINE

## 2023-09-20 RX ORDER — ATORVASTATIN CALCIUM 20 MG/1
20 TABLET, FILM COATED ORAL DAILY
Qty: 90 TABLET | Refills: 1 | Status: SHIPPED | OUTPATIENT
Start: 2023-09-20

## 2023-09-22 DIAGNOSIS — H40.1232 LOW-TENSION GLAUCOMA OF BOTH EYES, MODERATE STAGE: ICD-10-CM

## 2023-09-22 RX ORDER — LATANOPROST 50 UG/ML
1 SOLUTION/ DROPS OPHTHALMIC DAILY
Qty: 7.5 ML | Refills: 3 | Status: SHIPPED | OUTPATIENT
Start: 2023-09-22

## 2023-09-23 ENCOUNTER — TELEPHONE (OUTPATIENT)
Dept: INTERNAL MEDICINE | Facility: CLINIC | Age: 88
End: 2023-09-23
Payer: MEDICARE

## 2023-09-23 VITALS
HEIGHT: 64 IN | WEIGHT: 134.5 LBS | SYSTOLIC BLOOD PRESSURE: 124 MMHG | TEMPERATURE: 99 F | HEART RATE: 87 BPM | OXYGEN SATURATION: 96 % | DIASTOLIC BLOOD PRESSURE: 68 MMHG | BODY MASS INDEX: 22.96 KG/M2

## 2023-09-23 NOTE — TELEPHONE ENCOUNTER
Please contact patient and inform her that her lab work indicates I need to give her a thyroid supplement.  Please ask her what pharmacy she would like that sent to

## 2023-09-23 NOTE — PROGRESS NOTES
Subjective:       Patient ID: Inna Blankenship is a 89 y.o. female.    Chief Complaint: Hypertension    HPI  She returns for management of hypertension.  She has had hypertension for over a year.  Current treatment has included medications outlined in medication list.  She denies chest pain or shortness of breath.  No palpitations.  Denies left arm or neck pain.  She has congestive heart failure.  Denies orthopnea    Medications:  See medication list     Social history:  Does not smoke, does not drink alcohol       Review of Systems   Constitutional:  Negative for chills, fatigue, fever and unexpected weight change.   Respiratory:  Negative for chest tightness and shortness of breath.    Cardiovascular:  Negative for chest pain and palpitations.   Gastrointestinal:  Negative for abdominal pain and blood in stool.   Neurological:  Negative for dizziness, syncope, numbness and headaches.       Objective:      Physical Exam  HENT:      Right Ear: External ear normal.      Left Ear: External ear normal.      Nose: Nose normal.      Mouth/Throat:      Mouth: Mucous membranes are moist.      Pharynx: Oropharynx is clear.   Eyes:      Pupils: Pupils are equal, round, and reactive to light.   Cardiovascular:      Rate and Rhythm: Normal rate and regular rhythm.      Heart sounds: Murmur heard.   Pulmonary:      Breath sounds: Normal breath sounds.   Abdominal:      General: There is no distension.      Palpations: There is no hepatomegaly or splenomegaly.      Tenderness: There is no abdominal tenderness.   Musculoskeletal:      Cervical back: Normal range of motion.   Lymphadenopathy:      Cervical: No cervical adenopathy.      Upper Body:      Right upper body: No axillary adenopathy.      Left upper body: No axillary adenopathy.   Neurological:      Cranial Nerves: No cranial nerve deficit.      Sensory: No sensory deficit.      Motor: Motor function is intact.      Deep Tendon Reflexes: Reflexes are normal and symmetric.          Assessment/Plan       Assessment and plan:  1.  Hypertension:  Controlled.  Continue Cozaar   2.  Congestive heart failure:  Follow up with Cardiology  3.  She was here for urgent care only appointment.  She will return to clinic for a physical

## 2023-09-25 ENCOUNTER — TELEPHONE (OUTPATIENT)
Dept: INTERNAL MEDICINE | Facility: CLINIC | Age: 88
End: 2023-09-25
Payer: MEDICARE

## 2023-09-25 RX ORDER — LEVOTHYROXINE SODIUM 25 UG/1
25 TABLET ORAL
Qty: 30 TABLET | Refills: 11 | Status: SHIPPED | OUTPATIENT
Start: 2023-09-25 | End: 2024-09-24

## 2023-09-25 NOTE — TELEPHONE ENCOUNTER
----- Message from Chacha Arthur sent at 9/25/2023  9:40 AM CDT -----  Contact: [phone]  Patient is returning a phone call.  Who left a message for the patient: Dr Rodriguez's office  Does patient know what this is regarding:  no  Would you like a call back, or a response through your [facility] portal?:   phone  Comments:

## 2023-09-25 NOTE — TELEPHONE ENCOUNTER
Called and spoke to patients daughter. Relayed Dr. Rodriguez message. Daughter verbalized understanding.     She would like rx to go to [facility] on [address].

## 2023-09-27 ENCOUNTER — OUTPATIENT CASE MANAGEMENT (OUTPATIENT)
Dept: ADMINISTRATIVE | Facility: OTHER | Age: 88
End: 2023-09-27
Payer: MEDICARE

## 2023-09-28 NOTE — PROGRESS NOTES
11:42 LIZETH contacted patient grandson regarding referral. LIZETH explained to son reason for referral to assist with social needs. Best Albarran advised LIZETH patient has a hired caregiver in the home but requesting an extension of PT. Deion reports patient was discharged from PT and OT on 09/16/2023 and wondering if nurse will still be coming or will she discharge. Deion also has a question on SN weigh patient. Deion reports SN is not weighing patient but placing a weight down. LIZETH advised Deion she will contact [facility] to speak with  regarding SN (with abdulkadir concern) and the possibility of PT being extended.     11:58 LIZETH attempt to reach Care Manager with [facility] but wasn't available. LIZETH left a message with phone coordinator to have  give this SW a call.    12:11 LIZETH received a follow-up call from Jailene Care Manager with [facility]. Jailene advised LIZETH she will speak with SN regarding weight and will speak with PT to verify if it would be beneficial for patient to have more PT services . Jailene reports if PT says yes, she will send a message to PCP  requesting orders.      12:31 LIZETH followed up with Deion to let him know that Per Eleazar Zepeda she spoke with nurse regarding weight. Per Jailene SN uses the weight that sitter has document in folder. LIZETH advised Deion he can follow up with Eleazar Vazquez if he has any further concerns. LIZETH also advised Deion Dent will speak with PT to verify if it would be beneficial for patient to extend in home PT or what type of PT can be provided. Deion voiced understanding and stated he will follow-up with   Katelyn.

## 2023-10-03 ENCOUNTER — OUTPATIENT CASE MANAGEMENT (OUTPATIENT)
Dept: ADMINISTRATIVE | Facility: OTHER | Age: 88
End: 2023-10-03
Payer: MEDICARE

## 2023-10-03 NOTE — PROGRESS NOTES
LIZETH followed up with patient grandson Deion. Deion advised SW he has been busy and hasn't  had an opportunity to speak with  Jailene with [facility]  but plans to call her soon. Deion reports family will have a meeting soon to see how financially they can afford additional caregivers. Deion reports his mother cousin has been assisting his mother while she recovers from surgery but believes it time to get additional support as patient has been staying up. Deion reports prior to his mom having surgery she was taking care of all needs of the patient. LIZETH discussed resources through the [facility] waiver program . LIZETH explained to Deion there is a wait list for services dating back to 12/2022. SW also discussed resources through [facility] . LIZETH explained to Deion this can be a timely process. Deion denied patient had a LTC policy  but seeking immediate assistance. LIZETH discussed maybe family involvement Deion reports his nieces are willing to assist on weekends but still need another person. SW encourage Deion to explore maybe having patient and or mother [employer] members assist. Deion voiced understanding.

## 2023-10-08 PROCEDURE — G0179 PR HOME HEALTH MD RECERTIFICATION: ICD-10-PCS | Mod: ,,, | Performed by: INTERNAL MEDICINE

## 2023-10-08 PROCEDURE — G0179 MD RECERTIFICATION HHA PT: HCPCS | Mod: ,,, | Performed by: INTERNAL MEDICINE

## 2023-11-03 ENCOUNTER — EXTERNAL HOME HEALTH (OUTPATIENT)
Dept: HOME HEALTH SERVICES | Facility: HOSPITAL | Age: 88
End: 2023-11-03
Payer: MEDICARE

## 2023-11-06 PROBLEM — J96.21 ACUTE ON CHRONIC RESPIRATORY FAILURE WITH HYPOXIA: Status: RESOLVED | Noted: 2023-07-31 | Resolved: 2023-11-06

## 2023-12-08 ENCOUNTER — TELEPHONE (OUTPATIENT)
Dept: INTERNAL MEDICINE | Facility: CLINIC | Age: 88
End: 2023-12-08
Payer: MEDICARE

## 2023-12-08 DIAGNOSIS — I50.9 CONGESTIVE HEART FAILURE, UNSPECIFIED HF CHRONICITY, UNSPECIFIED HEART FAILURE TYPE: Primary | ICD-10-CM

## 2023-12-18 ENCOUNTER — TELEPHONE (OUTPATIENT)
Dept: INTERNAL MEDICINE | Facility: CLINIC | Age: 88
End: 2023-12-18
Payer: MEDICARE

## 2023-12-18 NOTE — TELEPHONE ENCOUNTER
She has a neurology appt on the [facility], they are asking for an appt at [facility]. Please see if it can be scheduled    I had placed a hospice referral which we faxed to [facility] hospice. Apparently they never contacted her. Please ask if they would like to try [facility] instead. If so, please fax to [facility]

## 2023-12-18 NOTE — TELEPHONE ENCOUNTER
----- Message from Jane Moscoso sent at 12/18/2023 10:19 AM CST -----  Contact: [phone]  1MEDICALADVICE     Patient is calling for Medical Advice regarding:needs to see about having neurology at [facility]     How long has patient had these symptoms:    Pharmacy name and phone#:    Would like response via [employer]:  no     Comments:  Pts grandson is calling he is asking if there is a way to get the pt an appt for neuro at [facility] for her the order is connected to the appt she has for the [facility] so she can get all appts at [facility] please give return call nothing is coming up for me     Also some one was suppose to be calling the other brother in regards to Hospice he states they never heard from anyone

## 2024-01-09 RX ORDER — AMLODIPINE BESYLATE 10 MG/1
10 TABLET ORAL DAILY
Qty: 90 TABLET | Refills: 1 | Status: SHIPPED | OUTPATIENT
Start: 2024-01-09 | End: 2025-01-03

## 2024-01-09 NOTE — TELEPHONE ENCOUNTER
No care due was identified.  [facility] Embedded Care Due Messages. Reference number: [id number].   1/09/2024 10:27:47 AM CST

## 2024-01-09 NOTE — TELEPHONE ENCOUNTER
Refill Routing Note   Medication(s) are not appropriate for processing by [facility] for the following reason(s):        Responsible provider unclear    [facility] action(s):  Defer               Appointments  past 12m or future 3m with PCP    Date Provider   Last Visit   9/20/2023 Stacy Rodriguez MD   Next Visit   3/8/2024 Stacy Rodriguez MD   ED visits in past 90 days: 0        Note composed:11:04 AM 01/09/2024

## 2024-01-09 NOTE — TELEPHONE ENCOUNTER
Care Due:                  Date            Visit Type   Department     Provider  --------------------------------------------------------------------------------                                EP -                              PRIMARY      [facility] INTERNAL  Last Visit: 09-      CARE ([facility])   MEDICINE       Stacy Rodriguez                               -                              PRIMARY      [facility] INTERNAL  Next Visit: 03-      CARE ([facility])   MEDICINE       Stacy Rodriguez                                                            Last  Test          Frequency    Reason                     Performed    Due Date  --------------------------------------------------------------------------------    Lipid Panel.  12 months..  atorvastatin.............  12- 11-    [facility] Embedded Care Due Messages. Reference number: [id number].   1/09/2024 10:26:45 AM CST

## 2024-01-10 RX ORDER — LOSARTAN POTASSIUM 25 MG/1
25 TABLET ORAL
Qty: 90 TABLET | Refills: 1 | Status: SHIPPED | OUTPATIENT
Start: 2024-01-10

## 2024-01-10 NOTE — TELEPHONE ENCOUNTER
Provider Staff:  Action required for this patient     Please see care gap opportunities below in Care Due Message.    Thanks!  [facility]     Appointments      Date Provider   Last Visit   9/20/2023 Stacy Rodriguez MD   Next Visit   1/9/2024 Stacy Rodriguez MD      Refill Decision Note   Inna Blankenship  is requesting a refill authorization.  Brief Assessment and Rationale for Refill:  Approve     Medication Therapy Plan:         Comments:     Note composed:3:08 AM 01/10/2024

## 2024-01-17 ENCOUNTER — TELEPHONE (OUTPATIENT)
Dept: INTERNAL MEDICINE | Facility: CLINIC | Age: 89
End: 2024-01-17
Payer: MEDICARE

## 2024-01-17 NOTE — TELEPHONE ENCOUNTER
We had place a hospice order for her- is she currently in hospice? If so, her hospice physician could give her a medication.    If not, she will need an appt

## 2024-01-17 NOTE — TELEPHONE ENCOUNTER
----- Message from Odalys Ordaz MA sent at 2024  3:38 PM CST -----  Contact: pt's daughter Cara [phone]    ----- Message -----  From: Carmela David  Sent: 2024   3:21 PM CST  To: Jennifer CASTILLO Staff    Per Cara, pt is acting strange. Everyone she sees are talks about is . Cara would like to be advised on what she can do about this. Please call.                      Thank you

## 2024-01-19 ENCOUNTER — TELEPHONE (OUTPATIENT)
Dept: INTERNAL MEDICINE | Facility: CLINIC | Age: 89
End: 2024-01-19
Payer: MEDICARE

## 2024-01-19 NOTE — TELEPHONE ENCOUNTER
----- Message from Manny Grayson sent at 1/19/2024 11:58 AM CST -----  Contact: [phone]@patient patient daughter  Good afternoon patient daughter  would like a call back to discuss what med she can give her mother for a cough that she have been having for 4days. Daughter says patient abilio has a odd as well.  Please give  a call back [phone]

## 2024-01-19 NOTE — TELEPHONE ENCOUNTER
----- Message from Kathy Velasco sent at 1/19/2024 12:47 PM CST -----  Contact: Best/ Deion [phone]  1MEDICALADVICE     Patient is calling for Medical Advice regarding: Foul odor to urine and a little disoriented, and cough    How long has patient had these symptoms: 3 days    Pharmacy name and phone#: [id number] [place] - [address] Phone: [phone] Fax: [phone]    Comments: He also wants to know if someone can go to her to collect the urine.

## 2024-01-22 ENCOUNTER — OFFICE VISIT (OUTPATIENT)
Dept: INTERNAL MEDICINE | Facility: CLINIC | Age: 89
End: 2024-01-22
Payer: MEDICARE

## 2024-01-22 VITALS
DIASTOLIC BLOOD PRESSURE: 70 MMHG | BODY MASS INDEX: 23.08 KG/M2 | HEIGHT: 64 IN | HEART RATE: 63 BPM | SYSTOLIC BLOOD PRESSURE: 114 MMHG | OXYGEN SATURATION: 90 %

## 2024-01-22 DIAGNOSIS — R41.0 DISORIENTATION: ICD-10-CM

## 2024-01-22 DIAGNOSIS — R05.9 COUGH, UNSPECIFIED TYPE: ICD-10-CM

## 2024-01-22 DIAGNOSIS — R82.90 ABNORMAL URINE ODOR: Primary | ICD-10-CM

## 2024-01-22 PROCEDURE — 1126F AMNT PAIN NOTED NONE PRSNT: CPT | Mod: HCNC,CPTII,S$GLB,

## 2024-01-22 PROCEDURE — 3288F FALL RISK ASSESSMENT DOCD: CPT | Mod: HCNC,CPTII,S$GLB,

## 2024-01-22 PROCEDURE — 99214 OFFICE O/P EST MOD 30 MIN: CPT | Mod: HCNC,S$GLB,,

## 2024-01-22 PROCEDURE — 99999 PR PBB SHADOW E&M-EST. PATIENT-LVL IV: CPT | Mod: PBBFAC,HCNC,,

## 2024-01-22 PROCEDURE — 1159F MED LIST DOCD IN RCRD: CPT | Mod: HCNC,CPTII,S$GLB,

## 2024-01-22 PROCEDURE — 1101F PT FALLS ASSESS-DOCD LE1/YR: CPT | Mod: HCNC,CPTII,S$GLB,

## 2024-01-22 PROCEDURE — G2211 COMPLEX E/M VISIT ADD ON: HCPCS | Mod: HCNC,S$GLB,,

## 2024-01-22 PROCEDURE — 1160F RVW MEDS BY RX/DR IN RCRD: CPT | Mod: HCNC,CPTII,S$GLB,

## 2024-01-22 RX ORDER — CIPROFLOXACIN 500 MG/1
500 TABLET ORAL EVERY 12 HOURS
Qty: 6 TABLET | Refills: 0 | Status: SHIPPED | OUTPATIENT
Start: 2024-01-22 | End: 2024-01-25

## 2024-01-22 NOTE — PROGRESS NOTES
"INTERNAL MEDICINE PROGRESS/URGENT CARE NOTE    CHIEF COMPLAINT     Chief Complaint   Patient presents with    urine issue       HPI     Inna Blankenship is a 89 y.o. female with T2DM, HTN, HLD, CHF, A. Fib, Pulmonary htn, CKD3a, hx DVT, who presents for an urgent visit today. Per chart review, patient's daughter called clinic 24 regarding "patient acting strange, Talking to and about  friends/family." PCP had previously placed orders for Hospice and advised evaluation by hospice physician or urgent care. Patient's grandson then contacted clinic 24 with c/o "foul odor to urine, cough, disorientation."    Today, she is here today with her grandson and his wife (who live in [place] and will be leaving to return home this evening via plane). Her son states one of her caregivers thinks she may have a uti. Was told there was an odor to her urine that started about 3 days ago. Son does not have additional history to provide. Patient denies dysuria, hematuria fevers, chills. Has not tried any treatments.     He states she also has a cough that has seemed to improve some since onset 1 wk ago. Dry cough, no sputum. Has tried cough drops and lemon tea.    Pt lives with her daughter, Cara, who recently broke her hip. Not presently under hospice care as family had concern of losing PCP as the one to help in decision-making for patient. Patient's two grandsons live in [place] but trying to help from afar. Family has around the clock care with sitters at this time.        PCP: Stacy Rodriguez MD    Past Medical History:  Past Medical History:   Diagnosis Date    Acute hypoxemic respiratory failure 2023    Arthritis     CHF (congestive heart failure) 2018    Chronic atrial fibrillation 2020    Chronic kidney disease, stage III (moderate) 2015    Colon adenoma     Diabetes mellitus, type II     Diet controlled    Glaucoma     Hyperlipemia     Hypertension     Osteopenia         Past Surgical " History:  Past Surgical History:   Procedure Laterality Date    APPENDECTOMY      CATARACT EXTRACTION W/  INTRAOCULAR LENS IMPLANT Right 03/15/2006        CATARACT EXTRACTION W/  INTRAOCULAR LENS IMPLANT Left 09/27/2006        COLONOSCOPY W/ POLYPECTOMY      EYE SURGERY      HYSTERECTOMY      TRANSESOPHAGEAL ECHOCARDIOGRAPHY N/A 11/9/2020    Procedure: ECHOCARDIOGRAM, TRANSESOPHAGEAL;  Surgeon: Marcelina Diagnostic Provider;  Location: [facility] EP LAB;  Service: Cardiology;  Laterality: N/A;    TREATMENT OF CARDIAC ARRHYTHMIA N/A 11/9/2020    Procedure: CARDIOVERSION;  Surgeon: Marvin Elmore MD;  Location: [facility] EP LAB;  Service: Cardiology;  Laterality: N/A;  AF, RAFFAELE, DCCV, MAC, GP, 3 PREP        Allergies:  Review of patient's allergies indicates:  No Known Allergies    Home Medications:    Current Outpatient Medications:     amLODIPine (NORVASC) 10 MG tablet, TAKE 1 TABLET (10 MG TOTAL) BY MOUTH ONCE DAILY., Disp: 90 tablet, Rfl: 1    apixaban (ELIQUIS) 2.5 mg Tab, Take 1 tablet (2.5 mg total) by mouth 2 (two) times daily., Disp: 180 tablet, Rfl: 3    atorvastatin (LIPITOR) 20 MG tablet, Take 1 tablet (20 mg total) by mouth once daily., Disp: 90 tablet, Rfl: 1    EScitalopram oxalate (LEXAPRO) 10 MG tablet, TAKE 1 TABLET EVERY DAY, Disp: 90 tablet, Rfl: 3    furosemide (LASIX) 20 MG tablet, Take 2 tablets (40 mg total) by mouth once daily., Disp: 90 tablet, Rfl: 3    latanoprost 0.005 % ophthalmic solution, PLACE 1 DROP INTO BOTH EYES ONCE DAILY., Disp: 7.5 mL, Rfl: 3    levothyroxine (SYNTHROID) 25 MCG tablet, Take 1 tablet (25 mcg total) by mouth before breakfast., Disp: 30 tablet, Rfl: 11    losartan (COZAAR) 25 MG tablet, TAKE 1 TABLET EVERY DAY, Disp: 90 tablet, Rfl: 1    multivit with minerals/lutein (MULTIVITAMIN 50 PLUS ORAL), Take 1 tablet by mouth once daily. , Disp: , Rfl:     ciprofloxacin HCl (CIPRO) 500 MG tablet, Take 1 tablet (500 mg total) by mouth every 12 (twelve) hours. for 3  "days, Disp: 6 tablet, Rfl: 0     Review of Systems:  Review of Systems   Constitutional:  Negative for fever.   Respiratory:  Positive for cough. Negative for chest tightness and shortness of breath.    Cardiovascular:  Negative for chest pain.   Gastrointestinal:  Negative for abdominal pain, blood in stool, diarrhea, nausea and vomiting.   Musculoskeletal:  Negative for myalgias.   Neurological:  Negative for dizziness, weakness and headaches.   Psychiatric/Behavioral:  Negative for suicidal ideas.          PHYSICAL EXAM     Vitals:    01/22/24 1416   BP: 114/70   BP Location: Left arm   Patient Position: Sitting   BP Method: Medium (Manual)   Pulse: 63   SpO2: (!) 90%   Height: 5' 4" (1.626 m)      Body mass index is 23.08 kg/m².     Physical Exam  Constitutional:       General: She is not in acute distress.     Appearance: Normal appearance. She is not ill-appearing, toxic-appearing or diaphoretic.      Comments: Speaking in complete sentences   HENT:      Head: Normocephalic.      Comments: Frontal and maxillary sinuses nttp     Right Ear: Tympanic membrane, ear canal and external ear normal. There is no impacted cerumen.      Left Ear: Tympanic membrane, ear canal and external ear normal. There is no impacted cerumen.      Nose: No congestion or rhinorrhea.      Mouth/Throat:      Mouth: Mucous membranes are moist.      Pharynx: Oropharynx is clear. No oropharyngeal exudate or posterior oropharyngeal erythema.   Eyes:      Extraocular Movements: Extraocular movements intact.      Pupils: Pupils are equal, round, and reactive to light.   Cardiovascular:      Rate and Rhythm: Normal rate and regular rhythm.   Pulmonary:      Effort: Pulmonary effort is normal. No respiratory distress.      Breath sounds: Normal breath sounds. No wheezing.   Abdominal:      General: Bowel sounds are normal. There is no distension.      Palpations: Abdomen is soft.      Tenderness: There is no abdominal tenderness. There is no " right CVA tenderness or left CVA tenderness.   Musculoskeletal:         General: Normal range of motion.      Cervical back: Normal range of motion and neck supple. No rigidity or tenderness.      Right lower leg: No edema.      Left lower leg: No edema.   Lymphadenopathy:      Cervical: No cervical adenopathy.   Skin:     General: Skin is warm and dry.   Neurological:      General: No focal deficit present.      Mental Status: She is alert and oriented to person, place, and time.         LABS     Lab Results   Component Value Date    HGBA1C 5.5 08/01/2023     CMP  Sodium   Date Value Ref Range Status   08/02/2023 142 136 - 145 mmol/L Final     Potassium   Date Value Ref Range Status   08/02/2023 3.8 3.5 - 5.1 mmol/L Final     Chloride   Date Value Ref Range Status   08/02/2023 108 95 - 110 mmol/L Final     CO2   Date Value Ref Range Status   08/02/2023 26 23 - 29 mmol/L Final     Glucose   Date Value Ref Range Status   08/02/2023 84 70 - 110 mg/dL Final     BUN   Date Value Ref Range Status   08/02/2023 20 10 - 30 mg/dL Final     Creatinine   Date Value Ref Range Status   08/02/2023 1.0 0.5 - 1.4 mg/dL Final     Calcium   Date Value Ref Range Status   08/02/2023 9.5 8.7 - 10.5 mg/dL Final     Total Protein   Date Value Ref Range Status   07/31/2023 7.4 6.0 - 8.4 g/dL Final     Albumin   Date Value Ref Range Status   07/31/2023 3.8 3.5 - 5.2 g/dL Final     Total Bilirubin   Date Value Ref Range Status   07/31/2023 1.0 0.1 - 1.0 mg/dL Final     Comment:     For infants and newborns, interpretation of results should be based  on gestational age, weight and in agreement with clinical  observations.    Premature Infant recommended reference ranges:  Up to 24 hours.............<8.0 mg/dL  Up to 48 hours............<12.0 mg/dL  3-5 days..................<15.0 mg/dL  6-29 days.................<15.0 mg/dL       Alkaline Phosphatase   Date Value Ref Range Status   07/31/2023 96 55 - 135 U/L Final     AST   Date Value Ref  Range Status   07/31/2023 31 10 - 40 U/L Final     ALT   Date Value Ref Range Status   07/31/2023 23 10 - 44 U/L Final     Anion Gap   Date Value Ref Range Status   08/02/2023 8 8 - 16 mmol/L Final     eGFR if    Date Value Ref Range Status   07/31/2022 54.9 (A) >60 mL/min/1.73 m^2 Final     eGFR if non    Date Value Ref Range Status   07/31/2022 47.7 (A) >60 mL/min/1.73 m^2 Final     Comment:     Calculation used to obtain the estimated glomerular filtration  rate (eGFR) is the CKD-EPI equation.        Lab Results   Component Value Date    WBC 5.07 07/31/2023    HGB 12.3 07/31/2023    HCT 38.9 07/31/2023    MCV 96 07/31/2023     07/31/2023     Lab Results   Component Value Date    CHOL 174 12/01/2022    CHOL 142 11/18/2020    CHOL 191 01/15/2020     Lab Results   Component Value Date    HDL 67 12/01/2022    HDL 52 11/18/2020    HDL 61 01/15/2020     Lab Results   Component Value Date    LDLCALC 99.2 12/01/2022    LDLCALC 80.2 11/18/2020    LDLCALC 117.6 01/15/2020     Lab Results   Component Value Date    TRIG 39 12/01/2022    TRIG 49 11/18/2020    TRIG 62 01/15/2020     Lab Results   Component Value Date    CHOLHDL 38.5 12/01/2022    CHOLHDL 36.6 11/18/2020    CHOLHDL 31.9 01/15/2020     Lab Results   Component Value Date    TSH 7.172 (H) 09/20/2023       ASSESSMENT & PLAN       1. Abnormal urine odor  Comments:  Stable. Family will try to collect urine at home before starting abx. Treating empirically for uti. Inc free water intake. RTC prn  Orders:  -     Cancel: Urinalysis, Reflex to Urine Culture Urine, Clean Catch; Future; Expected date: 01/22/2024  -     Urinalysis, Reflex to Urine Culture Urine, Clean Catch; Future; Expected date: 01/22/2024  -     ciprofloxacin HCl (CIPRO) 500 MG tablet; Take 1 tablet (500 mg total) by mouth every 12 (twelve) hours. for 3 days  Dispense: 6 tablet; Refill: 0    2. Disorientation  Comments:  Stable. Plan as above. RTC if sx worsen or do  not improve.  Orders:  -     Cancel: Urinalysis, Reflex to Urine Culture Urine, Clean Catch; Future; Expected date: 01/22/2024  -     CBC Auto Differential; Future; Expected date: 01/22/2024  -     BASIC METABOLIC PANEL; Future; Expected date: 01/22/2024  -     Urinalysis, Reflex to Urine Culture Urine, Clean Catch; Future; Expected date: 01/22/2024    3. Cough, unspecified type  Comments:  PE and VSS. Recommend supportive mgmt. rtc prn  Orders:  -     CBC Auto Differential; Future; Expected date: 01/22/2024     Family and patient decline bloodwork.    Follow up if symptoms worsen or fail to improve.    Patient was counseled on when to seek emergent care. Patient's plan/treatment was discussed including medications and possible side effects. Verbalized understanding of all instructions.            BLAKE Perez, FNP-C  [facility]  01/22/2024 10:42 AM

## 2024-01-23 ENCOUNTER — TELEPHONE (OUTPATIENT)
Dept: INTERNAL MEDICINE | Facility: CLINIC | Age: 89
End: 2024-01-23
Payer: MEDICARE

## 2024-01-23 NOTE — TELEPHONE ENCOUNTER
----- Message from Alee Gamez sent at 1/23/2024 12:03 PM CST -----  Contact: PT -son Deion @[phone]--  Patient is returning a phone call.    Who left a message for the patient: --Tabatha Ng--    Does patient know what this is regarding:  --Results--    Would you like a call back, or a response through your [facility] portal?: --call back--    Comments: Please call to advise.

## 2024-01-23 NOTE — TELEPHONE ENCOUNTER
"Returned Deion's phone call (Patient's grandson) 1/23/24 13:31.     Informed him of normal urine testing. Can dc antibiotic. If sx worsen or do not improve (urine odor, disorientation, "talking out of her mind") advised that she should return to clinic for further evaluation. Bloodwork during visit yesterday was declined by patient and patient's other grandson (Orlin).    Advised Deion that referrals had been placed by PCP for case mgmt, neurology, and hospice. He states Neurology appt was scheduled but he had to cancel since he would not be in town to take her (he lives in [place]). Advised rescheduling when able.    # given to [facility] to call to check on status of these services. Per chart review, family had previously requested services through this company. Advised Deion to call clinic or msg on portal with any further needs, questions, concerns.  "

## 2024-01-24 NOTE — TELEPHONE ENCOUNTER
When pt went to urgent care appointment nothing was called in.     Pt would like to have something to be called in for a cough

## 2024-01-24 NOTE — TELEPHONE ENCOUNTER
In December, I had placed an order for hospice.    Is she currently in hospice? If so, they should be taking care of this

## 2024-01-25 RX ORDER — PROMETHAZINE HYDROCHLORIDE AND DEXTROMETHORPHAN HYDROBROMIDE 6.25; 15 MG/5ML; MG/5ML
5 SYRUP ORAL EVERY 8 HOURS PRN
Qty: 150 ML | Refills: 1 | Status: SHIPPED | OUTPATIENT
Start: 2024-01-25

## 2024-01-30 ENCOUNTER — TELEPHONE (OUTPATIENT)
Dept: INTERNAL MEDICINE | Facility: CLINIC | Age: 89
End: 2024-01-30
Payer: MEDICARE

## 2024-01-30 ENCOUNTER — HOSPITAL ENCOUNTER (INPATIENT)
Facility: OTHER | Age: 89
LOS: 2 days | Discharge: HOSPICE | DRG: 291 | End: 2024-02-02
Attending: EMERGENCY MEDICINE | Admitting: INTERNAL MEDICINE
Payer: MEDICARE

## 2024-01-30 DIAGNOSIS — R06.00 DYSPNEA: ICD-10-CM

## 2024-01-30 DIAGNOSIS — I50.9 ACUTE ON CHRONIC CONGESTIVE HEART FAILURE, UNSPECIFIED HEART FAILURE TYPE: Primary | ICD-10-CM

## 2024-01-30 PROBLEM — E07.9 THYROID DISORDER: Status: ACTIVE | Noted: 2024-01-30

## 2024-01-30 LAB
ALBUMIN SERPL BCP-MCNC: 3.7 G/DL (ref 3.5–5.2)
ALP SERPL-CCNC: 86 U/L (ref 55–135)
ALT SERPL W/O P-5'-P-CCNC: 14 U/L (ref 10–44)
ANION GAP SERPL CALC-SCNC: 10 MMOL/L (ref 8–16)
AST SERPL-CCNC: 22 U/L (ref 10–40)
BASOPHILS # BLD AUTO: 0.05 K/UL (ref 0–0.2)
BASOPHILS NFR BLD: 0.9 % (ref 0–1.9)
BILIRUB SERPL-MCNC: 0.8 MG/DL (ref 0.1–1)
BNP SERPL-MCNC: 1479 PG/ML (ref 0–99)
BUN SERPL-MCNC: 26 MG/DL (ref 10–30)
CALCIUM SERPL-MCNC: 9.9 MG/DL (ref 8.7–10.5)
CHLORIDE SERPL-SCNC: 107 MMOL/L (ref 95–110)
CO2 SERPL-SCNC: 26 MMOL/L (ref 23–29)
CREAT SERPL-MCNC: 1.2 MG/DL (ref 0.5–1.4)
DIFFERENTIAL METHOD BLD: ABNORMAL
EOSINOPHIL # BLD AUTO: 0.1 K/UL (ref 0–0.5)
EOSINOPHIL NFR BLD: 1.3 % (ref 0–8)
ERYTHROCYTE [DISTWIDTH] IN BLOOD BY AUTOMATED COUNT: 14.9 % (ref 11.5–14.5)
EST. GFR  (NO RACE VARIABLE): 41 ML/MIN/1.73 M^2
GLUCOSE SERPL-MCNC: 135 MG/DL (ref 70–110)
HCT VFR BLD AUTO: 36.3 % (ref 37–48.5)
HCV AB SERPL QL IA: NEGATIVE
HGB BLD-MCNC: 11.4 G/DL (ref 12–16)
HIV 1+2 AB+HIV1 P24 AG SERPL QL IA: NEGATIVE
IMM GRANULOCYTES # BLD AUTO: 0.02 K/UL (ref 0–0.04)
IMM GRANULOCYTES NFR BLD AUTO: 0.4 % (ref 0–0.5)
INR PPP: 1.3 (ref 0.8–1.2)
LYMPHOCYTES # BLD AUTO: 1 K/UL (ref 1–4.8)
LYMPHOCYTES NFR BLD: 17.8 % (ref 18–48)
MAGNESIUM SERPL-MCNC: 1.9 MG/DL (ref 1.6–2.6)
MCH RBC QN AUTO: 30.6 PG (ref 27–31)
MCHC RBC AUTO-ENTMCNC: 31.4 G/DL (ref 32–36)
MCV RBC AUTO: 98 FL (ref 82–98)
MONOCYTES # BLD AUTO: 0.5 K/UL (ref 0.3–1)
MONOCYTES NFR BLD: 9.3 % (ref 4–15)
NEUTROPHILS # BLD AUTO: 3.9 K/UL (ref 1.8–7.7)
NEUTROPHILS NFR BLD: 70.3 % (ref 38–73)
NRBC BLD-RTO: 0 /100 WBC
PLATELET # BLD AUTO: 153 K/UL (ref 150–450)
PMV BLD AUTO: 11.7 FL (ref 9.2–12.9)
POTASSIUM SERPL-SCNC: 3.5 MMOL/L (ref 3.5–5.1)
PROT SERPL-MCNC: 7.4 G/DL (ref 6–8.4)
PROTHROMBIN TIME: 14.4 SEC (ref 9–12.5)
RBC # BLD AUTO: 3.72 M/UL (ref 4–5.4)
SODIUM SERPL-SCNC: 143 MMOL/L (ref 136–145)
TROPONIN I SERPL DL<=0.01 NG/ML-MCNC: 0.23 NG/ML (ref 0–0.03)
WBC # BLD AUTO: 5.51 K/UL (ref 3.9–12.7)

## 2024-01-30 PROCEDURE — 96374 THER/PROPH/DIAG INJ IV PUSH: CPT | Mod: HCNC

## 2024-01-30 PROCEDURE — 86803 HEPATITIS C AB TEST: CPT | Mod: HCNC | Performed by: EMERGENCY MEDICINE

## 2024-01-30 PROCEDURE — G0378 HOSPITAL OBSERVATION PER HR: HCPCS | Mod: HCNC

## 2024-01-30 PROCEDURE — 25000003 PHARM REV CODE 250: Mod: HCNC | Performed by: NURSE PRACTITIONER

## 2024-01-30 PROCEDURE — 93010 ELECTROCARDIOGRAM REPORT: CPT | Mod: HCNC,,, | Performed by: INTERNAL MEDICINE

## 2024-01-30 PROCEDURE — 84484 ASSAY OF TROPONIN QUANT: CPT | Mod: HCNC | Performed by: EMERGENCY MEDICINE

## 2024-01-30 PROCEDURE — 87389 HIV-1 AG W/HIV-1&-2 AB AG IA: CPT | Mod: HCNC | Performed by: EMERGENCY MEDICINE

## 2024-01-30 PROCEDURE — 83880 ASSAY OF NATRIURETIC PEPTIDE: CPT | Mod: HCNC | Performed by: EMERGENCY MEDICINE

## 2024-01-30 PROCEDURE — 63600175 PHARM REV CODE 636 W HCPCS: Mod: HCNC | Performed by: EMERGENCY MEDICINE

## 2024-01-30 PROCEDURE — 83735 ASSAY OF MAGNESIUM: CPT | Mod: HCNC | Performed by: EMERGENCY MEDICINE

## 2024-01-30 PROCEDURE — 93005 ELECTROCARDIOGRAM TRACING: CPT | Mod: HCNC

## 2024-01-30 PROCEDURE — 99285 EMERGENCY DEPT VISIT HI MDM: CPT | Mod: 25,HCNC

## 2024-01-30 PROCEDURE — 85610 PROTHROMBIN TIME: CPT | Mod: HCNC | Performed by: EMERGENCY MEDICINE

## 2024-01-30 PROCEDURE — 85025 COMPLETE CBC W/AUTO DIFF WBC: CPT | Mod: HCNC | Performed by: EMERGENCY MEDICINE

## 2024-01-30 PROCEDURE — 80053 COMPREHEN METABOLIC PANEL: CPT | Mod: HCNC | Performed by: EMERGENCY MEDICINE

## 2024-01-30 RX ORDER — LOSARTAN POTASSIUM 25 MG/1
25 TABLET ORAL DAILY
Status: DISCONTINUED | OUTPATIENT
Start: 2024-01-31 | End: 2024-02-02 | Stop reason: HOSPADM

## 2024-01-30 RX ORDER — ATORVASTATIN CALCIUM 20 MG/1
20 TABLET, FILM COATED ORAL DAILY
Status: DISCONTINUED | OUTPATIENT
Start: 2024-01-31 | End: 2024-02-02 | Stop reason: HOSPADM

## 2024-01-30 RX ORDER — SODIUM CHLORIDE 0.9 % (FLUSH) 0.9 %
10 SYRINGE (ML) INJECTION
Status: DISCONTINUED | OUTPATIENT
Start: 2024-01-30 | End: 2024-02-02 | Stop reason: HOSPADM

## 2024-01-30 RX ORDER — FUROSEMIDE 10 MG/ML
40 INJECTION INTRAMUSCULAR; INTRAVENOUS EVERY 12 HOURS
Status: DISCONTINUED | OUTPATIENT
Start: 2024-01-31 | End: 2024-02-02 | Stop reason: HOSPADM

## 2024-01-30 RX ORDER — FUROSEMIDE 10 MG/ML
40 INJECTION INTRAMUSCULAR; INTRAVENOUS
Status: COMPLETED | OUTPATIENT
Start: 2024-01-30 | End: 2024-01-30

## 2024-01-30 RX ORDER — LEVOTHYROXINE SODIUM 25 UG/1
25 TABLET ORAL
Status: DISCONTINUED | OUTPATIENT
Start: 2024-01-31 | End: 2024-02-02 | Stop reason: HOSPADM

## 2024-01-30 RX ORDER — ESCITALOPRAM OXALATE 10 MG/1
10 TABLET ORAL DAILY
Status: DISCONTINUED | OUTPATIENT
Start: 2024-01-31 | End: 2024-02-02 | Stop reason: HOSPADM

## 2024-01-30 RX ORDER — AMLODIPINE BESYLATE 5 MG/1
10 TABLET ORAL DAILY
Status: DISCONTINUED | OUTPATIENT
Start: 2024-01-31 | End: 2024-02-02 | Stop reason: HOSPADM

## 2024-01-30 RX ADMIN — FUROSEMIDE 40 MG: 10 INJECTION, SOLUTION INTRAMUSCULAR; INTRAVENOUS at 09:01

## 2024-01-30 RX ADMIN — APIXABAN 2.5 MG: 2.5 TABLET, FILM COATED ORAL at 11:01

## 2024-01-30 NOTE — TELEPHONE ENCOUNTER
----- Message from Renetta Iglesias sent at 1/30/2024 11:14 AM CST -----  Contact: Daughter Cara   [email or web address]     Patient is calling for Medical Advice regarding:right breast getting larger    How long has patient had these symptoms:about 4 days now    Pharmacy name and phone#:    Would like response via [employer]:  call back    Comments:Daughter thinks she may need a stronger fluid medication

## 2024-01-30 NOTE — TELEPHONE ENCOUNTER
Called and spoke to patients daughter Cara  about appt for in am. Daughter states they will be there.

## 2024-01-31 ENCOUNTER — TELEPHONE (OUTPATIENT)
Dept: INTERNAL MEDICINE | Facility: CLINIC | Age: 89
End: 2024-01-31
Payer: MEDICARE

## 2024-01-31 PROBLEM — Z51.5 ENCOUNTER FOR PALLIATIVE CARE: Status: ACTIVE | Noted: 2024-01-31

## 2024-01-31 LAB
ANION GAP SERPL CALC-SCNC: 9 MMOL/L (ref 8–16)
BUN SERPL-MCNC: 25 MG/DL (ref 10–30)
CALCIUM SERPL-MCNC: 9.4 MG/DL (ref 8.7–10.5)
CHLORIDE SERPL-SCNC: 108 MMOL/L (ref 95–110)
CO2 SERPL-SCNC: 26 MMOL/L (ref 23–29)
CREAT SERPL-MCNC: 1 MG/DL (ref 0.5–1.4)
EST. GFR  (NO RACE VARIABLE): 51 ML/MIN/1.73 M^2
GLUCOSE SERPL-MCNC: 106 MG/DL (ref 70–110)
POTASSIUM SERPL-SCNC: 3.3 MMOL/L (ref 3.5–5.1)
SODIUM SERPL-SCNC: 143 MMOL/L (ref 136–145)
TROPONIN I SERPL DL<=0.01 NG/ML-MCNC: 0.23 NG/ML (ref 0–0.03)
TROPONIN I SERPL DL<=0.01 NG/ML-MCNC: 0.23 NG/ML (ref 0–0.03)

## 2024-01-31 PROCEDURE — 25000003 PHARM REV CODE 250: Mod: HCNC | Performed by: NURSE PRACTITIONER

## 2024-01-31 PROCEDURE — 94761 N-INVAS EAR/PLS OXIMETRY MLT: CPT | Mod: HCNC

## 2024-01-31 PROCEDURE — 25000003 PHARM REV CODE 250: Mod: HCNC | Performed by: INTERNAL MEDICINE

## 2024-01-31 PROCEDURE — 63600175 PHARM REV CODE 636 W HCPCS: Mod: HCNC | Performed by: NURSE PRACTITIONER

## 2024-01-31 PROCEDURE — 27000221 HC OXYGEN, UP TO 24 HOURS: Mod: HCNC

## 2024-01-31 PROCEDURE — 36415 COLL VENOUS BLD VENIPUNCTURE: CPT | Mod: HCNC | Performed by: NURSE PRACTITIONER

## 2024-01-31 PROCEDURE — 80048 BASIC METABOLIC PNL TOTAL CA: CPT | Mod: HCNC | Performed by: NURSE PRACTITIONER

## 2024-01-31 PROCEDURE — 99223 1ST HOSP IP/OBS HIGH 75: CPT | Mod: HCNC,,, | Performed by: FAMILY MEDICINE

## 2024-01-31 PROCEDURE — 99900035 HC TECH TIME PER 15 MIN (STAT): Mod: HCNC

## 2024-01-31 PROCEDURE — 84484 ASSAY OF TROPONIN QUANT: CPT | Mod: HCNC | Performed by: NURSE PRACTITIONER

## 2024-01-31 PROCEDURE — 84484 ASSAY OF TROPONIN QUANT: CPT | Mod: 91,HCNC | Performed by: NURSE PRACTITIONER

## 2024-01-31 PROCEDURE — 21400001 HC TELEMETRY ROOM: Mod: HCNC

## 2024-01-31 PROCEDURE — 96376 TX/PRO/DX INJ SAME DRUG ADON: CPT

## 2024-01-31 RX ORDER — ONDANSETRON 4 MG/1
4 TABLET, ORALLY DISINTEGRATING ORAL EVERY 6 HOURS PRN
Status: DISCONTINUED | OUTPATIENT
Start: 2024-01-31 | End: 2024-02-02 | Stop reason: HOSPADM

## 2024-01-31 RX ORDER — POTASSIUM CHLORIDE 20 MEQ/1
40 TABLET, EXTENDED RELEASE ORAL
Status: COMPLETED | OUTPATIENT
Start: 2024-01-31 | End: 2024-01-31

## 2024-01-31 RX ORDER — ACETAMINOPHEN 325 MG/1
650 TABLET ORAL EVERY 6 HOURS PRN
Status: DISCONTINUED | OUTPATIENT
Start: 2024-01-31 | End: 2024-02-02 | Stop reason: HOSPADM

## 2024-01-31 RX ORDER — ONDANSETRON HYDROCHLORIDE 2 MG/ML
4 INJECTION, SOLUTION INTRAVENOUS EVERY 6 HOURS PRN
Status: DISCONTINUED | OUTPATIENT
Start: 2024-01-31 | End: 2024-02-02 | Stop reason: HOSPADM

## 2024-01-31 RX ORDER — TALC
6 POWDER (GRAM) TOPICAL NIGHTLY PRN
Status: DISCONTINUED | OUTPATIENT
Start: 2024-01-31 | End: 2024-02-02 | Stop reason: HOSPADM

## 2024-01-31 RX ADMIN — FUROSEMIDE 40 MG: 10 INJECTION, SOLUTION INTRAMUSCULAR; INTRAVENOUS at 08:01

## 2024-01-31 RX ADMIN — LOSARTAN POTASSIUM 25 MG: 25 TABLET, FILM COATED ORAL at 08:01

## 2024-01-31 RX ADMIN — POTASSIUM CHLORIDE 40 MEQ: 1500 TABLET, EXTENDED RELEASE ORAL at 11:01

## 2024-01-31 RX ADMIN — ATORVASTATIN CALCIUM 20 MG: 20 TABLET, FILM COATED ORAL at 08:01

## 2024-01-31 RX ADMIN — APIXABAN 2.5 MG: 2.5 TABLET, FILM COATED ORAL at 08:01

## 2024-01-31 RX ADMIN — LEVOTHYROXINE SODIUM 25 MCG: 25 TABLET ORAL at 05:01

## 2024-01-31 RX ADMIN — AMLODIPINE BESYLATE 10 MG: 5 TABLET ORAL at 08:01

## 2024-01-31 RX ADMIN — POTASSIUM CHLORIDE 40 MEQ: 1500 TABLET, EXTENDED RELEASE ORAL at 08:01

## 2024-01-31 RX ADMIN — ESCITALOPRAM OXALATE 10 MG: 10 TABLET ORAL at 08:01

## 2024-01-31 NOTE — ED TRIAGE NOTES
Inna Blankenship, an 89 y.o. female presents to the ED with shortness of breath for two weeks. Patient on home oxygen at baseline      Chief Complaint   Patient presents with    Shortness of Breath     Here via [facility] with c/o SOB and edema, Hx CHF, patient on 2L NC at baseline, 96% on 3L NC at this time, a fib      Review of patient's allergies indicates:  No Known Allergies  Past Medical History:   Diagnosis Date    Acute hypoxemic respiratory failure 1/30/2023    Arthritis     CHF (congestive heart failure) 12/26/2018    Chronic atrial fibrillation 8/29/2020    Chronic kidney disease, stage III (moderate) 9/11/2015    Colon adenoma     Diabetes mellitus, type II     Diet controlled    Glaucoma     Hyperlipemia     Hypertension     Osteopenia

## 2024-01-31 NOTE — ASSESSMENT & PLAN NOTE
Latest ECHO performed and demonstrates- Results for orders placed during the hospital encounter of 07/31/23    Echo    Interpretation Summary    Left Ventricle: The left ventricle is mildly dilated. Moderately increased wall thickness. Moderate global hypokinesis present. There is moderately reduced systolic function with a visually estimated ejection fraction of 40 - 45%.    Left Atrium: Left atrium is moderately dilated.    Right Ventricle: Normal right ventricular cavity size. Systolic function is normal.    Right Atrium: Right atrium is moderately dilated.    Aortic Valve: There is mild aortic regurgitation with a centrally directed jet.    Mitral Valve: There is no stenosis. There is mild regurgitation with a centrally directed jet.    Tricuspid Valve: There is mild transvalvular regurgitation with a centrally directed jet.    Pulmonic Valve: There is mild regurgitation with a centrally directed jet.    Pericardium: There is no pericardial effusion.    Recent Labs   Lab 01/30/24 2012   BNP 1,479*   - Patient is on 2.5 L NC at home; currently she is on 4 L NC while admitted

## 2024-01-31 NOTE — PLAN OF CARE
CM spoke with Deion marroquin grandson by phone who would like to speak with 2-3 hospice companies before choosing a company. Referrals sent to [facility].

## 2024-01-31 NOTE — ASSESSMENT & PLAN NOTE
Patient has chronic hypothyroidism. TFTs reviewed-   Lab Results   Component Value Date    TSH 7.172 (H) 09/20/2023   . Will continue chronic levothyroxine and adjust for and clinical changes.

## 2024-01-31 NOTE — ASSESSMENT & PLAN NOTE
- Consult for advance care planning/ goals of care in chronically ill patient admitted with CHF. Patient is on home oxygen. Of note, patient has been seen by our team in the past. She was last seen by Dr. Bronson in August 2023 and hospice versus palliative care was discussed. I can see Ms. Blankenship's family has been in contact with her PCP lately regarding referral for hospice.   - At time of initial consult, along with Rosaline Diaz (RN), visited with Ms. Blankenship at the bedside. She was comfortably sleeping. She is chronically ill appearing with temporal lobe wasting. Her very supportive caregiver, Sheryl, was present. Ms. Blankenship has caregivers with her around the clock. Ms. Blankenship lives in her own home. Her daughter, Cara, lives there also but is having her own medical issues. Sheryl reports over the last week Ms. Blankenship has become more SOB. She is usually on 2.5 L NC at home. Sheryl reports Ms. Blankenship has been having trouble ambulating due to SOB. Ms. Blankenship was resistant to come to the hospital, however family convinced her yesterday evening. While I was present, Sheryl then contact Ms. Blankenship's grandson, Deion. Deion lives out of town but is the family point of contact. Deion reports Ms. Blankenship's PCP placed a referral for hospice 2 weeks ago but no one has reached out. Of note, Ms. Blankenship's  was on home hospice with [facility] a few years ago. Deion reports family wants to ensure Ms. Blankenship is home and comfortable and focus on quality of life with hospice care. We discussed philosophy of hospice and that we could arrange this from the inpt setting. I will ask CM to reach out to Deion and arrange hospice informational sessions. Deion reports Ms. Blankenship has a birthday on Sunday and he was planning to come in town to see Ms. Blankenship. I then discussed end of life wishes with Deion, he reports Ms. Blankenship desires to pass away peacefully, which is consistent with DNR.  - Sheryl then reflected on Ms. Blankenship and how she has been  caring for her for 2 years. Sheryl also works for a hospice agency and is familiar with hospice. Sheryl reports Ms. Blankenship sleeps majority of the day and her appetite has been decreased recently.  - Care preferences are consistent with hopeful improvement of oxygen requirements, discharging with home hospice and DNR.

## 2024-01-31 NOTE — H&P
[facility] Medicine  History & Physical    Patient Name: Inna Blankenship  MRN: [id number]  Patient Class: OP- Observation  Admission Date: 1/30/2024  Attending Physician: SHWETHA Boone MD   Primary Care Provider: Stacy Rodriguez MD         Patient information was obtained from patient, caregiver / friend, past medical records, and ER records.     Subjective:     Principal Problem:Acute on chronic combined systolic and diastolic congestive heart failure    Chief Complaint:   Chief Complaint   Patient presents with    Shortness of Breath     Here via [facility] with c/o SOB and edema, Hx CHF, patient on 2L NC at baseline, 96% on 3L NC at this time, a fib         HPI: Inna Blankenship is a 89 year old female with a past history of Afib, CHF/pulmonary hypertension on home O2, HTN, DM and past DVT on Eliquis who presents with worsening shortness of breath and lower extremity swelling.  Patient states she has been feeling more short of breath for the past few weeks but was particularly worse today.  She states symptoms are worse in the morning when she wakes up and starts moving.  Patient reports associated nonproductive cough for the past few weeks.  She denies chest pain, fever, chills, nausea, vomiting and diarrhea.  She states she has more oxygen at home and denies wheezing. Caregiver at bedside also reports patient has had worsening orthopnea for the past few days with lower leg swelling only that started in the past week.  She also states that patient has right breast enlargement as well and both she and patient state that she has had this before with fluid overload.  Patient has been compliant with Lasix but no increased dose given recently.  Patient ambulates with walker and assistance and caregiver reports she has been more out of breath with activity lately.      ED workup revealed BNP 1479, troponin 0.2 (prior reading 0.13), hemoglobin 11.4, no leukocytosis and PTT 14.4 at with INR 1.3.  EKG with  no ST elevation or depression and unchanged from prior readings.  Chest x-ray reveals cardiomegaly with right-sided pleural effusion.  Patient received IV Lasix in the ED and was referred to Hospital Medicine for further evaluation and management.      Past Medical History:   Diagnosis Date    Acute hypoxemic respiratory failure 1/30/2023    Arthritis     CHF (congestive heart failure) 12/26/2018    Chronic atrial fibrillation 8/29/2020    Chronic kidney disease, stage III (moderate) 9/11/2015    Colon adenoma     Diabetes mellitus, type II     Diet controlled    Glaucoma     Hyperlipemia     Hypertension     Osteopenia        Past Surgical History:   Procedure Laterality Date    APPENDECTOMY      CATARACT EXTRACTION W/  INTRAOCULAR LENS IMPLANT Right 03/15/2006        CATARACT EXTRACTION W/  INTRAOCULAR LENS IMPLANT Left 09/27/2006        COLONOSCOPY W/ POLYPECTOMY      EYE SURGERY      HYSTERECTOMY      TRANSESOPHAGEAL ECHOCARDIOGRAPHY N/A 11/9/2020    Procedure: ECHOCARDIOGRAM, TRANSESOPHAGEAL;  Surgeon: Marcelina Diagnostic Provider;  Location: [facility] EP LAB;  Service: Cardiology;  Laterality: N/A;    TREATMENT OF CARDIAC ARRHYTHMIA N/A 11/9/2020    Procedure: CARDIOVERSION;  Surgeon: Marvin Elmore MD;  Location: [facility] EP LAB;  Service: Cardiology;  Laterality: N/A;  AF, RAFFAELE, DCCV, MAC, GP, 3 PREP       Review of patient's allergies indicates:  No Known Allergies    No current facility-administered medications on file prior to encounter.     Current Outpatient Medications on File Prior to Encounter   Medication Sig    amLODIPine (NORVASC) 10 MG tablet TAKE 1 TABLET (10 MG TOTAL) BY MOUTH ONCE DAILY.    apixaban (ELIQUIS) 2.5 mg Tab Take 1 tablet (2.5 mg total) by mouth 2 (two) times daily.    atorvastatin (LIPITOR) 20 MG tablet Take 1 tablet (20 mg total) by mouth once daily.    EScitalopram oxalate (LEXAPRO) 10 MG tablet TAKE 1 TABLET EVERY DAY    furosemide (LASIX) 20 MG tablet Take 2 tablets  (40 mg total) by mouth once daily.    latanoprost 0.005 % ophthalmic solution PLACE 1 DROP INTO BOTH EYES ONCE DAILY.    levothyroxine (SYNTHROID) 25 MCG tablet Take 1 tablet (25 mcg total) by mouth before breakfast.    losartan (COZAAR) 25 MG tablet TAKE 1 TABLET EVERY DAY    multivit with minerals/lutein (MULTIVITAMIN 50 PLUS ORAL) Take 1 tablet by mouth once daily.     promethazine-dextromethorphan (PROMETHAZINE-DM) 6.25-15 mg/5 mL Syrp Take 5 mLs by mouth every 8 (eight) hours as needed.     Family History       Problem Relation (Age of Onset)    Allergies Daughter    Asthma Daughter    Cancer Sister, Daughter    Heart attack Mother    Heart disease Mother    Hypertension Daughter    No Known Problems Father          Tobacco Use    Smoking status: Never    Smokeless tobacco: Never   Substance and Sexual Activity    Alcohol use: Yes     Comment: wine occas.    Drug use: No    Sexual activity: Not Currently     Review of Systems   Constitutional:  Negative for activity change, appetite change, chills and fever.   HENT:  Negative for congestion, sore throat and trouble swallowing.    Eyes:  Negative for photophobia and visual disturbance.   Respiratory:  Positive for cough and shortness of breath. Negative for chest tightness.    Cardiovascular:  Positive for leg swelling. Negative for chest pain and palpitations.   Gastrointestinal:  Negative for abdominal pain, diarrhea and nausea.   Genitourinary:  Negative for dysuria, flank pain and hematuria.   Musculoskeletal:  Negative for back pain.   Neurological:  Negative for dizziness, weakness and headaches.   Psychiatric/Behavioral:  Negative for confusion.      Objective:     Vital Signs (Most Recent):  Temp: 98.6 °F (37 °C) (01/30/24 1948)  Pulse: 64 (01/30/24 2200)  Resp: (!) 22 (01/30/24 2200)  BP: (!) 150/72 (01/30/24 2200)  SpO2: 98 % (01/30/24 2200) Vital Signs (24h Range):  Temp:  [98.6 °F (37 °C)] 98.6 °F (37 °C)  Pulse:  [62-83] 64  Resp:  [20-26] 22  SpO2:   [94 %-98 %] 98 %  BP: (137-150)/(63-72) 150/72     Weight: 60.8 kg (134 lb)  Body mass index is 23 kg/m².     Physical Exam  Vitals reviewed.   Constitutional:       Appearance: Normal appearance. She is normal weight.   HENT:      Head: Normocephalic.      Mouth/Throat:      Mouth: Mucous membranes are moist.      Pharynx: Oropharynx is clear.   Eyes:      Pupils: Pupils are equal, round, and reactive to light.   Cardiovascular:      Rate and Rhythm: Normal rate and regular rhythm.      Pulses: Normal pulses.           Radial pulses are 2+ on the right side and 2+ on the left side.        Dorsalis pedis pulses are 2+ on the right side and 2+ on the left side.      Heart sounds: Murmur heard.   Pulmonary:      Effort: Pulmonary effort is normal.      Breath sounds: Rales present.   Abdominal:      General: Bowel sounds are normal.   Musculoskeletal:         General: Normal range of motion.      Cervical back: Normal range of motion.      Right lower le+ Pitting Edema present.      Left lower le+ Pitting Edema present.   Skin:     General: Skin is warm and dry.   Neurological:      Mental Status: She is alert and oriented to person, place, and time. Mental status is at baseline.   Psychiatric:         Mood and Affect: Mood normal.              CRANIAL NERVES     CN III, IV, VI   Pupils are equal, round, and reactive to light.       Significant Labs: All pertinent labs within the past 24 hours have been reviewed.  BMP:   Recent Labs   Lab 24   *      K 3.5      CO2 26   BUN 26   CREATININE 1.2   CALCIUM 9.9   MG 1.9     CBC:   Recent Labs   Lab 24   WBC 5.51   HGB 11.4*   HCT 36.3*          Significant Imaging: I have reviewed all pertinent imaging results/findings within the past 24 hours.  Imaging Results              X-Ray Chest AP Portable (Final result)  Result time 24 21:34:46      Final result by Blaine Slade MD (24 21:34:46)                    Impression:      Cardiomegaly with increase in the right-sided pleural effusion.      Electronically signed by: Blaine Slade MD  Date:    01/30/2024  Time:    21:34               Narrative:    EXAMINATION:  XR CHEST AP PORTABLE    CLINICAL HISTORY:  Dyspnea, unspecified    TECHNIQUE:  Single frontal view of the chest was performed.    COMPARISON:  07/31/2023.    FINDINGS:  Monitoring EKG leads are present.  The trachea is unremarkable.  The cardiomediastinal silhouette is enlarged.  There is no evidence of free air the hemidiaphragms.  There is increase in the right-sided pleural effusion.  There is no pleural effusion on the left.  There is no evidence of a pneumothorax.  There is no evidence of pneumomediastinum.  No airspace opacity is present.  There are degenerative changes in the osseous structures.                                      Assessment/Plan:     * Acute on chronic combined systolic and diastolic congestive heart failure  History noted with current exacerbation evidenced by elevated BNP, increased lower extremity edema, orthopnea, increased O2 requirements at home and chest x-ray showing cardiomegaly with right-sided pleural effusion.  Most recent echo performed on 07/31/2023 revealed EF 40-45%.    -patient patient received 40 mg IV Lasix in the ED and will continue Lasix IV 40 mg b.i.d.   -fluid restriction and strict intake/output  -telemetry monitoring  -continue O2  -follow up with Cardiology outpatient      Thyroid disorder  Patient has chronic hypothyroidism. TFTs reviewed-   Lab Results   Component Value Date    TSH 7.172 (H) 09/20/2023   . Will continue chronic levothyroxine and adjust for and clinical changes.        Controlled type 2 diabetes mellitus with stage 3 chronic kidney disease, without long-term current use of insulin  DM type 2 currently controlled with diet.  Most recent A1c 6 months ago was 5.5    -continue to monitor glucose    History of DVT (deep vein  thrombosis)  History noted, patient currently taking apixaban 2.5 twice daily    -we will continue apixaban 2.5 mg b.i.d.      Other hyperlipidemia  Patient currently taking atorvastatin daily    -will continue atorvastatin 20 mg daily      Essential hypertension  Chronic, controlled. Latest blood pressure and vitals reviewed-     Temp:  [98.6 °F (37 °C)]   Pulse:  [62-83]   Resp:  [20-26]   BP: (137-150)/(63-72)   SpO2:  [94 %-98 %] .   Home meds for hypertension were reviewed and noted below.   Hypertension Medications               amLODIPine (NORVASC) 10 MG tablet TAKE 1 TABLET (10 MG TOTAL) BY MOUTH ONCE DAILY.    furosemide (LASIX) 20 MG tablet Take 2 tablets (40 mg total) by mouth once daily.    losartan (COZAAR) 25 MG tablet TAKE 1 TABLET EVERY DAY            While in the hospital, will manage blood pressure as follows; Continue home antihypertensive regimen    Will utilize p.r.n. blood pressure medication only if patient's blood pressure greater than 180/110 and she develops symptoms such as worsening chest pain or shortness of breath.      VTE Risk Mitigation (From admission, onward)           Ordered     apixaban tablet 2.5 mg  2 times daily         01/30/24 2231     IP VTE HIGH RISK PATIENT  Once         01/30/24 2231     Place sequential compression device  Until discontinued         01/30/24 2231                              Dipika Abebe NP  Department of Hospital Medicine  [facility] - Emergency Dept

## 2024-01-31 NOTE — TELEPHONE ENCOUNTER
----- Message from Jane Moscoso sent at 1/31/2024 10:00 AM CST -----  Contact: [phone]  [email or web address]     Patient is calling for Medical Advice regarding:will not make the appt today     How long has patient had these symptoms:    Pharmacy name and phone#:    Would like response via [employer]hart:  no     Comments:pts daughter is calling the pt is not going to make the appt for today she is in [facility] since last night please advise

## 2024-01-31 NOTE — PLAN OF CARE
MOON Message    Medicare Outpatient and Observation Notification regarding financial responsibilityGiven to patient/caregiver; Explained to patient/caregiver; Signed/date by patient/caregiverDate KHAN was signed1/31/2024Time KHAN was [id number]

## 2024-01-31 NOTE — ASSESSMENT & PLAN NOTE
DM type 2 currently controlled with diet.  Most recent A1c 6 months ago was 5.5    -continue to monitor glucose

## 2024-01-31 NOTE — ASSESSMENT & PLAN NOTE
History noted with current exacerbation evidenced by elevated BNP, increased lower extremity edema, orthopnea, increased O2 requirements at home and chest x-ray showing cardiomegaly with right-sided pleural effusion.  Most recent echo performed on 07/31/2023 revealed EF 40-45%.    -patient patient received 40 mg IV Lasix in the ED and will continue Lasix IV 40 mg b.i.d.   -fluid restriction and strict intake/output  -telemetry monitoring  -continue O2  -follow up with Cardiology outpatient

## 2024-01-31 NOTE — SUBJECTIVE & OBJECTIVE
Interval History: Patient resting comfortably in bed, she is chronically ill appearing with temporal lobe wasting. Currently on 4 L NC.     Past Medical History:   Diagnosis Date    Acute hypoxemic respiratory failure 1/30/2023    Arthritis     CHF (congestive heart failure) 12/26/2018    Chronic atrial fibrillation 8/29/2020    Chronic kidney disease, stage III (moderate) 9/11/2015    Colon adenoma     Diabetes mellitus, type II     Diet controlled    Glaucoma     Hyperlipemia     Hypertension     Osteopenia     Thyroid disorder 1/30/2024       Past Surgical History:   Procedure Laterality Date    APPENDECTOMY      CATARACT EXTRACTION W/  INTRAOCULAR LENS IMPLANT Right 03/15/2006        CATARACT EXTRACTION W/  INTRAOCULAR LENS IMPLANT Left 09/27/2006        COLONOSCOPY W/ POLYPECTOMY      EYE SURGERY      HYSTERECTOMY      TRANSESOPHAGEAL ECHOCARDIOGRAPHY N/A 11/9/2020    Procedure: ECHOCARDIOGRAM, TRANSESOPHAGEAL;  Surgeon: Marcelina Diagnostic Provider;  [place]: [facility];  Service: Cardiology;  Laterality: N/A;    TREATMENT OF CARDIAC ARRHYTHMIA N/A 11/9/2020    Procedure: CARDIOVERSION;  Surgeon: Marvin Elmore MD;  Location: [facility];  Service: Cardiology;  Laterality: N/A;  AF, RAFFAELE, DCCV, MAC, GP, 3 PREP       Review of patient's allergies indicates:  No Known Allergies    Medications:  Continuous Infusions:  Scheduled Meds:   amLODIPine  10 mg Oral Daily    apixaban  2.5 mg Oral BID    atorvastatin  20 mg Oral Daily    EScitalopram oxalate  10 mg Oral Daily    furosemide (LASIX) injection  40 mg Intravenous Q12H    levothyroxine  25 mcg Oral Before breakfast    losartan  25 mg Oral Daily    potassium chloride  40 mEq Oral Q2H     PRN Meds:acetaminophen, melatonin, ondansetron, ondansetron, sodium chloride 0.9%    Family History       Problem Relation (Age of Onset)    Allergies Daughter    Asthma Daughter    Cancer Sister, Daughter    Heart attack Mother    Heart disease Mother     Hypertension Daughter    No Known Problems Father          Tobacco Use    Smoking status: Never    Smokeless tobacco: Never   Substance and Sexual Activity    Alcohol use: Yes     Comment: wine occas.    Drug use: No    Sexual activity: Not Currently       Review of Systems   Unable to perform ROS: Other (Patient sleeping)     Objective:     Vital Signs (Most Recent):  Temp: 97.4 °F (36.3 °C) (01/31/24 0851)  Pulse: 64 (01/31/24 0851)  Resp: 18 (01/31/24 0851)  BP: 133/81 (01/31/24 0851)  SpO2: 95 % (01/31/24 0851) Vital Signs (24h Range):  Temp:  [97.4 °F (36.3 °C)-98.7 °F (37.1 °C)] 97.4 °F (36.3 °C)  Pulse:  [56-83] 64  Resp:  [16-26] 18  SpO2:  [91 %-98 %] 95 %  BP: (129-166)/(63-81) 133/81     Weight: 68 kg (150 lb)  Body mass index is 25.75 kg/m².       Physical Exam  Constitutional:       Appearance: She is ill-appearing (Chronically).      Comments: Sleeping   Temporal lobe wasting  Cachectic    Cardiovascular:      Rate and Rhythm: Normal rate.   Pulmonary:      Effort: No respiratory distress.      Comments: No signs of respiratory distress   4 L NC   Neurological:      Comments: Sleeping comfortably             Review of Symptoms      Symptom Assessment (ESAS 0-10 Scale)  Unable to complete assessment due to Other         Pain Assessment in Advanced Demential Scale (PAINAD)   Breathing - Independent of vocalization:  0  Negative vocalization:  0  Facial expression:  0  Body language:  0  Consolability:  0  Total:  0    Living Arrangements:  Lives with family    Psychosocial/Cultural:   See Palliative Psychosocial Note: Yes  - Patient lives in her own home with caregivers 24/7   - Her daughter, Cara, lives in her home with her  - Her grandson, Deion, is family point of contact   **Primary  to Follow**  Palliative Care  Consult: Yes        Advance Care Planning   Advance Directives:   Do Not Resuscitate Status: Yes      Decision Making:  Family answered questions  Goals of Care:  The family endorses that what is most important right now is to focus on avoiding the hospital and comfort and QOL     Accordingly, we have decided that the best plan to meet the patient's goals includes enrolling in hospice care           Significant Labs: All pertinent labs within the past 24 hours have been reviewed.  CBC:   Recent Labs   Lab 01/30/24 2012   WBC 5.51   HGB 11.4*   HCT 36.3*   MCV 98        BMP:  Recent Labs   Lab 01/30/24 2012 01/31/24  0420   * 106    143   K 3.5 3.3*    108   CO2 26 26   BUN 26 25   CREATININE 1.2 1.0   CALCIUM 9.9 9.4   MG 1.9  --      LFT:  Lab Results   Component Value Date    AST 22 01/30/2024    ALKPHOS 86 01/30/2024    BILITOT 0.8 01/30/2024     Albumin:   Albumin   Date Value Ref Range Status   01/30/2024 3.7 3.5 - 5.2 g/dL Final     Protein:   Total Protein   Date Value Ref Range Status   01/30/2024 7.4 6.0 - 8.4 g/dL Final     Lactic acid:   Lab Results   Component Value Date    LACTATE 1.9 04/10/2022    LACTATE 2.0 02/08/2021       Significant Imaging: I have reviewed all pertinent imaging results/findings within the past 24 hours.

## 2024-01-31 NOTE — PLAN OF CARE
Inpatient Upgrade Note    Inna Blankenship has warranted treatment spanning two or more midnights of hospital level care for the management of heart failure. She continues to require medication adjustments. Her condition is also complicated by the following comorbidities: Chronic respiratory disease.

## 2024-01-31 NOTE — CONSULTS
Nutrition consult received for fluid and sodium restriction diet education. Provided by Zenaida Alcantara, student dietitian to caregiver on 1/31/2024. Please see progress note.      Please re-consult as needed.    Thanks,    Airam Tello, RDN, LDN

## 2024-01-31 NOTE — PROGRESS NOTES
Food & Nutrition Education    Diet Education: Low sodium, fluid restriction  Time Spent: 10 minutes  Learners: Inna Blankenship's caregiver       Nutrition Education provided with handouts:  Heart Failure Nutrition Therapy    Comments:  The patient was resting when RD came to give education. Pt's caregiver received education on pt's behalf. RD asked caregiver if she is aware of the pt's low sodium/fluid restricted diet, caregiver clarified she did know this. RD asked caregiver if pt consumes a lot of salt in their diet, caregiver reported the pt does not usually, but does every once in a while. RD went over food items that are high in sodium, such as canned foods, packaged foods, and spice mixes. The caregiver acknowledged this information and expressed that she does not prepare the meals at home--she assumes the pt's family prepares and brings the food, she just re-heats and serves it. Caregiver is unaware if the family knows of the pt's dietary restrictions. RD rec caregiver tell the family of the low sodium/fluid restricted diet, so the foods can be prepared accordingly.   RD also emphasized that the patient is on a 1500 mL fluid restricted diet. RD expressed that this is equivalent to 6 8 oz cups or 1.5 L of liquids. The caregiver expressed understanding of this. RD emphasized this includes all liquids and it is important to let the family know, especially if they are bringing soups and other liquid based meals for the patient. The caregiver expressed understanding and willingness to assist the pt in following a low sodium diet and fluid restricted diet.     All questions and concerns answered. Dietitian's contact information provided.      Follow-Up: 02/07/2024    Please Re-consult as needed        Thanks!     Zenaida Alcantara, Student Intern

## 2024-01-31 NOTE — HPI
Inna Blankenship is a 89 year old female with a past history of Afib, CHF/pulmonary hypertension on home O2, HTN, DM and past DVT on Eliquis who presents with worsening shortness of breath and lower extremity swelling.  Patient states she has been feeling more short of breath for the past few weeks but was particularly worse today.  She states symptoms are worse in the morning when she wakes up and starts moving.  Patient reports associated nonproductive cough for the past few weeks.  She denies chest pain, fever, chills, nausea, vomiting and diarrhea.  She states she has more oxygen at home and denies wheezing. Caregiver at bedside also reports patient has had worsening orthopnea for the past few days with lower leg swelling only that started in the past week.  She also states that patient has right breast enlargement as well and both she and patient state that she has had this before with fluid overload.  Patient has been compliant with Lasix but no increased dose given recently.  Patient ambulates with walker and assistance and caregiver reports she has been more out of breath with activity lately.      ED workup revealed BNP 1479, troponin 0.2 (prior reading 0.13), hemoglobin 11.4, no leukocytosis and PTT 14.4 at with INR 1.3.  EKG with no ST elevation or depression and unchanged from prior readings.  Chest x-ray reveals cardiomegaly with right-sided pleural effusion.  Patient received IV Lasix in the ED and was referred to Hospital Medicine for further evaluation and management.

## 2024-01-31 NOTE — SUBJECTIVE & OBJECTIVE
Past Medical History:   Diagnosis Date    Acute hypoxemic respiratory failure 1/30/2023    Arthritis     CHF (congestive heart failure) 12/26/2018    Chronic atrial fibrillation 8/29/2020    Chronic kidney disease, stage III (moderate) 9/11/2015    Colon adenoma     Diabetes mellitus, type II     Diet controlled    Glaucoma     Hyperlipemia     Hypertension     Osteopenia        Past Surgical History:   Procedure Laterality Date    APPENDECTOMY      CATARACT EXTRACTION W/  INTRAOCULAR LENS IMPLANT Right 03/15/2006        CATARACT EXTRACTION W/  INTRAOCULAR LENS IMPLANT Left 09/27/2006        COLONOSCOPY W/ POLYPECTOMY      EYE SURGERY      HYSTERECTOMY      TRANSESOPHAGEAL ECHOCARDIOGRAPHY N/A 11/9/2020    Procedure: ECHOCARDIOGRAM, TRANSESOPHAGEAL;  Surgeon: Marcelina Diagnostic Provider;  Location: [facility] EP LAB;  Service: Cardiology;  Laterality: N/A;    TREATMENT OF CARDIAC ARRHYTHMIA N/A 11/9/2020    Procedure: CARDIOVERSION;  Surgeon: Marvin Elmore MD;  Location: [facility] EP LAB;  Service: Cardiology;  Laterality: N/A;  AF, RAFFAELE, DCCV, MAC, GP, 3 PREP       Review of patient's allergies indicates:  No Known Allergies    No current facility-administered medications on file prior to encounter.     Current Outpatient Medications on File Prior to Encounter   Medication Sig    amLODIPine (NORVASC) 10 MG tablet TAKE 1 TABLET (10 MG TOTAL) BY MOUTH ONCE DAILY.    apixaban (ELIQUIS) 2.5 mg Tab Take 1 tablet (2.5 mg total) by mouth 2 (two) times daily.    atorvastatin (LIPITOR) 20 MG tablet Take 1 tablet (20 mg total) by mouth once daily.    EScitalopram oxalate (LEXAPRO) 10 MG tablet TAKE 1 TABLET EVERY DAY    furosemide (LASIX) 20 MG tablet Take 2 tablets (40 mg total) by mouth once daily.    latanoprost 0.005 % ophthalmic solution PLACE 1 DROP INTO BOTH EYES ONCE DAILY.    levothyroxine (SYNTHROID) 25 MCG tablet Take 1 tablet (25 mcg total) by mouth before breakfast.    losartan (COZAAR) 25 MG tablet  TAKE 1 TABLET EVERY DAY    multivit with minerals/lutein (MULTIVITAMIN 50 PLUS ORAL) Take 1 tablet by mouth once daily.     promethazine-dextromethorphan (PROMETHAZINE-DM) 6.25-15 mg/5 mL Syrp Take 5 mLs by mouth every 8 (eight) hours as needed.     Family History       Problem Relation (Age of Onset)    Allergies Daughter    Asthma Daughter    Cancer Sister, Daughter    Heart attack Mother    Heart disease Mother    Hypertension Daughter    No Known Problems Father          Tobacco Use    Smoking status: Never    Smokeless tobacco: Never   Substance and Sexual Activity    Alcohol use: Yes     Comment: wine occas.    Drug use: No    Sexual activity: Not Currently     Review of Systems   Constitutional:  Negative for activity change, appetite change, chills and fever.   HENT:  Negative for congestion, sore throat and trouble swallowing.    Eyes:  Negative for photophobia and visual disturbance.   Respiratory:  Positive for cough and shortness of breath. Negative for chest tightness.    Cardiovascular:  Positive for leg swelling. Negative for chest pain and palpitations.   Gastrointestinal:  Negative for abdominal pain, diarrhea and nausea.   Genitourinary:  Negative for dysuria, flank pain and hematuria.   Musculoskeletal:  Negative for back pain.   Neurological:  Negative for dizziness, weakness and headaches.   Psychiatric/Behavioral:  Negative for confusion.      Objective:     Vital Signs (Most Recent):  Temp: 98.6 °F (37 °C) (01/30/24 1948)  Pulse: 64 (01/30/24 2200)  Resp: (!) 22 (01/30/24 2200)  BP: (!) 150/72 (01/30/24 2200)  SpO2: 98 % (01/30/24 2200) Vital Signs (24h Range):  Temp:  [98.6 °F (37 °C)] 98.6 °F (37 °C)  Pulse:  [62-83] 64  Resp:  [20-26] 22  SpO2:  [94 %-98 %] 98 %  BP: (137-150)/(63-72) 150/72     Weight: 60.8 kg (134 lb)  Body mass index is 23 kg/m².     Physical Exam  Vitals reviewed.   Constitutional:       Appearance: Normal appearance. She is normal weight.   HENT:      Head:  Normocephalic.      Mouth/Throat:      Mouth: Mucous membranes are moist.      Pharynx: Oropharynx is clear.   Eyes:      Pupils: Pupils are equal, round, and reactive to light.   Cardiovascular:      Rate and Rhythm: Normal rate and regular rhythm.      Pulses: Normal pulses.           Radial pulses are 2+ on the right side and 2+ on the left side.        Dorsalis pedis pulses are 2+ on the right side and 2+ on the left side.      Heart sounds: Murmur heard.   Pulmonary:      Effort: Pulmonary effort is normal.      Breath sounds: Rales present.   Abdominal:      General: Bowel sounds are normal.   Musculoskeletal:         General: Normal range of motion.      Cervical back: Normal range of motion.      Right lower le+ Pitting Edema present.      Left lower le+ Pitting Edema present.   Skin:     General: Skin is warm and dry.   Neurological:      Mental Status: She is alert and oriented to person, place, and time. Mental status is at baseline.   Psychiatric:         Mood and Affect: Mood normal.              CRANIAL NERVES     CN III, IV, VI   Pupils are equal, round, and reactive to light.       Significant Labs: All pertinent labs within the past 24 hours have been reviewed.  BMP:   Recent Labs   Lab 24   *      K 3.5      CO2 26   BUN 26   CREATININE 1.2   CALCIUM 9.9   MG 1.9     CBC:   Recent Labs   Lab 24   WBC 5.51   HGB 11.4*   HCT 36.3*          Significant Imaging: I have reviewed all pertinent imaging results/findings within the past 24 hours.  Imaging Results              X-Ray Chest AP Portable (Final result)  Result time 24 21:34:46      Final result by Blaine Slade MD (24 21:34:46)                   Impression:      Cardiomegaly with increase in the right-sided pleural effusion.      Electronically signed by: Blaine Slade MD  Date:    2024  Time:    21:34               Narrative:    EXAMINATION:  XR CHEST AP  PORTABLE    CLINICAL HISTORY:  Dyspnea, unspecified    TECHNIQUE:  Single frontal view of the chest was performed.    COMPARISON:  07/31/2023.    FINDINGS:  Monitoring EKG leads are present.  The trachea is unremarkable.  The cardiomediastinal silhouette is enlarged.  There is no evidence of free air the hemidiaphragms.  There is increase in the right-sided pleural effusion.  There is no pleural effusion on the left.  There is no evidence of a pneumothorax.  There is no evidence of pneumomediastinum.  No airspace opacity is present.  There are degenerative changes in the osseous structures.

## 2024-01-31 NOTE — NURSING
Nurses Note -- 4 Eyes      1/31/2024   12:12 AM      Skin assessed during: Admit      [x] No Altered Skin Integrity Present    []Prevention Measures Documented      [] Yes- Altered Skin Integrity Present or Discovered   [] LDA Added if Not in Epic (Describe Wound)   [] New Altered Skin Integrity was Present on Admit and Documented in LDA   [] Wound Image Taken    Wound Care Consulted? No    Attending Nurse:  BRODERICK Aleman    Second RN/Staff Member:  TOLU Doran

## 2024-01-31 NOTE — PLAN OF CARE
Patient AAOX3, independent at baseline. PCP correct on facesheet. Home DME-home O2 (2.5L NC), RW, wheelchair. Family to provide transportation home.    01/31/24 1005   Discharge Assessment   Assessment Type Discharge Planning Assessment   Confirmed/corrected address, phone number and insurance Yes   Confirmed Demographics Correct on Facesheet   Source of Information patient   Communicated BEN with patient/caregiver Date not available/Unable to determine   People in Home child(glenn), adult   Do you expect to return to your current living situation? Yes   Do you have help at home or someone to help you manage your care at home? Yes   Who are your caregiver(s) and their phone number(s)? Cara Saini (Daughter)  [phone] (Mobile)   Prior to hospitilization cognitive status: Alert/Oriented   Current cognitive status: Alert/Oriented   Walking or Climbing Stairs Difficulty yes   Walking or Climbing Stairs ambulation difficulty, requires equipment   Dressing/Bathing Difficulty yes   Dressing/Bathing bathing difficulty, dependent   Equipment Currently Used at Home walker, rolling;oxygen   Readmission within 30 days? No   Do you currently have service(s) that help you manage your care at home? No   Do you take prescription medications? Yes   Do you have prescription coverage? Yes   Do you have any problems affording any of your prescribed medications? No   Is the patient taking medications as prescribed? yes   Who is going to help you get home at discharge? Cara Saini (Daughter)  [phone] (Mobile)   How do you get to doctors appointments? family or friend will provide   Are you on dialysis? No   Do you take coumadin? No   Discharge Plan A Home Health   Discharge Plan B Home with family   DME Needed Upon Discharge  none   Discharge Plan discussed with: Patient;Adult children   Transition of Care Barriers None     [facility] ([address])  Initial Discharge Assessment       Primary Care Provider:  Stacy Rodriguez MD    Admission Diagnosis: Dyspnea [R06.00]  Acute on chronic congestive heart failure, unspecified heart failure type [I50.9]    Admission Date: 1/30/2024  Expected Discharge Date:     Transition of Care Barriers: (P) None    Payor: HU[employer] MANAGED MEDICARE / Plan: [employer] MEDICARE SELECT PARTNER / Product Type: Medicare Advantage /     Extended Emergency Contact Information  Primary Emergency Contact: Cara Saini  Address: [address] [place] [address] [facility]  Home Phone: [phone]  Work Phone: [phone]  Mobile Phone: [phone]  Relation: Daughter  Secondary Emergency Contact: Orlin Saini  Mobile Phone: [phone]  Relation: Grandchild    Discharge Plan A: (P) Home Health  Discharge Plan B: (P) Home with family      [facility] [place] - [address]  [address] [place] [address]  Phone: [phone] Fax: [phone]    [facility] - [place], [place] - [address]  [address] [facility] [address]  Phone: [phone] Fax: [phone]    [id number] - [place], [place] - [address]  [address]  [facility] [address]  Phone: [phone] Fax: [phone]    [facility]  [address] [place] [address]  Phone: [phone] Fax: [phone]      Initial Assessment (most recent)       Adult Discharge Assessment - 01/31/24 1005          Discharge Assessment    Assessment Type Discharge Planning Assessment (P)      Confirmed/corrected address, phone number and insurance Yes (P)      Confirmed Demographics Correct on Facesheet (P)      Source of Information patient (P)      Communicated BEN with patient/caregiver Date not available/Unable to determine (P)      People in Home child(glenn), adult (P)      Do you expect to return to your current living situation? Yes (P)      Do you have help at home or someone  to help you manage your care at home? Yes (P)      Who are your caregiver(s) and their phone number(s)? Cara Saini (Daughter)  [phone] (Mobile) (P)      Prior to hospitilization cognitive status: Alert/Oriented (P)      Current cognitive status: Alert/Oriented (P)      Walking or Climbing Stairs Difficulty yes (P)      Walking or Climbing Stairs ambulation difficulty, requires equipment (P)      Dressing/Bathing Difficulty yes (P)      Dressing/Bathing bathing difficulty, dependent (P)      Equipment Currently Used at Home walker, rolling;oxygen (P)      Readmission within 30 days? No (P)      Do you currently have service(s) that help you manage your care at home? No (P)      Do you take prescription medications? Yes (P)      Do you have prescription coverage? Yes (P)      Do you have any problems affording any of your prescribed medications? No (P)      Is the patient taking medications as prescribed? yes (P)      Who is going to help you get home at discharge? Cara Saini (Daughter)  [phone] (Mobile) (P)      How do you get to doctors appointments? family or friend will provide (P)      Are you on dialysis? No (P)      Do you take coumadin? No (P)      Discharge Plan A Home Health (P)      Discharge Plan B Home with family (P)      DME Needed Upon Discharge  none (P)      Discharge Plan discussed with: Patient;Adult children (P)      Transition of Care Barriers None (P)

## 2024-01-31 NOTE — HPI
"Per H&P: "HPI: Inna Blankenship is a 89 year old female with a past history of Afib, CHF/pulmonary hypertension on home O2, HTN, DM and past DVT on Eliquis who presents with worsening shortness of breath and lower extremity swelling.  Patient states she has been feeling more short of breath for the past few weeks but was particularly worse today.  She states symptoms are worse in the morning when she wakes up and starts moving.  Patient reports associated nonproductive cough for the past few weeks.  She denies chest pain, fever, chills, nausea, vomiting and diarrhea.  She states she has more oxygen at home and denies wheezing. Caregiver at bedside also reports patient has had worsening orthopnea for the past few days with lower leg swelling only that started in the past week.  She also states that patient has right breast enlargement as well and both she and patient state that she has had this before with fluid overload.  Patient has been compliant with Lasix but no increased dose given recently.  Patient ambulates with walker and assistance and caregiver reports she has been more out of breath with activity lately.       ED workup revealed BNP 1479, troponin 0.2 (prior reading 0.13), hemoglobin 11.4, no leukocytosis and PTT 14.4 at with INR 1.3.  EKG with no ST elevation or depression and unchanged from prior readings.  Chest x-ray reveals cardiomegaly with right-sided pleural effusion.  Patient received IV Lasix in the ED and was referred to Hospital Medicine for further evaluation and management."    At time of initial consult, patient seen lying in bed, sleeping. She is chronically ill appearing. Patients caregiver, Sheryl, at the bedside. Palliative medicine consulted for goals of care/ advance care planning.   "

## 2024-01-31 NOTE — CONSULTS
[facility] Surg ([address])  Palliative Medicine  Consult Note    Patient Name: Inna Blankenship  MRN: [id number]  Admission Date: 1/30/2024  Hospital Length of Stay: 0 days  Code Status: Full Code   Attending Provider: SHWETHA Boone MD  Consulting Provider: Bertha Jordan DNP  Primary Care Physician: Stacy Rodriguez MD  Principal Problem:Acute on chronic combined systolic and diastolic congestive heart failure    Patient information was obtained from patient, relative(s), caregiver / friend, and primary team.      Inpatient consult to Palliative Care  Consult performed by: Bertha Jordan DNP  Consult ordered by: SHWETHA Boone MD        Assessment/Plan:     Cardiac/Vascular  * Acute on chronic combined systolic and diastolic congestive heart failure  Latest ECHO performed and demonstrates- Results for orders placed during the hospital encounter of 07/31/23    Echo    Interpretation Summary    Left Ventricle: The left ventricle is mildly dilated. Moderately increased wall thickness. Moderate global hypokinesis present. There is moderately reduced systolic function with a visually estimated ejection fraction of 40 - 45%.    Left Atrium: Left atrium is moderately dilated.    Right Ventricle: Normal right ventricular cavity size. Systolic function is normal.    Right Atrium: Right atrium is moderately dilated.    Aortic Valve: There is mild aortic regurgitation with a centrally directed jet.    Mitral Valve: There is no stenosis. There is mild regurgitation with a centrally directed jet.    Tricuspid Valve: There is mild transvalvular regurgitation with a centrally directed jet.    Pulmonic Valve: There is mild regurgitation with a centrally directed jet.    Pericardium: There is no pericardial effusion.    Recent Labs   Lab 01/30/24 2012   BNP 1,479*   - Patient is on 2.5 L NC at home; currently she is on 4 L NC while admitted     Palliative Care  Encounter for palliative care  - Consult for  advance care planning/ goals of care in chronically ill patient admitted with CHF. Patient is on home oxygen. Of note, patient has been seen by our team in the past. She was last seen by Dr. Bronson in August 2023 and hospice versus palliative care was discussed. I can see Ms. Blankenship's family has been in contact with her PCP lately regarding referral for hospice.   - At time of initial consult, along with Rosaline Diaz (RN), visited with Ms. Blankenship at the bedside. She was comfortably sleeping. She is chronically ill appearing with temporal lobe wasting. Her very supportive caregiver, Sheryl, was present. Ms. Blankenship has caregivers with her around the clock. Ms. Blankenship lives in her own home. Her daughter, Cara, lives there also but is having her own medical issues. Sheryl reports over the last week Ms. Blankenship has become more SOB. She is usually on 2.5 L NC at home. Sheryl reports Ms. Blankenship has been having trouble ambulating due to SOB. Ms. Blankenship was resistant to come to the hospital, however family convinced her yesterday evening. While I was present, Sheryl then contact Ms. Blankenship's grandson, Deion. Deion lives out of town but is the family point of contact. Deion reports Ms. Blankenship's PCP placed a referral for hospice 2 weeks ago but no one has reached out. Of note, Ms. Blankenship's  was on home hospice with [facility] a few years ago. Deion reports family wants to ensure Ms. Blankenship is home and comfortable and focus on quality of life with hospice care. We discussed philosophy of hospice and that we could arrange this from the inpt setting. I will ask CM to reach out to Deion and arrange hospice informational sessions. Deion reports Ms. Blankenship has a birthday on Sunday and he was planning to come in town to see Ms. Blankenship. I then discussed end of life wishes with Deion, he reports Ms. Blankenship desires to pass away peacefully, which is consistent with DNR.  - Sheryl then reflected on Ms. Blankenship and how she has been caring for her  "for 2 years. Sheryl also works for a hospice agency and is familiar with hospice. Sheryl reports Ms. Blankenship sleeps majority of the day and her appetite has been decreased recently.  - Care preferences are consistent with hopeful improvement of oxygen requirements, discharging with home hospice and DNR.       Thank you for your consult.     Subjective:     HPI:   Per H&P: "HPI: Inna Blankenship is a 89 year old female with a past history of Afib, CHF/pulmonary hypertension on home O2, HTN, DM and past DVT on Eliquis who presents with worsening shortness of breath and lower extremity swelling.  Patient states she has been feeling more short of breath for the past few weeks but was particularly worse today.  She states symptoms are worse in the morning when she wakes up and starts moving.  Patient reports associated nonproductive cough for the past few weeks.  She denies chest pain, fever, chills, nausea, vomiting and diarrhea.  She states she has more oxygen at home and denies wheezing. Caregiver at bedside also reports patient has had worsening orthopnea for the past few days with lower leg swelling only that started in the past week.  She also states that patient has right breast enlargement as well and both she and patient state that she has had this before with fluid overload.  Patient has been compliant with Lasix but no increased dose given recently.  Patient ambulates with walker and assistance and caregiver reports she has been more out of breath with activity lately.       ED workup revealed BNP 1479, troponin 0.2 (prior reading 0.13), hemoglobin 11.4, no leukocytosis and PTT 14.4 at with INR 1.3.  EKG with no ST elevation or depression and unchanged from prior readings.  Chest x-ray reveals cardiomegaly with right-sided pleural effusion.  Patient received IV Lasix in the ED and was referred to Hospital Medicine for further evaluation and management."    At time of initial consult, patient seen lying in bed, " sleeping. She is chronically ill appearing. Patients caregiver, Sheryl, at the bedside. Palliative medicine consulted for goals of care/ advance care planning.     Hospital Course:  No notes on file    Interval History: Patient resting comfortably in bed, she is chronically ill appearing with temporal lobe wasting. Currently on 4 L NC.     Past Medical History:   Diagnosis Date    Acute hypoxemic respiratory failure 1/30/2023    Arthritis     CHF (congestive heart failure) 12/26/2018    Chronic atrial fibrillation 8/29/2020    Chronic kidney disease, stage III (moderate) 9/11/2015    Colon adenoma     Diabetes mellitus, type II     Diet controlled    Glaucoma     Hyperlipemia     Hypertension     Osteopenia     Thyroid disorder 1/30/2024       Past Surgical History:   Procedure Laterality Date    APPENDECTOMY      CATARACT EXTRACTION W/  INTRAOCULAR LENS IMPLANT Right 03/15/2006        CATARACT EXTRACTION W/  INTRAOCULAR LENS IMPLANT Left 09/27/2006        COLONOSCOPY W/ POLYPECTOMY      EYE SURGERY      HYSTERECTOMY      TRANSESOPHAGEAL ECHOCARDIOGRAPHY N/A 11/9/2020    Procedure: ECHOCARDIOGRAM, TRANSESOPHAGEAL;  Surgeon: Marcelina Diagnostic Provider;  Location: [facility] EP LAB;  Service: Cardiology;  Laterality: N/A;    TREATMENT OF CARDIAC ARRHYTHMIA N/A 11/9/2020    Procedure: CARDIOVERSION;  Surgeon: Marvin Elmore MD;  Location: [facility] EP LAB;  Service: Cardiology;  Laterality: N/A;  AF, RAFFAELE, DCCV, MAC, GP, 3 PREP       Review of patient's allergies indicates:  No Known Allergies    Medications:  Continuous Infusions:  Scheduled Meds:   amLODIPine  10 mg Oral Daily    apixaban  2.5 mg Oral BID    atorvastatin  20 mg Oral Daily    EScitalopram oxalate  10 mg Oral Daily    furosemide (LASIX) injection  40 mg Intravenous Q12H    levothyroxine  25 mcg Oral Before breakfast    losartan  25 mg Oral Daily    potassium chloride  40 mEq Oral Q2H     PRN Meds:acetaminophen, melatonin, ondansetron,  ondansetron, sodium chloride 0.9%    Family History       Problem Relation (Age of Onset)    Allergies Daughter    Asthma Daughter    Cancer Sister, Daughter    Heart attack Mother    Heart disease Mother    Hypertension Daughter    No Known Problems Father          Tobacco Use    Smoking status: Never    Smokeless tobacco: Never   Substance and Sexual Activity    Alcohol use: Yes     Comment: wine occas.    Drug use: No    Sexual activity: Not Currently       Review of Systems   Unable to perform ROS: Other (Patient sleeping)     Objective:     Vital Signs (Most Recent):  Temp: 97.4 °F (36.3 °C) (01/31/24 0851)  Pulse: 64 (01/31/24 0851)  Resp: 18 (01/31/24 0851)  BP: 133/81 (01/31/24 0851)  SpO2: 95 % (01/31/24 0851) Vital Signs (24h Range):  Temp:  [97.4 °F (36.3 °C)-98.7 °F (37.1 °C)] 97.4 °F (36.3 °C)  Pulse:  [56-83] 64  Resp:  [16-26] 18  SpO2:  [91 %-98 %] 95 %  BP: (129-166)/(63-81) 133/81     Weight: 68 kg (150 lb)  Body mass index is 25.75 kg/m².       Physical Exam  Constitutional:       Appearance: She is ill-appearing (Chronically).      Comments: Sleeping   Temporal lobe wasting  Cachectic    Cardiovascular:      Rate and Rhythm: Normal rate.   Pulmonary:      Effort: No respiratory distress.      Comments: No signs of respiratory distress   4 L NC   Neurological:      Comments: Sleeping comfortably             Review of Symptoms      Symptom Assessment (ESAS 0-10 Scale)  Unable to complete assessment due to Other         Pain Assessment in Advanced Demential Scale (PAINAD)   Breathing - Independent of vocalization:  0  Negative vocalization:  0  Facial expression:  0  Body language:  0  Consolability:  0  Total:  0    Living Arrangements:  Lives with family    Psychosocial/Cultural:   See Palliative Psychosocial Note: Yes  - Patient lives in her own home with caregivers 24/7   - Her daughter, Cara, lives in her home with her  - Her grandson, Deion, is family point of contact   **Primary Social  Worker to Follow**  Palliative Care  Consult: Yes        Advance Care Planning  Advance Directives:   Do Not Resuscitate Status: Yes      Decision Making:  Family answered questions  Goals of Care: The family endorses that what is most important right now is to focus on avoiding the hospital and comfort and QOL     Accordingly, we have decided that the best plan to meet the patient's goals includes enrolling in hospice care           Significant Labs: All pertinent labs within the past 24 hours have been reviewed.  CBC:   Recent Labs   Lab 01/30/24 2012   WBC 5.51   HGB 11.4*   HCT 36.3*   MCV 98        BMP:  Recent Labs   Lab 01/30/24 2012 01/31/24  0420   * 106    143   K 3.5 3.3*    108   CO2 26 26   BUN 26 25   CREATININE 1.2 1.0   CALCIUM 9.9 9.4   MG 1.9  --      LFT:  Lab Results   Component Value Date    AST 22 01/30/2024    ALKPHOS 86 01/30/2024    BILITOT 0.8 01/30/2024     Albumin:   Albumin   Date Value Ref Range Status   01/30/2024 3.7 3.5 - 5.2 g/dL Final     Protein:   Total Protein   Date Value Ref Range Status   01/30/2024 7.4 6.0 - 8.4 g/dL Final     Lactic acid:   Lab Results   Component Value Date    LACTATE 1.9 04/10/2022    LACTATE 2.0 02/08/2021       Significant Imaging: I have reviewed all pertinent imaging results/findings within the past 24 hours.    Discussed with Dr. Boone and CM    I spent a total of 70 minutes on the day of the visit. This includes face to face time in discussion of goals of care, symptom assessment, coordination of care and emotional support.  This also includes non-face to face time preparing to see the patient (eg, review of tests/imaging), obtaining and/or reviewing separately obtained history, documenting clinical information in the electronic or other health record, independently interpreting results and communicating results to the patient/family/caregiver, or care coordinator.    Bertha Jordan, ASHLY  Palliative  [facility] - Med Surg ([address])

## 2024-01-31 NOTE — PROGRESS NOTES
[facility] Surg ([address] Medicine  Progress Note    Patient Name: Inna Blankenship  MRN: [id number]  Patient Class: IP- Inpatient   Admission Date: 1/30/2024  Length of Stay: 0 days  Attending Physician: SHWETHA Boone MD  Primary Care Provider: Stacy Rodriguez MD        Subjective:     Principal Problem:Acute on chronic combined systolic and diastolic congestive heart failure        HPI:  Inna Blankenship is a 89 year old female with a past history of Afib, CHF/pulmonary hypertension on home O2, HTN, DM and past DVT on Eliquis who presents with worsening shortness of breath and lower extremity swelling.  Patient states she has been feeling more short of breath for the past few weeks but was particularly worse today.  She states symptoms are worse in the morning when she wakes up and starts moving.  Patient reports associated nonproductive cough for the past few weeks.  She denies chest pain, fever, chills, nausea, vomiting and diarrhea.  She states she has more oxygen at home and denies wheezing. Caregiver at bedside also reports patient has had worsening orthopnea for the past few days with lower leg swelling only that started in the past week.  She also states that patient has right breast enlargement as well and both she and patient state that she has had this before with fluid overload.  Patient has been compliant with Lasix but no increased dose given recently.  Patient ambulates with walker and assistance and caregiver reports she has been more out of breath with activity lately.      ED workup revealed BNP 1479, troponin 0.2 (prior reading 0.13), hemoglobin 11.4, no leukocytosis and PTT 14.4 at with INR 1.3.  EKG with no ST elevation or depression and unchanged from prior readings.  Chest x-ray reveals cardiomegaly with right-sided pleural effusion.  Patient received IV Lasix in the ED and was referred to Hospital Medicine for further evaluation and management.      Overview/Hospital Course:  No  notes on file    Interval History: No acute events overnight. SOB improving. Discussed plan of care. No other concerns at this time.    Review of Systems   Constitutional:  Negative for chills and fever.   Respiratory:  Positive for shortness of breath. Negative for cough.    Cardiovascular:  Positive for leg swelling. Negative for chest pain and palpitations.   Gastrointestinal:  Negative for abdominal pain, nausea and vomiting.     Objective:     Vital Signs (Most Recent):  Temp: 98.5 °F (36.9 °C) (01/31/24 1731)  Pulse: (!) 58 (01/1935)  Resp: 18 (01/31/24 1731)  BP: (!) 121/59 (01/31/24 1731)  SpO2: 97 % (01/31/24 2006) Vital Signs (24h Range):  Temp:  [97.4 °F (36.3 °C)-98.7 °F (37.1 °C)] 98.5 °F (36.9 °C)  Pulse:  [50-68] 58  Resp:  [16-22] 18  SpO2:  [91 %-98 %] 97 %  BP: (121-166)/(59-81) 121/59     Weight: 68 kg (150 lb)  Body mass index is 25.75 kg/m².    Intake/Output Summary (Last 24 hours) at 1/31/2024 2012  Last data filed at 1/31/2024 0528  Gross per 24 hour   Intake 50 ml   Output 300 ml   Net -250 ml         Physical Exam  Vitals and nursing note reviewed.   Constitutional:       General: She is not in acute distress.     Appearance: She is well-developed.   HENT:      Head: Normocephalic and atraumatic.   Eyes:      General:         Right eye: No discharge.         Left eye: No discharge.      Conjunctiva/sclera: Conjunctivae normal.   Cardiovascular:      Rate and Rhythm: Normal rate.      Pulses: Normal pulses.      Heart sounds: Murmur heard.   Pulmonary:      Effort: Pulmonary effort is normal. No respiratory distress.      Comments: Diminished at bases bilaterally.  Abdominal:      Palpations: Abdomen is soft.      Tenderness: There is no abdominal tenderness.   Musculoskeletal:         General: Normal range of motion.      Right lower leg: Edema present.      Left lower leg: Edema present.   Skin:     General: Skin is warm and dry.   Neurological:      Mental Status: She is alert and  oriented to person, place, and time.           Significant Labs:   CBC:  Recent Labs   Lab 01/30/24 2012   WBC 5.51   HGB 11.4*   HCT 36.3*      GRAN 70.3  3.9   LYMPH 17.8*  1.0   MONO 9.3  0.5   EOS 0.1   BASO 0.05   BMP:  Recent Labs   Lab 01/30/24 2012 01/31/24  0420    143   K 3.5 3.3*    108   CO2 26 26   BUN 26 25   CREATININE 1.2 1.0   * 106   CALCIUM 9.9 9.4   MG 1.9  --      Significant Imaging: I have reviewed and interpreted all pertinent imaging results/findings within the past 24 hours.      Assessment/Plan:      * Acute on chronic combined systolic and diastolic congestive heart failure  - Continue furosemide 40mg IV BID, strict intake/output, daily weights.  - Wean supplemental O2 as tolerated.  - Continue losartan as under HTN.    Thyroid disorder  - Continue levothyroxine 25mcg PO daily.    Controlled type 2 diabetes mellitus with stage 3 chronic kidney disease, without long-term current use of insulin  - Diet-controlled.    History of DVT (deep vein thrombosis)  - Continue apixaban 2.5mg PO BID.    Other hyperlipidemia  - Continue atorvastatin 20 mg PO daily.      Essential hypertension  - Continue amlodipine 10mg PO daily, losartan 25mg PO daily.      VTE Risk Mitigation (From admission, onward)           Ordered     apixaban tablet 2.5 mg  2 times daily         01/30/24 2231     IP VTE HIGH RISK PATIENT  Once         01/30/24 2231     Place sequential compression device  Until discontinued         01/30/24 2231                    Discharge Planning   BEN:      Code Status: Full Code   Is the patient medically ready for discharge?:     Reason for patient still in hospital (select all that apply): Treatment  Discharge Plan A: Home Health                  D Yevgeniy Boone MD  Department of Hospital Medicine   [facility] ([address])

## 2024-01-31 NOTE — ASSESSMENT & PLAN NOTE
History noted, patient currently taking apixaban 2.5 twice daily    -we will continue apixaban 2.5 mg b.i.d.

## 2024-01-31 NOTE — ED PROVIDER NOTES
Encounter Date: 1/30/2024       History     Chief Complaint   Patient presents with    Shortness of Breath     Here via [facility] with c/o SOB and edema, Hx CHF, patient on 2L NC at baseline, 96% on 3L NC at this time, a fib      89-year-old female with history of AFib, CHF/pulmonary hypertension on home O2, HTN, DM, previous DVT, brought by EMS for evaluation of worsening shortness of breath.  Patient states it has been ongoing for weeks but worse recently, particularly in the morning when she gets up and starts moving.  No other family members at bedside on initial interview, patient states she also has had a dry cough and leg swelling but denies any chest pain, wheezing, fevers, or other new complaints.      Review of patient's allergies indicates:  No Known Allergies  Past Medical History:   Diagnosis Date    Acute hypoxemic respiratory failure 1/30/2023    Arthritis     CHF (congestive heart failure) 12/26/2018    Chronic atrial fibrillation 8/29/2020    Chronic kidney disease, stage III (moderate) 9/11/2015    Colon adenoma     Diabetes mellitus, type II     Diet controlled    Glaucoma     Hyperlipemia     Hypertension     Osteopenia      Past Surgical History:   Procedure Laterality Date    APPENDECTOMY      CATARACT EXTRACTION W/  INTRAOCULAR LENS IMPLANT Right 03/15/2006        CATARACT EXTRACTION W/  INTRAOCULAR LENS IMPLANT Left 09/27/2006        COLONOSCOPY W/ POLYPECTOMY      EYE SURGERY      HYSTERECTOMY      TRANSESOPHAGEAL ECHOCARDIOGRAPHY N/A 11/9/2020    Procedure: ECHOCARDIOGRAM, TRANSESOPHAGEAL;  Surgeon: Marcelina Diagnostic Provider;  Location: [facility] EP LAB;  Service: Cardiology;  Laterality: N/A;    TREATMENT OF CARDIAC ARRHYTHMIA N/A 11/9/2020    Procedure: CARDIOVERSION;  Surgeon: Marvin Elmore MD;  Location: [facility] EP LAB;  Service: Cardiology;  Laterality: N/A;  AF, RAFFAELE, DCCV, MAC, GP, 3 PREP     Family History   Problem Relation Age of Onset    Heart attack Mother     Heart  disease Mother     No Known Problems Father     Cancer Sister     Hypertension Daughter     Asthma Daughter     Allergies Daughter     Cancer Daughter         ovarian    Amblyopia Neg Hx     Blindness Neg Hx     Cataracts Neg Hx     Diabetes Neg Hx     Glaucoma Neg Hx     Macular degeneration Neg Hx     Retinal detachment Neg Hx     Strabismus Neg Hx     Stroke Neg Hx     Thyroid disease Neg Hx      Social History     Tobacco Use    Smoking status: Never    Smokeless tobacco: Never   Substance Use Topics    Alcohol use: Yes     Comment: wine occas.    Drug use: No     Review of Systems   Constitutional:  Negative for fever.   HENT:  Negative for congestion.    Eyes:  Negative for redness.   Respiratory:  Positive for cough and shortness of breath.    Cardiovascular:  Positive for leg swelling. Negative for chest pain.   Gastrointestinal:  Negative for abdominal pain.   Genitourinary:  Negative for dysuria.   Skin:  Negative for rash.   Neurological:  Negative for headaches.   Psychiatric/Behavioral:  Negative for confusion.        Physical Exam     Initial Vitals [01/30/24 1948]   BP Pulse Resp Temp SpO2   (!) 144/70 83 (!) 26 98.6 °F (37 °C) 96 %      MAP       --         Physical Exam    Constitutional: She is not diaphoretic. No distress.   HENT:   Head: Normocephalic and atraumatic.   Eyes: Conjunctivae are normal.   Neck: Neck supple.   Cardiovascular:  Normal rate, regular rhythm, S1 normal, S2 normal and intact distal pulses.           Murmur (3/6 systolic murmur) heard.  Pulmonary/Chest: No respiratory distress. She has no wheezes. She has no rhonchi. She has rales.   Bibasilar rales   Abdominal: Abdomen is soft. There is no abdominal tenderness. There is no rebound and no guarding.   Musculoskeletal:         General: Edema (2+ pitting edema bilateral lower extremities) present.      Cervical back: Neck supple.     Neurological: She is alert and oriented to person, place, and time.   Skin: Skin is warm and  dry.   Psychiatric: She has a normal mood and affect.         ED Course   Procedures  Labs Reviewed   CBC W/ AUTO DIFFERENTIAL - Abnormal; Notable for the following components:       Result Value    RBC 3.72 (*)     Hemoglobin 11.4 (*)     Hematocrit 36.3 (*)     MCHC 31.4 (*)     RDW 14.9 (*)     Lymph % 17.8 (*)     All other components within normal limits   COMPREHENSIVE METABOLIC PANEL - Abnormal; Notable for the following components:    Glucose 135 (*)     eGFR 41 (*)     All other components within normal limits   B-TYPE NATRIURETIC PEPTIDE - Abnormal; Notable for the following components:    BNP 1,479 (*)     All other components within normal limits   TROPONIN I - Abnormal; Notable for the following components:    Troponin I 0.231 (*)     All other components within normal limits   PROTIME-INR - Abnormal; Notable for the following components:    Prothrombin Time 14.4 (*)     INR 1.3 (*)     All other components within normal limits    Narrative:     Release to patient->Immediate   HIV 1 / 2 ANTIBODY    Narrative:     Release to patient->Immediate   HEPATITIS C ANTIBODY    Narrative:     Release to patient->Immediate          Imaging Results              X-Ray Chest AP Portable (Final result)  Result time 01/30/24 21:34:46      Final result by Blaine Slade MD (01/30/24 21:34:46)                   Impression:      Cardiomegaly with increase in the right-sided pleural effusion.      Electronically signed by: Blaine Slade MD  Date:    01/30/2024  Time:    21:34               Narrative:    EXAMINATION:  XR CHEST AP PORTABLE    CLINICAL HISTORY:  Dyspnea, unspecified    TECHNIQUE:  Single frontal view of the chest was performed.    COMPARISON:  07/31/2023.    FINDINGS:  Monitoring EKG leads are present.  The trachea is unremarkable.  The cardiomediastinal silhouette is enlarged.  There is no evidence of free air the hemidiaphragms.  There is increase in the right-sided pleural effusion.  There is no pleural  effusion on the left.  There is no evidence of a pneumothorax.  There is no evidence of pneumomediastinum.  No airspace opacity is present.  There are degenerative changes in the osseous structures.                                       Medications   furosemide injection 40 mg (40 mg Intravenous Given 1/30/24 2147)     Medical Decision Making      89-year-old female with history of AFib, CHF/pulmonary hypertension on home O2, HTN, DM, previous DVT, brought by EMS for evaluation of worsening shortness of breath.  Per patient, it has been ongoing for weeks but worse recently, with associated lower leg swelling and dry cough.  She denies any associated chest pain or tightness, no fevers or other new complaints.  Caregiver now at bedside and confirms patient history, stating that she has also had worsening orthopnea and sleeping of right for the past few days, and that lower leg swelling only started in the past week.  She also states that patient has had right breast enlargement as well, patient states that she has had this before with fluid overload.  She has been compliant with Lasix but no increased dose given recently, and she is ambulatory with a walker with assistance but has been more out of breath doing this recently.    On exam patient with bibasilar rales, lower leg edema, and enlarged right breast but no palpable masses or tenderness os erythema to suggest malignancy or cellulitis.  Exam is concerning for CHF exacerbation.  Per chart review patient has been admitted for this in the past, most recently 6 months ago.  Her most recent echo with EF 40-45% then, and she is compliant with Lasix daily.  She also has had increased O2 requirements, usually 2 L but now 3 L.    ED workup confirm CHF exacerbation with elevated BNP and troponin both above previous baseline; no EKG changes or chest pain to suggest ACS.  Renal function near baseline, and chest x-ray shows cardiomegaly with increase in right-sided pleural  effusion.  Patient treated with IV Lasix in ED, given increased O2 requirement and signs of fluid overload and CURRY, will admit for further diuresis      Amount and/or Complexity of Data Reviewed  Labs: ordered.  Radiology: ordered.    Risk  Prescription drug management.                                      Clinical Impression:  Final diagnoses:  [R06.00] Dyspnea  [I50.9] Acute on chronic congestive heart failure, unspecified heart failure type (Primary)          ED Disposition Condition    Observation Stable                Pierre Rausch MD  01/30/24 4028

## 2024-02-01 LAB
ANION GAP SERPL CALC-SCNC: 7 MMOL/L (ref 8–16)
BUN SERPL-MCNC: 26 MG/DL (ref 10–30)
CALCIUM SERPL-MCNC: 9.4 MG/DL (ref 8.7–10.5)
CHLORIDE SERPL-SCNC: 109 MMOL/L (ref 95–110)
CO2 SERPL-SCNC: 28 MMOL/L (ref 23–29)
CREAT SERPL-MCNC: 1 MG/DL (ref 0.5–1.4)
EST. GFR  (NO RACE VARIABLE): 51 ML/MIN/1.73 M^2
GLUCOSE SERPL-MCNC: 106 MG/DL (ref 70–110)
POTASSIUM SERPL-SCNC: 4.1 MMOL/L (ref 3.5–5.1)
SODIUM SERPL-SCNC: 144 MMOL/L (ref 136–145)

## 2024-02-01 PROCEDURE — 27000221 HC OXYGEN, UP TO 24 HOURS: Mod: HCNC

## 2024-02-01 PROCEDURE — 63600175 PHARM REV CODE 636 W HCPCS: Mod: HCNC | Performed by: NURSE PRACTITIONER

## 2024-02-01 PROCEDURE — 36415 COLL VENOUS BLD VENIPUNCTURE: CPT | Mod: HCNC | Performed by: NURSE PRACTITIONER

## 2024-02-01 PROCEDURE — 25000003 PHARM REV CODE 250: Mod: HCNC | Performed by: NURSE PRACTITIONER

## 2024-02-01 PROCEDURE — 80048 BASIC METABOLIC PNL TOTAL CA: CPT | Mod: HCNC | Performed by: NURSE PRACTITIONER

## 2024-02-01 PROCEDURE — 99900035 HC TECH TIME PER 15 MIN (STAT): Mod: HCNC

## 2024-02-01 PROCEDURE — 94761 N-INVAS EAR/PLS OXIMETRY MLT: CPT | Mod: HCNC

## 2024-02-01 PROCEDURE — 21400001 HC TELEMETRY ROOM: Mod: HCNC

## 2024-02-01 RX ADMIN — LOSARTAN POTASSIUM 25 MG: 25 TABLET, FILM COATED ORAL at 09:02

## 2024-02-01 RX ADMIN — ATORVASTATIN CALCIUM 20 MG: 20 TABLET, FILM COATED ORAL at 09:02

## 2024-02-01 RX ADMIN — LEVOTHYROXINE SODIUM 25 MCG: 25 TABLET ORAL at 05:02

## 2024-02-01 RX ADMIN — ESCITALOPRAM OXALATE 10 MG: 10 TABLET ORAL at 09:02

## 2024-02-01 RX ADMIN — FUROSEMIDE 40 MG: 10 INJECTION, SOLUTION INTRAMUSCULAR; INTRAVENOUS at 09:02

## 2024-02-01 RX ADMIN — APIXABAN 2.5 MG: 2.5 TABLET, FILM COATED ORAL at 09:02

## 2024-02-01 RX ADMIN — AMLODIPINE BESYLATE 10 MG: 5 TABLET ORAL at 09:02

## 2024-02-01 NOTE — SUBJECTIVE & OBJECTIVE
Interval History: No acute events overnight. SOB improving. Discussed plan of care. No other concerns at this time.    Review of Systems   Constitutional:  Negative for chills and fever.   Respiratory:  Positive for shortness of breath. Negative for cough.    Cardiovascular:  Positive for leg swelling. Negative for chest pain and palpitations.   Gastrointestinal:  Negative for abdominal pain, nausea and vomiting.     Objective:     Vital Signs (Most Recent):  Temp: 98.5 °F (36.9 °C) (01/31/24 1731)  Pulse: (!) 58 (01/1935)  Resp: 18 (01/31/24 1731)  BP: (!) 121/59 (01/31/24 1731)  SpO2: 97 % (01/31/24 2006) Vital Signs (24h Range):  Temp:  [97.4 °F (36.3 °C)-98.7 °F (37.1 °C)] 98.5 °F (36.9 °C)  Pulse:  [50-68] 58  Resp:  [16-22] 18  SpO2:  [91 %-98 %] 97 %  BP: (121-166)/(59-81) 121/59     Weight: 68 kg (150 lb)  Body mass index is 25.75 kg/m².    Intake/Output Summary (Last 24 hours) at 1/31/2024 2012  Last data filed at 1/31/2024 0528  Gross per 24 hour   Intake 50 ml   Output 300 ml   Net -250 ml         Physical Exam  Vitals and nursing note reviewed.   Constitutional:       General: She is not in acute distress.     Appearance: She is well-developed.   HENT:      Head: Normocephalic and atraumatic.   Eyes:      General:         Right eye: No discharge.         Left eye: No discharge.      Conjunctiva/sclera: Conjunctivae normal.   Cardiovascular:      Rate and Rhythm: Normal rate.      Pulses: Normal pulses.      Heart sounds: Murmur heard.   Pulmonary:      Effort: Pulmonary effort is normal. No respiratory distress.      Comments: Diminished at bases bilaterally.  Abdominal:      Palpations: Abdomen is soft.      Tenderness: There is no abdominal tenderness.   Musculoskeletal:         General: Normal range of motion.      Right lower leg: Edema present.      Left lower leg: Edema present.   Skin:     General: Skin is warm and dry.   Neurological:      Mental Status: She is alert and oriented to person,  place, and time.           Significant Labs:   CBC:  Recent Labs   Lab 01/30/24 2012   WBC 5.51   HGB 11.4*   HCT 36.3*      GRAN 70.3  3.9   LYMPH 17.8*  1.0   MONO 9.3  0.5   EOS 0.1   BASO 0.05   BMP:  Recent Labs   Lab 01/30/24 2012 01/31/24  0420    143   K 3.5 3.3*    108   CO2 26 26   BUN 26 25   CREATININE 1.2 1.0   * 106   CALCIUM 9.9 9.4   MG 1.9  --      Significant Imaging: I have reviewed and interpreted all pertinent imaging results/findings within the past 24 hours.

## 2024-02-01 NOTE — PROGRESS NOTES
[facility] Surg ([address] Medicine  Progress Note    Patient Name: Inna Blankenship  MRN: [id number]  Patient Class: IP- Inpatient   Admission Date: 1/30/2024  Length of Stay: 1 days  Attending Physician: SHWETHA Boone MD  Primary Care Provider: Stacy Rodriguez MD        Subjective:     Principal Problem:Acute on chronic combined systolic and diastolic congestive heart failure        HPI:  Inna Blankenship is a 89 year old female with a past history of Afib, CHF/pulmonary hypertension on home O2, HTN, DM and past DVT on Eliquis who presents with worsening shortness of breath and lower extremity swelling.  Patient states she has been feeling more short of breath for the past few weeks but was particularly worse today.  She states symptoms are worse in the morning when she wakes up and starts moving.  Patient reports associated nonproductive cough for the past few weeks.  She denies chest pain, fever, chills, nausea, vomiting and diarrhea.  She states she has more oxygen at home and denies wheezing. Caregiver at bedside also reports patient has had worsening orthopnea for the past few days with lower leg swelling only that started in the past week.  She also states that patient has right breast enlargement as well and both she and patient state that she has had this before with fluid overload.  Patient has been compliant with Lasix but no increased dose given recently.  Patient ambulates with walker and assistance and caregiver reports she has been more out of breath with activity lately.      ED workup revealed BNP 1479, troponin 0.2 (prior reading 0.13), hemoglobin 11.4, no leukocytosis and PTT 14.4 at with INR 1.3.  EKG with no ST elevation or depression and unchanged from prior readings.  Chest x-ray reveals cardiomegaly with right-sided pleural effusion.  Patient received IV Lasix in the ED and was referred to Hospital Medicine for further evaluation and management.      Overview/Hospital Course:  No  notes on file    Interval History: No acute events overnight. Not feeling substantially SOB; legs still swollen. No other concerns at this time.    Review of Systems   Constitutional:  Negative for chills and fever.   Respiratory:  Negative for cough and shortness of breath.    Cardiovascular:  Positive for leg swelling. Negative for chest pain and palpitations.   Gastrointestinal:  Negative for abdominal pain, nausea and vomiting.     Objective:     Vital Signs (Most Recent):  Temp: 97.9 °F (36.6 °C) (02/01/24 1931)  Pulse: (!) 59 (02/01/24 1931)  Resp: 16 (02/01/24 1931)  BP: (!) 156/72 (02/01/24 1931)  SpO2: 96 % (02/01/24 1931) Vital Signs (24h Range):  Temp:  [97.8 °F (36.6 °C)-98.2 °F (36.8 °C)] 97.9 °F (36.6 °C)  Pulse:  [55-80] 59  Resp:  [16-20] 16  SpO2:  [88 %-97 %] 96 %  BP: (122-158)/(56-72) 156/72     Weight: 68 kg (149 lb 14.6 oz)  Body mass index is 25.73 kg/m².    Intake/Output Summary (Last 24 hours) at 2/1/2024 2005  Last data filed at 2/1/2024 1719  Gross per 24 hour   Intake 300 ml   Output 1050 ml   Net -750 ml         Physical Exam  Vitals and nursing note reviewed.   Constitutional:       General: She is not in acute distress.     Appearance: She is well-developed.   HENT:      Head: Normocephalic and atraumatic.   Eyes:      General:         Right eye: No discharge.         Left eye: No discharge.      Conjunctiva/sclera: Conjunctivae normal.   Cardiovascular:      Rate and Rhythm: Normal rate.      Pulses: Normal pulses.      Heart sounds: Murmur heard.   Pulmonary:      Effort: Pulmonary effort is normal. No respiratory distress.      Comments: Diminished at bases bilaterally.  Abdominal:      Palpations: Abdomen is soft.      Tenderness: There is no abdominal tenderness.   Musculoskeletal:         General: Normal range of motion.      Right lower leg: Edema present.      Left lower leg: Edema present.   Skin:     General: Skin is warm and dry.   Neurological:      Mental Status: She is  alert and oriented to person, place, and time.             Significant Labs:   CBC:  Recent Labs   Lab 01/30/24 2012   WBC 5.51   HGB 11.4*   HCT 36.3*      GRAN 70.3  3.9   LYMPH 17.8*  1.0   MONO 9.3  0.5   EOS 0.1   BASO 0.05   BMP:  Recent Labs   Lab 01/30/24 2012 01/31/24  0420 02/01/24  0323    143 144   K 3.5 3.3* 4.1    108 109   CO2 26 26 28   BUN 26 25 26   CREATININE 1.2 1.0 1.0   * 106 106   CALCIUM 9.9 9.4 9.4   MG 1.9  --   --      Significant Imaging: I have reviewed and interpreted all pertinent imaging results/findings within the past 24 hours.      Assessment/Plan:      * Acute on chronic combined systolic and diastolic congestive heart failure  - Continue furosemide 40mg IV BID, strict intake/output, daily weights.  - Wean supplemental O2 as tolerated.  - Continue losartan as under HTN.    Thyroid disorder  - Continue levothyroxine 25mcg PO daily.    Controlled type 2 diabetes mellitus with stage 3 chronic kidney disease, without long-term current use of insulin  - Diet-controlled.    History of DVT (deep vein thrombosis)  - Continue apixaban 2.5mg PO BID.    Other hyperlipidemia  - Continue atorvastatin 20 mg PO daily.      Essential hypertension  - Continue amlodipine 10mg PO daily, losartan 25mg PO daily.      VTE Risk Mitigation (From admission, onward)           Ordered     apixaban tablet 2.5 mg  2 times daily         01/30/24 2231     IP VTE HIGH RISK PATIENT  Once         01/30/24 2231     Place sequential compression device  Until discontinued         01/30/24 2231                    Discharge Planning   BEN: 2/2/2024     Code Status: DNR   Is the patient medically ready for discharge?:     Reason for patient still in hospital (select all that apply): Treatment  Discharge Plan A: Home Health                  D Yevgeniy Boone MD  Department of Hospital Medicine   [facility] ([address])

## 2024-02-01 NOTE — ASSESSMENT & PLAN NOTE
- Continue furosemide 40mg IV BID, strict intake/output, daily weights.  - Wean supplemental O2 as tolerated.  - Continue losartan as under HTN.

## 2024-02-01 NOTE — PLAN OF CARE
Best Albarran is signing admission consents with [facility]. Equipment to be delivered to home today.

## 2024-02-01 NOTE — PLAN OF CARE
Medicare Message     Important Message from Medicare regarding Discharge Appeal Rights -- Given to patient/caregiver; Explained to patient/caregiver; Signed/date by patient/caregiver   Date IMM was signed -- 2/1/2024   Time IMM was signed -- [id number]

## 2024-02-01 NOTE — PLAN OF CARE
AAOX1. VSS. Tolerated medication well. NSR on tele monitor. Family sitter at bedside. Placed back on 2 L NC due to labored breathing. Turned pt Q2H and changed purrwick. 1 BM on shift. Strict I&O. Assist pt with feeding. Np mango on shift. Bed locked in lowest position, bed alarm set, and frequently checked on pt. Reoriented pt throughout shift and called family about pt. No falls or injuries on shift. Safety maintained throughout shift.        Problem: Adult Inpatient Plan of Care  Goal: Plan of Care Review  Outcome: Ongoing, Progressing  Goal: Patient-Specific Goal (Individualized)  Outcome: Ongoing, Progressing  Goal: Absence of Hospital-Acquired Illness or Injury  Outcome: Ongoing, Progressing  Goal: Optimal Comfort and Wellbeing  Outcome: Ongoing, Progressing  Goal: Readiness for Transition of Care  Outcome: Ongoing, Progressing     Problem: Diabetes Comorbidity  Goal: Blood Glucose Level Within Targeted Range  Outcome: Ongoing, Progressing     Problem: Fall Injury Risk  Goal: Absence of Fall and Fall-Related Injury  Outcome: Ongoing, Progressing     Problem: Skin Injury Risk Increased  Goal: Skin Health and Integrity  Outcome: Ongoing, Progressing     Problem: Coping Ineffective  Goal: Effective Coping  Outcome: Ongoing, Progressing

## 2024-02-02 VITALS
HEIGHT: 64 IN | TEMPERATURE: 97 F | BODY MASS INDEX: 25.6 KG/M2 | RESPIRATION RATE: 18 BRPM | DIASTOLIC BLOOD PRESSURE: 66 MMHG | HEART RATE: 61 BPM | WEIGHT: 149.94 LBS | SYSTOLIC BLOOD PRESSURE: 147 MMHG | OXYGEN SATURATION: 97 %

## 2024-02-02 LAB
ANION GAP SERPL CALC-SCNC: 9 MMOL/L (ref 8–16)
BUN SERPL-MCNC: 26 MG/DL (ref 10–30)
CALCIUM SERPL-MCNC: 9.9 MG/DL (ref 8.7–10.5)
CHLORIDE SERPL-SCNC: 108 MMOL/L (ref 95–110)
CO2 SERPL-SCNC: 27 MMOL/L (ref 23–29)
CREAT SERPL-MCNC: 0.9 MG/DL (ref 0.5–1.4)
EST. GFR  (NO RACE VARIABLE): 58 ML/MIN/1.73 M^2
GLUCOSE SERPL-MCNC: 92 MG/DL (ref 70–110)
POTASSIUM SERPL-SCNC: 4.2 MMOL/L (ref 3.5–5.1)
SODIUM SERPL-SCNC: 144 MMOL/L (ref 136–145)

## 2024-02-02 PROCEDURE — 25000003 PHARM REV CODE 250: Mod: HCNC | Performed by: NURSE PRACTITIONER

## 2024-02-02 PROCEDURE — 63600175 PHARM REV CODE 636 W HCPCS: Mod: HCNC | Performed by: NURSE PRACTITIONER

## 2024-02-02 PROCEDURE — 80048 BASIC METABOLIC PNL TOTAL CA: CPT | Mod: HCNC | Performed by: NURSE PRACTITIONER

## 2024-02-02 PROCEDURE — 99900035 HC TECH TIME PER 15 MIN (STAT): Mod: HCNC

## 2024-02-02 PROCEDURE — 27000221 HC OXYGEN, UP TO 24 HOURS: Mod: HCNC

## 2024-02-02 PROCEDURE — 94761 N-INVAS EAR/PLS OXIMETRY MLT: CPT | Mod: HCNC

## 2024-02-02 PROCEDURE — 36415 COLL VENOUS BLD VENIPUNCTURE: CPT | Mod: HCNC | Performed by: NURSE PRACTITIONER

## 2024-02-02 RX ADMIN — ATORVASTATIN CALCIUM 20 MG: 20 TABLET, FILM COATED ORAL at 10:02

## 2024-02-02 RX ADMIN — LOSARTAN POTASSIUM 25 MG: 25 TABLET, FILM COATED ORAL at 10:02

## 2024-02-02 RX ADMIN — ESCITALOPRAM OXALATE 10 MG: 10 TABLET ORAL at 10:02

## 2024-02-02 RX ADMIN — LEVOTHYROXINE SODIUM 25 MCG: 25 TABLET ORAL at 05:02

## 2024-02-02 RX ADMIN — FUROSEMIDE 40 MG: 10 INJECTION, SOLUTION INTRAMUSCULAR; INTRAVENOUS at 10:02

## 2024-02-02 RX ADMIN — AMLODIPINE BESYLATE 10 MG: 5 TABLET ORAL at 10:02

## 2024-02-02 RX ADMIN — APIXABAN 2.5 MG: 2.5 TABLET, FILM COATED ORAL at 10:02

## 2024-02-02 NOTE — PLAN OF CARE
Problem: Adult Inpatient Plan of Care  Goal: Plan of Care Review  Outcome: Ongoing, Progressing  Flowsheets (Taken 2/2/2024 0258)  Plan of Care Reviewed With: patient  Goal: Optimal Comfort and Wellbeing  Outcome: Ongoing, Progressing     Problem: Diabetes Comorbidity  Goal: Blood Glucose Level Within Targeted Range  Outcome: Ongoing, Progressing     Problem: Fall Injury Risk  Goal: Absence of Fall and Fall-Related Injury  Outcome: Ongoing, Progressing  Intervention: Promote Injury-Free Environment  Flowsheets (Taken 2/2/2024 0258)  Safety Promotion/Fall Prevention:   assistive device/personal item within reach   bed alarm set   Fall Risk signage in place   medications reviewed   nonskid shoes/socks when out of bed   side rails raised x 2   instructed to call staff for mobility     Problem: Skin Injury Risk Increased  Goal: Skin Health and Integrity  Outcome: Ongoing, Progressing     POC reviewed with patient, verbalized understanding. Patient confused and AAOX1. Re-oriented to place, time, and situation. VSS, continuous cardiac monitoring in place. On 2L NC. New PIV started to RAC, pt tolerated well. Pt denied any pain or discomfort within shift. No acute events overnight. Turn q2. Fall precautions maintained. Bed locked and in lowest position. Side rails up and locked X2. Bed alarm set and audible; call light and personal belongings within reach. All questions and concerns addressed; pt progressing towards goal. See flow sheet for full assessment and V/S info. Pt instructed to call for assistance, verbalized understanding.

## 2024-02-02 NOTE — SUBJECTIVE & OBJECTIVE
Interval History: No acute events overnight. Not feeling substantially SOB; legs still swollen. No other concerns at this time.    Review of Systems   Constitutional:  Negative for chills and fever.   Respiratory:  Negative for cough and shortness of breath.    Cardiovascular:  Positive for leg swelling. Negative for chest pain and palpitations.   Gastrointestinal:  Negative for abdominal pain, nausea and vomiting.     Objective:     Vital Signs (Most Recent):  Temp: 97.9 °F (36.6 °C) (02/01/24 1931)  Pulse: (!) 59 (02/01/24 1931)  Resp: 16 (02/01/24 1931)  BP: (!) 156/72 (02/01/24 1931)  SpO2: 96 % (02/01/24 1931) Vital Signs (24h Range):  Temp:  [97.8 °F (36.6 °C)-98.2 °F (36.8 °C)] 97.9 °F (36.6 °C)  Pulse:  [55-80] 59  Resp:  [16-20] 16  SpO2:  [88 %-97 %] 96 %  BP: (122-158)/(56-72) 156/72     Weight: 68 kg (149 lb 14.6 oz)  Body mass index is 25.73 kg/m².    Intake/Output Summary (Last 24 hours) at 2/1/2024 2005  Last data filed at 2/1/2024 1719  Gross per 24 hour   Intake 300 ml   Output 1050 ml   Net -750 ml         Physical Exam  Vitals and nursing note reviewed.   Constitutional:       General: She is not in acute distress.     Appearance: She is well-developed.   HENT:      Head: Normocephalic and atraumatic.   Eyes:      General:         Right eye: No discharge.         Left eye: No discharge.      Conjunctiva/sclera: Conjunctivae normal.   Cardiovascular:      Rate and Rhythm: Normal rate.      Pulses: Normal pulses.      Heart sounds: Murmur heard.   Pulmonary:      Effort: Pulmonary effort is normal. No respiratory distress.      Comments: Diminished at bases bilaterally.  Abdominal:      Palpations: Abdomen is soft.      Tenderness: There is no abdominal tenderness.   Musculoskeletal:         General: Normal range of motion.      Right lower leg: Edema present.      Left lower leg: Edema present.   Skin:     General: Skin is warm and dry.   Neurological:      Mental Status: She is alert and oriented  to person, place, and time.             Significant Labs:   CBC:  Recent Labs   Lab 01/30/24 2012   WBC 5.51   HGB 11.4*   HCT 36.3*      GRAN 70.3  3.9   LYMPH 17.8*  1.0   MONO 9.3  0.5   EOS 0.1   BASO 0.05   BMP:  Recent Labs   Lab 01/30/24 2012 01/31/24  0420 02/01/24  0323    143 144   K 3.5 3.3* 4.1    108 109   CO2 26 26 28   BUN 26 25 26   CREATININE 1.2 1.0 1.0   * 106 106   CALCIUM 9.9 9.4 9.4   MG 1.9  --   --      Significant Imaging: I have reviewed and interpreted all pertinent imaging results/findings within the past 24 hours.

## 2024-02-02 NOTE — PLAN OF CARE
[facility]  Department of Hospital Medicine  [address] [place] [address]  [phone] after hours  [phone] fax    HOSPICE  ORDERS    02/02/2024    Admit to Hospice:  Home Service  Diagnoses:   Active Hospital Problems    Diagnosis  POA    *Acute on chronic combined systolic and diastolic congestive heart failure [I50.43]  Yes    Encounter for palliative care [Z51.5]  Not Applicable    Thyroid disorder [E07.9]  Yes    Controlled type 2 diabetes mellitus with stage 3 chronic kidney disease, without long-term current use of insulin [E11.22, N18.30]  Yes    History of DVT (deep vein thrombosis) [Z86.718]  Not Applicable    Other hyperlipidemia [E78.49]  Yes    Essential hypertension [I10]  Yes      Resolved Hospital Problems   No resolved problems to display.       Hospice Qualifying Diagnoses:        Patient has a life expectancy < 6 months due to:  Primary Hospice Diagnosis: CHF  Comorbid Conditions Contributing to Decline: CKD, cardiac amyloidosis, pHTN, h/o DVT    Vital Signs: Routine per Hospice Protocol.    Code Status: DNR    Allergies: Review of patient's allergies indicates:  No Known Allergies    Diet: Cardiac    Activities: As tolerated    Goals of Care Treatment Preferences:  Code Status: DNR    Health care agent: Cara (daughter)  Health care agent number: No value filed.          What is most important right now is to focus on avoiding the hospital, comfort and QOL .  Accordingly, we have decided that the best plan to meet the patient's goals includes enrolling in hospice care.      Nursing: Per Hospice Routine.   Routine Skin for Bedridden Patients: Apply moisture barrier cream to all skin folds and wet areas in perineal area daily and after baths and all bowel movements.    Oxygen: 2L O2 via NC continuously    Medications:      Medication List        CONTINUE taking these medications      amLODIPine 10 MG tablet  Commonly known as: NORVASC  TAKE 1  TABLET (10 MG TOTAL) BY MOUTH ONCE DAILY.     apixaban 2.5 mg Tab  Commonly known as: ELIQUIS  Take 1 tablet (2.5 mg total) by mouth 2 (two) times daily.     atorvastatin 20 MG tablet  Commonly known as: LIPITOR  Take 1 tablet (20 mg total) by mouth once daily.     EScitalopram oxalate 10 MG tablet  Commonly known as: LEXAPRO  TAKE 1 TABLET EVERY DAY     furosemide 20 MG tablet  Commonly known as: LASIX  Take 2 tablets (40 mg total) by mouth once daily.     latanoprost 0.005 % ophthalmic solution  PLACE 1 DROP INTO BOTH EYES ONCE DAILY.     levothyroxine 25 MCG tablet  Commonly known as: SYNTHROID  Take 1 tablet (25 mcg total) by mouth before breakfast.     losartan 25 MG tablet  Commonly known as: COZAAR  TAKE 1 TABLET EVERY DAY     MULTIVITAMIN 50 PLUS ORAL  Take 1 tablet by mouth once daily.     promethazine-dextromethorphan 6.25-15 mg/5 mL Syrp  Commonly known as: PROMETHAZINE-DM  Take 5 mLs by mouth every 8 (eight) hours as needed.            _________________________________  D Yevgeniy Boone MD  02/02/2024

## 2024-02-02 NOTE — DISCHARGE SUMMARY
[facility] Surg ([address] Medicine  Discharge Summary      Patient Name: Inna Blankenship  MRN: [id number]  RICCI: [id number]  Patient Class: IP- Inpatient  Admission Date: 1/30/2024  Hospital Length of Stay: 2 days  Discharge Date and Time: 2/2/2024  1:00 PM  Attending Physician: SHWETHA Boone MD   Discharging Provider: LOPEZ Boone MD  Primary Care Provider: Stacy Rodriguez MD    Primary Care Team: Networked reference to record PCT     HPI:   Inna Blankenship is a 89 year old female with a past history of Afib, CHF/pulmonary hypertension on home O2, HTN, DM and past DVT on Eliquis who presents with worsening shortness of breath and lower extremity swelling.  Patient states she has been feeling more short of breath for the past few weeks but was particularly worse today.  She states symptoms are worse in the morning when she wakes up and starts moving.  Patient reports associated nonproductive cough for the past few weeks.  She denies chest pain, fever, chills, nausea, vomiting and diarrhea.  She states she has more oxygen at home and denies wheezing. Caregiver at bedside also reports patient has had worsening orthopnea for the past few days with lower leg swelling only that started in the past week.  She also states that patient has right breast enlargement as well and both she and patient state that she has had this before with fluid overload.  Patient has been compliant with Lasix but no increased dose given recently.  Patient ambulates with walker and assistance and caregiver reports she has been more out of breath with activity lately.      ED workup revealed BNP 1479, troponin 0.2 (prior reading 0.13), hemoglobin 11.4, no leukocytosis and PTT 14.4 at with INR 1.3.  EKG with no ST elevation or depression and unchanged from prior readings.  Chest x-ray reveals cardiomegaly with right-sided pleural effusion.  Patient received IV Lasix in the ED and was referred to Hospital Medicine for further  evaluation and management.      * No surgery found *      Hospital Course:   Admitted with CHF exacerbation and diuresis initiated. Palliative consulted; patient and family had been looking into hospice in outpatient setting. Diuresis continued and respiratory status improved toward baseline. With clinical improvement and vital stability she was prepared for discharge home with home hospice.    Goals of Care Treatment Preferences:  Code Status: DNR    Health care agent: Cara (daughter)  Health care agent number: No value filed.          What is most important right now is to focus on avoiding the hospital, comfort and QOL .  Accordingly, we have decided that the best plan to meet the patient's goals includes enrolling in hospice care.      Consults:   Consults (From admission, onward)          Status Ordering Provider     Inpatient consult to Palliative Care  Once        Provider:  Bertha Jordan RN    Completed SHWETHA CLARK     Inpatient consult to Social Work/Case Management  Once        Provider:  (Not yet assigned)    Completed KRIS VEGA     Inpatient consult to Registered Dietitian/Nutritionist  Once        Provider:  (Not yet assigned)    Completed KRIS VEGA            No new Assessment & Plan notes have been filed under this hospital service since the last note was generated.  Service: Hospital Medicine    Final Active Diagnoses:    Diagnosis Date Noted POA    PRINCIPAL PROBLEM:  Acute on chronic combined systolic and diastolic congestive heart failure [I50.43] 08/28/2020 Yes    Encounter for palliative care [Z51.5] 01/31/2024 Not Applicable    Thyroid disorder [E07.9] 01/30/2024 Yes    Controlled type 2 diabetes mellitus with stage 3 chronic kidney disease, without long-term current use of insulin [E11.22, N18.30] 08/04/2020 Yes    History of DVT (deep vein thrombosis) [Z86.718] 07/17/2018 Not Applicable    Other hyperlipidemia [E78.49] 07/29/2012 Yes    Essential hypertension  "[I10] 07/27/2012 Yes      Problems Resolved During this Admission:       Discharged Condition: stable    Disposition:     Follow Up:   Follow-up Information       [facility]    Specialties: Hospice and Palliative Medicine, Hospice Services  Contact information:  [address] [place] [id number] [phone]                           Patient Instructions:      HOSPITAL BED FOR HOME USE     Order Specific Question Answer Comments   Type: Semi-electric    Length of need (1-99 months): 99    Does patient have medical equipment at home? walker, rolling    Does patient have medical equipment at home? oxygen    Height: 5' 4" (1.626 m)    Weight: 68 kg (150 lb)    Please check all that apply: Patient requires positioning of the body in ways not feasible in an ordinary bed due to a medical condition which is expected to last at least one month.    Please check all that apply: Patient requires, for the alleviation of pain, positioning of the body in ways not feasible in an ordinary bed.    Please check all that apply: Patient requires the head of bed to be elevated more than 30 degrees most of the time due to congestive heart failure, chronic pulmonary disease, or aspiration.  Pillows and wedges have been considered and ruled out.    Please check all that apply: Patient requires a bed height different than a fixed height hospital bed to permit transfers to chair, wheelchair, or standing.    Please check all that apply: Patient requires frequent changes in body position and/or has an immediate need for a change in body position.        Significant Diagnostic Studies:   CBC:  Recent Labs   Lab 01/30/24 2012   WBC 5.51   HGB 11.4*   HCT 36.3*      GRAN 70.3  3.9   LYMPH 17.8*  1.0   MONO 9.3  0.5   EOS 0.1   BASO 0.05     BMP:  Recent Labs   Lab 01/30/24 2012 01/31/24  0420 02/01/24  0323 02/02/24  0424    143 144 144   K 3.5 3.3* 4.1 4.2    108 109 108   CO2 26 26 28 27   BUN 26 25 " 26 26   CREATININE 1.2 1.0 1.0 0.9   * 106 106 92   CALCIUM 9.9 9.4 9.4 9.9   MG 1.9  --   --   --      CMP:  Recent Labs   Lab 01/30/24 2012 01/31/24  0420 02/01/24  0323 02/02/24 0424    143 144 144   K 3.5 3.3* 4.1 4.2    108 109 108   CO2 26 26 28 27   BUN 26 25 26 26   CREATININE 1.2 1.0 1.0 0.9   * 106 106 92   CALCIUM 9.9 9.4 9.4 9.9   MG 1.9  --   --   --    ALKPHOS 86  --   --   --    AST 22  --   --   --    ALT 14  --   --   --    BILITOT 0.8  --   --   --    PROT 7.4  --   --   --    ALBUMIN 3.7  --   --   --    ANIONGAP 10 9 7* 9     Imaging Results              X-Ray Chest AP Portable (Final result)  Result time 01/30/24 21:34:46      Final result by Blaine Slade MD (01/30/24 21:34:46)                   Impression:      Cardiomegaly with increase in the right-sided pleural effusion.      Electronically signed by: Blaine Slade MD  Date:    01/30/2024  Time:    21:34               Narrative:    EXAMINATION:  XR CHEST AP PORTABLE    CLINICAL HISTORY:  Dyspnea, unspecified    TECHNIQUE:  Single frontal view of the chest was performed.    COMPARISON:  07/31/2023.    FINDINGS:  Monitoring EKG leads are present.  The trachea is unremarkable.  The cardiomediastinal silhouette is enlarged.  There is no evidence of free air the hemidiaphragms.  There is increase in the right-sided pleural effusion.  There is no pleural effusion on the left.  There is no evidence of a pneumothorax.  There is no evidence of pneumomediastinum.  No airspace opacity is present.  There are degenerative changes in the osseous structures.                                      Pending Diagnostic Studies:       None           Medications:  Reconciled Home Medications:      Medication List        CONTINUE taking these medications      amLODIPine 10 MG tablet  Commonly known as: NORVASC  TAKE 1 TABLET (10 MG TOTAL) BY MOUTH ONCE DAILY.     apixaban 2.5 mg Tab  Commonly known as: ELIQUIS  Take 1 tablet (2.5 mg  total) by mouth 2 (two) times daily.     atorvastatin 20 MG tablet  Commonly known as: LIPITOR  Take 1 tablet (20 mg total) by mouth once daily.     EScitalopram oxalate 10 MG tablet  Commonly known as: LEXAPRO  TAKE 1 TABLET EVERY DAY     furosemide 20 MG tablet  Commonly known as: LASIX  Take 2 tablets (40 mg total) by mouth once daily.     latanoprost 0.005 % ophthalmic solution  PLACE 1 DROP INTO BOTH EYES ONCE DAILY.     levothyroxine 25 MCG tablet  Commonly known as: SYNTHROID  Take 1 tablet (25 mcg total) by mouth before breakfast.     losartan 25 MG tablet  Commonly known as: COZAAR  TAKE 1 TABLET EVERY DAY     MULTIVITAMIN 50 PLUS ORAL  Take 1 tablet by mouth once daily.     promethazine-dextromethorphan 6.25-15 mg/5 mL Syrp  Commonly known as: PROMETHAZINE-DM  Take 5 mLs by mouth every 8 (eight) hours as needed.              Indwelling Lines/Drains at time of discharge:   Lines/Drains/Airways       Drain  Duration             Female External Urinary Catheter w/ Suction 01/30/24 2200 2 days                    Time spent on the discharge of patient: 35 minutes         D Yevgeniy Boone MD  Department of Hospital Medicine  [facility] ([address])

## 2024-02-02 NOTE — PLAN OF CARE
Patient has been accepted to [facility]-admissions consents and equipment delivered to home. Best Albarran provided update stated someone is at the home to receive patient. ADT 30 placed. Nurse Kaylee provided with update.   02/02/24 1039   Final Note   Assessment Type Final Discharge Note   Anticipated Discharge Disposition [facility] Resources/Appts/Education Provided Provided patient/caregiver with written discharge plan information   Post-Acute Status   Post-Acute Authorization Hospice   Hospice Status Set-up Complete/Auth obtained   Discharge Delays (!) Ambulance Transport/Facility Transport     [facility] ([address])  Discharge Final Note    Primary Care Provider: Stacy Rodriguez MD    Expected Discharge Date: 2/2/2024    Final Discharge Note (most recent)       Final Note - 02/02/24 1039          Final Note    Assessment Type Final Discharge Note (P)      Anticipated Discharge Disposition Hospice/Home (P)      Hospital Resources/Appts/Education Provided Provided patient/caregiver with written discharge plan information (P)         Post-Acute Status    Post-Acute Authorization Hospice (P)      Hospice Status Set-up Complete/Auth obtained (P)      Discharge Delays Ambulance Transport/Facility Transport (P)                      Important Message from Medicare  Important Message from Medicare regarding Discharge Appeal Rights: Given to patient/caregiver, Explained to patient/caregiver, Signed/date by patient/caregiver     Date IMM was signed: 02/01/24  Time IMM was signed: 0907    Contact Info       [facility]   Specialty: Hospice and Palliative Medicine, Hospice Services    [address] [place] [address]   Phone: [phone]       Next Steps: Follow up

## 2024-02-18 NOTE — NURSING TRANSFER
Nursing Transfer Note      7/31/2023   5:20 PM    Nurse giving handoff:none- per ED protocol    Reason patient is being transferred: to floor for CHF exacerbation    Transfer From: ED    Transfer via stretcher    Transfer with O2, cardiac monitoring    Transported by transport staff    Transfer Vital Signs:  Blood Pressure:150/71  Heart Rate:51  O2:98%  Temperature:97.6  Respirations:17    Telemetry: Box Number [id number], Rate 52, Rhythm SB, and Telemetry  Aracelis  Order for Tele Monitor? Yes    Additional Lines: Oxygen    4eyes on Skin: yes    Medicines sent: Yes- eye drops    Any special needs or follow-up needed: Cardiology consult    Patient belongings transferred with patient: Yes    Chart send with patient: No    Notified: grandson    Upon arrival to floor: cardiac monitor applied, patient oriented to room, call bell in reach, and bed in lowest position   No

## (undated) DEVICE — PAD DEFIB CADENCE ADULT R2